# Patient Record
Sex: FEMALE | Race: WHITE | NOT HISPANIC OR LATINO | Employment: OTHER | URBAN - METROPOLITAN AREA
[De-identification: names, ages, dates, MRNs, and addresses within clinical notes are randomized per-mention and may not be internally consistent; named-entity substitution may affect disease eponyms.]

---

## 2017-04-25 DIAGNOSIS — E78.5 HYPERLIPIDEMIA LDL GOAL <130: ICD-10-CM

## 2017-04-25 RX ORDER — ATORVASTATIN CALCIUM 10 MG/1
TABLET, FILM COATED ORAL
Qty: 90 TABLET | Refills: 0 | Status: SHIPPED | OUTPATIENT
Start: 2017-04-25 | End: 2017-07-06

## 2017-04-25 NOTE — TELEPHONE ENCOUNTER
atorvastatin (LIPITOR) 10 MG tablet 90 tablet 3 2/22/2016              Last Written Prescription Date: 2/22/16  Last Fill Quantity: 90, # refills: 3    Last Office Visit with [facility], [facility] or [facility] prescribing provider:  10/10/16   Future Office Visit:  None    BP Readings from Last 3 Encounters:   10/10/16 115/56   03/25/16 149/78   02/22/16 138/63     Lab Results   Component Value Date    ALT 32 02/22/2016     Lab Results   Component Value Date    CHOL 174 09/21/2012     Lab Results   Component Value Date    HDL 68 02/22/2016     Lab Results   Component Value Date    LDL 87 02/22/2016    LDL 95 09/21/2012     Lab Results   Component Value Date    TRIG 95 09/21/2012     Lab Results   Component Value Date    CHOLHDLRATIO 2.9 09/21/2012

## 2017-04-25 NOTE — TELEPHONE ENCOUNTER
Prescription approved per [facility] Refill Protocol.  Lauren Rodriguez RN

## 2017-04-27 DIAGNOSIS — I10 ESSENTIAL HYPERTENSION, BENIGN: ICD-10-CM

## 2017-04-27 RX ORDER — CAPTOPRIL 50 MG/1
TABLET ORAL
Qty: 135 TABLET | Refills: 0 | Status: SHIPPED | OUTPATIENT
Start: 2017-04-27 | End: 2017-05-31

## 2017-04-27 NOTE — TELEPHONE ENCOUNTER
Pt is due for general Px. Please help her schedule then route back to refill pool. Thanks.     Amna Brown RN

## 2017-04-27 NOTE — TELEPHONE ENCOUNTER
Medication is being filled for 1 time refill only due to:  Due for labs  Filled 1 month supply  Mirian Eduardo RN

## 2017-04-27 NOTE — TELEPHONE ENCOUNTER
Pending Prescriptions:                       Disp   Refills    captopril (CAPOTEN) 50 MG tablet [Pharmacy*135 ta*0        Sig: TAKE ONE AND ONE-HALF TABLETS BY MOUTH THREE TIMES           DAILY          Last Written Prescription Date: 12/13/2016  Last Fill Quantity: 135, # refills: 0  Last Office Visit with [facility], [facility] or [facility] prescribing provider: 10/10/2016       Potassium   Date Value Ref Range Status   02/22/2016 4.2 3.4 - 5.3 mmol/L Final     Creatinine   Date Value Ref Range Status   02/22/2016 0.97 0.52 - 1.04 mg/dL Final     BP Readings from Last 3 Encounters:   10/10/16 115/56   03/25/16 149/78   02/22/16 138/63

## 2017-06-08 ENCOUNTER — OFFICE VISIT (OUTPATIENT)
Dept: URGENT CARE | Facility: URGENT CARE | Age: 82
End: 2017-06-08
Payer: COMMERCIAL

## 2017-06-08 ENCOUNTER — HOSPITAL ENCOUNTER (OUTPATIENT)
Dept: ULTRASOUND IMAGING | Facility: CLINIC | Age: 82
Discharge: HOME | End: 2017-06-08
Attending: PHYSICIAN ASSISTANT | Admitting: PHYSICIAN ASSISTANT
Payer: MEDICARE

## 2017-06-08 VITALS
BODY MASS INDEX: 41.81 KG/M2 | TEMPERATURE: 98.1 F | WEIGHT: 236 LBS | DIASTOLIC BLOOD PRESSURE: 70 MMHG | SYSTOLIC BLOOD PRESSURE: 152 MMHG | OXYGEN SATURATION: 97 % | HEART RATE: 63 BPM

## 2017-06-08 DIAGNOSIS — M79.661 RIGHT CALF PAIN: ICD-10-CM

## 2017-06-08 DIAGNOSIS — M79.10 MUSCLE PAIN: ICD-10-CM

## 2017-06-08 DIAGNOSIS — M79.604 PAIN OF RIGHT LOWER EXTREMITY: Primary | ICD-10-CM

## 2017-06-08 DIAGNOSIS — M79.604 PAIN OF RIGHT LOWER EXTREMITY: ICD-10-CM

## 2017-06-08 PROCEDURE — 99214 OFFICE O/P EST MOD 30 MIN: CPT | Performed by: PHYSICIAN ASSISTANT

## 2017-06-08 PROCEDURE — 93971 EXTREMITY STUDY: CPT | Mod: RT

## 2017-06-08 RX ORDER — ACETAMINOPHEN 500 MG
1000 TABLET ORAL EVERY 6 HOURS PRN
Qty: 30 TABLET | Refills: 0 | Status: SHIPPED | OUTPATIENT
Start: 2017-06-08 | End: 2018-12-27

## 2017-06-08 RX ORDER — TIZANIDINE 2 MG/1
2 TABLET ORAL 3 TIMES DAILY PRN
Qty: 21 TABLET | Refills: 0 | Status: SHIPPED | OUTPATIENT
Start: 2017-06-08 | End: 2017-07-06

## 2017-06-08 RX ORDER — MELOXICAM 7.5 MG/1
7.5 TABLET ORAL DAILY
Qty: 90 TABLET | Refills: 1 | Status: CANCELLED | OUTPATIENT
Start: 2017-06-08

## 2017-06-08 NOTE — NURSING NOTE
"Chief Complaint   Patient presents with     Leg Pain     Rt leg pain which is radiating to hip area x 2 days        Initial /70 (BP Location: Right arm, Patient Position: Chair, Cuff Size: Adult Regular)  Pulse 63  Temp 98.1  F (36.7  C) (Oral)  Wt 236 lb (107 kg)  SpO2 97%  BMI 41.81 kg/m2 Estimated body mass index is 41.81 kg/(m^2) as calculated from the following:    Height as of 10/10/16: 5' 3\" (1.6 m).    Weight as of this encounter: 236 lb (107 kg).  Medication Reconciliation: complete    "

## 2017-06-08 NOTE — PROGRESS NOTES
SUBJECTIVE:  Chief Complaint   Patient presents with     Leg Pain     Rt leg pain which is radiating to hip area x 2 days      Riddhi Aguilar is a 86 year old female presents with a chief complaint of right posterior hamstring, popliteal and calf tenderness .  How: no injury.  The patient complained of mild to moderate pain  and has had decreased ROM.  Pain exacerbated by walking and movement.  Relieved by rest.  She treated it initially with no therapy. This is the first time this type of injury has occurred to this patient.     Past Medical History:   Diagnosis Date     Bleeding ulcer      Essential hypertension, benign      Pure hypercholesterolemia      Spinal stenosis      Unspecified cataract      Unspecified glaucoma      Allergies   Allergen Reactions     No Known Drug Allergies      Social History   Substance Use Topics     Smoking status: Never Smoker     Smokeless tobacco: Never Used     Alcohol use No       ROS:  CONSTITUTIONAL:NEGATIVE for fever, chills, change in weight  INTEGUMENTARY/SKIN: NEGATIVE for worrisome rashes, moles or lesions  GI: NEGATIVE for nausea, abdominal pain, heartburn, or change in bowel habits  MUSCULOSKELETAL: POSITIVE  for right side posterior leg tenderness, pain, popliteal pain  NEURO: NEGATIVE for weakness, dizziness or paresthesias    EXAM:   /70 (BP Location: Right arm, Patient Position: Chair, Cuff Size: Adult Regular)  Pulse 63  Temp 98.1  F (36.7  C) (Oral)  Wt 236 lb (107 kg)  SpO2 97%  BMI 41.81 kg/m2  Gen: healthy,alert,no distress  Extremity: Positive for right posterior leg and calf tenderness, pain, tenderness.   There is not compromise to the distal circulation.  Pulses are +2 and CRT is brisk  GENERAL APPEARANCE: healthy, alert and no distress  EXTREMITIES: peripheral pulses normal  MS:  Positive for right popliteal and posterior leg tenderness  SKIN: no suspicious lesions or rashes  NEURO: Normal strength and tone, sensory exam grossly normal,  mentation intact and speech normal    X-RAY was not done.     Leg ultrasound negative for DVT    ASSESSMENT/PLAN:    ICD-10-CM    1. Pain of right lower extremity M79.604 US Lower Extremity Venous Duplex Right     acetaminophen (TYLENOL) 500 MG tablet   2. Right calf pain M79.661 US Lower Extremity Venous Duplex Right     acetaminophen (TYLENOL) 500 MG tablet   3. Muscle pain M79.1 tiZANidine (ZANAFLEX) 2 MG tablet     ROM exercises  Follow up with PCP as needed

## 2017-06-08 NOTE — MR AVS SNAPSHOT
"              After Visit Summary   6/8/2017    Riddhi Aguilar    MRN: [id number]           Patient Information     Date Of Birth          2/15/1931        Visit Information        Provider Department      6/8/2017 9:15 AM Roberto Carlos Oleary PA-C [facility]        Today's Diagnoses     Pain of right lower extremity    -  1    Right calf pain        Muscle pain           Follow-ups after your visit        Your next 10 appointments already scheduled     Jul 06, 2017 11:00 AM CDT   PHYSICAL with Shun Prado MD   [facility]    [address] [facility] [id number] [phone]              Future tests that were ordered for you today     Open Future Orders        Priority Expected Expires Ordered    US Lower Extremity Venous Duplex Right STAT  6/8/2018 6/8/2017            Who to contact     If you have questions or need follow up information about today's clinic visit or your schedule please contact [facility] directly at [phone].  Normal or non-critical lab and imaging results will be communicated to you by [employer] [facility], letter or phone within 4 business days after the clinic has received the results. If you do not hear from us within 7 days, please contact the clinic through [employer] [facility] or phone. If you have a critical or abnormal lab result, we will notify you by phone as soon as possible.  Submit refill requests through [employer] or call your pharmacy and they will forward the refill request to us. Please allow 3 business days for your refill to be completed.          Additional Information About Your Visit        [facility] [facility] Information     [employer] lets you send messages to your doctor, view your test results, renew your prescriptions, schedule appointments and more. To sign up, go to [email or web address] . Click on \"Log in\" on the left side of the screen, which will take you to the Welcome page. Then click on \"Sign up " "Now\" on the right side of the page.     You will be asked to enter the access code listed below, as well as some personal information. Please follow the directions to create your username and password.     Your access code is: [email or web address]  Expires: 2017  3:28 PM     Your access code will  in 90 days. If you need help or a new code, please call your [facility] clinic or [phone].        Care EveryWhere ID     This is your Care EveryWhere ID. This could be used by other organizations to access your [facility] medical records  [id number]        Your Vitals Were     Pulse Temperature Pulse Oximetry BMI (Body Mass Index)          63 98.1  F (36.7  C) (Oral) 97% 41.81 kg/m2         Blood Pressure from Last 3 Encounters:   17 152/70   10/10/16 115/56   16 149/78    Weight from Last 3 Encounters:   17 236 lb (107 kg)   10/10/16 243 lb 8 oz (110.5 kg)   16 230 lb (104.3 kg)                 Today's Medication Changes          These changes are accurate as of: 17  3:28 PM.  If you have any questions, ask your nurse or doctor.               Start taking these medicines.        Dose/Directions    acetaminophen 500 MG tablet   Commonly known as:  TYLENOL   Used for:  Pain of right lower extremity, Right calf pain   Started by:  Roberto Carlos Oleary PA-C        Dose:  1000 mg   Take 2 tablets (1,000 mg) by mouth every 6 hours as needed for mild pain   Quantity:  30 tablet   Refills:  0       tiZANidine 2 MG tablet   Commonly known as:  ZANAFLEX   Used for:  Muscle pain   Started by:  Roberto Carlos Oleary PA-C        Dose:  2 mg   Take 1 tablet (2 mg) by mouth 3 times daily as needed for muscle spasms   Quantity:  21 tablet   Refills:  0         Stop taking these medicines if you haven't already. Please contact your care team if you have questions.     methylPREDNISolone 4 MG tablet   Commonly known as:  MEDROL DOSEPAK   Stopped by:  Roberto Carlos Oleary PA-C                Where to get your medicines    "   These medications were sent to [facility] [address] - [place], [place] - [address]  [address], [facility] [address]     Phone:  [phone]     acetaminophen 500 MG tablet    tiZANidine 2 MG tablet                Primary Care Provider Office Phone # Fax #    Hari Khan Richard Brush [phone]       [facility] [address]  [place] [address]        Thank you!     Thank you for choosing [facility]  for your care. Our goal is always to provide you with excellent care. Hearing back from our patients is one way we can continue to improve our services. Please take a few minutes to complete the written survey that you may receive in the mail after your visit with us. Thank you!             Your Updated Medication List - Protect others around you: Learn how to safely use, store and throw away your medicines at www.disposemymeds.org.          This list is accurate as of: 6/8/17  3:28 PM.  Always use your most recent med list.                   Brand Name Dispense Instructions for use    acetaminophen 500 MG tablet    TYLENOL    30 tablet    Take 2 tablets (1,000 mg) by mouth every 6 hours as needed for mild pain       atorvastatin 10 MG tablet    LIPITOR    90 tablet    TAKE ONE TABLET BY MOUTH ONE TIME DAILY       buPROPion 300 MG 24 hr tablet    WELLBUTRIN XL    90 tablet    TAKE ONE TABLET BY MOUTH EVERY MORNING       captopril 50 MG tablet    CAPOTEN    135 tablet    TAKE ONE AND ONE-HALF TABLETS BY MOUTH THREE TIMES DAILY       hydrochlorothiazide 12.5 MG capsule    MICROZIDE    90 capsule    TAKE ONE CAPSULE BY MOUTH ONE TIME DAILY       oxyCODONE 5 MG IR tablet    ROXICODONE    30 tablet    Take 1 tablet (5 mg) by mouth every 6 hours as needed for pain (maximum 6 tablets per day)       spironolactone 25 MG tablet    ALDACTONE    90 tablet    TAKE ONE TABLET BY MOUTH ONE TIME DAILY       timolol 0.5 % ophthalmic gel-form     TIMOPTIC-XE     Place 1 drop into both eyes every morning.       tiZANidine 2 MG tablet    ZANAFLEX    21 tablet    Take 1 tablet (2 mg) by mouth 3 times daily as needed for muscle spasms       traMADol 50 MG tablet    ULTRAM    30 tablet    Take 1 tablet (50 mg) by mouth every 6 hours as needed for pain (maximum 8 tablets per day)       verapamil 240 MG CR tablet    CALAN-SR    180 tablet    TAKE ONE TABLET BY MOUTH EVERY 12 HOURS

## 2017-07-06 ENCOUNTER — OFFICE VISIT (OUTPATIENT)
Dept: FAMILY MEDICINE | Facility: CLINIC | Age: 82
End: 2017-07-06
Payer: COMMERCIAL

## 2017-07-06 VITALS
DIASTOLIC BLOOD PRESSURE: 66 MMHG | WEIGHT: 225 LBS | BODY MASS INDEX: 41.41 KG/M2 | HEART RATE: 73 BPM | OXYGEN SATURATION: 97 % | RESPIRATION RATE: 18 BRPM | TEMPERATURE: 96.8 F | SYSTOLIC BLOOD PRESSURE: 121 MMHG | HEIGHT: 62 IN

## 2017-07-06 DIAGNOSIS — E66.01 MORBID OBESITY DUE TO EXCESS CALORIES (H): ICD-10-CM

## 2017-07-06 DIAGNOSIS — C50.919 MALIGNANT NEOPLASM OF FEMALE BREAST, UNSPECIFIED ESTROGEN RECEPTOR STATUS, UNSPECIFIED LATERALITY, UNSPECIFIED SITE OF BREAST (H): ICD-10-CM

## 2017-07-06 DIAGNOSIS — M54.50 BILATERAL LOW BACK PAIN WITHOUT SCIATICA, UNSPECIFIED CHRONICITY: ICD-10-CM

## 2017-07-06 DIAGNOSIS — M70.51 PES ANSERINUS BURSITIS OF RIGHT KNEE: ICD-10-CM

## 2017-07-06 DIAGNOSIS — E78.5 HYPERLIPIDEMIA LDL GOAL <130: ICD-10-CM

## 2017-07-06 DIAGNOSIS — L97.301: Primary | ICD-10-CM

## 2017-07-06 DIAGNOSIS — I83.003: Primary | ICD-10-CM

## 2017-07-06 DIAGNOSIS — I10 ESSENTIAL HYPERTENSION, BENIGN: ICD-10-CM

## 2017-07-06 DIAGNOSIS — F33.0 MAJOR DEPRESSIVE DISORDER, RECURRENT EPISODE, MILD (H): ICD-10-CM

## 2017-07-06 LAB
ERYTHROCYTE [DISTWIDTH] IN BLOOD BY AUTOMATED COUNT: 16.9 % (ref 10–15)
HCT VFR BLD AUTO: 35.6 % (ref 35–47)
HGB BLD-MCNC: 11.7 G/DL (ref 11.7–15.7)
MCH RBC QN AUTO: 33.6 PG (ref 26.5–33)
MCHC RBC AUTO-ENTMCNC: 32.9 G/DL (ref 31.5–36.5)
MCV RBC AUTO: 102 FL (ref 78–100)
PLATELET # BLD AUTO: 332 10E9/L (ref 150–450)
RBC # BLD AUTO: 3.48 10E12/L (ref 3.8–5.2)
WBC # BLD AUTO: 6 10E9/L (ref 4–11)

## 2017-07-06 PROCEDURE — 36415 COLL VENOUS BLD VENIPUNCTURE: CPT | Performed by: INTERNAL MEDICINE

## 2017-07-06 PROCEDURE — 99213 OFFICE O/P EST LOW 20 MIN: CPT | Performed by: INTERNAL MEDICINE

## 2017-07-06 PROCEDURE — 80061 LIPID PANEL: CPT | Performed by: INTERNAL MEDICINE

## 2017-07-06 PROCEDURE — 85027 COMPLETE CBC AUTOMATED: CPT | Performed by: INTERNAL MEDICINE

## 2017-07-06 PROCEDURE — 80053 COMPREHEN METABOLIC PANEL: CPT | Performed by: INTERNAL MEDICINE

## 2017-07-06 RX ORDER — OXYCODONE HYDROCHLORIDE 5 MG/1
5 TABLET ORAL EVERY 6 HOURS PRN
Qty: 30 TABLET | Refills: 0 | Status: CANCELLED | OUTPATIENT
Start: 2017-07-06

## 2017-07-06 RX ORDER — ATORVASTATIN CALCIUM 10 MG/1
10 TABLET, FILM COATED ORAL DAILY
Qty: 90 TABLET | Refills: 3 | Status: SHIPPED | OUTPATIENT
Start: 2017-07-06 | End: 2018-11-29

## 2017-07-06 RX ORDER — HYDROCHLOROTHIAZIDE 12.5 MG/1
1 CAPSULE ORAL DAILY
Qty: 90 CAPSULE | Refills: 3 | Status: SHIPPED | OUTPATIENT
Start: 2017-07-06 | End: 2018-12-27

## 2017-07-06 NOTE — PATIENT INSTRUCTIONS
For your knee bursa pain you may safely use over the counter Tylenol (acetaminophen).  You make take 1000mg  three times per day.    You should ice your knee for 10 minutes twice daily         Preventive Health Recommendations    Female Ages 65 +    Yearly exam:     See your health care provider every year in order to  o Review health changes.   o Discuss preventive care.    o Review your medicines if your doctor has prescribed any.      You no longer need a yearly Pap test unless you've had an abnormal Pap test in the past 10 years. If you have vaginal symptoms, such as bleeding or discharge, be sure to talk with your provider about a Pap test.      Every 1 to 2 years, have a mammogram.  If you are over 69, talk with your health care provider about whether or not you want to continue having screening mammograms.      Every 10 years, have a colonoscopy. Or, have a yearly FIT test (stool test). These exams will check for colon cancer.       Have a cholesterol test every 5 years, or more often if your doctor advises it.       Have a diabetes test (fasting glucose) every three years. If you are at risk for diabetes, you should have this test more often.       At age 65, have a bone density scan (DEXA) to check for osteoporosis (brittle bone disease).    Shots:    Get a flu shot each year.    Get a tetanus shot every 10 years.    Talk to your doctor about your pneumonia vaccines. There are now two you should receive - Pneumovax (PPSV 23) and Prevnar (PCV 13).    Talk to your doctor about the shingles vaccine.    Talk to your doctor about the hepatitis B vaccine.    Nutrition:     Eat at least 5 servings of fruits and vegetables each day.      Eat whole-grain bread, whole-wheat pasta and brown rice instead of white grains and rice.      Talk to your provider about Calcium and Vitamin D.     Lifestyle    Exercise at least 150 minutes a week (30 minutes a day, 5 days a week). This will help you control your weight and  prevent disease.      Limit alcohol to one drink per day.      No smoking.       Wear sunscreen to prevent skin cancer.       See your dentist twice a year for an exam and cleaning.      See your eye doctor every 1 to 2 years to screen for conditions such as glaucoma, macular degeneration and cataracts.

## 2017-07-06 NOTE — NURSING NOTE
"Chief Complaint   Patient presents with     Wellness Visit     Fasting       Initial /66 (BP Location: Right arm, Patient Position: Chair, Cuff Size: Adult Large)  Pulse 73  Temp 96.8  F (36  C) (Oral)  Resp 18  Ht 5' 1.5\" (1.562 m)  Wt 225 lb (102.1 kg)  SpO2 97%  BMI 41.82 kg/m2 Estimated body mass index is 41.82 kg/(m^2) as calculated from the following:    Height as of this encounter: 5' 1.5\" (1.562 m).    Weight as of this encounter: 225 lb (102.1 kg).  Medication Reconciliation: complete   Kymberly Hardy CMA (AAMA)      "

## 2017-07-06 NOTE — LETTER
[facility]  [address] [place] [address]. [facility]  [address]  [place]  [address]  Tel: [phone]    July 10, 2017    Riddhi Aguilar  [address] [facility] [address]        Dear Ms. Aguilar,    The following letter pertains to your most recent diagnostic tests:    -Liver and gallbladder tests are normal for you. (ALT,AST, Alk phos, bilirubin), kidney function is normal for you (Creatinine, GFR), Sodium is normal, Potassium is normal for you, Calcium is normal for you, Glucose (blood sugar) is normal for you.      -Your cholesterol panel looks healthy.     -Your complete blood counts including your hemoglobin returned normal for you.         Bottom line:  Your lab results look healthy and at goal      Follow up:  Call me if your knee bursitis persists despite ICE and TYLENOL and we can arrange for you to come in for an injection    If you have any further questions or problems, please contact our office.      Sincerely,    Shun Prado MD/CIRAA    Enclosure: Lab Results  Results for orders placed or performed in visit on 07/06/17   Comprehensive metabolic panel   Result Value Ref Range    Sodium 139 133 - 144 mmol/L    Potassium 4.1 3.4 - 5.3 mmol/L    Chloride 106 94 - 109 mmol/L    Carbon Dioxide 25 20 - 32 mmol/L    Anion Gap 8 3 - 14 mmol/L    Glucose 88 70 - 99 mg/dL    Urea Nitrogen 27 7 - 30 mg/dL    Creatinine 1.01 0.52 - 1.04 mg/dL    GFR Estimate 52 (L) >60 mL/min/1.7m2    GFR Estimate If Black 63 >60 mL/min/1.7m2    Calcium 9.3 8.5 - 10.1 mg/dL    Bilirubin Total 1.2 0.2 - 1.3 mg/dL    Albumin 4.2 3.4 - 5.0 g/dL    Protein Total 7.3 6.8 - 8.8 g/dL    Alkaline Phosphatase 50 40 - 150 U/L    ALT 21 0 - 50 U/L    AST 22 0 - 45 U/L   Lipid Profile with reflex to direct LDL   Result Value Ref Range    Cholesterol 140 <200 mg/dL    Triglycerides 86 <150 mg/dL    HDL Cholesterol 72 >49 mg/dL    LDL Cholesterol Calculated 51 <100 mg/dL    Non HDL Cholesterol 68 <130 mg/dL   CBC with platelets   Result Value  Ref Range    WBC 6.0 4.0 - 11.0 10e9/L    RBC Count 3.48 (L) 3.8 - 5.2 10e12/L    Hemoglobin 11.7 11.7 - 15.7 g/dL    Hematocrit 35.6 35.0 - 47.0 %     (H) 78 - 100 fl    MCH 33.6 (H) 26.5 - 33.0 pg    MCHC 32.9 31.5 - 36.5 g/dL    RDW 16.9 (H) 10.0 - 15.0 %    Platelet Count 332 150 - 450 10e9/L

## 2017-07-06 NOTE — PROGRESS NOTES
SUBJECTIVE:   Riddhi Aguilar is a 86 year old female who presents for Preventive Visit.  Are you in the first 12 months of your Medicare Part B coverage?  No    Healthy Habits:    Do you get at least three servings of calcium containing foods daily (dairy, green leafy vegetables, etc.)? yes    Amount of exercise or daily activities, outside of work: light walking-when can    Problems taking medications regularly No    Medication side effects: No    Have you had an eye exam in the past two years? yes    Do you see a dentist twice per year? no    Do you have sleep apnea, excessive snoring or daytime drowsiness?yes    COGNITIVE SCREEN  1) Repeat 3 items (Banana, Sunrise, Chair)    2) Clock draw: NORMAL  3) 3 item recall: Recalls 3 objects  Results: 3 items recalled: COGNITIVE IMPAIRMENT LESS LIKELY    Mini-CogTM Copyright IZNA Thurston. Licensed by the author for use in [facility]; reprinted with permission (jose@[facility]). All rights reserved.        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  No fevers or chills or pain associated with ankle ulcer.      Reviewed and updated as needed this visit by clinical staff  Tobacco  Allergies  Meds  Med Hx  Surg Hx  Fam Hx  Soc Hx        Reviewed and updated as needed this visit by Provider        Social History   Substance Use Topics     Smoking status: Never Smoker     Smokeless tobacco: Never Used     Alcohol use No       The patient does not drink >3 drinks per day nor >7 drinks per week.    Today's PHQ-2 Score:   PHQ-2 ( 1999 [employer]) 7/6/2017 10/10/2016   Q1: Little interest or pleasure in doing things 0 0   Q2: Feeling down, depressed or hopeless 0 0   PHQ-2 Score 0 0       Do you feel safe in your environment - Yes    Do you have a Health Care Directive?: Yes:  Advance Directive has been received and scanned.    Current providers sharing in care for this patient include:   Patient Care Team:  Hari Brush MD as PCP - General (Internal Medicine)      Hearing impairment: Yes,     Ability to successfully perform activities of daily living: Yes, no assistance needed     Fall risk:  Fallen 2 or more times in the past year?: No  Any fall with injury in the past year?: No    Home safety:  none identified      The following health maintenance items are reviewed in Epic and correct as of today:  Health Maintenance   Topic Date Due     DEPRESSION ACTION PLAN Q1 YR  02/16/1931     FALL RISK ASSESSMENT  02/22/2017     PHQ-9 Q6 MONTHS  04/10/2017     INFLUENZA VACCINE (SYSTEM ASSIGNED)  09/01/2017     ADVANCE DIRECTIVE PLANNING Q5 YRS  09/21/2017     TETANUS IMMUNIZATION (SYSTEM ASSIGNED)  09/21/2022     PNEUMOCOCCAL  Completed     Patient Active Problem List   Diagnosis     Essential hypertension, benign     Obesity     Urgency of urination     Malignant neoplasm of female breast (H)     Asymptomatic varicose veins     Allergic state     Insomnia     Family Hist of Malignant Neoplasm-- breast cancer     Cervical radiculopathy     Herpes Zoster- rt abdomen     HYPERLIPIDEMIA LDL GOAL <130     Diplopia     Glaucoma     Cataract, left eye     Duodenal ulcer with hemorrhage     Advanced directives, counseling/discussion     Subdural hematoma caused by concussion (H)     Health Care Home     Major depressive disorder, recurrent episode, mild (H)     Major depressive disorder, single episode, mild (H)     Past Surgical History:   Procedure Laterality Date     BREAST SURGERY      lumpectomy     C NONSPECIFIC PROCEDURE      Repair of buckled retina of rt eye     C NONSPECIFIC PROCEDURE      Appy     DILATION AND CURETTAGE, HYSTEROSCOPY DIAGNOSTIC, COMBINED  2/6/2014    Procedure: COMBINED DILATION AND CURETTAGE, HYSTEROSCOPY DIAGNOSTIC;  DILATION AND CURETTAGE,  HYSTEROSCOPY, POLYPECTOMY, INCISION AND DRAINAGE OF SEBACEOUS CYST ;  Surgeon: Serena Zamora MD;  Location: [facility]     INCISION AND DRAINAGE PERINEAL, COMBINED  2/6/2014    Procedure: COMBINED INCISION AND DRAINAGE PERINEAL;;  Surgeon: Serena Zamora MD;  Location: [facility]       Social History   Substance Use Topics     Smoking status: Never Smoker     Smokeless tobacco: Never Used     Alcohol use No     Family History   Problem Relation Age of Onset     Breast Cancer Sister      Breast Cancer Sister      CANCER Brother      bone cancer in arm         Current Outpatient Prescriptions   Medication Sig Dispense Refill     acetaminophen (TYLENOL) 500 MG tablet Take 2 tablets (1,000 mg) by mouth every 6 hours as needed for mild pain 30 tablet 0     captopril (CAPOTEN) 50 MG tablet TAKE ONE AND ONE-HALF TABLETS BY MOUTH THREE TIMES DAILY 135 tablet 1     atorvastatin (LIPITOR) 10 MG tablet TAKE ONE TABLET BY MOUTH ONE TIME DAILY 90 tablet 0     verapamil (CALAN-SR) 240 MG CR tablet TAKE ONE TABLET BY MOUTH EVERY 12 HOURS 180 tablet 2     buPROPion (WELLBUTRIN XL) 300 MG 24 hr tablet TAKE ONE TABLET BY MOUTH EVERY MORNING 90 tablet 2     spironolactone (ALDACTONE) 25 MG tablet TAKE ONE TABLET BY MOUTH ONE TIME DAILY 90 tablet 2     timolol (TIMOPTIC-XR) 0.5 % ophthalmic gel-forming Place 1 drop into both eyes every morning.       tiZANidine (ZANAFLEX) 2 MG tablet Take 1 tablet (2 mg) by mouth 3 times daily as needed for muscle spasms (Patient not taking: Reported on 7/6/2017) 21 tablet 0     oxyCODONE (ROXICODONE) 5 MG immediate release tablet Take 1 tablet (5 mg) by mouth every 6 hours as needed for pain (maximum 6 tablets per day) (Patient not taking: Reported on 7/6/2017) 30 tablet 0     hydrochlorothiazide (MICROZIDE) 12.5 MG capsule TAKE ONE CAPSULE BY MOUTH ONE TIME DAILY (Patient not taking: Reported on 7/6/2017) 90 capsule 1     No Known Allergies        ROS:  Constitutional, HEENT, cardiovascular, pulmonary,  "GI, , musculoskeletal, neuro, skin, endocrine and psych systems are negative, except as otherwise noted.    Right medial knee pain without after \"over extending\" muscles one month ago.  No joint swelling or redness.  Pain improved with APAP.      OBJECTIVE:   /66 (BP Location: Right arm, Patient Position: Chair, Cuff Size: Adult Large)  Pulse 73  Temp 96.8  F (36  C) (Oral)  Resp 18  Ht 5' 1.5\" (1.562 m)  Wt 225 lb (102.1 kg)  SpO2 97%  BMI 41.82 kg/m2 Estimated body mass index is 41.82 kg/(m^2) as calculated from the following:    Height as of this encounter: 5' 1.5\" (1.562 m).    Weight as of this encounter: 225 lb (102.1 kg).  EXAM:   GENERAL APPEARANCE: healthy, alert and no distress  EYES: Eyes grossly normal to inspection, PERRL and conjunctivae and sclerae normal  HENT: ear canals and TM's normal, nose and mouth without ulcers or lesions, oropharynx clear and oral mucous membranes moist  NECK: no adenopathy, no asymmetry, masses, or scars and thyroid normal to palpation  RESP: lungs clear to auscultation - no rales, rhonchi or wheezes  BREAST: normal without masses, tenderness or nipple discharge and no palpable axillary masses or adenopathy  CV: regular rate and rhythm, normal S1 S2, no S3 or S4, no murmur, click or rub, no peripheral edema and peripheral pulses strong  ABDOMEN: Morbid obesity, soft, nontender, no hepatosplenomegaly, no masses and bowel sounds normal  MS: no musculoskeletal defects are noted and gait is age appropriate without ataxia  SKIN: small shallow ulcer on medial aspect of right ankle without surrounding skin erythema   NEURO: Normal strength and tone, sensory exam grossly normal, mentation intact and speech normal  PSYCH: mentation appears normal and affect normal/bright  Right knee without effusion, full ROM, tender anserine bursa     ASSESSMENT / PLAN:   1. Venous stasis ulcer of ankle limited to breakdown of skin (H)  Need wound care clinic to help heal, needs " "compression wraps likey; until then daily dressing changes with petrolatum   - WOUND CARE REFERRAL    2. Malignant neoplasm of female breast, unspecified estrogen receptor status, unspecified laterality, unspecified site of breast (H)  Breast exam today     3. Major depressive disorder, recurrent episode, mild (H)  Well controlled on wellbutrin    4. Morbid obesity due to excess calories (H)  Counseled on diet and exercise interventions to promote weight loss     5. Bilateral low back pain without sciatica, unspecified chronicity      6. Hyperlipidemia LDL goal <130    - atorvastatin (LIPITOR) 10 MG tablet; Take 1 tablet (10 mg) by mouth daily  Dispense: 90 tablet; Refill: 3  - Lipid Profile with reflex to direct LDL    7. Essential hypertension, benign  Well controlled   - hydrochlorothiazide (MICROZIDE) 12.5 MG capsule; Take 1 capsule (12.5 mg) by mouth daily  Dispense: 90 capsule; Refill: 3  - Comprehensive metabolic panel  - CBC with platelets    8. Pes anserinus bursitis of right knee  Try ice and APAP as per instructions; return for shot if needed       End of Life Planning:  Patient currently has an advanced directive: Yes.  Practitioner is supportive of decision.    COUNSELING:  Reviewed preventive health counseling, as reflected in patient instructions  Special attention given to:       Regular exercise       Healthy diet/nutrition        Estimated body mass index is 41.82 kg/(m^2) as calculated from the following:    Height as of this encounter: 5' 1.5\" (1.562 m).    Weight as of this encounter: 225 lb (102.1 kg).  Weight management plan: Discussed healthy diet and exercise guidelines and patient will follow up in 12 months in clinic to re-evaluate.   reports that she has never smoked. She has never used smokeless tobacco.      Appropriate preventive services were discussed with this patient, including applicable screening as appropriate for cardiovascular disease, diabetes, osteopenia/osteoporosis, and " glaucoma.  As appropriate for age/gender, discussed screening for colorectal cancer, prostate cancer, breast cancer, and cervical cancer. Checklist reviewing preventive services available has been given to the patient.    Reviewed patients plan of care and provided an AVS. The Basic Care Plan (routine screening as documented in Health Maintenance) for Riddhi meets the Care Plan requirement. This Care Plan has been established and reviewed with the Patient and other:daughter in law .    Counseling Resources:  ATP IV Guidelines  Pooled Cohorts Equation Calculator  Breast Cancer Risk Calculator  FRAX Risk Assessment  ICSI Preventive Guidelines  Dietary Guidelines for Americans, 2010  USDA's MyPlate  ASA Prophylaxis  Lung CA Screening    Shun Prado MD  [facility]

## 2017-07-06 NOTE — MR AVS SNAPSHOT
After Visit Summary   7/6/2017    Riddhi Aguilar    MRN: [id number]           Patient Information     Date Of Birth          2/15/1931        Visit Information        Provider Department      7/6/2017 11:00 AM Shun Prado MD [facility]        Today's Diagnoses     Venous stasis ulcer of ankle limited to breakdown of skin (H)    -  1    Malignant neoplasm of female breast, unspecified estrogen receptor status, unspecified laterality, unspecified site of breast (H)        Major depressive disorder, recurrent episode, mild (H)        Morbid obesity due to excess calories (H)        Bilateral low back pain without sciatica, unspecified chronicity        Hyperlipidemia LDL goal <130        Essential hypertension, benign        Pes anserinus bursitis of right knee          Care Instructions    For your knee bursa pain you may safely use over the counter Tylenol (acetaminophen).  You make take 1000mg  three times per day.    You should ice your knee for 10 minutes twice daily         Preventive Health Recommendations    Female Ages 65 +    Yearly exam:     See your health care provider every year in order to  o Review health changes.   o Discuss preventive care.    o Review your medicines if your doctor has prescribed any.      You no longer need a yearly Pap test unless you've had an abnormal Pap test in the past 10 years. If you have vaginal symptoms, such as bleeding or discharge, be sure to talk with your provider about a Pap test.      Every 1 to 2 years, have a mammogram.  If you are over 69, talk with your health care provider about whether or not you want to continue having screening mammograms.      Every 10 years, have a colonoscopy. Or, have a yearly FIT test (stool test). These exams will check for colon cancer.       Have a cholesterol test every 5 years, or more often if your doctor advises it.       Have a diabetes test (fasting glucose) every three years. If you are at risk  for diabetes, you should have this test more often.       At age 65, have a bone density scan (DEXA) to check for osteoporosis (brittle bone disease).    Shots:    Get a flu shot each year.    Get a tetanus shot every 10 years.    Talk to your doctor about your pneumonia vaccines. There are now two you should receive - Pneumovax (PPSV 23) and Prevnar (PCV 13).    Talk to your doctor about the shingles vaccine.    Talk to your doctor about the hepatitis B vaccine.    Nutrition:     Eat at least 5 servings of fruits and vegetables each day.      Eat whole-grain bread, whole-wheat pasta and brown rice instead of white grains and rice.      Talk to your provider about Calcium and Vitamin D.     Lifestyle    Exercise at least 150 minutes a week (30 minutes a day, 5 days a week). This will help you control your weight and prevent disease.      Limit alcohol to one drink per day.      No smoking.       Wear sunscreen to prevent skin cancer.       See your dentist twice a year for an exam and cleaning.      See your eye doctor every 1 to 2 years to screen for conditions such as glaucoma, macular degeneration and cataracts.          Follow-ups after your visit        Additional Services     WOUND CARE REFERRAL       Your provider has referred you to: [place]:  [facility] [place] [facility] [phone] FAX REFERRAL TO [phone] [email or web address]    Reason for referral: Wound care      1. [facility] Wound Consult appointment is related to what kind of wound: Venous leg/foot ulcer    2. Location of wound: Lower extremity    3. Reason for referral: Assess and treat as indicated    4. Desired treatment if any: Per WO nurse     Please be aware that coverage of these services is subject to the terms and limitations of your health insurance plan.  Call member services at your health plan with any benefit or coverage questions.      Please bring the following with you to your  "appointment:    (1) Any X-Rays, CTs or MRIs which have been performed.  Contact the facility where they were done to arrange for  prior to your scheduled appointment.    (2) List of current medications   (3) This referral request   (4) Any documents/labs given to you for this referral                  Follow-up notes from your care team     Return 1-2 weeks if bursitis pain persists.      Who to contact     If you have questions or need follow up information about today's clinic visit or your schedule please contact [facility] directly at [phone].  Normal or non-critical lab and imaging results will be communicated to you by Literablyhart, letter or phone within 4 business days after the clinic has received the results. If you do not hear from us within 7 days, please contact the clinic through Literablyhart or phone. If you have a critical or abnormal lab result, we will notify you by phone as soon as possible.  Submit refill requests through ChannelMeter or call your pharmacy and they will forward the refill request to us. Please allow 3 business days for your refill to be completed.          Additional Information About Your Visit        MyChart Information     ChannelMeter lets you send messages to your doctor, view your test results, renew your prescriptions, schedule appointments and more. To sign up, go to [email or web address] . Click on \"Log in\" on the left side of the screen, which will take you to the Welcome page. Then click on \"Sign up Now\" on the right side of the page.     You will be asked to enter the access code listed below, as well as some personal information. Please follow the directions to create your username and password.     Your access code is: [email or web address]  Expires: 2017  3:28 PM     Your access code will  in 90 days. If you need help or a new code, please call your [facility] or [phone].        Care EveryWhere ID     This is your Care EveryWhere ID. This could be " "used by other organizations to access your [place] medical records  [id number]        Your Vitals Were     Pulse Temperature Respirations Height Pulse Oximetry BMI (Body Mass Index)    73 96.8  F (36  C) (Oral) 18 5' 1.5\" (1.562 m) 97% 41.82 kg/m2       Blood Pressure from Last 3 Encounters:   07/06/17 121/66   06/08/17 152/70   10/10/16 115/56    Weight from Last 3 Encounters:   07/06/17 225 lb (102.1 kg)   06/08/17 236 lb (107 kg)   10/10/16 243 lb 8 oz (110.5 kg)              We Performed the Following     CBC with platelets     Comprehensive metabolic panel     DEPRESSION ACTION PLAN (DAP)     Lipid Profile with reflex to direct LDL     WOUND CARE REFERRAL          Today's Medication Changes          These changes are accurate as of: 7/6/17 11:46 AM.  If you have any questions, ask your nurse or doctor.               These medicines have changed or have updated prescriptions.        Dose/Directions    atorvastatin 10 MG tablet   Commonly known as:  LIPITOR   This may have changed:  See the new instructions.   Used for:  Hyperlipidemia LDL goal <130   Changed by:  Shun Prado MD        Dose:  10 mg   Take 1 tablet (10 mg) by mouth daily   Quantity:  90 tablet   Refills:  3       hydrochlorothiazide 12.5 MG capsule   Commonly known as:  MICROZIDE   This may have changed:  See the new instructions.   Used for:  Essential hypertension, benign   Changed by:  Shun Prado MD        Dose:  1 capsule   Take 1 capsule (12.5 mg) by mouth daily   Quantity:  90 capsule   Refills:  3         Stop taking these medicines if you haven't already. Please contact your care team if you have questions.     oxyCODONE 5 MG IR tablet   Commonly known as:  ROXICODONE   Stopped by:  Shun Prado MD           tiZANidine 2 MG tablet   Commonly known as:  ZANAFLEX   Stopped by:  Shun Prado MD           traMADol 50 MG tablet   Commonly known as:  ULTRAM   Stopped by:  Shun Prado MD                Where to get your " medicines      These medications were sent to [place] [id number] - [place], [place] - [address]  [address], [facility] [address]     Phone:  [phone]     atorvastatin 10 MG tablet    hydrochlorothiazide 12.5 MG capsule                Primary Care Provider Office Phone # Fax #    Hari Khan Richard Brush [phone]       [facility] [address]  [facility] [address]        Equal Access to Services     AMOS LUNA : Hadii aad ku hadasho Soomaali, waaxda luqadaha, qaybta kaalmada adeegyada, waxay idiin hayaan adeeg kharash la'matthieu . So [facility] [phone].    ATENCIÓN: Si habla español, tiene a horvath disposición servicios gratuitos de asistencia lingüística. [facility] [phone].    We comply with applicable federal civil rights laws and [place] laws. We do not discriminate on the basis of race, color, national origin, age, disability sex, sexual orientation or gender identity.            Thank you!     Thank you for choosing [facility]  for your care. Our goal is always to provide you with excellent care. Hearing back from our patients is one way we can continue to improve our services. Please take a few minutes to complete the written survey that you may receive in the mail after your visit with us. Thank you!             Your Updated Medication List - Protect others around you: Learn how to safely use, store and throw away your medicines at www.disposemymeds.org.          This list is accurate as of: 7/6/17 11:46 AM.  Always use your most recent med list.                   Brand Name Dispense Instructions for use Diagnosis    acetaminophen 500 MG tablet    TYLENOL    30 tablet    Take 2 tablets (1,000 mg) by mouth every 6 hours as needed for mild pain    Pain of right lower extremity, Right calf pain       atorvastatin 10 MG tablet    LIPITOR    90 tablet    Take 1 tablet (10 mg) by mouth daily    Hyperlipidemia LDL goal <130       buPROPion  300 MG 24 hr tablet    WELLBUTRIN XL    90 tablet    TAKE ONE TABLET BY MOUTH EVERY MORNING    Major depressive disorder, recurrent episode, mild (H), Essential hypertension, benign       captopril 50 MG tablet    CAPOTEN    135 tablet    TAKE ONE AND ONE-HALF TABLETS BY MOUTH THREE TIMES DAILY    Essential hypertension, benign       hydrochlorothiazide 12.5 MG capsule    MICROZIDE    90 capsule    Take 1 capsule (12.5 mg) by mouth daily    Essential hypertension, benign       spironolactone 25 MG tablet    ALDACTONE    90 tablet    TAKE ONE TABLET BY MOUTH ONE TIME DAILY    Major depressive disorder, recurrent episode, mild (H), Essential hypertension, benign       timolol 0.5 % ophthalmic gel-form    TIMOPTIC-XE     Place 1 drop into both eyes every morning.        verapamil 240 MG CR tablet    CALAN-SR    180 tablet    TAKE ONE TABLET BY MOUTH EVERY 12 HOURS    Major depressive disorder, recurrent episode, mild (H), Essential hypertension, benign

## 2017-07-07 LAB
ALBUMIN SERPL-MCNC: 4.2 G/DL (ref 3.4–5)
ALP SERPL-CCNC: 50 U/L (ref 40–150)
ALT SERPL W P-5'-P-CCNC: 21 U/L (ref 0–50)
ANION GAP SERPL CALCULATED.3IONS-SCNC: 8 MMOL/L (ref 3–14)
AST SERPL W P-5'-P-CCNC: 22 U/L (ref 0–45)
BILIRUB SERPL-MCNC: 1.2 MG/DL (ref 0.2–1.3)
BUN SERPL-MCNC: 27 MG/DL (ref 7–30)
CALCIUM SERPL-MCNC: 9.3 MG/DL (ref 8.5–10.1)
CHLORIDE SERPL-SCNC: 106 MMOL/L (ref 94–109)
CHOLEST SERPL-MCNC: 140 MG/DL
CO2 SERPL-SCNC: 25 MMOL/L (ref 20–32)
CREAT SERPL-MCNC: 1.01 MG/DL (ref 0.52–1.04)
GFR SERPL CREATININE-BSD FRML MDRD: 52 ML/MIN/1.7M2
GLUCOSE SERPL-MCNC: 88 MG/DL (ref 70–99)
HDLC SERPL-MCNC: 72 MG/DL
LDLC SERPL CALC-MCNC: 51 MG/DL
NONHDLC SERPL-MCNC: 68 MG/DL
POTASSIUM SERPL-SCNC: 4.1 MMOL/L (ref 3.4–5.3)
PROT SERPL-MCNC: 7.3 G/DL (ref 6.8–8.8)
SODIUM SERPL-SCNC: 139 MMOL/L (ref 133–144)
TRIGL SERPL-MCNC: 86 MG/DL

## 2017-07-07 ASSESSMENT — PATIENT HEALTH QUESTIONNAIRE - PHQ9: SUM OF ALL RESPONSES TO PHQ QUESTIONS 1-9: 6

## 2017-07-08 NOTE — PROGRESS NOTES
The following letter pertains to your most recent diagnostic tests:    -Liver and gallbladder tests are normal for you. (ALT,AST, Alk phos, bilirubin), kidney function is normal for you (Creatinine, GFR), Sodium is normal, Potassium is normal for you, Calcium is normal for you, Glucose (blood sugar) is normal for you.      -Your cholesterol panel looks healthy.     -Your complete blood counts including your hemoglobin returned normal for you.         Bottom line:  Your lab results look healthy and at goal      Follow up:  Call me if your knee bursitis persists despite ICE and TYLENOL and we can arrange for you to come in for an injection      Sincerely,    Dr. Prado

## 2017-08-14 ENCOUNTER — HOSPITAL ENCOUNTER (OUTPATIENT)
Dept: WOUND CARE | Facility: CLINIC | Age: 82
Discharge: HOME | End: 2017-08-14
Attending: SURGERY | Admitting: SURGERY
Payer: MEDICARE

## 2017-08-14 VITALS
RESPIRATION RATE: 16 BRPM | HEIGHT: 62 IN | BODY MASS INDEX: 41.96 KG/M2 | DIASTOLIC BLOOD PRESSURE: 74 MMHG | TEMPERATURE: 98.2 F | HEART RATE: 69 BPM | WEIGHT: 228 LBS | SYSTOLIC BLOOD PRESSURE: 139 MMHG

## 2017-08-14 PROCEDURE — 93923 UPR/LXTR ART STDY 3+ LVLS: CPT

## 2017-08-14 PROCEDURE — 11100 ZZHC BIOPSY SKIN/SUBQ/MUC MEM, SINGLE LESION: CPT | Performed by: SURGERY

## 2017-08-14 PROCEDURE — 88305 TISSUE EXAM BY PATHOLOGIST: CPT | Performed by: SURGERY

## 2017-08-14 PROCEDURE — 99214 OFFICE O/P EST MOD 30 MIN: CPT | Mod: 25

## 2017-08-14 PROCEDURE — 99202 OFFICE O/P NEW SF 15 MIN: CPT | Mod: 25 | Performed by: SURGERY

## 2017-08-14 PROCEDURE — 11100 ZZHC BIOPSY SKIN/SUBQ/MUC MEM, SINGLE LESION: CPT

## 2017-08-14 PROCEDURE — 88305 TISSUE EXAM BY PATHOLOGIST: CPT | Mod: 26 | Performed by: SURGERY

## 2017-08-14 PROCEDURE — A6235 HYDROCOLLD DRG >16<=48 W/O B: HCPCS

## 2017-08-14 NOTE — PROGRESS NOTES
[facility] [place] Progress Note    Subject: Riddhi Aguilar Was referred to see us today by Dr. Prado was her primary care physician.  This delightful independent 86-year-old patient with few medical problems except for well-controlled hypertension developed an ulceration right medial ankle over a year ago.  Etiology is unclear with no prior history of venous problems or DVT or significant swelling.  The wound is somewhat improved but is never healed over completely.  She has some mild discomfort but no major problems associated with this.  She does not have any significant swelling.  She's never had a similar ulceration.    PMH: No allergies              Current Outpatient Prescriptions:      atorvastatin (LIPITOR) 10 MG tablet, Take 1 tablet (10 mg) by mouth daily, Disp: 90 tablet, Rfl: 3     hydrochlorothiazide (MICROZIDE) 12.5 MG capsule, Take 1 capsule (12.5 mg) by mouth daily, Disp: 90 capsule, Rfl: 3     acetaminophen (TYLENOL) 500 MG tablet, Take 2 tablets (1,000 mg) by mouth every 6 hours as needed for mild pain, Disp: 30 tablet, Rfl: 0     captopril (CAPOTEN) 50 MG tablet, TAKE ONE AND ONE-HALF TABLETS BY MOUTH THREE TIMES DAILY, Disp: 135 tablet, Rfl: 1     verapamil (CALAN-SR) 240 MG CR tablet, TAKE ONE TABLET BY MOUTH EVERY 12 HOURS, Disp: 180 tablet, Rfl: 2     buPROPion (WELLBUTRIN XL) 300 MG 24 hr tablet, TAKE ONE TABLET BY MOUTH EVERY MORNING, Disp: 90 tablet, Rfl: 2     spironolactone (ALDACTONE) 25 MG tablet, TAKE ONE TABLET BY MOUTH ONE TIME DAILY, Disp: 90 tablet, Rfl: 2     timolol (TIMOPTIC-XR) 0.5 % ophthalmic gel-forming, Place 1 drop into both eyes every morning., Disp: , Rfl:              Medical: Hypertension, glaucoma                        No history significant cardiac problems.  No PAD.                        No known venous insufficiency.         Nonsmoker.  Lives independently.    12 point review of systems is otherwise unremarkable except for the ankle ulcer.  No  "history of skin cancers.        Exam:  /74  Pulse 69  Temp 98.2  F (36.8  C) (Temporal)  Resp 16  Ht 1.562 m (5' 1.5\")  Wt 103.4 kg (228 lb)  BMI 42.38 kg/m2   Alert and appropriate.  She is here with her daughter.  Normal affect.  No carotid bruits.  Chest=clear  Cardiovascular=RR  Abdomen= mildly obese, nontender  Extremities=  Very minimal edema.  Several spider Telangiectasia and reticular veins in both legs but no large varicose veins.  No stigmata of chronic venous insufficiency such as stasis changes or hemosiderin staining.            +3 palpable dorsalis pedis pulse bilaterally. Normal +3 triphasic Doppler signals in the dorsalis pedis and posterior tibial arteries bilaterally.              On the right medial ankle there is an ulcerated area  That measures 0.5 x 0.8 cm with a depth of 0.3 cm.  Immediate 0.5 cm surrounding skin is slightly abnormal and not being normal epithelium compared to the rest of the ankle and leg.  Small amount of slough is noted in the center of the ulcerated area.    With these findings I felt that an excisional biopsy was needed not only to get a clean wound bed but also to rule out a malignancy that can occur with an ulcer that is now well over a year old.    Procedure:  Time out was called, informed consent obtained, and topical anesthetic of 4% lidocaine was applied per standing protocol. To get a better pain control we prepped the area with Betadine and injected 10 mL of 0.5% plain Marcaine in a field block fashion with excellent results.  A full-thickness/subcutaneous excisional debridement was performed using a #15 blade down to and including subcutaneous tissue .  We excised down to healthy bleeding tissue.  Nevus silver nitrate to decrease this.  Tissue was sent for pathology in formaldehyde. Bleeding controlled with light pressure. Patient tolerated procedure well.    Impression: Right medial chronic ankle ulcer.  No evidence of arterial insufficiency or " significant swelling.   By location this should be related to venous insufficiency despite a negative history.   We have excised the ulcer down to healthier tissue and we'll also send this to pathology to rule out a malignancy or vasculitis.    Plan: We will dress the wounds with Aquacel Ag and a daily basis.  Spandigrip will be used to control the swelling and help hold the dressings in place.  She may shower and get the wound wet but avoid soaking.  .  Patient will return to the clinic in Two weeks time.    I will call the patient at the    Allergy is abnormal.  Otherwise we'll discuss this on her next clinic visit.  If this is deemed to be of venous etiology eventual more formal venous evaluation will be necessary.    Juan Luis Flores MD  08/14/17 12:33 PM

## 2017-08-15 ENCOUNTER — TELEPHONE (OUTPATIENT)
Dept: WOUND CARE | Facility: CLINIC | Age: 82
End: 2017-08-15

## 2017-08-15 LAB — COPATH REPORT: NORMAL

## 2017-08-15 NOTE — TELEPHONE ENCOUNTER
I called Riddhi Aguilar daughter Sophie about her right ankle biopsy from the wound clinic.    This is a basal cell cancer.  + margins since I did not do an excisional biopsy.  This explains the non-healing and somewhat unusual appearance.   She will need a wide excisional biopsy in [place] surgery with a MAC.  ?? whether able to close wound due to location.    All questions answered and they will call Dang GUDINO  to schedule.     Juan Luis Flores MD

## 2017-08-28 ENCOUNTER — TELEPHONE (OUTPATIENT)
Dept: WOUND CARE | Facility: CLINIC | Age: 82
End: 2017-08-28

## 2017-08-28 NOTE — H&P (VIEW-ONLY)
[facility] [place] Progress Note    Subject: Riddhi Aguilar Was referred to see us today by Dr. Prado was her primary care physician.  This delightful independent 86-year-old patient with few medical problems except for well-controlled hypertension developed an ulceration right medial ankle over a year ago.  Etiology is unclear with no prior history of venous problems or DVT or significant swelling.  The wound is somewhat improved but is never healed over completely.  She has some mild discomfort but no major problems associated with this.  She does not have any significant swelling.  She's never had a similar ulceration.    PMH: No allergies              Current Outpatient Prescriptions:      atorvastatin (LIPITOR) 10 MG tablet, Take 1 tablet (10 mg) by mouth daily, Disp: 90 tablet, Rfl: 3     hydrochlorothiazide (MICROZIDE) 12.5 MG capsule, Take 1 capsule (12.5 mg) by mouth daily, Disp: 90 capsule, Rfl: 3     acetaminophen (TYLENOL) 500 MG tablet, Take 2 tablets (1,000 mg) by mouth every 6 hours as needed for mild pain, Disp: 30 tablet, Rfl: 0     captopril (CAPOTEN) 50 MG tablet, TAKE ONE AND ONE-HALF TABLETS BY MOUTH THREE TIMES DAILY, Disp: 135 tablet, Rfl: 1     verapamil (CALAN-SR) 240 MG CR tablet, TAKE ONE TABLET BY MOUTH EVERY 12 HOURS, Disp: 180 tablet, Rfl: 2     buPROPion (WELLBUTRIN XL) 300 MG 24 hr tablet, TAKE ONE TABLET BY MOUTH EVERY MORNING, Disp: 90 tablet, Rfl: 2     spironolactone (ALDACTONE) 25 MG tablet, TAKE ONE TABLET BY MOUTH ONE TIME DAILY, Disp: 90 tablet, Rfl: 2     timolol (TIMOPTIC-XR) 0.5 % ophthalmic gel-forming, Place 1 drop into both eyes every morning., Disp: , Rfl:              Medical: Hypertension, glaucoma                        No history significant cardiac problems.  No PAD.                        No known venous insufficiency.         Nonsmoker.  Lives independently.    12 point review of systems is otherwise unremarkable except for the ankle ulcer.  No  "history of skin cancers.        Exam:  /74  Pulse 69  Temp 98.2  F (36.8  C) (Temporal)  Resp 16  Ht 1.562 m (5' 1.5\")  Wt 103.4 kg (228 lb)  BMI 42.38 kg/m2   Alert and appropriate.  She is here with her daughter.  Normal affect.  No carotid bruits.  Chest=clear  Cardiovascular=RR  Abdomen= mildly obese, nontender  Extremities=  Very minimal edema.  Several spider Telangiectasia and reticular veins in both legs but no large varicose veins.  No stigmata of chronic venous insufficiency such as stasis changes or hemosiderin staining.            +3 palpable dorsalis pedis pulse bilaterally. Normal +3 triphasic Doppler signals in the dorsalis pedis and posterior tibial arteries bilaterally.              On the right medial ankle there is an ulcerated area  That measures 0.5 x 0.8 cm with a depth of 0.3 cm.  Immediate 0.5 cm surrounding skin is slightly abnormal and not being normal epithelium compared to the rest of the ankle and leg.  Small amount of slough is noted in the center of the ulcerated area.    With these findings I felt that an excisional biopsy was needed not only to get a clean wound bed but also to rule out a malignancy that can occur with an ulcer that is now well over a year old.    Procedure:  Time out was called, informed consent obtained, and topical anesthetic of 4% lidocaine was applied per standing protocol. To get a better pain control we prepped the area with Betadine and injected 10 mL of 0.5% plain Marcaine in a field block fashion with excellent results.  A full-thickness/subcutaneous excisional debridement was performed using a #15 blade down to and including subcutaneous tissue .  We excised down to healthy bleeding tissue.  Nevus silver nitrate to decrease this.  Tissue was sent for pathology in formaldehyde. Bleeding controlled with light pressure. Patient tolerated procedure well.    Impression: Right medial chronic ankle ulcer.  No evidence of arterial insufficiency or " significant swelling.   By location this should be related to venous insufficiency despite a negative history.   We have excised the ulcer down to healthier tissue and we'll also send this to pathology to rule out a malignancy or vasculitis.    Plan: We will dress the wounds with Aquacel Ag and a daily basis.  Spandigrip will be used to control the swelling and help hold the dressings in place.  She may shower and get the wound wet but avoid soaking.  .  Patient will return to the clinic in Two weeks time.    I will call the patient at the    Allergy is abnormal.  Otherwise we'll discuss this on her next clinic visit.  If this is deemed to be of venous etiology eventual more formal venous evaluation will be necessary.    Juan Luis Flores MD  08/14/17 12:33 PM

## 2017-08-28 NOTE — TELEPHONE ENCOUNTER
I called Riddhi HIRAM Coramike Today.  She was seen or wound clinic with a nonhealing medial ankle ulcer.  The illnesses in the location for venous ulcer she had no stigmata associated with this and no arterial insufficiency or swelling.    We debrided the wound  In the clinic but sent it for pathology.  Pathology revealed a basal cell cancer.    This certainly would explain the failure to heal.  She'll need a excisional biopsy of the site and this is planned to be performed tomorrow as an outpatient under local anesthetic with sedation. We will see if the wound is able to be closed otherwise allow this to heal by secondary intent with the dressings we have recommended clinic  .    I spoke to the patient on the phone today to discuss this further.  She had no questions about the upcoming surgical treatment and plan.       Juan Luis Flores MD

## 2017-08-29 ENCOUNTER — OFFICE VISIT (OUTPATIENT)
Dept: SURGERY | Facility: PHYSICIAN GROUP | Age: 82
End: 2017-08-29
Payer: COMMERCIAL

## 2017-08-29 ENCOUNTER — ANESTHESIA EVENT (OUTPATIENT)
Dept: SURGERY | Facility: CLINIC | Age: 82
End: 2017-08-29
Payer: MEDICARE

## 2017-08-29 ENCOUNTER — ANESTHESIA (OUTPATIENT)
Dept: SURGERY | Facility: CLINIC | Age: 82
End: 2017-08-29
Payer: MEDICARE

## 2017-08-29 ENCOUNTER — HOSPITAL ENCOUNTER (OUTPATIENT)
Facility: CLINIC | Age: 82
Discharge: HOME | End: 2017-08-29
Attending: SURGERY | Admitting: SURGERY
Payer: MEDICARE

## 2017-08-29 ENCOUNTER — SURGERY (OUTPATIENT)
Age: 82
End: 2017-08-29

## 2017-08-29 VITALS
BODY MASS INDEX: 43.93 KG/M2 | HEIGHT: 61 IN | WEIGHT: 232.7 LBS | OXYGEN SATURATION: 94 % | TEMPERATURE: 97.9 F | DIASTOLIC BLOOD PRESSURE: 65 MMHG | SYSTOLIC BLOOD PRESSURE: 145 MMHG | RESPIRATION RATE: 16 BRPM

## 2017-08-29 DIAGNOSIS — C44.712: Primary | ICD-10-CM

## 2017-08-29 PROCEDURE — 40000170 ZZH STATISTIC PRE-PROCEDURE ASSESSMENT II: Performed by: SURGERY

## 2017-08-29 PROCEDURE — 27210794 ZZH OR GENERAL SUPPLY STERILE: Performed by: SURGERY

## 2017-08-29 PROCEDURE — 88305 TISSUE EXAM BY PATHOLOGIST: CPT | Performed by: SURGERY

## 2017-08-29 PROCEDURE — 25000125 ZZHC RX 250: Performed by: SURGERY

## 2017-08-29 PROCEDURE — 25000125 ZZHC RX 250: Performed by: NURSE ANESTHETIST, CERTIFIED REGISTERED

## 2017-08-29 PROCEDURE — 25000128 H RX IP 250 OP 636: Performed by: NURSE ANESTHETIST, CERTIFIED REGISTERED

## 2017-08-29 PROCEDURE — 37000009 ZZH ANESTHESIA TECHNICAL FEE, EACH ADDTL 15 MIN: Performed by: SURGERY

## 2017-08-29 PROCEDURE — 11602 EXC TR-EXT MAL+MARG 1.1-2 CM: CPT | Performed by: SURGERY

## 2017-08-29 PROCEDURE — 36000050 ZZH SURGERY LEVEL 2 1ST 30 MIN: Performed by: SURGERY

## 2017-08-29 PROCEDURE — 25000128 H RX IP 250 OP 636: Performed by: SURGERY

## 2017-08-29 PROCEDURE — 36000052 ZZH SURGERY LEVEL 2 EA 15 ADDTL MIN: Performed by: SURGERY

## 2017-08-29 PROCEDURE — 37000008 ZZH ANESTHESIA TECHNICAL FEE, 1ST 30 MIN: Performed by: SURGERY

## 2017-08-29 PROCEDURE — 88305 TISSUE EXAM BY PATHOLOGIST: CPT | Mod: 26 | Performed by: SURGERY

## 2017-08-29 PROCEDURE — 27210995 ZZH RX 272: Performed by: SURGERY

## 2017-08-29 PROCEDURE — 71000027 ZZH RECOVERY PHASE 2 EACH 15 MINS: Performed by: SURGERY

## 2017-08-29 PROCEDURE — 71000012 ZZH RECOVERY PHASE 1 LEVEL 1 FIRST HR: Performed by: SURGERY

## 2017-08-29 PROCEDURE — S0020 INJECTION, BUPIVICAINE HYDRO: HCPCS | Performed by: SURGERY

## 2017-08-29 RX ORDER — NALOXONE HYDROCHLORIDE 0.4 MG/ML
.1-.4 INJECTION, SOLUTION INTRAMUSCULAR; INTRAVENOUS; SUBCUTANEOUS
Status: DISCONTINUED | OUTPATIENT
Start: 2017-08-29 | End: 2017-08-29 | Stop reason: HOSPADM

## 2017-08-29 RX ORDER — CEFAZOLIN SODIUM 2 G/100ML
2 INJECTION, SOLUTION INTRAVENOUS
Status: COMPLETED | OUTPATIENT
Start: 2017-08-29 | End: 2017-08-29

## 2017-08-29 RX ORDER — BUPIVACAINE HYDROCHLORIDE 5 MG/ML
INJECTION, SOLUTION PERINEURAL PRN
Status: DISCONTINUED | OUTPATIENT
Start: 2017-08-29 | End: 2017-08-29 | Stop reason: HOSPADM

## 2017-08-29 RX ORDER — ONDANSETRON 4 MG/1
4 TABLET, ORALLY DISINTEGRATING ORAL EVERY 30 MIN PRN
Status: DISCONTINUED | OUTPATIENT
Start: 2017-08-29 | End: 2017-08-29 | Stop reason: HOSPADM

## 2017-08-29 RX ORDER — SODIUM CHLORIDE, SODIUM LACTATE, POTASSIUM CHLORIDE, CALCIUM CHLORIDE 600; 310; 30; 20 MG/100ML; MG/100ML; MG/100ML; MG/100ML
INJECTION, SOLUTION INTRAVENOUS CONTINUOUS
Status: DISCONTINUED | OUTPATIENT
Start: 2017-08-29 | End: 2017-08-29 | Stop reason: HOSPADM

## 2017-08-29 RX ORDER — FENTANYL CITRATE 50 UG/ML
INJECTION, SOLUTION INTRAMUSCULAR; INTRAVENOUS PRN
Status: DISCONTINUED | OUTPATIENT
Start: 2017-08-29 | End: 2017-08-29

## 2017-08-29 RX ORDER — ALBUTEROL SULFATE 0.83 MG/ML
2.5 SOLUTION RESPIRATORY (INHALATION) EVERY 4 HOURS PRN
Status: DISCONTINUED | OUTPATIENT
Start: 2017-08-29 | End: 2017-08-29 | Stop reason: HOSPADM

## 2017-08-29 RX ORDER — MAGNESIUM HYDROXIDE 1200 MG/15ML
LIQUID ORAL PRN
Status: DISCONTINUED | OUTPATIENT
Start: 2017-08-29 | End: 2017-08-29 | Stop reason: HOSPADM

## 2017-08-29 RX ORDER — FENTANYL CITRATE 0.05 MG/ML
25-50 INJECTION, SOLUTION INTRAMUSCULAR; INTRAVENOUS
Status: DISCONTINUED | OUTPATIENT
Start: 2017-08-29 | End: 2017-08-29 | Stop reason: HOSPADM

## 2017-08-29 RX ORDER — LABETALOL HYDROCHLORIDE 5 MG/ML
10 INJECTION, SOLUTION INTRAVENOUS
Status: DISCONTINUED | OUTPATIENT
Start: 2017-08-29 | End: 2017-08-29 | Stop reason: HOSPADM

## 2017-08-29 RX ORDER — HYDRALAZINE HYDROCHLORIDE 20 MG/ML
2.5-5 INJECTION INTRAMUSCULAR; INTRAVENOUS EVERY 10 MIN PRN
Status: DISCONTINUED | OUTPATIENT
Start: 2017-08-29 | End: 2017-08-29 | Stop reason: HOSPADM

## 2017-08-29 RX ORDER — PROPOFOL 10 MG/ML
INJECTION, EMULSION INTRAVENOUS PRN
Status: DISCONTINUED | OUTPATIENT
Start: 2017-08-29 | End: 2017-08-29

## 2017-08-29 RX ORDER — HYDROCODONE BITARTRATE AND ACETAMINOPHEN 5; 325 MG/1; MG/1
1-2 TABLET ORAL
Status: DISCONTINUED | OUTPATIENT
Start: 2017-08-29 | End: 2017-08-29 | Stop reason: HOSPADM

## 2017-08-29 RX ORDER — CEFAZOLIN SODIUM 1 G/3ML
1 INJECTION, POWDER, FOR SOLUTION INTRAMUSCULAR; INTRAVENOUS SEE ADMIN INSTRUCTIONS
Status: DISCONTINUED | OUTPATIENT
Start: 2017-08-29 | End: 2017-08-29 | Stop reason: HOSPADM

## 2017-08-29 RX ORDER — HYDROCODONE BITARTRATE AND ACETAMINOPHEN 5; 325 MG/1; MG/1
1 TABLET ORAL EVERY 4 HOURS PRN
Qty: 10 TABLET | Refills: 0 | Status: SHIPPED | OUTPATIENT
Start: 2017-08-29 | End: 2017-10-02

## 2017-08-29 RX ORDER — ONDANSETRON 2 MG/ML
INJECTION INTRAMUSCULAR; INTRAVENOUS PRN
Status: DISCONTINUED | OUTPATIENT
Start: 2017-08-29 | End: 2017-08-29

## 2017-08-29 RX ORDER — ONDANSETRON 2 MG/ML
4 INJECTION INTRAMUSCULAR; INTRAVENOUS EVERY 30 MIN PRN
Status: DISCONTINUED | OUTPATIENT
Start: 2017-08-29 | End: 2017-08-29 | Stop reason: HOSPADM

## 2017-08-29 RX ORDER — SODIUM CHLORIDE, SODIUM LACTATE, POTASSIUM CHLORIDE, CALCIUM CHLORIDE 600; 310; 30; 20 MG/100ML; MG/100ML; MG/100ML; MG/100ML
INJECTION, SOLUTION INTRAVENOUS CONTINUOUS PRN
Status: DISCONTINUED | OUTPATIENT
Start: 2017-08-29 | End: 2017-08-29

## 2017-08-29 RX ORDER — MEPERIDINE HYDROCHLORIDE 25 MG/ML
12.5 INJECTION INTRAMUSCULAR; INTRAVENOUS; SUBCUTANEOUS
Status: DISCONTINUED | OUTPATIENT
Start: 2017-08-29 | End: 2017-08-29 | Stop reason: HOSPADM

## 2017-08-29 RX ADMIN — SODIUM CHLORIDE, POTASSIUM CHLORIDE, SODIUM LACTATE AND CALCIUM CHLORIDE: 600; 310; 30; 20 INJECTION, SOLUTION INTRAVENOUS at 09:54

## 2017-08-29 RX ADMIN — BUPIVACAINE HYDROCHLORIDE 8 ML: 5 INJECTION, SOLUTION PERINEURAL at 10:33

## 2017-08-29 RX ADMIN — PROPOFOL 20 MG: 10 INJECTION, EMULSION INTRAVENOUS at 10:04

## 2017-08-29 RX ADMIN — DEXMEDETOMIDINE HYDROCHLORIDE 4 MCG: 100 INJECTION, SOLUTION INTRAVENOUS at 10:17

## 2017-08-29 RX ADMIN — PROPOFOL 10 MG: 10 INJECTION, EMULSION INTRAVENOUS at 10:02

## 2017-08-29 RX ADMIN — PROPOFOL 20 MG: 10 INJECTION, EMULSION INTRAVENOUS at 10:17

## 2017-08-29 RX ADMIN — SODIUM CHLORIDE 1000 ML: 0.9 IRRIGANT IRRIGATION at 10:31

## 2017-08-29 RX ADMIN — ONDANSETRON 4 MG: 2 INJECTION INTRAMUSCULAR; INTRAVENOUS at 10:28

## 2017-08-29 RX ADMIN — PROPOFOL 10 MG: 10 INJECTION, EMULSION INTRAVENOUS at 09:59

## 2017-08-29 RX ADMIN — FENTANYL CITRATE 25 MCG: 50 INJECTION, SOLUTION INTRAMUSCULAR; INTRAVENOUS at 10:04

## 2017-08-29 RX ADMIN — PROPOFOL 20 MG: 10 INJECTION, EMULSION INTRAVENOUS at 10:11

## 2017-08-29 RX ADMIN — PROPOFOL 10 MG: 10 INJECTION, EMULSION INTRAVENOUS at 10:10

## 2017-08-29 RX ADMIN — PROPOFOL 20 MG: 10 INJECTION, EMULSION INTRAVENOUS at 10:23

## 2017-08-29 RX ADMIN — DEXMEDETOMIDINE HYDROCHLORIDE 4 MCG: 100 INJECTION, SOLUTION INTRAVENOUS at 10:23

## 2017-08-29 RX ADMIN — DEXMEDETOMIDINE HYDROCHLORIDE 12 MCG: 100 INJECTION, SOLUTION INTRAVENOUS at 09:55

## 2017-08-29 RX ADMIN — PROPOFOL 20 MG: 10 INJECTION, EMULSION INTRAVENOUS at 10:21

## 2017-08-29 RX ADMIN — LIDOCAINE HYDROCHLORIDE 10 ML: 10 INJECTION, SOLUTION EPIDURAL; INFILTRATION; INTRACAUDAL; PERINEURAL at 10:31

## 2017-08-29 RX ADMIN — PROPOFOL 10 MG: 10 INJECTION, EMULSION INTRAVENOUS at 10:25

## 2017-08-29 RX ADMIN — CEFAZOLIN SODIUM 2 G: 2 INJECTION, SOLUTION INTRAVENOUS at 10:02

## 2017-08-29 RX ADMIN — PROPOFOL 10 MG: 10 INJECTION, EMULSION INTRAVENOUS at 10:07

## 2017-08-29 RX ADMIN — PROPOFOL 10 MG: 10 INJECTION, EMULSION INTRAVENOUS at 09:57

## 2017-08-29 RX ADMIN — FENTANYL CITRATE 25 MCG: 50 INJECTION, SOLUTION INTRAMUSCULAR; INTRAVENOUS at 10:21

## 2017-08-29 ASSESSMENT — ENCOUNTER SYMPTOMS
SEIZURES: 0
DYSRHYTHMIAS: 0

## 2017-08-29 ASSESSMENT — LIFESTYLE VARIABLES: TOBACCO_USE: 0

## 2017-08-29 ASSESSMENT — COPD QUESTIONNAIRES: COPD: 0

## 2017-08-29 NOTE — ANESTHESIA CARE TRANSFER NOTE
Patient: Riddhi Aguilar    Procedure(s):  WIDE EXCISIONAL BIOPSY OF RIGHT ANKLE BASAL CELL CARCINOMA - Wound Class: I-Clean    Diagnosis: RIGHT ANKLE BASAL CELL CARCINOMA  Diagnosis Additional Information: No value filed.    Anesthesia Type:   MAC     Note:  Airway :Nasal Cannula  Patient transferred to:PACU  Comments: Patient awake.   Vital signs stable. Patient transferred to recovery with oxygen. Report given to RN.  Vitals in PACU:  /62  Sats 95%  Rr 17  HR 86      Vitals: (Last set prior to Anesthesia Care Transfer)    CRNA VITALS  8/29/2017 1009 - 8/29/2017 1041      8/29/2017             SpO2: 96 %    Resp Rate (set): 10                Electronically Signed By: LUCY Castorena CRNA  August 29, 2017  10:41 AM

## 2017-08-29 NOTE — DISCHARGE INSTRUCTIONS
Same Day Surgery Discharge Instructions for  Sedation and General Anesthesia       It's not unusual to feel dizzy, light-headed or faint for up to 24 hours after surgery or while taking pain medication.  If you have these symptoms: sit for a few minutes before standing and have someone assist you when you get up to walk or use the bathroom.      You should rest and relax for the next 24 hours. We recommend you make arrangements to have an adult stay with you for at least 24 hours after your discharge.  Avoid hazardous and strenuous activity.      DO NOT DRIVE any vehicle or operate mechanical equipment for 24 hours following the end of your surgery.  Even though you may feel normal, your reactions may be affected by the medication you have received.      Do not drink alcoholic beverages for 24 hours following surgery.       Slowly progress to your regular diet as you feel able. It's not unusual to feel nauseated and/or vomit after receiving anesthesia.  If you develop these symptoms, drink clear liquids (apple juice, ginger ale, broth, 7-up, etc. ) until you feel better.  If your nausea and vomiting persists for 24 hours, please notify your surgeon.        All narcotic pain medications, along with inactivity and anesthesia, can cause constipation. Drinking plenty of liquids and increasing fiber intake will help.      For any questions of a medical nature, call your surgeon.      Do not make important decisions for 24 hours.      If you had general anesthesia, you may have a sore throat for a couple of days related to the breathing tube used during surgery.  You may use Cepacol lozenges to help with this discomfort.  If it worsens or if you develop a fever, contact your surgeon.       If you feel your pain is not well managed with the pain medications prescribed by your surgeon, please contact your surgeon's office to let them know so they can address your concerns.         Reasons to contact your  surgeon:    1. Signs of possible infection:once your dressing is removed, check your incision daily for redness, swelling, warmth, red streaks or foul drainage.   2. Elevated temperature.  3. Pain not controlled with pain medication and/or rest.   4. Uncontrolled nausea or vomiting.  5. Any questions or concerns.    **If you have questions or concerns about your procedure  call Dr Juan Luis Flores at [phone]**

## 2017-08-29 NOTE — IP AVS SNAPSHOT
MRN:[id number]                      After Visit Summary   8/29/2017    Riddhi Aguilar    MRN: [id number]           Thank you!     Thank you for choosing [facility] for your care. Our goal is always to provide you with excellent care. Hearing back from our patients is one way we can continue to improve our services. Please take a few minutes to complete the written survey that you may receive in the mail after you visit with us. Thank you!        Patient Information     Date Of Birth          2/15/1931        About your hospital stay     You were admitted on:  August 29, 2017 You last received care in the:  [facility] PACU    You were discharged on:  August 29, 2017       Who to Call     For medical emergencies, please call 911.  For non-urgent questions about your medical care, please call your primary care provider or clinic, [phone]  For questions related to your surgery, please call your surgery clinic        Attending Provider     Provider Specialty    Juan Luis Flores MD Surgery       Primary Care Provider Office Phone # Fax #    Shun SAMI Prado [phone]      After Care Instructions     Diet Instructions       Resume pre-procedure diet            Discharge Instructions       Patient to follow up with appointment in 2 weeks with Dr. Flores [facility]            Do not - immerse incision in water until sutures removed       Do not immerse incision in water until sutures removed            Dressing       Keep dressing clean and dry  Remove the dressing tomorrow  You have stitches that will need to be removed in clinic            Ice to affected area       Ice to operative site PRN            No driving or operating machinery        until the day after procedure            Shower       No shower for 48 hours post op            Weight bearing status - As tolerated                 Your next 10 appointments already scheduled     Sep 11, 2017 10:45 AM  CDT   Return Visit with Juan Luis Flores MD   [facility] [place] ([facility])    [address]  [address]  [place] [address] [phone]              Further instructions from your care team       Same Day Surgery Discharge Instructions for  Sedation and General Anesthesia       It's not unusual to feel dizzy, light-headed or faint for up to 24 hours after surgery or while taking pain medication.  If you have these symptoms: sit for a few minutes before standing and have someone assist you when you get up to walk or use the bathroom.      You should rest and relax for the next 24 hours. We recommend you make arrangements to have an adult stay with you for at least 24 hours after your discharge.  Avoid hazardous and strenuous activity.      DO NOT DRIVE any vehicle or operate mechanical equipment for 24 hours following the end of your surgery.  Even though you may feel normal, your reactions may be affected by the medication you have received.      Do not drink alcoholic beverages for 24 hours following surgery.       Slowly progress to your regular diet as you feel able. It's not unusual to feel nauseated and/or vomit after receiving anesthesia.  If you develop these symptoms, drink clear liquids (apple juice, ginger ale, broth, 7-up, etc. ) until you feel better.  If your nausea and vomiting persists for 24 hours, please notify your surgeon.        All narcotic pain medications, along with inactivity and anesthesia, can cause constipation. Drinking plenty of liquids and increasing fiber intake will help.      For any questions of a medical nature, call your surgeon.      Do not make important decisions for 24 hours.      If you had general anesthesia, you may have a sore throat for a couple of days related to the breathing tube used during surgery.  You may use Cepacol lozenges to help with this discomfort.  If it worsens or if you develop a fever, contact  "your surgeon.       If you feel your pain is not well managed with the pain medications prescribed by your surgeon, please contact your surgeon's office to let them know so they can address your concerns.         Reasons to contact your surgeon:    1. Signs of possible infection:once your dressing is removed, check your incision daily for redness, swelling, warmth, red streaks or foul drainage.   2. Elevated temperature.  3. Pain not controlled with pain medication and/or rest.   4. Uncontrolled nausea or vomiting.  5. Any questions or concerns.    **If you have questions or concerns about your procedure  call Dr Juan Luis Flores at [phone]**        Pending Results     No orders found from 8/27/2017 to 8/30/2017.            Admission Information     Date & Time Provider Department Dept. Phone    8/29/2017 Juan Luis Flores MD [facility] PACU [phone]      Your Vitals Were     Blood Pressure Temperature Respirations Height Weight Pulse Oximetry    139/61 97.9  F (36.6  C) 16 1.549 m (5' 1\") 105.6 kg (232 lb 11.2 oz) 94%    BMI (Body Mass Index)                   43.97 kg/m2           Care EveryWhere ID     This is your Care EveryWhere ID. This could be used by other organizations to access your [facility] medical records  [id number]        Equal Access to Services     AMOS LUNA AH: Hadii valery brighto Sosimonaali, waaxda luqadaha, qaybta kaalmada adeegyada, minh moore. So [facility] [phone].    ATENCIÓN: Si habla español, tiene a horvath disposición servicios gratuitos de asistencia lingüística. Llame al [phone].    We comply with applicable federal civil rights laws and [place] laws. We do not discriminate on the basis of race, color, national origin, age, disability sex, sexual orientation or gender identity.               Review of your medicines      START taking        Dose / Directions    HYDROcodone-acetaminophen 5-325 MG per tablet   Commonly known as:  NORCO "   Used for:  Basal cell carcinoma of skin of ankle, right        Dose:  1 tablet   Take 1 tablet by mouth every 4 hours as needed for other (Moderate to Severe Pain)   Quantity:  10 tablet   Refills:  0         CONTINUE these medicines which have NOT CHANGED        Dose / Directions    acetaminophen 500 MG tablet   Commonly known as:  TYLENOL   Used for:  Pain of right lower extremity, Right calf pain        Dose:  1000 mg   Take 2 tablets (1,000 mg) by mouth every 6 hours as needed for mild pain   Quantity:  30 tablet   Refills:  0       atorvastatin 10 MG tablet   Commonly known as:  LIPITOR   Used for:  Hyperlipidemia LDL goal <130        Dose:  10 mg   Take 1 tablet (10 mg) by mouth daily   Quantity:  90 tablet   Refills:  3       buPROPion 300 MG 24 hr tablet   Commonly known as:  WELLBUTRIN XL   Used for:  Major depressive disorder, recurrent episode, mild (H), Essential hypertension, benign        TAKE ONE TABLET BY MOUTH EVERY MORNING   Quantity:  90 tablet   Refills:  2       captopril 50 MG tablet   Commonly known as:  CAPOTEN   Used for:  Essential hypertension, benign        TAKE ONE AND ONE-HALF TABLETS BY MOUTH THREE TIMES DAILY   Quantity:  135 tablet   Refills:  1       hydrochlorothiazide 12.5 MG capsule   Commonly known as:  MICROZIDE   Used for:  Essential hypertension, benign        Dose:  1 capsule   Take 1 capsule (12.5 mg) by mouth daily   Quantity:  90 capsule   Refills:  3       spironolactone 25 MG tablet   Commonly known as:  ALDACTONE   Used for:  Major depressive disorder, recurrent episode, mild (H), Essential hypertension, benign        TAKE ONE TABLET BY MOUTH ONE TIME DAILY   Quantity:  90 tablet   Refills:  2       timolol 0.5 % ophthalmic gel-form   Commonly known as:  TIMOPTIC-XE        Dose:  1 drop   Place 1 drop into both eyes every morning.   Refills:  0       verapamil 240 MG CR tablet   Commonly known as:  CALAN-SR   Used for:  Major depressive disorder, recurrent episode,  mild (H), Essential hypertension, benign        TAKE ONE TABLET BY MOUTH EVERY 12 HOURS   Quantity:  180 tablet   Refills:  2            Where to get your medicines      Some of these will need a paper prescription and others can be bought over the counter. Ask your nurse if you have questions.     Bring a paper prescription for each of these medications     HYDROcodone-acetaminophen 5-325 MG per tablet                Protect others around you: Learn how to safely use, store and throw away your medicines at www.disposemymeds.org.             Medication List: This is a list of all your medications and when to take them. Check marks below indicate your daily home schedule. Keep this list as a reference.      Medications           Morning Afternoon Evening Bedtime As Needed    acetaminophen 500 MG tablet   Commonly known as:  TYLENOL   Take 2 tablets (1,000 mg) by mouth every 6 hours as needed for mild pain                                atorvastatin 10 MG tablet   Commonly known as:  LIPITOR   Take 1 tablet (10 mg) by mouth daily                                buPROPion 300 MG 24 hr tablet   Commonly known as:  WELLBUTRIN XL   TAKE ONE TABLET BY MOUTH EVERY MORNING                                captopril 50 MG tablet   Commonly known as:  CAPOTEN   TAKE ONE AND ONE-HALF TABLETS BY MOUTH THREE TIMES DAILY                                hydrochlorothiazide 12.5 MG capsule   Commonly known as:  MICROZIDE   Take 1 capsule (12.5 mg) by mouth daily                                HYDROcodone-acetaminophen 5-325 MG per tablet   Commonly known as:  NORCO   Take 1 tablet by mouth every 4 hours as needed for other (Moderate to Severe Pain)                                spironolactone 25 MG tablet   Commonly known as:  ALDACTONE   TAKE ONE TABLET BY MOUTH ONE TIME DAILY                                timolol 0.5 % ophthalmic gel-form   Commonly known as:  TIMOPTIC-XE   Place 1 drop into both eyes every morning.                                 verapamil 240 MG CR tablet   Commonly known as:  CALAN-SR   TAKE ONE TABLET BY MOUTH EVERY 12 HOURS

## 2017-08-29 NOTE — IP AVS SNAPSHOT
[place] [address]    [place] [address]    Phone:  [phone]                                       After Visit Summary   8/29/2017    Riddhi Aguilar    MRN: [id number]           After Visit Summary Signature Page     I have received my discharge instructions, and my questions have been answered. I have discussed any challenges I see with this plan with the nurse or doctor.    ..........................................................................................................................................  Patient/Patient Representative Signature      ..........................................................................................................................................  Patient Representative Print Name and Relationship to Patient    ..................................................               ................................................  Date                                            Time    ..........................................................................................................................................  Reviewed by Signature/Title    ...................................................              ..............................................  Date                                                            Time

## 2017-08-29 NOTE — BRIEF OP NOTE
[facility] Brief Operative Note    Pre-operative diagnosis: RIGHT ANKLE BASAL CELL CARCINOMA   Post-operative diagnosis * No post-op diagnosis entered *  same   Procedure: Procedure(s):  WIDE EXCISIONAL BIOPSY OF RIGHT ANKLE BASAL CELL CARCINOMA - Wound Class: I-Clean   Surgeon(s): Surgeon(s) and Role:     * Juan Luis Flores MD - Primary   Estimated blood loss: * No values recorded between 8/29/2017 10:10 AM and 8/29/2017 10:38 AM *    Specimens:   ID Type Source Tests Collected by Time Destination   A : RIGHT ANKLE  Tissue Ankle SURGICAL PATHOLOGY EXAM Juan Luis Flores MD 8/29/2017 10:17 AM       Findings: Wide excision of right ankle basal cell carcinoma with primary closure

## 2017-08-29 NOTE — OP NOTE
Surgery Operative Note    PREOPERATIVE DIAGNOSIS:  Nonhealing right medial ankle ulceration secondary to                                                                  basal cell cancer     POSTOPERATIVE DIAGNOSIS:  Same    PROCEDURE: Wide excisional of right medial ankle basal cell ulceration with primary                                    closure.    ANESTHESIA:  Local with intravenous supplementation    PREOPERATIVE MEDICATIONS:  Ancef 2 g IV    SURGEON:  Juan Luis Flores MD     : Berta Fernandez MD ( [facility] Surgery Resident)    INDICATIONS:  86-year-old presented or wound clinic with a   Nonhealing right medial ankle ulcer.  She had excellent arterial blood and no stigmata of venous insufficiency.  Wound was debrided and the tissue sent for pathology due to the unusual appearance.  This was a basal cell cancer which explained the nonhealing.  She needs a wide excision with hopeful primary wound closure.    PROCEDURE: Brought to same-day surgery.  Right calf and foot were prepped and draped.  Timeout was called.  One percent lidocaine was injected in a field block fashion.             An elliptical incision was made with a #15 blade scalpel.  We had a minimum of 3 mm margin anteriorly and posteriorly and much larger margin proximally and distally.  Excellent blood supply was noted.  A suture was placed at the 12:00  For pathology.  We excised an additional 1.5 mm margin anteriorly.             Excellent blood supply was noted.  Electrocautery was used for hemostasis. Anterior and posterior skin flaps were created with good blood supply. Wound was closed with interrupted 3-0 nylon sutures with minimal tension.              Original ulcer measured 1 x 1 cm.  Total excision measured 3 x 2 cm extended into the subcutaneous normal tissue.  Bacitracin was placed over this followed by gauze-Kerlix-Ace bandage.    ESTIMATED BLOOD LOSS:  5 ml      Patient procedure well.  Discharged to home with her  daughter with postoperative instructions and follow up in two weeks.    Juan Luis Flores MD

## 2017-08-29 NOTE — ANESTHESIA POSTPROCEDURE EVALUATION
Patient: Riddhi Aguilar    Procedure(s):  WIDE EXCISIONAL BIOPSY OF RIGHT ANKLE BASAL CELL CARCINOMA - Wound Class: I-Clean    Diagnosis:RIGHT ANKLE BASAL CELL CARCINOMA  Diagnosis Additional Information: No value filed.    Anesthesia Type:  MAC    Note:  Anesthesia Post Evaluation    Patient location during evaluation: PACU  Patient participation: Able to fully participate in evaluation  Level of consciousness: awake and alert  Pain management: adequate  Airway patency: patent  Cardiovascular status: acceptable, blood pressure returned to baseline and hemodynamically unstable  Respiratory status: acceptable  Hydration status: acceptable  PONV: none     Anesthetic complications: None          Last vitals:  Vitals:    08/29/17 1100 08/29/17 1115 08/29/17 1130   BP: 139/61 145/65    Resp: 16 16 16   Temp: 36.6  C (97.9  F) 36.6  C (97.9  F)    SpO2: 94% 96% 94%         Electronically Signed By: Coleen Hernandez MD  August 29, 2017  4:21 PM

## 2017-08-29 NOTE — ANESTHESIA PREPROCEDURE EVALUATION
Procedure: Procedure(s):  EXCISE LESION LOWER EXTREMITY  Preop diagnosis: RIGHT ANKLE BASAL CELL CARCINOMA    No Known Allergies  Past Medical History:   Diagnosis Date     Bleeding ulcer      Essential hypertension, benign      Glaucoma      Pure hypercholesterolemia      Spinal stenosis      Unspecified cataract      Past Surgical History:   Procedure Laterality Date     BREAST SURGERY      lumpectomy     C NONSPECIFIC PROCEDURE      Repair of buckled retina of rt eye     C NONSPECIFIC PROCEDURE      Appy     DILATION AND CURETTAGE, HYSTEROSCOPY DIAGNOSTIC, COMBINED  2/6/2014    Procedure: COMBINED DILATION AND CURETTAGE, HYSTEROSCOPY DIAGNOSTIC;  DILATION AND CURETTAGE, HYSTEROSCOPY, POLYPECTOMY, INCISION AND DRAINAGE OF SEBACEOUS CYST ;  Surgeon: Serena Zamora MD;  Location: [facility]     INCISION AND DRAINAGE PERINEAL, COMBINED  2/6/2014    Procedure: COMBINED INCISION AND DRAINAGE PERINEAL;;  Surgeon: Serena Zamora MD;  Location: [facility]     Prior to Admission medications    Medication Sig Start Date End Date Taking? Authorizing Provider   atorvastatin (LIPITOR) 10 MG tablet Take 1 tablet (10 mg) by mouth daily 7/6/17   Shun Prado MD   hydrochlorothiazide (MICROZIDE) 12.5 MG capsule Take 1 capsule (12.5 mg) by mouth daily 7/6/17   Shun Prado MD   acetaminophen (TYLENOL) 500 MG tablet Take 2 tablets (1,000 mg) by mouth every 6 hours as needed for mild pain 6/8/17   Roberto Carlos Oleary, PAJean-PierreC   captopril (CAPOTEN) 50 MG tablet TAKE ONE AND ONE-HALF TABLETS BY MOUTH THREE TIMES DAILY 6/1/17   Shun Prado MD   verapamil (CALAN-SR) 240 MG CR tablet TAKE ONE TABLET BY MOUTH EVERY 12 HOURS 2/14/17   Hari Brush MD   buPROPion (WELLBUTRIN XL) 300 MG 24 hr tablet TAKE ONE TABLET BY MOUTH EVERY MORNING 2/14/17   Hari Brush MD   spironolactone (ALDACTONE) 25 MG tablet TAKE ONE TABLET BY MOUTH ONE TIME DAILY 2/14/17   Hari Brush MD   timolol  (TIMOPTIC-XR) 0.5 % ophthalmic gel-forming Place 1 drop into both eyes every morning.    Unknown, Entered By History     No current Epic-ordered facility-administered medications on file.      Current Outpatient Prescriptions Ordered in Epic   Medication     atorvastatin (LIPITOR) 10 MG tablet     hydrochlorothiazide (MICROZIDE) 12.5 MG capsule     acetaminophen (TYLENOL) 500 MG tablet     captopril (CAPOTEN) 50 MG tablet     verapamil (CALAN-SR) 240 MG CR tablet     buPROPion (WELLBUTRIN XL) 300 MG 24 hr tablet     spironolactone (ALDACTONE) 25 MG tablet     timolol (TIMOPTIC-XR) 0.5 % ophthalmic gel-forming     Wt Readings from Last 1 Encounters:   08/14/17 103.4 kg (228 lb)     Temp Readings from Last 1 Encounters:   08/14/17 36.8  C (98.2  F) (Temporal)     BP Readings from Last 6 Encounters:   08/14/17 139/74   07/06/17 121/66   06/08/17 152/70   10/10/16 115/56   03/25/16 149/78   02/22/16 138/63     Pulse Readings from Last 4 Encounters:   08/14/17 69   07/06/17 73   06/08/17 63   10/10/16 75     Resp Readings from Last 1 Encounters:   08/14/17 16     SpO2 Readings from Last 1 Encounters:   07/06/17 97%     Recent Labs   Lab Test  07/06/17   1159  02/22/16   1512   NA  139  137   POTASSIUM  4.1  4.2   CHLORIDE  106  102   CO2  25  28   ANIONGAP  8  7   GLC  88  78   BUN  27  22   CR  1.01  0.97   TAMMY  9.3  9.2     Recent Labs   Lab Test  07/06/17   1159  02/22/16   1512   WBC  6.0  6.2   HGB  11.7  12.7   PLT  332  312     Recent Labs   Lab Test  12/16/12   2244   INR  1.05              Anesthesia Evaluation     .             ROS/MED HX    ENT/Pulmonary:     (+)ANUSHA risk factors hypertension, obese, , . .   (-) tobacco use, asthma, COPD and sleep apnea   Neurologic:     (+)other neuro spinal stenosis   (-) seizures, CVA and migraines   Cardiovascular:     (+) Dyslipidemia, hypertension----. : . . . :. .      (-) CAD, arrhythmias and valvular problems/murmurs   METS/Exercise Tolerance:  >4 METS   Hematologic:         (-) history of blood clots, anemia and other hematologic disorder   Musculoskeletal:         GI/Hepatic:     (+) Other GI/Hepatic hx peptic ulcer disease     (-) GERD and liver disease   Renal/Genitourinary:      (-) renal disease and nephrolithiasis   Endo:     (+) Obesity, .   (-) Type I DM, Type II DM and thyroid disease   Psychiatric:     (+) psychiatric history depression      Infectious Disease:        (-) Recent Fever   Malignancy:   (+) Malignancy History of Breast  Breast CA Remission status post Surgery.         Other:                     Physical Exam  Normal systems: cardiovascular and pulmonary    Airway   Mallampati: III  TM distance: >3 FB  Neck ROM: full    Dental   (+) lower dentures and upper dentures    Cardiovascular   Rhythm and rate: regular and normal      Pulmonary    breath sounds clear to auscultation                    Anesthesia Plan      History & Physical Review      ASA Status:  3 .    NPO Status:  > 8 hours    Plan for MAC Reason for MAC:  Deep or markedly invasive procedure (G8)  PONV prophylaxis:  Ondansetron (or other 5HT-3)  Propofol infusion      Postoperative Care  Postoperative pain management:  Multi-modal analgesia.      Consents  Anesthetic plan, risks, benefits and alternatives discussed with:  Patient..                          .

## 2017-08-30 LAB — COPATH REPORT: NORMAL

## 2017-09-01 ENCOUNTER — TELEPHONE (OUTPATIENT)
Dept: WOUND CARE | Facility: CLINIC | Age: 82
End: 2017-09-01

## 2017-09-01 NOTE — TELEPHONE ENCOUNTER
Wound Clinic    I called Riddhi Aguilar today.  She had a nonhealing right ankle ulcer that turned out to be a basal cell cancer.  She underwent wide excision with primary closure earlier this week.  She reports she had some  Discomfort later that day after the local anesthetic wore off but has been fine since.  She reports the wound looks fine also      Pathology revealed a basal cell cancer.  The immediate 9:00 margin did h evidence of tumor with the rest being negative.  I did discuss this with her.  We may consider further excision once the present wound has healed since if we excised more at this  We would be unable to close the wound primarily.    I will discuss this further with  Her when she returns to the Wound  Clinic in 10 days.       Juan Luis Flores MD       We did send an additional skin segment to Path labeled re-excision of anterior margin.  This would be from the 6 oclock to 12 oclock (thus including the 9 oclock original specimen margin that was positive).  This additional segment had no BCC.    Thus all margins were negative when second segment included.    Juan Luis Flores MD   09-06-17

## 2017-09-11 ENCOUNTER — HOSPITAL ENCOUNTER (OUTPATIENT)
Dept: WOUND CARE | Facility: CLINIC | Age: 82
Discharge: HOME | End: 2017-09-11
Attending: SURGERY | Admitting: SURGERY
Payer: MEDICARE

## 2017-09-11 VITALS
TEMPERATURE: 98.6 F | SYSTOLIC BLOOD PRESSURE: 138 MMHG | RESPIRATION RATE: 18 BRPM | DIASTOLIC BLOOD PRESSURE: 69 MMHG | HEART RATE: 68 BPM

## 2017-09-11 PROCEDURE — 99211 OFF/OP EST MAY X REQ PHY/QHP: CPT

## 2017-09-11 PROCEDURE — 99024 POSTOP FOLLOW-UP VISIT: CPT | Performed by: SURGERY

## 2017-09-11 NOTE — PROGRESS NOTES
[facility] Wound Healing [place] Progress Note    Subject: Riddhi Aguilar And her daughter return to see us in follow-up.  She had a nonhealing right medial ankle ulcer that was debrided and biopsied in the clinic.  This revealed a basal cell cancer.  She underwent excisional biopsy operating room On 8/29/2017.  We undermined the wound to allow closure but there was some tension.    Final pathology revealed a basal cell cancer.  The 9:00 margin was positive.  We did excise at the time of surgery in additional 2-3 mm skin segment along that entire 9:00 margin with the final on this margin being negative for basal cell.    Patient is having no discomfort or drainage from the site.  She is applying a small amount of bacitracin and a Band-Aid.  She is ambulatory.      Exam:  /69  Pulse 68  Temp 98.6  F (37  C) (Temporal)  Resp 18  Alert and appropriate.  Very comfortable.  Evaluation the site does reveal some eschar on the anterior margin measuring between 1-3 mm in width.  No evidence of infection.  All sutures removed today.    l.    Impression: Complete excision of basal cell cancer from medial ankle.  Due the closure under tension we may lose the anterior margin and this was discussed with the patient and her daughter.  We'll allow this to demarcate and if necessary the wound will need to heal by secondary intent.  She'll apply a small amount of bacitracin to the site along with a protective Band-Aid.  She may shower but not soak the wound.    Plan: We will dress the wounds with Bacitracin.  Patient will return to the clinic in Three weeks time. She will call if she has any concerns prior to this.    Juan Luis Flores MD  09/11/17 11:31 AM

## 2017-09-18 ENCOUNTER — HOSPITAL ENCOUNTER (OUTPATIENT)
Dept: WOUND CARE | Facility: CLINIC | Age: 82
Discharge: HOME | End: 2017-09-18
Attending: SURGERY | Admitting: SURGERY
Payer: MEDICARE

## 2017-09-18 VITALS
DIASTOLIC BLOOD PRESSURE: 65 MMHG | HEART RATE: 68 BPM | SYSTOLIC BLOOD PRESSURE: 140 MMHG | TEMPERATURE: 98.5 F | RESPIRATION RATE: 16 BRPM

## 2017-09-18 PROCEDURE — 11042 DBRDMT SUBQ TIS 1ST 20SQCM/<: CPT | Performed by: SURGERY

## 2017-09-18 PROCEDURE — A6235 HYDROCOLLD DRG >16<=48 W/O B: HCPCS

## 2017-09-18 PROCEDURE — 11042 DBRDMT SUBQ TIS 1ST 20SQCM/<: CPT

## 2017-09-18 NOTE — PROGRESS NOTES
[facility] Wound Healing [place] Progress Note    Subject: Riddhi Aguilar At her daughter return to see us. She had a basal cell carcinoma that underwent wide excision.  We tried to close the wound primarily but did have some tissue loss with eschar noted on her last visit.   Her daughters noted some worsening of this.  She had more swelling and erythema yesterday.  She did cleanse the wound and place bacitracin on it with some improvement of the erythema but thought that evaluation was indicated.      Exam:  /65  Pulse 68  Temp 98.5  F (36.9  C) (Temporal)  Resp 16  Third appropriate.  Very comfortable.  More swelling is appreciated that her last visit.  The area that we has closed his now nonviable.  This measures 2.4 x 2.5 x 0.3 cm.  Some black eschars noted.  Some erythema but no obvious cellulitis.    Procedure:  Time out was called, informed consent obtained, and topical anesthetic of 4% lidocaine was applied per standing protocol, debridement was performed using a #15 blade down to and including subcutaneous tissue .  I excised circumferentially until we had viable tissue with the measurements as described above.  Good bleeding healthy tissue was noted. Bleeding controlled with light pressure. Patient tolerated procedure well.    Impression: Loss of area that we tried to close.  We don't have an open ulcer.  We discussed this with the patient and her daughter at the time of surgery that this may occur.   This will not need to heal by secondary intent.  I am not going to put her on oral antibiotics since I do not see cellulitis though she certainly is at risk and they will contact us if there is any concerns.    Plan: We will dress the wounds with Aquacel Ag daily.  She may shower..  Patient will return to the clinic in Two weeks time    Juan Luis Flores MD  09/18/17 11:06 AM

## 2017-10-02 ENCOUNTER — HOSPITAL ENCOUNTER (OUTPATIENT)
Dept: WOUND CARE | Facility: CLINIC | Age: 82
Discharge: HOME | End: 2017-10-02
Attending: SURGERY | Admitting: SURGERY
Payer: MEDICARE

## 2017-10-02 VITALS — HEART RATE: 72 BPM | SYSTOLIC BLOOD PRESSURE: 163 MMHG | DIASTOLIC BLOOD PRESSURE: 81 MMHG | TEMPERATURE: 97.8 F

## 2017-10-02 DIAGNOSIS — T81.89XA NON-HEALING SURGICAL WOUND, INITIAL ENCOUNTER: Primary | ICD-10-CM

## 2017-10-02 PROCEDURE — A6235 HYDROCOLLD DRG >16<=48 W/O B: HCPCS

## 2017-10-02 PROCEDURE — 11042 DBRDMT SUBQ TIS 1ST 20SQCM/<: CPT | Performed by: SURGERY

## 2017-10-02 PROCEDURE — 11042 DBRDMT SUBQ TIS 1ST 20SQCM/<: CPT

## 2017-10-02 NOTE — PROGRESS NOTES
[facility] Wound Healing [place] Progress Note    Subject: Riddhi Aguilar And her daughter return to see us at the clinic today.  She'll long-standing nonhealing right medial ankle ulcer. We were very suspicious of this and she had excellent blood supply and no history of venous problems and performed a biopsy.  This revealed a basal cell carcinoma explaining the nonhealing.    We performed a wide excision in the operating room with negative final pathology margins.  We attempted to close the wound primarily but unfortunately this had to much tension and broke down.    She now has an open ulcer that she is treating daily with Aquacel AG.  She has some chronic mild aching to the site.  She is wearing a SpandaGrip which makes it feel better and hold the dressing in place.  Her daughters change this daily.      Her  is in hospice and is expected to pass away in the next week or two when they're dealing with this presently.      Exam:  /81  Pulse 72  Temp 97.8  F (36.6  C)  Alert and appropriate.  Very comfortable.  No significant leg or ankle swelling.  +3 palpable dorsalis pedis pulse.  Medial ankle ulcer measures 2.9 x 2.8 cm with a depth of 0.2 cm.  Very adherent layer of fibrin covers the entire wound.  No border erythema or undermining.    Procedure:  Time out was called, informed consent obtained, and topical anesthetic of 4% lidocaine was applied per standing protocol, debridement was performed using a #15 blade down to and including subcutaneous tissue .  We excised all fibrin and the skin edges slightly.  Excellent granulation tissue covers over 85% of the wound base. Bleeding controlled with light pressure. Patient tolerated procedure well.    Dressed with Aquacel Ag-gauze-SpandaGrip    Impression: Healthy granulation tissue underlying the fibrin that was excised.  We feel be appropriate to use Santyl daily with an overlying gauze to aid in debridement.  This should be covered by her  medication insurance since it is quite expensive but hopefully we will only need one tube of this and this will keep the wound  with a healthier base and thus increase the rate of healing by secondary intent.    Plan: We will dress the wounds with santyl daily when available but continue with Aquacel Ag till then. Discuss plan with the patient and her daughter today and all questions were answered.  Patient will return to the clinic in 1 weeks time    Juan Luis Flores MD  10/02/17 10:49 AM

## 2017-10-23 ENCOUNTER — HOSPITAL ENCOUNTER (OUTPATIENT)
Dept: WOUND CARE | Facility: CLINIC | Age: 82
Discharge: HOME | End: 2017-10-23
Attending: SURGERY | Admitting: SURGERY
Payer: MEDICARE

## 2017-10-23 VITALS
DIASTOLIC BLOOD PRESSURE: 60 MMHG | TEMPERATURE: 98.6 F | RESPIRATION RATE: 16 BRPM | SYSTOLIC BLOOD PRESSURE: 146 MMHG | HEART RATE: 76 BPM

## 2017-10-23 DIAGNOSIS — F33.0 MAJOR DEPRESSIVE DISORDER, RECURRENT EPISODE, MILD (H): ICD-10-CM

## 2017-10-23 DIAGNOSIS — I10 ESSENTIAL HYPERTENSION, BENIGN: ICD-10-CM

## 2017-10-23 PROCEDURE — 11042 DBRDMT SUBQ TIS 1ST 20SQCM/<: CPT

## 2017-10-23 PROCEDURE — 11042 DBRDMT SUBQ TIS 1ST 20SQCM/<: CPT | Performed by: SURGERY

## 2017-10-23 NOTE — PROGRESS NOTES
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  She underwent wide excisional Biopsy with attempted primary wound closure. The closure did break down and  We have been treating this with SANTYL daily.  She has missed the last two appointments due to  Illness over 89-year-old   Who  last week.  She and her family are doing well.  She has minimal discomfort  At the ulcer site.      Exam:  /60  Pulse 76  Temp 98.6  F (37  C) (Tympanic)  Resp 16  Alert and appropriate.  Very comfortable.  Good distal pulses.  Minimal edema.  Ulcer measures 2.1 x 2.1 cm with a depth of 0.1 cm and a moderate amount of slough.  No undermining.    Procedure:  Time out was called, informed consent obtained, and topical anesthetic of 4% lidocaine was applied per standing protocol, debridement was performed using a #15 blade down to and including subcutaneous tissue .  We removed all the slough down to very healthy firm granulation tissue.  Skin edges were excised 0.1 cm  To very healthy tissue. Bleeding controlled with light pressure. Patient tolerated procedure well.    Impression: Good granulation bed is now developed.  Continue with Santyl daily with an overlying Telfa followed by gauze.   We will see if the wound can heal by  Secondary intent and avoid skin grafting.    Plan: We will dress the wounds with Santyl.  Patient will return to the clinic in 1 weeks time    Juan Luis Flores MD  10/23/17 9:19 AM

## 2017-10-24 NOTE — TELEPHONE ENCOUNTER
Routing refill request to provider for review/approval because:  Last Rx from former PCP Dr Brush  Pt now established with you  Please advise on taking over refilling this med  Thanks,  Jordyn VILLARREAL RN

## 2017-10-25 RX ORDER — BUPROPION HYDROCHLORIDE 300 MG/1
300 TABLET ORAL EVERY MORNING
Qty: 90 TABLET | Refills: 2 | Status: SHIPPED | OUTPATIENT
Start: 2017-10-25 | End: 2018-07-12

## 2017-10-30 ENCOUNTER — HOSPITAL ENCOUNTER (OUTPATIENT)
Dept: WOUND CARE | Facility: CLINIC | Age: 82
Discharge: HOME | End: 2017-10-30
Attending: SURGERY | Admitting: SURGERY
Payer: MEDICARE

## 2017-10-30 VITALS — SYSTOLIC BLOOD PRESSURE: 161 MMHG | HEART RATE: 69 BPM | DIASTOLIC BLOOD PRESSURE: 72 MMHG | TEMPERATURE: 98.5 F

## 2017-10-30 PROCEDURE — 11042 DBRDMT SUBQ TIS 1ST 20SQCM/<: CPT | Performed by: SURGERY

## 2017-10-30 PROCEDURE — 11042 DBRDMT SUBQ TIS 1ST 20SQCM/<: CPT

## 2017-10-30 NOTE — PROGRESS NOTES
[facility] Wound Healing [place] Progress Note    Subject: Riddhi Aguilar Returns for follow-up of her right medial ankle ulcer.  She has not had a basal cell cancers the recent nonhealing and underwent wide excision with attempted primary closure.  This did not work and we are using dressings.  Patient is not interested in a skin graft and once the wound to heal by secondary intent.  She has no problems with the daily dressing changes and does not have any discomfort.  Her appetite and nutritional intake and good.  She is taking vitamin supplements to aid healing.    Her son is with her today.      Exam:  /72  Pulse 69  Temp 98.5  F (36.9  C) (Tympanic)  Alert and appropriate.  Decreased size of wound with healthier granulation tissue.  Mild amount of fibrin slough.  No undermining.  No cord or erythema.Ulcer now measures 2.1 x 2.2 cm with a maximum depth distal of 0.2 cm.      She does have a tender area to touch distal to the ulcer and slightly anterior of uncertain etiology.    Procedure:  Time out was called, informed consent obtained, and topical anesthetic of 4% lidocaine was applied per standing protocol, debridement was performed using a #15 blade down to and including subcutaneous tissue .  Removed all fibrinous slough down to very healthy tissue. Bleeding controlled with light pressure. Patient tolerated procedure well.    Impression:  Continued improvement.  She has enough Santyl last two more weeks and she'll use this daily.  We will see her back at that time.  She has not interested and skin grafting over the area    Plan: We will dress the wounds with Santyl.  Patient will return to the clinic in Two weeks time    Juan Luis Flores MD  10/30/17 9:44 AM

## 2017-11-13 ENCOUNTER — HOSPITAL ENCOUNTER (OUTPATIENT)
Dept: WOUND CARE | Facility: CLINIC | Age: 82
Discharge: HOME | End: 2017-11-13
Attending: SURGERY | Admitting: SURGERY
Payer: MEDICARE

## 2017-11-13 VITALS
DIASTOLIC BLOOD PRESSURE: 69 MMHG | HEART RATE: 92 BPM | SYSTOLIC BLOOD PRESSURE: 127 MMHG | RESPIRATION RATE: 16 BRPM | TEMPERATURE: 98 F

## 2017-11-13 PROCEDURE — 11042 DBRDMT SUBQ TIS 1ST 20SQCM/<: CPT | Performed by: SURGERY

## 2017-11-13 PROCEDURE — A6248 HYDROGEL DRSG GEL FILLER: HCPCS

## 2017-11-13 NOTE — PROGRESS NOTES
[facility] Wound Healing [place] Progress Note    Subject: Riddhi Aguilar And her daughter return to see us today for her right medial ankle ulcer.  This was related to a squamous cell cancer.  We did a wide excision with negative margins with attempted primary closure.  This was unsuccessful due to tension. We have decided to allow the wound to heal by secondary intent ( patient is not interested and split-thickness skin grafting).  She's been using Santyl on the wound daily and his distention the tube off.  She has no pain..  She uses a wheeled walker for ambulation  Is able to do her normal activities.      Exam:  /69  Pulse 92  Temp 98  F (36.7  C) (Tympanic)  Resp 16  Alert and appropriate.  Very comfortable.  Minimal swelling and no cellulitis.  +3 dorsalis pedis pulse.  The ulceration measures 1.9 x 2.2 x 0.2 cm.  Very thick Of fibrin is noted  Covering the entire ulcerated site.  No undermining.    Procedure:  Time out was called, informed consent obtained, and topical anesthetic of 4% lidocaine was applied per standing protocol, debridement was performed using a #15 blade down to and including subcutaneous tissue .  We removed all fibrin.  I did excise some of the more prominent granulation tissue so was level with the skin.  Skin edges were debrided 0.1 cm  Down to bleeding epithelium and dermis to encourage tissue ingrowth and reepithelialization.  She tolerated this with no discomfort  Using the topical lidocaine. Bleeding controlled with light pressure. Patient tolerated procedure well.    Impression: Improvement of the ulcer.  Large amount of fibrin despite the use the Santyl.  Because of this we will see her back in one week for sharp debridement.  We'll go to a [place] collagen gel to be applied daily.    Plan: We will dress the wounds with Collagen gel daily.  Patient will return to the clinic in One weeks time    Juan Luis Flores MD  11/13/17 9:42 AM

## 2017-11-14 DIAGNOSIS — F33.0 MAJOR DEPRESSIVE DISORDER, RECURRENT EPISODE, MILD (H): ICD-10-CM

## 2017-11-14 DIAGNOSIS — I10 ESSENTIAL HYPERTENSION, BENIGN: ICD-10-CM

## 2017-11-16 RX ORDER — VERAPAMIL HYDROCHLORIDE 240 MG/1
TABLET, FILM COATED, EXTENDED RELEASE ORAL
Qty: 180 TABLET | Refills: 1 | Status: SHIPPED | OUTPATIENT
Start: 2017-11-16 | End: 2018-02-15

## 2017-11-16 RX ORDER — SPIRONOLACTONE 25 MG/1
25 TABLET ORAL DAILY
Qty: 90 TABLET | Refills: 1 | Status: SHIPPED | OUTPATIENT
Start: 2017-11-16 | End: 2018-02-15

## 2017-11-16 NOTE — TELEPHONE ENCOUNTER
Prescription approved per [facility] Refill Protocol.  Mirian Eduardo RN

## 2017-11-20 ENCOUNTER — HOSPITAL ENCOUNTER (OUTPATIENT)
Dept: WOUND CARE | Facility: CLINIC | Age: 82
Discharge: HOME | End: 2017-11-20
Attending: SURGERY | Admitting: SURGERY
Payer: MEDICARE

## 2017-11-20 VITALS
DIASTOLIC BLOOD PRESSURE: 65 MMHG | SYSTOLIC BLOOD PRESSURE: 132 MMHG | RESPIRATION RATE: 16 BRPM | TEMPERATURE: 98.2 F | HEART RATE: 76 BPM

## 2017-11-20 PROCEDURE — 11042 DBRDMT SUBQ TIS 1ST 20SQCM/<: CPT | Performed by: SURGERY

## 2017-11-20 PROCEDURE — 11042 DBRDMT SUBQ TIS 1ST 20SQCM/<: CPT

## 2017-11-20 PROCEDURE — A6248 HYDROGEL DRSG GEL FILLER: HCPCS

## 2017-11-20 NOTE — PROGRESS NOTES
[facility] Wound Healing [place] Progress Note    Subject: Riddhi Aguilar And her daughter return for follow-up of her right medial ankle ulceration.  This was due to a squamous cell skin cancer.  We performed a wide excision along the wound to heal by secondary intent. The Shellman had not been overly effective as a debriding agent.  He changed to a wound collagen gel last week and felt that weekly debridement would be more effective.  She has very minimal discomfort associated with this.  She notices that using gauze  Causes the wound to dry out.      Exam:  /65  Pulse 76  Temp 98.2  F (36.8  C) (Tympanic)  Resp 16  Very alert and appropriate.  Quite comfortable.  Good distal dorsalis pedis pulse.  No evidence of cellulitis.  Ulcerated area measures 1.9 x 2 cm with a depth of 0.2 cm.  There is a layer of fibrin and slough over the wound but no undermining.  Granulation tissue at the base is very firm and healthy.    Procedure:  Time out was called, informed consent obtained, and topical anesthetic of 4% lidocaine was applied per standing protocol, debridement was performed using a #15 blade down to and including subcutaneous tissue .  We removed all slough and fibrin and debriding the skin edges 0.1 cm.  Very healthy from  Bleeding granulation tissue is noted following debridement. Bleeding controlled with light pressure. Patient tolerated procedure well.    Impression: Continued improvement.  Weekly debridements have been helpful and we'll see her again in one week.  She'll use the collagen gel but patent helpful over this to help keep the wound moist  Along with a Juan Diego wrap since the tape is very irritating to her skin.  Elevated as much as possible.    Plan: We will dress the wounds with Collagen gel and Telfa.  Patient will return to the clinic in One weeks time    Juan Luis Flores MD  11/20/17 8:57 AM

## 2017-11-27 ENCOUNTER — HOSPITAL ENCOUNTER (OUTPATIENT)
Dept: WOUND CARE | Facility: CLINIC | Age: 82
Discharge: HOME | End: 2017-11-27
Attending: SURGERY | Admitting: SURGERY
Payer: MEDICARE

## 2017-11-27 VITALS
HEART RATE: 81 BPM | SYSTOLIC BLOOD PRESSURE: 133 MMHG | RESPIRATION RATE: 16 BRPM | DIASTOLIC BLOOD PRESSURE: 63 MMHG | TEMPERATURE: 98.6 F

## 2017-11-27 PROCEDURE — 11042 DBRDMT SUBQ TIS 1ST 20SQCM/<: CPT | Performed by: SURGERY

## 2017-11-27 PROCEDURE — 11042 DBRDMT SUBQ TIS 1ST 20SQCM/<: CPT

## 2017-11-27 NOTE — PROGRESS NOTES
[facility] Wound Healing [place] Progress Note    Subject: Riddhi Aguilar And her son return to see us at the wound clinic today.  She required a wide excision of a nonhealing right medial ankle ulcer due to squamous cell cancer.  She is decided let this heal by secondary intent. She is using collagen gel in the area with an overlying Telfa but does notice its dry in the morning.  She has no pain at and remains fully independent and ambulatory. Been doing weekly surgical debridements here in the clinic to help stimulate a healing.  Wound has been decreasing in size and depth.      Exam:  /63  Pulse 81  Temp 98.6  F (37  C) (Tympanic)  Resp 16  Alert and appropriate.  Excellent distal pulses.  Ulceration has decreased now measuring 1.9 x 1.8 x 0.2 cm. While adherent slough is noted on the granulation bed which was otherwise healthy.  No undermining.  Surrounding skin is normal.    Procedure:  Time out was called, informed consent obtained, and topical anesthetic of 4% lidocaine was applied per standing protocol, debridement was performed using a #15 blade down to and including subcutaneous tissue Per to all slough and fibrin was removed.  Skin edges were excised 0.1 cm circumferentially to encourage healing. Bleeding controlled with light pressure. Patient tolerated procedure well.    Impression: Continued improvement.  We'll allow the heal by secondary intent.  Since the wound is drying out the collagen gel will be applied twice daily.    Plan: We will dress the wounds with Collagen gel b.i.d. With overlying Telfa.  Patient will return to the clinic in One weeks time    Juan Luis Flores MD  11/27/17 10:04 AM

## 2017-12-04 ENCOUNTER — HOSPITAL ENCOUNTER (OUTPATIENT)
Dept: WOUND CARE | Facility: CLINIC | Age: 82
Discharge: HOME | End: 2017-12-04
Attending: SURGERY | Admitting: SURGERY
Payer: MEDICARE

## 2017-12-04 VITALS — HEART RATE: 76 BPM | SYSTOLIC BLOOD PRESSURE: 133 MMHG | TEMPERATURE: 98.7 F | DIASTOLIC BLOOD PRESSURE: 50 MMHG

## 2017-12-04 PROCEDURE — 11042 DBRDMT SUBQ TIS 1ST 20SQCM/<: CPT | Performed by: SURGERY

## 2017-12-04 NOTE — PROGRESS NOTES
[facility] Wound Healing [place] Progress Note    Subject: Riddhi Aguilar And her son return to see us in the office today.  She had a nonhealing right medial ankle ulcer secondary squamous cell cancer with a wide excision. We are allowing this to heal by secondary intent.  She's been applying Santyl to the wound but due to drying reapplying this evening  She is changing the entire dressing daily.  Some mild irritation of the site but otherwise no problems.  She may developing some mild dermatitis cephalid to the wound.  She is avoiding tape to this area.      Exam:  /50  Pulse 76  Temp 98.7  F (37.1  C) (Tympanic)  Alert and appropriate.  Very comfortable.  +3 dorsalis pedis pulse with no distal edema. No cellulitis.  Mild eczema as noted proximal to the ulcer.  Ulcer measures 1.8 x 1.8 x 0.2 cm    Procedure:  Time out was called, informed consent obtained, and topical anesthetic of 4% lidocaine was applied per standing protocol, debridement was performed using a #15 blade down to and including subcutaneous tissue .  Skin edges were excised through 0.1 cm and the rest was debrided  Down to very healthy firm bleeding granulation tissue.  With the b.i.d. Reapplication of the sac until the wound iswell hydrated.   Bleeding controlled with light pressure. Patient tolerated procedure well.    Impression: Continuing improvement.  Continue with b.i.d. Santyl..  When this is finished and we will go to the collagen gel b.i.d. Also.    Plan: We will dress the wounds with Above.  Patient will return to the clinic in 2 weeks time    Juan Luis Flores MD  12/04/17 10:04 AM

## 2017-12-16 DIAGNOSIS — I10 ESSENTIAL HYPERTENSION, BENIGN: ICD-10-CM

## 2017-12-18 ENCOUNTER — HOSPITAL ENCOUNTER (OUTPATIENT)
Dept: WOUND CARE | Facility: CLINIC | Age: 82
Discharge: HOME | End: 2017-12-18
Attending: SURGERY | Admitting: SURGERY
Payer: MEDICARE

## 2017-12-18 VITALS — TEMPERATURE: 98.5 F | SYSTOLIC BLOOD PRESSURE: 152 MMHG | DIASTOLIC BLOOD PRESSURE: 74 MMHG | HEART RATE: 86 BPM

## 2017-12-18 DIAGNOSIS — T81.89XD NONHEALING SURGICAL WOUND, SUBSEQUENT ENCOUNTER: Primary | ICD-10-CM

## 2017-12-18 PROCEDURE — A6021 COLLAGEN DRESSING <=16 SQ IN: HCPCS

## 2017-12-18 PROCEDURE — A6212 FOAM DRG <=16 SQ IN W/BORDER: HCPCS

## 2017-12-18 PROCEDURE — 11042 DBRDMT SUBQ TIS 1ST 20SQCM/<: CPT | Performed by: SURGERY

## 2017-12-18 NOTE — PROGRESS NOTES
[facility] Wound Healing [place] Progress Note    Subject: Riddhi Aguilar her son return to see us today at the wound clinic.  She developed an ulceration of her right medial ankle which was a squamous cell cancer.  She underwent wide excision to negative margins and we have been allowing this to heal by secondary intent.  She has minimal discomfort.  She is ambulatory.  More recently she has been using [employer] collagen gel and a Telfa along with SpandaGrip for compression (she finished off her Santyl last week and went to the [facility] at that time).    This measured 1.8 x 1.8 x 0.2 cm 2 weeks ago      Exam:  /74  Pulse 86  Temp 98.5  F (36.9  C) (Tympanic)  Alert and appropriate.  Mild calf and ankle edema.  No cellulitis.  Good distal pulses.  Ulceration measures 1.6 x 2 cm with a depth of 0.1 cm  Mild amount of slough is noted.  No undermining.    Procedure:  Time out was called, informed consent obtained, and topical anesthetic of 4% lidocaine was applied per standing protocol, debridement was performed using a #15 blade down to and including subcutaneous tissue.  Excised skin edges 0.1 cm and removed all fibrin from the wound base.  Granulation tissue is firm and healthy.  Good bleeding noted.    Bleeding controlled with light pressure. Patient tolerated procedure well.    Impression: Clean ulcer with good granulation bed and circulation.  However, the wound is stagnant and has not changed significantly in diameter.  We thus will change this to Endoform collagen--we will see if this will speed up reepithelialization.    Plan: We will dress the wounds with Endoform collagen to be changed every other day.  She is noticing minimal drainage and thus saline will be used to activate the dressing at time of application.    Patient will return to the clinic in 1 weeks time    Juan Luis Flores MD  12/18/17 10:08 AM

## 2017-12-19 RX ORDER — CAPTOPRIL 50 MG/1
TABLET ORAL
Qty: 405 TABLET | Refills: 1 | Status: SHIPPED | OUTPATIENT
Start: 2017-12-19 | End: 2018-11-29

## 2017-12-19 NOTE — TELEPHONE ENCOUNTER
Routing refill request to provider for review/approval because:  A break in medication - Rx last sent for 30 day supply with 1 refill 6/2017   Ryann CARRILLO RN        Requested Prescriptions   Pending Prescriptions Disp Refills     captopril (CAPOTEN) 50 MG tablet [Pharmacy Med Name: CAPTOPRIL 50 MG TABLET] 135 tablet 1     Sig: TAKE ONE AND ONE-HALF TABLETS BY MOUTH THREE TIMES DAILY    ACE Inhibitors (Including Combos) Protocol Passed    12/16/2017 10:20 AM       Passed - Blood pressure under 140/90    BP Readings from Last 3 Encounters:   12/18/17 152/74   12/04/17 133/50   11/27/17 133/63                Passed - Recent or future visit with authorizing provider's specialty    Patient had office visit in the last year or has a visit in the next 30 days with authorizing provider.  See chart review.              Passed - Patient is age 18 or older       Passed - No active pregnancy on record       Passed - Normal serum creatinine on file in past 12 months    Recent Labs   Lab Test  07/06/17   1159   CR  1.01            Passed - Normal serum potassium on file in past 12 months    Recent Labs   Lab Test  07/06/17   1159   POTASSIUM  4.1            Passed - No positive pregnancy test in past 12 months

## 2018-01-02 ENCOUNTER — HOSPITAL ENCOUNTER (OUTPATIENT)
Dept: WOUND CARE | Facility: CLINIC | Age: 83
Discharge: HOME | End: 2018-01-02
Attending: PHYSICIAN ASSISTANT | Admitting: PHYSICIAN ASSISTANT
Payer: MEDICARE

## 2018-01-02 VITALS
SYSTOLIC BLOOD PRESSURE: 114 MMHG | HEART RATE: 80 BPM | RESPIRATION RATE: 18 BRPM | DIASTOLIC BLOOD PRESSURE: 73 MMHG | TEMPERATURE: 97.6 F

## 2018-01-02 PROCEDURE — 97597 DBRDMT OPN WND 1ST 20 CM/<: CPT | Performed by: PHYSICIAN ASSISTANT

## 2018-01-02 PROCEDURE — A6021 COLLAGEN DRESSING <=16 SQ IN: HCPCS

## 2018-01-11 NOTE — PROGRESS NOTES
[place] [facility]    HISTORY OF PRESENT ILLNESS: Ms. Riddhi Aguilar is an 86-year-old woman who returns to the clinic to follow-up on a right medial ankle ulcer secondary to a SCC excision performed by Dr. Flores. The margins were clear and she has been healing in secondarily steadily.    WOUND CARE: Riddhi has been dressing the wound with Endoform and utilizing SpandaGrip for compression.     DATE WOUND ACQUIRED: Excision was 8/29/17    PAST MEDICAL HISTORY:  has a past medical history of Arthritis; Bleeding ulcer; Essential hypertension, benign; Glaucoma; Pure hypercholesterolemia; Spinal stenosis; and Unspecified cataract.    MEDICATIONS: reviewed, negative for anticoagulants or immunosuppressants    VITALS: /73  Pulse 80  Temp 97.6  F (36.4  C) (Tympanic)  Resp 18    PHYSICAL EXAM:  GENERAL: Patient is alert and oriented and in no acute distress  INTEGUMENTARY:   WOUND ASSESSMENT #1:     Location: right medial ankle     Size: 1.5 cm x 1.5 cm with a depth of 0.1 cm    Drainage: moderate amount of serosanguinous drainage    Wound description: continues to improve, small amount of moist slough overlying granular bed    PROCEDURE: A selective debridement was performed using a sharp curette to remove all non-viable tissue of <20 cm. Minimal bleeding was controlled with pressure. The patient tolerated the procedure well.      ASSESSMENT: surgical ulcer of right lower leg with fat-layer exposed    PLAN:   1. Primary dressing: Endoform    FOLLOW-UP: as scheduled with Dr. Thalia LINK PA-C

## 2018-01-15 ENCOUNTER — HOSPITAL ENCOUNTER (OUTPATIENT)
Dept: WOUND CARE | Facility: CLINIC | Age: 83
Discharge: HOME | End: 2018-01-15
Attending: SURGERY | Admitting: SURGERY
Payer: MEDICARE

## 2018-01-15 VITALS — TEMPERATURE: 98 F | SYSTOLIC BLOOD PRESSURE: 136 MMHG | HEART RATE: 77 BPM | DIASTOLIC BLOOD PRESSURE: 56 MMHG

## 2018-01-15 PROCEDURE — A6021 COLLAGEN DRESSING <=16 SQ IN: HCPCS

## 2018-01-15 PROCEDURE — 11042 DBRDMT SUBQ TIS 1ST 20SQCM/<: CPT | Performed by: SURGERY

## 2018-01-15 PROCEDURE — 11042 DBRDMT SUBQ TIS 1ST 20SQCM/<: CPT

## 2018-01-15 RX ORDER — MULTIPLE VITAMINS W/ MINERALS TAB 9MG-400MCG
1 TAB ORAL DAILY
COMMUNITY

## 2018-01-15 NOTE — PROGRESS NOTES
[facility] Wound Healing [place] Progress Note    Subject: Riddhi Aguilar and her son return to see us in the office today.  She had a in cancer in her right medial ankle that required wide excision.  Attempted primary closure was unsuccessful.  We decided to allow this to heal by secondary intent.  She is able to change her own dressings daily or every other day with endo-form.  She has no pain or drainage from the site.  She remains completely independent.      Exam:  /56  Pulse 77  Temp 98  F (36.7  C) (Tympanic)  Alert and appropriate.  Very comfortable.  Ulceration measures 1.5 x 1.5  Centimeters.  This is actually somewhat elevated from the skin edges surrounding it.  No undermining.  No cellulitis or swelling.  Good pulses distally.    Procedure:  Time out was called, informed consent obtained, and topical anesthetic of 4% lidocaine was applied per standing protocol, debridement was performed using a #15 blade down to and including subcutaneous tissue.  We excised the slough and fibrin and shaved down the granulation tissue till was flushed with the skin.  Skin edges surrounding were slightly debrided very healthy.  She tolerated this with no discomfort.  Bleeding controlled with light pressure. Patient tolerated procedure well.    Impression: Slowly improving right medial ankle ulceration following wide excision of skin cancer.  She is very comfortable with the present cares and allowing this to heal by secondary intent.    Plan: We will dress the wounds with above.  Patient will return to the clinic in 3 weeks time    Juan Luis Flores MD  01/15/18 10:11 AM

## 2018-02-05 ENCOUNTER — HOSPITAL ENCOUNTER (OUTPATIENT)
Dept: WOUND CARE | Facility: CLINIC | Age: 83
Discharge: HOME | End: 2018-02-05
Attending: SURGERY | Admitting: SURGERY
Payer: MEDICARE

## 2018-02-05 VITALS
TEMPERATURE: 97.7 F | DIASTOLIC BLOOD PRESSURE: 77 MMHG | SYSTOLIC BLOOD PRESSURE: 118 MMHG | RESPIRATION RATE: 18 BRPM | HEART RATE: 79 BPM

## 2018-02-05 PROCEDURE — 11042 DBRDMT SUBQ TIS 1ST 20SQCM/<: CPT | Performed by: SURGERY

## 2018-02-05 PROCEDURE — 11042 DBRDMT SUBQ TIS 1ST 20SQCM/<: CPT

## 2018-02-05 NOTE — PROGRESS NOTES
[facility] Wound Healing [place] Progress Note    Subject: Riddhi Aguilar and her son return to see me in the office today.  She underwent a wide excision of her right medial ankle squamous cell cancer.  We have been allowing this to heal by secondary intent.  She has been using Fibracol to the area but due to the decreased size this is drying out despite being moistened and covered with Telfa.  Wound continues to decrease in size with no drainage.  She has no pain.  She is fully ambulatory.  Good nutritional intake.      Exam:  /77  Pulse 79  Temp 97.7  F (36.5  C)  Resp 18  Alert and appropriate.  No distal edema.  Good distal pulse.  Ulceration is significantly improved.  This now measures 0.9 x 0.6 x 0.1 cm.  Mild amount of slough is noted but no undermining and very healthy underlying granulation tissue.  Surrounding skin is completely normal.    Procedure:  Time out was called, informed consent obtained, and topical anesthetic of 4% lidocaine was applied per standing protocol, debridement was performed using a #15 blade down to and including subcutaneous tissue.  We excised the skin edges and base of the wound and a very brisk bleeding healthy firm granulation tissue throughout the wound bed.  Bleeding controlled with light pressure. Patient tolerated procedure well.    Impression: Continued improvement.  Decreasing size and depth we will go to the Woun'Dres collagen gel covered with a Telfa tape or wrap to be changed daily.    Plan: We will dress the wounds with Woun'Dres collagen gel.  Patient will return to the clinic in 3 weeks time    Juan Luis Flores MD  02/05/18 10:05 AM

## 2018-02-13 ENCOUNTER — TELEPHONE (OUTPATIENT)
Dept: FAMILY MEDICINE | Facility: CLINIC | Age: 83
End: 2018-02-13

## 2018-02-13 NOTE — TELEPHONE ENCOUNTER
"Reason for Call:  Medication or medication refill:    Do you use a [facility]?  Name of the pharmacy and phone number for the current request:  [facility] [address] [place] [facility], [place] - [address] [place] [address]      Name of the medication requested: n/a    Other request: please change phamarcy to \"fv mail order\"     Can we leave a detailed message on this number? yes    Phone number patient can be reached at: Home number on file  Best Time: any      Call taken on 2/13/2018 at 11:56 AM by Dre Blackman      "

## 2018-02-15 DIAGNOSIS — I10 ESSENTIAL HYPERTENSION, BENIGN: ICD-10-CM

## 2018-02-15 DIAGNOSIS — F33.0 MAJOR DEPRESSIVE DISORDER, RECURRENT EPISODE, MILD (H): ICD-10-CM

## 2018-02-15 RX ORDER — SPIRONOLACTONE 25 MG/1
25 TABLET ORAL DAILY
Qty: 90 TABLET | Refills: 1 | Status: SHIPPED | OUTPATIENT
Start: 2018-02-15 | End: 2018-11-29

## 2018-02-15 RX ORDER — VERAPAMIL HYDROCHLORIDE 240 MG/1
TABLET, FILM COATED, EXTENDED RELEASE ORAL
Qty: 180 TABLET | Refills: 1 | Status: SHIPPED | OUTPATIENT
Start: 2018-02-15 | End: 2018-03-13

## 2018-02-16 NOTE — TELEPHONE ENCOUNTER
Prescription approved per [facility] Refill Protocol.  Lauren Rodriguez RN

## 2018-02-16 NOTE — TELEPHONE ENCOUNTER
"Switching to mail order pharmacy    Requested Prescriptions   Pending Prescriptions Disp Refills     spironolactone (ALDACTONE) 25 MG tablet  Last Written Prescription Date:  11/16/17  Last Fill Quantity: 90 tablet,  # refills: 1   Last office visit: 7/6/2017 with prescribing provider:  Johan Villasenor Office Visit:   Next 5 appointments (look out 90 days)     Feb 26, 2018  9:30 AM CST   Return Visit with Juan Luis Flores MD   [facility]ound [facility] [place] ([facility])    [address] [facility] [address]  [facility] [address]   [phone]                90 tablet 1     Sig: Take 1 tablet (25 mg) by mouth daily    Diuretics (Including Combos) Protocol Passed    2/15/2018  6:30 PM       Passed - Blood pressure under 140/90 in past 12 months    BP Readings from Last 3 Encounters:   02/05/18 118/77   01/15/18 136/56   01/02/18 114/73          Passed - Recent or future visit with authorizing provider's specialty    Patient had office visit in the last year or has a visit in the next 30 days with authorizing provider.  See \"Patient Info\" tab in inbasket, or \"Choose Columns\" in Meds & Orders section of the refill encounter.        Passed - Patient is age 18 or older       Passed - No active pregancy on record       Passed - Normal serum creatinine on file in past 12 months    Recent Labs   Lab Test  07/06/17   1159   CR  1.01           Passed - Normal serum potassium on file in past 12 months    Recent Labs   Lab Test  07/06/17   1159   POTASSIUM  4.1           Passed - Normal serum sodium on file in past 12 months    Recent Labs   Lab Test  07/06/17   1159   NA  139           Passed - No positive pregnancy test in past 12 months                  verapamil (CALAN-SR) 240 MG CR tablet  Last Written Prescription Date:  11/16/17  Last Fill Quantity: 180 tablet,  # refills: 1   Last office visit: 7/6/2017 with prescribing provider:  Johan Villasenor Office Visit:   Next 5 appointments (look out " "90 days)     Feb 26, 2018  9:30 AM CST   Return Visit with Juan Luis Flores MD   [facility]ound Healing [place] ([facility])    [address]  [place] [address] [phone]                180 tablet 1     Sig: TAKE ONE TABLET BY MOUTH EVERY 12 HOURS    Calcium Channel Blockers Protocol  Passed    2/15/2018  6:30 PM       Passed - Blood pressure under 140/90 in past 12 months    BP Readings from Last 3 Encounters:   02/05/18 118/77   01/15/18 136/56   01/02/18 114/73          Passed - Normal ALT in past 12 months    Recent Labs   Lab Test  07/06/17   1159   ALT  21          Passed - Recent or future visit with authorizing provider    Patient had office visit in the last year or has a visit in the next 30 days with authorizing provider.  See \"Patient Info\" tab in inbasket, or \"Choose Columns\" in Meds & Orders section of the refill encounter.        Passed - Patient is age 18 or older       Passed - No active pregnancy on record       Passed - Normal serum creatinine on file in past 12 months    Recent Labs   Lab Test  07/06/17   1159   CR  1.01          Passed - No positive pregnancy test in past 12 months          "

## 2018-02-26 ENCOUNTER — HOSPITAL ENCOUNTER (OUTPATIENT)
Dept: WOUND CARE | Facility: CLINIC | Age: 83
Discharge: HOME | End: 2018-02-26
Attending: SURGERY | Admitting: SURGERY
Payer: MEDICARE

## 2018-02-26 VITALS — TEMPERATURE: 97.3 F | SYSTOLIC BLOOD PRESSURE: 142 MMHG | HEART RATE: 67 BPM | DIASTOLIC BLOOD PRESSURE: 69 MMHG

## 2018-02-26 PROCEDURE — 11042 DBRDMT SUBQ TIS 1ST 20SQCM/<: CPT | Performed by: SURGERY

## 2018-02-26 PROCEDURE — 97597 DBRDMT OPN WND 1ST 20 CM/<: CPT | Performed by: SURGERY

## 2018-02-26 NOTE — PROGRESS NOTES
[facility] Wound Healing [place] Progress Note    Subject: Riddhi Aguilar and her son return to see us today for right medial ankle ulcer related to a underlying squamous cell cancer that underwent wide excision.  We have been allowing this to heal by secondary intent.    She has no complaints at all.  She is very active.  She is using Woun'Dres collagen gel over the area followed by a Telfa and paper tape changing this daily.  She has noticed continued decrease in size of the ulceration          Exam:  /69  Pulse 67  Temp 97.3  F (36.3  C) (Tympanic)  Alert and appropriate.  No edema nor cellulitis.  Ulcer continues to decrease in size now measuring 0.3 x 0.4 x 0.1 cm with a mild amount of slough.  Surrounding skin is healed over very nicely.    Procedure:  Time out was called, informed consent obtained, and topical anesthetic of 4% lidocaine was applied per standing protocol, debridement was performed using a #15 blade to remove the overlying slough and cause bleeding in the wound and a selective debridement fashion. bleeding controlled with light pressure. Patient tolerated procedure well.    Impression: Continued decrease in size of ulceration related to wide excision of the skin cancer.  Continue with the Woun'Dres collagen gel daily dressing changes until this is healed over.    Plan: We will dress the wounds with above.  Patient will return to the clinic in 3 weeks time    Juan Luis Flores MD  02/26/18 9:51 AM

## 2018-02-26 NOTE — DISCHARGE INSTRUCTIONS
[facility]  [address] [facility] [address], [facility] [address]  Appointment Phone [phone] Nurse Advisors [phone]    Riddhi Aguilar      2/15/1931    Wound Dressing Change to right medial ankle  Cleanse wound and surrounding skin with:soap and water  Cover wound with Wound'dres gel   Cover wound with telfa and paper tape  Change dressing daily.    Dr. Juan Luis Flores    Call us at [phone] if you have any questions about your wounds, have redness or swelling around your wound, have a fever of 101 or greater or if you have any other problems or concerns. We answer the phone Monday through Friday 8 am to 4 pm, please leave a message as we check the voicemail frequently throughout the day.     Follow up with Provider - 2-3 weeks

## 2018-02-26 NOTE — MR AVS SNAPSHOT
"                  MRN:[id number]                      After Visit Summary   2018    Riddhi Aguilar    MRN: [id number]           Visit Information        Provider Department      2018  9:30 AM Juan Luis Flores MD [facility] [place]          Further instructions from your care team             [facility] [place]  [address] [facility] [address], [place] [address]  Appointment Phone [phone] Nurse Advisors [phone]    Riddhi Aguilar      2/15/1931    Wound Dressing Change to right medial ankle  Cleanse wound and surrounding skin with:soap and water  Cover wound with Wound'dres gel   Cover wound with telfa and paper tape  Change dressing daily.    Dr. Juan Luis Flores    Call us at [phone] if you have any questions about your wounds, have redness or swelling around your wound, have a fever of 101 or greater or if you have any other problems or concerns. We answer the phone Monday through Friday 8 am to 4 pm, please leave a message as we check the voicemail frequently throughout the day.     Follow up with Provider - 2-3 weeks           MyChart Information     [employer] lets you send messages to your doctor, view your test results, renew your prescriptions, schedule appointments and more. To sign up, go to [email or web address] . Click on \"Log in\" on the left side of the screen, which will take you to the Welcome page. Then click on \"Sign up Now\" on the right side of the page.     You will be asked to enter the access code listed below, as well as some personal information. Please follow the directions to create your username and password.     Your access code is: [email or web address]  Expires: 2018  9:44 AM     Your access code will  in 90 days. If you need help or a new code, please call your [place] clinic or [phone].        Care EveryWhere ID     This is your Care EveryWhere ID. This could be used by other organizations to access " your [place] medical records  [id number]        Equal Access to Services     AMOS LUNA : Art Rasmussen, baldemar guevara, shazia sorensen, minh moore. So [facility] [phone].    ATENCIÓN: Si habla español, tiene a horvath disposición servicios gratuitos de asistencia lingüística. Llame al [phone].    We comply with applicable federal civil rights laws and [place] laws. We do not discriminate on the basis of race, color, national origin, age, disability, sex, sexual orientation, or gender identity.

## 2018-03-01 ENCOUNTER — ALLIED HEALTH/NURSE VISIT (OUTPATIENT)
Dept: PHARMACY | Facility: CLINIC | Age: 83
End: 2018-03-01
Payer: COMMERCIAL

## 2018-03-01 DIAGNOSIS — I10 ESSENTIAL HYPERTENSION, BENIGN: ICD-10-CM

## 2018-03-01 DIAGNOSIS — M54.12 CERVICAL RADICULOPATHY: ICD-10-CM

## 2018-03-01 DIAGNOSIS — F33.0 MAJOR DEPRESSIVE DISORDER, RECURRENT EPISODE, MILD (H): Primary | ICD-10-CM

## 2018-03-01 DIAGNOSIS — Z85.828 HISTORY OF BASAL CELL CARCINOMA: ICD-10-CM

## 2018-03-01 DIAGNOSIS — E78.5 HYPERLIPIDEMIA LDL GOAL <130: ICD-10-CM

## 2018-03-01 PROCEDURE — 99605 MTMS BY PHARM NP 15 MIN: CPT | Mod: U4 | Performed by: PHARMACIST

## 2018-03-01 PROCEDURE — 99607 MTMS BY PHARM ADDL 15 MIN: CPT | Mod: U4 | Performed by: PHARMACIST

## 2018-03-01 NOTE — PROGRESS NOTES
SUBJECTIVE/OBJECTIVE:                           Riddhi Aguilar is a 87 year old female called for an initial visit for Medication Therapy Management.  She was referred to me from [facility].     Chief Complaint: Comprehensive medication review.    Allergies/ADRs: None  Tobacco: No tobacco use  Alcohol: none  Caffeine: 1 cups/day of coffee  Activity: walks with walker as much as possible  PMH: Reviewed in Epic    Medication Adherence/Access:  Patient uses pill box(es). Patient is a retired RN  Patient takes medications 3 time(s) per day.  Per patient, misses medication 0 times per week.   Medication barriers: none.   The patient fills medications at [facility]: YES.     Hypertension: Current medications include Captopril 75 mg three times daily, Hydrochlorothiazide 12.5 mg daily, Spironolactone 25 mg daily, and Verapamil 240 mg twice daily.  Patient has BP monitor at home but has not been using it. BP has been monitored at wound clinic appointments. Patient reports last BP taken at wound clinic was 117/62. Patient reports no current medication side effects. Patient requested medication refills for Verapamil and Spironolactone.    S/p Basal Cell Carcinoma: She thinks she only has 1 more visit left at the wound clinic. No skin graft. She is healing well. Down to a pencil eraser size. Gets her BP measured there.     Spinal Stenosis/Knee Pain: Current medications include APAP 500 mg 1-2 tablets 1-2 times daily as needed.  She finds that this works well for her. Utilizes a walker.    Depression:  Current medications include: Bupropion  mg once daily. Pt reports that depression symptoms are stable.  PHQ-9 SCORE 3/25/2016 10/10/2016 7/6/2017   Total Score - - -   Total Score 11 2 6     Hyperlipidemia: Current therapy includes atorvastatin 10 mg once daily.  Pt reports no significant myalgias or other side effects.   The ASCVD Risk score ([place] FLOR Jr, et al., 2013) failed to calculate for the following reasons:     The 2013 ASCVD risk score is only valid for ages 40 to 79    Current labs include:  Today's Vitals: There were no vitals taken for this visit. - telemed    BP Readings from Last 3 Encounters:   02/26/18 142/69   02/05/18 118/77   01/15/18 136/56     No results found for: A1C.  Lab Results   Component Value Date    CHOL 140 07/06/2017     Lab Results   Component Value Date    TRIG 86 07/06/2017     Lab Results   Component Value Date    HDL 72 07/06/2017     Lab Results   Component Value Date    LDL 51 07/06/2017       Liver Function Studies -   Recent Labs   Lab Test  07/06/17   1159   PROTTOTAL  7.3   ALBUMIN  4.2   BILITOTAL  1.2   ALKPHOS  50   AST  22   ALT  21     Last Basic Metabolic Panel:  Lab Results   Component Value Date     07/06/2017      Lab Results   Component Value Date    POTASSIUM 4.1 07/06/2017     Lab Results   Component Value Date    CHLORIDE 106 07/06/2017     Lab Results   Component Value Date    BUN 27 07/06/2017     Lab Results   Component Value Date    CR 1.01 07/06/2017     GFR Estimate   Date Value Ref Range Status   07/06/2017 52 (L) >60 mL/min/1.7m2 Final     Comment:     Non  GFR Calc   02/22/2016 55 (L) >60 mL/min/1.7m2 Final     Comment:     Non  GFR Calc   03/04/2015 62 >60 mL/min/1.7m2 Final     Comment:     Non  GFR Calc     TSH   Date Value Ref Range Status   03/04/2015 1.99 0.40 - 4.00 mU/L Final     Comment:     Effective 7/30/2014, the reference range for this assay has changed to reflect   new instrumentation/methodology.     ]    Most Recent Immunizations   Administered Date(s) Administered     Influenza (IIV3) PF 10/26/2015     Pneumo Conj 13-V (2010&after) 03/04/2015     Pneumococcal 23 valent 01/27/2010     TD (ADULT, 7+) 05/22/2008     TDAP Vaccine (Adacel) 09/21/2012     Zoster vaccine, live 01/31/2014       ASSESSMENT:                             Current medications were reviewed today. Medicare Part D topics  discussed:Medications reviewed-no issues identified or changes needed    Medication Adherence: excellent, no issues identified    Hypertension: Stable. New Rx for Verapamil and Spironolactone were sent to [facility] mail order on 2/15 by Dr. Prado.    S/p Basal Cell Carcinoma: No issues identified    Spinal Stenosis/Knee Pain: No issues identified. Current use of APAP Is safe.     Depression: Stable.     Hyperlipidemia: Stable. Pt is on moderate intensity statin which is indicated based on 2013 ACC/AHA guidelines for lipid management.  However, the benefits of a statin for primary prevention in a patient of this age are unclear.      PLAN:                            1. Continue current medication regimen    I spent 15 minutes with this patient today (an extra 15 minutes was spent creating the Medication Action Plan). A copy of the visit note was provided to the patient's primary care provider.    Will follow up in 1 year or sooner if needed.    The patient declined a summary of these recommendations as an after visit summary.     Se Magallanes, Pharm.D Student    Berta Padilla Pharm.D., BCACP  Medication Therapy Management Pharmacist  Page/VM:  [phone]

## 2018-03-01 NOTE — PATIENT INSTRUCTIONS
Recommendations from today's MTM visit:                                                    Today we reviewed what your medicines are for, how to know if they are working, that your medicines are safe and how to make your medicine regimen as easy as possible.     1. Everything looks great!    Next MTM visit: Please feel free to reach out if any questions or concerns should arise with your medications.    To schedule another MTM appointment, please call the clinic directly or you may call the MTM scheduling line at [phone] or toll-free at [phone].     My Clinical Pharmacist's contact information:                                                      It was a pleasure seeing you today!  Please feel free to contact me with any questions or concerns you have.     Se Magallanes, Pharmacy Student Intern     Berta Padilla, Pharm.D., [facility]  Medication Therapy Management Pharmacist  Page/VM:  [phone]    You may receive a survey about the MTM services you received.  I would appreciate your feedback to help me serve you better in the future. Please fill it out and return it when you can. Your comments will be anonymous.

## 2018-03-01 NOTE — LETTER
"     [facility] MEDICATION THERAPY MANAGEMENT     Date: 3/2/2018    Riddhi Aguilar  [address] [facility] [address]    Dear Ms. Jeff,    Thank you for talking with me on  about your health and medications. Medicare s MTM (Medication Therapy Management) program helps you understand your medications and use them safely.      This letter includes an action plan (Medication Action Plan) and medication list (Personal Medication List). The action plan has steps you should take to help you get the best results from your medications. The medication list will help you keep track of your medications and how to use them the right way.       Have your action plan and medication list with you when you talk with your doctors, pharmacists, and other healthcare providers in your care team.     Ask your doctors, pharmacists, and other healthcare providers to update the action plan and medication list at every visit.     Take your medication list with you if you go to the hospital or emergency room.     Give a copy of the action plan and medication list to your family or caregivers.     If you want to talk about this letter or any of the papers with it, please call   [phone].   We look forward to working with you, your doctors, and other healthcare providers to help you stay healthy.     Sincerely,    Berta Padilla      Enclosed: Medication Action Plan and Personal Medication List    MEDICATION ACTION PLAN FOR Riddhi Aguilar,  2/15/1931     This action plan will help you get the best results from your medications if you:   1. Read \"What we talked about.\"   2. Take the steps listed in the \"What I need to do\" boxes.   3. Fill in \"What I did and when I did it.\"   4. Fill in \"My follow-up plan\" and \"Questions I want to ask.\"     Have this action plan with you when you talk with your doctors, pharmacists, and other healthcare providers in your care team. Share this with your family or caregivers " too.  DATE PREPARED: 3/2/2018  What we talked about: Continue your current medications as directed                                                  What I need to do: Nothing at this time       What I did and when I did it:                                              My follow-up plan:                 Questions I want to ask:              If you have any questions about your action plan, call Berta Padilla, Phone: [phone] , Monday-Friday 8-4:30pm.           MEDICATION LIST FOR JULIEN Bentley 2/15/1931     This medication list was made for you after we talked. We also used information from your doctor's chart.      Use blank rows to add new medications. Then fill in the dates you started using them.    Cross out medications when you no longer use them. Then write the date and why you stopped using them.    Ask your doctors, pharmacists, and other healthcare providers to update this list at every visit. Keep this list up-to-date with:       Prescription medications    Over the counter drugs     Herbals    Vitamins    Minerals      If you go to the hospital or emergency room, take this list with you. Share this with your family or caregivers too.     DATE PREPARED: 3/2/2018  Allergies or side effects: Review of patient's allergies indicates no known allergies.     Medication:  ACETAMINOPHEN 500 MG PO TABS      How I use it:  Take 2 tablets (1,000 mg) by mouth every 6 hours as needed for mild pain      Why I use it: Pain     Prescriber:  Roberto Carlos Oleary PA-C      Date I started using it:       Date I stopped using it:         Why I stopped using it:            Medication:  ATORVASTATIN CALCIUM 10 MG PO TABS      How I use it:  Take 1 tablet (10 mg) by mouth daily      Why I use it: Cholesterol    Prescriber:  Shun Prado MD      Date I started using it:       Date I stopped using it:         Why I stopped using it:            Medication:  BUPROPION HCL ER (XL) 300 MG PO TB24      How I use it:  Take  1 tablet (300 mg) by mouth every morning      Why I use it: Depression    Prescriber:  Shun Prado MD      Date I started using it:       Date I stopped using it:         Why I stopped using it:            Medication:  CAPTOPRIL 50 MG PO TABS      How I use it:  TAKE ONE AND ONE-HALF TABLETS BY MOUTH THREE TIMES DAILY      Why I use it: High blood pressure    Prescriber:  Shun Prado MD      Date I started using it:       Date I stopped using it:         Why I stopped using it:            Medication:  HYDROCHLOROTHIAZIDE 12.5 MG PO CAPS      How I use it:  Take 1 capsule (12.5 mg) by mouth daily      Why I use it: High blood pressure    Prescriber:  Shun Prado MD      Date I started using it:       Date I stopped using it:         Why I stopped using it:            Medication:  SPIRONOLACTONE 25 MG PO TABS      How I use it:  Take 1 tablet (25 mg) by mouth daily      Why I use it: High blood pressure    Prescriber:  Shun Prado MD      Date I started using it:       Date I stopped using it:         Why I stopped using it:            Medication:  THERA M PLUS PO TABS      How I use it:  Take 1 tablet by mouth daily      Why I use it:  General health    Prescriber:  Patient Reported      Date I started using it:       Date I stopped using it:         Why I stopped using it:            Medication:  TIMOLOL MALEATE 0.5 % OP SOLG      How I use it:  Place 1 drop into both eyes every morning.      Why I use it:  Glaucoma    Prescriber:  Entered By History Unknown      Date I started using it:       Date I stopped using it:         Why I stopped using it:            Medication:  VERAPAMIL HCL  MG PO TBCR      How I use it:  TAKE ONE TABLET BY MOUTH EVERY 12 HOURS      Why I use it: High blood pressure    Prescriber:  Shun Prado MD      Date I started using it:       Date I stopped using it:         Why I stopped using it:               Other Information:     If you have any questions about your  action plan, call [phone].    According to the Paperwork Reduction Act of 1995, no persons are required to respond to a collection of information unless it displays a valid OMB control number. The valid OMB number for this information collection is 0651-1363. The time required to complete this information collection is estimated to average 40 minutes per response, including the time to review instructions, searching existing data resources, gather the data needed, and complete and review the information collection. If you have any comments concerning the accuracy of the time estimate(s) or suggestions for improving this form, please write to: CMS, Attn: PRA Reports Clearance Officer, [address] [place] [phone].

## 2018-03-01 NOTE — MR AVS SNAPSHOT
After Visit Summary   3/1/2018    Riddhi Aguilar    MRN: [id number]           Patient Information     Date Of Birth          2/15/1931        Visit Information        Provider Department      3/1/2018 11:00 AM Berta Padilla [facility] Medication Therapy Management        Today's Diagnoses     Major depressive disorder, recurrent episode, mild (H)    -  1    Hyperlipidemia LDL goal <130        Essential hypertension, benign        Cervical radiculopathy        History of basal cell carcinoma          Care Instructions    Recommendations from today's MTM visit:                                                    Today we reviewed what your medicines are for, how to know if they are working, that your medicines are safe and how to make your medicine regimen as easy as possible.     1. Everything looks great!    Next MTM visit: Please feel free to reach out if any questions or concerns should arise with your medications.    To schedule another MTM appointment, please call the clinic directly or you may call the MTM scheduling line at [phone] or toll-free at [phone].     My Clinical Pharmacist's contact information:                                                      It was a pleasure seeing you today!  Please feel free to contact me with any questions or concerns you have.     Se Magallanes, Pharmacy Student Intern     Berta Padilla, Pharm.D., [facility]  Medication Therapy Management Pharmacist  Page/VM:  [phone]    You may receive a survey about the MTM services you received.  I would appreciate your feedback to help me serve you better in the future. Please fill it out and return it when you can. Your comments will be anonymous.                  Follow-ups after your visit        Your next 10 appointments already scheduled     Mar 02, 2018  4:00 PM CST   [facility]    [address] [facility]  "[place] [address]   [phone]            Mar 19, 2018  9:30 AM CDT   Return Visit with Juan Luis Flores MD   [facility] ([facility])    [address]  [address]  [place] [address]   [phone]              Who to contact     If you have questions or need follow up information about today's clinic visit or your schedule please contact [facility] MEDICATION THERAPY MANAGEMENT directly at [phone].  Normal or non-critical lab and imaging results will be communicated to you by [employer], letter or phone within 4 business days after the clinic has received the results. If you do not hear from us within 7 days, please contact the clinic through [employer] or phone. If you have a critical or abnormal lab result, we will notify you by phone as soon as possible.  Submit refill requests through [employer] or call your pharmacy and they will forward the refill request to us. Please allow 3 business days for your refill to be completed.          Additional Information About Your Visit        Amvona Information     [employer]ona lets you send messages to your doctor, view your test results, renew your prescriptions, schedule appointments and more. To sign up, go to [email or web address] . Click on \"Log in\" on the left side of the screen, which will take you to the Welcome page. Then click on \"Sign up Now\" on the right side of the page.     You will be asked to enter the access code listed below, as well as some personal information. Please follow the directions to create your username and password.     Your access code is: [email or web address]  Expires: 2018  9:44 AM     Your access code will  in 90 days. If you need help or a new code, please call your [facility] clinic or [phone].        Care EveryWhere ID     This is your Care EveryWhere ID. This could be used by other organizations to access your [facility] medical records  [id number]         Blood Pressure from Last " 3 Encounters:   02/26/18 142/69   02/05/18 118/77   01/15/18 136/56    Weight from Last 3 Encounters:   08/29/17 232 lb 11.2 oz (105.6 kg)   08/14/17 228 lb (103.4 kg)   07/06/17 225 lb (102.1 kg)              Today, you had the following     No orders found for display       Primary Care Provider Office Phone # Fax #    Shun Prado [phone]       [facility] [address] [place] [address] [facility]  [facility] [address]        Equal Access to Services     [facility]: Hadii aad ku hadasho Soomaali, waaxda luqadaha, qaybta kaalmada adeegyada, minh glover . So [facility] [phone].    ATENCIÓN: Si habla español, tiene a horvath disposición servicios gratuitos de asistencia lingüística. [facility] [phone].    We comply with applicable federal civil rights laws and [place] laws. We do not discriminate on the basis of race, color, national origin, age, disability, sex, sexual orientation, or gender identity.            Thank you!     Thank you for choosing [facility] MEDICATION THERAPY MANAGEMENT  for your care. Our goal is always to provide you with excellent care. Hearing back from our patients is one way we can continue to improve our services. Please take a few minutes to complete the written survey that you may receive in the mail after your visit with us. Thank you!             Your Updated Medication List - Protect others around you: Learn how to safely use, store and throw away your medicines at www.disposemymeds.org.          This list is accurate as of 3/1/18 11:59 PM.  Always use your most recent med list.                   Brand Name Dispense Instructions for use Diagnosis    acetaminophen 500 MG tablet    TYLENOL    30 tablet    Take 2 tablets (1,000 mg) by mouth every 6 hours as needed for mild pain    Pain of right lower extremity, Right calf pain       atorvastatin 10 MG tablet    LIPITOR    90 tablet    Take 1 tablet (10 mg) by mouth daily    Hyperlipidemia LDL  goal <130       buPROPion 300 MG 24 hr tablet    WELLBUTRIN XL    90 tablet    Take 1 tablet (300 mg) by mouth every morning    Major depressive disorder, recurrent episode, mild (H), Essential hypertension, benign       captopril 50 MG tablet    CAPOTEN    405 tablet    TAKE ONE AND ONE-HALF TABLETS BY MOUTH THREE TIMES DAILY    Essential hypertension, benign       hydrochlorothiazide 12.5 MG capsule    MICROZIDE    90 capsule    Take 1 capsule (12.5 mg) by mouth daily    Essential hypertension, benign       Multi-vitamin Tabs tablet      Take 1 tablet by mouth daily        spironolactone 25 MG tablet    ALDACTONE    90 tablet    Take 1 tablet (25 mg) by mouth daily    Major depressive disorder, recurrent episode, mild (H), Essential hypertension, benign       timolol 0.5 % ophthalmic gel-form    TIMOPTIC-XE     Place 1 drop into both eyes every morning.        verapamil 240 MG CR tablet    CALAN-SR    180 tablet    TAKE ONE TABLET BY MOUTH EVERY 12 HOURS    Major depressive disorder, recurrent episode, mild (H), Essential hypertension, benign

## 2018-03-02 ENCOUNTER — OFFICE VISIT (OUTPATIENT)
Dept: FAMILY MEDICINE | Facility: CLINIC | Age: 83
End: 2018-03-02
Payer: COMMERCIAL

## 2018-03-02 VITALS
SYSTOLIC BLOOD PRESSURE: 134 MMHG | HEIGHT: 61 IN | TEMPERATURE: 97.7 F | HEART RATE: 78 BPM | DIASTOLIC BLOOD PRESSURE: 74 MMHG | WEIGHT: 228.3 LBS | BODY MASS INDEX: 43.11 KG/M2 | OXYGEN SATURATION: 99 %

## 2018-03-02 DIAGNOSIS — C50.919 MALIGNANT NEOPLASM OF FEMALE BREAST, UNSPECIFIED ESTROGEN RECEPTOR STATUS, UNSPECIFIED LATERALITY, UNSPECIFIED SITE OF BREAST (H): ICD-10-CM

## 2018-03-02 DIAGNOSIS — F32.0 MAJOR DEPRESSIVE DISORDER, SINGLE EPISODE, MILD (H): ICD-10-CM

## 2018-03-02 DIAGNOSIS — Z01.818 PREOP GENERAL PHYSICAL EXAM: Primary | ICD-10-CM

## 2018-03-02 DIAGNOSIS — E66.01 MORBID OBESITY DUE TO EXCESS CALORIES (H): ICD-10-CM

## 2018-03-02 DIAGNOSIS — H02.403 PTOSIS OF BOTH EYELIDS: ICD-10-CM

## 2018-03-02 DIAGNOSIS — I10 ESSENTIAL HYPERTENSION, BENIGN: ICD-10-CM

## 2018-03-02 PROCEDURE — 99214 OFFICE O/P EST MOD 30 MIN: CPT | Performed by: INTERNAL MEDICINE

## 2018-03-02 NOTE — MR AVS SNAPSHOT
After Visit Summary   3/2/2018    Riddhi Aguilar    MRN: [id number]           Patient Information     Date Of Birth          2/15/1931        Visit Information        Provider Department      3/2/2018 4:00 PM Shun Prado MD [facility]        Today's Diagnoses     Preop general physical exam    -  1    Ptosis of both eyelids        Morbid obesity due to excess calories (H)        Malignant neoplasm of female breast, unspecified estrogen receptor status, unspecified laterality, unspecified site of breast (H)        Major depressive disorder, single episode, mild (H)        Essential hypertension, benign          Care Instructions      Before Your Surgery      Call your surgeon if there is any change in your health. This includes signs of a cold or flu (such as a sore throat, runny nose, cough, rash or fever).    Do not smoke, drink alcohol or take over the counter medicine (unless your surgeon or primary care doctor tells you to) for the 24 hours before and after surgery.    If you take prescribed drugs: Follow your doctor s orders about which medicines to take and which to stop until after surgery.    Eating and drinking prior to surgery: follow the instructions from your surgeon    Take a shower or bath the night before surgery. Use the soap your surgeon gave you to gently clean your skin. If you do not have soap from your surgeon, use your regular soap. Do not shave or scrub the surgery site.  Wear clean pajamas and have clean sheets on your bed.           Follow-ups after your visit        Follow-up notes from your care team     Return in about 4 months (around 7/2/2018).      Your next 10 appointments already scheduled     Mar 19, 2018  9:30 AM CDT   Return Visit with Juan Luis Flores MD   [facility] [place] ([facility])    [address] [address]  [facility] [address]   [phone]              Who to contact     If you have  "questions or need follow up information about today's clinic visit or your schedule please contact [facility] directly at [phone].  Normal or non-critical lab and imaging results will be communicated to you by MyChart, letter or phone within 4 business days after the clinic has received the results. If you do not hear from us within 7 days, please contact the clinic through MyChart or phone. If you have a critical or abnormal lab result, we will notify you by phone as soon as possible.  Submit refill requests through [employer] or call your pharmacy and they will forward the refill request to us. Please allow 3 business days for your refill to be completed.          Additional Information About Your Visit        Care EveryWhere ID     This is your Care EveryWhere ID. This could be used by other organizations to access your [facility] medical records  [id number]        Your Vitals Were     Pulse Temperature Height Pulse Oximetry BMI (Body Mass Index)       78 97.7  F (36.5  C) (Oral) 5' 1\" (1.549 m) 99% 43.14 kg/m2        Blood Pressure from Last 3 Encounters:   03/02/18 134/74   02/26/18 142/69   02/05/18 118/77    Weight from Last 3 Encounters:   03/02/18 228 lb 4.8 oz (103.6 kg)   08/29/17 232 lb 11.2 oz (105.6 kg)   08/14/17 228 lb (103.4 kg)              Today, you had the following     No orders found for display       Primary Care Provider Office Phone # Fax #    Shun C MD Johan [phone]       [facility] [address] [address]        Equal Access to Services     SAMINA LUNA : Hadii valery Rasmussen, waaxda luqmiguelangel, qaybta kaalminh dean. So [facility] [phone].    ATENCIÓN: Si habla español, tiene a horvath disposición servicios gratuitos de asistencia lingüística. Llame al [phone].    We comply with applicable federal civil rights laws and [place] laws. We do not discriminate on the basis " of race, color, national origin, age, disability, sex, sexual orientation, or gender identity.            Thank you!     Thank you for choosing [facility]  for your care. Our goal is always to provide you with excellent care. Hearing back from our patients is one way we can continue to improve our services. Please take a few minutes to complete the written survey that you may receive in the mail after your visit with us. Thank you!             Your Updated Medication List - Protect others around you: Learn how to safely use, store and throw away your medicines at www.disposemymeds.org.          This list is accurate as of 3/2/18  4:41 PM.  Always use your most recent med list.                   Brand Name Dispense Instructions for use Diagnosis    acetaminophen 500 MG tablet    TYLENOL    30 tablet    Take 2 tablets (1,000 mg) by mouth every 6 hours as needed for mild pain    Pain of right lower extremity, Right calf pain       atorvastatin 10 MG tablet    LIPITOR    90 tablet    Take 1 tablet (10 mg) by mouth daily    Hyperlipidemia LDL goal <130       buPROPion 300 MG 24 hr tablet    WELLBUTRIN XL    90 tablet    Take 1 tablet (300 mg) by mouth every morning    Major depressive disorder, recurrent episode, mild (H), Essential hypertension, benign       captopril 50 MG tablet    CAPOTEN    405 tablet    TAKE ONE AND ONE-HALF TABLETS BY MOUTH THREE TIMES DAILY    Essential hypertension, benign       hydrochlorothiazide 12.5 MG capsule    MICROZIDE    90 capsule    Take 1 capsule (12.5 mg) by mouth daily    Essential hypertension, benign       Multi-vitamin Tabs tablet      Take 1 tablet by mouth daily        spironolactone 25 MG tablet    ALDACTONE    90 tablet    Take 1 tablet (25 mg) by mouth daily    Major depressive disorder, recurrent episode, mild (H), Essential hypertension, benign       timolol 0.5 % ophthalmic gel-form    TIMOPTIC-XE     Place 1 drop into both eyes every morning.         verapamil 240 MG CR tablet    CALAN-SR    180 tablet    TAKE ONE TABLET BY MOUTH EVERY 12 HOURS    Major depressive disorder, recurrent episode, mild (H), Essential hypertension, benign

## 2018-03-02 NOTE — PROGRESS NOTES
"[facility]  [address] [address]  [phone]  Dept: [phone]    PRE-OP EVALUATION:  Today's date: 3/2/2018    Riddhi Aguilar (: 2/15/1931) presents for pre-operative evaluation assessment as requested by Dr. Samir Warren.  She requires evaluation and anesthesia risk assessment prior to undergoing surgery/procedure for treatment of ptosis .    Proposed Surgery/ Procedure: Bilateral upper lid blepharoplasty, Ptosis repair  Date of Surgery/ Procedure: 3-  Time of Surgery/ Procedure: 12:15 pm  Hospital/Surgical Facility: [facility]  Fax number for surgical facility: [phone]  Primary Physician: Shun Prado  Type of Anesthesia Anticipated: to be determined    Patient has a Health Care Directive or Living Will:  NO    1. NO - Do you have a history of heart attack, stroke, stent, bypass or surgery on an artery in the head, neck, heart or legs?  2. NO - Do you ever have any pain or discomfort in your chest?  3. NO - Do you have a history of  Heart Failure?  4. NO - Are you troubled by shortness of breath when: walking on the level, up a slight hill or at night?  5. NO - Do you currently have a cold, bronchitis or other respiratory infection?  6. NO - Do you have a cough, shortness of breath or wheezing?  7. NO - Do you sometimes get pains in the calves of your legs when you walk?  8. NO - Do you or anyone in your family have previous history of blood clots?  9. NO - Do you or does anyone in your family have a serious bleeding problem such as prolonged bleeding following surgeries or cuts?  10. NO - Have you ever had problems with anemia or been told to take iron pills?  11. yes - Have you had any abnormal blood loss such as black, tarry or bloody stools, or abnormal vaginal bleeding?  She had \"bleeding ulcers\" 6 years ago.  Current stools are brown color.    12. yes - Have you ever had a blood transfusion?  13. NO - Have you or any of your relatives " ever had problems with anesthesia?  14. NO - Do you have sleep apnea, excessive snoring or daytime drowsiness?  15. NO - Do you have any prosthetic heart valves?  16. NO - Do you have prosthetic joints?  17. NO - Is there any chance that you may be pregnant?      HPI:     HPI related to upcoming procedure: She is having trouble reading without holding her eyelids up.  Symptoms have been present for years, but are now severe.  She can walk up a flight of stairs without chest pain or dyspnea.        MEDICAL HISTORY:     Patient Active Problem List    Diagnosis Date Noted     Major depressive disorder, single episode, mild (H) 03/10/2016     Priority: Medium     Major depressive disorder, recurrent episode, mild (H) 02/29/2016     Priority: Medium     Health Care Home 12/20/2012     Priority: Medium     EMERGENCY CARE PLAN  Presenting Problem Signs and Symptoms Treatment Plan    Questions or conerns during clinic hours    I will call the clinic directly     Questions or conerns outside clinic hours    I will call the 24 hour nurse line at [phone]    Patient needs to schedule an appointment    I will call the 24 hour scheduling team at [phone] or clinic directly    Same day treatment     I will call the clinic first, nurse line if after hours, urgent care and express care if needed                        Sara Hoff RN, MS, Clinic Care Coordinator  [phone]    Neurologist Teo Amin [address] [facility] [phone]  DX V65.8 REPLACED WITH [phone] HEALTH CARE HOME (04/08/2013)       Subdural hematoma caused by concussion (H) 12/16/2012     Priority: Medium     Advanced directives, counseling/discussion 09/21/2012     Priority: Medium     Discussed advance care planning with patient; information given to patient to review. 9/21/2012          Duodenal ulcer with hemorrhage 03/09/2012     Priority: Medium     Cataract, left eye 05/17/2011     Priority: Medium     Diplopia 01/06/2011     Priority: Medium      Borderline glaucoma with ocular hypertension, unspecified laterality 01/06/2011     Priority: Medium     HYPERLIPIDEMIA LDL GOAL <130 10/31/2010     Priority: Medium     Herpes Zoster- rt abdomen 07/27/2010     Priority: Medium     Cervical radiculopathy 01/27/2010     Priority: Medium     Family Hist of Malignant Neoplasm-- breast cancer 05/19/2009     Priority: Medium     Allergic state 05/22/2008     Priority: Medium     (Problem list name updated by automated process. Provider to review and confirm.)       Insomnia 05/22/2008     Priority: Medium     Urgency of urination 07/18/2006     Priority: Medium     Malignant neoplasm of female breast (H) 07/18/2006     Priority: Medium     Problem list name updated by automated process. Provider to review       Asymptomatic varicose veins 07/18/2006     Priority: Medium     Essential hypertension, benign 05/25/2004     Priority: Medium     Morbid obesity due to excess calories (H) 05/25/2004     Priority: Medium     Problem list name updated by automated process. Provider to review        Past Medical History:   Diagnosis Date     Arthritis      Bleeding ulcer      Essential hypertension, benign      Pure hypercholesterolemia      Spinal stenosis      Unspecified cataract      Unspecified glaucoma(365.9)      Past Surgical History:   Procedure Laterality Date     BREAST SURGERY      lumpectomy     C NONSPECIFIC PROCEDURE      Repair of buckled retina of rt eye     C NONSPECIFIC PROCEDURE      Appy     DILATION AND CURETTAGE, HYSTEROSCOPY DIAGNOSTIC, COMBINED  2/6/2014    Procedure: COMBINED DILATION AND CURETTAGE, HYSTEROSCOPY DIAGNOSTIC;  DILATION AND CURETTAGE, HYSTEROSCOPY, POLYPECTOMY, INCISION AND DRAINAGE OF SEBACEOUS CYST ;  Surgeon: Serena Zamora MD;  Location: [facility]     EXCISE LESION LOWER EXTREMITY Right 8/29/2017    Procedure: EXCISE LESION LOWER EXTREMITY;  WIDE EXCISIONAL BIOPSY OF RIGHT ANKLE BASAL CELL CARCINOMA;  Surgeon: Juan Luis Flores MD;   "Location:  OR     INCISION AND DRAINAGE PERINEAL, COMBINED  2/6/2014    Procedure: COMBINED INCISION AND DRAINAGE PERINEAL;;  Surgeon: Serena Zamora MD;  Location: [facility]     Current Outpatient Prescriptions   Medication Sig Dispense Refill     spironolactone (ALDACTONE) 25 MG tablet Take 1 tablet (25 mg) by mouth daily 90 tablet 1     verapamil (CALAN-SR) 240 MG CR tablet TAKE ONE TABLET BY MOUTH EVERY 12 HOURS 180 tablet 1     multivitamin, therapeutic with minerals (MULTI-VITAMIN) TABS tablet Take 1 tablet by mouth daily       captopril (CAPOTEN) 50 MG tablet TAKE ONE AND ONE-HALF TABLETS BY MOUTH THREE TIMES DAILY 405 tablet 1     buPROPion (WELLBUTRIN XL) 300 MG 24 hr tablet Take 1 tablet (300 mg) by mouth every morning 90 tablet 2     atorvastatin (LIPITOR) 10 MG tablet Take 1 tablet (10 mg) by mouth daily 90 tablet 3     hydrochlorothiazide (MICROZIDE) 12.5 MG capsule Take 1 capsule (12.5 mg) by mouth daily 90 capsule 3     acetaminophen (TYLENOL) 500 MG tablet Take 2 tablets (1,000 mg) by mouth every 6 hours as needed for mild pain 30 tablet 0     timolol (TIMOPTIC-XR) 0.5 % ophthalmic gel-forming Place 1 drop into both eyes every morning.       OTC products: None, except as noted above    No Known Allergies   Latex Allergy: NO    Social History   Substance Use Topics     Smoking status: Never Smoker     Smokeless tobacco: Never Used     Alcohol use No     History   Drug Use No       REVIEW OF SYSTEMS:   Constitutional, neuro, ENT, endocrine, pulmonary, cardiac, gastrointestinal, genitourinary, musculoskeletal, integument and psychiatric systems are negative, except as otherwise noted.    EXAM:   /74  Pulse 78  Temp 97.7  F (36.5  C) (Oral)  Ht 5' 1\" (1.549 m)  Wt 228 lb 4.8 oz (103.6 kg)  SpO2 99%  BMI 43.14 kg/m2    GENERAL APPEARANCE: healthy, alert and no distress     EYES: bilateral ptosis noted, EOMI, PERRL     HENT: ear canals and TM's normal and nose and mouth without ulcers or " lesions     NECK: no adenopathy, no asymmetry, masses, or scars and thyroid normal to palpation     RESP: lungs clear to auscultation - no rales, rhonchi or wheezes     CV: regular rates and rhythm, normal S1 S2, no S3 or S4 and no murmur, click or rub     ABDOMEN:  soft, nontender, no HSM or masses and bowel sounds normal     MS: extremities normal- no gross deformities noted, no evidence of inflammation in joints, FROM in all extremities.     SKIN: no suspicious lesions or rashes     NEURO: Normal strength and tone, sensory exam grossly normal, mentation intact and speech normal     PSYCH: mentation appears normal. and affect normal/bright     LYMPHATICS: No cervical adenopathy    DIAGNOSTICS:   No labs or EKG required for low risk surgery (cataract, skin procedure, breast biopsy, etc)    Recent Labs   Lab Test  07/06/17   1159  02/22/16   1512   12/16/12   2244   HGB  11.7  12.7   < >  12.4   PLT  332  312   < >   --    INR   --    --    --   1.05   NA  139  137   < >   --    POTASSIUM  4.1  4.2   < >   --    CR  1.01  0.97   < >   --     < > = values in this interval not displayed.        IMPRESSION:   Reason for surgery/procedure: bilateral ptosis   Diagnosis/reason for consult: Preoperative evaluation     The proposed surgical procedure is considered LOW risk.    REVISED CARDIAC RISK INDEX  The patient has the following serious cardiovascular risks for perioperative complications such as (MI, PE, VFib and 3  AV Block):  No serious cardiac risks  INTERPRETATION: 0 risks: Class I (very low risk - 0.4% complication rate)    The patient has the following additional risks for perioperative complications:  No identified additional risks      ICD-10-CM    1. Preop general physical exam Z01.818    2. Ptosis of both eyelids H02.403    3. Morbid obesity due to excess calories (H) E66.01    4. Malignant neoplasm of female breast, unspecified estrogen receptor status, unspecified laterality, unspecified site of breast (H)  C50.919    5. Major depressive disorder, single episode, mild (H) F32.0    6. Essential hypertension, benign I10        RECOMMENDATIONS:     --Consult hospital rounder / IM to assist post-op medical management    --Patient is to take all scheduled medications on the day of surgery     APPROVAL GIVEN to proceed with proposed procedure, without further diagnostic evaluation       Signed Electronically by: Shun Prado MD    Copy of this evaluation report is provided to requesting physician.    [place] Preop Guidelines

## 2018-03-02 NOTE — NURSING NOTE
"Chief Complaint   Patient presents with     Pre-Op Exam       Initial /71 (BP Location: Left arm, Cuff Size: Adult Large)  Pulse 78  Temp 97.7  F (36.5  C) (Oral)  Ht 5' 1\" (1.549 m)  Wt 228 lb 4.8 oz (103.6 kg)  SpO2 99%  BMI 43.14 kg/m2 Estimated body mass index is 43.14 kg/(m^2) as calculated from the following:    Height as of this encounter: 5' 1\" (1.549 m).    Weight as of this encounter: 228 lb 4.8 oz (103.6 kg).  Medication Reconciliation: complete   Edie Duran MA  "

## 2018-03-03 ASSESSMENT — PATIENT HEALTH QUESTIONNAIRE - PHQ9: SUM OF ALL RESPONSES TO PHQ QUESTIONS 1-9: 4

## 2018-03-13 ENCOUNTER — TELEPHONE (OUTPATIENT)
Dept: FAMILY MEDICINE | Facility: CLINIC | Age: 83
End: 2018-03-13

## 2018-03-13 DIAGNOSIS — I10 ESSENTIAL HYPERTENSION, BENIGN: ICD-10-CM

## 2018-03-13 DIAGNOSIS — F33.0 MAJOR DEPRESSIVE DISORDER, RECURRENT EPISODE, MILD (H): ICD-10-CM

## 2018-03-13 RX ORDER — VERAPAMIL HYDROCHLORIDE 120 MG/1
TABLET, FILM COATED, EXTENDED RELEASE ORAL
Qty: 180 TABLET | Refills: 3 | Status: SHIPPED | OUTPATIENT
Start: 2018-03-13 | End: 2019-06-04

## 2018-03-13 NOTE — TELEPHONE ENCOUNTER
If patient's pulse is that low, I recommend cutting verapamil dose in half   Prescription for lower dose of verapamil sent to [facility]  Patient should start taking lower dose as soon as she can  She should schedule office visit appointment with me when she can for blood pressure recheck and to recheck pulse and possibly EKG  Agree ER sooner if symptoms reoccur

## 2018-03-13 NOTE — TELEPHONE ENCOUNTER
Reason for call:  Patient reporting a symptom    Symptom or request: Dizzy and nauseas, pt vomited BP was 101/46 pulse was 45 Pt is NOT currently having these symptoms     Duration (how long have symptoms been present): 1 day 3/11-     Have you been treated for this before? No    Additional comments: Pt states she broke out into sweat and then got freezing cold. Pt states she took the BP later in the the day it was 122/50       Phone Number patient can be reached at:  Home number on file [phone] (home)    Best Time:  any    Can we leave a detailed message on this number:  YES    Call taken on 3/13/2018 at 3:40 PM by Blanche Junior

## 2018-03-13 NOTE — TELEPHONE ENCOUNTER
Spoke with  pharmacy- they provided the number to call the  of the medication     Spoke with [employer] who verified that the mechanisms (extended release) would NOT be altered if divided in half by the score line.     Updated patient that she CAN cut her 240mg tab in half, starting this evening and until she receives her new RX from her mail order pharmacy.       Also schedule Pt for f/u appointment on Friday, 3/16.     Again reiterated the importance of calling/seeking care if symptoms return,     Pt agrees with plan,     Deborah QUIÑONES RN    **Pt would like us to call and HOLD her new RX for 120mg tabs. She has just rec'd a #90 supply from the pharmacy for the 240mg which she will cut in half. (Does not want to over use her Medicare spending) pharmacy has profiled the med- pt can call when she needs refills.

## 2018-03-16 ENCOUNTER — OFFICE VISIT (OUTPATIENT)
Dept: FAMILY MEDICINE | Facility: CLINIC | Age: 83
End: 2018-03-16
Payer: COMMERCIAL

## 2018-03-16 VITALS
BODY MASS INDEX: 43.05 KG/M2 | WEIGHT: 228 LBS | HEART RATE: 74 BPM | SYSTOLIC BLOOD PRESSURE: 136 MMHG | TEMPERATURE: 96.8 F | DIASTOLIC BLOOD PRESSURE: 72 MMHG | OXYGEN SATURATION: 99 % | HEIGHT: 61 IN

## 2018-03-16 DIAGNOSIS — I10 ESSENTIAL HYPERTENSION, BENIGN: ICD-10-CM

## 2018-03-16 DIAGNOSIS — R00.1 BRADYCARDIA: ICD-10-CM

## 2018-03-16 DIAGNOSIS — R55 NEAR SYNCOPE: Primary | ICD-10-CM

## 2018-03-16 PROCEDURE — 93005 ELECTROCARDIOGRAM TRACING: CPT | Performed by: INTERNAL MEDICINE

## 2018-03-16 PROCEDURE — 99214 OFFICE O/P EST MOD 30 MIN: CPT | Performed by: INTERNAL MEDICINE

## 2018-03-16 NOTE — PROGRESS NOTES
SUBJECTIVE:   Riddhi Aguilar is a 87 year old female who presents to clinic today for the following health issues:    Recheck medication - hypotension, low BP, emesis episode.     Episode of near syncope with emesis and noted low blood pressure and pulse on 3/11  No subsequent episodes  Similar episode noted 2 months prior  No preceding chest pain, palpitations, bowel bladder incontinence or post ictal symptoms   No postural dizziness   Episode occurred with patient at rest reading news paper       Problem list and histories reviewed & adjusted, as indicated.  Additional history: as documented    Patient Active Problem List   Diagnosis     Essential hypertension, benign     Morbid obesity due to excess calories (H)     Urgency of urination     Malignant neoplasm of female breast (H)     Asymptomatic varicose veins     Allergic state     Insomnia     Family Hist of Malignant Neoplasm-- breast cancer     Cervical radiculopathy     Herpes Zoster- rt abdomen     HYPERLIPIDEMIA LDL GOAL <130     Diplopia     Borderline glaucoma with ocular hypertension, unspecified laterality     Cataract, left eye     Duodenal ulcer with hemorrhage     Advanced directives, counseling/discussion     Subdural hematoma caused by concussion (H)     Health Care Home     Major depressive disorder, recurrent episode, mild (H)     Major depressive disorder, single episode, mild (H)     Past Surgical History:   Procedure Laterality Date     BREAST SURGERY      lumpectomy     C NONSPECIFIC PROCEDURE      Repair of buckled retina of rt eye     C NONSPECIFIC PROCEDURE      Appy     DILATION AND CURETTAGE, HYSTEROSCOPY DIAGNOSTIC, COMBINED  2/6/2014    Procedure: COMBINED DILATION AND CURETTAGE, HYSTEROSCOPY DIAGNOSTIC;  DILATION AND CURETTAGE, HYSTEROSCOPY, POLYPECTOMY, INCISION AND DRAINAGE OF SEBACEOUS CYST ;  Surgeon: Serena Zamora MD;  Location:  [facility]     EXCISE LESION LOWER EXTREMITY Right 8/29/2017    Procedure: EXCISE LESION LOWER  "EXTREMITY;  WIDE EXCISIONAL BIOPSY OF RIGHT ANKLE BASAL CELL CARCINOMA;  Surgeon: Juan Luis Flores MD;  Location: [facility]     INCISION AND DRAINAGE PERINEAL, COMBINED  2/6/2014    Procedure: COMBINED INCISION AND DRAINAGE PERINEAL;;  Surgeon: Serena Zamora MD;  Location:  [facility]       Social History   Substance Use Topics     Smoking status: Never Smoker     Smokeless tobacco: Never Used     Alcohol use No     Family History   Problem Relation Age of Onset     Breast Cancer Sister      Breast Cancer Sister      CANCER Brother      bone cancer in arm         Current Outpatient Prescriptions   Medication Sig Dispense Refill     verapamil (CALAN-SR) 120 MG TBCR CR tablet TAKE ONE TABLET BY MOUTH EVERY 12 HOURS 180 tablet 3     spironolactone (ALDACTONE) 25 MG tablet Take 1 tablet (25 mg) by mouth daily 90 tablet 1     multivitamin, therapeutic with minerals (MULTI-VITAMIN) TABS tablet Take 1 tablet by mouth daily       captopril (CAPOTEN) 50 MG tablet TAKE ONE AND ONE-HALF TABLETS BY MOUTH THREE TIMES DAILY 405 tablet 1     buPROPion (WELLBUTRIN XL) 300 MG 24 hr tablet Take 1 tablet (300 mg) by mouth every morning 90 tablet 2     atorvastatin (LIPITOR) 10 MG tablet Take 1 tablet (10 mg) by mouth daily 90 tablet 3     hydrochlorothiazide (MICROZIDE) 12.5 MG capsule Take 1 capsule (12.5 mg) by mouth daily 90 capsule 3     acetaminophen (TYLENOL) 500 MG tablet Take 2 tablets (1,000 mg) by mouth every 6 hours as needed for mild pain 30 tablet 0     timolol (TIMOPTIC-XR) 0.5 % ophthalmic gel-forming Place 1 drop into both eyes every morning.       No Known Allergies    Reviewed and updated as needed this visit by clinical staff  Tobacco       Reviewed and updated as needed this visit by Provider         ROS:  Constitutional, HEENT, cardiovascular, pulmonary, gi and gu systems are negative, except as otherwise noted.    OBJECTIVE:     /72  Pulse 74  Temp 96.8  F (36  C) (Tympanic)  Ht 5' 1\" (1.549 m)  Wt 228 " lb (103.4 kg)  SpO2 99%  Breastfeeding? No  BMI 43.08 kg/m2  Body mass index is 43.08 kg/(m^2).   Blood pressure above standing   GENERAL: healthy, alert and no distress  RESP: lungs clear to auscultation - no rales, rhonchi or wheezes  CV: Heart with regular rate and rhythm.   MS: No edema  NEURO: Normal strength and tone, mentation intact and speech normal; uses walker   PSYCH: mentation appears normal, affect normal/bright    EKG:  Pending     ASSESSMENT/PLAN:       1. Near syncope  Consider bradyarrhythmia/heart block as potential etiology   Holter to further evaluate    - EKG 12-lead complete w/read - Clinics    2. Bradycardia  If significant bradycardia noted on holter, then stop verapamil or consider EP consult   - Zio Patch Holter; Future    3. Essential hypertension, benign  Blood pressure ok in clinic today on half-dose of verapamil       FUTURE APPOINTMENTS:       - Follow-up visit pending Holter findings/ symptoms     Shun Prado MD  [facility]

## 2018-03-16 NOTE — NURSING NOTE
"Chief Complaint   Patient presents with     RECHECK     Hypotension       Initial /66 (BP Location: Right arm, Cuff Size: Adult Large)  Pulse 74  Temp 96.8  F (36  C) (Tympanic)  Ht 5' 1\" (1.549 m)  Wt 228 lb (103.4 kg)  SpO2 99%  Breastfeeding? No  BMI 43.08 kg/m2 Estimated body mass index is 43.08 kg/(m^2) as calculated from the following:    Height as of this encounter: 5' 1\" (1.549 m).    Weight as of this encounter: 228 lb (103.4 kg).  Medication Reconciliation: complete   Ashley Mancini MA      "

## 2018-03-16 NOTE — MR AVS SNAPSHOT
"              After Visit Summary   3/16/2018    Riddhi Aguilar    MRN: [id number]           Patient Information     Date Of Birth          2/15/1931        Visit Information        Provider Department      3/16/2018 11:00 AM Shun Prado MD [facility]        Today's Diagnoses     Near syncope    -  1    Bradycardia        Essential hypertension, benign           Follow-ups after your visit        Future tests that were ordered for you today     Open Future Orders        Priority Expected Expires Ordered    Zio Patch Holter Routine  4/30/2018 3/16/2018            Who to contact     If you have questions or need follow up information about today's clinic visit or your schedule please contact [facility] directly at [phone].  Normal or non-critical lab and imaging results will be communicated to you by MyChart, letter or phone within 4 business days after the clinic has received the results. If you do not hear from us within 7 days, please contact the clinic through MyChart or phone. If you have a critical or abnormal lab result, we will notify you by phone as soon as possible.  Submit refill requests through [email or web address] or call your pharmacy and they will forward the refill request to us. Please allow 3 business days for your refill to be completed.          Additional Information About Your Visit        Care EveryWhere ID     This is your Care EveryWhere ID. This could be used by other organizations to access your [place] medical records  [id number]        Your Vitals Were     Pulse Temperature Height Pulse Oximetry Breastfeeding? BMI (Body Mass Index)    74 96.8  F (36  C) (Tympanic) 5' 1\" (1.549 m) 99% No 43.08 kg/m2       Blood Pressure from Last 3 Encounters:   03/16/18 136/72   03/02/18 134/74   02/26/18 142/69    Weight from Last 3 Encounters:   03/16/18 228 lb (103.4 kg)   03/02/18 228 lb 4.8 oz (103.6 kg)   08/29/17 232 lb 11.2 oz (105.6 kg)              We Performed the " Following     EKG 12-lead complete w/read - Clinics        Primary Care Provider Office Phone # Fax #    Shun Prado [phone]       [facility] [address] [facility] [address]  [facility] [address]        Equal Access to Services     AMOS LUNA : Hadii aad ku hadasho Soomaali, waaxda luqadaha, qaybta kaalmada adeegyada, waxay idiin hayaan adeeg harjeetsukh lamarin . So [facility] [phone].    ATENCIÓN: Si habla español, tiene a horvath disposición servicios gratuitos de asistencia lingüística. Llame al [phone].    We comply with applicable federal civil rights laws and [place] laws. We do not discriminate on the basis of race, color, national origin, age, disability, sex, sexual orientation, or gender identity.            Thank you!     Thank you for choosing [facility]  for your care. Our goal is always to provide you with excellent care. Hearing back from our patients is one way we can continue to improve our services. Please take a few minutes to complete the written survey that you may receive in the mail after your visit with us. Thank you!             Your Updated Medication List - Protect others around you: Learn how to safely use, store and throw away your medicines at www.disposemymeds.org.          This list is accurate as of 3/16/18 11:44 AM.  Always use your most recent med list.                   Brand Name Dispense Instructions for use Diagnosis    acetaminophen 500 MG tablet    TYLENOL    30 tablet    Take 2 tablets (1,000 mg) by mouth every 6 hours as needed for mild pain    Pain of right lower extremity, Right calf pain       atorvastatin 10 MG tablet    LIPITOR    90 tablet    Take 1 tablet (10 mg) by mouth daily    Hyperlipidemia LDL goal <130       buPROPion 300 MG 24 hr tablet    WELLBUTRIN XL    90 tablet    Take 1 tablet (300 mg) by mouth every morning    Major depressive disorder, recurrent episode, mild (H), Essential hypertension, benign       captopril 50  MG tablet    CAPOTEN    405 tablet    TAKE ONE AND ONE-HALF TABLETS BY MOUTH THREE TIMES DAILY    Essential hypertension, benign       hydrochlorothiazide 12.5 MG capsule    MICROZIDE    90 capsule    Take 1 capsule (12.5 mg) by mouth daily    Essential hypertension, benign       Multi-vitamin Tabs tablet      Take 1 tablet by mouth daily        spironolactone 25 MG tablet    ALDACTONE    90 tablet    Take 1 tablet (25 mg) by mouth daily    Major depressive disorder, recurrent episode, mild (H), Essential hypertension, benign       timolol 0.5 % ophthalmic gel-form    TIMOPTIC-XE     Place 1 drop into both eyes every morning.        verapamil 120 MG Tbcr CR tablet    CALAN-SR    180 tablet    TAKE ONE TABLET BY MOUTH EVERY 12 HOURS    Essential hypertension, benign

## 2018-03-23 ENCOUNTER — HOSPITAL ENCOUNTER (OUTPATIENT)
Dept: CARDIOLOGY | Facility: CLINIC | Age: 83
Discharge: HOME | End: 2018-03-23
Attending: INTERNAL MEDICINE | Admitting: INTERNAL MEDICINE
Payer: MEDICARE

## 2018-03-23 DIAGNOSIS — R00.1 BRADYCARDIA: ICD-10-CM

## 2018-03-23 PROCEDURE — 0296T ZIO PATCH HOLTER: CPT

## 2018-03-23 PROCEDURE — 0298T ZZC EXT ECG > 48HR TO 21 DAY REVIEW AND INTERPRETATN: CPT | Performed by: INTERNAL MEDICINE

## 2018-03-23 NOTE — LETTER
[address]. [facility]  [address]  [place]  [address]  Tel: [phone]    April 17, 2018    Riddhi Aguilar  [address] [place] [address]        Dear Ms. Aguilar,    The following letter pertains to your most recent diagnostic tests:    Good news! No dangerous heart arrhythmias were identified on your heart monitor test to explain your episode of faintness last month.  If you continue to have episodes of feeling faint, please inform me.      Sincerely,    Shun Prado MD/CIARA

## 2018-03-23 NOTE — LETTER
[facility]  [address] [place] [address]. [facility]  [address]  [place]  [address]  Tel: [phone]    April 17, 2018    Riddhi Aguilar  [address] [address]        Dear Ms. Aguilar,    The following letter pertains to your most recent diagnostic tests:    Good news! No dangerous heart arrhythmias were identified on your heart monitor test to explain your episode of faintness last month.  If you continue to have episodes of feeling faint, please inform me.    If you have any further questions or problems, please contact our office.      Sincerely,    Shun Prado MD/teresa          Enclosure: Lab Results

## 2018-04-17 NOTE — PROGRESS NOTES
The following letter pertains to your most recent diagnostic tests:    Good news! No dangerous heart arrhythmias were identified on your heart monitor test to explain your episode of faintness last month.  If you continue to have episodes of feeling faint, please inform me.      Sincerely,    Dr. Prado

## 2018-07-12 DIAGNOSIS — I10 ESSENTIAL HYPERTENSION, BENIGN: ICD-10-CM

## 2018-07-12 DIAGNOSIS — F33.0 MAJOR DEPRESSIVE DISORDER, RECURRENT EPISODE, MILD (H): ICD-10-CM

## 2018-07-12 NOTE — TELEPHONE ENCOUNTER
"Last Written Prescription Date:  10/25/17  Last Fill Quantity: 90 tablet,  # refills: 2   Last office visit: 3/16/2018 with prescribing provider:  Johan   Future Office Visit:      Requested Prescriptions   Pending Prescriptions Disp Refills     buPROPion (WELLBUTRIN XL) 300 MG 24 hr tablet 90 tablet 2     Sig: Take 1 tablet (300 mg) by mouth every morning    SSRIs Protocol Passed    7/12/2018  4:20 PM       Passed - PHQ-9 score less than 5 in past 6 months    Please review last PHQ-9 score.          Passed - Medication is Bupropion    If the medication is Bupropion (Wellbutrin), and the patient is taking for smoking cessation; OK to refill.         Passed - Patient is age 18 or older       Passed - No active pregnancy on record       Passed - No positive pregnancy test in last 12 months       Passed - Recent (6 mo) or future (30 days) visit within the authorizing provider's specialty    Patient had office visit in the last 6 months or has a visit in the next 30 days with authorizing provider or within the authorizing provider's specialty.  See \"Patient Info\" tab in inbasket, or \"Choose Columns\" in Meds & Orders section of the refill encounter.              PHQ-9 SCORE 10/10/2016 7/6/2017 3/2/2018   Total Score - - -   Total Score 2 6 4     No flowsheet data found.        "

## 2018-07-16 RX ORDER — BUPROPION HYDROCHLORIDE 300 MG/1
300 TABLET ORAL EVERY MORNING
Qty: 90 TABLET | Refills: 0 | Status: SHIPPED | OUTPATIENT
Start: 2018-07-16 | End: 2018-11-29

## 2018-07-16 NOTE — TELEPHONE ENCOUNTER
PHQ-9 SCORE 10/10/2016 7/6/2017 3/2/2018   Total Score - - -   Total Score 2 6 4     Pt due for a PHQ9 in September  Prescription approved per [facility] Refill Protocol.  Kassy Predomo RN

## 2018-11-29 DIAGNOSIS — I10 ESSENTIAL HYPERTENSION, BENIGN: ICD-10-CM

## 2018-11-29 DIAGNOSIS — F33.0 MAJOR DEPRESSIVE DISORDER, RECURRENT EPISODE, MILD (H): ICD-10-CM

## 2018-11-29 DIAGNOSIS — E78.5 HYPERLIPIDEMIA LDL GOAL <130: ICD-10-CM

## 2018-11-29 NOTE — TELEPHONE ENCOUNTER
captopril (CAPOTEN) 50 MG tablet 405 tablet 1 12/19/2017  No   Sig: TAKE ONE AND ONE-HALF TABLETS BY MOUTH THREE TIMES DAILY     Last Written Prescription Date:  12/19/2017  Last Fill Quantity: 405,  # refills: 1   Last office visit: 3/16/2018 with prescribing provider:     Future Office Visit:   Next 5 appointments (look out 90 days)     Dec 27, 2018 11:30 AM CST   [facility] Visit with Shun Prado MD   [facility]    [address] [facility] [address]   [phone]                       buPROPion (WELLBUTRIN XL) 300 MG 24 hr tablet 90 tablet 0 7/16/2018  No   Sig - Route: Take 1 tablet (300 mg) by mouth every morning - Oral   Class: E-Prescribe   Notes to Pharmacy: Pt due for a PHQ9 in September     PHQ-9 SCORE 10/10/2016 7/6/2017 3/2/2018   PHQ-9 Total Score - - -   PHQ-9 Total Score 2 6 4     Last Written Prescription Date:  07/16/2018  Last Fill Quantity: 90,  # refills: 0   Last office visit: 3/16/2018 with prescribing provider:     Future Office Visit:   Next 5 appointments (look out 90 days)     Dec 27, 2018 11:30 AM CST   Office Visit with Shun Prado MD   [facility]    [address] [facility] [address]   [phone]                         atorvastatin (LIPITOR) 10 MG tablet 90 tablet 3 7/6/2017  No   Sig - Route: Take 1 tablet (10 mg) by mouth daily      Last Written Prescription Date:  90  Last Fill Quantity: 3,  # refills: 07/06/2017   Last office visit: 3/16/2018 with prescribing provider:     Future Office Visit:   Next 5 appointments (look out 90 days)     Dec 27, 2018 11:30 AM CST   Office Visit with Shun Prado MD   [facility]    [address] [facility] [address]   [phone]                     spironolactone (ALDACTONE) 25 MG tablet 90 tablet 1 2/15/2018  No   Sig - Route: Take 1 tablet (25 mg) by mouth daily      Last Written Prescription Date:   "02/15/2018  Last Fill Quantity: 90,  # refills: 1   Last office visit: 3/16/2018 with prescribing provider:     Future Office Visit:   Next 5 appointments (look out 90 days)     Dec 27, 2018 11:30 AM CST   Office Visit with Shun Prado MD   [facility] ([facility]    [address] [facility] [address]   [phone]                     Requested Prescriptions   Pending Prescriptions Disp Refills     captopril (CAPOTEN) 50 MG tablet 405 tablet 1    ACE Inhibitors (Including Combos) Protocol Failed    11/29/2018  4:17 PM       Failed - Normal serum creatinine on file in past 12 months    Recent Labs   Lab Test  07/06/17   1159   12/01/10   1447   CR  1.01   < >   --    CREAT   --    --   0.5*    < > = values in this interval not displayed.            Failed - Normal serum potassium on file in past 12 months    Recent Labs   Lab Test  07/06/17   1159   POTASSIUM  4.1            Passed - Blood pressure under 140/90 in past 12 months    BP Readings from Last 3 Encounters:   03/16/18 136/72   03/02/18 134/74   02/26/18 142/69                Passed - Recent (12 mo) or future (30 days) visit within the authorizing provider's specialty    Patient had office visit in the last 12 months or has a visit in the next 30 days with authorizing provider or within the authorizing provider's specialty.  See \"Patient Info\" tab in inbasket, or \"Choose Columns\" in Meds & Orders section of the refill encounter.             Passed - Patient is age 18 or older       Passed - No active pregnancy on record       Passed - No positive pregnancy test in past 12 months        buPROPion (WELLBUTRIN XL) 300 MG 24 hr tablet 90 tablet 0     Sig: Take 1 tablet (300 mg) by mouth every morning    SSRIs Protocol Failed    11/29/2018  4:17 PM       Failed - PHQ-9 score less than 5 in past 6 months    Please review last PHQ-9 score.          Passed - Medication is Bupropion    If the medication is Bupropion (Wellbutrin), " "and the patient is taking for smoking cessation; OK to refill.         Passed - Patient is age 18 or older       Passed - No active pregnancy on record       Passed - No positive pregnancy test in last 12 months       Passed - Recent (6 mo) or future (30 days) visit within the authorizing provider's specialty    Patient had office visit in the last 6 months or has a visit in the next 30 days with authorizing provider or within the authorizing provider's specialty.  See \"Patient Info\" tab in inbasket, or \"Choose Columns\" in Meds & Orders section of the refill encounter.            atorvastatin (LIPITOR) 10 MG tablet 90 tablet 3     Sig: Take 1 tablet (10 mg) by mouth daily    Statins Protocol Failed    11/29/2018  4:17 PM       Failed - LDL on file in past 12 months    Recent Labs   Lab Test  07/06/17   1159   LDL  51            Passed - No abnormal creatine kinase in past 12 months    No lab results found.            Passed - Recent (12 mo) or future (30 days) visit within the authorizing provider's specialty    Patient had office visit in the last 12 months or has a visit in the next 30 days with authorizing provider or within the authorizing provider's specialty.  See \"Patient Info\" tab in inbasket, or \"Choose Columns\" in Meds & Orders section of the refill encounter.             Passed - Patient is age 18 or older       Passed - No active pregnancy on record       Passed - No positive pregnancy test in past 12 months        spironolactone (ALDACTONE) 25 MG tablet 90 tablet 1     Sig: Take 1 tablet (25 mg) by mouth daily    Diuretics (Including Combos) Protocol Failed    11/29/2018  4:17 PM       Failed - Normal serum creatinine on file in past 12 months    Recent Labs   Lab Test  07/06/17   1159   CR  1.01             Failed - Normal serum potassium on file in past 12 months    Recent Labs   Lab Test  07/06/17   1159   POTASSIUM  4.1                   Failed - Normal serum sodium on file in past 12 months    " "Recent Labs   Lab Test  07/06/17   1159   NA  139             Passed - Blood pressure under 140/90 in past 12 months    BP Readings from Last 3 Encounters:   03/16/18 136/72   03/02/18 134/74   02/26/18 142/69                Passed - Recent (12 mo) or future (30 days) visit within the authorizing provider's specialty    Patient had office visit in the last 12 months or has a visit in the next 30 days with authorizing provider or within the authorizing provider's specialty.  See \"Patient Info\" tab in inbasket, or \"Choose Columns\" in Meds & Orders section of the refill encounter.             Passed - Patient is age 18 or older       Passed - No active pregancy on record       Passed - No positive pregnancy test in past 12 months          "

## 2018-12-03 RX ORDER — BUPROPION HYDROCHLORIDE 300 MG/1
300 TABLET ORAL EVERY MORNING
Qty: 90 TABLET | Refills: 0 | Status: SHIPPED | OUTPATIENT
Start: 2018-12-03 | End: 2019-03-04

## 2018-12-03 RX ORDER — CAPTOPRIL 50 MG/1
TABLET ORAL
Qty: 405 TABLET | Refills: 0 | Status: SHIPPED | OUTPATIENT
Start: 2018-12-03 | End: 2019-03-04

## 2018-12-03 RX ORDER — ATORVASTATIN CALCIUM 10 MG/1
10 TABLET, FILM COATED ORAL DAILY
Qty: 90 TABLET | Refills: 0 | Status: SHIPPED | OUTPATIENT
Start: 2018-12-03 | End: 2019-03-04

## 2018-12-03 RX ORDER — SPIRONOLACTONE 25 MG/1
25 TABLET ORAL DAILY
Qty: 90 TABLET | Refills: 0 | Status: SHIPPED | OUTPATIENT
Start: 2018-12-03 | End: 2019-03-04

## 2018-12-03 NOTE — TELEPHONE ENCOUNTER
Medication filled 1 time as pt is due for Physical. It has been one year since last office visit. Please have patient call [phone] to schedule.

## 2018-12-27 ENCOUNTER — OFFICE VISIT (OUTPATIENT)
Dept: FAMILY MEDICINE | Facility: CLINIC | Age: 83
End: 2018-12-27
Payer: COMMERCIAL

## 2018-12-27 VITALS
OXYGEN SATURATION: 99 % | HEART RATE: 78 BPM | WEIGHT: 219 LBS | SYSTOLIC BLOOD PRESSURE: 129 MMHG | TEMPERATURE: 98.4 F | DIASTOLIC BLOOD PRESSURE: 55 MMHG | BODY MASS INDEX: 41.35 KG/M2 | HEIGHT: 61 IN

## 2018-12-27 DIAGNOSIS — F33.0 MAJOR DEPRESSIVE DISORDER, RECURRENT EPISODE, MILD (H): ICD-10-CM

## 2018-12-27 DIAGNOSIS — E78.5 HYPERLIPIDEMIA LDL GOAL <100: ICD-10-CM

## 2018-12-27 DIAGNOSIS — G89.29 CHRONIC KNEE PAIN, UNSPECIFIED LATERALITY: ICD-10-CM

## 2018-12-27 DIAGNOSIS — I10 ESSENTIAL HYPERTENSION, BENIGN: Primary | ICD-10-CM

## 2018-12-27 DIAGNOSIS — E66.01 MORBID OBESITY DUE TO EXCESS CALORIES (H): ICD-10-CM

## 2018-12-27 DIAGNOSIS — M25.569 CHRONIC KNEE PAIN, UNSPECIFIED LATERALITY: ICD-10-CM

## 2018-12-27 LAB
ERYTHROCYTE [DISTWIDTH] IN BLOOD BY AUTOMATED COUNT: 19.1 % (ref 10–15)
HCT VFR BLD AUTO: 34.7 % (ref 35–47)
HGB BLD-MCNC: 11.2 G/DL (ref 11.7–15.7)
MCH RBC QN AUTO: 33.4 PG (ref 26.5–33)
MCHC RBC AUTO-ENTMCNC: 32.3 G/DL (ref 31.5–36.5)
MCV RBC AUTO: 104 FL (ref 78–100)
PLATELET # BLD AUTO: 364 10E9/L (ref 150–450)
RBC # BLD AUTO: 3.35 10E12/L (ref 3.8–5.2)
WBC # BLD AUTO: 6.7 10E9/L (ref 4–11)

## 2018-12-27 PROCEDURE — 36415 COLL VENOUS BLD VENIPUNCTURE: CPT | Performed by: INTERNAL MEDICINE

## 2018-12-27 PROCEDURE — 99214 OFFICE O/P EST MOD 30 MIN: CPT | Performed by: INTERNAL MEDICINE

## 2018-12-27 PROCEDURE — 80053 COMPREHEN METABOLIC PANEL: CPT | Performed by: INTERNAL MEDICINE

## 2018-12-27 PROCEDURE — 80061 LIPID PANEL: CPT | Performed by: INTERNAL MEDICINE

## 2018-12-27 PROCEDURE — 85027 COMPLETE CBC AUTOMATED: CPT | Performed by: INTERNAL MEDICINE

## 2018-12-27 RX ORDER — ACETAMINOPHEN 500 MG
500 TABLET ORAL EVERY 6 HOURS PRN
Qty: 1 BOTTLE | Refills: 11 | Status: SHIPPED | OUTPATIENT
Start: 2018-12-27 | End: 2024-01-31

## 2018-12-27 RX ORDER — HYDROCHLOROTHIAZIDE 12.5 MG/1
1 CAPSULE ORAL DAILY
Qty: 90 CAPSULE | Refills: 3 | Status: SHIPPED | OUTPATIENT
Start: 2018-12-27 | End: 2020-01-20

## 2018-12-27 ASSESSMENT — PATIENT HEALTH QUESTIONNAIRE - PHQ9: SUM OF ALL RESPONSES TO PHQ QUESTIONS 1-9: 1

## 2018-12-27 ASSESSMENT — MIFFLIN-ST. JEOR: SCORE: 1365.76

## 2018-12-27 NOTE — LETTER
[facility]  [address] [place] [address]  [address]  [place]  [address]  Tel: [phone]    December 31, 2018    Riddhi HIRAM Jeff  [address] [address]        Dear Ms. Aguilar,    The following letter pertains to your most recent diagnostic tests:     -Liver and gallbladder tests are normal for you. (ALT,AST, Alk phos, bilirubin), kidney function is normal for you (Creatinine, GFR), Sodium is normal, Potassium is normal for you, Calcium is normal for you, Glucose (blood sugar) is normal for you.       -Your cholesterol panel looks healthy.     -Your complete blood counts including your hemoglobin returned stable for you.           Bottom line:  Your lab results look stable.         Follow up:  Schedule an appointment for a physical examination with fasting blood tests in one year's time, or return sooner if new questions, symptoms or problems arise. Have fun in [place]!       Sincerely,    Shun Prado MD/ jennifer          Enclosure: Lab Results            Results for orders placed or performed in visit on 12/27/18   Lipid panel reflex to direct LDL Fasting   Result Value Ref Range    Cholesterol 150 <200 mg/dL    Triglycerides 85 <150 mg/dL    HDL Cholesterol 65 >49 mg/dL    LDL Cholesterol Calculated 68 <100 mg/dL    Non HDL Cholesterol 85 <130 mg/dL   Comprehensive metabolic panel   Result Value Ref Range    Sodium 137 133 - 144 mmol/L    Potassium 4.8 3.4 - 5.3 mmol/L    Chloride 104 94 - 109 mmol/L    Carbon Dioxide 26 20 - 32 mmol/L    Anion Gap 7 3 - 14 mmol/L    Glucose 85 70 - 99 mg/dL    Urea Nitrogen 24 7 - 30 mg/dL    Creatinine 1.00 0.52 - 1.04 mg/dL    GFR Estimate 50 (L) >60 mL/min/[1.73_m2]    GFR Estimate If Black 58 (L) >60 mL/min/[1.73_m2]    Calcium 9.9 8.5 - 10.1 mg/dL    Bilirubin Total 1.2 0.2 - 1.3 mg/dL    Albumin 4.2 3.4 - 5.0 g/dL    Protein Total 7.2 6.8 - 8.8 g/dL    Alkaline Phosphatase 46 40 - 150 U/L    ALT 23 0 - 50 U/L    AST 27 0 - 45 U/L   CBC with platelets    Result Value Ref Range    WBC 6.7 4.0 - 11.0 10e9/L    RBC Count 3.35 (L) 3.8 - 5.2 10e12/L    Hemoglobin 11.2 (L) 11.7 - 15.7 g/dL    Hematocrit 34.7 (L) 35.0 - 47.0 %     (H) 78 - 100 fl    MCH 33.4 (H) 26.5 - 33.0 pg    MCHC 32.3 31.5 - 36.5 g/dL    RDW 19.1 (H) 10.0 - 15.0 %    Platelet Count 364 150 - 450 10e9/L

## 2018-12-27 NOTE — PROGRESS NOTES
SUBJECTIVE:   Riddhi Aguilar is a 87 year old female who presents to clinic today for the following health issues:      Medication Followup     Taking Medication as prescribed: yes    Side Effects:  None    Medication Helping Symptoms:  yes       Very pleasant 87-year-old female with history of morbid obesity, hypertension, hyperlipidemia, chronic knee pain, depression, breast cancer.  She lives independently and is a non-smoker.  She was treated for a skin cancer in the lower extremity last year.  She also was treated for near syncope symptoms last spring and her symptoms have resolved completely since decreasing the dose of her verapamil.  She had a negative Holter monitor.  She feels well today and is without specific complaints.  She has occasional mild pain in her knee that is associated with long walks, but this is controllable with ice and Tylenol.  She denies recent knee injury or trauma.    Problem list and histories reviewed & adjusted, as indicated.  Additional history: as documented    Patient Active Problem List   Diagnosis     Essential hypertension, benign     Morbid obesity due to excess calories (H)     Urgency of urination     Malignant neoplasm of female breast (H)     Asymptomatic varicose veins     Allergic state     Insomnia     Family Hist of Malignant Neoplasm-- breast cancer     Cervical radiculopathy     Herpes Zoster- rt abdomen     Hyperlipidemia LDL goal <100     Diplopia     Borderline glaucoma with ocular hypertension, unspecified laterality     Cataract, left eye     Duodenal ulcer with hemorrhage     Advanced directives, counseling/discussion     Subdural hematoma caused by concussion (H)     Health Care Home     Major depressive disorder, recurrent episode, mild (H)     Major depressive disorder, single episode, mild (H)     Past Surgical History:   Procedure Laterality Date     BREAST SURGERY      lumpectomy     C NONSPECIFIC PROCEDURE      Repair of buckled retina of rt eye      C NONSPECIFIC PROCEDURE      Appy     DILATION AND CURETTAGE, HYSTEROSCOPY DIAGNOSTIC, COMBINED  2/6/2014    Procedure: COMBINED DILATION AND CURETTAGE, HYSTEROSCOPY DIAGNOSTIC;  DILATION AND CURETTAGE, HYSTEROSCOPY, POLYPECTOMY, INCISION AND DRAINAGE OF SEBACEOUS CYST ;  Surgeon: Serena Zamora MD;  [facility]:  [facility]     EXCISE LESION LOWER EXTREMITY Right 8/29/2017    Procedure: EXCISE LESION LOWER EXTREMITY;  WIDE EXCISIONAL BIOPSY OF RIGHT ANKLE BASAL CELL CARCINOMA;  Surgeon: Juan Luis Flores MD;  Location:  OR     INCISION AND DRAINAGE PERINEAL, COMBINED  2/6/2014    Procedure: COMBINED INCISION AND DRAINAGE PERINEAL;;  Surgeon: Serena Zamora MD;  Location: [facility]       Social History     Tobacco Use     Smoking status: Never Smoker     Smokeless tobacco: Never Used   Substance Use Topics     Alcohol use: No     Alcohol/week: 0.0 oz     Family History   Problem Relation Age of Onset     Breast Cancer Sister      Breast Cancer Sister      Cancer Brother         bone cancer in arm         Current Outpatient Medications   Medication Sig Dispense Refill     acetaminophen (TYLENOL) 500 MG tablet Take 1 tablet (500 mg) by mouth every 6 hours as needed for mild pain 1 Bottle 11     atorvastatin (LIPITOR) 10 MG tablet Take 1 tablet (10 mg) by mouth daily 90 tablet 0     buPROPion (WELLBUTRIN XL) 300 MG 24 hr tablet Take 1 tablet (300 mg) by mouth every morning 90 tablet 0     captopril (CAPOTEN) 50 MG tablet TAKE ONE AND ONE-HALF TABLETS BY MOUTH THREE TIMES DAILY 405 tablet 0     hydrochlorothiazide (MICROZIDE) 12.5 MG capsule Take 1 capsule (12.5 mg) by mouth daily 90 capsule 3     multivitamin, therapeutic with minerals (MULTI-VITAMIN) TABS tablet Take 1 tablet by mouth daily       spironolactone (ALDACTONE) 25 MG tablet Take 1 tablet (25 mg) by mouth daily 90 tablet 0     timolol (TIMOPTIC-XR) 0.5 % ophthalmic gel-forming Place 1 drop into both eyes every morning.       verapamil (CALAN-SR) 120 MG TBCR  "CR tablet TAKE ONE TABLET BY MOUTH EVERY 12 HOURS 180 tablet 3     No Known Allergies    Reviewed and updated as needed this visit by clinical staff       Reviewed and updated as needed this visit by Provider         ROS:  Constitutional, HEENT, cardiovascular, pulmonary, gi and gu systems are negative, except as otherwise noted.    OBJECTIVE:     /55 (BP Location: Right arm, Patient Position: Chair, Cuff Size: Adult Large)   Pulse 78   Temp 98.4  F (36.9  C) (Tympanic)   Ht 1.549 m (5' 1\")   Wt 99.3 kg (219 lb)   SpO2 99%   BMI 41.38 kg/m    Body mass index is 41.38 kg/m .  GENERAL: healthy, alert and no distress  EYES: Eyes grossly normal to inspection, PERRL and conjunctivae and sclerae normal  HENT: ear canals and TM's normal, nose and mouth without ulcers or lesions  NECK: no adenopathy, no asymmetry, masses, or scars and thyroid normal to palpation  RESP: lungs clear to auscultation - no rales, rhonchi or wheezes  CV: The heart has a regular rate and rhythm, no carotid bruits  ABDOMEN: soft, nontender, no hepatosplenomegaly, no masses and bowel sounds normal  MS: Trace bilateral peripheral edema, improved from previous exams, venous stasis skin changes in both lower extremities, no ulcerations  NEURO: Normal strength and tone, mentation intact and speech normal; she walks with a walker  PSYCH: mentation appears normal, affect normal/bright    Diagnostic Test Results:  Labs pending    ASSESSMENT/PLAN:       1. Essential hypertension, benign  Under good control on current medications despite decreased dose of verapamil  - hydrochlorothiazide (MICROZIDE) 12.5 MG capsule; Take 1 capsule (12.5 mg) by mouth daily  Dispense: 90 capsule; Refill: 3    2. Hyperlipidemia LDL goal <100  On statin therapy, recheck lipids  - Lipid panel reflex to direct LDL Fasting  - Comprehensive metabolic panel  - CBC with platelets    3. Morbid obesity due to excess calories (H)  Discussed diet and exercise modification to " promote weight loss    4. Major depressive disorder, recurrent episode, mild (H)  Mood symptoms under good control on current dose of Wellbutrin    5. Chronic knee pain, unspecified laterality  Continue with ice and Tylenol as needed, return as symptoms dictate for cortisone injection, discussed with patient and daughter  - acetaminophen (TYLENOL) 500 MG tablet; Take 1 tablet (500 mg) by mouth every 6 hours as needed for mild pain  Dispense: 1 Bottle; Refill: 11        Shun Prado MD  [facility]

## 2018-12-28 LAB
ALBUMIN SERPL-MCNC: 4.2 G/DL (ref 3.4–5)
ALP SERPL-CCNC: 46 U/L (ref 40–150)
ALT SERPL W P-5'-P-CCNC: 23 U/L (ref 0–50)
ANION GAP SERPL CALCULATED.3IONS-SCNC: 7 MMOL/L (ref 3–14)
AST SERPL W P-5'-P-CCNC: 27 U/L (ref 0–45)
BILIRUB SERPL-MCNC: 1.2 MG/DL (ref 0.2–1.3)
BUN SERPL-MCNC: 24 MG/DL (ref 7–30)
CALCIUM SERPL-MCNC: 9.9 MG/DL (ref 8.5–10.1)
CHLORIDE SERPL-SCNC: 104 MMOL/L (ref 94–109)
CHOLEST SERPL-MCNC: 150 MG/DL
CO2 SERPL-SCNC: 26 MMOL/L (ref 20–32)
CREAT SERPL-MCNC: 1 MG/DL (ref 0.52–1.04)
GFR SERPL CREATININE-BSD FRML MDRD: 50 ML/MIN/{1.73_M2}
GLUCOSE SERPL-MCNC: 85 MG/DL (ref 70–99)
HDLC SERPL-MCNC: 65 MG/DL
LDLC SERPL CALC-MCNC: 68 MG/DL
NONHDLC SERPL-MCNC: 85 MG/DL
POTASSIUM SERPL-SCNC: 4.8 MMOL/L (ref 3.4–5.3)
PROT SERPL-MCNC: 7.2 G/DL (ref 6.8–8.8)
SODIUM SERPL-SCNC: 137 MMOL/L (ref 133–144)
TRIGL SERPL-MCNC: 85 MG/DL

## 2018-12-30 NOTE — RESULT ENCOUNTER NOTE
The following letter pertains to your most recent diagnostic tests:    -Liver and gallbladder tests are normal for you. (ALT,AST, Alk phos, bilirubin), kidney function is normal for you (Creatinine, GFR), Sodium is normal, Potassium is normal for you, Calcium is normal for you, Glucose (blood sugar) is normal for you.      -Your cholesterol panel looks healthy.     -Your complete blood counts including your hemoglobin returned stable for you.          Bottom line:  Your lab results look stable.        Follow up:  Schedule an appointment for a physical examination with fasting blood tests in one year's time, or return sooner if new questions, symptoms or problems arise      Sincerely,    Dr. Prado

## 2018-12-30 NOTE — RESULT ENCOUNTER NOTE
The following letter pertains to your most recent diagnostic tests:    -Liver and gallbladder tests are normal for you. (ALT,AST, Alk phos, bilirubin), kidney function is normal for you (Creatinine, GFR), Sodium is normal, Potassium is normal for you, Calcium is normal for you, Glucose (blood sugar) is normal for you.      -Your cholesterol panel looks healthy.     -Your complete blood counts including your hemoglobin returned stable for you.          Bottom line:  Your lab results look stable.        Follow up:  Schedule an appointment for a physical examination with fasting blood tests in one year's time, or return sooner if new questions, symptoms or problems arise. Have fun in [place]!      Sincerely,    Dr. Prado

## 2018-12-30 NOTE — RESULT ENCOUNTER NOTE
"Please send the letter that does not include \"have fun in [place]\" this patient is not going to [place]... thanks"

## 2019-03-01 DIAGNOSIS — I10 ESSENTIAL HYPERTENSION, BENIGN: ICD-10-CM

## 2019-03-01 DIAGNOSIS — F33.0 MAJOR DEPRESSIVE DISORDER, RECURRENT EPISODE, MILD (H): ICD-10-CM

## 2019-03-01 DIAGNOSIS — E78.5 HYPERLIPIDEMIA LDL GOAL <130: ICD-10-CM

## 2019-03-01 NOTE — TELEPHONE ENCOUNTER
"spironolactone (ALDACTONE) 25 MG tablet  Last Written Prescription Date:  12/3/18  Last Fill Quantity: 90,  # refills: 0   Last office visit: 12/27/2018 with prescribing provider:  Johan Villasenor Office Visit:          buPROPion (WELLBUTRIN XL) 300 MG 24 hr tablet  Last Written Prescription Date:  12/3/18  Last Fill Quantity: 90,  # refills: 0   Last office visit: 12/27/2018 with prescribing provider:  Johan Villasenor Office Visit:        atorvastatin (LIPITOR) 10 MG tablet  Last Written Prescription Date:  12/3/18  Last Fill Quantity: 90,  # refills: 0   Last office visit: 12/27/2018 with prescribing provider:  Johan Villasenor Office Visit:            captopril (CAPOTEN) 50 MG tablet  Last Written Prescription Date:  12/3/18  Last Fill Quantity: 405,  # refills: 0   Last office visit: 12/27/2018 with prescribing provider:  Johan Villasenor Office Visit:            Requested Prescriptions   Pending Prescriptions Disp Refills     buPROPion (WELLBUTRIN XL) 300 MG 24 hr tablet 90 tablet 0     Sig: Take 1 tablet (300 mg) by mouth every morning    SSRIs Protocol Passed - 3/1/2019 12:29 PM       Passed - PHQ-9 score less than 5 in past 6 months    Please review last PHQ-9 score.          Passed - Medication is Bupropion    If the medication is Bupropion (Wellbutrin), and the patient is taking for smoking cessation; OK to refill.         Passed - Medication is active on med list       Passed - Patient is age 18 or older       Passed - No active pregnancy on record       Passed - No positive pregnancy test in last 12 months       Passed - Recent (6 mo) or future (30 days) visit within the authorizing provider's specialty    Patient had office visit in the last 6 months or has a visit in the next 30 days with authorizing provider or within the authorizing provider's specialty.  See \"Patient Info\" tab in inbasket, or \"Choose Columns\" in Meds & Orders section of the refill encounter.            atorvastatin (LIPITOR) 10 MG " "tablet 90 tablet 0     Sig: Take 1 tablet (10 mg) by mouth daily    Statins Protocol Passed - 3/1/2019 12:29 PM       Passed - LDL on file in past 12 months    Recent Labs   Lab Test 12/27/18  1415   LDL 68            Passed - No abnormal creatine kinase in past 12 months    No lab results found.            Passed - Recent (12 mo) or future (30 days) visit within the authorizing provider's specialty    Patient had office visit in the last 12 months or has a visit in the next 30 days with authorizing provider or within the authorizing provider's specialty.  See \"Patient Info\" tab in inbasket, or \"Choose Columns\" in Meds & Orders section of the refill encounter.             Passed - Medication is active on med list       Passed - Patient is age 18 or older       Passed - No active pregnancy on record       Passed - No positive pregnancy test in past 12 months        captopril (CAPOTEN) 50 MG tablet 405 tablet 0     Sig: TAKE ONE AND ONE-HALF TABLETS BY MOUTH THREE TIMES DAILY    ACE Inhibitors (Including Combos) Protocol Passed - 3/1/2019 12:29 PM       Passed - Blood pressure under 140/90 in past 12 months    BP Readings from Last 3 Encounters:   12/27/18 129/55   03/16/18 136/72   03/02/18 134/74                Passed - Recent (12 mo) or future (30 days) visit within the authorizing provider's specialty    Patient had office visit in the last 12 months or has a visit in the next 30 days with authorizing provider or within the authorizing provider's specialty.  See \"Patient Info\" tab in inbasket, or \"Choose Columns\" in Meds & Orders section of the refill encounter.             Passed - Medication is active on med list       Passed - Patient is age 18 or older       Passed - No active pregnancy on record       Passed - Normal serum creatinine on file in past 12 months    Recent Labs   Lab Test 12/27/18  1415   CR 1.00            Passed - Normal serum potassium on file in past 12 months    Recent Labs   Lab Test " "12/27/18  1415   POTASSIUM 4.8            Passed - No positive pregnancy test within past 12 months        spironolactone (ALDACTONE) 25 MG tablet 90 tablet 0     Sig: Take 1 tablet (25 mg) by mouth daily    Diuretics (Including Combos) Protocol Passed - 3/1/2019 12:29 PM       Passed - Blood pressure under 140/90 in past 12 months    BP Readings from Last 3 Encounters:   12/27/18 129/55   03/16/18 136/72   03/02/18 134/74                Passed - Recent (12 mo) or future (30 days) visit within the authorizing provider's specialty    Patient had office visit in the last 12 months or has a visit in the next 30 days with authorizing provider or within the authorizing provider's specialty.  See \"Patient Info\" tab in inbasket, or \"Choose Columns\" in Meds & Orders section of the refill encounter.             Passed - Medication is active on med list       Passed - Patient is age 18 or older       Passed - No active pregancy on record       Passed - Normal serum creatinine on file in past 12 months    Recent Labs   Lab Test 12/27/18  1415   CR 1.00             Passed - Normal serum potassium on file in past 12 months    Recent Labs   Lab Test 12/27/18  1415   POTASSIUM 4.8                   Passed - Normal serum sodium on file in past 12 months    Recent Labs   Lab Test 12/27/18  1415                Passed - No positive pregnancy test in past 12 months          "

## 2019-03-04 RX ORDER — ATORVASTATIN CALCIUM 10 MG/1
10 TABLET, FILM COATED ORAL DAILY
Qty: 90 TABLET | Refills: 2 | Status: SHIPPED | OUTPATIENT
Start: 2019-03-04 | End: 2019-10-28

## 2019-03-04 RX ORDER — SPIRONOLACTONE 25 MG/1
25 TABLET ORAL DAILY
Qty: 90 TABLET | Refills: 2 | Status: SHIPPED | OUTPATIENT
Start: 2019-03-04 | End: 2019-10-28

## 2019-03-04 RX ORDER — BUPROPION HYDROCHLORIDE 300 MG/1
300 TABLET ORAL EVERY MORNING
Qty: 90 TABLET | Refills: 0 | Status: SHIPPED | OUTPATIENT
Start: 2019-03-04 | End: 2019-05-29

## 2019-03-04 RX ORDER — CAPTOPRIL 50 MG/1
TABLET ORAL
Qty: 405 TABLET | Refills: 2 | Status: SHIPPED | OUTPATIENT
Start: 2019-03-04 | End: 2020-01-17

## 2019-03-04 NOTE — TELEPHONE ENCOUNTER
To PCP    Routing refill request to provider for review/approval because:  Drug interaction warning: Verapamil/Atorvastatin     Please review and authorize if appropriate,     Thank you,   Deborah MARSHALL RN       All other RX's approved per [facility] Protocol

## 2019-05-09 ENCOUNTER — TELEPHONE (OUTPATIENT)
Dept: FAMILY MEDICINE | Facility: CLINIC | Age: 84
End: 2019-05-09

## 2019-05-09 NOTE — TELEPHONE ENCOUNTER
Reason for Call:  Other appointment    Detailed comments: patient is experiencing a great deal of pain in her knee. She probably needs to have a cortisone injection, but called to schedule and cannot get in with Dr. Prado until mid June.    Would it be possible to get in sooner than that?    It has been difficult to walk due to the pain.    Phone Number Patient daughter in law, Mary Ann, can be reached at: [phone]    Best Time: anytime    Can we leave a detailed message on this number? YES    Call taken on 5/9/2019 at 9:58 AM by Bella Ortega  .

## 2019-05-15 ENCOUNTER — OFFICE VISIT (OUTPATIENT)
Dept: FAMILY MEDICINE | Facility: CLINIC | Age: 84
End: 2019-05-15
Payer: MEDICARE

## 2019-05-15 VITALS
DIASTOLIC BLOOD PRESSURE: 70 MMHG | SYSTOLIC BLOOD PRESSURE: 155 MMHG | OXYGEN SATURATION: 98 % | TEMPERATURE: 97.3 F | HEART RATE: 88 BPM | WEIGHT: 206 LBS | BODY MASS INDEX: 38.92 KG/M2

## 2019-05-15 DIAGNOSIS — M17.12 PRIMARY OSTEOARTHRITIS OF LEFT KNEE: Primary | ICD-10-CM

## 2019-05-15 PROCEDURE — 20610 DRAIN/INJ JOINT/BURSA W/O US: CPT | Performed by: INTERNAL MEDICINE

## 2019-05-15 PROCEDURE — 99212 OFFICE O/P EST SF 10 MIN: CPT | Mod: 25 | Performed by: INTERNAL MEDICINE

## 2019-05-15 RX ORDER — TRIAMCINOLONE ACETONIDE 40 MG/ML
40 INJECTION, SUSPENSION INTRA-ARTICULAR; INTRAMUSCULAR ONCE
Status: DISCONTINUED | OUTPATIENT
Start: 2019-05-15 | End: 2020-12-17

## 2019-05-15 NOTE — PROGRESS NOTES
SUBJECTIVE:   Riddhi Aguilar is a 88 year old female who presents to clinic today for the following   health issues:      Left knee pain    Many year history of left knee pain as described at her clinic visit with me in December.  Pain is refractory to Tylenol and ice as recommended at her clinic visit in December.  She did see a orthopedic surgeon for this in the past and was administered cortisone injections with temporary symptom relief.  She requests a cortisone injection today.  She recalls having x-rays in the orthopedic clinic demonstrating arthritis.  She denies any knee swelling or redness or locking or catching.  The pain is severe.    Additional history: as documented    Reviewed  and updated as needed this visit by clinical staff  Tobacco  Allergies  Meds         Reviewed and updated as needed this visit by Provider             ROS:      OBJECTIVE:     /70 (BP Location: Right arm, Patient Position: Chair, Cuff Size: Adult Large)   Pulse 88   Temp 97.3  F (36.3  C) (Tympanic)   Wt 93.4 kg (206 lb)   SpO2 98%   BMI 38.92 kg/m    Body mass index is 38.92 kg/m .  There is lateral joint line tenderness of the left knee joint without any effusion or Baker's cyst, the knee has full range of motion and meniscus and ligamentous testing is intact, the patient walks with a cane.         ASSESSMENT/PLAN:         1. Primary osteoarthritis of left knee  Left knee cortisone injection  After discussion of risks and benefits of the procedure including bleeding and infection, verbal informed consent was obtained.  Area prepped and draped in sterile fashion.  Local anesthesia was obtained with 1% lidocaine.   The suprapatellar pouch was entered using a lateral approach.  1cc of K40 was injected into the joint space.  The procedure was tolerated well and after care was discussed.        - Drain/Inject Large Joint/Bursa  (Shoulder, Knee)  - triamcinolone (KENALOG-40) injection 40 mg    FUTURE APPOINTMENTS:        -As symptoms dictate or on December 2019 for a preventive exam    Shun Prado MD  [facility]

## 2019-05-28 DIAGNOSIS — I10 ESSENTIAL HYPERTENSION, BENIGN: ICD-10-CM

## 2019-05-28 DIAGNOSIS — F33.0 MAJOR DEPRESSIVE DISORDER, RECURRENT EPISODE, MILD (H): ICD-10-CM

## 2019-05-29 RX ORDER — BUPROPION HYDROCHLORIDE 300 MG/1
300 TABLET ORAL EVERY MORNING
Qty: 90 TABLET | Refills: 0 | Status: SHIPPED | OUTPATIENT
Start: 2019-05-29 | End: 2019-06-21

## 2019-05-29 NOTE — TELEPHONE ENCOUNTER
Prescription approved per [facility] Refill Protocol.  Jordyn VILLARREAL RN

## 2019-05-31 DIAGNOSIS — I10 ESSENTIAL HYPERTENSION, BENIGN: ICD-10-CM

## 2019-05-31 NOTE — TELEPHONE ENCOUNTER
Reason for Call:  Other prescription    Detailed comments: verapamil (CALAN-SR) 120 MG TBCR CR tablet        [facility] - [place], [place] - [address] [facility] [address]     Phone Number Patient can be reached at: Cell number on file:    Telephone Information:   Mobile [phone]       Best Time: any    Can we leave a detailed message on this number? YES    Call taken on 5/31/2019 at 12:04 PM by Daphnie Mays

## 2019-05-31 NOTE — TELEPHONE ENCOUNTER
"    verapamil (CALAN-SR) 120 MG TBCR CR tablet 180 tablet 3 3/13/2018  No   Sig: TAKE ONE TABLET BY MOUTH EVERY 12 HOURS     Last Written Prescription Date:  03/13/2018  Last Fill Quantity: 180,  # refills: 3   Last office visit: 5/15/2019 with prescribing provider:     Future Office Visit:      Requested Prescriptions   Pending Prescriptions Disp Refills     verapamil ER (CALAN-SR) 120 MG CR tablet 180 tablet 3     Sig: TAKE ONE TABLET BY MOUTH EVERY 12 HOURS       Calcium Channel Blockers Protocol  Failed - 5/31/2019 12:10 PM        Failed - Blood pressure under 140/90 in past 12 months     BP Readings from Last 3 Encounters:   05/15/19 155/70   12/27/18 129/55   03/16/18 136/72                 Passed - Normal ALT in past 12 months     Recent Labs   Lab Test 12/27/18  1415   ALT 23             Passed - Recent (12 mo) or future (30 days) visit within the authorizing provider's specialty     Patient had office visit in the last 12 months or has a visit in the next 30 days with authorizing provider or within the authorizing provider's specialty.  See \"Patient Info\" tab in inbasket, or \"Choose Columns\" in Meds & Orders section of the refill encounter.              Passed - Medication is active on med list        Passed - Patient is age 18 or older        Passed - No active pregnancy on record        Passed - Normal serum creatinine on file in past 12 months     Recent Labs   Lab Test 12/27/18  1415   CR 1.00             Passed - No positive pregnancy test in past 12 months          "

## 2019-06-04 RX ORDER — VERAPAMIL HYDROCHLORIDE 120 MG/1
TABLET, FILM COATED, EXTENDED RELEASE ORAL
Qty: 180 TABLET | Refills: 3 | Status: SHIPPED | OUTPATIENT
Start: 2019-06-04 | End: 2020-12-17

## 2019-06-04 NOTE — TELEPHONE ENCOUNTER
Routing refill request to provider for review/approval because:  BPs out of range.  Please authorize if appropriate.  Thanks,  Reentta Bejarano RN

## 2019-06-12 DIAGNOSIS — F33.0 MAJOR DEPRESSIVE DISORDER, RECURRENT EPISODE, MILD (H): ICD-10-CM

## 2019-06-12 DIAGNOSIS — I10 ESSENTIAL HYPERTENSION, BENIGN: ICD-10-CM

## 2019-06-12 NOTE — TELEPHONE ENCOUNTER
"Pending Prescriptions:                       Disp   Refills    buPROPion (WELLBUTRIN XL) 300 MG 24 hr ta*90 tab*0            Sig: TAKE ONE TABLET BY MOUTH EVERY MORNING    Last Written Prescription Date:  05/29/2019  Last Fill Quantity: 90,  # refills: 0   Last office visit: 5/15/2019 with prescribing provider:     Future Office Visit:    Requested Prescriptions   Pending Prescriptions Disp Refills     buPROPion (WELLBUTRIN XL) 300 MG 24 hr tablet [Pharmacy Med Name: BUPROPION HCL ER (XL) 300MG TB24] 90 tablet 0     Sig: TAKE ONE TABLET BY MOUTH EVERY MORNING       SSRIs Protocol Passed - 6/12/2019 11:45 AM        Passed - PHQ-9 score less than 5 in past 6 months     Please review last PHQ-9 score.           Passed - Medication is Bupropion     If the medication is Bupropion (Wellbutrin), and the patient is taking for smoking cessation; OK to refill.          Passed - Medication is active on med list        Passed - Patient is age 18 or older        Passed - No active pregnancy on record        Passed - No positive pregnancy test in last 12 months        Passed - Recent (6 mo) or future (30 days) visit within the authorizing provider's specialty     Patient had office visit in the last 6 months or has a visit in the next 30 days with authorizing provider or within the authorizing provider's specialty.  See \"Patient Info\" tab in inbasket, or \"Choose Columns\" in Meds & Orders section of the refill encounter.              "

## 2019-06-13 RX ORDER — BUPROPION HYDROCHLORIDE 300 MG/1
TABLET ORAL
Refills: 0
Start: 2019-06-13

## 2019-06-21 DIAGNOSIS — I10 ESSENTIAL HYPERTENSION, BENIGN: ICD-10-CM

## 2019-06-21 DIAGNOSIS — F33.0 MAJOR DEPRESSIVE DISORDER, RECURRENT EPISODE, MILD (H): ICD-10-CM

## 2019-06-21 RX ORDER — BUPROPION HYDROCHLORIDE 300 MG/1
TABLET ORAL
Qty: 90 TABLET | Refills: 0 | Status: SHIPPED | OUTPATIENT
Start: 2019-06-21 | End: 2019-10-28

## 2019-06-21 NOTE — TELEPHONE ENCOUNTER
PHQ-9 SCORE 3/2/2018 12/27/2018 12/27/2018   PHQ-9 Total Score - - -   PHQ-9 Total Score 4 1 1     Prescription approved per [facility] Refill Protocol.  Ryann CARRILLO RN

## 2019-06-21 NOTE — TELEPHONE ENCOUNTER
"Pending Prescriptions:                       Disp   Refills    buPROPion (WELLBUTRIN XL) 300 MG 24 hr ta*90 tab*0            Sig: TAKE ONE TABLET BY MOUTH EVERY MORNING    Last Written Prescription Date:  5/29/19  Last Fill Quantity: 90,  # refills: 0   Last office visit: 5/15/2019 with prescribing provider:     Future Office Visit:    Requested Prescriptions   Pending Prescriptions Disp Refills     buPROPion (WELLBUTRIN XL) 300 MG 24 hr tablet [Pharmacy Med Name: BUPROPION HCL ER (XL) 300MG TB24] 90 tablet 0     Sig: TAKE ONE TABLET BY MOUTH EVERY MORNING       SSRIs Protocol Passed - 6/21/2019 10:11 AM        Passed - PHQ-9 score less than 5 in past 6 months     Please review last PHQ-9 score.           Passed - Medication is Bupropion     If the medication is Bupropion (Wellbutrin), and the patient is taking for smoking cessation; OK to refill.          Passed - Medication is active on med list        Passed - Patient is age 18 or older        Passed - No active pregnancy on record        Passed - No positive pregnancy test in last 12 months        Passed - Recent (6 mo) or future (30 days) visit within the authorizing provider's specialty     Patient had office visit in the last 6 months or has a visit in the next 30 days with authorizing provider or within the authorizing provider's specialty.  See \"Patient Info\" tab in inbasket, or \"Choose Columns\" in Meds & Orders section of the refill encounter.              "

## 2019-10-28 DIAGNOSIS — I10 ESSENTIAL HYPERTENSION, BENIGN: ICD-10-CM

## 2019-10-28 DIAGNOSIS — E78.5 HYPERLIPIDEMIA LDL GOAL <130: ICD-10-CM

## 2019-10-28 DIAGNOSIS — F33.0 MAJOR DEPRESSIVE DISORDER, RECURRENT EPISODE, MILD (H): ICD-10-CM

## 2019-10-29 NOTE — TELEPHONE ENCOUNTER
"spironolactone (ALDACTONE) 25 MG tablet  Last Written Prescription Date:  3/04/19  Last Fill Quantity: 90 tablet,  # refills: 2   Last office visit: 5/15/2019 with prescribing provider:  Johan Villasenor Office Visit:      buPROPion (WELLBUTRIN XL) 300 MG 24 hr tablet  Last Written Prescription Date:  6/21/19  Last Fill Quantity: 90 tablet,  # refills: 0   Last office visit: 5/15/2019 with prescribing provider:  Johan Villasenor Office Visit:      [facility] [place] Follow-up to PHQ 3/2/2018 12/27/2018 12/27/2018   PHQ-9 9. Suicide Ideation past 2 weeks Not at all Not at all Not at all     No flowsheet data found.      atorvastatin (LIPITOR) 10 MG tablet  Last Written Prescription Date:  3/04/19  Last Fill Quantity: 90 tablet,  # refills: 2   Last office visit: 5/15/2019 with prescribing provider:  Johan Villasenor Office Visit:      Requested Prescriptions   Pending Prescriptions Disp Refills     spironolactone (ALDACTONE) 25 MG tablet [Pharmacy Med Name: SPIRONOLACTONE 25MG TABS] 90 tablet 1     Sig: TAKE ONE TABLET BY MOUTH EVERY DAY       Diuretics (Including Combos) Protocol Failed - 10/28/2019  2:21 PM        Failed - Blood pressure under 140/90 in past 12 months     BP Readings from Last 3 Encounters:   05/15/19 155/70   12/27/18 129/55   03/16/18 136/72                 Passed - Recent (12 mo) or future (30 days) visit within the authorizing provider's specialty     Patient has had an office visit with the authorizing provider or a provider within the authorizing providers department within the previous 12 mos or has a future within next 30 days. See \"Patient Info\" tab in inbasket, or \"Choose Columns\" in Meds & Orders section of the refill encounter.              Passed - Medication is active on med list        Passed - Patient is age 18 or older        Passed - No active pregancy on record        Passed - Normal serum creatinine on file in past 12 months     Recent Labs   Lab Test 12/27/18  1415   CR 1.00              Passed " "- Normal serum potassium on file in past 12 months     Recent Labs   Lab Test 12/27/18  1415   POTASSIUM 4.8                    Passed - Normal serum sodium on file in past 12 months     Recent Labs   Lab Test 12/27/18  1415                 Passed - No positive pregnancy test in past 12 months        buPROPion (WELLBUTRIN XL) 300 MG 24 hr tablet [Pharmacy Med Name: BUPROPION HCL ER (XL) 300MG TB24] 90 tablet 0     Sig: TAKE ONE TABLET BY MOUTH EVERY MORNING       SSRIs Protocol Failed - 10/28/2019  2:21 PM        Failed - PHQ-9 score less than 5 in past 6 months     Please review last PHQ-9 score.           Passed - Medication is Bupropion     If the medication is Bupropion (Wellbutrin), and the patient is taking for smoking cessation; OK to refill.          Passed - Medication is active on med list        Passed - Patient is age 18 or older        Passed - No active pregnancy on record        Passed - No positive pregnancy test in last 12 months        Passed - Recent (6 mo) or future (30 days) visit within the authorizing provider's specialty     Patient had office visit in the last 6 months or has a visit in the next 30 days with authorizing provider or within the authorizing provider's specialty.  See \"Patient Info\" tab in inbasket, or \"Choose Columns\" in Meds & Orders section of the refill encounter.            atorvastatin (LIPITOR) 10 MG tablet [Pharmacy Med Name: ATORVASTATIN CALCIUM 10MG TABS] 90 tablet 1     Sig: TAKE ONE TABLET BY MOUTH EVERY DAY       Statins Protocol Passed - 10/28/2019  2:21 PM        Passed - LDL on file in past 12 months     Recent Labs   Lab Test 12/27/18  1415   LDL 68             Passed - No abnormal creatine kinase in past 12 months     No lab results found.             Passed - Recent (12 mo) or future (30 days) visit within the authorizing provider's specialty     Patient has had an office visit with the authorizing provider or a provider within the authorizing providers " "department within the previous 12 mos or has a future within next 30 days. See \"Patient Info\" tab in inbasket, or \"Choose Columns\" in Meds & Orders section of the refill encounter.              Passed - Medication is active on med list        Passed - Patient is age 18 or older        Passed - No active pregnancy on record        Passed - No positive pregnancy test in past 12 months          "

## 2019-10-30 RX ORDER — ATORVASTATIN CALCIUM 10 MG/1
TABLET, FILM COATED ORAL
Qty: 90 TABLET | Refills: 0 | Status: SHIPPED | OUTPATIENT
Start: 2019-10-30 | End: 2020-01-17

## 2019-10-30 RX ORDER — BUPROPION HYDROCHLORIDE 300 MG/1
TABLET ORAL
Qty: 90 TABLET | Refills: 0 | Status: SHIPPED | OUTPATIENT
Start: 2019-10-30 | End: 2020-01-17

## 2019-10-30 RX ORDER — SPIRONOLACTONE 25 MG/1
TABLET ORAL
Qty: 90 TABLET | Refills: 0 | Status: SHIPPED | OUTPATIENT
Start: 2019-10-30 | End: 2020-01-17

## 2019-10-30 NOTE — TELEPHONE ENCOUNTER
Routing refill request to provider for review/approval because:  Drug interaction warning: Verapamil and atorvastatin     Please review and authorize if appropriate,     Thank you,   Deborah MARSHALL RN          Spironolactone/bupropion: Pended/sent Medication filled 1 time as pt is due for a follow-up in clinic (End of December 2018/January 2020). Pharmacy has been notified to inform patient to call clinic and schedule appointment.

## 2020-01-17 DIAGNOSIS — I10 ESSENTIAL HYPERTENSION, BENIGN: ICD-10-CM

## 2020-01-17 DIAGNOSIS — F33.0 MAJOR DEPRESSIVE DISORDER, RECURRENT EPISODE, MILD (H): ICD-10-CM

## 2020-01-17 DIAGNOSIS — E78.5 HYPERLIPIDEMIA LDL GOAL <130: ICD-10-CM

## 2020-01-17 RX ORDER — CAPTOPRIL 50 MG/1
TABLET ORAL
Qty: 405 TABLET | Refills: 0 | Status: SHIPPED | OUTPATIENT
Start: 2020-01-17 | End: 2020-04-29

## 2020-01-17 NOTE — TELEPHONE ENCOUNTER
"All RX's previously sent to mail order pharmacies, switching to [facility] in [place]      atorvastatin (LIPITOR) 10 MG tablet  Last Written Prescription Date:  10/30/19  Last Fill Quantity: 90 tablet,  # refills: 0   Last office visit: 5/15/2019 with prescribing provider:  Johan Villasenor Office Visit:      buPROPion (WELLBUTRIN XL) 300 MG 24 hr tablet  Last Written Prescription Date:  10/30/19  Last Fill Quantity: 90 tablet,  # refills: 0   Last office visit: 5/15/2019 with prescribing provider:  Johan Villasenor Office Visit:      [facility] Follow-up to PHQ 3/2/2018 12/27/2018 12/27/2018   PHQ-9 9. Suicide Ideation past 2 weeks Not at all Not at all Not at all     No flowsheet data found.      hydrochlorothiazide (MICROZIDE) 12.5 MG capsule  Last Written Prescription Date:  12/27/18  Last Fill Quantity: 90 capsule,  # refills: 3   Last office visit: 5/15/2019 with prescribing provider:  Johan Villasenor Office Visit:      spironolactone (ALDACTONE) 25 MG tablet  Last Written Prescription Date:  10/30/19  Last Fill Quantity: 90 tablet,  # refills: 0   Last office visit: 5/15/2019 with prescribing provider:  Johan Villasenor Office Visit:      Requested Prescriptions   Pending Prescriptions Disp Refills     atorvastatin (LIPITOR) 10 MG tablet 90 tablet 0     Sig: Take 1 tablet (10 mg) by mouth daily       Statins Protocol Failed - 1/17/2020  5:33 PM        Failed - LDL on file in past 12 months     Recent Labs   Lab Test 12/27/18  1415   LDL 68             Passed - No abnormal creatine kinase in past 12 months     No lab results found.             Passed - Recent (12 mo) or future (30 days) visit within the authorizing provider's specialty     Patient has had an office visit with the authorizing provider or a provider within the authorizing providers department within the previous 12 mos or has a future within next 30 days. See \"Patient Info\" tab in inbasket, or \"Choose Columns\" in Meds & Orders section of the refill " "encounter.              Passed - Medication is active on med list        Passed - Patient is age 18 or older        Passed - No active pregnancy on record        Passed - No positive pregnancy test in past 12 months        buPROPion (WELLBUTRIN XL) 300 MG 24 hr tablet 90 tablet 0     Sig: Take 1 tablet (300 mg) by mouth every morning       SSRIs Protocol Failed - 1/17/2020  5:33 PM        Failed - PHQ-9 score less than 5 in past 6 months     Please review last PHQ-9 score.           Failed - Recent (6 mo) or future (30 days) visit within the authorizing provider's specialty     Patient had office visit in the last 6 months or has a visit in the next 30 days with authorizing provider or within the authorizing provider's specialty.  See \"Patient Info\" tab in inbasket, or \"Choose Columns\" in Meds & Orders section of the refill encounter.            Passed - Medication is Bupropion     If the medication is Bupropion (Wellbutrin), and the patient is taking for smoking cessation; OK to refill.          Passed - Medication is active on med list        Passed - Patient is age 18 or older        Passed - No active pregnancy on record        Passed - No positive pregnancy test in last 12 months        hydrochlorothiazide (MICROZIDE) 12.5 MG capsule 90 capsule 3     Sig: Take 1 capsule (12.5 mg) by mouth daily       Diuretics (Including Combos) Protocol Failed - 1/17/2020  5:33 PM        Failed - Blood pressure under 140/90 in past 12 months     BP Readings from Last 3 Encounters:   05/15/19 155/70   12/27/18 129/55   03/16/18 136/72                 Failed - Normal serum creatinine on file in past 12 months     Recent Labs   Lab Test 12/27/18  1415   CR 1.00              Failed - Normal serum potassium on file in past 12 months     Recent Labs   Lab Test 12/27/18  1415   POTASSIUM 4.8                    Failed - Normal serum sodium on file in past 12 months     Recent Labs   Lab Test 12/27/18  1415                 Passed " "- Recent (12 mo) or future (30 days) visit within the authorizing provider's specialty     Patient has had an office visit with the authorizing provider or a provider within the authorizing providers department within the previous 12 mos or has a future within next 30 days. See \"Patient Info\" tab in inbasket, or \"Choose Columns\" in Meds & Orders section of the refill encounter.              Passed - Medication is active on med list        Passed - Patient is age 18 or older        Passed - No active pregancy on record        Passed - No positive pregnancy test in past 12 months        spironolactone (ALDACTONE) 25 MG tablet 90 tablet 0     Sig: Take 1 tablet (25 mg) by mouth daily       Diuretics (Including Combos) Protocol Failed - 1/17/2020  5:33 PM        Failed - Blood pressure under 140/90 in past 12 months     BP Readings from Last 3 Encounters:   05/15/19 155/70   12/27/18 129/55   03/16/18 136/72                 Failed - Normal serum creatinine on file in past 12 months     Recent Labs   Lab Test 12/27/18  1415   CR 1.00              Failed - Normal serum potassium on file in past 12 months     Recent Labs   Lab Test 12/27/18  1415   POTASSIUM 4.8                    Failed - Normal serum sodium on file in past 12 months     Recent Labs   Lab Test 12/27/18  1415                 Passed - Recent (12 mo) or future (30 days) visit within the authorizing provider's specialty     Patient has had an office visit with the authorizing provider or a provider within the authorizing providers department within the previous 12 mos or has a future within next 30 days. See \"Patient Info\" tab in inbasket, or \"Choose Columns\" in Meds & Orders section of the refill encounter.              Passed - Medication is active on med list        Passed - Patient is age 18 or older        Passed - No active pregancy on record        Passed - No positive pregnancy test in past 12 months          "

## 2020-01-20 RX ORDER — ATORVASTATIN CALCIUM 10 MG/1
10 TABLET, FILM COATED ORAL DAILY
Qty: 90 TABLET | Refills: 0 | Status: SHIPPED | OUTPATIENT
Start: 2020-01-20 | End: 2020-04-29

## 2020-01-20 RX ORDER — BUPROPION HYDROCHLORIDE 300 MG/1
300 TABLET ORAL EVERY MORNING
Qty: 90 TABLET | Refills: 0 | Status: SHIPPED | OUTPATIENT
Start: 2020-01-20 | End: 2020-04-29

## 2020-01-20 RX ORDER — SPIRONOLACTONE 25 MG/1
25 TABLET ORAL DAILY
Qty: 90 TABLET | Refills: 0 | Status: SHIPPED | OUTPATIENT
Start: 2020-01-20 | End: 2020-04-29

## 2020-01-20 RX ORDER — HYDROCHLOROTHIAZIDE 12.5 MG/1
1 CAPSULE ORAL DAILY
Qty: 90 CAPSULE | Refills: 0 | Status: SHIPPED | OUTPATIENT
Start: 2020-01-20 | End: 2021-04-12

## 2020-01-20 NOTE — TELEPHONE ENCOUNTER
Pt due for physical. Medication filled 1 time as pt is due for a follow-up in clinic. Pharmacy has been notified to inform patient to call clinic and schedule appointment.

## 2020-05-22 NOTE — TELEPHONE ENCOUNTER
"buPROPion (WELLBUTRIN XL) 300 MG 24 hr tablet 90 tablet 0 3/4/2019     Last Written Prescription Date:  3/4/19  Last Fill Quantity: 90,  # refills: 0   Last office visit: 5/15/2019 with prescribing provider:  Johan   Future Office Visit:  None    Requested Prescriptions   Pending Prescriptions Disp Refills     buPROPion (WELLBUTRIN XL) 300 MG 24 hr tablet 90 tablet 0     Sig: Take 1 tablet (300 mg) by mouth every morning       SSRIs Protocol Passed - 5/28/2019  8:45 PM        Passed - PHQ-9 score less than 5 in past 6 months     Please review last PHQ-9 score.           Passed - Medication is Bupropion     If the medication is Bupropion (Wellbutrin), and the patient is taking for smoking cessation; OK to refill.          Passed - Medication is active on med list        Passed - Patient is age 18 or older        Passed - No active pregnancy on record        Passed - No positive pregnancy test in last 12 months        Passed - Recent (6 mo) or future (30 days) visit within the authorizing provider's specialty     Patient had office visit in the last 6 months or has a visit in the next 30 days with authorizing provider or within the authorizing provider's specialty.  See \"Patient Info\" tab in inbasket, or \"Choose Columns\" in Meds & Orders section of the refill encounter.            [facility] Follow-up to PHQ 3/2/2018 12/27/2018 12/27/2018   PHQ-9 9. Suicide Ideation past 2 weeks Not at all Not at all Not at all         " INTERNAL MEDICINE PROGRESS NOTE    Patient: Blanca Humphreys Date: 2020   : 1966 Attending: Yaya Jimenes MD   53 year old female      Chief Complaint:  Shortness of breath    Subjective:  Patient seen and examined, c/o right leg cramping pain, denies shortness of breath, nausea/vomiting.  No fever.  On 3 L O2 n/c    Summary:   Blanca Humphreys is a 53 year old female with PMH OHT   (NICMP: with LVAD), KOFFI, HTN, DM II, CKD II, DVT & PE; s/p IVC filter. She  presented to the ED from OP setting with c/o with dyspnea, chest and abdonminal pain. She had MONICA with hypokalemia and was admitted to the floor and found to be COVID +. She had increased oxygen requirements and WOB; was transferred to the [facility] () and placed on optiflow. She has remained on optiflow. Received tocilizumab . Oxygenation improving; again developed MONICA with steep rise in Scr, oliguria. Received albumin and bumex, UO increased with downtrending Scr     Problem List:   Patient Active Problem List   Diagnosis   • DVT (deep venous thrombosis) (CMS/HCC)   • Pulmonary embolism (CMS/[facility])   • Nonischemic Dilated Cardiomyopathy    • Hypertension, on Lisinopril and Hydralazine   • Obstructive sleep apnea, uses CPAP   • DM (diabetes mellitus) (CMS/HCC)   • Long-term use of anticoagulants, on Coumadin for DVT   • Digoxin therapy   • Congestive Heart Failure, Chronic Systolic   • Adrenal adenoma   • Type II or unspecified type diabetes mellitus without mention of complication, uncontrolled   • Hx of Ovarian Cancer: Dysgerminoma   • Morbid obesity (CMS/HCC)   • LV dysfunction, EF% 17% echo 2013    • Hypomagnesemia   • Hypokalemia   • Chest pain   • Systolic heart failure (CMS/HCC)   • S/P orthotopic heart transplant (CMS/[facility])   • Family history of breast cancer   • Adjustment disorder with depressed mood   • Heart transplant recipient (CMS/HCC)   • Nausea and vomiting in adult   • Abdominal pain   • Type 2 diabetes mellitus without  complication, with long-term current use of insulin (CMS/HCC)   • History of DVT (deep vein thrombosis)   • Debility   • CKD (chronic kidney disease) stage 2, GFR 60-89 ml/min   • S/P IVC filter   • Hypokalemia   • Long term (current) use of anticoagulants   • IVC thrombosis (CMS/HCC)   • Iliac vein thrombosis, bilateral (CMS/HCC)        ALLERGIES:   Allergen Reactions   • Lisinopril SWELLING     Lip Swelling       Review of Systems: A 10 point review of systems is negative except as in the HPI    Vital Last Value 24 Hour Range   Temperature 97.7 °F (36.5 °C) (05/22/20 0700) Temp  Min: 97.6 °F (36.4 °C)  Max: 98.6 °F (37 °C)   Pulse 88 (05/22/20 1100) Pulse  Min: 85  Max: 114   Respiratory (!) 30 (05/22/20 0700) Resp  Min: 22  Max: 42   Non-Invasive  Blood Pressure 120/77 (05/22/20 0700) BP  Min: 117/88  Max: 150/82   Pulse Oximetry 93 % (05/22/20 0700) SpO2  Min: 93 %  Max: 100 %     Vital Today Admitted   Weight 127.6 kg (05/21/20 0500) Weight: 134.9 kg (05/08/20 2340)   Height N/A Height: 5' 7\" (170.2 cm) (05/08/20 2340)   BMI N/A BMI (Calculated): 46.58 (05/08/20 2340)     Intake and Output:    Intake/Output Summary (Last 24 hours) at 5/22/2020 1118  Last data filed at 5/22/2020 0845  Gross per 24 hour   Intake 1432 ml   Output 1000 ml   Net 432 ml         Physical Examination :  General:  Alert, awake, no acute distress  HENT:  Atraumatic, normocephalic  Neck:  Supple  Respiratory:  Breath sounds clear on both sides, no wheezing   Cardiovascular:  RRR, S1 and S2 normal, no murmurs  GI:  Soft, nontender, nondistended  Musculoskeletal:  Right lower extremity edema below the knee with tenderness  Skin:  No rash  Neurologic:  Alert, oriented x 3, no focal deficits noted       Medications :   Scheduled  • melatonin  6 mg Oral Nightly   • TACROlimus  1.5 mg Oral BIDTX   • insulin glargine  20 Units Subcutaneous Nightly   • pravastatin  20 mg Oral Nightly   • sodium chloride (PF)  2 mL Intracatheter 2 times per day    • insulin lispro   Subcutaneous TID WC       Continuous infusion  • heparin (porcine) 25,000 units/250 mL in dextrose 5 % infusion 13 Units/kg/hr (05/22/20 1005)   • sodium chloride 0.9% infusion 30 mL/hr at 05/22/20 0800   • dextrose 5 % infusion         Laboratory Results:   Recent Labs   Lab 05/22/20  0659 05/21/20  2331 05/21/20  1619 05/21/20  1618 05/21/20  0650  05/20/20  0355  05/19/20  0634  05/16/20  0351   WBC 5.8  --   --   --  5.2  --  6.2  --  10.2   < > 6.9   HCT 29.0*  --   --   --  27.8*  --  26.8*  --  30.3*   < > 32.7*   HGB 9.1*  --   --   --  8.6*  --  8.3*  --  9.2*   < > 10.1*     --   --   --  290  --  237  --  265   < > 275   INR 2.1  --   --   --  2.6  --  3.8   < >  --    < > 3.0   PTT 94* 82*  --  38*  --   --   --   --   --   --   --    SODIUM 135  --  135  --  133*   < > 132*   < > 131*   < > 134*   POTASSIUM 3.3*  --  3.7  --  3.8   < > 4.3   < > 4.4   < > 4.5   CHLORIDE 103  --  102  --  102   < > 101   < > 100   < > 105   CO2 23  --  25  --  24   < > 24   < > 24   < > 23   CALCIUM 9.3  --  9.5  --  9.0   < > 9.0   < > 9.0   < > 8.4   GLUCOSE 151*  --  134*  --  145*   < > 161*   < > 194*   < > 200*   BUN 53*  --  59*  --  60*   < > 56*   < > 42*   < > 23*   CREATININE 1.44*  --  1.88*  --  2.23*   < > 3.45*   < > 2.44*   < > 1.18*   AST 23  --   --   --  21  --  26  --  31   < >  --    GPT 19  --   --   --  19  --  18  --  16   < >  --    ALKPT 59  --   --   --  61  --  63  --  72   < >  --    BILIRUBIN 0.7  --   --   --  0.7  --  0.8  --  1.5*   < >  --    ALBUMIN 2.5*  --   --   --  2.5*  --  2.2*  --  2.1*   < >  --    PHOS 4.2  --   --   --   --   --   --   --  5.0*  --  3.1    < > = values in this interval not displayed.       Imaging Results Reviewed Include:  BILATERAL LOWER EXTREMITY DUPLEX VENOUS ULTRASOUND 5/21/2020  IMPRESSION:  1. Rather extensive right lower extremity DVT from common femoral through the popliteal vein. 2. Although the right external iliac vein  is not seen, the left lower extremity venous waveforms are normal without evidence of central caval occlusion. 3. There is no sonographic evidence of left lower extremity DVT. These findings were called to the nurses caring for the patient at 1526 PM 21 May.      XR CHEST AP OR PA 5/20/2020  IMPRESSION:  Interstitial and upper lobe predominant alveolar opacities, slightly  improved on the right compared to the prior study, unchanged on the left.      Assessment :  1. COVID-19 pneumonia, s/p convalescent plasma (5/14), Tocilzumab (5/19)  2. Acute hypoxic respiratory failure - on 3 L O2 n/c, continue wean if tolerates  3. Orthotopic heart transplant (10/2014), on chronic immunosuppression   4. Acute kidney injury on CKD II - Cr improving  5. Supratherapeutic INR, resolved  6. Right lower extremity DVT, s/p IVC filter (4/2019)  7. Anemia of chronic disease  8. DM type 2  9. Generalized weakness      Plan :  Recheck CBC, BMP in a.m.  Heparin gtt  Continue Lantus, sliding scale insulin  Statin  Vascular surgery consult  PT/OT    DVT/VTE Prophylaxis    Discussed with RN      Code status:  Full Resuscitation        Yaya Jimenes MD  [phone]    Contact the Hospitalist caring for the patient until 7pm.   From 7pm to 7am contact the Hospitalist on call

## 2020-07-23 DIAGNOSIS — I10 ESSENTIAL HYPERTENSION, BENIGN: ICD-10-CM

## 2020-07-24 RX ORDER — CAPTOPRIL 50 MG/1
TABLET ORAL
Qty: 405 TABLET | Refills: 0 | Status: SHIPPED | OUTPATIENT
Start: 2020-07-24 | End: 2020-12-17

## 2020-07-24 NOTE — TELEPHONE ENCOUNTER
BP Readings from Last 6 Encounters:   05/15/19 155/70   12/27/18 129/55   03/16/18 136/72   03/02/18 134/74   02/26/18 142/69   02/05/18 118/77     Routing refill request to provider for review/approval because:  Over 1 year since last visit and labs.  Pended 90 days with note to pharm for review.     Medication refilled one time.  Pharmacy instructed to notify patient to call and send a follow up, either in clinic or virtual as appropriate.

## 2020-07-29 DIAGNOSIS — E78.5 HYPERLIPIDEMIA LDL GOAL <130: ICD-10-CM

## 2020-07-29 DIAGNOSIS — F33.0 MAJOR DEPRESSIVE DISORDER, RECURRENT EPISODE, MILD (H): ICD-10-CM

## 2020-07-29 DIAGNOSIS — I10 ESSENTIAL HYPERTENSION, BENIGN: ICD-10-CM

## 2020-07-30 RX ORDER — BUPROPION HYDROCHLORIDE 300 MG/1
TABLET ORAL
Qty: 90 TABLET | Refills: 0 | Status: SHIPPED | OUTPATIENT
Start: 2020-07-30 | End: 2020-10-22

## 2020-07-30 RX ORDER — VERAPAMIL HYDROCHLORIDE 120 MG/1
CAPSULE, EXTENDED RELEASE ORAL
Qty: 180 CAPSULE | Refills: 0 | Status: SHIPPED | OUTPATIENT
Start: 2020-07-30 | End: 2020-10-22

## 2020-07-30 RX ORDER — ATORVASTATIN CALCIUM 10 MG/1
TABLET, FILM COATED ORAL
Qty: 90 TABLET | Refills: 0 | Status: SHIPPED | OUTPATIENT
Start: 2020-07-30 | End: 2020-10-22

## 2020-07-30 RX ORDER — SPIRONOLACTONE 25 MG/1
TABLET ORAL
Qty: 90 TABLET | Refills: 0 | Status: SHIPPED | OUTPATIENT
Start: 2020-07-30 | End: 2020-10-22

## 2020-10-22 DIAGNOSIS — F33.0 MAJOR DEPRESSIVE DISORDER, RECURRENT EPISODE, MILD (H): ICD-10-CM

## 2020-10-22 DIAGNOSIS — I10 ESSENTIAL HYPERTENSION, BENIGN: ICD-10-CM

## 2020-10-22 DIAGNOSIS — E78.5 HYPERLIPIDEMIA LDL GOAL <130: ICD-10-CM

## 2020-10-23 NOTE — TELEPHONE ENCOUNTER
"Last OV 5/15/19    Need to notify due for OV/verify if being seen elsewhere?     Called patient - phone has been disconnected     Called Mary Ann - on C2C. M asking her to call back.      Called Nguyen - on C2C - no answer     Ryann CARRILLO RN      Requested Prescriptions   Pending Prescriptions Disp Refills     atorvastatin (LIPITOR) 10 MG tablet 90 tablet 0     Sig: Take 1 tablet (10 mg) by mouth daily       Statins Protocol Failed - 10/22/2020  3:29 PM        Failed - LDL on file in past 12 months     Recent Labs   Lab Test 12/27/18  1415   LDL 68             Failed - Recent (12 mo) or future (30 days) visit within the authorizing provider's specialty     Patient has had an office visit with the authorizing provider or a provider within the authorizing providers department within the previous 12 mos or has a future within next 30 days. See \"Patient Info\" tab in inbasket, or \"Choose Columns\" in Meds & Orders section of the refill encounter.              Passed - No abnormal creatine kinase in past 12 months     No lab results found.             Passed - Medication is active on med list        Passed - Patient is age 18 or older        Passed - No active pregnancy on record        Passed - No positive pregnancy test in past 12 months           spironolactone (ALDACTONE) 25 MG tablet 90 tablet 0     Sig: Take 1 tablet (25 mg) by mouth daily       Diuretics (Including Combos) Protocol Failed - 10/22/2020  3:29 PM        Failed - Blood pressure under 140/90 in past 12 months     BP Readings from Last 3 Encounters:   05/15/19 155/70   12/27/18 129/55   03/16/18 136/72                 Failed - Recent (12 mo) or future (30 days) visit within the authorizing provider's specialty     Patient has had an office visit with the authorizing provider or a provider within the authorizing providers department within the previous 12 mos or has a future within next 30 days. See \"Patient Info\" tab in inbasket, or \"Choose Columns\" in Meds & " "Orders section of the refill encounter.              Failed - Normal serum creatinine on file in past 12 months     Recent Labs   Lab Test 12/27/18  1415   CR 1.00              Failed - Normal serum potassium on file in past 12 months     Recent Labs   Lab Test 12/27/18  1415   POTASSIUM 4.8                    Failed - Normal serum sodium on file in past 12 months     Recent Labs   Lab Test 12/27/18  1415                 Passed - Medication is active on med list        Passed - Patient is age 18 or older        Passed - No active pregancy on record        Passed - No positive pregnancy test in past 12 months           buPROPion (WELLBUTRIN XL) 300 MG 24 hr tablet 90 tablet 0     Sig: TAKE 1 TABLET(300 MG) BY MOUTH EVERY MORNING       SSRIs Protocol Failed - 10/22/2020  3:29 PM        Failed - PHQ-9 score less than 5 in past 6 months     Please review last PHQ-9 score.           Failed - Recent (6 mo) or future (30 days) visit within the authorizing provider's specialty     Patient had office visit in the last 6 months or has a visit in the next 30 days with authorizing provider or within the authorizing provider's specialty.  See \"Patient Info\" tab in inbasket, or \"Choose Columns\" in Meds & Orders section of the refill encounter.            Passed - Medication is Bupropion     If the medication is Bupropion (Wellbutrin), and the patient is taking for smoking cessation; OK to refill.          Passed - Medication is active on med list        Passed - Patient is age 18 or older        Passed - No active pregnancy on record        Passed - No positive pregnancy test in last 12 months           verapamil ER (VERELAN) 120 MG 24 hr capsule 180 capsule 0     Sig: TAKE 1 CAPSULE BY MOUTH EVERY 12 HOURS       Calcium Channel Blockers Protocol  Failed - 10/22/2020  3:29 PM        Failed - Blood pressure under 140/90 in past 12 months     BP Readings from Last 3 Encounters:   05/15/19 155/70   12/27/18 129/55   03/16/18 " "136/72                 Failed - Normal ALT in past 12 months     Recent Labs   Lab Test 12/27/18  1415   ALT 23             Failed - Recent (12 mo) or future (30 days) visit within the authorizing provider's specialty     Patient has had an office visit with the authorizing provider or a provider within the authorizing providers department within the previous 12 mos or has a future within next 30 days. See \"Patient Info\" tab in inbasket, or \"Choose Columns\" in Meds & Orders section of the refill encounter.              Failed - Normal serum creatinine on file in past 12 months     Recent Labs   Lab Test 12/27/18  1415   CR 1.00       Ok to refill medication if creatinine is low          Passed - Medication is active on med list        Passed - Patient is age 18 or older        Passed - No active pregnancy on record        Passed - No positive pregnancy test in past 12 months             "

## 2020-10-24 ENCOUNTER — NURSE TRIAGE (OUTPATIENT)
Dept: NURSING | Facility: CLINIC | Age: 85
End: 2020-10-24

## 2020-10-24 NOTE — TELEPHONE ENCOUNTER
Son of pt Vahe calls in   Saying he is returning a call from yesterday from     Looking in chart I see   Ryann Gilliam RN   Registered Nurse      Telephone Encounter   Signed   Encounter Date:  10/22/2020                    []Hide copied text    []Hover for details  Last OV 5/15/19     Need to notify due for OV/verify if being seen elsewhere?      Called patient - phone has been disconnected      Called Mary Ann - on C2C. [facility] asking her to call back.       Called Nguyen - on C2C - no answer      Ryann CARRILLO RN        Advised Vahe to call clinic Monday when open and update phone and address and discuss    Protocol and care advice reviewed  Caller states understanding of the recommended disposition  Advised to call back if further questions or concerns    Doe Pemberton RN / [facility]    Reason for Disposition    [1] Caller requesting NON-URGENT health information AND [2] PCP's office is the best resource    Protocols used: INFORMATION ONLY CALL-A-

## 2020-10-27 ENCOUNTER — TELEPHONE (OUTPATIENT)
Dept: FAMILY MEDICINE | Facility: CLINIC | Age: 85
End: 2020-10-27

## 2020-10-27 NOTE — TELEPHONE ENCOUNTER
Reason for Call:  Other call back    Detailed comments: patient wasn't sure why we were trying to Reachher but she updated her address and phone number with us    Phone Number Patient can be reached at: Home number on file [phone] (home)    Best Time: when available    Can we leave a detailed message on this number? YES    Call taken on 10/27/2020 at 4:57 PM by Fernanda Mcdonnell

## 2020-10-29 RX ORDER — ATORVASTATIN CALCIUM 10 MG/1
10 TABLET, FILM COATED ORAL DAILY
Qty: 90 TABLET | Refills: 0 | Status: SHIPPED | OUTPATIENT
Start: 2020-10-29 | End: 2020-12-17

## 2020-10-29 RX ORDER — VERAPAMIL HYDROCHLORIDE 120 MG/1
CAPSULE, EXTENDED RELEASE ORAL
Qty: 180 CAPSULE | Refills: 0 | Status: SHIPPED | OUTPATIENT
Start: 2020-10-29 | End: 2021-04-12

## 2020-10-29 RX ORDER — BUPROPION HYDROCHLORIDE 300 MG/1
TABLET ORAL
Qty: 90 TABLET | Refills: 0 | Status: SHIPPED | OUTPATIENT
Start: 2020-10-29 | End: 2020-12-17

## 2020-10-29 RX ORDER — SPIRONOLACTONE 25 MG/1
25 TABLET ORAL DAILY
Qty: 90 TABLET | Refills: 0 | Status: SHIPPED | OUTPATIENT
Start: 2020-10-29 | End: 2020-12-17

## 2020-10-29 NOTE — TELEPHONE ENCOUNTER
Discussed with patient   Still a pt of Dr Prado - scheduled future OV    Next 5 appointments (look out 90 days)    Dec 17, 2020 10:30 AM  Office Visit with Shun Prado MD  [facility] ([facility]) [address] [facility] [address]  [phone]        Ryann CARRILLO RN

## 2020-10-29 NOTE — TELEPHONE ENCOUNTER
"Called patient - scheduled future visit       Next 5 appointments (look out 90 days)    Dec 17, 2020 10:30 AM  Office Visit with Shun Prado MD  [facility] ([facility]) [address] [facility] [address] [phone]        Routing refill request to provider for review/approval because:  Last labs in 2018 - 90 day Rxs pended to last to upcoming appt     Ryann CARRILLO RN          Requested Prescriptions   Pending Prescriptions Disp Refills     atorvastatin (LIPITOR) 10 MG tablet 90 tablet 0     Sig: Take 1 tablet (10 mg) by mouth daily       Statins Protocol Failed - 10/23/2020  9:08 AM        Failed - LDL on file in past 12 months     Recent Labs   Lab Test 12/27/18  1415   LDL 68             Failed - Recent (12 mo) or future (30 days) visit within the authorizing provider's specialty     Patient has had an office visit with the authorizing provider or a provider within the authorizing providers department within the previous 12 mos or has a future within next 30 days. See \"Patient Info\" tab in inbasket, or \"Choose Columns\" in Meds & Orders section of the refill encounter.              Passed - No abnormal creatine kinase in past 12 months     No lab results found.             Passed - Medication is active on med list        Passed - Patient is age 18 or older        Passed - No active pregnancy on record        Passed - No positive pregnancy test in past 12 months           spironolactone (ALDACTONE) 25 MG tablet 90 tablet 0     Sig: Take 1 tablet (25 mg) by mouth daily       Diuretics (Including Combos) Protocol Failed - 10/23/2020  9:08 AM        Failed - Blood pressure under 140/90 in past 12 months     BP Readings from Last 3 Encounters:   05/15/19 155/70   12/27/18 129/55   03/16/18 136/72                 Failed - Recent (12 mo) or future (30 days) visit within the authorizing provider's specialty     Patient has had an office visit with the authorizing provider or a " "provider within the authorizing providers department within the previous 12 mos or has a future within next 30 days. See \"Patient Info\" tab in inbasket, or \"Choose Columns\" in Meds & Orders section of the refill encounter.              Failed - Normal serum creatinine on file in past 12 months     Recent Labs   Lab Test 12/27/18  1415   CR 1.00              Failed - Normal serum potassium on file in past 12 months     Recent Labs   Lab Test 12/27/18  1415   POTASSIUM 4.8                    Failed - Normal serum sodium on file in past 12 months     Recent Labs   Lab Test 12/27/18  1415                 Passed - Medication is active on med list        Passed - Patient is age 18 or older        Passed - No active pregancy on record        Passed - No positive pregnancy test in past 12 months           buPROPion (WELLBUTRIN XL) 300 MG 24 hr tablet 90 tablet 0     Sig: TAKE 1 TABLET(300 MG) BY MOUTH EVERY MORNING       SSRIs Protocol Failed - 10/23/2020  9:08 AM        Failed - PHQ-9 score less than 5 in past 6 months     Please review last PHQ-9 score.           Failed - Recent (6 mo) or future (30 days) visit within the authorizing provider's specialty     Patient had office visit in the last 6 months or has a visit in the next 30 days with authorizing provider or within the authorizing provider's specialty.  See \"Patient Info\" tab in inbasket, or \"Choose Columns\" in Meds & Orders section of the refill encounter.            Passed - Medication is Bupropion     If the medication is Bupropion (Wellbutrin), and the patient is taking for smoking cessation; OK to refill.          Passed - Medication is active on med list        Passed - Patient is age 18 or older        Passed - No active pregnancy on record        Passed - No positive pregnancy test in last 12 months           verapamil ER (VERELAN) 120 MG 24 hr capsule 180 capsule 0     Sig: TAKE 1 CAPSULE BY MOUTH EVERY 12 HOURS       Calcium Channel Blockers " "Protocol  Failed - 10/23/2020  9:08 AM        Failed - Blood pressure under 140/90 in past 12 months     BP Readings from Last 3 Encounters:   05/15/19 155/70   12/27/18 129/55   03/16/18 136/72                 Failed - Normal ALT in past 12 months     Recent Labs   Lab Test 12/27/18  1415   ALT 23             Failed - Recent (12 mo) or future (30 days) visit within the authorizing provider's specialty     Patient has had an office visit with the authorizing provider or a provider within the authorizing providers department within the previous 12 mos or has a future within next 30 days. See \"Patient Info\" tab in inbasket, or \"Choose Columns\" in Meds & Orders section of the refill encounter.              Failed - Normal serum creatinine on file in past 12 months     Recent Labs   Lab Test 12/27/18  1415   CR 1.00       Ok to refill medication if creatinine is low          Passed - Medication is active on med list        Passed - Patient is age 18 or older        Passed - No active pregnancy on record        Passed - No positive pregnancy test in past 12 months               "

## 2020-12-08 ENCOUNTER — DOCUMENTATION ONLY (OUTPATIENT)
Dept: FAMILY MEDICINE | Facility: CLINIC | Age: 85
End: 2020-12-08

## 2020-12-08 NOTE — TELEPHONE ENCOUNTER
I would recommend that we wait until after her telephone visit  Based on any issues discussed we can order labs if needed following the visit

## 2020-12-09 NOTE — PROGRESS NOTES
Called and spoke to patient regarding upcoming Lab appointment - will check with Daughter to reschedule after Televisit with PCP.    Mary Hoffmann MA on 12/9/2020 at 9:09 AM

## 2020-12-17 ENCOUNTER — VIRTUAL VISIT (OUTPATIENT)
Dept: FAMILY MEDICINE | Facility: CLINIC | Age: 85
End: 2020-12-17

## 2020-12-17 VITALS — WEIGHT: 179 LBS | BODY MASS INDEX: 33.82 KG/M2

## 2020-12-17 DIAGNOSIS — E78.5 HYPERLIPIDEMIA LDL GOAL <130: ICD-10-CM

## 2020-12-17 DIAGNOSIS — Z85.3 PERSONAL HISTORY OF MALIGNANT NEOPLASM OF BREAST: Primary | ICD-10-CM

## 2020-12-17 DIAGNOSIS — I10 ESSENTIAL HYPERTENSION, BENIGN: ICD-10-CM

## 2020-12-17 DIAGNOSIS — F33.0 MAJOR DEPRESSIVE DISORDER, RECURRENT EPISODE, MILD (H): ICD-10-CM

## 2020-12-17 DIAGNOSIS — N18.32 STAGE 3B CHRONIC KIDNEY DISEASE (H): ICD-10-CM

## 2020-12-17 PROBLEM — N18.30 CHRONIC KIDNEY DISEASE, STAGE 3 (H): Status: ACTIVE | Noted: 2020-12-17

## 2020-12-17 PROCEDURE — 99214 OFFICE O/P EST MOD 30 MIN: CPT | Mod: 95 | Performed by: INTERNAL MEDICINE

## 2020-12-17 RX ORDER — VERAPAMIL HYDROCHLORIDE 120 MG/1
TABLET, FILM COATED, EXTENDED RELEASE ORAL
Qty: 180 TABLET | Refills: 3 | Status: SHIPPED | OUTPATIENT
Start: 2020-12-17 | End: 2021-12-27

## 2020-12-17 RX ORDER — SPIRONOLACTONE 25 MG/1
25 TABLET ORAL DAILY
Qty: 90 TABLET | Refills: 3 | Status: SHIPPED | OUTPATIENT
Start: 2020-12-17 | End: 2021-04-12

## 2020-12-17 RX ORDER — BUPROPION HYDROCHLORIDE 300 MG/1
TABLET ORAL
Qty: 90 TABLET | Refills: 3 | Status: SHIPPED | OUTPATIENT
Start: 2020-12-17 | End: 2021-04-12

## 2020-12-17 RX ORDER — CAPTOPRIL 50 MG/1
TABLET ORAL
Qty: 405 TABLET | Refills: 3 | Status: SHIPPED | OUTPATIENT
Start: 2020-12-17 | End: 2021-04-12

## 2020-12-17 RX ORDER — ATORVASTATIN CALCIUM 10 MG/1
10 TABLET, FILM COATED ORAL DAILY
Qty: 90 TABLET | Refills: 3 | Status: SHIPPED | OUTPATIENT
Start: 2020-12-17 | End: 2021-04-12

## 2020-12-17 ASSESSMENT — PATIENT HEALTH QUESTIONNAIRE - PHQ9: SUM OF ALL RESPONSES TO PHQ QUESTIONS 1-9: 2

## 2020-12-17 NOTE — PROGRESS NOTES
"Riddhi Aguilar is a 89 year old female who is being evaluated via a billable telephone visit.      The patient has been notified of following:     \"This telephone visit will be conducted via a call between you and your physician/provider. We have found that certain health care needs can be provided without the need for a physical exam.  This service lets us provide the care you need with a short phone conversation.  If a prescription is necessary we can send it directly to your pharmacy.  If lab work is needed we can place an order for that and you can then stop by our lab to have the test done at a later time.    Telephone visits are billed at different rates depending on your insurance coverage. During this emergency period, for some insurers they may be billed the same as an in-person visit.  Please reach out to your insurance provider with any questions.    If during the course of the call the physician/provider feels a telephone visit is not appropriate, you will not be charged for this service.\"    Patient has given verbal consent for Telephone visit?  Yes    What phone number would you like to be contacted at? [phone] would also like to have a 3-way call with her daughter in law Mary Ann at [phone] if possible please.     How would you like to obtain your AVS? Mail a copy    Subjective     Riddhi Aguilar is a 89 year old female who presents via phone visit today for the following health issues:    HPI     Medication Check - She is not taking her hydrochlorothiazide daily as prescribed - she is only taking it 2-3 days per week, depending on her ankle swelling. She has not been able to take her BP at home due to inability to get the cuff on her own arm, and nobody available at her facility to help since she is in independent living.        Overall she is doing well  Daughter in law does not have any specific questions  No new symptoms  Will be getting vacccine for COVID in 2 weeks she tells me "       Review of Systems   Constitutional, HEENT, cardiovascular, pulmonary, gi and gu systems are negative, except as otherwise noted.       Objective          Vitals:  No vitals were obtained today due to virtual visit.    healthy, alert and no distress  PSYCH: Alert and oriented times 3; coherent speech, normal   rate and volume, able to articulate logical thoughts, able   to abstract reason, no tangential thoughts, no hallucinations   or delusions  Her affect is normal  RESP: No cough, no audible wheezing, able to talk in full sentences  Remainder of exam unable to be completed due to telephone visits            Assessment/Plan:    Assessment & Plan     Riddhi was seen today for recheck medication.    Diagnoses and all orders for this visit:    Personal history of malignant neoplasm of breast    Essential hypertension, benign  -     verapamil ER (CALAN-SR) 120 MG CR tablet; TAKE ONE TABLET BY MOUTH EVERY 12 HOURS  -     spironolactone (ALDACTONE) 25 MG tablet; Take 1 tablet (25 mg) by mouth daily  -     captopril (CAPOTEN) 50 MG tablet; TAKE ONE AND ONE-HALF TABLET BY MOUTH THREE TIMES DAILY  -     buPROPion (WELLBUTRIN XL) 300 MG 24 hr tablet; TAKE 1 TABLET(300 MG) BY MOUTH EVERY MORNING    Major depressive disorder, recurrent episode, mild (H)  -     spironolactone (ALDACTONE) 25 MG tablet; Take 1 tablet (25 mg) by mouth daily  -     buPROPion (WELLBUTRIN XL) 300 MG 24 hr tablet; TAKE 1 TABLET(300 MG) BY MOUTH EVERY MORNING    Hyperlipidemia LDL goal <130  -     atorvastatin (LIPITOR) 10 MG tablet; Take 1 tablet (10 mg) by mouth daily    Stage 3b chronic kidney disease          She seems to be doing well  We will plan to bring her in to the office in 2-3 months to check labs and blood pressure after she has had COVID vaccine series and it is safer        Return in about 2 months (around 2/17/2021) for Preventive Visit.  Patient instructed to return to clinic or contact us sooner if symptoms worsen or new  symptoms develop.     Shun Prado MD  [facility]    Phone call duration:  15 minutes

## 2021-04-12 ENCOUNTER — OFFICE VISIT (OUTPATIENT)
Dept: FAMILY MEDICINE | Facility: CLINIC | Age: 86
End: 2021-04-12
Payer: COMMERCIAL

## 2021-04-12 VITALS
OXYGEN SATURATION: 100 % | BODY MASS INDEX: 35.6 KG/M2 | HEART RATE: 62 BPM | DIASTOLIC BLOOD PRESSURE: 74 MMHG | TEMPERATURE: 98 F | SYSTOLIC BLOOD PRESSURE: 122 MMHG | WEIGHT: 188.4 LBS

## 2021-04-12 DIAGNOSIS — Z00.00 ROUTINE GENERAL MEDICAL EXAMINATION AT A HEALTH CARE FACILITY: Primary | ICD-10-CM

## 2021-04-12 DIAGNOSIS — E66.01 MORBID OBESITY (H): ICD-10-CM

## 2021-04-12 DIAGNOSIS — N18.32 STAGE 3B CHRONIC KIDNEY DISEASE (H): ICD-10-CM

## 2021-04-12 DIAGNOSIS — D64.9 ANEMIA, UNSPECIFIED TYPE: ICD-10-CM

## 2021-04-12 DIAGNOSIS — F33.0 MAJOR DEPRESSIVE DISORDER, RECURRENT EPISODE, MILD (H): ICD-10-CM

## 2021-04-12 DIAGNOSIS — E78.5 HYPERLIPIDEMIA LDL GOAL <130: ICD-10-CM

## 2021-04-12 DIAGNOSIS — I10 ESSENTIAL HYPERTENSION, BENIGN: ICD-10-CM

## 2021-04-12 LAB
ANION GAP SERPL CALCULATED.3IONS-SCNC: 5 MMOL/L (ref 3–14)
BUN SERPL-MCNC: 29 MG/DL (ref 7–30)
CALCIUM SERPL-MCNC: 9.3 MG/DL (ref 8.5–10.1)
CHLORIDE SERPL-SCNC: 106 MMOL/L (ref 94–109)
CHOLEST SERPL-MCNC: 126 MG/DL
CO2 SERPL-SCNC: 28 MMOL/L (ref 20–32)
CREAT SERPL-MCNC: 1.23 MG/DL (ref 0.52–1.04)
ERYTHROCYTE [DISTWIDTH] IN BLOOD BY AUTOMATED COUNT: 19.4 % (ref 10–15)
GFR SERPL CREATININE-BSD FRML MDRD: 39 ML/MIN/{1.73_M2}
GLUCOSE SERPL-MCNC: 82 MG/DL (ref 70–99)
HCT VFR BLD AUTO: 30.6 % (ref 35–47)
HDLC SERPL-MCNC: 61 MG/DL
HGB BLD-MCNC: 10.4 G/DL (ref 11.7–15.7)
LDLC SERPL CALC-MCNC: 50 MG/DL
MCH RBC QN AUTO: 35.5 PG (ref 26.5–33)
MCHC RBC AUTO-ENTMCNC: 34 G/DL (ref 31.5–36.5)
MCV RBC AUTO: 104 FL (ref 78–100)
NONHDLC SERPL-MCNC: 65 MG/DL
PLATELET # BLD AUTO: 337 10E9/L (ref 150–450)
POTASSIUM SERPL-SCNC: 4 MMOL/L (ref 3.4–5.3)
RBC # BLD AUTO: 2.93 10E12/L (ref 3.8–5.2)
SODIUM SERPL-SCNC: 139 MMOL/L (ref 133–144)
TRIGL SERPL-MCNC: 73 MG/DL
WBC # BLD AUTO: 5.7 10E9/L (ref 4–11)

## 2021-04-12 PROCEDURE — 80048 BASIC METABOLIC PNL TOTAL CA: CPT | Performed by: INTERNAL MEDICINE

## 2021-04-12 PROCEDURE — 36415 COLL VENOUS BLD VENIPUNCTURE: CPT | Performed by: INTERNAL MEDICINE

## 2021-04-12 PROCEDURE — 85027 COMPLETE CBC AUTOMATED: CPT | Performed by: INTERNAL MEDICINE

## 2021-04-12 PROCEDURE — 99397 PER PM REEVAL EST PAT 65+ YR: CPT | Performed by: INTERNAL MEDICINE

## 2021-04-12 PROCEDURE — 80061 LIPID PANEL: CPT | Performed by: INTERNAL MEDICINE

## 2021-04-12 RX ORDER — BUPROPION HYDROCHLORIDE 300 MG/1
TABLET ORAL
Qty: 90 TABLET | Refills: 3 | Status: SHIPPED | OUTPATIENT
Start: 2021-04-12 | End: 2022-03-24

## 2021-04-12 RX ORDER — HYDROCHLOROTHIAZIDE 12.5 MG/1
1 CAPSULE ORAL DAILY
Qty: 90 CAPSULE | Refills: 3 | Status: SHIPPED | OUTPATIENT
Start: 2021-04-12 | End: 2022-04-27

## 2021-04-12 RX ORDER — ATORVASTATIN CALCIUM 10 MG/1
10 TABLET, FILM COATED ORAL DAILY
Qty: 90 TABLET | Refills: 3 | Status: SHIPPED | OUTPATIENT
Start: 2021-04-12 | End: 2022-04-27

## 2021-04-12 RX ORDER — VERAPAMIL HYDROCHLORIDE 120 MG/1
CAPSULE, EXTENDED RELEASE ORAL
Qty: 180 CAPSULE | Refills: 0 | Status: SHIPPED | OUTPATIENT
Start: 2021-04-12 | End: 2021-04-12

## 2021-04-12 RX ORDER — VERAPAMIL HYDROCHLORIDE 120 MG/1
TABLET, FILM COATED, EXTENDED RELEASE ORAL
Qty: 180 TABLET | Refills: 3 | Status: CANCELLED | OUTPATIENT
Start: 2021-04-12

## 2021-04-12 RX ORDER — SPIRONOLACTONE 25 MG/1
25 TABLET ORAL DAILY
Qty: 90 TABLET | Refills: 3 | Status: SHIPPED | OUTPATIENT
Start: 2021-04-12 | End: 2022-04-27

## 2021-04-12 RX ORDER — CAPTOPRIL 50 MG/1
TABLET ORAL
Qty: 405 TABLET | Refills: 3 | Status: SHIPPED | OUTPATIENT
Start: 2021-04-12 | End: 2022-04-27

## 2021-04-12 RX ORDER — TIMOLOL MALEATE 5 MG/ML
1 SOLUTION OPHTHALMIC EVERY MORNING
Status: CANCELLED | OUTPATIENT
Start: 2021-04-12

## 2021-04-12 ASSESSMENT — ACTIVITIES OF DAILY LIVING (ADL): CURRENT_FUNCTION: NO ASSISTANCE NEEDED

## 2021-04-12 NOTE — PROGRESS NOTES
"SUBJECTIVE:   Riddhi Aguilar is a 89 year old female who presents for Preventive Visit.      Patient has been advised of split billing requirements and indicates understanding: Yes   Are you in the first 12 months of your Medicare coverage?  No    Healthy Habits:     In general, how would you rate your overall health?  Good    Frequency of exercise:  2-3 days/week    Duration of exercise:  Less than 15 minutes    Do you usually eat at least 4 servings of fruit and vegetables a day, include whole grains    & fiber and avoid regularly eating high fat or \"junk\" foods?  Yes    Taking medications regularly:  Yes    Barriers to taking medications:  None    Medication side effects:  None    Ability to successfully perform activities of daily living:  No assistance needed    Home Safety:  No safety concerns identified    Hearing Impairment:  No hearing concerns    In the past 6 months, have you been bothered by leaking of urine?  No    In general, how would you rate your overall mental or emotional health?  Good      PHQ-2 Total Score: 0    Additional concerns today:  No    Do you feel safe in your environment? Yes    Have you ever done Advance Care Planning? (For example, a Health Directive, POLST, or a discussion with a medical provider or your loved ones about your wishes): Yes, patient states has an Advance Care Planning document and will bring a copy to the clinic.       Fall risk  Fallen 2 or more times in the past year?: No  Any fall with injury in the past year?: No    Cognitive Screening   1) Repeat 3 items (Leader, Season, Table)    2) Clock draw: ABNORMAL - wrong time   3) 3 item recall: Recalls 3 objects  Results: ABNORMAL clock, 1-2 items recalled: PROBABLE COGNITIVE IMPAIRMENT, **INFORM PROVIDER**    Mini-CogTM Copyright ZINA Thurston. Licensed by the author for use in [facility]; reprinted with permission (jose@.[facility]). All rights reserved.      Do you have sleep apnea, excessive snoring or daytime " drowsiness?: yes    Reviewed and updated as needed this visit by clinical staff  Tobacco  Allergies  Meds              Reviewed and updated as needed this visit by Provider                Social History     Tobacco Use     Smoking status: Never Smoker     Smokeless tobacco: Never Used   Substance Use Topics     Alcohol use: No     Alcohol/week: 0.0 standard drinks     If you drink alcohol do you typically have >3 drinks per day or >7 drinks per week? No    Alcohol Use 7/6/2017   Prescreen: >3 drinks/day or >7 drinks/week? The patient does not drink >3 drinks per day nor >7 drinks per week.           Current providers sharing in care for this patient include:   Patient Care Team:  Shun Prado MD as PCP - General (Internal Medicine)  Shun Prado MD as Assigned PCP    The following health maintenance items are reviewed in Epic and correct as of today:  Health Maintenance Due   Topic Date Due     ADVANCE CARE PLANNING  09/21/2017     Patient Active Problem List   Diagnosis     Essential hypertension, benign     Urgency of urination     Asymptomatic varicose veins     Allergic state     Insomnia     Family Hist of Malignant Neoplasm-- breast cancer     Cervical radiculopathy     Herpes Zoster- rt abdomen     Hyperlipidemia LDL goal <130     Diplopia     Borderline glaucoma with ocular hypertension, unspecified laterality     Cataract, left eye     Duodenal ulcer with hemorrhage     Advanced directives, counseling/discussion     Health Care Home     Major depressive disorder, recurrent episode, mild (H)     Major depressive disorder, single episode, mild (H)     Chronic kidney disease, stage 3     Personal history of malignant neoplasm of breast     Past Surgical History:   Procedure Laterality Date     BREAST SURGERY      lumpectomy     DILATION AND CURETTAGE, HYSTEROSCOPY DIAGNOSTIC, COMBINED  2/6/2014    Procedure: COMBINED DILATION AND CURETTAGE, HYSTEROSCOPY DIAGNOSTIC;  DILATION AND CURETTAGE,  HYSTEROSCOPY, POLYPECTOMY, INCISION AND DRAINAGE OF SEBACEOUS CYST ;  Surgeon: Serena Zamora MD;  Location:  [facility]     EXCISE LESION LOWER EXTREMITY Right 8/29/2017    Procedure: EXCISE LESION LOWER EXTREMITY;  WIDE EXCISIONAL BIOPSY OF RIGHT ANKLE BASAL CELL CARCINOMA;  Surgeon: Juan Luis Flores MD;  Location: [facility] OR     INCISION AND DRAINAGE PERINEAL, COMBINED  2/6/2014    Procedure: COMBINED INCISION AND DRAINAGE PERINEAL;;  Surgeon: Serena Zamora MD;  Location: [facility]     [facility] NONSPECIFIC PROCEDURE      Repair of buckled retina of rt eye     [facility] NONSPECIFIC PROCEDURE      Appy       Social History     Tobacco Use     Smoking status: Never Smoker     Smokeless tobacco: Never Used   Substance Use Topics     Alcohol use: No     Alcohol/week: 0.0 standard drinks     Family History   Problem Relation Age of Onset     Breast Cancer Sister      Breast Cancer Sister      Cancer Brother         bone cancer in arm         Current Outpatient Medications   Medication Sig Dispense Refill     acetaminophen (TYLENOL) 500 MG tablet Take 1 tablet (500 mg) by mouth every 6 hours as needed for mild pain 1 Bottle 11     atorvastatin (LIPITOR) 10 MG tablet Take 1 tablet (10 mg) by mouth daily 90 tablet 3     buPROPion (WELLBUTRIN XL) 300 MG 24 hr tablet TAKE 1 TABLET(300 MG) BY MOUTH EVERY MORNING 90 tablet 3     captopril (CAPOTEN) 50 MG tablet TAKE ONE AND ONE-HALF TABLET BY MOUTH THREE TIMES DAILY 405 tablet 3     hydrochlorothiazide (MICROZIDE) 12.5 MG capsule Take 1 capsule (12.5 mg) by mouth daily Patient is taking this PRN for ankle swelling only - states she takes in 2-3 times per week. 90 capsule 3     multivitamin, therapeutic with minerals (MULTI-VITAMIN) TABS tablet Take 1 tablet by mouth daily       spironolactone (ALDACTONE) 25 MG tablet Take 1 tablet (25 mg) by mouth daily 90 tablet 3     timolol (TIMOPTIC-XR) 0.5 % ophthalmic gel-forming Place 1 drop into both eyes every morning.       verapamil ER  "(CALAN-SR) 120 MG CR tablet TAKE ONE TABLET BY MOUTH EVERY 12 HOURS 180 tablet 3     No Known Allergies      Review of Systems  A 10 organ systems ROS is negative other than any pertinent positives or negatives previously stated.     OBJECTIVE:   /74 (BP Location: Right arm, Patient Position: Sitting, Cuff Size: Adult Regular)   Pulse 62   Temp 98  F (36.7  C) (Temporal)   Wt 85.5 kg (188 lb 6.4 oz)   SpO2 100%   Breastfeeding No   BMI 35.60 kg/m   Estimated body mass index is 35.6 kg/m  as calculated from the following:    Height as of 12/27/18: 1.549 m (5' 1\").    Weight as of this encounter: 85.5 kg (188 lb 6.4 oz).  Physical Exam  GENERAL APPEARANCE: healthy, alert and no distress  EYES: Eyes grossly normal to inspection, PERRL and conjunctivae and sclerae normal  HENT: ear canals and TM's normal  NECK: no adenopathy, no asymmetry, masses, or scars and thyroid normal to palpation  RESP: lungs clear to auscultation - no rales, rhonchi or wheezes  CV: regular rate and rhythm, normal S1 S2, no S3 or S4, no murmur, click or rub, no peripheral edema and peripheral pulses strong  ABDOMEN: Obese, soft, nontender, no hepatosplenomegaly, no masses and bowel sounds normal  MS: no musculoskeletal defects are noted and gait is age appropriate she uses a walker  SKIN: no suspicious lesions or rashes  NEURO: Normal strength and tone, sensory exam grossly normal, mentation intact and speech normal, she did not pass the memory screen, but I see no deficits, she probably could not hear the questions, no concerns from her daughter in law,  PSYCH: mentation appears normal and affect normal/bright    Labs pending     ASSESSMENT / PLAN:       ICD-10-CM    1. Routine general medical examination at a health care facility  Z00.00    2. Morbid obesity (H)  E66.01    3. Major depressive disorder, recurrent episode, mild (H)  F33.0 buPROPion (WELLBUTRIN XL) 300 MG 24 hr tablet     spironolactone (ALDACTONE) 25 MG tablet   4. " "Hyperlipidemia LDL goal <130  E78.5 atorvastatin (LIPITOR) 10 MG tablet     Lipid panel reflex to direct LDL Fasting   5. Essential hypertension, benign  I10 buPROPion (WELLBUTRIN XL) 300 MG 24 hr tablet     captopril (CAPOTEN) 50 MG tablet     hydrochlorothiazide (MICROZIDE) 12.5 MG capsule     spironolactone (ALDACTONE) 25 MG tablet     Basic metabolic panel     CBC with platelets     DISCONTINUED: verapamil ER (VERELAN) 120 MG 24 hr capsule   6. Stage 3b chronic kidney disease  N18.32      Overall she is doing quite well  She is working on her diet to improve weight  She is on statin therapy  Blood pressure is well controlled   Mood is well controlled    Patient has been advised of split billing requirements and indicates understanding: Yes  COUNSELING:  Reviewed preventive health counseling, as reflected in patient instructions  Special attention given to:       Regular exercise       Healthy diet/nutrition       Immunizations    Vaccines are up to date           Estimated body mass index is 35.6 kg/m  as calculated from the following:    Height as of 12/27/18: 1.549 m (5' 1\").    Weight as of this encounter: 85.5 kg (188 lb 6.4 oz).    Weight management plan: Discussed healthy diet and exercise guidelines    She reports that she has never smoked. She has never used smokeless tobacco.      Appropriate preventive services were discussed with this patient, including applicable screening as appropriate for cardiovascular disease, diabetes, osteopenia/osteoporosis, and glaucoma.  As appropriate for age/gender, discussed screening for colorectal cancer, prostate cancer, breast cancer, and cervical cancer. Checklist reviewing preventive services available has been given to the patient.    Reviewed patients plan of care and provided an AVS. The Basic Care Plan (routine screening as documented in Health Maintenance) for Riddhi meets the Care Plan requirement. This Care Plan has been established and reviewed with the " Patient.    Counseling Resources:  ATP IV Guidelines  Pooled Cohorts Equation Calculator  Breast Cancer Risk Calculator  Breast Cancer: Medication to Reduce Risk  FRAX Risk Assessment  ICSI Preventive Guidelines  Dietary Guidelines for Americans, 2010  USDA's MyPlate  ASA Prophylaxis  Lung CA Screening    Shun Prado MD  [facility]    Identified Health Risks:

## 2021-04-12 NOTE — LETTER
"April 13, 2021      Riddhi HIRAM Jeff  [address]   [place] [address]        Dear ,    The following letter pertains to your most recent diagnostic tests:     The basic metabolic panel demonstrates a mild decrease in kidney function as evidenced by the elevated creatinine level.  This may have to do with fasting for lab test and mild dehydration.  It also could reflect age-related \"wear and tear\" effects on the kidneys.  Certainly, it is not a dangerous finding.  However, I think it should be rechecked in a nonfasting state.  Also, the hemoglobin has decreased slightly from your baseline.  This is referred to as mild anemia.  I would like to recheck the hemoglobin as well to establish a trend.  If the hemoglobin remains low on the second check, we can add additional laboratory investigations to find out if there is a potentially treatable or reversible cause for this.       The remainder of the blood test results look healthy.     Resulted Orders   Lipid panel reflex to direct LDL Fasting   Result Value Ref Range    Cholesterol 126 <200 mg/dL    Triglycerides 73 <150 mg/dL      Comment:      Fasting specimen    HDL Cholesterol 61 >49 mg/dL    LDL Cholesterol Calculated 50 <100 mg/dL      Comment:      Desirable:       <100 mg/dl    Non HDL Cholesterol 65 <130 mg/dL   Basic metabolic panel   Result Value Ref Range    Sodium 139 133 - 144 mmol/L    Potassium 4.0 3.4 - 5.3 mmol/L    Chloride 106 94 - 109 mmol/L    Carbon Dioxide 28 20 - 32 mmol/L    Anion Gap 5 3 - 14 mmol/L    Glucose 82 70 - 99 mg/dL      Comment:      Fasting specimen    Urea Nitrogen 29 7 - 30 mg/dL    Creatinine 1.23 (H) 0.52 - 1.04 mg/dL    GFR Estimate 39 (L) >60 mL/min/[1.73_m2]      Comment:      Non  GFR Calc  Starting 12/18/2018, serum creatinine based estimated GFR (eGFR) will be   calculated using the Chronic Kidney Disease Epidemiology Collaboration   (CKD-EPI) equation.      GFR Estimate If " Black 45 (L) >60 mL/min/[1.73_m2]      Comment:       GFR Calc  Starting 12/18/2018, serum creatinine based estimated GFR (eGFR) will be   calculated using the Chronic Kidney Disease Epidemiology Collaboration   (CKD-EPI) equation.      Calcium 9.3 8.5 - 10.1 mg/dL   CBC with platelets   Result Value Ref Range    WBC 5.7 4.0 - 11.0 10e9/L    RBC Count 2.93 (L) 3.8 - 5.2 10e12/L    Hemoglobin 10.4 (L) 11.7 - 15.7 g/dL    Hematocrit 30.6 (L) 35.0 - 47.0 %     (H) 78 - 100 fl    MCH 35.5 (H) 26.5 - 33.0 pg    MCHC 34.0 31.5 - 36.5 g/dL    RDW 19.4 (H) 10.0 - 15.0 %    Platelet Count 337 150 - 450 10e9/L       If you have any questions or concerns, please call the clinic at the number listed above.       Sincerely,      Shun Prado MD    roshni

## 2021-04-13 NOTE — RESULT ENCOUNTER NOTE
"The following letter pertains to your most recent diagnostic tests:    The basic metabolic panel demonstrates a mild decrease in kidney function as evidenced by the elevated creatinine level.  This may have to do with fasting for lab test and mild dehydration.  It also could reflect age-related \"wear and tear\" effects on the kidneys.  Certainly, it is not a dangerous finding.  However, I think it should be rechecked in a nonfasting state.  Also, the hemoglobin has decreased slightly from your baseline.  This is referred to as mild anemia.  I would like to recheck the hemoglobin as well to establish a trend.  If the hemoglobin remains low on the second check, we can add additional laboratory investigations to find out if there is a potentially treatable or reversible cause for this.      The remainder of the blood test results look healthy.      Sincerely,    Dr. Prado"

## 2021-04-13 NOTE — RESULT ENCOUNTER NOTE
Please call patient to arrange for a laboratory appointment to recheck the metabolic panel and CBC as stated in my lab letter.

## 2021-04-26 DIAGNOSIS — N18.32 STAGE 3B CHRONIC KIDNEY DISEASE (H): ICD-10-CM

## 2021-04-26 LAB
ERYTHROCYTE [DISTWIDTH] IN BLOOD BY AUTOMATED COUNT: 20 % (ref 10–15)
HCT VFR BLD AUTO: 30.1 % (ref 35–47)
HGB BLD-MCNC: 9.9 G/DL (ref 11.7–15.7)
MCH RBC QN AUTO: 35 PG (ref 26.5–33)
MCHC RBC AUTO-ENTMCNC: 32.9 G/DL (ref 31.5–36.5)
MCV RBC AUTO: 106 FL (ref 78–100)
PLATELET # BLD AUTO: 318 10E9/L (ref 150–450)
RBC # BLD AUTO: 2.83 10E12/L (ref 3.8–5.2)
WBC # BLD AUTO: 5.2 10E9/L (ref 4–11)

## 2021-04-26 PROCEDURE — 80048 BASIC METABOLIC PNL TOTAL CA: CPT | Performed by: INTERNAL MEDICINE

## 2021-04-26 PROCEDURE — 36415 COLL VENOUS BLD VENIPUNCTURE: CPT | Performed by: INTERNAL MEDICINE

## 2021-04-26 PROCEDURE — 85027 COMPLETE CBC AUTOMATED: CPT | Performed by: INTERNAL MEDICINE

## 2021-04-26 NOTE — LETTER
April 29, 2021      Riddhi Aguilar  [address]   [place] [address]        Dear ,    The following letter pertains to your most recent diagnostic tests:     The kidney function test creatinine has improved back to your baseline.  This is good news.  However, the hemoglobin is actually a little lower than it was when last checked.  You have moderate anemia.  The reason for your low blood count is unclear to me.  I tried to have the laboratory add additional tests onto your existing blood sample to investigate the cause of your low blood count, but unfortunately, they were unable to do so (my apologies).  As such, I recommend that you return to the laboratory to have additional blood drawn to further investigate the cause for your low hemoglobin.  You can schedule a lab appointment for that purpose.     Resulted Orders   **Basic metabolic panel FUTURE anytime   Result Value Ref Range    Sodium 139 133 - 144 mmol/L    Potassium 4.1 3.4 - 5.3 mmol/L    Chloride 107 94 - 109 mmol/L    Carbon Dioxide 29 20 - 32 mmol/L    Anion Gap 3 3 - 14 mmol/L    Glucose 79 70 - 99 mg/dL      Comment:      Non Fasting    Urea Nitrogen 25 7 - 30 mg/dL    Creatinine 1.03 0.52 - 1.04 mg/dL    GFR Estimate 48 (L) >60 mL/min/[1.73_m2]      Comment:      Non  GFR Calc  Starting 12/18/2018, serum creatinine based estimated GFR (eGFR) will be   calculated using the Chronic Kidney Disease Epidemiology Collaboration   (CKD-EPI) equation.      GFR Estimate If Black 55 (L) >60 mL/min/[1.73_m2]      Comment:       GFR Calc  Starting 12/18/2018, serum creatinine based estimated GFR (eGFR) will be   calculated using the Chronic Kidney Disease Epidemiology Collaboration   (CKD-EPI) equation.      Calcium 9.1 8.5 - 10.1 mg/dL   **CBC with platelets FUTURE anytime   Result Value Ref Range    WBC 5.2 4.0 - 11.0 10e9/L    RBC Count 2.83 (L) 3.8 - 5.2 10e12/L    Hemoglobin 9.9 (L) 11.7 - 15.7  g/dL    Hematocrit 30.1 (L) 35.0 - 47.0 %     (H) 78 - 100 fl    MCH 35.0 (H) 26.5 - 33.0 pg    MCHC 32.9 31.5 - 36.5 g/dL    RDW 20.0 (H) 10.0 - 15.0 %    Platelet Count 318 150 - 450 10e9/L       If you have any questions or concerns, please call the clinic at the number listed above.       Sincerely,      Shun Prado MD      roshni

## 2021-04-27 LAB
ANION GAP SERPL CALCULATED.3IONS-SCNC: 3 MMOL/L (ref 3–14)
BUN SERPL-MCNC: 25 MG/DL (ref 7–30)
CALCIUM SERPL-MCNC: 9.1 MG/DL (ref 8.5–10.1)
CHLORIDE SERPL-SCNC: 107 MMOL/L (ref 94–109)
CO2 SERPL-SCNC: 29 MMOL/L (ref 20–32)
CREAT SERPL-MCNC: 1.03 MG/DL (ref 0.52–1.04)
GFR SERPL CREATININE-BSD FRML MDRD: 48 ML/MIN/{1.73_M2}
GLUCOSE SERPL-MCNC: 79 MG/DL (ref 70–99)
POTASSIUM SERPL-SCNC: 4.1 MMOL/L (ref 3.4–5.3)
SODIUM SERPL-SCNC: 139 MMOL/L (ref 133–144)

## 2021-04-27 NOTE — RESULT ENCOUNTER NOTE
Can you please ask the lab staff to add labs I ordered yesterday to work up anemia  I am not sure they check the  lab messages anymore  Dr. Prado

## 2021-04-29 NOTE — RESULT ENCOUNTER NOTE
The following letter pertains to your most recent diagnostic tests:    The kidney function test creatinine has improved back to your baseline.  This is good news.  However, the hemoglobin is actually a little lower than it was when last checked.  You have moderate anemia.  The reason for your low blood count is unclear to me.  I tried to have the laboratory add additional tests onto your existing blood sample to investigate the cause of your low blood count, but unfortunately, they were unable to do so (my apologies).  As such, I recommend that you return to the laboratory to have additional blood drawn to further investigate the cause for your low hemoglobin.  You can schedule a lab appointment for that purpose.       Sincerely,    Dr. Prado

## 2021-05-10 DIAGNOSIS — D64.9 ANEMIA, UNSPECIFIED TYPE: ICD-10-CM

## 2021-05-10 LAB
FOLATE SERPL-MCNC: 56.2 NG/ML
TSH SERPL DL<=0.005 MIU/L-ACNC: 3.45 MU/L (ref 0.4–4)
VIT B12 SERPL-MCNC: 937 PG/ML (ref 193–986)

## 2021-05-10 PROCEDURE — 36415 COLL VENOUS BLD VENIPUNCTURE: CPT | Performed by: INTERNAL MEDICINE

## 2021-05-10 PROCEDURE — 82728 ASSAY OF FERRITIN: CPT | Performed by: INTERNAL MEDICINE

## 2021-05-10 PROCEDURE — 84443 ASSAY THYROID STIM HORMONE: CPT | Performed by: INTERNAL MEDICINE

## 2021-05-10 PROCEDURE — 82607 VITAMIN B-12: CPT | Performed by: INTERNAL MEDICINE

## 2021-05-10 PROCEDURE — 82746 ASSAY OF FOLIC ACID SERUM: CPT | Performed by: INTERNAL MEDICINE

## 2021-05-10 PROCEDURE — 83540 ASSAY OF IRON: CPT | Performed by: INTERNAL MEDICINE

## 2021-05-10 PROCEDURE — 83010 ASSAY OF HAPTOGLOBIN QUANT: CPT | Performed by: INTERNAL MEDICINE

## 2021-05-10 PROCEDURE — 83550 IRON BINDING TEST: CPT | Performed by: INTERNAL MEDICINE

## 2021-05-10 NOTE — LETTER
May 11, 2021      Riddhi Aguilar  [address]   [place] [address]        Dear ,    We are writing to inform you of your test results.  The following letter pertains to your most recent diagnostic tests:     The investigation to explore for causes of low blood count did not reveal any abnormal findings.  Specifically, your anemia is not the results of lack of iron, vitamin B12, Folic acid, or thyroid hormone.       This is both good news and not as good news.       The good news is there is no dangerous cause for your anemia.  The not as good news is that there is not an easily fixable or reversible cause for your anemia.       Since the anemia remains, mild I would not recommend any specific additional intervention or testing related to the problem for now other than rechecking at a 3 month interval.  If the hemoglobin is lower at that interval, we may have you see a blood specialist to see if they can come up with any reason for the low blood count that I could not identify.         Follow up:  Schedule a lab appointment in August to recheck the hemoglobin.   Return sooner if new symptom or problems develop.         Resulted Orders   Haptoglobin   Result Value Ref Range    Haptoglobin 119 32 - 197 mg/dL   Folate   Result Value Ref Range    Folate 56.2 >5.4 ng/mL   TSH with free T4 reflex   Result Value Ref Range    TSH 3.45 0.40 - 4.00 mU/L   Vitamin B12   Result Value Ref Range    Vitamin B12 937 193 - 986 pg/mL   Ferritin   Result Value Ref Range    Ferritin 348 (H) 8 - 252 ng/mL   Iron and iron binding capacity   Result Value Ref Range    Iron 118 35 - 180 ug/dL    Iron Binding Cap 295 240 - 430 ug/dL    Iron Saturation Index 40 15 - 46 %       If you have any questions or concerns, please call the clinic at the number listed above.       Sincerely,      Shun Prado MD

## 2021-05-11 LAB
FERRITIN SERPL-MCNC: 348 NG/ML (ref 8–252)
HAPTOGLOB SERPL-MCNC: 119 MG/DL (ref 32–197)
IRON SATN MFR SERPL: 40 % (ref 15–46)
IRON SERPL-MCNC: 118 UG/DL (ref 35–180)
TIBC SERPL-MCNC: 295 UG/DL (ref 240–430)

## 2021-05-11 NOTE — RESULT ENCOUNTER NOTE
The following letter pertains to your most recent diagnostic tests:    The investigation to explore for causes of low blood count did not reveal any abnormal findings.  Specifically, your anemia is not the results of lack of iron, vitamin B12, Folic acid, or thyroid hormone.      This is both good news and not as good news.       The good news is there is no dangerous cause for your anemia.  The not as good news is that there is not an easily fixable or reversible cause for your anemia.      Since the anemia remains, mild I would not recommend any specific additional intervention or testing related to the problem for now other than rechecking at a 3 month interval.  If the hemoglobin is lower at that interval, we may have you see a blood specialist to see if they can come up with any reason for the low blood count that I could not identify.         Follow up:  Schedule a lab appointment in August to recheck the hemoglobin.   Return sooner if new symptom or problems develop.         Sincerely,    Dr. Prado

## 2021-07-10 DIAGNOSIS — I10 ESSENTIAL HYPERTENSION, BENIGN: ICD-10-CM

## 2021-07-12 RX ORDER — VERAPAMIL HYDROCHLORIDE 120 MG/1
CAPSULE, EXTENDED RELEASE ORAL
Qty: 180 CAPSULE | Refills: 0 | OUTPATIENT
Start: 2021-07-12

## 2021-07-12 NOTE — TELEPHONE ENCOUNTER
1 year Rx was sent 12/17/2020     verapamil ER (CALAN-SR) 120 MG CR tablet 180 tablet 3 12/17/2020  No   Sig: TAKE ONE TABLET BY MOUTH EVERY 12 HOURS   Sent to pharmacy as: Verapamil HCl  MG Oral Tablet Extended Release (CALAN-SR)   Class: E-Prescribe   Order: [id number]   E-Prescribing Status: Receipt confirmed by pharmacy (12/17/2020 11:03 AM CST)   Printout Tracking    External Result Report   Medication Administration Instructions    TAKE ONE TABLET BY MOUTH EVERY 12 HOURS   Pharmacy    [id number] - [place] [address] [address] Replaced by [facility] [address]

## 2021-08-23 ENCOUNTER — LAB (OUTPATIENT)
Dept: LAB | Facility: CLINIC | Age: 86
End: 2021-08-23
Payer: COMMERCIAL

## 2021-08-23 DIAGNOSIS — D64.9 ANEMIA, UNSPECIFIED TYPE: ICD-10-CM

## 2021-08-23 DIAGNOSIS — D64.9 ANEMIA, UNSPECIFIED TYPE: Primary | ICD-10-CM

## 2021-08-23 LAB — HGB BLD-MCNC: 10 G/DL (ref 11.7–15.7)

## 2021-08-23 PROCEDURE — 36415 COLL VENOUS BLD VENIPUNCTURE: CPT

## 2021-08-23 PROCEDURE — 85018 HEMOGLOBIN: CPT

## 2021-08-23 NOTE — RESULT ENCOUNTER NOTE
The following letter pertains to your most recent diagnostic tests:    Good news! The hemoglobin has improved slightly since last check.  Provided that you remain feeling well.  I recommend another check in 3-6 months.           Follow up:  lab appointment in 3-6 month for follow up hemoglobin level.        Sincerely,    Dr. Prado

## 2021-08-30 ENCOUNTER — TRANSFERRED RECORDS (OUTPATIENT)
Dept: HEALTH INFORMATION MANAGEMENT | Facility: CLINIC | Age: 86
End: 2021-08-30

## 2021-10-24 ENCOUNTER — HEALTH MAINTENANCE LETTER (OUTPATIENT)
Age: 86
End: 2021-10-24

## 2021-12-27 DIAGNOSIS — I10 ESSENTIAL HYPERTENSION, BENIGN: ICD-10-CM

## 2021-12-27 NOTE — TELEPHONE ENCOUNTER
Refill request for verapamil to [facility] in [place] on [address].    Nuria Pope RN  [facility] Nurse Advisor

## 2021-12-30 RX ORDER — VERAPAMIL HYDROCHLORIDE 120 MG/1
TABLET, FILM COATED, EXTENDED RELEASE ORAL
Qty: 180 TABLET | Refills: 0 | Status: SHIPPED | OUTPATIENT
Start: 2021-12-30 | End: 2022-04-27

## 2021-12-30 NOTE — TELEPHONE ENCOUNTER
Routing refill request to provider for review/approval because:  Labs not current:  Lab Results   Component Value Date    ALT 23 12/27/2018     Last office vist: 4/12/2021    Pended 90 day supply & 0 refills. Please Review.    Next office visit: none- sent patient mychart reminder to notify that due for appt on 4/12/21      Asha Faust RN  [facility]

## 2022-03-19 DIAGNOSIS — F33.0 MAJOR DEPRESSIVE DISORDER, RECURRENT EPISODE, MILD (H): ICD-10-CM

## 2022-03-19 DIAGNOSIS — I10 ESSENTIAL HYPERTENSION, BENIGN: ICD-10-CM

## 2022-03-19 NOTE — TELEPHONE ENCOUNTER
Bupropion  MG tablets    Summary: TAKE 1 TABLET(300 MG) BY MOUTH EVERY MORNING, Disp-90 tablet, R-3, E-Prescribe   Start: 4/12/2021  Ord/Sold: 4/12/2021    LOV 4/12/21

## 2022-03-23 ENCOUNTER — TRANSFERRED RECORDS (OUTPATIENT)
Dept: HEALTH INFORMATION MANAGEMENT | Facility: CLINIC | Age: 87
End: 2022-03-23
Payer: COMMERCIAL

## 2022-03-24 ENCOUNTER — MYC MEDICAL ADVICE (OUTPATIENT)
Dept: FAMILY MEDICINE | Facility: CLINIC | Age: 87
End: 2022-03-24
Payer: COMMERCIAL

## 2022-03-24 RX ORDER — BUPROPION HYDROCHLORIDE 300 MG/1
TABLET ORAL
Qty: 90 TABLET | Refills: 0 | Status: SHIPPED | OUTPATIENT
Start: 2022-03-24 | End: 2022-04-27

## 2022-03-24 ASSESSMENT — PATIENT HEALTH QUESTIONNAIRE - PHQ9
SUM OF ALL RESPONSES TO PHQ QUESTIONS 1-9: 12
SUM OF ALL RESPONSES TO PHQ QUESTIONS 1-9: 12
10. IF YOU CHECKED OFF ANY PROBLEMS, HOW DIFFICULT HAVE THESE PROBLEMS MADE IT FOR YOU TO DO YOUR WORK, TAKE CARE OF THINGS AT HOME, OR GET ALONG WITH OTHER PEOPLE: SOMEWHAT DIFFICULT

## 2022-03-24 NOTE — TELEPHONE ENCOUNTER
PHQ9 survey sent via my chart.     .Medication is being filled for 1 time refill only due to:  Pt needs PHQ9 for more refills.

## 2022-03-25 ASSESSMENT — PATIENT HEALTH QUESTIONNAIRE - PHQ9: SUM OF ALL RESPONSES TO PHQ QUESTIONS 1-9: 12

## 2022-04-27 ENCOUNTER — OFFICE VISIT (OUTPATIENT)
Dept: FAMILY MEDICINE | Facility: CLINIC | Age: 87
End: 2022-04-27
Payer: COMMERCIAL

## 2022-04-27 VITALS
BODY MASS INDEX: 36.25 KG/M2 | HEIGHT: 61 IN | WEIGHT: 192 LBS | HEART RATE: 66 BPM | TEMPERATURE: 98 F | OXYGEN SATURATION: 100 % | RESPIRATION RATE: 17 BRPM | SYSTOLIC BLOOD PRESSURE: 143 MMHG | DIASTOLIC BLOOD PRESSURE: 75 MMHG

## 2022-04-27 DIAGNOSIS — N18.32 STAGE 3B CHRONIC KIDNEY DISEASE (H): ICD-10-CM

## 2022-04-27 DIAGNOSIS — F33.0 MAJOR DEPRESSIVE DISORDER, RECURRENT EPISODE, MILD (H): Primary | ICD-10-CM

## 2022-04-27 DIAGNOSIS — Z23 HIGH PRIORITY FOR 2019-NCOV VACCINE: ICD-10-CM

## 2022-04-27 DIAGNOSIS — G89.29 CHRONIC LEFT SHOULDER PAIN: ICD-10-CM

## 2022-04-27 DIAGNOSIS — E78.5 HYPERLIPIDEMIA LDL GOAL <130: ICD-10-CM

## 2022-04-27 DIAGNOSIS — M25.512 CHRONIC LEFT SHOULDER PAIN: ICD-10-CM

## 2022-04-27 DIAGNOSIS — I10 ESSENTIAL HYPERTENSION, BENIGN: ICD-10-CM

## 2022-04-27 DIAGNOSIS — E66.01 MORBID OBESITY (H): ICD-10-CM

## 2022-04-27 DIAGNOSIS — D64.9 ANEMIA, UNSPECIFIED TYPE: ICD-10-CM

## 2022-04-27 LAB
ERYTHROCYTE [DISTWIDTH] IN BLOOD BY AUTOMATED COUNT: 20.4 % (ref 10–15)
HCT VFR BLD AUTO: 32.8 % (ref 35–47)
HGB BLD-MCNC: 10.8 G/DL (ref 11.7–15.7)
MCH RBC QN AUTO: 34.8 PG (ref 26.5–33)
MCHC RBC AUTO-ENTMCNC: 32.9 G/DL (ref 31.5–36.5)
MCV RBC AUTO: 106 FL (ref 78–100)
PLATELET # BLD AUTO: 337 10E3/UL (ref 150–450)
RBC # BLD AUTO: 3.1 10E6/UL (ref 3.8–5.2)
WBC # BLD AUTO: 6.1 10E3/UL (ref 4–11)

## 2022-04-27 PROCEDURE — 91306 COVID-19,PF,MODERNA (18+ YRS BOOSTER .25ML): CPT | Performed by: INTERNAL MEDICINE

## 2022-04-27 PROCEDURE — 80061 LIPID PANEL: CPT | Performed by: INTERNAL MEDICINE

## 2022-04-27 PROCEDURE — 0064A COVID-19,PF,MODERNA (18+ YRS BOOSTER .25ML): CPT | Performed by: INTERNAL MEDICINE

## 2022-04-27 PROCEDURE — 36415 COLL VENOUS BLD VENIPUNCTURE: CPT | Performed by: INTERNAL MEDICINE

## 2022-04-27 PROCEDURE — 99397 PER PM REEVAL EST PAT 65+ YR: CPT | Mod: 25 | Performed by: INTERNAL MEDICINE

## 2022-04-27 PROCEDURE — 80048 BASIC METABOLIC PNL TOTAL CA: CPT | Performed by: INTERNAL MEDICINE

## 2022-04-27 PROCEDURE — 85027 COMPLETE CBC AUTOMATED: CPT | Performed by: INTERNAL MEDICINE

## 2022-04-27 PROCEDURE — 99214 OFFICE O/P EST MOD 30 MIN: CPT | Mod: 25 | Performed by: INTERNAL MEDICINE

## 2022-04-27 RX ORDER — BUPROPION HYDROCHLORIDE 300 MG/1
TABLET ORAL
Qty: 90 TABLET | Refills: 3 | Status: SHIPPED | OUTPATIENT
Start: 2022-04-27 | End: 2023-05-09

## 2022-04-27 RX ORDER — VERAPAMIL HYDROCHLORIDE 120 MG/1
TABLET, FILM COATED, EXTENDED RELEASE ORAL
Qty: 180 TABLET | Refills: 3 | Status: SHIPPED | OUTPATIENT
Start: 2022-04-27 | End: 2023-04-12

## 2022-04-27 RX ORDER — ATORVASTATIN CALCIUM 10 MG/1
10 TABLET, FILM COATED ORAL DAILY
Qty: 90 TABLET | Refills: 3 | Status: SHIPPED | OUTPATIENT
Start: 2022-04-27 | End: 2023-04-26

## 2022-04-27 RX ORDER — SPIRONOLACTONE 25 MG/1
25 TABLET ORAL DAILY
Qty: 90 TABLET | Refills: 3 | Status: SHIPPED | OUTPATIENT
Start: 2022-04-27 | End: 2023-04-12

## 2022-04-27 RX ORDER — CAPTOPRIL 50 MG/1
TABLET ORAL
Qty: 405 TABLET | Refills: 3 | Status: SHIPPED | OUTPATIENT
Start: 2022-04-27 | End: 2023-05-09

## 2022-04-27 RX ORDER — HYDROCHLOROTHIAZIDE 12.5 MG/1
1 CAPSULE ORAL DAILY
Qty: 90 CAPSULE | Refills: 3 | Status: ON HOLD | OUTPATIENT
Start: 2022-04-27 | End: 2024-01-24

## 2022-04-27 ASSESSMENT — ENCOUNTER SYMPTOMS
FREQUENCY: 1
CHILLS: 0
SHORTNESS OF BREATH: 0
ARTHRALGIAS: 1
HEMATOCHEZIA: 0
PARESTHESIAS: 0
ABDOMINAL PAIN: 0
PALPITATIONS: 0
DYSURIA: 0
MYALGIAS: 1
COUGH: 0
NAUSEA: 0
FEVER: 0
BREAST MASS: 0
DIARRHEA: 0
SORE THROAT: 0
HEARTBURN: 0
NERVOUS/ANXIOUS: 0
WEAKNESS: 0
DIZZINESS: 0
HEMATURIA: 0
CONSTIPATION: 1
HEADACHES: 0

## 2022-04-27 ASSESSMENT — ACTIVITIES OF DAILY LIVING (ADL)
CURRENT_FUNCTION: SHOPPING REQUIRES ASSISTANCE
CURRENT_FUNCTION: HOUSEWORK REQUIRES ASSISTANCE

## 2022-04-27 ASSESSMENT — PAIN SCALES - GENERAL: PAINLEVEL: EXTREME PAIN (8)

## 2022-04-27 NOTE — PROGRESS NOTES
"SUBJECTIVE:   Ridhdi Aguilar is a 89 year old female who presents for Preventive Visit.    Accompanied by daughter-in-law Mary Ann.    Patient has been advised of split billing requirements and indicates understanding: Yes  Are you in the first 12 months of your Medicare coverage?  No      Healthy Habits:     In general, how would you rate your overall health?  Good    Frequency of exercise:  1 day/week    Duration of exercise:  Less than 15 minutes    Do you usually eat at least 4 servings of fruit and vegetables a day, include whole grains    & fiber and avoid regularly eating high fat or \"junk\" foods?  Yes    Taking medications regularly:  Yes    Medication side effects:  None    Ability to successfully perform activities of daily living:  Shopping requires assistance and housework requires assistance    Home Safety:  No safety concerns identified    Hearing Impairment:  No hearing concerns    In the past 6 months, have you been bothered by leaking of urine? Yes    In general, how would you rate your overall mental or emotional health?  Good      PHQ-2 Total Score: 2    Additional concerns today:  Yes    Several weeks of shoulder pain  Fall 3 years ago  Difficulty lifting shoulder above head    Do you feel safe in your environment? Yes    Have you ever done Advance Care Planning? (For example, a Health Directive, POLST, or a discussion with a medical provider or your loved ones about your wishes): No, advance care planning information given to patient to review.  Patient plans to discuss their wishes with loved ones or provider.         Fall risk  Fallen 2 or more times in the past year?: No  Any fall with injury in the past year?: No    Cognitive Screening   1) Repeat 3 items (Leader, Season, Table)    2) Clock draw: NORMAL  3) 3 item recall: Recalls 3 objects  Results: 3 items recalled: COGNITIVE IMPAIRMENT LESS LIKELY    Mini-CogTM Copyright S Karena. Licensed by the author for use in [facility]; " reprinted with permission (soxu@.[facility]). All rights reserved.      Do you have sleep apnea, excessive snoring or daytime drowsiness?: no    Reviewed and updated as needed this visit by clinical staff   Tobacco  Allergies                 Reviewed and updated as needed this visit by Provider                   Social History     Tobacco Use     Smoking status: Never Smoker     Smokeless tobacco: Never Used   Substance Use Topics     Alcohol use: No     Alcohol/week: 0.0 standard drinks     If you drink alcohol do you typically have >3 drinks per day or >7 drinks per week? No    Alcohol Use 4/27/2022   Prescreen: >3 drinks/day or >7 drinks/week? No   Prescreen: >3 drinks/day or >7 drinks/week? -     Current providers sharing in care for this patient include:   Patient Care Team:  Shun Prado MD as PCP - General (Internal Medicine)  Shun Prado MD as Assigned PCP    The following health maintenance items are reviewed in Epic and correct as of today:  Health Maintenance Due   Topic Date Due     COVID-19 Vaccine (3 - Booster for Moderna series) 07/15/2021     LIPID  04/12/2022     FALL RISK ASSESSMENT  04/12/2022     BMP  04/26/2022     Patient Active Problem List   Diagnosis     Essential hypertension, benign     Urgency of urination     Asymptomatic varicose veins     Allergic state     Insomnia     Family Hist of Malignant Neoplasm-- breast cancer     Cervical radiculopathy     Herpes Zoster- rt abdomen     Hyperlipidemia LDL goal <130     Diplopia     Borderline glaucoma with ocular hypertension, unspecified laterality     Cataract, left eye     Duodenal ulcer with hemorrhage     Advanced directives, counseling/discussion     Health Care Home     Major depressive disorder, recurrent episode, mild (H)     Major depressive disorder, single episode, mild (H)     Chronic kidney disease, stage 3 (H)     Personal history of malignant neoplasm of breast     Morbid obesity (H)     Past Surgical History:   Procedure  Laterality Date     BREAST SURGERY      lumpectomy     DILATION AND CURETTAGE, HYSTEROSCOPY DIAGNOSTIC, COMBINED  2/6/2014    Procedure: COMBINED DILATION AND CURETTAGE, HYSTEROSCOPY DIAGNOSTIC;  DILATION AND CURETTAGE, HYSTEROSCOPY, POLYPECTOMY, INCISION AND DRAINAGE OF SEBACEOUS CYST ;  Surgeon: Serena Zamora MD;  Location:  [facility]     EXCISE LESION LOWER EXTREMITY Right 8/29/2017    Procedure: EXCISE LESION LOWER EXTREMITY;  WIDE EXCISIONAL BIOPSY OF RIGHT ANKLE BASAL CELL CARCINOMA;  Surgeon: Juan Luis Flores MD;  Location:  OR     INCISION AND DRAINAGE PERINEAL, COMBINED  2/6/2014    Procedure: COMBINED INCISION AND DRAINAGE PERINEAL;;  Surgeon: Serena Zamora MD;  Location: [facility]     ZZC NONSPECIFIC PROCEDURE      Repair of buckled retina of rt eye     Z NONSPECIFIC PROCEDURE      Appy       Social History     Tobacco Use     Smoking status: Never Smoker     Smokeless tobacco: Never Used   Substance Use Topics     Alcohol use: No     Alcohol/week: 0.0 standard drinks     Family History   Problem Relation Age of Onset     Breast Cancer Sister      Breast Cancer Sister      Cancer Brother         bone cancer in arm         Current Outpatient Medications   Medication Sig Dispense Refill     acetaminophen (TYLENOL) 500 MG tablet Take 1 tablet (500 mg) by mouth every 6 hours as needed for mild pain 1 Bottle 11     atorvastatin (LIPITOR) 10 MG tablet Take 1 tablet (10 mg) by mouth daily 90 tablet 3     buPROPion (WELLBUTRIN XL) 300 MG 24 hr tablet TAKE 1 TABLET(300 MG) BY MOUTH EVERY MORNING 90 tablet 3     captopril (CAPOTEN) 50 MG tablet TAKE ONE AND ONE-HALF TABLET BY MOUTH THREE TIMES DAILY 405 tablet 3     hydrochlorothiazide (MICROZIDE) 12.5 MG capsule Take 1 capsule (12.5 mg) by mouth daily Patient is taking this PRN for ankle swelling only - states she takes in 2-3 times per week. 90 capsule 3     multivitamin w/minerals (THERA-VIT-M) tablet Take 1 tablet by mouth daily       spironolactone  "(ALDACTONE) 25 MG tablet Take 1 tablet (25 mg) by mouth daily 90 tablet 3     timolol (TIMOPTIC-XR) 0.5 % ophthalmic gel-forming Place 1 drop into both eyes every morning.       verapamil ER (CALAN-SR) 120 MG CR tablet TAKE ONE TABLET BY MOUTH EVERY 12 HOURS 180 tablet 3     No Known Allergies      Review of Systems   Constitutional: Negative for chills and fever.   HENT: Negative for congestion, ear pain, hearing loss and sore throat.    Eyes: Negative for visual disturbance.   Respiratory: Negative for cough and shortness of breath.    Cardiovascular: Negative for chest pain, palpitations and peripheral edema.   Gastrointestinal: Positive for constipation. Negative for abdominal pain, diarrhea, heartburn, hematochezia and nausea.   Breasts:  Negative for tenderness, breast mass and discharge.   Genitourinary: Positive for frequency and urgency. Negative for dysuria, hematuria, pelvic pain, vaginal bleeding and vaginal discharge.   Musculoskeletal: Positive for arthralgias and myalgias.   Skin: Negative for rash.   Neurological: Negative for dizziness, weakness, headaches and paresthesias.   Psychiatric/Behavioral: Negative for mood changes. The patient is not nervous/anxious.          OBJECTIVE:   BP (!) 143/75 (BP Location: Right arm, Patient Position: Left side, Cuff Size: Adult Regular)   Pulse 66   Temp 98  F (36.7  C) (Temporal)   Resp 17   Ht 1.549 m (5' 1\")   Wt 87.1 kg (192 lb)   SpO2 100%   BMI 36.28 kg/m   Estimated body mass index is 36.28 kg/m  as calculated from the following:    Height as of this encounter: 1.549 m (5' 1\").    Weight as of this encounter: 87.1 kg (192 lb).  Physical Exam  GENERAL: healthy, alert and no distress  EYES: Eyes grossly normal to inspection, PERRL and conjunctivae and sclerae normal  HENT: ear canals and TM's normal, nose and mouth without ulcers or lesions  NECK: no adenopathy, no asymmetry, masses, or scars and thyroid normal to palpation  RESP: lungs clear to " auscultation - no rales, rhonchi or wheezes  CV: regular rate and rhythm, normal S1 S2, no S3 or S4, no murmur, click or rub, no peripheral edema and peripheral pulses strong  ABDOMEN: Obese, soft, nontender, no hepatosplenomegaly, no masses and bowel sounds normal  MS: Rotator cuff strength in left arm is intact, impingement sign noted, no bony tenderness  SKIN: no suspicious lesions or rashes  NEURO: Normal strength and tone, mentation intact and speech normal  PSYCH: mentation appears normal, affect normal/bright        ASSESSMENT / PLAN:   (F33.0) Major depressive disorder, recurrent episode, mild (H)  (primary encounter diagnosis)  Comment: stable   Plan: buPROPion (WELLBUTRIN XL) 300 MG 24 hr tablet,         spironolactone (ALDACTONE) 25 MG tablet,         OFFICE/OUTPT VISIT,EST,LEVL IV            (N18.32) Stage 3b chronic kidney disease (H)  Comment: recheck  Plan: OFFICE/OUTPT VISIT,EST,LEVL IV            (E66.01) Morbid obesity (H)  Comment:   Plan: OFFICE/OUTPT VISIT,EST,LEVL IV            (I10) Essential hypertension, benign  Comment: ok control for her age ; check labs   Plan: buPROPion (WELLBUTRIN XL) 300 MG 24 hr tablet,         verapamil ER (CALAN-SR) 120 MG CR tablet,         captopril (CAPOTEN) 50 MG tablet,         hydrochlorothiazide (MICROZIDE) 12.5 MG         capsule, spironolactone (ALDACTONE) 25 MG         tablet, CBC with platelets, Basic metabolic         panel, OFFICE/OUTPT VISIT,EST,LEVL IV            (E78.5) Hyperlipidemia LDL goal <130  Comment: recheck   Plan: atorvastatin (LIPITOR) 10 MG tablet,         OFFICE/OUTPT VISIT,EST,LEVL IV            (M25.512,  G89.29) Chronic left shoulder pain  Comment: check x-ray ; PT vs. Injection pending result; if going to PT could work on knee too  Plan: XR Shoulder Left 2 Views, OFFICE/OUTPT         VISIT,EST,LEVL IV, OFFICE/OUTPT VISIT,EST,LEVL         IV            (Z23) High priority for 2019-nCoV vaccine  Comment:   Plan: COVID-19,PF,MODERNA  "(18+ Yrs BOOSTER .25mL)            (D64.9) Anemia, unspecified type  Comment: recheck  Plan:     Patient has been advised of split billing requirements and indicates understanding: Yes    COUNSELING:  Reviewed preventive health counseling, as reflected in patient instructions  Special attention given to:       Regular exercise       Healthy diet/nutrition       Immunizations    4th COVID shot today           Estimated body mass index is 36.28 kg/m  as calculated from the following:    Height as of this encounter: 1.549 m (5' 1\").    Weight as of this encounter: 87.1 kg (192 lb).    Weight management plan: Discussed healthy diet and exercise guidelines    She reports that she has never smoked. She has never used smokeless tobacco.      Appropriate preventive services were discussed with this patient, including applicable screening as appropriate for cardiovascular disease, diabetes, osteopenia/osteoporosis, and glaucoma.  As appropriate for age/gender, discussed screening for colorectal cancer, prostate cancer, breast cancer, and cervical cancer. Checklist reviewing preventive services available has been given to the patient.    Reviewed patients plan of care and provided an AVS. The Basic Care Plan (routine screening as documented in Health Maintenance) for Riddhi meets the Care Plan requirement. This Care Plan has been established and reviewed with the Patient.    Counseling Resources:  ATP IV Guidelines  Pooled Cohorts Equation Calculator  Breast Cancer Risk Calculator  Breast Cancer: Medication to Reduce Risk  FRAX Risk Assessment  ICSI Preventive Guidelines  Dietary Guidelines for Americans, 2010  USDA's MyPlate  ASA Prophylaxis  Lung CA Screening    Shun Prado MD  [facility]    Identified Health Risks:  "

## 2022-04-27 NOTE — LETTER
April 29, 2022      Riddhi HIRAM Jeff  [address]   [place] [address]        Dear ,    We are writing to inform you of your test results.    The following letter pertains to your most recent diagnostic tests:     -Your cholesterol panel looks healthy.     -The metabolic panel is stable.       -Your complete blood counts including your hemoglobin returned stable for you.             Bottom line:  Labs look stable for you       Follow up:  Schedule an appointment for a physical examination with fasting blood tests in one year's time, or return sooner if new questions, symptoms or problems arise.       Resulted Orders   Lipid panel reflex to direct LDL Fasting   Result Value Ref Range    Cholesterol 132 <200 mg/dL    Triglycerides 76 <150 mg/dL    Direct Measure HDL 66 >=50 mg/dL    LDL Cholesterol Calculated 51 <=100 mg/dL    Non HDL Cholesterol 66 <130 mg/dL    Patient Fasting > 8hrs? Yes     Narrative    Cholesterol  Desirable:  <200 mg/dL    Triglycerides  Normal:  Less than 150 mg/dL  Borderline High:  150-199 mg/dL  High:  200-499 mg/dL  Very High:  Greater than or equal to 500 mg/dL    Direct Measure HDL  Female:  Greater than or equal to 50 mg/dL   Male:  Greater than or equal to 40 mg/dL    LDL Cholesterol  Desirable:  <100mg/dL  Above Desirable:  100-129 mg/dL   Borderline High:  130-159 mg/dL   High:  160-189 mg/dL   Very High:  >= 190 mg/dL    Non HDL Cholesterol  Desirable:  130 mg/dL  Above Desirable:  130-159 mg/dL  Borderline High:  160-189 mg/dL  High:  190-219 mg/dL  Very High:  Greater than or equal to 220 mg/dL   CBC with platelets   Result Value Ref Range    WBC Count 6.1 4.0 - 11.0 10e3/uL    RBC Count 3.10 (L) 3.80 - 5.20 10e6/uL    Hemoglobin 10.8 (L) 11.7 - 15.7 g/dL    Hematocrit 32.8 (L) 35.0 - 47.0 %     (H) 78 - 100 fL    MCH 34.8 (H) 26.5 - 33.0 pg    MCHC 32.9 31.5 - 36.5 g/dL    RDW 20.4 (H) 10.0 - 15.0 %    Platelet Count 337 150 - 450 10e3/uL   Basic  metabolic panel   Result Value Ref Range    Sodium 139 133 - 144 mmol/L    Potassium 4.6 3.4 - 5.3 mmol/L    Chloride 106 94 - 109 mmol/L    Carbon Dioxide (CO2) 26 20 - 32 mmol/L      Comment:      This result was previously suppressed from the chart.    Anion Gap 7 3 - 14 mmol/L      Comment:      This result was previously suppressed from the chart.    Urea Nitrogen 28 7 - 30 mg/dL      Comment:      This result was previously suppressed from the chart.    Creatinine 1.10 (H) 0.52 - 1.04 mg/dL      Comment:      This result was previously suppressed from the chart.    Calcium 9.5 8.5 - 10.1 mg/dL      Comment:      This result was previously suppressed from the chart.    Glucose 84 70 - 99 mg/dL      Comment:      This result was previously suppressed from the chart.    GFR Estimate 47 (L) >60 mL/min/1.73m2      Comment:      Effective December 21, 2021 eGFRcr in adults is calculated using the 2021 CKD-EPI creatinine equation which includes age and gender (Angela et al., NEJM, DOI: 10.1056/JPSHdx0493916)   This result was previously suppressed from the chart.       If you have any questions or concerns, please call the clinic at the number listed above.       Sincerely,      Shun Prado MD

## 2022-04-28 LAB
ANION GAP SERPL CALCULATED.3IONS-SCNC: 7 MMOL/L (ref 3–14)
BUN SERPL-MCNC: 28 MG/DL (ref 7–30)
CALCIUM SERPL-MCNC: 9.5 MG/DL (ref 8.5–10.1)
CHLORIDE BLD-SCNC: 106 MMOL/L (ref 94–109)
CHOLEST SERPL-MCNC: 132 MG/DL
CO2 SERPL-SCNC: 26 MMOL/L (ref 20–32)
CREAT SERPL-MCNC: 1.1 MG/DL (ref 0.52–1.04)
FASTING STATUS PATIENT QL REPORTED: YES
GFR SERPL CREATININE-BSD FRML MDRD: 47 ML/MIN/1.73M2
GLUCOSE BLD-MCNC: 84 MG/DL (ref 70–99)
HDLC SERPL-MCNC: 66 MG/DL
LDLC SERPL CALC-MCNC: 51 MG/DL
NONHDLC SERPL-MCNC: 66 MG/DL
POTASSIUM BLD-SCNC: 4.6 MMOL/L (ref 3.4–5.3)
SODIUM SERPL-SCNC: 139 MMOL/L (ref 133–144)
TRIGL SERPL-MCNC: 76 MG/DL

## 2022-04-29 NOTE — RESULT ENCOUNTER NOTE
The following letter pertains to your most recent diagnostic tests:    -Your cholesterol panel looks healthy.     -The metabolic panel is stable.      -Your complete blood counts including your hemoglobin returned stable for you.           Bottom line:  Labs look stable for you      Follow up:  Schedule an appointment for a physical examination with fasting blood tests in one year's time, or return sooner if new questions, symptoms or problems arise.        Sincerely,    Dr. Prado

## 2022-05-06 ENCOUNTER — THERAPY VISIT (OUTPATIENT)
Dept: PHYSICAL THERAPY | Facility: CLINIC | Age: 87
End: 2022-05-06
Attending: INTERNAL MEDICINE
Payer: COMMERCIAL

## 2022-05-06 DIAGNOSIS — G89.29 CHRONIC PAIN OF LEFT KNEE: ICD-10-CM

## 2022-05-06 DIAGNOSIS — G89.29 CHRONIC LEFT SHOULDER PAIN: ICD-10-CM

## 2022-05-06 DIAGNOSIS — M25.562 CHRONIC PAIN OF LEFT KNEE: ICD-10-CM

## 2022-05-06 DIAGNOSIS — M25.512 CHRONIC LEFT SHOULDER PAIN: ICD-10-CM

## 2022-05-06 PROCEDURE — 97110 THERAPEUTIC EXERCISES: CPT | Mod: GP | Performed by: PHYSICAL THERAPIST

## 2022-05-06 PROCEDURE — 97161 PT EVAL LOW COMPLEX 20 MIN: CPT | Mod: GP | Performed by: PHYSICAL THERAPIST

## 2022-05-06 NOTE — PROGRESS NOTES
Physical Therapy Initial Evaluation  Subjective:  History of left shoulder pain for 3 years following a fall. Pt has noted increased pain in the left shoulder since 12-21. Pt has had left knee pain for greater than 3 years secondary to a fall. Pt referred by MD for physical therapy on 4-27-22    The history is provided by the patient. No  was used.   Therapist Generated HPI Evaluation         Type of problem:  Left shoulder.    This is a chronic condition.  Condition occurred with:  A fall.  Where condition occurred: at home.  Patient reports pain:  Anterior and posterior.  Pain is described as aching and is intermittent.  Pain radiates to:  Upper arm. Pain is the same all the time.  Since onset symptoms are gradually improving.  Associated symptoms:  Loss of motion/stiffness and loss of strength. Symptoms are exacerbated by carrying, lifting, lying on extremity, using arm at shoulder level, using arm behind back and using arm overhead (dressing)  and relieved by heat, analgesics and rest.  Special tests included:  X-ray (see report).  Past treatment: none.   Work activity restrictions: retired       Therapist Generated HPI Evaluation         Type of problem:  Left knee.    This is a chronic condition.  Condition occurred with:  A fall/slip.  Where condition occurred: at home.  Patient reports pain:  Anterior.  Pain is described as aching and is intermittent.  Pain is the same all the time.    Associated symptoms:  Loss of motion/stiffness and loss of strength. Symptoms are exacerbated by walking, transfers, standing, descending stairs and ascending stairs  and relieved by rest.    Past treatment: none.   Work activity restrictions: retired   Barriers include:  None as reported by patient.                        Objective:    Gait:  Pt uses walker secondary to spinal stenosis  Assistive Devices:  Walker      Flexibility/Screens:       Lower Extremity:  Decreased left lower extremity  flexibility:Hamstrings                           Shoulder Evaluation:  ROM:  AROM:  : IR/EXT left to belt line.  Flexion:  Left:  75      Extension: Left: 40  Abduction:  Left: 70         External Rotation:  Left:  20                    PROM:    Flexion:  Left:  105        Extension:  Left:  50      Abduction:  Left:  80          External Rotation:  Left:  30                        Strength:    Flexion: Left:4-/5   Pain:      Extension:  Left: 4/5    Pain:      Abduction:  Left: 4-/5  Pain:      Adduction:  Left: 5/5    Pain:      Internal Rotation:  Left:4/5     Pain:      External Rotation:   Left:4-/5     Pain:               Special Tests:    Left shoulder positive for the following special tests:  Impingement    Palpation:    Left shoulder tenderness present at:  Acrimioclavicular and Supraspinatus                                 Knee Evaluation:  ROM:    AROM    Hyperextension: Left:  0    Right:  Extension: Left: 10    Right:   Flexion: Left: 100   Right:  PROM    Hyperextension: Left: 0   Right:   Extension: Left: 5   Right:   Flexion: Left: 105   Right:       Strength:     Extension:  Left: 4/5   Pain:        Flexion:  Left: 4+/5   Pain:                Palpation:    Left knee tenderness present at:  Medial Joint Line; Patellar Medial and Patellar Inferior              General     ROS    Assessment/Plan:    Patient is a 89 year old female with left side shoulder and left side knee complaints.    Patient has the following significant findings with corresponding treatment plan.                Diagnosis 1:  Left shoulder/left knee pain  Pain -  hot/cold therapy, manual therapy, self management, education and home program  Decreased ROM/flexibility - manual therapy, therapeutic exercise, therapeutic activity and home program  Decreased strength - therapeutic exercise, therapeutic activities and home program    Therapy Evaluation Codes:   1) History comprised of:   Personal factors that impact the plan of care:       Age.    Comorbidity factors that impact the plan of care are:      Cancer, Depression, High blood pressure, Migraines/headaches, Numbness/tingling and Overweight.     Medications impacting care: Anti-depressant and High blood pressure.  2) Examination of Body Systems comprised of:   Body structures and functions that impact the plan of care:      Shoulder.   Activity limitations that impact the plan of care are:      Dressing, Lifting, Squatting/kneeling, Stairs, Walking, Sleeping, Laying down and reaching.  3) Clinical presentation characteristics are:   Stable/Uncomplicated.  4) Decision-Making    Low complexity using standardized patient assessment instrument and/or measureable assessment of functional outcome.  Cumulative Therapy Evaluation is: Low complexity.    Previous and current functional limitations:  (See Goal Flow Sheet for this information)    Short term and Long term goals: (See Goal Flow Sheet for this information)     Communication ability:  Patient appears to be able to clearly communicate and understand verbal and written communication and follow directions correctly.  Treatment Explanation - The following has been discussed with the patient:   RX ordered/plan of care  Anticipated outcomes  Possible risks and side effects  This patient would benefit from PT intervention to resume normal activities.   Rehab potential is good.    Frequency:  1 X week, once daily  Duration:  for 6 weeks  Discharge Plan:  Achieve all LTG.  Independent in home treatment program.  Reach maximal therapeutic benefit.    Please refer to the daily flowsheet for treatment today, total treatment time and time spent performing 1:1 timed codes.

## 2022-05-06 NOTE — PROGRESS NOTES
[facility]    OUTPATIENT Physical Therapy ORTHOPEDIC EVALUATION  PLAN OF TREATMENT FOR OUTPATIENT REHABILITATION  (COMPLETE FOR INITIAL CLAIMS ONLY)  Patient's Last Name, First Name, M.I.  YOB: 1931  Riddhi Aguilar    Provider s Name:  [facility]   Medical Record No.  [id number]   Start of Care Date:  05/06/22   Onset Date:   04/27/22   Type:     _X__PT   ___OT Medical Diagnosis:    Encounter Diagnoses   Name Primary?    Chronic left shoulder pain     Chronic pain of left knee         Treatment Diagnosis:  left shoulder/left knee pain        Goals:     05/06/22 0500   Body Part   Goals listed below are for left shoulder/ left knee   Goal #1   Goal #1 reaching   Previous Functional Level No restrictions   Current Functional Level Cannot reach ;to shoulder level   STG Target Performance Reach ;to shoulder level   Rationale for independent personal hygiene;for dressing;for bathing;for meal preparation   Due date 06/06/22   LTG Target Performance Reach;overhead   Rationale for independent personal hygiene;for dressing;for bathing;for meal preparation   Due date 07/06/22   Goal #2   Goal #2 ambulation   Previous Functional Level No restrictions   Current Functional Level Miles patient can walk;with walker   Performance Level 5   STG Target Performance Minutes patient will be able to walk;with walker   Performance Level 10   Rationale for safe household ambulation;for safe outdoor household ambulation;for safe community ambulation   Due Date 06/07/22    LTG Target Performance Minutes patient will be able to  walk;with walker   Performance Level 15   Rationale for safe household ambulation;for safe outdoor household ambulation;for safe community ambulation   Due Date 07/07/22       Therapy Frequency:  1x/week  Predicted Duration of Therapy Intervention:  6 weeks    Stefan  Sena, PT                 I CERTIFY THE NEED FOR THESE SERVICES FURNISHED UNDER        THIS PLAN OF TREATMENT AND WHILE UNDER MY CARE     (Physician attestation of this document indicates review and certification of the therapy plan).                     Certification Date From:  05/06/22   Certification Date To:  08/03/22    Referring Provider:  Shun Prado    Initial Assessment        See Epic Evaluation SOC Date: 05/06/22

## 2022-05-20 ENCOUNTER — THERAPY VISIT (OUTPATIENT)
Dept: PHYSICAL THERAPY | Facility: CLINIC | Age: 87
End: 2022-05-20
Payer: COMMERCIAL

## 2022-05-20 DIAGNOSIS — M25.562 CHRONIC PAIN OF LEFT KNEE: ICD-10-CM

## 2022-05-20 DIAGNOSIS — G89.29 CHRONIC PAIN OF LEFT KNEE: ICD-10-CM

## 2022-05-20 DIAGNOSIS — M25.512 CHRONIC LEFT SHOULDER PAIN: Primary | ICD-10-CM

## 2022-05-20 DIAGNOSIS — G89.29 CHRONIC LEFT SHOULDER PAIN: Primary | ICD-10-CM

## 2022-05-20 PROCEDURE — 97112 NEUROMUSCULAR REEDUCATION: CPT | Mod: GP | Performed by: PHYSICAL THERAPIST

## 2022-05-20 PROCEDURE — 97110 THERAPEUTIC EXERCISES: CPT | Mod: GP | Performed by: PHYSICAL THERAPIST

## 2022-05-31 ENCOUNTER — THERAPY VISIT (OUTPATIENT)
Dept: PHYSICAL THERAPY | Facility: CLINIC | Age: 87
End: 2022-05-31
Payer: COMMERCIAL

## 2022-05-31 DIAGNOSIS — M25.512 CHRONIC LEFT SHOULDER PAIN: Primary | ICD-10-CM

## 2022-05-31 DIAGNOSIS — M25.562 CHRONIC PAIN OF LEFT KNEE: ICD-10-CM

## 2022-05-31 DIAGNOSIS — G89.29 CHRONIC PAIN OF LEFT KNEE: ICD-10-CM

## 2022-05-31 DIAGNOSIS — G89.29 CHRONIC LEFT SHOULDER PAIN: Primary | ICD-10-CM

## 2022-05-31 PROCEDURE — 97110 THERAPEUTIC EXERCISES: CPT | Mod: GP | Performed by: PHYSICAL THERAPIST

## 2022-05-31 PROCEDURE — 97112 NEUROMUSCULAR REEDUCATION: CPT | Mod: GP | Performed by: PHYSICAL THERAPIST

## 2022-06-07 ENCOUNTER — THERAPY VISIT (OUTPATIENT)
Dept: PHYSICAL THERAPY | Facility: CLINIC | Age: 87
End: 2022-06-07
Payer: COMMERCIAL

## 2022-06-07 DIAGNOSIS — G89.29 CHRONIC PAIN OF LEFT KNEE: ICD-10-CM

## 2022-06-07 DIAGNOSIS — M25.562 CHRONIC PAIN OF LEFT KNEE: ICD-10-CM

## 2022-06-07 DIAGNOSIS — G89.29 CHRONIC LEFT SHOULDER PAIN: Primary | ICD-10-CM

## 2022-06-07 DIAGNOSIS — M25.512 CHRONIC LEFT SHOULDER PAIN: Primary | ICD-10-CM

## 2022-06-07 PROCEDURE — 97110 THERAPEUTIC EXERCISES: CPT | Mod: GP | Performed by: PHYSICAL THERAPIST

## 2022-06-07 PROCEDURE — 97112 NEUROMUSCULAR REEDUCATION: CPT | Mod: GP | Performed by: PHYSICAL THERAPIST

## 2022-06-07 NOTE — PROGRESS NOTES
Subjective:  HPI  Physical Exam                    Objective:  System    Physical Exam    General     ROS    Assessment/Plan:    SUBJECTIVE  Subjective changes as noted by pt:  Pt notes increased strength in left arm     Current pain level: 4/10 Current Pain level: 4/10   Changes in function:  Pt notes increased ease with reaching to shoulder height     Adverse reaction to treatment or activity:  None    OBJECTIVE  Changes in objective findings:  Pt demonstrates improved strength left biceps and anterior deltoid.         ASSESSMENT  Riddhi continues to require intervention to meet STG and LTG's: PT  Patient's symptoms are resolving.  Response to therapy has shown an improvement in  strength and function  Progress made towards STG/LTG?  Yes,     PLAN  Current treatment program is being advanced to more complex exercises.    PTA/ATC plan:  N/A    Please refer to the daily flowsheet for treatment today, total treatment time and time spent performing 1:1 timed codes.

## 2022-06-14 ENCOUNTER — THERAPY VISIT (OUTPATIENT)
Dept: PHYSICAL THERAPY | Facility: CLINIC | Age: 87
End: 2022-06-14
Payer: COMMERCIAL

## 2022-06-14 DIAGNOSIS — M25.562 CHRONIC PAIN OF LEFT KNEE: ICD-10-CM

## 2022-06-14 DIAGNOSIS — M25.512 CHRONIC LEFT SHOULDER PAIN: Primary | ICD-10-CM

## 2022-06-14 DIAGNOSIS — G89.29 CHRONIC PAIN OF LEFT KNEE: ICD-10-CM

## 2022-06-14 DIAGNOSIS — G89.29 CHRONIC LEFT SHOULDER PAIN: Primary | ICD-10-CM

## 2022-06-14 PROCEDURE — 97110 THERAPEUTIC EXERCISES: CPT | Mod: GP | Performed by: PHYSICAL THERAPIST

## 2022-06-14 PROCEDURE — 97112 NEUROMUSCULAR REEDUCATION: CPT | Mod: GP | Performed by: PHYSICAL THERAPIST

## 2022-06-21 ENCOUNTER — THERAPY VISIT (OUTPATIENT)
Dept: PHYSICAL THERAPY | Facility: CLINIC | Age: 87
End: 2022-06-21
Payer: COMMERCIAL

## 2022-06-21 DIAGNOSIS — G89.29 CHRONIC PAIN OF LEFT KNEE: ICD-10-CM

## 2022-06-21 DIAGNOSIS — M25.512 CHRONIC LEFT SHOULDER PAIN: Primary | ICD-10-CM

## 2022-06-21 DIAGNOSIS — M25.562 CHRONIC PAIN OF LEFT KNEE: ICD-10-CM

## 2022-06-21 DIAGNOSIS — G89.29 CHRONIC LEFT SHOULDER PAIN: Primary | ICD-10-CM

## 2022-06-21 PROCEDURE — 97112 NEUROMUSCULAR REEDUCATION: CPT | Mod: GP | Performed by: PHYSICAL THERAPIST

## 2022-06-21 PROCEDURE — 97110 THERAPEUTIC EXERCISES: CPT | Mod: GP | Performed by: PHYSICAL THERAPIST

## 2022-06-21 NOTE — PROGRESS NOTES
Subjective:  HPI  Physical Exam                    Objective:  System    Physical Exam    General     ROS    Assessment/Plan:    SUBJECTIVE  Subjective changes as noted by pt:  Pt notes increased strength in left shoulder and knee. Pt reports minimal pain left knee and mild pain left shoulder      Current pain level: 1/10  Left knee 2/10 left shoulder   Changes in function:  Pt notes increased ease reaching overhead. pt still notes some difficulty with going up stairs.      Adverse reaction to treatment or activity:  None    OBJECTIVE  Changes in objective findings:  Improved strength left anterior deltoid and infraspinatus. Left quadriceps strength improving.         ASSESSMENT  Riddhi continues to require intervention to meet STG and LTG's: PT  Patient's symptoms are resolving.  Response to therapy has shown an improvement in  pain level and strength  Progress made towards STG/LTG?  Yes,     PLAN  Current treatment program is being advanced to more complex exercises.    PTA/ATC plan:  N/A    Please refer to the daily flowsheet for treatment today, total treatment time and time spent performing 1:1 timed codes.

## 2022-07-11 ENCOUNTER — THERAPY VISIT (OUTPATIENT)
Dept: PHYSICAL THERAPY | Facility: CLINIC | Age: 87
End: 2022-07-11
Payer: COMMERCIAL

## 2022-07-11 DIAGNOSIS — M25.512 CHRONIC LEFT SHOULDER PAIN: Primary | ICD-10-CM

## 2022-07-11 DIAGNOSIS — M25.562 CHRONIC PAIN OF LEFT KNEE: ICD-10-CM

## 2022-07-11 DIAGNOSIS — G89.29 CHRONIC LEFT SHOULDER PAIN: Primary | ICD-10-CM

## 2022-07-11 DIAGNOSIS — G89.29 CHRONIC PAIN OF LEFT KNEE: ICD-10-CM

## 2022-07-11 PROCEDURE — 97112 NEUROMUSCULAR REEDUCATION: CPT | Mod: GP | Performed by: PHYSICAL THERAPIST

## 2022-07-11 PROCEDURE — 97110 THERAPEUTIC EXERCISES: CPT | Mod: GP | Performed by: PHYSICAL THERAPIST

## 2022-07-11 ASSESSMENT — ACTIVITIES OF DAILY LIVING (ADL)
GIVING WAY, BUCKLING OR SHIFTING OF KNEE: I HAVE THE SYMPTOM BUT IT DOES NOT AFFECT MY ACTIVITY
GO DOWN STAIRS: ACTIVITY IS MINIMALLY DIFFICULT
KNEE_ACTIVITY_OF_DAILY_LIVING_SUM: 50
STAND: ACTIVITY IS MINIMALLY DIFFICULT
KNEEL ON THE FRONT OF YOUR KNEE: I AM UNABLE TO DO THE ACTIVITY
AS_A_RESULT_OF_YOUR_KNEE_INJURY,_HOW_WOULD_YOU_RATE_YOUR_CURRENT_LEVEL_OF_DAILY_ACTIVITY?: NEARLY NORMAL
PAIN: I HAVE THE SYMPTOM BUT IT DOES NOT AFFECT MY ACTIVITY
SQUAT: I AM UNABLE TO DO THE ACTIVITY
RAW_SCORE: 50
SIT WITH YOUR KNEE BENT: ACTIVITY IS NOT DIFFICULT
RISE FROM A CHAIR: ACTIVITY IS MINIMALLY DIFFICULT
HOW_WOULD_YOU_RATE_THE_CURRENT_FUNCTION_OF_YOUR_KNEE_DURING_YOUR_USUAL_DAILY_ACTIVITIES_ON_A_SCALE_FROM_0_TO_100_WITH_100_BEING_YOUR_LEVEL_OF_KNEE_FUNCTION_PRIOR_TO_YOUR_INJURY_AND_0_BEING_THE_INABILITY_TO_PERFORM_ANY_OF_YOUR_USUAL_DAILY_ACTIVITIES?: 40
STIFFNESS: I DO NOT HAVE THE SYMPTOM
LIMPING: I DO NOT HAVE THE SYMPTOM
GO UP STAIRS: ACTIVITY IS FAIRLY DIFFICULT
WALK: ACTIVITY IS MINIMALLY DIFFICULT
WEAKNESS: I HAVE THE SYMPTOM BUT IT DOES NOT AFFECT MY ACTIVITY
KNEE_ACTIVITY_OF_DAILY_LIVING_SCORE: 71.43
SWELLING: I DO NOT HAVE THE SYMPTOM
HOW_WOULD_YOU_RATE_THE_OVERALL_FUNCTION_OF_YOUR_KNEE_DURING_YOUR_USUAL_DAILY_ACTIVITIES?: NEARLY NORMAL

## 2022-07-11 NOTE — PROGRESS NOTES
Subjective:  HPI  Physical Exam                    Objective:  System    Physical Exam    General     ROS    Assessment/Plan:    PROGRESS  REPORT    Progress reporting period is from 5-6-22 to 7-11-22.       SUBJECTIVE  Subjective changes noted by patient:  Pt notes decreased pain and increased strength in left shoulder and left knee .       Current pain level is 2/10  .     Previous pain level was  5/10  .   Changes in function:  Pt still notes some difficulty with dressing and lifting objects overhead. Pt notes increased ease with ambulation and getting out of a chair.   Adverse reaction to treatment or activity: None    OBJECTIVE  Changes noted in objective findings:  Left shoulder AROM WFL left knee AROM WFL. Pt demonstrates improved strength in left shoulder and knee.         ASSESSMENT/PLAN  Updated problem list and treatment plan: Diagnosis 1:  Left shoulder/left knee pain  Pain -  hot/cold therapy, self management, education and home program  Decreased ROM/flexibility - therapeutic exercise, therapeutic activity and home program  Decreased strength - therapeutic exercise, therapeutic activities and home program  STG/LTGs have been met or progress has been made towards goals:  Yes,   Assessment of Progress: The patient's condition is improving.  Self Management Plans:  Patient has been instructed in a home treatment program.  I have re-evaluated this patient and find that the nature, scope, duration and intensity of the therapy is appropriate for the medical condition of the patient.  Riddhi continues to require the following intervention to meet STG and LTG's:  PT intervention is no longer required to meet STG/LTG.    Recommendations:  This patient is ready to be discharged from therapy and continue their home treatment program.    Please refer to the daily flowsheet for treatment today, total treatment time and time spent performing 1:1 timed codes.

## 2022-08-25 NOTE — PATIENT INSTRUCTIONS
"You should get the new shingles vaccine series \"SHINGRIX\" (not Zostavax) at a pharmacy.    " For your information only

## 2022-10-15 ENCOUNTER — HEALTH MAINTENANCE LETTER (OUTPATIENT)
Age: 87
End: 2022-10-15

## 2023-01-09 ENCOUNTER — TELEPHONE (OUTPATIENT)
Dept: FAMILY MEDICINE | Facility: CLINIC | Age: 88
End: 2023-01-09

## 2023-01-09 NOTE — TELEPHONE ENCOUNTER
Reason for Call:  Appointment Request    Patient requesting this type of appt:  Wellness    Requested provider: Shun Prado    Reason patient unable to be scheduled: Not within requested timeframe    When does patient want to be seen/preferred time: After 4/26    Comments: Daughter in law called to schedule annual wellness for pt. Pt is due for wellness exam after 4/26. Daughter in law hoping that pt can be seen closer to the time that the wellness visit is due. Please call daughter in law for scheduling. Please advise.    Could we send this information to you in [employer] or would you prefer to receive a phone call?:   Patient would prefer a phone call   Okay to leave a detailed message?: Yes at Other phone number:  [phone] (daughter in lawMary Ann)    Call taken on 1/9/2023 at 10:10 AM by Nuria Jimenez

## 2023-01-11 ENCOUNTER — TRANSFERRED RECORDS (OUTPATIENT)
Dept: FAMILY MEDICINE | Facility: CLINIC | Age: 88
End: 2023-01-11

## 2023-03-17 ENCOUNTER — MYC MEDICAL ADVICE (OUTPATIENT)
Dept: FAMILY MEDICINE | Facility: CLINIC | Age: 88
End: 2023-03-17

## 2023-03-17 ENCOUNTER — LAB (OUTPATIENT)
Dept: LAB | Facility: CLINIC | Age: 88
End: 2023-03-17
Attending: PREVENTIVE MEDICINE
Payer: COMMERCIAL

## 2023-03-17 ENCOUNTER — NURSE TRIAGE (OUTPATIENT)
Dept: NURSING | Facility: CLINIC | Age: 88
End: 2023-03-17

## 2023-03-17 ENCOUNTER — E-VISIT (OUTPATIENT)
Dept: URGENT CARE | Facility: CLINIC | Age: 88
End: 2023-03-17
Payer: COMMERCIAL

## 2023-03-17 DIAGNOSIS — U07.1 INFECTION DUE TO 2019 NOVEL CORONAVIRUS: Primary | ICD-10-CM

## 2023-03-17 DIAGNOSIS — Z20.822 SUSPECTED COVID-19 VIRUS INFECTION: ICD-10-CM

## 2023-03-17 DIAGNOSIS — Z20.822 SUSPECTED COVID-19 VIRUS INFECTION: Primary | ICD-10-CM

## 2023-03-17 LAB
DEPRECATED S PYO AG THROAT QL EIA: NEGATIVE
FLUAV RNA SPEC QL NAA+PROBE: NEGATIVE
FLUBV RNA RESP QL NAA+PROBE: NEGATIVE
GROUP A STREP BY PCR: NOT DETECTED
RSV RNA SPEC NAA+PROBE: NEGATIVE
SARS-COV-2 RNA RESP QL NAA+PROBE: POSITIVE

## 2023-03-17 PROCEDURE — 87651 STREP A DNA AMP PROBE: CPT

## 2023-03-17 PROCEDURE — 87637 SARSCOV2&INF A&B&RSV AMP PRB: CPT

## 2023-03-17 PROCEDURE — 99207 PR NO BILLABLE SERVICE THIS VISIT: CPT | Performed by: PREVENTIVE MEDICINE

## 2023-03-17 PROCEDURE — 99207 PR NO CHARGE LOS: CPT | Performed by: FAMILY MEDICINE

## 2023-03-17 NOTE — PATIENT INSTRUCTIONS
Riddhi,    Your symptoms show that you may have COVID-19. This illness can cause fever, cough and trouble breathing. Many people get a mild case and get better on their own. Some people can get very sick.    Because you reported additional symptoms, I would like to also test you for strep, covid, flu and rsv.    What should I do?  I have placed orders for these tests.   To schedule: go to your [employer] home page and scroll down to the section that says  You have an appointment that needs to be scheduled  and click the large green button that says  Schedule Now  and follow the steps to find the next available openings.     If you are unable to complete these [employer] scheduling steps, please call [phone] to schedule your testing.     How do I self-isolate?  You isolate when you have symptoms of COVID or a test shows you have COVID, even if you don t have symptoms.   If you DO have symptoms:  Stay home and away from others  For at least 5 days after your symptoms started, AND   You are fever free for 24 hours (with no medicine that reduces fever), AND  Your other symptoms are better.  Wear a mask for 10 full days any time you are around others.  If you DON T have symptoms:  Stay at home and away from others for at least 5 days after your positive test.  Wear a mask for 10 full days any time you are around others.    How can I take care of myself?  Over the counter medications may help with your symptoms such as runny or stuffy nose, cough, chills, or fever. Talk to your care team about your options.     Some people are at high risk of severe illness (for example, you have a weak immune system, you re 50 years or older, or you have certain medical problems). If your risk is high and your symptoms started in the last 5 days, we strongly recommend for you to get COVID treatment as soon as possible. There are safe and effective medicines available that can make you feel better faster, and prevent hospitalization and  "death.       To discuss COVID treatment you can:  Call your clinic OR [phone] ([phone]) and ask to speak to a nurse about a positive COVID test.   Send a [employer] message to your care team. In [employer], select \"Ask a Medical Question\" then \"Do I need an appointment\" and put \"COVID\" in the subject line. Please include a phone number to call you back in the message.        Get lots of rest. Drink extra fluids (unless a doctor has told you not to)  Take Tylenol (acetaminophen) or ibuprofen for fever or pain. If you have liver or kidney problems, ask your family doctor if it's okay to take Tylenol or ibuprofen  Take over the counter medications for your symptoms, as directed by your doctor. You may also talk to your pharmacist.    If you have other health problems (like cancer, heart failure, an organ transplant or severe kidney disease): Call your specialty clinic if you don't feel better in the next 2 days.  Know when to call 911. Emergency warning signs include:  Trouble breathing or shortness of breath  Pain or pressure in the chest that doesn't go away  Feeling confused like you haven't felt before, or not being able to wake up  Bluish-colored lips or face    Where can I get more information?  [facility] - About COVID-19: [email or web address]   CDC - What to Do If You're Sick: https://www.cdc.gov/coronavirus/2019-ncov/if-you-are-sick/index.html   CDC - Isolation https://www.cdc.gov/coronavirus/2019-ncov/your-health/isolation.html  "

## 2023-03-18 ENCOUNTER — VIRTUAL VISIT (OUTPATIENT)
Dept: URGENT CARE | Facility: CLINIC | Age: 88
End: 2023-03-18
Payer: COMMERCIAL

## 2023-03-18 ENCOUNTER — NURSE TRIAGE (OUTPATIENT)
Dept: NURSING | Facility: CLINIC | Age: 88
End: 2023-03-18

## 2023-03-18 DIAGNOSIS — U07.1 SARS-COV-2 POSITIVE: Primary | ICD-10-CM

## 2023-03-18 PROCEDURE — 99213 OFFICE O/P EST LOW 20 MIN: CPT | Mod: CS

## 2023-03-18 NOTE — PATIENT INSTRUCTIONS
Dear Riddhi Aguilar,    Covid treatment is not available through [facility] at this time.  You may call the clinic and ask to speak with a nurse as there is now a nurse protocol where patient may be prescribed Paxlovid ASAP.   Otherwise you may also schedule an ASAP telephone visit appointment today through our virtual urgent care. There are many open slots today.     Guadalupe Angel MD

## 2023-03-18 NOTE — PROGRESS NOTES
"Assessment:    SARS-CoV-2 positive    COVID-19 positive patient.  Encounter for consideration of medication intervention.    Patient does qualify for a prescription.   Full discussion with patient including medication options, risks and benefits.   Potential drug interactions reviewed with patient.      Plan:  Treatment planned   Paxlovid will be sent in to preferred pharmacy, renally dosed     Temporary change to home medications:   HOLD Lipitor (atorvastatin)  Monitor BP daily    Suresh Hannon  is a 89 year old  who has a confirmed new positive COVID-19 diagnosis.      She has been identified as high risk for complications of this infection, and is being evaluated via a billable     Phone visit.      Phone call duration: 9 minutes  Patient location: Home  Provider location: Home    Concern for COVID-19  When did symptoms begin? 3/17/23    Positive COVID test? Yes    Symptoms (Cough, trouble breathing, fever, chills, headache, sore throat, chest pain, diarrhea, body aches)?  Runny nose, rattling cough, tightness in upper chest         Constitutional, HEENT, cardiovascular, pulmonary, gi and gu systems are negative, except as otherwise noted.    Objective    Vitals:  No vitals were obtained today due to virtual visit.  Estimated body mass index is 36.28 kg/m  as calculated from the following:    Height as of 4/27/22: 1.549 m (5' 1\").    Weight as of 4/27/22: 87.1 kg (192 lb).   General: Alert, no apparent distress  PSYCH: Alert; coherent speech, normal rate and volume, able to articulate logical thoughts, appropriate insight, no tangential thoughts, no hallucination or delusions.  Affect is appropriate  RESP: No cough, no audible wheezing, able to talk in full sentences  Remainder of exam unable to be completed due to telephone visit  GFR Estimate   Date Value Ref Range Status   04/27/2022 47 (L) >60 mL/min/1.73m2 Final     Comment:     Effective December 21, 2021 eGFRcr in adults is calculated using the " "2021 CKD-EPI creatinine equation which includes age and gender (Angela peguero al., NEJM, DOI: 10.1056/SDROoh2935867)  This result was previously suppressed from the chart.   04/26/2021 48 (L) >60 mL/min/[1.73_m2] Final     Comment:     Non  GFR Calc  Starting 12/18/2018, serum creatinine based estimated GFR (eGFR) will be   calculated using the Chronic Kidney Disease Epidemiology Collaboration   (CKD-EPI) equation.                    The patient has been notified of following:     \"This telephone visit will be conducted via a call between you and your physician/provider. We have found that certain health care needs can be provided without the need for a physical exam.  This service lets us provide the care you need with a short phone conversation.  If a prescription is necessary we can send it directly to your pharmacy.  If lab work is needed we can place an order for that and you can then stop by our lab to have the test done at a later time.    Telephone visits are billed at different rates depending on your insurance coverage. During this emergency period, for some insurers they may be billed the same as an in-person visit.  Please reach out to your insurance provider with any questions.    If during the course of the call the physician/provider feels a telephone visit is not appropriate, you will not be charged for this service.\"    Patient has given verbal consent for Telephone visit?  Yes    "

## 2023-03-18 NOTE — TELEPHONE ENCOUNTER
Daughter in law calling and has consent to give information, not with Riddhi requesting Paxlovid. She is on Day 2 symptoms started 3/16. Informed her she may not get a call until Monday and she could try [facility] for more information.  Trinidad Huynh RN on 3/18/2023 at 12:25 PM      Reason for Disposition    Caller has medicine question, adult has minor symptoms, caller declines triage, AND triager answers question    Protocols used: MEDICATION QUESTION CALL-A-

## 2023-03-18 NOTE — TELEPHONE ENCOUNTER
covid positive    Chills 3/16/23 sore throat headache congestion fever. Currently afebrile.  Took tylenol about 3 hours ago    evisit    Consent to communicate on file    COVID Positive/Requesting COVID treatment    Patient is positive for COVID and requesting treatment options.    Date of positive COVID test (PCR or at home)? 3/17/23  Current COVID symptoms: fever or chills, shortness of breath or difficulty breathing, headache, sore throat and congestion or runny nose  Date COVID symptoms began: 3/16/23    Message should be routed to clinic RN pool. Best phone number to use for call back: Darren Cotter [phone] there is a consent to communicate on file and son coordinates all cares/rides    Nurse Triage SBAR    Is this a 2nd Level Triage? YES, LICENSED PRACTITIONER REVIEW IS REQUIRED    Situation: covid +    Background: Patient is covid +. Symptoms include chills, sore throat, headache, congestion, and  fever. Patient is currently afebrile, although treating with ibuprofen.  Patient had an Evisit today.    Assessment: Patient is high risk and would like to begin paxlovid    Protocol Recommended Disposition:   Call PCP Now    Recommendation: Patient will follow care advise with exception. Family will wait to hear about antiviral treatment. Patient verbalized understanding of care advise     Routed to covid 19 ambulatory and discharged patients pool    Cindi Beach RN on 3/17/2023 at 10:25 PM      Does the patient meet one of the following criteria for ADS visit consideration? 16+ years old, with an [facility] PCP     TIP  Providers, please consider if this condition is appropriate for management at one of our Acute and Diagnostic Services sites.     If patient is a good candidate, please use dotphrase <dot>triageresponse and select Refer to ADS to document.  Reason for Disposition    [1] HIGH RISK for severe COVID complications (e.g., weak immune system, age > 64 years, obesity with BMI > 25, pregnant, chronic lung  disease or other chronic medical condition) AND [2] COVID symptoms (e.g., cough, fever)  (Exceptions: Already seen by PCP and no new or worsening symptoms.)    Additional Information    Negative: SEVERE difficulty breathing (e.g., struggling for each breath, speaks in single words)    Negative: Difficult to awaken or acting confused (e.g., disoriented, slurred speech)    Negative: Bluish (or gray) lips or face now    Negative: Shock suspected (e.g., cold/pale/clammy skin, too weak to stand, low BP, rapid pulse)    Negative: Sounds like a life-threatening emergency to the triager    Negative: SEVERE or constant chest pain or pressure  (Exception: Mild central chest pain, present only when coughing.)    Negative: MODERATE difficulty breathing (e.g., speaks in phrases, SOB even at rest, pulse 100-120)    Negative: [1] Headache AND [2] stiff neck (can't touch chin to chest)    Negative: Oxygen level (e.g., pulse oximetry) 90 percent or lower    Negative: Chest pain or pressure    Negative: Patient sounds very sick or weak to the triager    Negative: MILD difficulty breathing (e.g., minimal/no SOB at rest, SOB with walking, pulse <100)    Negative: Fever > 103 F (39.4 C)    Negative: [1] Fever > 101 F (38.3 C) AND [2] age > 60 years    Negative: [1] Fever > 100.0 F (37.8 C) AND [2] bedridden (e.g., nursing home patient, CVA, chronic illness, recovering from surgery)    Protocols used: CORONAVIRUS (COVID-19) DIAGNOSED OR [id number]

## 2023-03-20 NOTE — TELEPHONE ENCOUNTER
Per chart review, patient had VV on 3/18/23 for covid virtual tx appointment and prescribed paxlovid by  provider.    Signing this encounter.    Sunny Parada RN  [facility]

## 2023-03-21 NOTE — TELEPHONE ENCOUNTER
Per chart review, patient had VV on 3/18/23 for covid virtual tx appointment, reports doing well on paxlovid.    Signing encounter.    Sunny Parada RN  [facility]

## 2023-03-28 ENCOUNTER — TELEPHONE (OUTPATIENT)
Dept: FAMILY MEDICINE | Facility: CLINIC | Age: 88
End: 2023-03-28
Payer: COMMERCIAL

## 2023-03-28 NOTE — TELEPHONE ENCOUNTER
Reason for Call:  Appointment Request    Patient requesting this type of appt:  Preventive     Requested provider: Sooner appt after 4-28 - last physical 4-27-22. Pt will run out of meds before June appt.    Reason patient unable to be scheduledabove    When does patient want to be seen/preferred time: anytime      Comments: none    Could we send this information to you in [employer] or would you prefer to receive a phone call?:   Call Mary Ann daughter [phone]    Call taken on 3/28/2023 at 10:46 AM by Ada Roland

## 2023-04-11 DIAGNOSIS — I10 ESSENTIAL HYPERTENSION, BENIGN: ICD-10-CM

## 2023-04-11 DIAGNOSIS — F33.0 MAJOR DEPRESSIVE DISORDER, RECURRENT EPISODE, MILD (H): ICD-10-CM

## 2023-04-12 RX ORDER — VERAPAMIL HYDROCHLORIDE 120 MG/1
TABLET, FILM COATED, EXTENDED RELEASE ORAL
Qty: 180 TABLET | Refills: 3 | Status: SHIPPED | OUTPATIENT
Start: 2023-04-12 | End: 2023-05-09

## 2023-04-12 RX ORDER — SPIRONOLACTONE 25 MG/1
TABLET ORAL
Qty: 90 TABLET | Refills: 3 | Status: SHIPPED | OUTPATIENT
Start: 2023-04-12 | End: 2023-05-09

## 2023-05-06 ASSESSMENT — ENCOUNTER SYMPTOMS
MYALGIAS: 0
CONSTIPATION: 1
HEMATOCHEZIA: 0
FEVER: 0
HEMATURIA: 0
SORE THROAT: 0
HEARTBURN: 0
ABDOMINAL PAIN: 0
NERVOUS/ANXIOUS: 0
SHORTNESS OF BREATH: 0
EYE PAIN: 0
DIARRHEA: 0
PARESTHESIAS: 0
WEAKNESS: 1
JOINT SWELLING: 0
BREAST MASS: 0
CHILLS: 0
PALPITATIONS: 0
DIZZINESS: 0
COUGH: 0
FREQUENCY: 1
DYSURIA: 0
HEADACHES: 1
ARTHRALGIAS: 1
NAUSEA: 0

## 2023-05-06 ASSESSMENT — ACTIVITIES OF DAILY LIVING (ADL)
CURRENT_FUNCTION: TRANSPORTATION REQUIRES ASSISTANCE
CURRENT_FUNCTION: HOUSEWORK REQUIRES ASSISTANCE
CURRENT_FUNCTION: SHOPPING REQUIRES ASSISTANCE

## 2023-05-09 ENCOUNTER — OFFICE VISIT (OUTPATIENT)
Dept: FAMILY MEDICINE | Facility: CLINIC | Age: 88
End: 2023-05-09
Payer: COMMERCIAL

## 2023-05-09 VITALS
TEMPERATURE: 97.3 F | BODY MASS INDEX: 35.01 KG/M2 | OXYGEN SATURATION: 99 % | HEART RATE: 83 BPM | RESPIRATION RATE: 20 BRPM | WEIGHT: 185.4 LBS | DIASTOLIC BLOOD PRESSURE: 71 MMHG | SYSTOLIC BLOOD PRESSURE: 116 MMHG | HEIGHT: 61 IN

## 2023-05-09 DIAGNOSIS — I10 ESSENTIAL HYPERTENSION, BENIGN: ICD-10-CM

## 2023-05-09 DIAGNOSIS — Z23 HIGH PRIORITY FOR 2019-NCOV VACCINE: ICD-10-CM

## 2023-05-09 DIAGNOSIS — N18.30 STAGE 3 CHRONIC KIDNEY DISEASE, UNSPECIFIED WHETHER STAGE 3A OR 3B CKD (H): ICD-10-CM

## 2023-05-09 DIAGNOSIS — Z00.00 ROUTINE GENERAL MEDICAL EXAMINATION AT A HEALTH CARE FACILITY: Primary | ICD-10-CM

## 2023-05-09 DIAGNOSIS — E78.5 HYPERLIPIDEMIA LDL GOAL <130: ICD-10-CM

## 2023-05-09 DIAGNOSIS — E66.01 MORBID OBESITY (H): ICD-10-CM

## 2023-05-09 DIAGNOSIS — F33.0 MAJOR DEPRESSIVE DISORDER, RECURRENT EPISODE, MILD (H): ICD-10-CM

## 2023-05-09 LAB — HGB BLD-MCNC: 10.3 G/DL (ref 11.7–15.7)

## 2023-05-09 PROCEDURE — 36415 COLL VENOUS BLD VENIPUNCTURE: CPT | Performed by: INTERNAL MEDICINE

## 2023-05-09 PROCEDURE — 80061 LIPID PANEL: CPT | Performed by: INTERNAL MEDICINE

## 2023-05-09 PROCEDURE — 99214 OFFICE O/P EST MOD 30 MIN: CPT | Mod: 25 | Performed by: INTERNAL MEDICINE

## 2023-05-09 PROCEDURE — G0439 PPPS, SUBSEQ VISIT: HCPCS | Performed by: INTERNAL MEDICINE

## 2023-05-09 PROCEDURE — 80048 BASIC METABOLIC PNL TOTAL CA: CPT | Performed by: INTERNAL MEDICINE

## 2023-05-09 PROCEDURE — 85018 HEMOGLOBIN: CPT | Performed by: INTERNAL MEDICINE

## 2023-05-09 PROCEDURE — 0134A COVID-19 BIVALENT 18+ (MODERNA): CPT | Performed by: INTERNAL MEDICINE

## 2023-05-09 PROCEDURE — 91313 COVID-19 BIVALENT 18+ (MODERNA): CPT | Performed by: INTERNAL MEDICINE

## 2023-05-09 RX ORDER — BUPROPION HYDROCHLORIDE 450 MG/1
450 TABLET, FILM COATED, EXTENDED RELEASE ORAL EVERY MORNING
Qty: 90 TABLET | Refills: 3 | Status: SHIPPED | OUTPATIENT
Start: 2023-05-09 | End: 2023-05-15

## 2023-05-09 RX ORDER — LISINOPRIL 40 MG/1
40 TABLET ORAL DAILY
Qty: 90 TABLET | Refills: 3 | Status: ON HOLD | OUTPATIENT
Start: 2023-05-09 | End: 2024-01-30

## 2023-05-09 RX ORDER — ATORVASTATIN CALCIUM 10 MG/1
10 TABLET, FILM COATED ORAL DAILY
Qty: 90 TABLET | Refills: 3 | Status: SHIPPED | OUTPATIENT
Start: 2023-05-09 | End: 2024-05-16

## 2023-05-09 RX ORDER — SPIRONOLACTONE 25 MG/1
25 TABLET ORAL DAILY
Qty: 90 TABLET | Refills: 3 | Status: ON HOLD | OUTPATIENT
Start: 2023-05-09 | End: 2024-01-30

## 2023-05-09 RX ORDER — CAPTOPRIL 50 MG/1
TABLET ORAL
Qty: 405 TABLET | Refills: 3 | Status: SHIPPED | OUTPATIENT
Start: 2023-05-09 | End: 2023-05-09

## 2023-05-09 RX ORDER — VERAPAMIL HYDROCHLORIDE 120 MG/1
TABLET, FILM COATED, EXTENDED RELEASE ORAL
Qty: 180 TABLET | Refills: 3 | Status: ON HOLD | OUTPATIENT
Start: 2023-05-09 | End: 2024-01-30

## 2023-05-09 ASSESSMENT — ENCOUNTER SYMPTOMS
HEMATOCHEZIA: 0
FREQUENCY: 1
PARESTHESIAS: 0
MYALGIAS: 0
CHILLS: 0
DYSURIA: 0
WEAKNESS: 1
HEADACHES: 1
NAUSEA: 0
EYE PAIN: 0
DIARRHEA: 0
HEMATURIA: 0
FEVER: 0
JOINT SWELLING: 0
ABDOMINAL PAIN: 0
DIZZINESS: 0
NERVOUS/ANXIOUS: 0
CONSTIPATION: 1
HEARTBURN: 0
BREAST MASS: 0
COUGH: 0
SHORTNESS OF BREATH: 0
PALPITATIONS: 0
ARTHRALGIAS: 1
SORE THROAT: 0

## 2023-05-09 ASSESSMENT — PATIENT HEALTH QUESTIONNAIRE - PHQ9
SUM OF ALL RESPONSES TO PHQ QUESTIONS 1-9: 5
10. IF YOU CHECKED OFF ANY PROBLEMS, HOW DIFFICULT HAVE THESE PROBLEMS MADE IT FOR YOU TO DO YOUR WORK, TAKE CARE OF THINGS AT HOME, OR GET ALONG WITH OTHER PEOPLE: SOMEWHAT DIFFICULT
SUM OF ALL RESPONSES TO PHQ QUESTIONS 1-9: 5

## 2023-05-09 ASSESSMENT — ACTIVITIES OF DAILY LIVING (ADL)
CURRENT_FUNCTION: HOUSEWORK REQUIRES ASSISTANCE
CURRENT_FUNCTION: TRANSPORTATION REQUIRES ASSISTANCE
CURRENT_FUNCTION: SHOPPING REQUIRES ASSISTANCE

## 2023-05-09 ASSESSMENT — PAIN SCALES - GENERAL: PAINLEVEL: WORST PAIN (10)

## 2023-05-09 NOTE — PROGRESS NOTES
"SUBJECTIVE:   Riddhi is a 89 year old who presents for Preventive Visit.       View : No data to display.            Patient has been advised of split billing requirements and indicates understanding: Yes  Are you in the first 12 months of your Medicare coverage?  No    Healthy Habits:     In general, how would you rate your overall health?  Good    Frequency of exercise:  2-3 days/week    Duration of exercise:  15-30 minutes    Do you usually eat at least 4 servings of fruit and vegetables a day, include whole grains    & fiber and avoid regularly eating high fat or \"junk\" foods?  Yes    Taking medications regularly:  Yes    Medication side effects:  None    Ability to successfully perform activities of daily living:  Transportation requires assistance, shopping requires assistance and housework requires assistance    Home Safety:  No safety concerns identified    Hearing Impairment:  No hearing concerns    In the past 6 months, have you been bothered by leaking of urine? Yes    In general, how would you rate your overall mental or emotional health?  Fair      PHQ-2 Total Score: 2    Additional concerns today:  No      Have you ever done Advance Care Planning? (For example, a Health Directive, POLST, or a discussion with a medical provider or your loved ones about your wishes): No, advance care planning information given to patient to review.  Patient declined advance care planning discussion at this time.       Fall risk  Fallen 2 or more times in the past year?: No  Any fall with injury in the past year?: No  click delete button to remove this line now  Cognitive Screening   1) Repeat 3 items (Leader, Season, Table)    2) Clock draw: NORMAL  3) 3 item recall: Recalls 3 objects  Results: 3 items recalled: COGNITIVE IMPAIRMENT LESS LIKELY    Mini-CogTM Copyright ZINA Thurston. Licensed by the author for use in [facility]; reprinted with permission (jose@.[facility]). All rights reserved.       Do you have sleep " apnea, excessive snoring or daytime drowsiness?: no    Reviewed and updated as needed this visit by clinical staff    Allergies  Meds              Reviewed and updated as needed this visit by Provider                 Social History     Tobacco Use     Smoking status: Never     Smokeless tobacco: Never   Vaping Use     Vaping status: Not on file   Substance Use Topics     Alcohol use: No     Alcohol/week: 0.0 standard drinks of alcohol             5/6/2023     2:25 PM   Alcohol Use   Prescreen: >3 drinks/day or >7 drinks/week? Not Applicable     Do you have a current opioid prescription? No  Do you use any other controlled substances or medications that are not prescribed by a provider? None      Current providers sharing in care for this patient include:   Patient Care Team:  Shun Prado MD as PCP - General (Internal Medicine)  Shun Prado MD as Assigned PCP    The following health maintenance items are reviewed in Epic and correct as of today:  Health Maintenance   Topic Date Due     DTAP/TDAP/TD IMMUNIZATION (2 - Td or Tdap) 09/21/2022     BMP  04/27/2023     LIPID  04/27/2023     MEDICARE ANNUAL WELLNESS VISIT  04/27/2023     PHQ-9  11/09/2023     ANNUAL REVIEW OF HM ORDERS  05/09/2024     FALL RISK ASSESSMENT  05/09/2024     HEMOGLOBIN  05/09/2024     ADVANCE CARE PLANNING  05/09/2028     DEPRESSION ACTION PLAN  Completed     INFLUENZA VACCINE  Completed     Pneumococcal Vaccine: 65+ Years  Completed     URINALYSIS  Completed     ZOSTER IMMUNIZATION  Completed     COVID-19 Vaccine  Completed     IPV IMMUNIZATION  Aged Out     MENINGITIS IMMUNIZATION  Aged Out     MICROALBUMIN  Discontinued     Patient Active Problem List   Diagnosis     Essential hypertension, benign     Urgency of urination     Asymptomatic varicose veins     Allergic state     Insomnia     Family Hist of Malignant Neoplasm-- breast cancer     Cervical radiculopathy     Herpes Zoster- rt abdomen     Hyperlipidemia LDL goal <130      Diplopia     Borderline glaucoma with ocular hypertension, unspecified laterality     Cataract, left eye     Duodenal ulcer with hemorrhage     Advanced directives, counseling/discussion     Health Care Home     Major depressive disorder, recurrent episode, mild (H)     Major depressive disorder, single episode, mild (H)     Chronic kidney disease, stage 3 (H)     Personal history of malignant neoplasm of breast     Morbid obesity (H)     Chronic left shoulder pain     Chronic pain of left knee     Past Surgical History:   Procedure Laterality Date     BREAST SURGERY      lumpectomy     DILATION AND CURETTAGE, HYSTEROSCOPY DIAGNOSTIC, COMBINED  2/6/2014    Procedure: COMBINED DILATION AND CURETTAGE, HYSTEROSCOPY DIAGNOSTIC;  DILATION AND CURETTAGE, HYSTEROSCOPY, POLYPECTOMY, INCISION AND DRAINAGE OF SEBACEOUS CYST ;  Surgeon: Serena Zamora MD;  [facility]     EXCISE LESION LOWER EXTREMITY Right 8/29/2017    Procedure: EXCISE LESION LOWER EXTREMITY;  WIDE EXCISIONAL BIOPSY OF RIGHT ANKLE BASAL CELL CARCINOMA;  Surgeon: Juan Luis Flores MD;  Location:  OR     INCISION AND DRAINAGE PERINEAL, COMBINED  2/6/2014    Procedure: COMBINED INCISION AND DRAINAGE PERINEAL;;  Surgeon: Serena Zamora MD;  [facility]     [facility] NONSPECIFIC PROCEDURE      Repair of buckled retina of rt eye     [facility] NONSPECIFIC PROCEDURE      Appy       Social History     Tobacco Use     Smoking status: Never     Smokeless tobacco: Never   Vaping Use     Vaping status: Not on file   Substance Use Topics     Alcohol use: No     Alcohol/week: 0.0 standard drinks of alcohol     Family History   Problem Relation Age of Onset     Breast Cancer Sister      Breast Cancer Sister      Cancer Brother         bone cancer in arm         Current Outpatient Medications   Medication Sig Dispense Refill     acetaminophen (TYLENOL) 500 MG tablet Take 1 tablet (500 mg) by mouth every 6 hours as needed for mild pain 1 Bottle 11     atorvastatin  (LIPITOR) 10 MG tablet Take 1 tablet (10 mg) by mouth daily 90 tablet 3     buPROPion 450 MG TB24 Take 450 mg by mouth every morning 90 tablet 3     hydrochlorothiazide (MICROZIDE) 12.5 MG capsule Take 1 capsule (12.5 mg) by mouth daily Patient is taking this PRN for ankle swelling only - states she takes in 2-3 times per week. 90 capsule 3     lisinopril (ZESTRIL) 40 MG tablet Take 1 tablet (40 mg) by mouth daily 90 tablet 3     multivitamin w/minerals (THERA-VIT-M) tablet Take 1 tablet by mouth daily       spironolactone (ALDACTONE) 25 MG tablet Take 1 tablet (25 mg) by mouth daily 90 tablet 3     timolol (TIMOPTIC-XR) 0.5 % ophthalmic gel-forming Place 1 drop into both eyes every morning.       verapamil ER (CALAN-SR) 120 MG CR tablet TAKE 1 TABLET BY MOUTH EVERY 12 HOURS 180 tablet 3     No Known Allergies        Pertinent mammograms are reviewed under the imaging tab.    Review of Systems   Constitutional: Negative for chills and fever.   HENT: Negative for congestion, ear pain, hearing loss and sore throat.    Eyes: Negative for pain and visual disturbance.   Respiratory: Negative for cough and shortness of breath.    Cardiovascular: Negative for chest pain, palpitations and peripheral edema.   Gastrointestinal: Positive for constipation. Negative for abdominal pain, diarrhea, heartburn, hematochezia and nausea.   Breasts:  Negative for tenderness, breast mass and discharge.   Genitourinary: Positive for frequency and urgency. Negative for dysuria, genital sores, hematuria, pelvic pain, vaginal bleeding and vaginal discharge.   Musculoskeletal: Positive for arthralgias. Negative for joint swelling and myalgias.   Skin: Negative for rash.   Neurological: Positive for weakness and headaches. Negative for dizziness and paresthesias.   Psychiatric/Behavioral: Positive for mood changes. The patient is not nervous/anxious.      Above symptoms are not new; headaches are not new symptom, they come and go, they usually  "go away with over the counter analgesics   Feels mild depression, she tried taking 1.5 X 300 mg wellbutrin tabs for several days and mood improved     OBJECTIVE:   /71 (BP Location: Right arm, Patient Position: Sitting, Cuff Size: Adult Large)   Pulse 83   Temp 97.3  F (36.3  C) (Temporal)   Resp 20   Ht 1.549 m (5' 1\")   Wt 84.1 kg (185 lb 6.4 oz)   SpO2 99%   BMI 35.03 kg/m   Estimated body mass index is 35.03 kg/m  as calculated from the following:    Height as of this encounter: 1.549 m (5' 1\").    Weight as of this encounter: 84.1 kg (185 lb 6.4 oz).  Physical Exam  GENERAL APPEARANCE: healthy, alert and no distress  EYES: Eyes grossly normal to inspection, PERRL and conjunctivae and sclerae normal  HENT: ear canals and TM's normal, nose and mouth without ulcers or lesions, oropharynx clear and oral mucous membranes moist  NECK: no adenopathy, no asymmetry, masses, or scars and thyroid normal to palpation  RESP: lungs clear to auscultation - no rales, rhonchi or wheezes  CV: regular rate and rhythm, normal S1 S2, no S3 or S4, no murmur, click or rub, no peripheral edema and peripheral pulses strong  ABDOMEN: soft, nontender, no hepatosplenomegaly, no masses and bowel sounds normal  MS: no musculoskeletal defects are noted and gait is age appropriate without ataxia  SKIN: no suspicious lesions or rashes  NEURO: Normal strength and tone, sensory exam grossly normal, mentation intact and speech normal  PSYCH: mentation appears normal and affect normal/bright    Labs pending     ASSESSMENT / PLAN:       ICD-10-CM    1. Routine general medical examination at a health care facility  Z00.00 COVID-19 BIVALENT 18+ (MODERNA)      2. Stage 3 chronic kidney disease, unspecified whether stage 3a or 3b CKD (H)  N18.30 BASIC METABOLIC PANEL     Hemoglobin     BASIC METABOLIC PANEL     Hemoglobin      3. Major depressive disorder, recurrent episode, mild (H)  F33.0 buPROPion 450 MG TB24     spironolactone " (ALDACTONE) 25 MG tablet      4. Morbid obesity (H)  E66.01       5. Hyperlipidemia LDL goal <130  E78.5 Lipid panel reflex to direct LDL Non-fasting     atorvastatin (LIPITOR) 10 MG tablet     Lipid panel reflex to direct LDL Non-fasting      6. Essential hypertension, benign  I10 buPROPion 450 MG TB24     spironolactone (ALDACTONE) 25 MG tablet     verapamil ER (CALAN-SR) 120 MG CR tablet     lisinopril (ZESTRIL) 40 MG tablet     DISCONTINUED: captopril (CAPOTEN) 50 MG tablet      7. High priority for 2019-nCoV vaccine  Z23 COVID-19 BIVALENT 18+ (MODERNA)        Recheck renal labs  Increase wellbutrin from 300 -> 450 to address mood complaints, side effects and risks dsicussed  Recheck lipids  Change TID captopril to equivalent dose lisinopril  Daughter in law who is RN will check blood pressure after 3 days of doing so and report back if blood pressure not well controlled   Patient has been advised of split billing requirements and indicates understanding: Yes      COUNSELING:  Reviewed preventive health counseling, as reflected in patient instructions  Special attention given to:       Immunizations    COVID booster today               She reports that she has never smoked. She has never used smokeless tobacco.      Appropriate preventive services were discussed with this patient, including applicable screening as appropriate for cardiovascular disease, diabetes, osteopenia/osteoporosis, and glaucoma.  As appropriate for age/gender, discussed screening for colorectal cancer, prostate cancer, breast cancer, and cervical cancer. Checklist reviewing preventive services available has been given to the patient.    Reviewed patients plan of care and provided an AVS. The Basic Care Plan (routine screening as documented in Health Maintenance) for Riddhi meets the Care Plan requirement. This Care Plan has been established and reviewed with the Patient and other:daughter in law.      Shun Prado MD  [facility]  [facility]    Identified Health Risks:    Answers for HPI/ROS submitted by the patient on 5/9/2023  If you checked off any problems, how difficult have these problems made it for you to do your work, take care of things at home, or get along with other people?: Somewhat difficult  PHQ9 TOTAL SCORE: 5

## 2023-05-10 LAB
ANION GAP SERPL CALCULATED.3IONS-SCNC: 14 MMOL/L (ref 7–15)
BUN SERPL-MCNC: 26.9 MG/DL (ref 8–23)
CALCIUM SERPL-MCNC: 9.8 MG/DL (ref 8.2–9.6)
CHLORIDE SERPL-SCNC: 102 MMOL/L (ref 98–107)
CHOLEST SERPL-MCNC: 127 MG/DL
CREAT SERPL-MCNC: 1.16 MG/DL (ref 0.51–0.95)
DEPRECATED HCO3 PLAS-SCNC: 23 MMOL/L (ref 22–29)
GFR SERPL CREATININE-BSD FRML MDRD: 44 ML/MIN/1.73M2
GLUCOSE SERPL-MCNC: 73 MG/DL (ref 70–99)
HDLC SERPL-MCNC: 56 MG/DL
LDLC SERPL CALC-MCNC: 57 MG/DL
NONHDLC SERPL-MCNC: 71 MG/DL
POTASSIUM SERPL-SCNC: 4.5 MMOL/L (ref 3.4–5.3)
SODIUM SERPL-SCNC: 139 MMOL/L (ref 136–145)
TRIGL SERPL-MCNC: 69 MG/DL

## 2023-05-10 NOTE — RESULT ENCOUNTER NOTE
The following letter pertains to your most recent diagnostic tests:    Overall, your blood work looks stable.  You have mild chronic age-related kidney dysfunction and mild associated anemia (lower hemoglobin).  This has been stable over the years.  The cholesterol looks at goal.      Provided, that you feel OK, we can recheck these parameters in one year's time.        Sincerely,    Dr. Prado

## 2023-05-15 ENCOUNTER — MYC MEDICAL ADVICE (OUTPATIENT)
Dept: FAMILY MEDICINE | Facility: CLINIC | Age: 88
End: 2023-05-15
Payer: COMMERCIAL

## 2023-05-15 DIAGNOSIS — F33.0 MAJOR DEPRESSIVE DISORDER, RECURRENT EPISODE, MILD (H): Primary | ICD-10-CM

## 2023-05-15 RX ORDER — BUPROPION HYDROCHLORIDE 150 MG/1
150 TABLET ORAL EVERY MORNING
Qty: 90 TABLET | Refills: 3 | Status: SHIPPED | OUTPATIENT
Start: 2023-05-15 | End: 2023-10-03

## 2023-05-15 RX ORDER — BUPROPION HYDROCHLORIDE 300 MG/1
300 TABLET ORAL EVERY MORNING
Qty: 90 TABLET | Refills: 3 | Status: SHIPPED | OUTPATIENT
Start: 2023-05-15 | End: 2023-05-30

## 2023-05-30 DIAGNOSIS — F33.0 MAJOR DEPRESSIVE DISORDER, RECURRENT EPISODE, MILD (H): ICD-10-CM

## 2023-05-30 RX ORDER — BUPROPION HYDROCHLORIDE 300 MG/1
TABLET ORAL
Qty: 90 TABLET | Refills: 3 | Status: SHIPPED | OUTPATIENT
Start: 2023-05-30 | End: 2024-05-07

## 2023-09-27 ENCOUNTER — OFFICE VISIT (OUTPATIENT)
Dept: URGENT CARE | Facility: URGENT CARE | Age: 88
End: 2023-09-27
Payer: COMMERCIAL

## 2023-09-27 ENCOUNTER — NURSE TRIAGE (OUTPATIENT)
Dept: NURSING | Facility: CLINIC | Age: 88
End: 2023-09-27
Payer: COMMERCIAL

## 2023-09-27 VITALS
HEART RATE: 80 BPM | SYSTOLIC BLOOD PRESSURE: 135 MMHG | DIASTOLIC BLOOD PRESSURE: 70 MMHG | TEMPERATURE: 98.8 F | OXYGEN SATURATION: 95 %

## 2023-09-27 DIAGNOSIS — M79.89 RIGHT LEG SWELLING: ICD-10-CM

## 2023-09-27 DIAGNOSIS — L03.115 CELLULITIS OF RIGHT LEG: Primary | ICD-10-CM

## 2023-09-27 PROCEDURE — 99214 OFFICE O/P EST MOD 30 MIN: CPT | Performed by: PHYSICIAN ASSISTANT

## 2023-09-27 RX ORDER — CEPHALEXIN 500 MG/1
500 CAPSULE ORAL 3 TIMES DAILY
Qty: 21 CAPSULE | Refills: 0 | Status: SHIPPED | OUTPATIENT
Start: 2023-09-27 | End: 2023-10-04

## 2023-09-27 NOTE — TELEPHONE ENCOUNTER
I don't see a picture  If there is an appointment today, then I think that would be OK, but if not, recommend UC today

## 2023-09-27 NOTE — PATIENT INSTRUCTIONS
Elevate leg and rest  Continue using an ace wrap on the area  Keflex for infection  Follow-up tomorrow for US at [facility], if positive, you will be contacted to go to ER for further treatment      Seek emergency care if you develop fever, chills, severe pain/swelling, inability to move extremity, numbness / tingling, weakness, skin paleness, or icy cold extremity.

## 2023-09-27 NOTE — TELEPHONE ENCOUNTER
"Writer called and spoke with patient's daughter in law Mary Ann (C2C) and reviewed Dr. Prado's response. Mary Ann expressed verbal understanding and is agreeable.     No appointments available today, Mary Ann will bring patient to [facility] this evening, notified of \"on my way\" feature.    No further questions or concerns at this time.    Signing encounter.    Sunny Parada RN  [facility]  "

## 2023-09-27 NOTE — PROGRESS NOTES
Assessment & Plan     1. Right leg swelling    - US Lower Extremity Venous Duplex Right; Future    2. Cellulitis of right leg    - cephALEXin (KEFLEX) 500 MG capsule; Take 1 capsule (500 mg) by mouth 3 times daily for 7 days  Dispense: 21 capsule; Refill: 0    Exam is consistent with cellulitis from an infected hematoma. Small area was weeping, I placed a drop of dermabond on the area to stop oozing. An ace bandage was applied for compression.   No systemic signs to suggest sepsis, joint infection etc. Low risk for fracture and patient has been able to bear weight without problem.   Will have her start Keflex antibiotics, which have been adjusted for creatinine clearance.   US scheduled for tomorrow AM to R/O DVT, if positive, will have her follow-up in ER  Encouraged her to rest and elevate her leg.   Discussed signs and symptoms requiring emergent follow-up     Diagnosis and treatment plan was reviewed with patient and/or family.   Patient verbalizes understanding. All questions were addressed and answered.     Addend: Spoke to patient about US results. Exam done does not appear to have abscess. We will continue with the medication as prescribed in clinic, if not improved advised follow-up in clinic.   LISA Collins PA-C  [facility] [place]    CHIEF COMPLAINT:   Chief Complaint   Patient presents with    Urgent Care     Left Leg infections concerns- hit it on the  1 week ago - warm to the touch     Subjective     Riddhi is a 89 year old female who presents to clinic today for evaluation of right lower leg swelling. Patient hit her left leg on a  last Thursday. Now, in the past several days, she has had increased redness and swelling. Area is tender to the touch. She has also noted some weeping from the area.  Patient denies having fever, chills, numbness, tingling, pale or cold extremity.       Past Medical History:   Diagnosis Date    Arthritis      Bleeding ulcer     Essential hypertension, benign     Pure hypercholesterolemia     Spinal stenosis     Unspecified cataract     Unspecified glaucoma(365.9)      Past Surgical History:   Procedure Laterality Date    BREAST SURGERY      lumpectomy    DILATION AND CURETTAGE, HYSTEROSCOPY DIAGNOSTIC, COMBINED  2/6/2014    Procedure: COMBINED DILATION AND CURETTAGE, HYSTEROSCOPY DIAGNOSTIC;  DILATION AND CURETTAGE, HYSTEROSCOPY, POLYPECTOMY, INCISION AND DRAINAGE OF SEBACEOUS CYST ;  Surgeon: Serena Zamora MD;  [facility]    EXCISE LESION LOWER EXTREMITY Right 8/29/2017    Procedure: EXCISE LESION LOWER EXTREMITY;  WIDE EXCISIONAL BIOPSY OF RIGHT ANKLE BASAL CELL CARCINOMA;  Surgeon: Juan Luis Flores MD;  Location:  OR    INCISION AND DRAINAGE PERINEAL, COMBINED  2/6/2014    Procedure: COMBINED INCISION AND DRAINAGE PERINEAL;;  Surgeon: Serena Zamora MD;  [facility]    [facility] NONSPECIFIC PROCEDURE      Repair of buckled retina of rt eye    [facility] NONSPECIFIC PROCEDURE      Appy     Social History     Tobacco Use    Smoking status: Never    Smokeless tobacco: Never   Substance Use Topics    Alcohol use: No     Alcohol/week: 0.0 standard drinks of alcohol     Current Outpatient Medications   Medication    acetaminophen (TYLENOL) 500 MG tablet    atorvastatin (LIPITOR) 10 MG tablet    buPROPion (WELLBUTRIN XL) 150 MG 24 hr tablet    buPROPion (WELLBUTRIN XL) 300 MG 24 hr tablet    hydrochlorothiazide (MICROZIDE) 12.5 MG capsule    lisinopril (ZESTRIL) 40 MG tablet    multivitamin w/minerals (THERA-VIT-M) tablet    spironolactone (ALDACTONE) 25 MG tablet    timolol (TIMOPTIC-XR) 0.5 % ophthalmic gel-forming    verapamil ER (CALAN-SR) 120 MG CR tablet     No current facility-administered medications for this visit.     No Known Allergies    10 point ROS of systems were all negative except for pertinent positives noted in my HPI.      Exam:   /70   Pulse 80   Temp 98.8  F (37.1  C) (Tympanic)    SpO2 95%   Gen: healthy,alert,no distress  Extremity: right lower leg has swelling, erythema and TTP. She has scant weeping.  There is not compromise to the distal circulation.  Pulses are +2 and CRT is brisk  EXTREMITIES: peripheral pulses normal  SKIN: no suspicious lesions or rashes  NEURO: Normal strength and tone, sensory exam grossly normal, mentation intact and speech normal

## 2023-09-27 NOTE — TELEPHONE ENCOUNTER
Nurse Triage SBAR    Is this a 2nd Level Triage? YES, LICENSED PRACTITIONER REVIEW IS REQUIRED    Situation:  Daughter-in-law, Mary Ann,  calling about patient having swelling and redness in right lower extremity following a minor skin injury. Able to get patient on the line to triage. Protocol advises patient be seen in ED/UCC (or to office with PCP approval). Daughter-in-law is asking if patient can be seen in clinic. Will upload a picture of leg in [employer].  Consent: on file in chart    Background: Last Thurs patient hit her right lower leg on corner of . Leg has become increasingly swollen, firm, warm  and red and is now leaking fluid.    Assessment:   Right lower leg wound- shin area   Area below knee and above ankle is red, firm, swollen, and warm to touch  Leaking fluid  Able to walk  Minimal pain  No fever    Protocol Recommended Disposition:   Go to ED/UCC Now (or to office with PCP approval)    Recommendation: Advised patient to go to urgent care, but daughter-in-law would like to speak with provider to see if patient can be seen in clinic. Will route a message to provider. Care advice given. Patient/caller verbalized understanding and agreed with plan.     Routed to provider to review and advise.    Does the patient meet one of the following criteria for ADS visit consideration? 16+ years old, with an [facility] PCP     TIP  Providers, please consider if this condition is appropriate for management at one of our Acute and Diagnostic Services sites.     If patient is a good candidate, please use dotphrase <dot>triageresponse and select Refer to ADS to document.     Heather Collier RN [facility] 9/27/2023 1:52 PM    Reason for Disposition   SEVERE swelling (e.g., swelling extends above knee, entire leg is swollen, weeping fluid)   Looks infected (spreading redness, red streak) and no fever   Wound looks infected    Additional Information   Negative: Sounds like a life-threatening emergency to  the triager   Negative: Difficulty breathing at rest   Negative: Entire foot is cool or blue in comparison to other side   Negative: Chest pain   Negative: Followed an insect bite and has localized swelling (e.g., small area of puffy or swollen skin)   Negative: Followed a knee injury   Negative: Ankle or foot injury   Negative: Pregnant with leg swelling or edema   Negative: Major bleeding (actively dripping or spurting) that can't be stopped   Negative: Bullet, stabbed by knife, or other serious penetrating wound   Negative: Looks like a dislocated joint (crooked or deformed)   Negative: Can't stand (bear weight) or walk   Negative: Serious injury with multiple fractures (broken bones)   Negative: Sounds like a life-threatening emergency to the triager   Negative: SEVERE pain in the wound   Negative: SEVERE pain with bending of finger (or toe) in wound on hand (or foot)   Negative: Bright red, wide-spread, sunburn-like rash   Negative: Fever > 103 F (39.4 C)   Negative: Black (necrotic), dark purple, or blisters develop in area of wound   Negative: Patient sounds very sick or weak to the triager   Negative: Wound infection diagnosed and taking an antibiotic   Negative: Cellulitis diagnosed and taking an antibiotic   Negative: Animal bite wound infection suspected   Negative: Boil suspected (painful red lump)   Negative: Surgical wound infection suspected (post-op)   Negative: Skin glue used to close wound and not infected   Negative: Stitches and not infected   Negative: Widespread rash and bright red, sunburn-like and too weak to stand   Negative: Sounds like a life-threatening emergency to the triager   Negative: Looks infected (spreading redness, red streak, pus) and fever   Negative: Looks infected (spreading redness, red streak, pus) and face wound   Negative: Red streak runs from the wound and longer than 1 inch (2.5 cm)   Negative: Finger wound and entire finger swollen    Protocols used: Leg Injury-A-OH,  Leg Swelling and Edema-A-OH, Wound Infection [id number]

## 2023-09-28 ENCOUNTER — HOSPITAL ENCOUNTER (OUTPATIENT)
Dept: ULTRASOUND IMAGING | Facility: CLINIC | Age: 88
Discharge: HOME | End: 2023-09-28
Attending: PHYSICIAN ASSISTANT | Admitting: PHYSICIAN ASSISTANT
Payer: COMMERCIAL

## 2023-09-28 DIAGNOSIS — M79.89 RIGHT LEG SWELLING: ICD-10-CM

## 2023-09-28 PROCEDURE — 93971 EXTREMITY STUDY: CPT | Mod: RT

## 2023-09-29 ASSESSMENT — ANXIETY QUESTIONNAIRES
6. BECOMING EASILY ANNOYED OR IRRITABLE: NOT AT ALL
GAD7 TOTAL SCORE: 1
GAD7 TOTAL SCORE: 1
2. NOT BEING ABLE TO STOP OR CONTROL WORRYING: NOT AT ALL
1. FEELING NERVOUS, ANXIOUS, OR ON EDGE: SEVERAL DAYS
7. FEELING AFRAID AS IF SOMETHING AWFUL MIGHT HAPPEN: NOT AT ALL
3. WORRYING TOO MUCH ABOUT DIFFERENT THINGS: NOT AT ALL
4. TROUBLE RELAXING: NOT AT ALL
IF YOU CHECKED OFF ANY PROBLEMS ON THIS QUESTIONNAIRE, HOW DIFFICULT HAVE THESE PROBLEMS MADE IT FOR YOU TO DO YOUR WORK, TAKE CARE OF THINGS AT HOME, OR GET ALONG WITH OTHER PEOPLE: SOMEWHAT DIFFICULT
5. BEING SO RESTLESS THAT IT IS HARD TO SIT STILL: NOT AT ALL

## 2023-10-03 ENCOUNTER — VIRTUAL VISIT (OUTPATIENT)
Dept: FAMILY MEDICINE | Facility: CLINIC | Age: 88
End: 2023-10-03
Payer: COMMERCIAL

## 2023-10-03 DIAGNOSIS — F33.0 MAJOR DEPRESSIVE DISORDER, RECURRENT EPISODE, MILD (H): Primary | ICD-10-CM

## 2023-10-03 PROCEDURE — 99214 OFFICE O/P EST MOD 30 MIN: CPT | Mod: VID | Performed by: INTERNAL MEDICINE

## 2023-10-03 RX ORDER — SERTRALINE HYDROCHLORIDE 25 MG/1
TABLET, FILM COATED ORAL
Qty: 90 TABLET | Refills: 3 | Status: ON HOLD | OUTPATIENT
Start: 2023-10-03 | End: 2024-01-24

## 2023-10-03 RX ORDER — DORZOLAMIDE HYDROCHLORIDE AND TIMOLOL MALEATE 20; 5 MG/ML; MG/ML
1 SOLUTION/ DROPS OPHTHALMIC 2 TIMES DAILY
COMMUNITY
Start: 2023-10-02

## 2023-10-03 NOTE — PROGRESS NOTES
Riddhi is a 89 year old who is being evaluated via a billable video visit.      How would you like to obtain your AVS? MyChart  If the video visit is dropped, the invitation should be resent by: Text to cell phone: [phone]  Will anyone else be joining your video visit? No          Assessment & Plan     Major depressive disorder, recurrent episode, mild (H24)  Discussed treatment options including non-drug interventions such talk therapy, physical activity, social activities  She would like to start a new medication  She adamantly declines talk therapy  Return to previous dose of wellbutrin 450-> 300 mg   Start zoloft 12.5-> after one week  Discussed risks and side effects of Zoloft  Virtual visit in 3 weeks to assess therapy      No LOS data to display   Time spent by me doing chart review, history and exam, documentation and further activities per the note        Shun Prado MD  [facility]    Subjective   Riddhi is a 89 year old, presenting for the following health issues:  Recheck Medication      History of Present Illness       Mental Health Follow-up:  Patient presents to follow-up on Depression & Anxiety.Patient's depression since last visit has been:  Worse  The patient is having other symptoms associated with depression.  Patient's anxiety since last visit has been:  Medium  The patient is not having other symptoms associated with anxiety.  Any significant life events: No  Patient is not feeling anxious or having panic attacks.  Patient has no concerns about alcohol or drug use.    She eats 2-3 servings of fruits and vegetables daily.She consumes 0 sweetened beverage(s) daily.She exercises with enough effort to increase her heart rate 9 or less minutes per day.  She exercises with enough effort to increase her heart rate 3 or less days per week.   She is taking medications regularly.     Mood no better on higher dose of wellbutrin  No side effects   Describes anhedonia, depressed  mood, lack of motivation, boredom  Denies dyspnea, chest pains  Was treated for cellulitis in UC, improving       Review of Systems         Objective           Vitals:  No vitals were obtained today due to virtual visit.    Physical Exam   GENERAL: Healthy, alert and no distress  EYES: Eyes grossly normal to inspection.  No discharge or erythema, or obvious scleral/conjunctival abnormalities.  RESP: No audible wheeze, cough, or visible cyanosis.  No visible retractions or increased work of breathing.    SKIN: Visible skin clear. No significant rash, abnormal pigmentation or lesions.  NEURO: Cranial nerves grossly intact.  Mentation and speech appropriate for age.  PSYCH: Mentation appears normal, affect normal/bright, judgement and insight intact, normal speech and appearance well-groomed.                Video-Visit Details    Type of service:  Video Visit   Video Start Time: 4:43 PM  Video End Time:5:07 PM    Originating Location (pt. Location): Home    Distant Location (provider location):  On-site  Platform used for Video Visit: [employer]

## 2023-10-30 NOTE — PROGRESS NOTES
Riddhi is a 89 year old who is being evaluated via a billable video visit.      How would you like to obtain your AVS? MyChart  If the video visit is dropped, the invitation should be resent by: Text to cell phone: [phone]  Will anyone else be joining your video visit? No          Assessment & Plan     Major depressive disorder, recurrent episode, mild (H24)  Continue current sertraline  Recommended RSV shot @ pharmacy       No LOS data to display   Time spent by me doing chart review, history and exam, documentation and further activities per the note           Shun Prado MD  [facility]    Subjective   Riddhi is a 89 year old, presenting for the following health issues:  Follow Up (Patient is having a virtual follow up.  Patient is following up for starting Zoloft and leg swelling and cellulitis. )      History of Present Illness       Mental Health Follow-up:  Patient presents to follow-up on Depression & Anxiety.Patient's depression since last visit has been:  Good  The patient is not having other symptoms associated with depression.  Patient's anxiety since last visit has been:  Good  The patient is not having other symptoms associated with anxiety.  Any significant life events: No  Patient is not feeling anxious or having panic attacks.  Patient has no concerns about alcohol or drug use.          No side effects from zoloft; working great  Leg swelling is improving with support stockings        3/24/2022    10:25 AM 5/9/2023    10:31 AM 10/31/2023     2:23 PM   PHQ   PHQ-9 Total Score 12 5 1   Q9: Thoughts of better off dead/self-harm past 2 weeks Not at all Not at all Not at all           Review of Systems         Objective           Vitals:  No vitals were obtained today due to virtual visit.    Physical Exam   GENERAL: Healthy, alert and no distress  EYES: Eyes grossly normal to inspection.  No discharge or erythema, or obvious scleral/conjunctival abnormalities.  RESP: No  audible wheeze, cough, or visible cyanosis.  No visible retractions or increased work of breathing.    SKIN: Visible skin clear. No significant rash, abnormal pigmentation or lesions.  NEURO: Cranial nerves grossly intact.  Mentation and speech appropriate for age.  PSYCH: Mentation appears normal, affect normal/bright, judgement and insight intact, normal speech and appearance well-groomed.                Video-Visit Details    Type of service:  Video Visit   Video Start Time: 4:10 PM  Video End Time: 1619    Originating Location (pt. Location): Home    Distant Location (provider location):  On-site  Platform used for Video Visit: [employer]

## 2023-10-31 ENCOUNTER — VIRTUAL VISIT (OUTPATIENT)
Dept: FAMILY MEDICINE | Facility: CLINIC | Age: 88
End: 2023-10-31
Attending: INTERNAL MEDICINE
Payer: COMMERCIAL

## 2023-10-31 DIAGNOSIS — F33.0 MAJOR DEPRESSIVE DISORDER, RECURRENT EPISODE, MILD (H): Primary | ICD-10-CM

## 2023-10-31 PROCEDURE — 99213 OFFICE O/P EST LOW 20 MIN: CPT | Mod: VID | Performed by: INTERNAL MEDICINE

## 2023-10-31 ASSESSMENT — ANXIETY QUESTIONNAIRES
5. BEING SO RESTLESS THAT IT IS HARD TO SIT STILL: NOT AT ALL
6. BECOMING EASILY ANNOYED OR IRRITABLE: NOT AT ALL
GAD7 TOTAL SCORE: 0
GAD7 TOTAL SCORE: 0
1. FEELING NERVOUS, ANXIOUS, OR ON EDGE: NOT AT ALL
3. WORRYING TOO MUCH ABOUT DIFFERENT THINGS: NOT AT ALL
2. NOT BEING ABLE TO STOP OR CONTROL WORRYING: NOT AT ALL
4. TROUBLE RELAXING: NOT AT ALL
7. FEELING AFRAID AS IF SOMETHING AWFUL MIGHT HAPPEN: NOT AT ALL
GAD7 TOTAL SCORE: 0
7. FEELING AFRAID AS IF SOMETHING AWFUL MIGHT HAPPEN: NOT AT ALL

## 2023-10-31 ASSESSMENT — PATIENT HEALTH QUESTIONNAIRE - PHQ9: SUM OF ALL RESPONSES TO PHQ QUESTIONS 1-9: 1

## 2024-01-23 ENCOUNTER — APPOINTMENT (OUTPATIENT)
Dept: CT IMAGING | Facility: CLINIC | Age: 89
DRG: 853 | End: 2024-01-23
Attending: EMERGENCY MEDICINE
Payer: COMMERCIAL

## 2024-01-23 ENCOUNTER — HOSPITAL ENCOUNTER (INPATIENT)
Facility: CLINIC | Age: 89
LOS: 7 days | Discharge: NURSING, REHAB OR LONG-TERM CARE | DRG: 853 | End: 2024-01-30
Attending: EMERGENCY MEDICINE | Admitting: INTERNAL MEDICINE
Payer: COMMERCIAL

## 2024-01-23 ENCOUNTER — APPOINTMENT (OUTPATIENT)
Dept: GENERAL RADIOLOGY | Facility: CLINIC | Age: 89
DRG: 853 | End: 2024-01-23
Attending: EMERGENCY MEDICINE
Payer: COMMERCIAL

## 2024-01-23 DIAGNOSIS — N20.1 LEFT URETERAL STONE: Primary | ICD-10-CM

## 2024-01-23 DIAGNOSIS — I10 ESSENTIAL HYPERTENSION, BENIGN: ICD-10-CM

## 2024-01-23 DIAGNOSIS — F33.0 MAJOR DEPRESSIVE DISORDER, RECURRENT EPISODE, MILD (H): ICD-10-CM

## 2024-01-23 DIAGNOSIS — A41.9 SEPSIS, DUE TO UNSPECIFIED ORGANISM, UNSPECIFIED WHETHER ACUTE ORGAN DYSFUNCTION PRESENT (H): ICD-10-CM

## 2024-01-23 DIAGNOSIS — R31.9 URINARY TRACT INFECTION WITH HEMATURIA, SITE UNSPECIFIED: ICD-10-CM

## 2024-01-23 DIAGNOSIS — N39.0 URINARY TRACT INFECTION WITH HEMATURIA, SITE UNSPECIFIED: ICD-10-CM

## 2024-01-23 DIAGNOSIS — L89.312 PRESSURE INJURY OF RIGHT BUTTOCK, STAGE 2 (H): ICD-10-CM

## 2024-01-23 DIAGNOSIS — N20.0 KIDNEY STONE: ICD-10-CM

## 2024-01-23 LAB
ALBUMIN SERPL BCG-MCNC: 4.8 G/DL (ref 3.5–5.2)
ALBUMIN UR-MCNC: 50 MG/DL
ALP SERPL-CCNC: 68 U/L (ref 40–150)
ALT SERPL W P-5'-P-CCNC: 16 U/L (ref 0–50)
AMORPH CRY #/AREA URNS HPF: ABNORMAL /HPF
ANION GAP SERPL CALCULATED.3IONS-SCNC: 14 MMOL/L (ref 7–15)
APPEARANCE UR: ABNORMAL
AST SERPL W P-5'-P-CCNC: 33 U/L (ref 0–45)
BACTERIA #/AREA URNS HPF: ABNORMAL /HPF
BASOPHILS # BLD AUTO: ABNORMAL 10*3/UL
BASOPHILS # BLD MANUAL: 0 10E3/UL (ref 0–0.2)
BASOPHILS NFR BLD AUTO: ABNORMAL %
BASOPHILS NFR BLD MANUAL: 0 %
BILIRUB DIRECT SERPL-MCNC: 0.55 MG/DL (ref 0–0.3)
BILIRUB SERPL-MCNC: 2.1 MG/DL
BILIRUB UR QL STRIP: NEGATIVE
BUN SERPL-MCNC: 27 MG/DL (ref 8–23)
CALCIUM SERPL-MCNC: 9.8 MG/DL (ref 8.2–9.6)
CHLORIDE SERPL-SCNC: 100 MMOL/L (ref 98–107)
COLOR UR AUTO: YELLOW
CREAT SERPL-MCNC: 1.16 MG/DL (ref 0.51–0.95)
DEPRECATED HCO3 PLAS-SCNC: 23 MMOL/L (ref 22–29)
EGFRCR SERPLBLD CKD-EPI 2021: 44 ML/MIN/1.73M2
ELLIPTOCYTES BLD QL SMEAR: SLIGHT
EOSINOPHIL # BLD AUTO: ABNORMAL 10*3/UL
EOSINOPHIL # BLD MANUAL: 0 10E3/UL (ref 0–0.7)
EOSINOPHIL NFR BLD AUTO: ABNORMAL %
EOSINOPHIL NFR BLD MANUAL: 0 %
ERYTHROCYTE [DISTWIDTH] IN BLOOD BY AUTOMATED COUNT: 22.7 % (ref 10–15)
FLUAV RNA SPEC QL NAA+PROBE: NEGATIVE
FLUBV RNA RESP QL NAA+PROBE: NEGATIVE
GLUCOSE SERPL-MCNC: 93 MG/DL (ref 70–99)
GLUCOSE UR STRIP-MCNC: NEGATIVE MG/DL
HCT VFR BLD AUTO: 32.7 % (ref 35–47)
HGB BLD-MCNC: 10.6 G/DL (ref 11.7–15.7)
HGB UR QL STRIP: ABNORMAL
IMM GRANULOCYTES # BLD: ABNORMAL 10*3/UL
IMM GRANULOCYTES NFR BLD: ABNORMAL %
KETONES UR STRIP-MCNC: NEGATIVE MG/DL
LACTATE SERPL-SCNC: 2.7 MMOL/L (ref 0.7–2)
LACTATE SERPL-SCNC: 3 MMOL/L (ref 0.7–2)
LEUKOCYTE ESTERASE UR QL STRIP: ABNORMAL
LIPASE SERPL-CCNC: 10 U/L (ref 13–60)
LYMPHOCYTES # BLD AUTO: ABNORMAL 10*3/UL
LYMPHOCYTES # BLD MANUAL: 0.1 10E3/UL (ref 0.8–5.3)
LYMPHOCYTES NFR BLD AUTO: ABNORMAL %
LYMPHOCYTES NFR BLD MANUAL: 1 %
MCH RBC QN AUTO: 34.2 PG (ref 26.5–33)
MCHC RBC AUTO-ENTMCNC: 32.4 G/DL (ref 31.5–36.5)
MCV RBC AUTO: 106 FL (ref 78–100)
MONOCYTES # BLD AUTO: ABNORMAL 10*3/UL
MONOCYTES # BLD MANUAL: 0 10E3/UL (ref 0–1.3)
MONOCYTES NFR BLD AUTO: ABNORMAL %
MONOCYTES NFR BLD MANUAL: 0 %
MUCOUS THREADS #/AREA URNS LPF: PRESENT /LPF
NEUTROPHILS # BLD AUTO: ABNORMAL 10*3/UL
NEUTROPHILS # BLD MANUAL: 12.4 10E3/UL (ref 1.6–8.3)
NEUTROPHILS NFR BLD AUTO: ABNORMAL %
NEUTROPHILS NFR BLD MANUAL: 99 %
NITRATE UR QL: NEGATIVE
NRBC # BLD AUTO: 0.5 10E3/UL
NRBC # BLD AUTO: 0.6 10E3/UL
NRBC BLD AUTO-RTO: 4 /100
NRBC BLD MANUAL-RTO: 5 %
PH UR STRIP: 7 [PH] (ref 5–7)
PLAT MORPH BLD: ABNORMAL
PLATELET # BLD AUTO: 275 10E3/UL (ref 150–450)
POTASSIUM SERPL-SCNC: 4.3 MMOL/L (ref 3.4–5.3)
PROT SERPL-MCNC: 7.2 G/DL (ref 6.4–8.3)
RBC # BLD AUTO: 3.1 10E6/UL (ref 3.8–5.2)
RBC MORPH BLD: ABNORMAL
RBC URINE: 118 /HPF
RSV RNA SPEC NAA+PROBE: NEGATIVE
SARS-COV-2 RNA RESP QL NAA+PROBE: NEGATIVE
SODIUM SERPL-SCNC: 137 MMOL/L (ref 135–145)
SP GR UR STRIP: 1.01 (ref 1–1.03)
TARGETS BLD QL SMEAR: SLIGHT
UROBILINOGEN UR STRIP-MCNC: NORMAL MG/DL
WBC # BLD AUTO: 12.5 10E3/UL (ref 4–11)
WBC URINE: 62 /HPF

## 2024-01-23 PROCEDURE — 82248 BILIRUBIN DIRECT: CPT | Performed by: EMERGENCY MEDICINE

## 2024-01-23 PROCEDURE — 250N000011 HC RX IP 250 OP 636: Performed by: EMERGENCY MEDICINE

## 2024-01-23 PROCEDURE — 83605 ASSAY OF LACTIC ACID: CPT | Performed by: EMERGENCY MEDICINE

## 2024-01-23 PROCEDURE — 93005 ELECTROCARDIOGRAM TRACING: CPT

## 2024-01-23 PROCEDURE — 80053 COMPREHEN METABOLIC PANEL: CPT | Performed by: EMERGENCY MEDICINE

## 2024-01-23 PROCEDURE — 36415 COLL VENOUS BLD VENIPUNCTURE: CPT | Performed by: EMERGENCY MEDICINE

## 2024-01-23 PROCEDURE — 87149 DNA/RNA DIRECT PROBE: CPT | Performed by: EMERGENCY MEDICINE

## 2024-01-23 PROCEDURE — 85007 BL SMEAR W/DIFF WBC COUNT: CPT | Performed by: EMERGENCY MEDICINE

## 2024-01-23 PROCEDURE — 83690 ASSAY OF LIPASE: CPT | Performed by: EMERGENCY MEDICINE

## 2024-01-23 PROCEDURE — 96361 HYDRATE IV INFUSION ADD-ON: CPT

## 2024-01-23 PROCEDURE — 80048 BASIC METABOLIC PNL TOTAL CA: CPT | Performed by: EMERGENCY MEDICINE

## 2024-01-23 PROCEDURE — 85027 COMPLETE CBC AUTOMATED: CPT | Performed by: EMERGENCY MEDICINE

## 2024-01-23 PROCEDURE — 99223 1ST HOSP IP/OBS HIGH 75: CPT | Performed by: INTERNAL MEDICINE

## 2024-01-23 PROCEDURE — 96365 THER/PROPH/DIAG IV INF INIT: CPT

## 2024-01-23 PROCEDURE — 71045 X-RAY EXAM CHEST 1 VIEW: CPT

## 2024-01-23 PROCEDURE — 74176 CT ABD & PELVIS W/O CONTRAST: CPT

## 2024-01-23 PROCEDURE — 87186 SC STD MICRODIL/AGAR DIL: CPT | Performed by: EMERGENCY MEDICINE

## 2024-01-23 PROCEDURE — 87086 URINE CULTURE/COLONY COUNT: CPT | Performed by: EMERGENCY MEDICINE

## 2024-01-23 PROCEDURE — 120N000001 HC R&B MED SURG/OB

## 2024-01-23 PROCEDURE — 258N000003 HC RX IP 258 OP 636: Performed by: EMERGENCY MEDICINE

## 2024-01-23 PROCEDURE — 81001 URINALYSIS AUTO W/SCOPE: CPT | Performed by: EMERGENCY MEDICINE

## 2024-01-23 PROCEDURE — 96375 TX/PRO/DX INJ NEW DRUG ADDON: CPT

## 2024-01-23 PROCEDURE — 99285 EMERGENCY DEPT VISIT HI MDM: CPT | Mod: 25

## 2024-01-23 PROCEDURE — 250N000013 HC RX MED GY IP 250 OP 250 PS 637: Performed by: EMERGENCY MEDICINE

## 2024-01-23 PROCEDURE — 87637 SARSCOV2&INF A&B&RSV AMP PRB: CPT | Performed by: EMERGENCY MEDICINE

## 2024-01-23 RX ORDER — OXYCODONE HYDROCHLORIDE 5 MG/1
5 TABLET ORAL ONCE
Status: COMPLETED | OUTPATIENT
Start: 2024-01-23 | End: 2024-01-23

## 2024-01-23 RX ORDER — CEFTRIAXONE 1 G/1
1 INJECTION, POWDER, FOR SOLUTION INTRAMUSCULAR; INTRAVENOUS ONCE
Status: COMPLETED | OUTPATIENT
Start: 2024-01-23 | End: 2024-01-23

## 2024-01-23 RX ORDER — ONDANSETRON 2 MG/ML
4 INJECTION INTRAMUSCULAR; INTRAVENOUS ONCE
Status: COMPLETED | OUTPATIENT
Start: 2024-01-23 | End: 2024-01-23

## 2024-01-23 RX ADMIN — SODIUM CHLORIDE 1000 ML: 9 INJECTION, SOLUTION INTRAVENOUS at 20:19

## 2024-01-23 RX ADMIN — ONDANSETRON 4 MG: 2 INJECTION INTRAMUSCULAR; INTRAVENOUS at 20:18

## 2024-01-23 RX ADMIN — CEFTRIAXONE 1 G: 1 INJECTION, POWDER, FOR SOLUTION INTRAMUSCULAR; INTRAVENOUS at 22:49

## 2024-01-23 RX ADMIN — OXYCODONE HYDROCHLORIDE 5 MG: 5 TABLET ORAL at 20:18

## 2024-01-23 ASSESSMENT — ACTIVITIES OF DAILY LIVING (ADL)
ADLS_ACUITY_SCORE: 35
ADLS_ACUITY_SCORE: 35

## 2024-01-24 ENCOUNTER — ANESTHESIA (OUTPATIENT)
Dept: SURGERY | Facility: CLINIC | Age: 89
DRG: 853 | End: 2024-01-24
Payer: COMMERCIAL

## 2024-01-24 ENCOUNTER — APPOINTMENT (OUTPATIENT)
Dept: GENERAL RADIOLOGY | Facility: CLINIC | Age: 89
DRG: 853 | End: 2024-01-24
Attending: EMERGENCY MEDICINE
Payer: COMMERCIAL

## 2024-01-24 ENCOUNTER — ANESTHESIA EVENT (OUTPATIENT)
Dept: SURGERY | Facility: CLINIC | Age: 89
DRG: 853 | End: 2024-01-24
Payer: COMMERCIAL

## 2024-01-24 LAB
ACINETOBACTER SPECIES: NOT DETECTED
ANION GAP SERPL CALCULATED.3IONS-SCNC: 12 MMOL/L (ref 7–15)
ATRIAL RATE - MUSE: 95 BPM
BUN SERPL-MCNC: 34.7 MG/DL (ref 8–23)
CALCIUM SERPL-MCNC: 8.7 MG/DL (ref 8.2–9.6)
CHLORIDE SERPL-SCNC: 101 MMOL/L (ref 98–107)
CITROBACTER SPECIES: NOT DETECTED
CREAT SERPL-MCNC: 1.43 MG/DL (ref 0.51–0.95)
CTX-M: NOT DETECTED
DEPRECATED HCO3 PLAS-SCNC: 21 MMOL/L (ref 22–29)
DIASTOLIC BLOOD PRESSURE - MUSE: NORMAL MMHG
EGFRCR SERPLBLD CKD-EPI 2021: 34 ML/MIN/1.73M2
ENTEROBACTER SPECIES: NOT DETECTED
ERYTHROCYTE [DISTWIDTH] IN BLOOD BY AUTOMATED COUNT: 23.3 % (ref 10–15)
ESCHERICHIA COLI: NOT DETECTED
GLUCOSE SERPL-MCNC: 87 MG/DL (ref 70–99)
HCT VFR BLD AUTO: 26.5 % (ref 35–47)
HGB BLD-MCNC: 8.8 G/DL (ref 11.7–15.7)
IMP: NOT DETECTED
INTERPRETATION ECG - MUSE: NORMAL
KLEBSIELLA OXYTOCA: NOT DETECTED
KLEBSIELLA PNEUMONIAE: NOT DETECTED
KPC: NOT DETECTED
LACTATE SERPL-SCNC: 2.5 MMOL/L (ref 0.7–2)
LACTATE SERPL-SCNC: 2.5 MMOL/L (ref 0.7–2)
MCH RBC QN AUTO: 35.2 PG (ref 26.5–33)
MCHC RBC AUTO-ENTMCNC: 33.2 G/DL (ref 31.5–36.5)
MCV RBC AUTO: 106 FL (ref 78–100)
NDM: NOT DETECTED
OXA (DETECTED/NOT DETECTED): NOT DETECTED
P AXIS - MUSE: 42 DEGREES
PLATELET # BLD AUTO: 228 10E3/UL (ref 150–450)
POTASSIUM SERPL-SCNC: 4 MMOL/L (ref 3.4–5.3)
PR INTERVAL - MUSE: 196 MS
PROTEUS SPECIES: DETECTED
PSEUDOMONAS AERUGINOSA: NOT DETECTED
QRS DURATION - MUSE: 92 MS
QT - MUSE: 378 MS
QTC - MUSE: 475 MS
R AXIS - MUSE: 6 DEGREES
RBC # BLD AUTO: 2.5 10E6/UL (ref 3.8–5.2)
SODIUM SERPL-SCNC: 134 MMOL/L (ref 135–145)
SYSTOLIC BLOOD PRESSURE - MUSE: NORMAL MMHG
T AXIS - MUSE: 83 DEGREES
VENTRICULAR RATE- MUSE: 95 BPM
VIM: NOT DETECTED
WBC # BLD AUTO: 41.3 10E3/UL (ref 4–11)

## 2024-01-24 PROCEDURE — 36415 COLL VENOUS BLD VENIPUNCTURE: CPT | Performed by: INTERNAL MEDICINE

## 2024-01-24 PROCEDURE — 250N000011 HC RX IP 250 OP 636

## 2024-01-24 PROCEDURE — 250N000025 HC SEVOFLURANE, PER MIN: Performed by: UROLOGY

## 2024-01-24 PROCEDURE — 0T9B70Z DRAINAGE OF BLADDER WITH DRAINAGE DEVICE, VIA NATURAL OR ARTIFICIAL OPENING: ICD-10-PCS | Performed by: UROLOGY

## 2024-01-24 PROCEDURE — 250N000011 HC RX IP 250 OP 636: Performed by: INTERNAL MEDICINE

## 2024-01-24 PROCEDURE — C2617 STENT, NON-COR, TEM W/O DEL: HCPCS | Performed by: UROLOGY

## 2024-01-24 PROCEDURE — C1758 CATHETER, URETERAL: HCPCS | Performed by: UROLOGY

## 2024-01-24 PROCEDURE — 258N000001 HC RX 258: Performed by: UROLOGY

## 2024-01-24 PROCEDURE — 250N000011 HC RX IP 250 OP 636: Performed by: UROLOGY

## 2024-01-24 PROCEDURE — 99233 SBSQ HOSP IP/OBS HIGH 50: CPT | Performed by: INTERNAL MEDICINE

## 2024-01-24 PROCEDURE — 250N000013 HC RX MED GY IP 250 OP 250 PS 637: Performed by: INTERNAL MEDICINE

## 2024-01-24 PROCEDURE — 52332 CYSTOSCOPY AND TREATMENT: CPT | Mod: LT | Performed by: UROLOGY

## 2024-01-24 PROCEDURE — 272N000001 HC OR GENERAL SUPPLY STERILE: Performed by: UROLOGY

## 2024-01-24 PROCEDURE — 258N000003 HC RX IP 258 OP 636

## 2024-01-24 PROCEDURE — 83605 ASSAY OF LACTIC ACID: CPT | Performed by: INTERNAL MEDICINE

## 2024-01-24 PROCEDURE — 80048 BASIC METABOLIC PNL TOTAL CA: CPT | Performed by: INTERNAL MEDICINE

## 2024-01-24 PROCEDURE — 85027 COMPLETE CBC AUTOMATED: CPT | Performed by: INTERNAL MEDICINE

## 2024-01-24 PROCEDURE — 999N000179 XR SURGERY CARM FLUORO LESS THAN 5 MIN W STILLS: Mod: TC

## 2024-01-24 PROCEDURE — 370N000017 HC ANESTHESIA TECHNICAL FEE, PER MIN: Performed by: UROLOGY

## 2024-01-24 PROCEDURE — 120N000001 HC R&B MED SURG/OB

## 2024-01-24 PROCEDURE — 250N000009 HC RX 250: Performed by: UROLOGY

## 2024-01-24 PROCEDURE — 0T778DZ DILATION OF LEFT URETER WITH INTRALUMINAL DEVICE, VIA NATURAL OR ARTIFICIAL OPENING ENDOSCOPIC: ICD-10-PCS | Performed by: UROLOGY

## 2024-01-24 PROCEDURE — 74420 UROGRAPHY RTRGR +-KUB: CPT | Mod: 26 | Performed by: UROLOGY

## 2024-01-24 PROCEDURE — 999N000141 HC STATISTIC PRE-PROCEDURE NURSING ASSESSMENT: Performed by: UROLOGY

## 2024-01-24 PROCEDURE — 360N000076 HC SURGERY LEVEL 3, PER MIN: Performed by: UROLOGY

## 2024-01-24 PROCEDURE — 250N000013 HC RX MED GY IP 250 OP 250 PS 637: Performed by: UROLOGY

## 2024-01-24 PROCEDURE — 250N000009 HC RX 250

## 2024-01-24 PROCEDURE — 710N000009 HC RECOVERY PHASE 1, LEVEL 1, PER MIN: Performed by: UROLOGY

## 2024-01-24 PROCEDURE — 99223 1ST HOSP IP/OBS HIGH 75: CPT | Mod: 25 | Performed by: UROLOGY

## 2024-01-24 DEVICE — URETERAL STENT
Type: IMPLANTABLE DEVICE | Site: URETHRA | Status: NON-FUNCTIONAL
Brand: POLARIS™ ULTRA
Removed: 2024-02-20

## 2024-01-24 RX ORDER — SODIUM CHLORIDE, SODIUM LACTATE, POTASSIUM CHLORIDE, CALCIUM CHLORIDE 600; 310; 30; 20 MG/100ML; MG/100ML; MG/100ML; MG/100ML
INJECTION, SOLUTION INTRAVENOUS CONTINUOUS PRN
Status: DISCONTINUED | OUTPATIENT
Start: 2024-01-24 | End: 2024-01-24

## 2024-01-24 RX ORDER — MAGNESIUM SULFATE HEPTAHYDRATE 40 MG/ML
2 INJECTION, SOLUTION INTRAVENOUS
Status: DISCONTINUED | OUTPATIENT
Start: 2024-01-24 | End: 2024-01-24 | Stop reason: HOSPADM

## 2024-01-24 RX ORDER — SODIUM CHLORIDE, SODIUM LACTATE, POTASSIUM CHLORIDE, CALCIUM CHLORIDE 600; 310; 30; 20 MG/100ML; MG/100ML; MG/100ML; MG/100ML
INJECTION, SOLUTION INTRAVENOUS CONTINUOUS
Status: DISCONTINUED | OUTPATIENT
Start: 2024-01-24 | End: 2024-01-24 | Stop reason: HOSPADM

## 2024-01-24 RX ORDER — ONDANSETRON 2 MG/ML
4 INJECTION INTRAMUSCULAR; INTRAVENOUS EVERY 6 HOURS PRN
Status: DISCONTINUED | OUTPATIENT
Start: 2024-01-24 | End: 2024-01-30 | Stop reason: HOSPADM

## 2024-01-24 RX ORDER — OXYCODONE HYDROCHLORIDE 5 MG/1
10 TABLET ORAL
Status: DISCONTINUED | OUTPATIENT
Start: 2024-01-24 | End: 2024-01-24 | Stop reason: HOSPADM

## 2024-01-24 RX ORDER — OXYCODONE HYDROCHLORIDE 5 MG/1
5 TABLET ORAL
Status: DISCONTINUED | OUTPATIENT
Start: 2024-01-24 | End: 2024-01-24 | Stop reason: HOSPADM

## 2024-01-24 RX ORDER — ACETAMINOPHEN 650 MG/1
650 SUPPOSITORY RECTAL EVERY 4 HOURS PRN
Status: DISCONTINUED | OUTPATIENT
Start: 2024-01-24 | End: 2024-01-30 | Stop reason: HOSPADM

## 2024-01-24 RX ORDER — FENTANYL CITRATE 50 UG/ML
50 INJECTION, SOLUTION INTRAMUSCULAR; INTRAVENOUS EVERY 5 MIN PRN
Status: DISCONTINUED | OUTPATIENT
Start: 2024-01-24 | End: 2024-01-24 | Stop reason: HOSPADM

## 2024-01-24 RX ORDER — DIPHENHYDRAMINE HYDROCHLORIDE 50 MG/ML
12.5 INJECTION INTRAMUSCULAR; INTRAVENOUS EVERY 6 HOURS PRN
Status: DISCONTINUED | OUTPATIENT
Start: 2024-01-24 | End: 2024-01-24 | Stop reason: HOSPADM

## 2024-01-24 RX ORDER — CALCIUM CARBONATE 500 MG/1
1000 TABLET, CHEWABLE ORAL 2 TIMES DAILY PRN
Status: DISCONTINUED | OUTPATIENT
Start: 2024-01-24 | End: 2024-01-30 | Stop reason: HOSPADM

## 2024-01-24 RX ORDER — DIPHENHYDRAMINE HCL 12.5MG/5ML
12.5 LIQUID (ML) ORAL EVERY 6 HOURS PRN
Status: DISCONTINUED | OUTPATIENT
Start: 2024-01-24 | End: 2024-01-24 | Stop reason: HOSPADM

## 2024-01-24 RX ORDER — SERTRALINE HYDROCHLORIDE 25 MG/1
25 TABLET, FILM COATED ORAL DAILY
COMMUNITY
End: 2024-05-14

## 2024-01-24 RX ORDER — AMOXICILLIN 250 MG
2 CAPSULE ORAL 2 TIMES DAILY PRN
Status: DISCONTINUED | OUTPATIENT
Start: 2024-01-24 | End: 2024-01-30 | Stop reason: HOSPADM

## 2024-01-24 RX ORDER — FENTANYL CITRATE 50 UG/ML
INJECTION, SOLUTION INTRAMUSCULAR; INTRAVENOUS PRN
Status: DISCONTINUED | OUTPATIENT
Start: 2024-01-24 | End: 2024-01-24

## 2024-01-24 RX ORDER — DEXAMETHASONE SODIUM PHOSPHATE 4 MG/ML
INJECTION, SOLUTION INTRA-ARTICULAR; INTRALESIONAL; INTRAMUSCULAR; INTRAVENOUS; SOFT TISSUE PRN
Status: DISCONTINUED | OUTPATIENT
Start: 2024-01-24 | End: 2024-01-24

## 2024-01-24 RX ORDER — ONDANSETRON 2 MG/ML
4 INJECTION INTRAMUSCULAR; INTRAVENOUS EVERY 30 MIN PRN
Status: DISCONTINUED | OUTPATIENT
Start: 2024-01-24 | End: 2024-01-24 | Stop reason: HOSPADM

## 2024-01-24 RX ORDER — ALBUTEROL SULFATE 0.83 MG/ML
2.5 SOLUTION RESPIRATORY (INHALATION) EVERY 4 HOURS PRN
Status: DISCONTINUED | OUTPATIENT
Start: 2024-01-24 | End: 2024-01-24 | Stop reason: HOSPADM

## 2024-01-24 RX ORDER — SIMETHICONE 80 MG
80 TABLET,CHEWABLE ORAL EVERY 6 HOURS PRN
Status: DISCONTINUED | OUTPATIENT
Start: 2024-01-24 | End: 2024-01-30 | Stop reason: HOSPADM

## 2024-01-24 RX ORDER — ONDANSETRON 4 MG/1
4 TABLET, ORALLY DISINTEGRATING ORAL EVERY 30 MIN PRN
Status: DISCONTINUED | OUTPATIENT
Start: 2024-01-24 | End: 2024-01-24 | Stop reason: HOSPADM

## 2024-01-24 RX ORDER — POLYETHYLENE GLYCOL 3350 17 G/17G
17 POWDER, FOR SOLUTION ORAL DAILY
Status: DISCONTINUED | OUTPATIENT
Start: 2024-01-24 | End: 2024-01-30 | Stop reason: HOSPADM

## 2024-01-24 RX ORDER — CEFTRIAXONE 1 G/1
1 INJECTION, POWDER, FOR SOLUTION INTRAMUSCULAR; INTRAVENOUS ONCE
Status: COMPLETED | OUTPATIENT
Start: 2024-01-24 | End: 2024-01-24

## 2024-01-24 RX ORDER — AMOXICILLIN 250 MG
1 CAPSULE ORAL 2 TIMES DAILY PRN
Status: DISCONTINUED | OUTPATIENT
Start: 2024-01-24 | End: 2024-01-30 | Stop reason: HOSPADM

## 2024-01-24 RX ORDER — GLYCOPYRROLATE 0.2 MG/ML
INJECTION, SOLUTION INTRAMUSCULAR; INTRAVENOUS PRN
Status: DISCONTINUED | OUTPATIENT
Start: 2024-01-24 | End: 2024-01-24

## 2024-01-24 RX ORDER — HYDRALAZINE HYDROCHLORIDE 20 MG/ML
5-10 INJECTION INTRAMUSCULAR; INTRAVENOUS EVERY 10 MIN PRN
Status: DISCONTINUED | OUTPATIENT
Start: 2024-01-24 | End: 2024-01-24 | Stop reason: HOSPADM

## 2024-01-24 RX ORDER — LABETALOL HYDROCHLORIDE 5 MG/ML
10 INJECTION, SOLUTION INTRAVENOUS
Status: DISCONTINUED | OUTPATIENT
Start: 2024-01-24 | End: 2024-01-24 | Stop reason: HOSPADM

## 2024-01-24 RX ORDER — LIDOCAINE HYDROCHLORIDE 20 MG/ML
INJECTION, SOLUTION INFILTRATION; PERINEURAL PRN
Status: DISCONTINUED | OUTPATIENT
Start: 2024-01-24 | End: 2024-01-24

## 2024-01-24 RX ORDER — PROPOFOL 10 MG/ML
INJECTION, EMULSION INTRAVENOUS PRN
Status: DISCONTINUED | OUTPATIENT
Start: 2024-01-24 | End: 2024-01-24

## 2024-01-24 RX ORDER — DORZOLAMIDE HYDROCHLORIDE AND TIMOLOL MALEATE 20; 5 MG/ML; MG/ML
1 SOLUTION/ DROPS OPHTHALMIC 2 TIMES DAILY
Status: DISCONTINUED | OUTPATIENT
Start: 2024-01-24 | End: 2024-01-30 | Stop reason: HOSPADM

## 2024-01-24 RX ORDER — ACETAMINOPHEN 325 MG/1
650 TABLET ORAL EVERY 4 HOURS PRN
Status: DISCONTINUED | OUTPATIENT
Start: 2024-01-24 | End: 2024-01-30 | Stop reason: HOSPADM

## 2024-01-24 RX ORDER — CEFTRIAXONE 2 G/1
2 INJECTION, POWDER, FOR SOLUTION INTRAMUSCULAR; INTRAVENOUS EVERY 24 HOURS
Status: DISCONTINUED | OUTPATIENT
Start: 2024-01-24 | End: 2024-01-29

## 2024-01-24 RX ORDER — ONDANSETRON 4 MG/1
4 TABLET, ORALLY DISINTEGRATING ORAL EVERY 6 HOURS PRN
Status: DISCONTINUED | OUTPATIENT
Start: 2024-01-24 | End: 2024-01-30 | Stop reason: HOSPADM

## 2024-01-24 RX ORDER — LIDOCAINE 40 MG/G
CREAM TOPICAL
Status: DISCONTINUED | OUTPATIENT
Start: 2024-01-24 | End: 2024-01-30 | Stop reason: HOSPADM

## 2024-01-24 RX ORDER — SERTRALINE HYDROCHLORIDE 25 MG/1
25 TABLET, FILM COATED ORAL DAILY
Status: DISCONTINUED | OUTPATIENT
Start: 2024-01-24 | End: 2024-01-30 | Stop reason: HOSPADM

## 2024-01-24 RX ORDER — KETOROLAC TROMETHAMINE 15 MG/ML
15 INJECTION, SOLUTION INTRAMUSCULAR; INTRAVENOUS
Status: DISCONTINUED | OUTPATIENT
Start: 2024-01-24 | End: 2024-01-24 | Stop reason: HOSPADM

## 2024-01-24 RX ORDER — HYDROMORPHONE HYDROCHLORIDE 1 MG/ML
0.5 INJECTION, SOLUTION INTRAMUSCULAR; INTRAVENOUS; SUBCUTANEOUS EVERY 5 MIN PRN
Status: DISCONTINUED | OUTPATIENT
Start: 2024-01-24 | End: 2024-01-24 | Stop reason: HOSPADM

## 2024-01-24 RX ORDER — BUPROPION HYDROCHLORIDE 150 MG/1
300 TABLET ORAL EVERY MORNING
Status: DISCONTINUED | OUTPATIENT
Start: 2024-01-25 | End: 2024-01-30 | Stop reason: HOSPADM

## 2024-01-24 RX ORDER — ONDANSETRON 2 MG/ML
INJECTION INTRAMUSCULAR; INTRAVENOUS PRN
Status: DISCONTINUED | OUTPATIENT
Start: 2024-01-24 | End: 2024-01-24

## 2024-01-24 RX ADMIN — GLYCOPYRROLATE 0.1 MG: 0.2 INJECTION, SOLUTION INTRAMUSCULAR; INTRAVENOUS at 06:45

## 2024-01-24 RX ADMIN — SERTRALINE HYDROCHLORIDE 25 MG: 25 TABLET ORAL at 14:15

## 2024-01-24 RX ADMIN — FENTANYL CITRATE 50 MCG: 50 INJECTION INTRAMUSCULAR; INTRAVENOUS at 06:39

## 2024-01-24 RX ADMIN — PHENYLEPHRINE HYDROCHLORIDE 150 MCG: 10 INJECTION INTRAVENOUS at 06:43

## 2024-01-24 RX ADMIN — DEXAMETHASONE SODIUM PHOSPHATE 4 MG: 4 INJECTION, SOLUTION INTRA-ARTICULAR; INTRALESIONAL; INTRAMUSCULAR; INTRAVENOUS; SOFT TISSUE at 06:39

## 2024-01-24 RX ADMIN — SODIUM CHLORIDE, POTASSIUM CHLORIDE, SODIUM LACTATE AND CALCIUM CHLORIDE: 600; 310; 30; 20 INJECTION, SOLUTION INTRAVENOUS at 06:36

## 2024-01-24 RX ADMIN — PROPOFOL 120 MG: 10 INJECTION, EMULSION INTRAVENOUS at 06:39

## 2024-01-24 RX ADMIN — ONDANSETRON 4 MG: 2 INJECTION INTRAMUSCULAR; INTRAVENOUS at 06:39

## 2024-01-24 RX ADMIN — LIDOCAINE HYDROCHLORIDE 50 MG: 20 INJECTION, SOLUTION INFILTRATION; PERINEURAL at 06:39

## 2024-01-24 RX ADMIN — ACETAMINOPHEN 650 MG: 325 TABLET, FILM COATED ORAL at 21:04

## 2024-01-24 RX ADMIN — DORZOLAMIDE HYDROCHLORIDE AND TIMOLOL MALEATE 1 DROP: 20; 5 SOLUTION/ DROPS OPHTHALMIC at 20:24

## 2024-01-24 RX ADMIN — PHENYLEPHRINE HYDROCHLORIDE 150 MCG: 10 INJECTION INTRAVENOUS at 06:50

## 2024-01-24 RX ADMIN — CEFTRIAXONE 2 G: 2 INJECTION, POWDER, FOR SOLUTION INTRAMUSCULAR; INTRAVENOUS at 21:05

## 2024-01-24 RX ADMIN — PHENYLEPHRINE HYDROCHLORIDE 150 MCG: 10 INJECTION INTRAVENOUS at 06:45

## 2024-01-24 RX ADMIN — CEFTRIAXONE 1 G: 1 INJECTION, POWDER, FOR SOLUTION INTRAMUSCULAR; INTRAVENOUS at 03:15

## 2024-01-24 ASSESSMENT — ACTIVITIES OF DAILY LIVING (ADL)
ADLS_ACUITY_SCORE: 39
ADLS_ACUITY_SCORE: 35
ADLS_ACUITY_SCORE: 37
ADLS_ACUITY_SCORE: 37
ADLS_ACUITY_SCORE: 39

## 2024-01-24 NOTE — H&P
History and Physical     Riddhi Aguilar MRN# [id number]   YOB: 1931 Age: 89 year old      Date of Admission:  1/23/2024    Primary care provider: Shun Prado          Assessment and Plan:     Summary of Stay: Riddhi Aguilar is a 89 year old female with a history of  htn/hlp, hx of GIB 2/2 duodenal ulcer, chronic macrocytic anemia in the 10-11 range, CKD 3 with baseline creat  1-1.2 range, admitted on 1/23/2024 with left sided pyelonephritis in association with a 2 mm proximal obstructing ureteral stone    Around 2 pm today she had abrupt onset of left sided flank pain.  At times the flank pain was so severe that it led to nausea with dry heaving.  She had some tactile fevers as well.  She's noticed that her urine has been cloudy and smelling off for the past 2 days.    Otherwise has been in her usual state of health, although does relate that 2 weeks ago had similar pain which resolved on its own.  This pain persisted and so she came into the ER to be evaluated     ER temp is climbing at 99.7 for Tmax, BPs have been ok, sats dropped and she's requiring some supplemental oxygen to keep her sats mid 90's.  CMP with baseline kidney function bun/creat 27/1.16, Tbili is up at 2.1 with direct fraction of 0.51 but LFTs wnl.    CBC with leukocytosis 12.5 with neutrophilia, hgb stable at 10.6    Lactic acid 2.7->3.0 despite 1 L NS    UA with large blood/118 rbcs, small LE/62 wbcs    Covid/flu/rsv negative     CT a/p 2 mm obstructing stone proximal left ureter resulting in moderate hydronephrosis and perinephric stranding.Several additional small stones both kidneys, and tiny layering stones in the bladder.Mild diffuse bladder wall thickening, nonspecific, but can be seen with cystitis. Clinical correlation recommended Severe atherosclerotic disease    Urology contacted from the ER, they plan on taking her for cysto in the am     When I see her in the ER her pain has completely resolved     Problem List:    Pyelonephritis  Obstructing L ureteral stone with mod hydro  Severe sepsis   Urology contacted from the ER given sepsis in one with obstructing ureteral stone and sepsis.  They plan to take her for cysto and stone removal first thing in the am. Somewhat concerning given rise in lactic acid and new hypoxic respiratory failure, CXR with vascular congestion (see below).  Her hypoxia will likely limit our ability to cont with aggressive IVF.  Interestingly her pain is completely gone now which could represent passage or at least movement of the stone such that it's not obstructing anymore   -marianela lactate  -ceftriaxone 2 gm every day for possible bacteremia  -urology consultation for cysto and definitive management of obstructing stone  -strain urine    Hypoxic respiratory failure   Murmur on exam seems most consistent with Aortic Stenosis although could be   CXR looks like pulmonary edema to me either from fluids or capillary leak in the setting of sepsis.   No diuretics at this stage given sepsis   Supplemental oxygen as needed  -ck echo given murmur and pulmonary edema to assess EF and help with future fluid needs     Elevated bilirubin  LFTs wnl.    Bili normal when last measured in 2018.  Abdominal exam is benign for me although does comment on a 2 weeks history of abdominal distension     Htn/hlp  Await med rec , will hold off on both at this time given severe sepsis    osteoporosis  Await med rec     DVT Prophylaxis: Pneumatic Compression Devices  Code Status: DNR / DNI discussed with patient and dtr at the bedside  Functional Status: lives in independent living at [facility], ambulates with a walker   [place]: not needed  Access:   PIV              Time spent 75 minutes reviewing epic including notes/labs/prior hx, current medications.  In addition to interviewing and examining the patient, updated patient and family regarding plan of care          Chief Complaint:     Abrupt onset of left flank pain         History of Present Illness:   Riddhi Aguilar is a 89 year old female with a history of  htn/hlp, hx of GIB 2/2 duodenal ulcer, chronic macrocytic anemia in the 10-11 range, CKD 3 with baseline creat  1-1.2 range, admitted on 1/23/2024 with left sided pyelonephritis in association with a 2 mm proximal obstructing ureteral stone    Around 2 pm today she had abrupt onset of left sided flank pain.  At times the flank pain was so severe that it led to nausea with dry heaving.  She had some tactile fevers as well.  She's noticed that her urine has been cloudy and smelling off for the past 2 days.    Otherwise has been in her usual state of health, although does relate that 2 weeks ago had similar pain which resolved on its own.  This pain persisted and so she came into the ER to be evaluated     ER temp is climbing at 99.7 for Tmax, BPs have been ok, sats dropped and she's requiring some supplemental oxygen to keep her sats mid 90's.  CMP with baseline kidney function bun/creat 27/1.16, Tbili is up at 2.1 with direct fraction of 0.51 but LFTs wnl.    CBC with leukocytosis 12.5 with neutrophilia, hgb stable at 10.6    Lactic acid 2.7->3.0 despite 1 L NS    UA with large blood/118 rbcs, small LE/62 wbcs    Covid/flu/rsv negative     CT a/p 2 mm obstructing stone proximal left ureter resulting in moderate hydronephrosis and perinephric stranding.Several additional small stones both kidneys, and tiny layering stones in the bladder.Mild diffuse bladder wall thickening, nonspecific, but can be seen with cystitis. Clinical correlation recommended Severe atherosclerotic disease    Urology contacted from the ER, they plan on taking her for cysto in the am     When I see her in the ER her pain has completely resolved       The history is obtained in discussion with the ER provider  Dr Lang, the patient with excellent reliability     Epic and Care everywhere were extensively reviewed        Past Medical History:     Past Medical  History:   Diagnosis Date    Anemia     baseline 10-11    Arthritis     Bleeding ulcer     duodenal ulcer    CKD (chronic kidney disease) stage 3, GFR 30-59 ml/min (H)     Essential hypertension, benign     Osteoporosis     Pure hypercholesterolemia     Spinal stenosis     Unspecified cataract     Unspecified glaucoma(365.9)              Past Surgical History:     Past Surgical History:   Procedure Laterality Date    APPENDECTOMY      BREAST SURGERY      lumpectomy    DILATION AND CURETTAGE, HYSTEROSCOPY DIAGNOSTIC, COMBINED  02/06/2014    Procedure: COMBINED DILATION AND CURETTAGE, HYSTEROSCOPY DIAGNOSTIC;  DILATION AND CURETTAGE, HYSTEROSCOPY, POLYPECTOMY, INCISION AND DRAINAGE OF SEBACEOUS CYST ;  Surgeon: Serena Zamora MD;  Location:  [facility]    EXCISE LESION LOWER EXTREMITY Right 08/29/2017    Procedure: EXCISE LESION LOWER EXTREMITY;  WIDE EXCISIONAL BIOPSY OF RIGHT ANKLE BASAL CELL CARCINOMA;  Surgeon: Juan Luis Flores MD;  Location:  OR    INCISION AND DRAINAGE PERINEAL, COMBINED  02/06/2014    Procedure: COMBINED INCISION AND DRAINAGE PERINEAL;;  Surgeon: Serena Zamora MD;  Location:  [facility]    ZZC NONSPECIFIC PROCEDURE      Repair of buckled retina of rt eye    [facility] NONSPECIFIC PROCEDURE      Appy             Social History:     Social History     Tobacco Use    Smoking status: Never    Smokeless tobacco: Never   Substance Use Topics    Alcohol use: No     Alcohol/week: 0.0 standard drinks of alcohol             Family History:     Family History   Problem Relation Age of Onset    Breast Cancer Sister     Breast Cancer Sister     Cancer Brother         bone cancer in arm            Allergies:   No Known Allergies          Medications:   Await formal med rec             Review of Systems:     A Comprehensive greater than 10 system review of systems was carried out.  Pertinent positives and negatives are noted above.  Otherwise negative for contributory information.           Physical Exam:   Blood  pressure 128/57, pulse 93, temperature 99.7  F (37.6  C), temperature source Oral, resp. rate 20, SpO2 92%, not currently breastfeeding.  Exam:    General:  Pleasant nad looks stated age  HEENT:  Head nc/at sclera clear PERRL OP with moist mm Neck is supple  Lungs: cta b nl effort   CV:  RRR no 3/6 SUNNY r/g trace-1+ chronic le edema  Abd:  S/nd, mild distension  no r/g  Neuro:  Cn 2-12 grossly intact and coffman  Alert and oriented affect appropriate   Skin:  W/d no c/c               Data:     Results for orders placed or performed during the hospital encounter of 01/23/24   Abd/pelvis CT no contrast - Stone Protocol     Status: None    Narrative    EXAM: CT ABDOMEN PELVIS W/O CONTRAST  LOCATION: [facility]  DATE: 1/23/2024    INDICATION: flank pain  COMPARISON: None.  TECHNIQUE: CT scan of the abdomen and pelvis was performed without IV contrast. Multiplanar reformats were obtained. Dose reduction techniques were used.  CONTRAST: None.    FINDINGS:   LOWER CHEST: Interstitial densities both lower lung fields could represent edema or fibrotic changes. Tiny calcified granuloma right middle lobe. Dense calcification of the mitral annulus. Small esophageal hiatal hernia.    HEPATOBILIARY: Normal.    PANCREAS: Normal.    SPLEEN: Normal.    ADRENAL GLANDS: Normal.    KIDNEYS/BLADDER: 2 mm obstructing stone proximal left ureter on image 90 of series 3 resulting in moderate hydronephrosis and left perinephric stranding. 3 additional tiny calyceal tip stones in the left kidney. 2 calyceal tip nonobstructing stones right   kidney, the largest measuring 3 mm. Several tiny stones layering within the dependent portion of the bladder. Mild diffuse bladder wall thickening.     BOWEL: No evidence for bowel obstruction. No bowel wall thickening or inflammatory changes.    LYMPH NODES: Normal.    VASCULATURE: Advanced atherosclerotic disease abdominal aorta and iliac arteries.    PELVIC ORGANS:  Normal.    MUSCULOSKELETAL: Prominent lower anterior abdominal wall pannus. Tiny fat-containing ventral wall hernia. Advanced degenerative disc and facet disease lower thoracic and lumbar spine. Presumed old L3 vertebral body compression fracture. Osteopenia.   Degenerative changes both hips.      Impression    IMPRESSION:   1.  2 mm obstructing stone proximal left ureter resulting in moderate hydronephrosis and perinephric stranding.  2.  Several additional small stones both kidneys, and tiny layering stones in the bladder.  3.  Mild diffuse bladder wall thickening, nonspecific, but can be seen with cystitis. Clinical correlation recommended.  4.  Severe atherosclerotic disease.     XR Chest Port 1 View     Status: None    Narrative    EXAM: XR CHEST PORT 1 VIEW  LOCATION: [facility]  DATE: 1/23/2024    INDICATION: Hypoxia  COMPARISON: 01/16/2010      Impression    IMPRESSION: Interval development of interstitial and alveolar infiltrates involving the mid to lower lungs, greater left lung base. Findings can be seen with acute pneumonitis. Acute pneumonitis associated with chronic underlying pulmonary fibrosis can   also have this appearance. Cardiac enlargement with mild pulmonary vascular congestion and interstitial opacities compatible with a component of CHF/volume overload. Atherosclerotic vascular calcification. Dense mitral valve calcification. Degenerative   changes in the spine and both shoulders.   Basic metabolic panel     Status: Abnormal   Result Value Ref Range    Sodium 137 135 - 145 mmol/L    Potassium 4.3 3.4 - 5.3 mmol/L    Chloride 100 98 - 107 mmol/L    Carbon Dioxide (CO2) 23 22 - 29 mmol/L    Anion Gap 14 7 - 15 mmol/L    Urea Nitrogen 27.0 (H) 8.0 - 23.0 mg/dL    Creatinine 1.16 (H) 0.51 - 0.95 mg/dL    GFR Estimate 44 (L) >60 mL/min/1.73m2    Calcium 9.8 (H) 8.2 - 9.6 mg/dL    Glucose 93 70 - 99 mg/dL   UA with Microscopic reflex to Culture     Status: Abnormal     Specimen: Urine, Catheter   Result Value Ref Range    Color Urine Yellow Colorless, Straw, Light Yellow, Yellow    Appearance Urine Cloudy (A) Clear    Glucose Urine Negative Negative mg/dL    Bilirubin Urine Negative Negative    Ketones Urine Negative Negative mg/dL    Specific Gravity Urine 1.015 1.003 - 1.035    Blood Urine Large (A) Negative    pH Urine 7.0 5.0 - 7.0    Protein Albumin Urine 50 (A) Negative mg/dL    Urobilinogen Urine Normal Normal, 2.0 mg/dL    Nitrite Urine Negative Negative    Leukocyte Esterase Urine Small (A) Negative    Bacteria Urine Few (A) None Seen /HPF    Mucus Urine Present (A) None Seen /LPF    Amorphous Crystals Urine Many (A) None Seen /HPF    RBC Urine 118 (H) <=2 /HPF    WBC Urine 62 (H) <=5 /HPF    Narrative    Urine Culture ordered based on laboratory criteria   Lactic acid whole blood     Status: Abnormal   Result Value Ref Range    Lactic Acid 2.7 (H) 0.7 - 2.0 mmol/L   Symptomatic Influenza A/B, RSV, & SARS-CoV2 PCR (COVID-19) Nasopharyngeal     Status: Normal    Specimen: Nasopharyngeal; Swab   Result Value Ref Range    Influenza A PCR Negative Negative    Influenza B PCR Negative Negative    RSV PCR Negative Negative    SARS CoV2 PCR Negative Negative    Narrative    Testing was performed using the Xpert Xpress CoV2/Flu/RSV Assay on the [employer] GeneXpert Instrument. This test should be ordered for the detection of SARS-CoV-2, influenza, and RSV viruses in individuals who meet clinical and/or epidemiological criteria. Test performance is unknown in asymptomatic patients. This test is for in vitro diagnostic use under the FDA EUA for laboratories certified under CLIA to perform high or moderate complexity testing. This test has not been FDA cleared or approved. A negative result does not rule out the presence of PCR inhibitors in the specimen or target RNA in concentration below the limit of detection for the assay. If only one viral target is positive but coinfection with  multiple targets is suspected, the sample should be re-tested with another FDA cleared, approved, or authorized test, if coinfection would change clinical management. This test was validated by the [facility]. These laboratories are certified under the Clinical Laboratory Improvement Amendments of 1988 (CLIA-88) as qualified to perform high complexity laboratory testing.   CBC with platelets and differential     Status: Abnormal   Result Value Ref Range    WBC Count 12.5 (H) 4.0 - 11.0 10e3/uL    RBC Count 3.10 (L) 3.80 - 5.20 10e6/uL    Hemoglobin 10.6 (L) 11.7 - 15.7 g/dL    Hematocrit 32.7 (L) 35.0 - 47.0 %     (H) 78 - 100 fL    MCH 34.2 (H) 26.5 - 33.0 pg    MCHC 32.4 31.5 - 36.5 g/dL    RDW 22.7 (H) 10.0 - 15.0 %    Platelet Count 275 150 - 450 10e3/uL    % Neutrophils      % Lymphocytes      % Monocytes      % Eosinophils      % Basophils      % Immature Granulocytes      NRBCs per 100 WBC 4 (H) <1 /100    Absolute Neutrophils      Absolute Lymphocytes      Absolute Monocytes      Absolute Eosinophils      Absolute Basophils      Absolute Immature Granulocytes      Absolute NRBCs 0.5 10e3/uL   Hepatic panel     Status: Abnormal   Result Value Ref Range    Protein Total 7.2 6.4 - 8.3 g/dL    Albumin 4.8 3.5 - 5.2 g/dL    Bilirubin Total 2.1 (H) <=1.2 mg/dL    Alkaline Phosphatase 68 40 - 150 U/L    AST 33 0 - 45 U/L    ALT 16 0 - 50 U/L    Bilirubin Direct 0.55 (H) 0.00 - 0.30 mg/dL   Lipase     Status: Abnormal   Result Value Ref Range    Lipase 10 (L) 13 - 60 U/L   Lactic acid whole blood     Status: Abnormal   Result Value Ref Range    Lactic Acid 3.0 (H) 0.7 - 2.0 mmol/L   Manual Differential     Status: Abnormal   Result Value Ref Range    % Neutrophils 99 %    % Lymphocytes 1 %    % Monocytes 0 %    % Eosinophils 0 %    % Basophils 0 %    NRBCs per 100 WBC 5 (H) <=0 %    Absolute Neutrophils 12.4 (H) 1.6 - 8.3 10e3/uL    Absolute Lymphocytes 0.1 (L) 0.8 - 5.3 10e3/uL     Absolute Monocytes 0.0 0.0 - 1.3 10e3/uL    Absolute Eosinophils 0.0 0.0 - 0.7 10e3/uL    Absolute Basophils 0.0 0.0 - 0.2 10e3/uL    Absolute NRBCs 0.6 (H) <=0.0 10e3/uL    RBC Morphology Confirmed RBC Indices     Platelet Assessment  Automated Count Confirmed. Platelet morphology is normal.     Automated Count Confirmed. Platelet morphology is normal.    Elliptocytes Slight (A) None Seen    Target Cells Slight (A) None Seen   CBC with platelets differential     Status: Abnormal    Narrative    The following orders were created for panel order CBC with platelets differential.  Procedure                               Abnormality         Status                     ---------                               -----------         ------                     CBC with platelets and d...[[id number]]  Abnormal            Final result               Manual Differential[[id number]]          Abnormal            Final result                 Please view results for these tests on the individual orders.

## 2024-01-24 NOTE — OP NOTE
OPERATIVE REPORT  DATE OF SURGERY: 01/24/24  LOCATION OF SURGERY: [facility]  PREOPERATIVE DIAGNOSIS:  (N20.1) Left ureteral stone  (primary encounter diagnosis)  (N20.0) Kidney stone  (N39.0,  R31.9) Urinary tract infection with hematuria, site unspecified  (A41.9) Sepsis, due to unspecified organism, unspecified whether acute organ dysfunction present (H)  POSTOPERATIVE DIAGNOSIS: (N20.1) Left ureteral stone  (primary encounter diagnosis)  (N20.0) Kidney stone  (N39.0,  R31.9) Urinary tract infection with hematuria, site unspecified  (A41.9) Sepsis, due to unspecified organism, unspecified whether acute organ dysfunction present (H)    START TIME: 6:47 AM  END TIME: 6:55 AM    PROCEDURE PERFORMED:   1. Cystoscopy  2. LEFT retrograde pyelogram  3. LEFT JJ stent placement  4. <1hr physician fluoroscopy time      STAFF SURGEON: Aureliano Brandt MD  ANESTHESIA: General.   ESTIMATED BLOOD LOSS: 0 mL.   DRAINS AND TUBES: LEFT 6fr x 24cm ureteral stent, 16fr rivera catheter  COMPLICATIONS: None.   DISPOSITION: PACU.   SPECIMENS OBTAINED: None  SIGNIFICANT FINDINGS: LEFT ureteral stent placed     HISTORY OF PRESENT ILLNESS: Riddhi Aguilar is a 89 year old female who is being seen for evaluation of 2 to 3 mm stone in the left proximal ureter with concern for UTI and concomitant sepsis from urinary source.  She developed acute onset of left-sided flank pain around 2 PM on 1/23/2024.  She had some associated nausea and dry heaving for this.  She is also noted cloudy and foul-smelling urine for the last 2 days.  She presented to the emergency room where she was found to have fairly stable vital signs with an elevated lactic acid which artem slightly from 2.7-3.0 despite 1 L of normal saline.  Urinalysis concerning for possible UTI.  Serum creatinine baseline.  CT scan obtained.     OPERATION PERFORMED:   Informed consent was obtained and the patient was brought to the operating room where general anesthesia was induced. The  patient was given appropriate preoperative antibiotics and positioned supine. The patient was then repositioned in dorsal lithotomy with all pressure points padded. We then performed a timeout, verifying the correct patient's site and procedure to be performed.    22 Emirati cystoscope inserted atraumatically into the bladder.  She was noted to have significant cloudy urine within the bladder.  The left ureteral orifice identified and cannulated with a 5 Emirati open-ended catheter which was advanced to the mid ureter and a gentle retrograde pyelogram was performed with evidence of mild hydronephrosis.  A 0.035 sensor wire was advanced up to the renal pelvis and the open-ended catheter was removed.  A 6 Emirati by 24 cm JJ ureteral stent was advanced over the wire with good curl noted in the renal pelvis fluoroscopically and confirmed in the bladder cystoscopically.  The cystoscope was removed and a 16 Emirati Martines catheter was placed with 10 cc balloon.  She was emerged from anesthesia and taken to the recovery room in stable condition.    Plan:  - Maintain Martines catheter to gravity drainage today  - Continue broad-spectrum antibiotics pending her urine culture  - Given the significant elevation of her leukocytosis dissipate she will require 1 to 2 days of hospitalization  -She will require a second procedure for stone removal in the near future and we will coordinate this on an outpatient basis.    Aureliano Brandt MD   Urology  [facility]  Clinic Phone [phone]

## 2024-01-24 NOTE — PROGRESS NOTES
[facility]  Hospitalist Progress Note  Amari Hooks MD 01/24/24    Reason for Stay (Diagnosis): Gram-negative sepsis, UTI with obstructing stone         Assessment and Plan:      Summary of Stay: Riddhi Aguilar is a 89 year old female with past medical history including hypertension, duodenal ulcer, chronic anemia with baseline of around 10, CKD 3 admitted on 1/23/2024 with left-sided pyelonephritis with a 2 mm proximal obstructing ureteral stone found to have septic shock.    Here in the ER she had low-grade fevers.  Kidney function was around baseline with creatinine of 1.16 and CBC showed leukocytosis of 12.5 K with neutrophilic predominance.  UA showed large blood and positive leukocyte esterase and 62 white cells.  Lactic acid was elevated at 3.0.  CT scan showed a 2 mm obstructing left ureteral stone with moderate hydronephrosis and perinephric stranding.  There were multiple additional small stones in both kidneys.    Urology was contacted and she underwent cystoscopy with left-sided ureteral stent placement early 1/24.  She remains admitted for IV antibiotics and supportive cares following this.    Blood culture has since returned positive for gram-negative rods.  White blood count artem up to 41 K and she was initially somewhat hypotensive but now hemodynamics are improving which is reassuring.    Problem List/Assessment and Plan:   Septic shock due to pyelonephritis/urinary tract infection with gram-negative bacteremia, left ureteral stone: As above  --Continue IV ceftriaxone  --White blood count up to 41 K perioperatively, recheck in the morning  --Monitor hemodynamics closely, hypotension improved throughout the day  --Await blood cultures, urine culture  --Urology following  --Martines catheter currently in place, defer to urology regarding timing of removal in this case    2.   Acute hypoxic respiratory failure: Potentially related to sepsis.  Chest x-ray did demonstrate some  evidence of pulmonary edema though it is unclear if this is cardiogenic or noncardiogenic.  --Await echocardiogram  --Balancing fluid resuscitation with risk for fluid overload.  Currently holding off on further IV maintenance fluids but also not giving diuretics.    3.   History of hypertension: Holding home verapamil, spironolactone, lisinopril given hypotension.  Resume once more stable.    4.   History of anxiety/depression: Resume home Wellbutrin and sertraline      DVT Prophylaxis: Pneumatic Compression Devices  Code Status: DNR / DNI  Discharge Dispo/Date: Likely 2 or 3 days more here.    Clinically Significant Risk Factors Present on Admission                  # Hypertension: Noted on problem list                       Interval History (Subjective):      Status post cystoscopy and stent placement this morning  Initially was somewhat hypotensive postoperatively but felt okay without lightheadedness or dizziness  Blood culture positive for gram-negative rods  Remains on IV ceftriaxone  Updated family at the bedside                  Physical Exam:      Last Vital Signs:  /49 (BP Location: Right arm, Patient Position: Semi-Jones's, Cuff Size: Adult Regular)   Pulse 85   Temp 97.8  F (36.6  C) (Oral)   Resp 20   Wt 78 kg (172 lb)   SpO2 97%   BMI 32.50 kg/m        Intake/Output Summary (Last 24 hours) at 1/24/2024 1349  Last data filed at 1/24/2024 1300  Gross per 24 hour   Intake 1400 ml   Output 225 ml   Net 1175 ml       General: Alert, awake, no acute distress.  HEENT: NC/AT, eyes anicteric, external occular movements intact, face symmetric.    Cardiac: 2 out of 6 systolic murmur RRR, S1, S2.  No murmurs appreciated.  Pulmonary: Normal chest rise, normal work of breathing.  Lungs CTA BL  Abdomen:  soft, non-tender, non-distended.  Bowel Sounds Present.  No guarding.  Extremities: no deformities.  Warm, well perfused.  Skin: no rashes or lesions noted.  Warm and Dry.  Neuro: No focal deficits  "noted.  Speech clear.  Coordination and strength grossly normal.  Psych: Appropriate affect.         Medications:      All current medications were reviewed with changes reflected in problem list.         Data:      All new lab and imaging data was reviewed.   Labs:  No results for input(s): \"CULT\" in the last 168 hours.  Recent Labs   Lab 01/24/24  0608   *   POTASSIUM 4.0   CHLORIDE 101   CO2 21*   ANIONGAP 12   GLC 87   BUN 34.7*   CR 1.43*   GFRESTIMATED 34*   TAMMY 8.7     Recent Labs   Lab 01/24/24  0608   WBC 41.3*   HGB 8.8*   HCT 26.5*   *         Imaging:   Recent Results (from the past 48 hour(s))   Abd/pelvis CT no contrast - Stone Protocol    Narrative    EXAM: CT ABDOMEN PELVIS W/O CONTRAST  LOCATION: [facility] [place]  DATE: 1/23/2024    INDICATION: flank pain  COMPARISON: None.  TECHNIQUE: CT scan of the abdomen and pelvis was performed without IV contrast. Multiplanar reformats were obtained. Dose reduction techniques were used.  CONTRAST: None.    FINDINGS:   LOWER CHEST: Interstitial densities both lower lung fields could represent edema or fibrotic changes. Tiny calcified granuloma right middle lobe. Dense calcification of the mitral annulus. Small esophageal hiatal hernia.    HEPATOBILIARY: Normal.    PANCREAS: Normal.    SPLEEN: Normal.    ADRENAL GLANDS: Normal.    KIDNEYS/BLADDER: 2 mm obstructing stone proximal left ureter on image 90 of series 3 resulting in moderate hydronephrosis and left perinephric stranding. 3 additional tiny calyceal tip stones in the left kidney. 2 calyceal tip nonobstructing stones right   kidney, the largest measuring 3 mm. Several tiny stones layering within the dependent portion of the bladder. Mild diffuse bladder wall thickening.     BOWEL: No evidence for bowel obstruction. No bowel wall thickening or inflammatory changes.    LYMPH NODES: Normal.    VASCULATURE: Advanced atherosclerotic disease abdominal aorta and iliac " arteries.    PELVIC ORGANS: Normal.    MUSCULOSKELETAL: Prominent lower anterior abdominal wall pannus. Tiny fat-containing ventral wall hernia. Advanced degenerative disc and facet disease lower thoracic and lumbar spine. Presumed old L3 vertebral body compression fracture. Osteopenia.   Degenerative changes both hips.      Impression    IMPRESSION:   1.  2 mm obstructing stone proximal left ureter resulting in moderate hydronephrosis and perinephric stranding.  2.  Several additional small stones both kidneys, and tiny layering stones in the bladder.  3.  Mild diffuse bladder wall thickening, nonspecific, but can be seen with cystitis. Clinical correlation recommended.  4.  Severe atherosclerotic disease.     XR Chest Port 1 View    Narrative    EXAM: XR CHEST PORT 1 VIEW  LOCATION: [facility]  DATE: 1/23/2024    INDICATION: Hypoxia  COMPARISON: 01/16/2010      Impression    IMPRESSION: Interval development of interstitial and alveolar infiltrates involving the mid to lower lungs, greater left lung base. Findings can be seen with acute pneumonitis. Acute pneumonitis associated with chronic underlying pulmonary fibrosis can   also have this appearance. Cardiac enlargement with mild pulmonary vascular congestion and interstitial opacities compatible with a component of CHF/volume overload. Atherosclerotic vascular calcification. Dense mitral valve calcification. Degenerative   changes in the spine and both shoulders.   XR Surgery HEYDI L/T 5 Min Fluoro w Stills    Narrative    This exam was marked as non-reportable because it will not be read by a   radiologist or a [place] non-radiologist provider.               Amari Hooks MD    I've spent 50 minutes in chart review, ordering medications and tests, obtaining additional history as needed, evaluating the patient and in documentation for this encounter.

## 2024-01-24 NOTE — PLAN OF CARE
Goal Outcome Evaluation:      Plan of Care Reviewed With: patient    Overall Patient Progress: improvingOverall Patient Progress: improving     Pt is A&Ox4, VSS. Weaned off oxygen to 1/2L which she tolerated around 94%. Incontinent CHG bath given. Antibiotic administered. NPO at midnight. Denies pain. Stomach distended and passing gas. Daughter in law in at bedside.

## 2024-01-24 NOTE — ED PROVIDER NOTES
History     Chief Complaint:  Abdominal Pain and Flank Pain       HPI   Riddhi Aguilar is a 89 year old female with history of hypertension who presents with left upper quadrant abdominal pain that began around 1400 today. The patient has not taken anything for the pain. She states it occasionally radiates to her left flank. The patient did have some chills in the lobby. The patient's daughter in law reports that she lives alone. She is experiencing a runny nose which she occasionally gets. She denies vomiting, chest pain, shortness of breath, fevers, cough, sore throat, dysuria, diarrhea or other symptoms. No recent antibiotics, hospitalizations or exposure to illness.    Independent Historian:    The patient's son and daughter in law aided in the history noted above.    Review of External Notes:  I reviewed the patient's virtual visit note from 10/31/23.      Medications:    Lipitor   Wellbutrin   Microzide   Zestril   Zoloft   Aldactone     Past Medical History:    Hypertension   Insomnia   Hyperlipidemia   MDD  Morbid obesity   Spinal stenosis   Arthritis     Past Surgical History:    Lumpectomy   Dilation and curettage, hysteroscopy, combined  Excise lesion lower extremity, right  Incision and drainage perineal, combined  Repair of buckled retina of right eye   Appendectomy    Carpal tunnel release   Capsulotomy     Physical Exam   Patient Vitals for the past 24 hrs:   BP Temp Temp src Pulse Resp SpO2   01/23/24 2215 (!) 151/60 -- -- 98 20 94 %   01/23/24 2028 (!) 163/61 -- -- -- -- --   01/23/24 1814 (!) 165/73 -- -- 77 16 100 %   01/23/24 1811 -- 97.7  F (36.5  C) Temporal -- -- --      Physical Exam  Nursing note and vitals reviewed.  Constitutional: Well nourished.   Eyes: Conjunctiva normal.  Pupils are equal, round, and reactive to light.   ENT: Nose normal. Mucous membranes pink and moist.    Neck: Normal range of motion.  CVS: Normal rate, regular rhythm.  Normal heart sounds.    Pulmonary: Lungs  clear to auscultation bilaterally. No wheezes/rales/rhonchi.  GI: Abdomen soft. Mild LUQ tenderness. No rigidity or guarding. Mild L. CVA tenderness    MSK: No calf tenderness or swelling.  Neuro: Alert. Follows simple commands.  Skin: Skin is warm and dry. No rash noted.   Psychiatric: Normal affect.     Emergency Department Course     Imaging:  XR Chest Port 1 View   Final Result   IMPRESSION: Interval development of interstitial and alveolar infiltrates involving the mid to lower lungs, greater left lung base. Findings can be seen with acute pneumonitis. Acute pneumonitis associated with chronic underlying pulmonary fibrosis can    also have this appearance. Cardiac enlargement with mild pulmonary vascular congestion and interstitial opacities compatible with a component of CHF/volume overload. Atherosclerotic vascular calcification. Dense mitral valve calcification. Degenerative    changes in the spine and both shoulders.      Abd/pelvis CT no contrast - Stone Protocol   Final Result   IMPRESSION:    1.  2 mm obstructing stone proximal left ureter resulting in moderate hydronephrosis and perinephric stranding.   2.  Several additional small stones both kidneys, and tiny layering stones in the bladder.   3.  Mild diffuse bladder wall thickening, nonspecific, but can be seen with cystitis. Clinical correlation recommended.   4.  Severe atherosclerotic disease.           Report per radiology    Laboratory:  Labs Ordered and Resulted from Time of ED Arrival to Time of ED Departure   BASIC METABOLIC PANEL - Abnormal       Result Value    Sodium 137      Potassium 4.3      Chloride 100      Carbon Dioxide (CO2) 23      Anion Gap 14      Urea Nitrogen 27.0 (*)     Creatinine 1.16 (*)     GFR Estimate 44 (*)     Calcium 9.8 (*)     Glucose 93     ROUTINE UA WITH MICROSCOPIC REFLEX TO CULTURE - Abnormal    Color Urine Yellow      Appearance Urine Cloudy (*)     Glucose Urine Negative      Bilirubin Urine Negative       Ketones Urine Negative      Specific Gravity Urine 1.015      Blood Urine Large (*)     pH Urine 7.0      Protein Albumin Urine 50 (*)     Urobilinogen Urine Normal      Nitrite Urine Negative      Leukocyte Esterase Urine Small (*)     Bacteria Urine Few (*)     Mucus Urine Present (*)     Amorphous Crystals Urine Many (*)     RBC Urine 118 (*)     WBC Urine 62 (*)    LACTIC ACID WHOLE BLOOD - Abnormal    Lactic Acid 2.7 (*)    CBC WITH PLATELETS AND DIFFERENTIAL - Abnormal    WBC Count 12.5 (*)     RBC Count 3.10 (*)     Hemoglobin 10.6 (*)     Hematocrit 32.7 (*)      (*)     MCH 34.2 (*)     MCHC 32.4      RDW 22.7 (*)     Platelet Count 275      % Neutrophils        % Lymphocytes        % Monocytes        % Eosinophils        % Basophils        % Immature Granulocytes        NRBCs per 100 WBC 4 (*)     Absolute Neutrophils        Absolute Lymphocytes        Absolute Monocytes        Absolute Eosinophils        Absolute Basophils        Absolute Immature Granulocytes        Absolute NRBCs 0.5     HEPATIC FUNCTION PANEL - Abnormal    Protein Total 7.2      Albumin 4.8      Bilirubin Total 2.1 (*)     Alkaline Phosphatase 68      AST 33      ALT 16      Bilirubin Direct 0.55 (*)    LIPASE - Abnormal    Lipase 10 (*)    LACTIC ACID WHOLE BLOOD - Abnormal    Lactic Acid 3.0 (*)    DIFFERENTIAL - Abnormal    % Neutrophils 99      % Lymphocytes 1      % Monocytes 0      % Eosinophils 0      % Basophils 0      NRBCs per 100 WBC 5 (*)     Absolute Neutrophils 12.4 (*)     Absolute Lymphocytes 0.1 (*)     Absolute Monocytes 0.0      Absolute Eosinophils 0.0      Absolute Basophils 0.0      Absolute NRBCs 0.6 (*)     RBC Morphology Confirmed RBC Indices      Platelet Assessment        Value: Automated Count Confirmed. Platelet morphology is normal.    Elliptocytes Slight (*)     Target Cells Slight (*)    INFLUENZA A/B, RSV, & SARS-COV2 PCR - Normal    Influenza A PCR Negative      Influenza B PCR Negative      RSV  PCR Negative      SARS CoV2 PCR Negative     LACTIC ACID WHOLE BLOOD   BLOOD CULTURE   BLOOD CULTURE   URINE CULTURE        Emergency Department Course & Assessments:       Interventions:  Medications   cefTRIAXone (ROCEPHIN) 1 g vial to attach to  mL bag for ADULTS or NS 50 mL bag for PEDS (1 g Intravenous $New Bag 1/23/24 1769)   sodium chloride 0.9% BOLUS 500 mL (has no administration in time range)   sodium chloride 0.9% BOLUS 1,000 mL (0 mLs Intravenous Stopped 1/23/24 2146)   ondansetron (ZOFRAN) injection 4 mg (4 mg Intravenous $Given 1/23/24 2018)   oxyCODONE (ROXICODONE) tablet 5 mg (5 mg Oral $Given 1/23/24 2018)      Assessments:  2010 I obtained history and examined the patient as noted above.       Consultations/Discussion of Management or Tests:  2243 I spoke with Dr. Andrade from Urology resident about the patient's presentation, findings, and plan of care.   11:05 PM I spoke to Dr. Castano, urology attending, who plans to take patient to OR in AM     11:12 PM I spoke with hospitalist Dr. Aguilar about the patient's presentation, findings, and plan of care.     Social Determinants of Health affecting care:  None      Disposition:  The patient was admitted to the hospital under the care of Dr. Aguilar.     Impression & Plan    CMS Diagnoses: Lactate elevated 2/2 to sepsis. No septic shock.     Medical Decision Making:  Patient is a 89-year-old female presenting with predominant complaints of upper abdominal pain/flank pain.  Her workup was initiated from triage given prolonged wait times.  UA does suggest concerns for infection.  Patient also found to have a 2 mm obstructing kidney stone in the left ureter resulting in moderate hydronephrosis.  Labs with noted lactic acidosis.  Despite IV fluids her lactic uptrended.  Her pressures however remain stable.  I have been judicious with IV fluids particularly given obstruction, she is unlikely to clear her lactate.  Moreover during patient's time in the ED she  did require minimal supplemental nasal cannula after IV fluids.  Blood cultures have been sent as well as urine culture.  She was given a dose of IV Rocephin.  I did speak to urology team requesting urgent urologic intervention given concern for septic kidney stone.  They have agreed to place patient on the OR schedule for the early morning.  Patient to be placed in IMC at this point in time for continuity of care.    Diagnosis:    ICD-10-CM    1. Left ureteral stone  N20.1 Case Request: CYSTOSCOPY, WITH RETROGRADE PYELOGRAM AND URETERAL STENT PLACEMENT- LEFT     Case Request: CYSTOSCOPY, WITH RETROGRADE PYELOGRAM AND URETERAL STENT PLACEMENT- LEFT      2. Kidney stone  N20.0       3. Urinary tract infection with hematuria, site unspecified  N39.0     R31.9       4. Sepsis, due to unspecified organism, unspecified whether acute organ dysfunction present (H)  A41.9            Scribe Disclosure:  Wood CONTRERAS, am serving as a scribe at 8:10 PM on 1/23/2024 to document services personally performed by Jalyn Lang DO based on my observations and the provider's statements to me.               Jalyn Lang DO  01/24/24 0207

## 2024-01-24 NOTE — ED TRIAGE NOTES
Pt. Presents to ED with complaints of L sided abdominal pain since around 1400. Reports it does radiate into her flank. Pt. Also reports abdominal distention. Reports she had a BM yesterday but none today. Reports nausea and dry heaving that has since improved. Reports she has had cloudy urine and thought she had a UTI but did not receive treatment. Pt. Is chronically incontinent and will need a cath UA. A & O x 4, independent. Denies blood in her urine or stool. Denies chest pain/SOB.

## 2024-01-24 NOTE — ED NOTES
[facility]  ED Nurse Handoff Report    ED Chief complaint: Abdominal Pain and Flank Pain  . ED Diagnosis:   Final diagnoses:   Kidney stone   Urinary tract infection with hematuria, site unspecified   Sepsis, due to unspecified organism, unspecified whether acute organ dysfunction present (H)       Allergies: No Known Allergies    Code Status: Full Code    Activity level - Baseline/Home:  independent.  Activity Level - Current:   assist of 1.   Lift room needed: No.   Bariatric: No   Needed: No   Isolation: No.   Infection: Not Applicable.     Respiratory status: Nasal cannula    Vital Signs (within 30 minutes):   Vitals:    01/23/24 2215 01/23/24 2245 01/23/24 2300 01/23/24 2311   BP: (!) 151/60 (!) 146/56 128/57    BP Location:       Pulse: 98 90 93    Resp: 20      Temp:    99.7  F (37.6  C)   TempSrc:    Oral   SpO2: 94% 92% 92%        Cardiac Rhythm:  ,      Pain level:    Patient confused: No.   Patient Falls Risk: nonskid shoes/slippers when out of bed and patient and family education.   Elimination Status: Has voided     Patient Report - Initial Complaint: abdominal pain/flank pain.   Focused Assessment: Riddhi Aguilar is a 89 year old female with history of hypertension who presents with left upper quadrant abdominal pain that began around 1400 today. The patient has not taken anything for the pain. She states it occasionally radiates to her left flank. The patient did have some chills in the lobby. The patient's daughter in law reports that she lives alone. She is experiencing a runny nose which she occasionally gets. She denies vomiting, chest pain, shortness of breath, fevers, cough, sore throat, dysuria, diarrhea or other symptoms. No recent antibiotics, hospitalizations or exposure to illness.        Abnormal Results:   Labs Ordered and Resulted from Time of ED Arrival to Time of ED Departure   BASIC METABOLIC PANEL - Abnormal       Result Value    Sodium 137       Potassium 4.3      Chloride 100      Carbon Dioxide (CO2) 23      Anion Gap 14      Urea Nitrogen 27.0 (*)     Creatinine 1.16 (*)     GFR Estimate 44 (*)     Calcium 9.8 (*)     Glucose 93     ROUTINE UA WITH MICROSCOPIC REFLEX TO CULTURE - Abnormal    Color Urine Yellow      Appearance Urine Cloudy (*)     Glucose Urine Negative      Bilirubin Urine Negative      Ketones Urine Negative      Specific Gravity Urine 1.015      Blood Urine Large (*)     pH Urine 7.0      Protein Albumin Urine 50 (*)     Urobilinogen Urine Normal      Nitrite Urine Negative      Leukocyte Esterase Urine Small (*)     Bacteria Urine Few (*)     Mucus Urine Present (*)     Amorphous Crystals Urine Many (*)     RBC Urine 118 (*)     WBC Urine 62 (*)    LACTIC ACID WHOLE BLOOD - Abnormal    Lactic Acid 2.7 (*)    CBC WITH PLATELETS AND DIFFERENTIAL - Abnormal    WBC Count 12.5 (*)     RBC Count 3.10 (*)     Hemoglobin 10.6 (*)     Hematocrit 32.7 (*)      (*)     MCH 34.2 (*)     MCHC 32.4      RDW 22.7 (*)     Platelet Count 275      % Neutrophils        % Lymphocytes        % Monocytes        % Eosinophils        % Basophils        % Immature Granulocytes        NRBCs per 100 WBC 4 (*)     Absolute Neutrophils        Absolute Lymphocytes        Absolute Monocytes        Absolute Eosinophils        Absolute Basophils        Absolute Immature Granulocytes        Absolute NRBCs 0.5     HEPATIC FUNCTION PANEL - Abnormal    Protein Total 7.2      Albumin 4.8      Bilirubin Total 2.1 (*)     Alkaline Phosphatase 68      AST 33      ALT 16      Bilirubin Direct 0.55 (*)    LIPASE - Abnormal    Lipase 10 (*)    LACTIC ACID WHOLE BLOOD - Abnormal    Lactic Acid 3.0 (*)    DIFFERENTIAL - Abnormal    % Neutrophils 99      % Lymphocytes 1      % Monocytes 0      % Eosinophils 0      % Basophils 0      NRBCs per 100 WBC 5 (*)     Absolute Neutrophils 12.4 (*)     Absolute Lymphocytes 0.1 (*)     Absolute Monocytes 0.0      Absolute Eosinophils  0.0      Absolute Basophils 0.0      Absolute NRBCs 0.6 (*)     RBC Morphology Confirmed RBC Indices      Platelet Assessment        Value: Automated Count Confirmed. Platelet morphology is normal.    Elliptocytes Slight (*)     Target Cells Slight (*)    INFLUENZA A/B, RSV, & SARS-COV2 PCR - Normal    Influenza A PCR Negative      Influenza B PCR Negative      RSV PCR Negative      SARS CoV2 PCR Negative     BLOOD CULTURE   BLOOD CULTURE   URINE CULTURE        Abd/pelvis CT no contrast - Stone Protocol   Final Result   IMPRESSION:    1.  2 mm obstructing stone proximal left ureter resulting in moderate hydronephrosis and perinephric stranding.   2.  Several additional small stones both kidneys, and tiny layering stones in the bladder.   3.  Mild diffuse bladder wall thickening, nonspecific, but can be seen with cystitis. Clinical correlation recommended.   4.  Severe atherosclerotic disease.         XR Chest Port 1 View    (Results Pending)       Treatments provided: IV, labs, images, abx  Family Comments: at bedside  OBS brochure/video discussed/provided to patient:  N/A  ED Medications:   Medications   sodium chloride 0.9% BOLUS 500 mL (0 mLs Intravenous Hold 1/23/24 2311)   sodium chloride 0.9% BOLUS 1,000 mL (0 mLs Intravenous Stopped 1/23/24 2146)   ondansetron (ZOFRAN) injection 4 mg (4 mg Intravenous $Given 1/23/24 2018)   oxyCODONE (ROXICODONE) tablet 5 mg (5 mg Oral $Given 1/23/24 2018)   cefTRIAXone (ROCEPHIN) 1 g vial to attach to  mL bag for ADULTS or NS 50 mL bag for PEDS (0 g Intravenous Stopped 1/23/24 2307)       Drips infusing:  No  For the majority of the shift this patient was Green.   Interventions performed were na.    Sepsis treatment initiated: Yes    Per the ED Provider, Time Zero for severe sepsis or septic shock is:  2115    3 Hour Severe Sepsis Bundle Completion:  1. Initial Lactic Acid Result:   Recent Labs   Lab Test 01/23/24 2151 01/23/24 2036   LACT 3.0* 2.7*     2. Blood  Cultures before Antibiotics: Yes  3. Broad Spectrum Antibiotics Administered:     Anti-infectives (From now, onward)      None          4. 1000 ml of IV fluids have been given so far      6 Hour Severe Sepsis Bundle Completion:    1. Repeat Lactic Acid Level: Last result   Lab Results   Component Value Date    LACT 3.0 (H) 01/23/2024     2. Patient currently on Vasopressors =  No    Cares/treatment/interventions/medications to be completed following ED care: per orders. cystoscopy    ED Nurse Name: Yung Osorio RN  11:13 PM

## 2024-01-24 NOTE — ANESTHESIA PREPROCEDURE EVALUATION
Anesthesia Pre-Procedure Evaluation    Patient: Riddhi Aguilar   MRN: [id number] : 2/15/1931        Procedure : Procedure(s):  CYSTOSCOPY, WITH RETROGRADE PYELOGRAM AND URETERAL STENT PLACEMENT- LEFT          Past Medical History:   Diagnosis Date    Anemia     baseline 10-11    Arthritis     Bleeding ulcer     duodenal ulcer    CKD (chronic kidney disease) stage 3, GFR 30-59 ml/min (H)     Essential hypertension, benign     Osteoporosis     Pure hypercholesterolemia     Spinal stenosis     Unspecified cataract     Unspecified glaucoma(365.9)       Past Surgical History:   Procedure Laterality Date    APPENDECTOMY      BREAST SURGERY      lumpectomy    DILATION AND CURETTAGE, HYSTEROSCOPY DIAGNOSTIC, COMBINED  2014    Procedure: COMBINED DILATION AND CURETTAGE, HYSTEROSCOPY DIAGNOSTIC;  DILATION AND CURETTAGE, HYSTEROSCOPY, POLYPECTOMY, INCISION AND DRAINAGE OF SEBACEOUS CYST ;  Surgeon: Serena Zamora MD;  [facility]    EXCISE LESION LOWER EXTREMITY Right 2017    Procedure: EXCISE LESION LOWER EXTREMITY;  WIDE EXCISIONAL BIOPSY OF RIGHT ANKLE BASAL CELL CARCINOMA;  Surgeon: Juan Luis Flores MD;  [facility]:  [facility]    INCISION AND DRAINAGE PERINEAL, COMBINED  2014    Procedure: COMBINED INCISION AND DRAINAGE PERINEAL;;  Surgeon: Serena Zamora MD;  [facility]    ZZC NONSPECIFIC PROCEDURE      Repair of buckled retina of rt eye    Z NONSPECIFIC PROCEDURE      Appy      No Known Allergies   Social History     Tobacco Use    Smoking status: Never    Smokeless tobacco: Never   Substance Use Topics    Alcohol use: No     Alcohol/week: 0.0 standard drinks of alcohol      Wt Readings from Last 1 Encounters:   23 84.1 kg (185 lb 6.4 oz)        Anesthesia Evaluation   Pt has had prior anesthetic.     No history of anesthetic complications       ROS/MED HX  ENT/Pulmonary:  - neg pulmonary ROS     Neurologic:  - neg neurologic ROS     Cardiovascular:     (+) Dyslipidemia  hypertension- -   -  - -                                      METS/Exercise Tolerance:     Hematologic:     (+)      anemia,          Musculoskeletal:   (+)  arthritis,             GI/Hepatic:       Renal/Genitourinary:     (+) renal disease, type: CRI, Pt does not require dialysis,    Nephrolithiasis ,       Endo:     (+)               Obesity,       Psychiatric/Substance Use:  - neg psychiatric ROS     Infectious Disease:       Malignancy:       Other:            Physical Exam    Airway        Mallampati: II   TM distance: > 3 FB   Neck ROM: full   Mouth opening: > 3 cm    Respiratory Devices and Support         Dental       (+) Modest Abnormalities - crowns, retainers, 1 or 2 missing teeth      Cardiovascular          Rhythm and rate: regular and normal     Pulmonary           breath sounds clear to auscultation           OUTSIDE LABS:  CBC:   Lab Results   Component Value Date    WBC 12.5 (H) 01/23/2024    WBC 6.1 04/27/2022    HGB 10.6 (L) 01/23/2024    HGB 10.3 (L) 05/09/2023    HCT 32.7 (L) 01/23/2024    HCT 32.8 (L) 04/27/2022     01/23/2024     04/27/2022     BMP:   Lab Results   Component Value Date     01/23/2024     05/09/2023    POTASSIUM 4.3 01/23/2024    POTASSIUM 4.5 05/09/2023    CHLORIDE 100 01/23/2024    CHLORIDE 102 05/09/2023    CO2 23 01/23/2024    CO2 23 05/09/2023    BUN 27.0 (H) 01/23/2024    BUN 26.9 (H) 05/09/2023    CR 1.16 (H) 01/23/2024    CR 1.16 (H) 05/09/2023    GLC 93 01/23/2024    GLC 73 05/09/2023     COAGS:   Lab Results   Component Value Date    INR 1.05 12/16/2012     POC:   Lab Results   Component Value Date     (H) 01/17/2010     HEPATIC:   Lab Results   Component Value Date    ALBUMIN 4.8 01/23/2024    PROTTOTAL 7.2 01/23/2024    ALT 16 01/23/2024    AST 33 01/23/2024    ALKPHOS 68 01/23/2024    BILITOTAL 2.1 (H) 01/23/2024     OTHER:   Lab Results   Component Value Date    LACT 3.0 (H) 01/23/2024    TAMMY 9.8 (H) 01/23/2024    LIPASE 10 (L)  01/23/2024    AMYLASE 60 07/27/2010    TSH 3.45 05/10/2021       Anesthesia Plan    ASA Status:  2    NPO Status:  NPO Appropriate    Anesthesia Type: General.     - Airway: LMA   Induction: Intravenous.   Maintenance: Balanced.        Consents    Anesthesia Plan(s) and associated risks, benefits, and realistic alternatives discussed. Questions answered and patient/representative(s) expressed understanding.     - Discussed:     - Discussed with:  Patient      - Extended Intubation/Ventilatory Support Discussed: No.      - Patient is DNR/DNI Status: No     Use of blood products discussed: No .     Postoperative Care    Pain management: IV analgesics, Oral pain medications, Multi-modal analgesia.   PONV prophylaxis: Ondansetron (or other 5HT-3), Dexamethasone or Solumedrol     Comments:               Sandro Brush MD

## 2024-01-24 NOTE — PHARMACY-ADMISSION MEDICATION HISTORY
Pharmacist Admission Medication History    Admission medication history is complete. The information provided in this note is only as accurate as the sources available at the time of the update.    Information Source(s): Patient and CareEverywhere/SureScripts via in-person    Pertinent Information:     Changes made to PTA medication list:  Added: vitamin d  Deleted: hydrochlorothiazide, timolol  Changed: mvi, zoloft, cosopt    :       Allergies reviewed with patient and updates made in EHR: yes    Medication History Completed By: Annmarie Rosa [facility] 1/24/2024 12:52 PM    Prior to Admission medications    Medication Sig Last Dose Taking? Auth Provider Long Term End Date   acetaminophen (TYLENOL) 500 MG tablet Take 1 tablet (500 mg) by mouth every 6 hours as needed for mild pain Past Month Yes Shun Prado MD     buPROPion (WELLBUTRIN XL) 300 MG 24 hr tablet TAKE 1 TABLET(300 MG) BY MOUTH EVERY MORNING 1/23/2024 at 0730 Yes Shun Prado MD Yes    dorzolamide-timolol (COSOPT) 22.3-6.8 MG/ML ophthalmic solution Place 1 drop into both eyes 2 times daily 1/23/2024 at am Yes Reported, Patient     lisinopril (ZESTRIL) 40 MG tablet Take 1 tablet (40 mg) by mouth daily 1/23/2024 at 0740 Yes Shun Prado MD Yes    multivitamin w/minerals (THERA-VIT-M) tablet Take 1 tablet by mouth daily 1/23/2024 at 0900 Yes Reported, Patient     sertraline (ZOLOFT) 25 MG tablet Take 25 mg by mouth daily 1/23/2024 at am Yes Unknown, Entered By History Yes    spironolactone (ALDACTONE) 25 MG tablet Take 1 tablet (25 mg) by mouth daily 1/23/2024 at 0730 Yes Shun Prado MD Yes    verapamil ER (CALAN-SR) 120 MG CR tablet TAKE 1 TABLET BY MOUTH EVERY 12 HOURS 1/23/2024 at 0730 Yes Shun Prado MD Yes    VITAMIN D PO Take 1 tablet by mouth daily Doesn't know strength 1/23/2024 Yes Unknown, Entered By History     atorvastatin (LIPITOR) 10 MG tablet Take 1 tablet (10 mg) by mouth daily 1/22/2024 at 1200  Shun Prado MD Yes

## 2024-01-24 NOTE — PROVIDER NOTIFICATION
DATE/TIME OF CALL RECEIVED FROM LAB:  01/24/24 at 2:11 PM   LAB TEST:  Lactate   LAB VALUE:  2.5  PROVIDER NOTIFIED?: Yes  PROVIDER NAME: Neav   DATE/TIME LAB VALUE REPORTED TO PROVIDER: Neva   MECHANISM OF PROVIDER NOTIFICATION: Page  PROVIDER RESPONSE: No IV fluids now. Encourage fluid intake.

## 2024-01-24 NOTE — CARE PLAN
Tried to wean off O2 and sats dropped down to 86%. Placed 2 liters O2 back on thru capno and stats maintain >90%.

## 2024-01-24 NOTE — ANESTHESIA PROCEDURE NOTES
Airway       Patient location during procedure: OR  Staff -        CRNA: Amna Whitaker APRN CRNA       Performed By: CRNA  Consent for Airway        Urgency: elective  Indications and Patient Condition       Indications for airway management: trixie-procedural       Induction type:intravenous       Mask difficulty assessment: 0 - not attempted    Final Airway Details       Final airway type: supraglottic airway    Supraglottic Airway Details        Type: LMA       Brand: I-Gel       LMA size: 4    Post intubation assessment        Placement verified by: capnometry, equal breath sounds and chest rise        Number of attempts at approach: 1       Number of other approaches attempted: 0       Secured with: commercial tube jane       Ease of procedure: easy       Dentition: Unchanged

## 2024-01-24 NOTE — CARE PLAN
PRIMARY DIAGNOSIS: CYSTOSCOPY, WITH RETROGRADE PYELOGRAM AND URETERAL STENT PLACEMENT- LEFT   OUTPATIENT/OBSERVATION GOALS TO BE MET BEFORE DISCHARGE:  1. Stable vital signs Yes  2. Tolerating diet:Yes  3. Pain controlled with oral pain medications:  Yes  4. Positive bowel sounds:  Yes  5. Voiding without difficulty:   Martines Catheter in place  6. Able to ambulate:  Yes  7. Provider specific discharge goals met:  No    Discharge Planner Nurse   Safe discharge environment identified: Yes  Barriers to discharge: Yes       Entered by: Yina Stephens RN 01/24/2024 12:33 PM     Please review provider order for any additional goals.   Nurse to notify provider when observation goals have been met and patient is ready for discharge.    Patient is alert and oriented x4. VS WNL and documented on the FS. Lungs sounds clear and patient is on 2 liters of O2 (will try to wean). Capno in place. Will encourage IS. Active bowel sounds with LBM 2 days ago. Martines in place and patient has a raspberry color urine (patent with adequate output). SL. Regular diet. Patient drinking plenty of water. Patient had positive BC on the 23rd and will continue on Rocephin. 1 assist with walker and gait belt when up. Daughter at bedside. Inpatient status.     /53 (BP Location: Right arm)   Pulse 81   Temp 98.2  F (36.8  C) (Oral)   Resp 18   Wt 78 kg (172 lb)   SpO2 96%   BMI 32.50 kg/m

## 2024-01-24 NOTE — CONSULTS
Urology Consult History and Physical    Name: Riddhi Aguilar    MRN: [id number]   YOB: 1931       We were asked to see Riddhi Aguilar at the request of Dr. Lang and Dr. Aguilar for evaluation and treatment of LEFT ureteral stone.          Chief Complaint:   LEFT ureteral stone, UTI with concern for sepsis from urinary source    History is obtained from the patient          History of Present Illness:   Riddhi Aguilar is a 89 year old female who is being seen for evaluation of 2 to 3 mm stone in the left proximal ureter with concern for UTI and concomitant sepsis from urinary source.  She developed acute onset of left-sided flank pain around 2 PM on 1/23/2024.  She had some associated nausea and dry heaving for this.  She is also noted cloudy and foul-smelling urine for the last 2 days.  She presented to the emergency room where she was found to have fairly stable vital signs with an elevated lactic acid which artem slightly from 2.7-3.0 despite 1 L of normal saline.  Urinalysis concerning for possible UTI.  Serum creatinine baseline.  CT scan obtained.           Past Medical History:     Past Medical History:   Diagnosis Date    Anemia     baseline 10-11    Arthritis     Bleeding ulcer     duodenal ulcer    CKD (chronic kidney disease) stage 3, GFR 30-59 ml/min (H)     Essential hypertension, benign     Osteoporosis     Pure hypercholesterolemia     Spinal stenosis     Unspecified cataract     Unspecified glaucoma(365.9)             Past Surgical History:     Past Surgical History:   Procedure Laterality Date    APPENDECTOMY      BREAST SURGERY      lumpectomy    DILATION AND CURETTAGE, HYSTEROSCOPY DIAGNOSTIC, COMBINED  02/06/2014    Procedure: COMBINED DILATION AND CURETTAGE, HYSTEROSCOPY DIAGNOSTIC;  DILATION AND CURETTAGE, HYSTEROSCOPY, POLYPECTOMY, INCISION AND DRAINAGE OF SEBACEOUS CYST ;  Surgeon: Serena Zamora MD;  Location: [facility]    EXCISE LESION LOWER EXTREMITY Right 08/29/2017     Procedure: EXCISE LESION LOWER EXTREMITY;  WIDE EXCISIONAL BIOPSY OF RIGHT ANKLE BASAL CELL CARCINOMA;  Surgeon: Juan Luis Flores MD;  Location: [facility]    INCISION AND DRAINAGE PERINEAL, COMBINED  02/06/2014    Procedure: COMBINED INCISION AND DRAINAGE PERINEAL;;  Surgeon: Serena Zamora MD;  Location:  [facility]    [facility] NONSPECIFIC PROCEDURE      Repair of buckled retina of rt eye    [facility] NONSPECIFIC PROCEDURE      Appy            Social History:     Social History     Tobacco Use    Smoking status: Never    Smokeless tobacco: Never   Substance Use Topics    Alcohol use: No     Alcohol/week: 0.0 standard drinks of alcohol            Family History:     Family History   Problem Relation Age of Onset    Breast Cancer Sister     Breast Cancer Sister     Cancer Brother         bone cancer in arm              Allergies:   No Known Allergies         Medications:     Current Facility-Administered Medications   Medication    [Auto Hold] acetaminophen (TYLENOL) tablet 650 mg    Or    [Auto Hold] acetaminophen (TYLENOL) Suppository 650 mg    [Auto Hold] calcium carbonate (TUMS) chewable tablet 1,000 mg    [Auto Hold] cefTRIAXone (ROCEPHIN) 2 g vial to attach to  ml bag for ADULTS or NS 50 ml bag for PEDS    [Auto Hold] lidocaine (LMX4) cream    [Auto Hold] lidocaine 1 % 0.1-1 mL    [Auto Hold] ondansetron (ZOFRAN ODT) ODT tab 4 mg    Or    [Auto Hold] ondansetron (ZOFRAN) injection 4 mg    [Auto Hold] polyethylene glycol (MIRALAX) Packet 17 g    [Auto Hold] senna-docusate (SENOKOT-S/PERICOLACE) 8.6-50 MG per tablet 1 tablet    Or    [Auto Hold] senna-docusate (SENOKOT-S/PERICOLACE) 8.6-50 MG per tablet 2 tablet    [Auto Hold] simethicone (MYLICON) chewable tablet 80 mg    [Auto Hold] sodium chloride (PF) 0.9% PF flush 3 mL    [Auto Hold] sodium chloride (PF) 0.9% PF flush 3 mL    [Auto Hold] sodium chloride 0.9% BOLUS 500 mL             Review of Systems:    ROS: 10 point ROS neg other than the symptoms noted above in  the HPI.          Physical Exam:   Patient Vitals for the past 24 hrs:   BP Temp Temp src Pulse Resp SpO2 Weight   01/24/24 0550 100/51 98.7  F (37.1  C) Temporal -- 16 96 % --   01/24/24 0454 -- -- -- -- -- -- 78 kg (172 lb)   01/24/24 0232 107/53 98.4  F (36.9  C) Oral 105 18 94 % --   01/23/24 2315 116/55 -- -- 90 -- 92 % --   01/23/24 2311 -- 99.7  F (37.6  C) Oral -- -- -- --   01/23/24 2300 128/57 -- -- 93 -- 92 % --   01/23/24 2245 (!) 146/56 -- -- 90 -- 92 % --   01/23/24 2215 (!) 151/60 -- -- 98 20 94 % --   01/23/24 2028 (!) 163/61 -- -- -- -- -- --   01/23/24 1814 (!) 165/73 -- -- 77 16 100 % --   01/23/24 1811 -- 97.7  F (36.5  C) Temporal -- -- -- --     General: age-appropriate appearing female in NAD  HEENT: Head AT/NC, EOMI, CN Grossly intact  Lungs: no respiratory distress, or pursed lip breathing  Heart: No obvious jugular venous distension present  Abdomen: soft, obesely-distended, non-tender. No organomegaly  Lymph: no palpable inguinal lymphadenopathy.  LE: no edema. pneumoboots in place.  Musculoskeltal: extremities normal, no peripheral edema  Neuro: Alert, oriented, speech and mentation normal;   moving all 4 extremities equally.  Psych: affect and mood normal          Data:   All laboratory data reviewed:    Recent Labs   Lab 01/24/24  0608 01/23/24 2036   WBC 41.3* 12.5*   HGB 8.8* 10.6*    275       Recent Labs   Lab 01/23/24 2036      POTASSIUM 4.3   CHLORIDE 100   CO2 23   BUN 27.0*   CR 1.16*   GLC 93   TAMMY 9.8*       Recent Labs   Lab 01/23/24 2159   COLOR Yellow   APPEARANCE Cloudy*   URINEGLC Negative   URINEBILI Negative   URINEKETONE Negative   SG 1.015   URINEPH 7.0   PROTEIN 50*   NITRITE Negative   LEUKEST Small*   RBCU 118*   WBCU 62*     IMAGING:  All pertinent imaging reviewed:    All imaging studies reviewed by me.  I personally reviewed these imaging films.  A formal report from radiology will follow.    CT ABD/PEL 1/23/2024:  FINDINGS:   LOWER CHEST:  Interstitial densities both lower lung fields could represent edema or fibrotic changes. Tiny calcified granuloma right middle lobe. Dense calcification of the mitral annulus. Small esophageal hiatal hernia.     HEPATOBILIARY: Normal.     PANCREAS: Normal.     SPLEEN: Normal.     ADRENAL GLANDS: Normal.     KIDNEYS/BLADDER: 2 mm obstructing stone proximal left ureter on image 90 of series 3 resulting in moderate hydronephrosis and left perinephric stranding. 3 additional tiny calyceal tip stones in the left kidney. 2 calyceal tip nonobstructing stones right   kidney, the largest measuring 3 mm. Several tiny stones layering within the dependent portion of the bladder. Mild diffuse bladder wall thickening.      BOWEL: No evidence for bowel obstruction. No bowel wall thickening or inflammatory changes.     LYMPH NODES: Normal.     VASCULATURE: Advanced atherosclerotic disease abdominal aorta and iliac arteries.     PELVIC ORGANS: Normal.     MUSCULOSKELETAL: Prominent lower anterior abdominal wall pannus. Tiny fat-containing ventral wall hernia. Advanced degenerative disc and facet disease lower thoracic and lumbar spine. Presumed old L3 vertebral body compression fracture. Osteopenia.   Degenerative changes both hips.                                                             IMPRESSION:   1.  2 mm obstructing stone proximal left ureter resulting in moderate hydronephrosis and perinephric stranding.  2.  Several additional small stones both kidneys, and tiny layering stones in the bladder.  3.  Mild diffuse bladder wall thickening, nonspecific, but can be seen with cystitis. Clinical correlation recommended.  4.  Severe atherosclerotic disease.         Impression and Plan:   Impression:   89-year-old female with complex medical history who presents with left-sided flank pain found to have a 2 to 3 mm left proximal ureteral stone with concern for a UTI and sepsis from a urinary source      Plan:   2 to 3 mm left  proximal ureteral stone with UTI and sepsis from urinary source  - I reviewed her labs which are notable for a stable serum creatinine  - I reviewed her urinalysis with concern for possible infection, though nitrite negative  - I reviewed her CBC with notable rise in her white blood cell count with leukocytosis on recheck this morning up to 41.3  - I reviewed her lactic acid which initially artem from 2.7-3.0 despite IV fluid, this has been down slightly to 2.5  -I reviewed his CT scan and reviewed these images personally.  Initial stone and a 2 to 3 mm in the left proximal ureter with associated perinephric stranding  - We discussed the need for urgent renal decompression with an operative cystoscopy, left retrograde pyelogram, and left ureteral stent placement.  Risk and benefits discussed in detail with the patient and her daughter-in-law who is an RN.  Informed consent completed  - Proceed urgently to the operating room     Aureliano Brandt MD   Urology  [facility]  Clinic Phone [phone]

## 2024-01-24 NOTE — ANESTHESIA POSTPROCEDURE EVALUATION
Patient: Riddhi Aguilar    Procedure: Procedure(s):  CYSTOSCOPY, WITH RETROGRADE PYELOGRAM AND URETERAL STENT PLACEMENT- LEFT       Anesthesia Type:  General    Note:  Disposition: Admission   Postop Pain Control: Uneventful            Sign Out: Well controlled pain   PONV: No   Neuro/Psych: Uneventful            Sign Out: Acceptable/Baseline neuro status   Airway/Respiratory: Uneventful            Sign Out: Acceptable/Baseline resp. status   CV/Hemodynamics: Uneventful            Sign Out: Acceptable CV status   Other NRE: NONE   DID A NON-ROUTINE EVENT OCCUR? No           Last vitals:  Vitals Value Taken Time   BP 91/43 01/24/24 0705   Temp 98.2  F (36.8  C) 01/24/24 0705   Pulse 79 01/24/24 0706   Resp 20 01/24/24 0706   SpO2 95 % 01/24/24 0706   Vitals shown include unfiled device data.    Electronically Signed By: Sandro Brush MD  January 24, 2024  7:08 AM

## 2024-01-25 ENCOUNTER — APPOINTMENT (OUTPATIENT)
Dept: CARDIOLOGY | Facility: CLINIC | Age: 89
DRG: 853 | End: 2024-01-25
Attending: PHYSICIAN ASSISTANT
Payer: COMMERCIAL

## 2024-01-25 LAB
ANION GAP SERPL CALCULATED.3IONS-SCNC: 9 MMOL/L (ref 7–15)
BUN SERPL-MCNC: 38.8 MG/DL (ref 8–23)
CALCIUM SERPL-MCNC: 8.8 MG/DL (ref 8.2–9.6)
CHLORIDE SERPL-SCNC: 100 MMOL/L (ref 98–107)
CREAT SERPL-MCNC: 1.2 MG/DL (ref 0.51–0.95)
DEPRECATED HCO3 PLAS-SCNC: 24 MMOL/L (ref 22–29)
EGFRCR SERPLBLD CKD-EPI 2021: 42 ML/MIN/1.73M2
ERYTHROCYTE [DISTWIDTH] IN BLOOD BY AUTOMATED COUNT: 23.6 % (ref 10–15)
GLUCOSE SERPL-MCNC: 106 MG/DL (ref 70–99)
HCT VFR BLD AUTO: 26.5 % (ref 35–47)
HGB BLD-MCNC: 8.8 G/DL (ref 11.7–15.7)
LVEF ECHO: NORMAL
MCH RBC QN AUTO: 35.2 PG (ref 26.5–33)
MCHC RBC AUTO-ENTMCNC: 33.2 G/DL (ref 31.5–36.5)
MCV RBC AUTO: 106 FL (ref 78–100)
PLATELET # BLD AUTO: 208 10E3/UL (ref 150–450)
POTASSIUM SERPL-SCNC: 4.6 MMOL/L (ref 3.4–5.3)
RBC # BLD AUTO: 2.5 10E6/UL (ref 3.8–5.2)
SODIUM SERPL-SCNC: 133 MMOL/L (ref 135–145)
WBC # BLD AUTO: 34.9 10E3/UL (ref 4–11)

## 2024-01-25 PROCEDURE — 85014 HEMATOCRIT: CPT | Performed by: INTERNAL MEDICINE

## 2024-01-25 PROCEDURE — 250N000013 HC RX MED GY IP 250 OP 250 PS 637: Performed by: INTERNAL MEDICINE

## 2024-01-25 PROCEDURE — 99232 SBSQ HOSP IP/OBS MODERATE 35: CPT | Performed by: PHYSICIAN ASSISTANT

## 2024-01-25 PROCEDURE — 255N000002 HC RX 255 OP 636: Performed by: EMERGENCY MEDICINE

## 2024-01-25 PROCEDURE — 120N000001 HC R&B MED SURG/OB

## 2024-01-25 PROCEDURE — 36415 COLL VENOUS BLD VENIPUNCTURE: CPT | Performed by: PHYSICIAN ASSISTANT

## 2024-01-25 PROCEDURE — 87040 BLOOD CULTURE FOR BACTERIA: CPT | Performed by: PHYSICIAN ASSISTANT

## 2024-01-25 PROCEDURE — 250N000011 HC RX IP 250 OP 636: Performed by: INTERNAL MEDICINE

## 2024-01-25 PROCEDURE — 93306 TTE W/DOPPLER COMPLETE: CPT | Mod: 26 | Performed by: INTERNAL MEDICINE

## 2024-01-25 PROCEDURE — C8929 TTE W OR WO FOL WCON,DOPPLER: HCPCS

## 2024-01-25 PROCEDURE — 250N000013 HC RX MED GY IP 250 OP 250 PS 637: Performed by: UROLOGY

## 2024-01-25 PROCEDURE — 80048 BASIC METABOLIC PNL TOTAL CA: CPT | Performed by: INTERNAL MEDICINE

## 2024-01-25 PROCEDURE — 36415 COLL VENOUS BLD VENIPUNCTURE: CPT | Performed by: INTERNAL MEDICINE

## 2024-01-25 RX ADMIN — ACETAMINOPHEN 650 MG: 325 TABLET, FILM COATED ORAL at 21:38

## 2024-01-25 RX ADMIN — HUMAN ALBUMIN MICROSPHERES AND PERFLUTREN 2 ML: 10; .22 INJECTION, SOLUTION INTRAVENOUS at 14:10

## 2024-01-25 RX ADMIN — SERTRALINE HYDROCHLORIDE 25 MG: 25 TABLET ORAL at 07:16

## 2024-01-25 RX ADMIN — POLYETHYLENE GLYCOL 3350 17 G: 17 POWDER, FOR SOLUTION ORAL at 07:16

## 2024-01-25 RX ADMIN — ACETAMINOPHEN 650 MG: 325 TABLET, FILM COATED ORAL at 14:24

## 2024-01-25 RX ADMIN — DORZOLAMIDE HYDROCHLORIDE AND TIMOLOL MALEATE 1 DROP: 20; 5 SOLUTION/ DROPS OPHTHALMIC at 07:21

## 2024-01-25 RX ADMIN — DORZOLAMIDE HYDROCHLORIDE AND TIMOLOL MALEATE 1 DROP: 20; 5 SOLUTION/ DROPS OPHTHALMIC at 19:44

## 2024-01-25 RX ADMIN — BUPROPION 300 MG: 150 TABLET, EXTENDED RELEASE ORAL at 07:16

## 2024-01-25 RX ADMIN — CEFTRIAXONE 2 G: 2 INJECTION, POWDER, FOR SOLUTION INTRAMUSCULAR; INTRAVENOUS at 21:31

## 2024-01-25 ASSESSMENT — ACTIVITIES OF DAILY LIVING (ADL)
ADLS_ACUITY_SCORE: 39

## 2024-01-25 NOTE — PLAN OF CARE
[id number]    Inpatient Progress Note:    /49 (BP Location: Right arm)   Pulse 70   Temp 97.9  F (36.6  C) (Oral)   Resp 16   Wt 78 kg (172 lb)   SpO2 95%   BMI 32.50 kg/m         Orientation: A&Ox4  Pain status: Denies  Activity: Ax1 Walker GB  Resp: denies SOB, refuses CPAP  Cardiac: WDL  GI: WDL  :  Martines cath in place, voiding adequately  Skin: intact  LDA: SL -IV rocephin   Pertinent Labs: Lactic acid- 2.5, hemoglobin 8.8   Diet: Regular  Consults: Urology  Discharge Plan:     Will continue to monitor and provide cares.     Carina Booker RN

## 2024-01-25 NOTE — CARE PLAN
Patient is alert and oriented x4. VS WNL and documented on the FS. Lung sounds clear and patient placed on RA this morning and stats >90%. Patient denies SOB. IS at beside and patient able to get it to 1000. Active bowel sounds and LBM was on Monday (stool softeners given). Martines catheter patent with adequate urine output (yellow). Denies pain. SL. Regular diet and tolerating. SBA/1 assist with walker (has her own in room) when up. Continues on Rocephin for a UTI with gram negative bacteremia.     Plan: Urology following.     /61 (BP Location: Right arm, Patient Position: Semi-Jones's, Cuff Size: Adult Regular)   Pulse 65   Temp 97.8  F (36.6  C) (Oral)   Resp 18   Wt 78 kg (172 lb)   SpO2 94%   BMI 32.50 kg/m

## 2024-01-25 NOTE — PROGRESS NOTES
Urology Progress Note    Name: Riddhi Aguilar    MRN: [id number]   YOB: 1931  Date of Admission: 1/23/2024              Impression and Plan:   Impression / Plan:   Riddhi Aguilar is a 89 year old female with 2 mm obstructing proximal L ureteral stone and gram-negative bacteremia. Now POD1 Cystoscopy, left retrograde pyelogram, left JJ stent placement.      - Maintain rivera catheter to gravity for max drainage. Rivera can be removed prior to discharge.   - Continue IV Abx, tailor Tx to C&S as available. Transition to orals as clinically appropriate.   - Monitor WBC.  - Will require URS for definitive stone management, patient prefers to have this done at [facility]. Will coordinate this on an outpatient basis.   - Anticipate being able to discharge from a urologic perspective tomorrow or the day after pending clinical improvement/C&S results.     Discussed with Dr. Jaquez    Thank you for the opportunity to participate in the care of Riddhi Aguilar.     LUCY Marcus, CNP  [facility] - Department of Urology  [phone]            Interval History:   Riddhi Aguilar is a 89 year old female with history including hypertension, duodenal ulcer, chronic anemia with baseline of around 10, CKD 3 who developed acute onset of left-sided flank pain around 2 PM on 1/23/2024, found to have gram-negative bacteremia (on Rocephin) & 2 mm obstructing stone of the proximal left ureter resulting in moderate hydronephrosis, now POD1 Cystoscopy, left retrograde pyelogram, left JJ stent placement. White blood count up to 41.3 perioperatively now improved to 34.9. She was initially somewhat hypotensive, currently afebrile and hemodynamically stable. She is doing well and enjoying her breakfast.     History is obtained from patient & EMR.          Past Medical History:     Past Medical History:   Diagnosis Date    Anemia     baseline 10-11    Arthritis     Bleeding ulcer     duodenal ulcer    CKD (chronic kidney  disease) stage 3, GFR 30-59 ml/min (H)     Essential hypertension, benign     Osteoporosis     Pure hypercholesterolemia     Spinal stenosis     Unspecified cataract     Unspecified glaucoma(365.9)             Past Surgical History:     Past Surgical History:   Procedure Laterality Date    APPENDECTOMY      BREAST SURGERY      lumpectomy    DILATION AND CURETTAGE, HYSTEROSCOPY DIAGNOSTIC, COMBINED  02/06/2014    Procedure: COMBINED DILATION AND CURETTAGE, HYSTEROSCOPY DIAGNOSTIC;  DILATION AND CURETTAGE, HYSTEROSCOPY, POLYPECTOMY, INCISION AND DRAINAGE OF SEBACEOUS CYST ;  Surgeon: Serena Zamora MD;  [facility]    EXCISE LESION LOWER EXTREMITY Right 08/29/2017    Procedure: EXCISE LESION LOWER EXTREMITY;  WIDE EXCISIONAL BIOPSY OF RIGHT ANKLE BASAL CELL CARCINOMA;  Surgeon: Juan Luis Flores MD;  Location:  OR    INCISION AND DRAINAGE PERINEAL, COMBINED  02/06/2014    Procedure: COMBINED INCISION AND DRAINAGE PERINEAL;;  Surgeon: Serena Zamora MD;  Location:  [facility]    [facility] NONSPECIFIC PROCEDURE      Repair of buckled retina of rt eye    [facility] NONSPECIFIC PROCEDURE      Appy            Social History:     Social History     Tobacco Use    Smoking status: Never    Smokeless tobacco: Never   Substance Use Topics    Alcohol use: No     Alcohol/week: 0.0 standard drinks of alcohol            Family History:     Family History   Problem Relation Age of Onset    Breast Cancer Sister     Breast Cancer Sister     Cancer Brother         bone cancer in arm            Allergies:   No Known Allergies         Medications:     Current Facility-Administered Medications   Medication    acetaminophen (TYLENOL) tablet 650 mg    Or    acetaminophen (TYLENOL) Suppository 650 mg    buPROPion (WELLBUTRIN XL) 24 hr tablet 300 mg    calcium carbonate (TUMS) chewable tablet 1,000 mg    cefTRIAXone (ROCEPHIN) 2 g vial to attach to  ml bag for ADULTS or NS 50 ml bag for PEDS    dorzolamide-timolol (COSOPT) ophthalmic  "solution 1 drop    lidocaine (LMX4) cream    lidocaine 1 % 0.1-1 mL    ondansetron (ZOFRAN ODT) ODT tab 4 mg    Or    ondansetron (ZOFRAN) injection 4 mg    polyethylene glycol (MIRALAX) Packet 17 g    senna-docusate (SENOKOT-S/PERICOLACE) 8.6-50 MG per tablet 1 tablet    Or    senna-docusate (SENOKOT-S/PERICOLACE) 8.6-50 MG per tablet 2 tablet    sertraline (ZOLOFT) tablet 25 mg    simethicone (MYLICON) chewable tablet 80 mg    sodium chloride (PF) 0.9% PF flush 3 mL    sodium chloride (PF) 0.9% PF flush 3 mL    sodium chloride 0.9% BOLUS 500 mL             Review of Systems:    ROS: See HPI for pertinent details.  Remainder of 10-point ROS negative.         Physical Exam:   VS:  T: 97.8    HR: 65    BP: 123/61    RR: 18     GEN:  Alert.  NAD. Pt is not diaphoretic.   HEENT:  Sclerae anicteric.    CV:  No obvious jugular venous distension  LUNGS: No respiratory distress, breathing comfortably wo accessory muscle use.  ABD:  Soft.  ND.   EXT:  Warm, well perfused.  SKIN:  Warm.  Dry.   NEURO:  CN grossly intact.     URINE: clear yellow urine collecting in rivera          Data:   All laboratory data reviewed:    No results found for: \"PSA\"  Recent Labs   Lab 01/25/24  0805 01/24/24  0608 01/23/24 2036   WBC 34.9* 41.3* 12.5*   HGB 8.8* 8.8* 10.6*    228 275       Recent Labs   Lab 01/25/24  0805 01/24/24  0608 01/23/24 2036   * 134* 137   POTASSIUM 4.6 4.0 4.3   CHLORIDE 100 101 100   CO2 24 21* 23   BUN 38.8* 34.7* 27.0*   CR 1.20* 1.43* 1.16*   * 87 93   TAMMY 8.8 8.7 9.8*       Recent Labs   Lab 01/23/24 2159   COLOR Yellow   APPEARANCE Cloudy*   URINEGLC Negative   URINEBILI Negative   URINEKETONE Negative   SG 1.015   URINEPH 7.0   PROTEIN 50*   NITRITE Negative   LEUKEST Small*   RBCU 118*   WBCU 62*     Results for orders placed or performed during the hospital encounter of 01/23/24   Urine Culture    Specimen: Urine, Catheter   Result Value Ref Range    Culture Culture in progress     " Culture >100,000 CFU/mL Gram negative bacilli (A)    Results for orders placed or performed in visit on 11/15/13   Urine culture    Specimen: Urine   Result Value Ref Range    Specimen Description Midstream Urine     Culture Micro       10,000 to 50,000 colonies/mL Mixed gram positive garry  Multiple species present, probable perineal contamination.  Susceptibility testing not routinely done    Micro Report Status FINAL 11/16/2013

## 2024-01-25 NOTE — PLAN OF CARE
INPATIENT NOTE: 1500 - 2300     PRIMARY PROBLEM: Abdominal Pain/UTI     Vital Signs: Stable      Orientation: A/O x 4    Neuro: Intact    Pain status: Denies, however asked from Tylenol to help her sleep through the night    Resp: Lung sounds CTA, denies any SOB, oxygen weaned    Cardiac: WDL    GI: Bowel sounds active. Last BM 1/22    : Martines catheter in place, sidra clear out put, voiding adequately    Skin: Intact    LDA: Peripheral IV to GREY SL    Pertinent Labs/Imaging: Hgb 8.8    Diet: Regular    Activity: Assist x 1 with W/G    Alarms/Safety: All alarms on and audible     Consults: Urology    Discharge Plan: Continue with Rocephin IV once daily for UTI.    Discharge Date: TBD     Will continue to monitor and provide cares.     Argenis Farias RN

## 2024-01-25 NOTE — PROGRESS NOTES
Patient triggered sepsis, PA on duty notified. No new orders at this time. Continue with the current treatment.

## 2024-01-25 NOTE — PROGRESS NOTES
[facility]    Medicine Progress Note - Hospitalist Service    Date of Admission: 1/23/2024    Assessment & Plan   Riddhi Aguilar is a 89 year old female with past medical history including hypertension, duodenal ulcer, chronic anemia with baseline of around 10, and CKD 3 admitted on 1/23/2024 with left-sided pyelonephritis with a 2 mm proximal obstructing ureteral stone found to have septic shock.     Here in the ER she had low-grade fevers.  Kidney function was around baseline with creatinine of 1.16 and CBC showed leukocytosis of 12.5 K with neutrophilic predominance.  UA showed large blood and positive leukocyte esterase and 62 white cells.  Lactic acid was elevated at 3.0.  CT scan showed a 2 mm obstructing left ureteral stone with moderate hydronephrosis and perinephric stranding.  There were multiple additional small stones in both kidneys.     Urology was contacted and she underwent cystoscopy with left-sided ureteral stent placement early 1/24.  She remains admitted for IV antibiotics and supportive cares following this.     Blood culture has since returned positive for gram-negative rods.  White blood count artem up to 41 K on 1/24 and downward trending on 1/25 and she was initially somewhat hypotensive but now hemodynamics are improving which is reassuring.     Problem List/Assessment and Plan:   #Septic shock due to pyelonephritis/urinary tract infection with Proteus mirabilis bacteremia  #Left 2 mm obstructing ureteral stone: As above  -Continue IV ceftriaxone (day 3)  -WBC downward trending to 34.9 on 1/25  -Monitor hemodynamics closely, BP stable over last day   -2 of 2 blood cultures from 1/23 growing proteus mirabilis, follow final results  -repeat blood cultures on 1/25, monitor clearance    -urine culture growing gram negative bacilli, follow final results  -Urology following, plan for outpatient follow up for definitive stone management   -Martines catheter currently in place,  per Urology voiding trial can be performed prior to discharge, plan for 1/26   -follow BMP and CBC      #Acute hypoxic respiratory failure, resolved: potentially related to sepsis.  Chest x-ray did demonstrate some evidence of pulmonary edema though it is unclear if this is cardiogenic or noncardiogenic.  -echocardiogram not previously ordered, obtained on 1/25 showing EF of 60 to 65%, left atrium is severely dilated, severe mitral annular calcification with mild mitral stenosis, mild valvular aortic stenosis with mild aortic regurgitation, no WMA, pulmonary hypertension  -weaned to RA on 1/25  -appears euvolemic on exam, hold off on diuresis at this time unless recurrent hypoxia   -encourage IS use      #MICHELE on CKD stage 3: baseline creatinine around 1.1-1.2, creatinine acutely increased to 1.43 on 1/24.   -creatinine improving to 1.2 on 1/25  -avoid nephrotoxins  -follow BMP    #Mild hyponatremia: suspect related to hypovolemia and holding off on additional IVFs with concern for hypervolemia on CXR. No history of hyponatremia.  -follow sodium level   -if continues to downward trend then add on urine osmolality and urine sodium in AM     #Chronic anemia: baseline Hgb of 9-10 with acute strop to 8.8 on 1/24, suspect drop may of been partially diluational.  -no current evidence of bleeding  -follow CBC     #History of hypertension: currently holding home verapamil, spironolactone, lisinopril given hypotension initially. Would hold off resuming on 1/25, reassess in AM if BP trends upward.      #History of anxiety/depression: resume home Wellbutrin and sertraline        Diet: Advance Diet as Tolerated: Regular Diet Adult    DVT Prophylaxis: Pneumatic Compression Devices  Martines Catheter: PRESENT, indication: /GI/GYN Pelvic Procedure  Lines: None     Cardiac Monitoring: None  Code Status: No CPR- Do NOT Intubate      Clinically Significant Risk Factors                  # Hypertension: Noted on problem list                    Disposition Plan      Expected Discharge Date: 01/26/2024                  The patient's care was discussed with the Bedside Nurse, Patient, and Patient's Family.    Fauzia Mcnamara PA-C  Hospitalist Service  [facility]  Securely message with [employer] (more info)  Text page via [facility] Paging/Directory   ______________________________________________________________________    Interval History   Patient reports still having pain on her left side and decreased appetite.  She notes a mild sore throat.  She reports drinking plenty of fluids.  Denies chest pain, shortness of breath, or cough.    Patient's son and daughter in law at the bedside and updated on current plan with all questions answered.     Physical Exam   Vital Signs: Temp: 98.2  F (36.8  C) Temp src: Oral BP: 116/56 Pulse: 87   Resp: 17 SpO2: 90 % O2 Device: None (Room air) Oxygen Delivery: 1 LPM  Weight: 172 lbs 0 oz    General Appearance: Sitting up in bed, A&Ox3, interactive, pleasant, NAD  Respiratory: CTAB without wheezing, mild crackles in right lung base  Cardiovascular: RRR without significant murmur heard   GI: soft, mild LLQ abdominal/flank pain with palpation, nondistended, normoactive bowel sounds   Skin: warm, dry, no lesions in visualized areas  Other: no LE edema      Medical Decision Making       45 MINUTES SPENT BY ME on the date of service doing chart review, history, exam, documentation & further activities per the note.      Data   ------------------------- PAST 24 HR DATA REVIEWED -----------------------------------------------

## 2024-01-25 NOTE — CARE PLAN
Patient is alert and oriented x4. VS WNL and documented on the FS. Lung sounds clear and patient placed on RA this morning and stats >90%. Patient denies SOB. IS at beside and patient able to get it to 1000. Active bowel sounds and LBM today (loose stool). Martines catheter patent with adequate urine output (yellow). Denies pain. SL. Regular diet and tolerating. SBA/1 assist with walker (has her own in room) when up. Continues on Rocephin for a UTI with gram negative bacteremia.      Plan: Urology following.     /57 (BP Location: Right arm)   Pulse 75   Temp 97.8  F (36.6  C) (Oral)   Resp 17   Wt 78 kg (172 lb)   SpO2 (!) 89%   BMI 32.50 kg/m

## 2024-01-26 ENCOUNTER — APPOINTMENT (OUTPATIENT)
Dept: PHYSICAL THERAPY | Facility: CLINIC | Age: 89
DRG: 853 | End: 2024-01-26
Attending: NURSE PRACTITIONER
Payer: COMMERCIAL

## 2024-01-26 LAB
ANION GAP SERPL CALCULATED.3IONS-SCNC: 9 MMOL/L (ref 7–15)
BACTERIA BLD CULT: ABNORMAL
BACTERIA UR CULT: ABNORMAL
BACTERIA UR CULT: ABNORMAL
BASOPHILS # BLD AUTO: 0.1 10E3/UL (ref 0–0.2)
BASOPHILS NFR BLD AUTO: 0 %
BUN SERPL-MCNC: 32.6 MG/DL (ref 8–23)
CALCIUM SERPL-MCNC: 8.9 MG/DL (ref 8.2–9.6)
CHLORIDE SERPL-SCNC: 101 MMOL/L (ref 98–107)
CREAT SERPL-MCNC: 0.99 MG/DL (ref 0.51–0.95)
DEPRECATED HCO3 PLAS-SCNC: 26 MMOL/L (ref 22–29)
EGFRCR SERPLBLD CKD-EPI 2021: 53 ML/MIN/1.73M2
EOSINOPHIL # BLD AUTO: 0.3 10E3/UL (ref 0–0.7)
EOSINOPHIL NFR BLD AUTO: 1 %
ERYTHROCYTE [DISTWIDTH] IN BLOOD BY AUTOMATED COUNT: 23.1 % (ref 10–15)
GLUCOSE SERPL-MCNC: 90 MG/DL (ref 70–99)
HCT VFR BLD AUTO: 29 % (ref 35–47)
HGB BLD-MCNC: 9.5 G/DL (ref 11.7–15.7)
IMM GRANULOCYTES # BLD: 0.3 10E3/UL
IMM GRANULOCYTES NFR BLD: 1 %
LACTATE SERPL-SCNC: 1.2 MMOL/L (ref 0.7–2)
LYMPHOCYTES # BLD AUTO: 0.6 10E3/UL (ref 0.8–5.3)
LYMPHOCYTES NFR BLD AUTO: 2 %
MCH RBC QN AUTO: 33.9 PG (ref 26.5–33)
MCHC RBC AUTO-ENTMCNC: 32.8 G/DL (ref 31.5–36.5)
MCV RBC AUTO: 104 FL (ref 78–100)
MONOCYTES # BLD AUTO: 1.3 10E3/UL (ref 0–1.3)
MONOCYTES NFR BLD AUTO: 5 %
NEUTROPHILS # BLD AUTO: 23.3 10E3/UL (ref 1.6–8.3)
NEUTROPHILS NFR BLD AUTO: 91 %
NRBC # BLD AUTO: 0.1 10E3/UL
NRBC BLD AUTO-RTO: 1 /100
PLATELET # BLD AUTO: 247 10E3/UL (ref 150–450)
POTASSIUM SERPL-SCNC: 4.2 MMOL/L (ref 3.4–5.3)
RBC # BLD AUTO: 2.8 10E6/UL (ref 3.8–5.2)
SODIUM SERPL-SCNC: 136 MMOL/L (ref 135–145)
WBC # BLD AUTO: 25.9 10E3/UL (ref 4–11)

## 2024-01-26 PROCEDURE — 250N000013 HC RX MED GY IP 250 OP 250 PS 637: Performed by: INTERNAL MEDICINE

## 2024-01-26 PROCEDURE — 83605 ASSAY OF LACTIC ACID: CPT | Performed by: PHYSICIAN ASSISTANT

## 2024-01-26 PROCEDURE — 36415 COLL VENOUS BLD VENIPUNCTURE: CPT | Performed by: PHYSICIAN ASSISTANT

## 2024-01-26 PROCEDURE — 85004 AUTOMATED DIFF WBC COUNT: CPT | Performed by: PHYSICIAN ASSISTANT

## 2024-01-26 PROCEDURE — 97161 PT EVAL LOW COMPLEX 20 MIN: CPT | Mod: GP | Performed by: PHYSICAL THERAPIST

## 2024-01-26 PROCEDURE — 80048 BASIC METABOLIC PNL TOTAL CA: CPT | Performed by: PHYSICIAN ASSISTANT

## 2024-01-26 PROCEDURE — 120N000001 HC R&B MED SURG/OB

## 2024-01-26 PROCEDURE — 250N000011 HC RX IP 250 OP 636: Performed by: INTERNAL MEDICINE

## 2024-01-26 PROCEDURE — 97530 THERAPEUTIC ACTIVITIES: CPT | Mod: GP | Performed by: PHYSICAL THERAPIST

## 2024-01-26 PROCEDURE — 250N000013 HC RX MED GY IP 250 OP 250 PS 637: Performed by: UROLOGY

## 2024-01-26 PROCEDURE — 99232 SBSQ HOSP IP/OBS MODERATE 35: CPT | Performed by: PHYSICIAN ASSISTANT

## 2024-01-26 PROCEDURE — G0463 HOSPITAL OUTPT CLINIC VISIT: HCPCS

## 2024-01-26 RX ORDER — LISINOPRIL 40 MG/1
40 TABLET ORAL DAILY
Status: DISCONTINUED | OUTPATIENT
Start: 2024-01-27 | End: 2024-01-30 | Stop reason: HOSPADM

## 2024-01-26 RX ADMIN — SERTRALINE HYDROCHLORIDE 25 MG: 25 TABLET ORAL at 07:29

## 2024-01-26 RX ADMIN — DORZOLAMIDE HYDROCHLORIDE AND TIMOLOL MALEATE 1 DROP: 20; 5 SOLUTION/ DROPS OPHTHALMIC at 21:19

## 2024-01-26 RX ADMIN — DORZOLAMIDE HYDROCHLORIDE AND TIMOLOL MALEATE 1 DROP: 20; 5 SOLUTION/ DROPS OPHTHALMIC at 07:29

## 2024-01-26 RX ADMIN — ACETAMINOPHEN 650 MG: 325 TABLET, FILM COATED ORAL at 21:19

## 2024-01-26 RX ADMIN — BUPROPION 300 MG: 150 TABLET, EXTENDED RELEASE ORAL at 07:29

## 2024-01-26 RX ADMIN — CEFTRIAXONE 2 G: 2 INJECTION, POWDER, FOR SOLUTION INTRAMUSCULAR; INTRAVENOUS at 21:19

## 2024-01-26 ASSESSMENT — ACTIVITIES OF DAILY LIVING (ADL)
DEPENDENT_IADLS:: INDEPENDENT
ADLS_ACUITY_SCORE: 39
ADLS_ACUITY_SCORE: 37
ADLS_ACUITY_SCORE: 39
ADLS_ACUITY_SCORE: 37
ADLS_ACUITY_SCORE: 39
ADLS_ACUITY_SCORE: 39

## 2024-01-26 NOTE — PLAN OF CARE
INPATIENT NOTE: 1500 - 2300     PRIMARY PROBLEM: Abdominal Pain/UTI     Vital Signs: Stable      Orientation: A/O x 4    Neuro: Intact    Pain status: Denies, however asked from Tylenol to help her sleep through the night    Resp: Lung sounds CTA, oxygen 2LPM    Cardiac: WDL    GI: Bowel sounds active. Last BM 1/22    : Martines catheter in place, sidra clear out put, voiding adequately    Skin: Intact    LDA: Peripheral IV to GREY SL    Pertinent Labs/Imaging: Hgb 8.8, pt triggering sepsis, PA notified, no new orders. Continue with the current Tx.    Diet: Regular    Activity: Assist x 1 with W/G    Alarms/Safety: All alarms on and audible     Consults: Urology    Discharge Plan: Continue with Rocephin IV once daily for UTI.    Discharge Date: TBD     Will continue to monitor and provide cares.     Argenis Farias RN

## 2024-01-26 NOTE — PROGRESS NOTES
Urology Progress Note    Name: Riddhi Aguilar    MRN: [id number]   YOB: 1931  Date of Admission: 1/23/2024              Impression and Plan:   Impression / Plan:   Riddhi Aguilar is a 89 year old female with 2 mm obstructing proximal L ureteral stone and pansusceptible proteus mirabilis bacteremia. Now POD2 Cystoscopy, left retrograde pyelogram, left JJ stent placement.      - Continue IV Abx, tailor Tx to C&S. Transition to 10-day PO course of Keflex TID on discharge.   - TOV tomorrow AM.  - Plan for interval definitive management of the stone @ [facility] Dr. Jaquez. Will coordinate this on an OP basis.   - Anticipate being able to discharge from a urologic perspective tomorrow or the day after pending clinical improvement     Discussed with Dr. Baron    Thank you for the opportunity to participate in the care of Riddhi Aguilar.     Bradley Patel, APRN, CNP  [facility] - Department of Urology  [phone]            Interval History:     Blood culture showing Proteus mirabilis - pansusceptible.  Feeling more fatigued today.  AVSS.  Tolerating PO.  Martines w clear yellow urine collecting. Draining freely.   WBC improving 41.3 --> 34.9 --> 25.9  Cr improved 1.42 --> 1.2 --> 0.99    History is obtained from patient & EMR.          Past Medical History:     Past Medical History:   Diagnosis Date    Anemia     baseline 10-11    Arthritis     Bleeding ulcer     duodenal ulcer    CKD (chronic kidney disease) stage 3, GFR 30-59 ml/min (H)     Essential hypertension, benign     Osteoporosis     Pure hypercholesterolemia     Spinal stenosis     Unspecified cataract     Unspecified glaucoma(365.9)             Past Surgical History:     Past Surgical History:   Procedure Laterality Date    APPENDECTOMY      BREAST SURGERY      lumpectomy    COMBINED CYSTOSCOPY, RETROGRADES, EXCHANGE STENT URETER(S) Left 1/24/2024    Procedure: Cystoscopy, left retrograde pyelogram, left JJ stent placement, <1hr physician  fluoroscopy time;  Surgeon: Aureliano Brandt MD;  Location:  OR    DILATION AND CURETTAGE, HYSTEROSCOPY DIAGNOSTIC, COMBINED  02/06/2014    Procedure: COMBINED DILATION AND CURETTAGE, HYSTEROSCOPY DIAGNOSTIC;  DILATION AND CURETTAGE, HYSTEROSCOPY, POLYPECTOMY, INCISION AND DRAINAGE OF SEBACEOUS CYST ;  Surgeon: Serena Zamora MD;  Location:  SD    EXCISE LESION LOWER EXTREMITY Right 08/29/2017    Procedure: EXCISE LESION LOWER EXTREMITY;  WIDE EXCISIONAL BIOPSY OF RIGHT ANKLE BASAL CELL CARCINOMA;  Surgeon: Juan Luis Flores MD;  Location:  OR    INCISION AND DRAINAGE PERINEAL, COMBINED  02/06/2014    Procedure: COMBINED INCISION AND DRAINAGE PERINEAL;;  Surgeon: Serena Zamora MD;  Location:  SD    ZZ NONSPECIFIC PROCEDURE      Repair of buckled retina of rt eye    [facility] NONSPECIFIC PROCEDURE      Appy            Social History:     Social History     Tobacco Use    Smoking status: Never    Smokeless tobacco: Never   Substance Use Topics    Alcohol use: No     Alcohol/week: 0.0 standard drinks of alcohol            Family History:     Family History   Problem Relation Age of Onset    Breast Cancer Sister     Breast Cancer Sister     Cancer Brother         bone cancer in arm            Allergies:   No Known Allergies         Medications:     Current Facility-Administered Medications   Medication    acetaminophen (TYLENOL) tablet 650 mg    Or    acetaminophen (TYLENOL) Suppository 650 mg    buPROPion (WELLBUTRIN XL) 24 hr tablet 300 mg    calcium carbonate (TUMS) chewable tablet 1,000 mg    cefTRIAXone (ROCEPHIN) 2 g vial to attach to  ml bag for ADULTS or NS 50 ml bag for PEDS    dorzolamide-timolol (COSOPT) ophthalmic solution 1 drop    lidocaine (LMX4) cream    lidocaine 1 % 0.1-1 mL    ondansetron (ZOFRAN ODT) ODT tab 4 mg    Or    ondansetron (ZOFRAN) injection 4 mg    polyethylene glycol (MIRALAX) Packet 17 g    senna-docusate (SENOKOT-S/PERICOLACE) 8.6-50 MG per tablet 1 tablet    Or     "senna-docusate (SENOKOT-S/PERICOLACE) 8.6-50 MG per tablet 2 tablet    sertraline (ZOLOFT) tablet 25 mg    simethicone (MYLICON) chewable tablet 80 mg    sodium chloride (PF) 0.9% PF flush 3 mL    sodium chloride (PF) 0.9% PF flush 3 mL    sodium chloride 0.9% BOLUS 500 mL             Review of Systems:    ROS: See HPI for pertinent details.  Remainder of 10-point ROS negative.         Physical Exam:   VS:  T: 98.9    HR: 87    BP: 121/79    RR: 16     GEN:  Alert.  NAD. Pt is not diaphoretic.   HEENT:  Sclerae anicteric.    CV:  No obvious jugular venous distension  LUNGS: No respiratory distress, breathing comfortably wo accessory muscle use.  ABD:  Soft.  NT.  ND.  No rebound or guarding.      EXT:  Warm, well perfused.  SKIN:  Warm.  Dry.   NEURO:  CN grossly intact.     URINE: rivera collecting clear yellow urine           Data:   All laboratory data reviewed:    No results found for: \"PSA\"  Recent Labs   Lab 01/26/24  0537 01/25/24  0805 01/24/24  0608 01/23/24 2036   WBC 25.9* 34.9* 41.3* 12.5*   HGB 9.5* 8.8* 8.8* 10.6*    208 228 275       Recent Labs   Lab 01/26/24  0537 01/25/24  0805 01/24/24  0608 01/23/24 2036    133* 134* 137   POTASSIUM 4.2 4.6 4.0 4.3   CHLORIDE 101 100 101 100   CO2 26 24 21* 23   BUN 32.6* 38.8* 34.7* 27.0*   CR 0.99* 1.20* 1.43* 1.16*   GLC 90 106* 87 93   TAMMY 8.9 8.8 8.7 9.8*       Recent Labs   Lab 01/23/24 2159   COLOR Yellow   APPEARANCE Cloudy*   URINEGLC Negative   URINEBILI Negative   URINEKETONE Negative   SG 1.015   URINEPH 7.0   PROTEIN 50*   NITRITE Negative   LEUKEST Small*   RBCU 118*   WBCU 62*     Results for orders placed or performed during the hospital encounter of 01/23/24   Urine Culture    Specimen: Urine, Catheter   Result Value Ref Range    Culture Culture in progress     Culture >100,000 CFU/mL Gram negative bacilli (A)    Results for orders placed or performed in visit on 11/15/13   Urine culture    Specimen: Urine   Result Value Ref " Range    Specimen Description Midstream Urine     Culture Micro       10,000 to 50,000 colonies/mL Mixed gram positive garry  Multiple species present, probable perineal contamination.  Susceptibility testing not routinely done    Micro Report Status FINAL 11/16/2013         '

## 2024-01-26 NOTE — CONSULTS
[facility] Nurse Inpatient Assessment     Consulted for: Buttocks    Patient History (according to provider note(s):      Riddhi Aguilar is a 89 year old female with past medical history including hypertension, duodenal ulcer, chronic anemia with baseline of around 10, and CKD 3 admitted on 1/23/2024 with left-sided pyelonephritis with a 2 mm proximal obstructing ureteral stone found to have septic shock.     Assessment:      Areas visualized during today's visit: Focused:    Pressure Injury Location: Right buttock  Last photo: 1/26/24    Wound type: Pressure Injury     Pressure Injury Stage: 2, present on admission   Wound history/plan of care:   Patient reports area started in March 2023.  Has been using Desitin at home with slow progress.  Currently having loose stools.   Wound base: 100 % dry drainage     Palpation of the wound bed: firm      Drainage: none     Description of drainage: none     Measurements (length x width x depth, in cm) 1  x 0.8  cm      Tunneling N/A     Undermining N/A  Periwound skin:  hyperpigmentation on left buttock from previous wound which is now healed, slight intertrigo in gluteal cleft      Color: pink      Temperature: normal   Odor: none  Pain: mild  Pain intervention prior to dressing change: slow and gentle cares   Treatment goal: Heal  and Protection  STATUS: initial assessment  Supplies ordered: at bedside    My PI Risk Assessment     Sensory Perception: 3 - Slightly Limited     Moisture: 3 - Occasionally moist      Activity: 3 - Walks occasionally      Mobility: 3 - Slightly limited      Nutrition: 3 - Adequate     Friction/Shear: 2 - Potential problem      TOTAL: 17       Treatment Plan:     Buttock wound(s): BID  Cleanse with Alberto Cleanse and Protect spray and soft dry wipe  Apply a thin layer of Critic Aid paste  On repeat cleanings only remove surface stool, then reapply Critic Aid over any remaining paste     Pressure Injury Prevention (PIP)  "Plan:  If patient is declining pressure injury prevention interventions: Explore reason why and address patient's concerns, Educate on pressure injury risk and prevention intervention(s), and If patient is still declining, document \"informed refusal\"   Mattress: Follow bed algorithm, reassess daily and order specialty mattress, if indicated.  HOB: Maintain at or below 30 degrees, unless contraindicated  Repositioning in bed: Every 1-2 hours   Heels: Pillows under calves  Protective Dressing: None  Positioning Equipment: None  Chair positioning: Chair cushion (#[id number])    If patient has a buttock pressure injury, or high risk for PI use chair cushion or SPS.  Moisture Management: Avoid brief in bed  Under Devices: Inspect skin under all medical devices during skin inspection , Ensure tubes are stabilized without tension, and Ensure patient is not lying on medical devices or equipment when repositioned  Ask provider to discontinue device when no longer needed.    Orders: Written    RECOMMEND PRIMARY TEAM ORDER: None, at this time  Education provided: importance of repositioning, plan of care, and Off-loading pressure  Discussed plan of care with: Patient, Family, and Nurse  WOC nurse follow-up plan: weekly  Notify WOC if wound(s) deteriorate.  Nursing to notify the Provider(s) and re-consult the WOC Nurse if new skin concern.    DATA:     Current support surface: Standard  Standard gel/foam mattress (IsoFlex, Atmos air, etc)  Containment of urine/stool: Indwelling catheter  BMI: Body mass index is 32.5 kg/m .   Active diet order: Orders Placed This Encounter      Advance Diet as Tolerated: Regular Diet Adult     Output: I/O last 3 completed shifts:  In: 960 [P.O.:960]  Out: 1875 [Urine:1875]     Labs:   Recent Labs   Lab 01/26/24  0537 01/24/24  0608 01/23/24 2036   ALBUMIN  --   --  4.8   HGB 9.5*   < > 10.6*   WBC 25.9*   < > 12.5*    < > = values in this interval not displayed.     Pressure injury risk assessment: "   Sensory Perception: 4-->no impairment  Moisture: 4-->rarely moist  Activity: 3-->walks occasionally  Mobility: 3-->slightly limited  Nutrition: 3-->adequate  Friction and Shear: 3-->no apparent problem  Syed Score: 20    DONTRELL Gross [facility] [employer]  Dept. Office Number: [phone]

## 2024-01-26 NOTE — CONSULTS
Care Management Initial Consult    General Information  Assessment completed with: Patient, Children,    Type of CM/SW Visit: Initial Assessment    Primary Care Provider verified and updated as needed: Yes   Readmission within the last 30 days: no previous admission in last 30 days      Reason for Consult: discharge planning  Advance Care Planning:            Communication Assessment  Patient's communication style: spoken language (English or Bilingual)             Cognitive  Cognitive/Neuro/Behavioral: WDL                      Living Environment:   People in home: alone     Current living Arrangements: independent living facility      Able to return to prior arrangements: other (see comments)  Living Arrangement Comments: Needs TCU for increased mobility and strength prior to return home    Family/Social Support:  Care provided by: self  Provides care for: no one  Marital Status:   Children          Description of Support System: Involved, Supportive    Support Assessment: Adequate family and caregiver support    Current Resources:   Patient receiving home care services: No     Community Resources: None  Equipment currently used at home: shower chair, walker, rolling  Supplies currently used at home: None    Employment/Financial:  Employment Status:          Financial Concerns:             Does the patient's insurance plan have a 3 day qualifying hospital stay waiver?  Yes     Which insurance plan 3 day waiver is available? Alternative insurance waiver    Will the waiver be used for post-acute placement? Yes    Lifestyle & Psychosocial Needs:  Social Determinants of Health     Food Insecurity: Low Risk  (9/29/2023)    Food Insecurity     Within the past 12 months, did you worry that your food would run out before you got money to buy more?: No     Within the past 12 months, did the food you bought just not last and you didn t have money to get more?: No   Depression: Not at risk (10/31/2023)    PHQ-2      PHQ-2 Score: 0   Housing Stability: Low Risk  (9/29/2023)    Housing Stability     Do you have housing? : Yes     Are you worried about losing your housing?: No   Tobacco Use: Low Risk  (1/24/2024)    Patient History     Smoking Tobacco Use: Never     Smokeless Tobacco Use: Never     Passive Exposure: Not on file   Financial Resource Strain: Low Risk  (9/29/2023)    Financial Resource Strain     Within the past 12 months, have you or your family members you live with been unable to get utilities (heat, electricity) when it was really needed?: No   Alcohol Use: Not on file   Transportation Needs: Low Risk  (9/29/2023)    Transportation Needs     Within the past 12 months, has lack of transportation kept you from medical appointments, getting your medicines, non-medical meetings or appointments, work, or from getting things that you need?: No   Physical Activity: Not on file   Interpersonal Safety: Low Risk  (10/31/2023)    Interpersonal Safety     Do you feel physically and emotionally safe where you currently live?: Yes     Within the past 12 months, have you been hit, slapped, kicked or otherwise physically hurt by someone?: No     Within the past 12 months, have you been humiliated or emotionally abused in other ways by your partner or ex-partner?: No   Stress: Not on file   Social Connections: Not on file       Functional Status:  Prior to admission patient needed assistance:   Dependent ADLs:: Ambulation-walker  Dependent IADLs:: Independent  Assesssment of Functional Status: Not at baseline with ADL Functioning    Mental Health Status:          Chemical Dependency Status:                Values/Beliefs:  Spiritual, Cultural Beliefs, Confucianist Practices, Values that affect care:                 Additional Information:    SW met with pt and daughter in law Nguyen (P: [phone]) at the bedside to discuss discharge planning. Pt reports that she lives in [facility] and is independent with a walker at baseline. Pt and  daughter in law are both agreeable to the recommendation for TCU and prefer [facility] as top choice. They are also accepting of [facility]. They prefer a private room and are agreeable to cost. They would be accepting of a shared room if that is all that's available. Family will transport at discharge. TCU referrals sent. SW following.     Elana Chakraborty/KRYSTINA Terrell, WEI  Inpatient Care Coordination  Emergency Room /Float  [phone]    Elana Chakraborty, SW

## 2024-01-26 NOTE — PROGRESS NOTES
[facility]    Medicine Progress Note - Hospitalist Service    Date of Admission:  1/23/2024    Assessment & Plan   Riddhi Aguilar is a 89 year old female with past medical history including hypertension, duodenal ulcer, chronic anemia with baseline of around 10, and CKD 3 admitted on 1/23/2024 with left-sided pyelonephritis with a 2 mm proximal obstructing ureteral stone found to have septic shock.     Here in the ER she had low-grade fevers.  Kidney function was around baseline with creatinine of 1.16 and CBC showed leukocytosis of 12.5 K with neutrophilic predominance.  UA showed large blood and positive leukocyte esterase and 62 white cells.  Lactic acid was elevated at 3.0.  CT scan showed a 2 mm obstructing left ureteral stone with moderate hydronephrosis and perinephric stranding.  There were multiple additional small stones in both kidneys.     Urology was contacted and she underwent cystoscopy with left-sided ureteral stent placement early 1/24.  She remains admitted for IV antibiotics and supportive cares following this.     Blood culture from 1/23 growing Proteus mirabilis species with urine culture growing >100 k Proteus mirabilis and Aerococcus sanguinicola with susceptibilities pending.  Blood cultures from 1/25 with NGTD.  Initial blood cultures with pan sensitivity including Rocephin which patient has been on since admission.  Leukocytosis has continued to trend down throughout stay and patient has remained afebrile.     Problem List/Assessment and Plan:   #Septic shock due to pyelonephritis/urinary tract infection with Proteus mirabilis bacteremia  #Left 2 mm obstructing ureteral stone: As above  -Continue IV ceftriaxone (day 4)  -WBC downward trending to 25.9 on 1/25  -Monitor hemodynamics closely, VSS  -2 of 2 blood cultures from 1/23 growing proteus mirabilis, pan sensitive including Ceftriaxone   -repeat blood cultures on 1/25, NGTD, continue to follow   -urine culture  growing >100 k proteus mirabilis and aerococcus sanguinicola with sensitivities pending   -Urology following, plan for outpatient follow up for definitive stone management   -Martines catheter currently in place, plan for voiding trial on 1/27 per discussion with Urology   -follow BMP and CBC   -PT consult due to increased weakness due to decreased mobility since admission  -SW consult to assist with discharge planning      #Acute hypoxic respiratory failure, resolved: potentially related to sepsis. Chest x-ray did demonstrate some evidence of pulmonary edema though it is unclear if this is cardiogenic or noncardiogenic, higher suspicion noncardiogenic.    -echocardiogram not previously ordered, obtained on 1/25 showing EF of 60 to 65%, left atrium is severely dilated, severe mitral annular calcification with mild mitral stenosis, mild valvular aortic stenosis with mild aortic regurgitation, no WMA, pulmonary hypertension  -weaned to RA on 1/25, intermittently requiring supplemental oxygen pending position in bed or sleeping   -appears euvolemic on exam, hold off on diuresis at this time unless recurrent/consistent hypoxia   -encourage IS use      #MICHELE on CKD stage 3: baseline creatinine around 1.1-1.2, creatinine acutely increased to 1.43 on 1/24.   -creatinine improved to 0.99  -avoid nephrotoxins  -follow BMP    #Mild hyponatremia: suspect related to hypovolemia and holding off on additional IVFs with concern for hypervolemia on CXR. No history of hyponatremia.  -resolved on recheck on 1/27    #Chronic anemia: baseline Hgb of 9-10 with acute strop to 8.8 on 1/24, suspect drop may of been partially diluational.  -no current evidence of bleeding  -Hgb upward trending on recheck  -follow CBC     #History of hypertension: initially held home verapamil, spironolactone, lisinopril given hypotension  -restart Lisinopril with parameters   -continue to hold PTA Verapamil and Spironolactone      #History of  anxiety/depression: resume home Wellbutrin and sertraline        Diet: Advance Diet as Tolerated: Regular Diet Adult    DVT Prophylaxis: Pneumatic Compression Devices  Martines Catheter: PRESENT, indication: /GI/GYN Pelvic Procedure  Lines: None     Cardiac Monitoring: None  Code Status: No CPR- Do NOT Intubate      Clinically Significant Risk Factors                  # Hypertension: Noted on problem list                   Disposition Plan     Expected Discharge Date: 01/26/2024                The patient's care was discussed with the Bedside Nurse, Patient, Patient's Family, and Urology Consultant(s).    Fauzia Mcnamara PA-C  Hospitalist Service  [facility]  Securely message with [employer] (more info)  Text page via [facility] Paging/Directory   ______________________________________________________________________    Interval History   Reports increased back pain due to her known spinal stenosis from laying in bed for prolonged periods of time since admission.  She notes increased weakness over the last day.  She has had some mild shortness of breath if hunched over.  Plan for physical therapy assessment.    Patient's daughter-in-law and daughter at bedside and updated on current plan with all questions answered.    Physical Exam   Vital Signs: Temp: 97.8  F (36.6  C) Temp src: Oral BP: (!) 146/71 Pulse: 86   Resp: 16 SpO2: 95 % O2 Device: Nasal cannula Oxygen Delivery: 1/2 LPM  Weight: 172 lbs 0 oz    General Appearance:  Sitting up in bed, A&Ox3, interactive, pleasant, NAD  Respiratory: CTAB without wheezing, no crackles  Cardiovascular: RRR without significant murmur heard   GI: soft, minimal LLQ abdominal/flank pain with palpation, nondistended, normoactive bowel sounds   Skin: warm, dry, no lesions in visualized areas  Other: no LE edema      Medical Decision Making       45 MINUTES SPENT BY ME on the date of service doing chart review, history, exam, documentation & further activities per  the note.      Data   ------------------------- PAST 24 HR DATA REVIEWED -----------------------------------------------

## 2024-01-26 NOTE — PROGRESS NOTES
01/26/24 1428   Appointment Info   Signing Clinician's Name / Credentials (PT) Denisha Meyer DPT   Living Environment   People in Home alone   Current Living Arrangements independent living facility   Home Accessibility no concerns   Transportation Anticipated family or friend will provide   Living Environment Comments Lives in ILF alone; walk in shower with bench (sits to shower); IND with all mobility with 4WW, ADLs and most IADLs (gets assist with vacuuming and transportation); baseline, not on O2   Self-Care   Usual Activity Tolerance moderate   Current Activity Tolerance fair   Equipment Currently Used at Home shower chair;walker, rolling   Fall history within last six months no   General Information   Onset of Illness/Injury or Date of Surgery 01/23/24   Referring Physician Vahe Cummings APRN CNP   Patient/Family Therapy Goals Statement (PT) to get stronger   Pertinent History of Current Problem (include personal factors and/or comorbidities that impact the POC) 89 year old female with past medical history including hypertension, duodenal ulcer, chronic anemia with baseline of around 10, and CKD 3 admitted on 1/23/2024 with left-sided pyelonephritis with a 2 mm proximal obstructing ureteral stone found to have septic shock   Existing Precautions/Restrictions no known precautions/restrictions   General Observations supine, NAD   Cognition   Affect/Mental Status (Cognition) WFL   Orientation Status (Cognition) oriented x 4   Follows Commands (Cognition) WFL   Pain Assessment   Patient Currently in Pain No   Integumentary/Edema   Integumentary/Edema Comments no significant skin changes noted   Posture    Posture Forward head position;Protracted shoulders   Range of Motion (ROM)   Range of Motion ROM is WFL   Strength (Manual Muscle Testing)   Strength (Manual Muscle Testing) Deficits observed during functional mobility   Bed Mobility   Bed Mobility supine-sit;sit-supine   Supine-Sit [place] (Bed  "Mobility) supervision   Sit-Supine [place] (Bed Mobility) supervision   Comment, (Bed Mobility) has \"really old hospital bed\" she sleeps in otherwise sleeps in lift chair   Transfers   Transfers sit-stand transfer   Sit-Stand Transfer   Sit-Stand [place] (Transfers) contact guard   Assistive Device (Sit-Stand Transfers) walker, 4-wheeled   Comment, (Sit-Stand Transfer) cues to place brakes   Gait/Stairs (Locomotion)   [place] Level (Gait) contact guard   Assistive Device (Gait) walker, 4-wheeled   Distance in Feet (Gait) 5' for eval; add'l for treat   Pattern (Gait) step-through   Balance   Balance Comments Fair, no overt LOB/lateral path deviation   Clinical Impression   Criteria for Skilled Therapeutic Intervention Yes, treatment indicated   PT Diagnosis (PT) decreased functional mobility   Influenced by the following impairments weakness/deconditioning   Functional limitations due to impairments ambulation distance   Clinical Presentation (PT Evaluation Complexity) stable   Clinical Presentation Rationale clinical judgement   Clinical Decision Making (Complexity) low complexity   Planned Therapy Interventions (PT) balance training;bed mobility training;gait training;home exercise program;neuromuscular re-education;ROM (range of motion);strengthening;stretching;transfer training;progressive activity/exercise;risk factor education;home program guidelines   Risk & Benefits of therapy have been explained care plan/treatment goals reviewed;evaluation/treatment results reviewed;risks/benefits reviewed;current/potential barriers reviewed;participants voiced agreement with care plan;participants included;patient   PT Total Evaluation Time   PT Eval, Low Complexity Minutes (19562) 8   Physical Therapy Goals   PT Frequency Daily   PT Predicted Duration/Target Date for Goal Attainment 01/31/24   PT Goals Transfers;Gait   PT: Transfers Modified independent;Sit to/from stand;Bed to/from chair;Assistive device "   PT: Gait Modified independent;Rolling walker;100 feet   PT Discharge Planning   PT Plan repeated STS for LE strengthening, inc amb distance   PT Discharge Recommendation (DC Rec) Transitional Care Facility;home with assist;home with home care physical therapy   PT Rationale for DC Rec Pt is below baseline for mobility with decreased activity tolerance.  Pt very IND at baseline with 4WW per family report.  Rec TCU for strengthening and mobility.   PT Brief overview of current status SBA with 4WW   PT Equipment Needed at Discharge walker, rolling   Total Session Time   Timed Code Treatment Minutes 24   Total Session Time (sum of timed and untimed services) 32

## 2024-01-26 NOTE — PLAN OF CARE
[id number]    Inpatient Progress Note:    /63 (BP Location: Right arm, Patient Position: Semi-Jones's)   Pulse 80   Temp 98.2  F (36.8  C) (Oral)   Resp 16   Wt 78 kg (172 lb)   SpO2 93%   BMI 32.50 kg/m         Orientation: A&O x4  Pain status: Denies  Activity: Ax1 walker GB  Resp: Oxygen 1L nasal cannula.  Cardiac: WDL, denies chest pain  GI: WDL  :  Martines cath in place, voiding adequately  Skin: Intact  Infusions: PIV SL- abx IV rocephin daily  Pertinent Labs: Hgb 8.8  Diet: Regular  Consults: Urology following  Discharge Plan: TBD    Pt triggered sepsis, PA notified, no new orders, continue w current treatment    Will continue to monitor and provide cares.     Carina Booker RN

## 2024-01-26 NOTE — CARE PLAN
Patient is alert and oriented x4. VS WNL and documented on the FS. Lung sounds clear and patient on 1/2 liter O2 NC this morning and stats >90%. Patient denies SOB at rest. IS at beside and patient able to get it to 1000. Active bowel sounds and LBM yesterday (held stool softeners). Martines catheter patent with adequate urine output (yellow). Patient complaining of back pain and declined any pain medication (patient states repositioning helps) SL. Regular diet and tolerating. SBA/1 assist with walker (has her own in room) when up. Continues on Rocephin for septic shock due to pyelonephritis/urinary tract infection with Proteus mirabilis bacteremia.    -2 of 2 blood cultures from 1/23 growing proteus mirabilis, follow final results   -repeat blood cultures on 1/25 NGTD  WBC trending down was 34.9 now 25.9  Creat was 1.20 now .99    Plan: Urology following.     /79 (BP Location: Right arm, Patient Position: Semi-Jones's, Cuff Size: Adult Large)   Pulse 87   Temp 98.9  F (37.2  C) (Oral)   Resp 16   Wt 78 kg (172 lb)   SpO2 92%   BMI 32.50 kg/m

## 2024-01-27 ENCOUNTER — PREP FOR PROCEDURE (OUTPATIENT)
Dept: UROLOGY | Facility: CLINIC | Age: 89
End: 2024-01-27
Payer: COMMERCIAL

## 2024-01-27 ENCOUNTER — APPOINTMENT (OUTPATIENT)
Dept: PHYSICAL THERAPY | Facility: CLINIC | Age: 89
DRG: 853 | End: 2024-01-27
Payer: COMMERCIAL

## 2024-01-27 DIAGNOSIS — N20.1 LEFT URETERAL STONE: ICD-10-CM

## 2024-01-27 DIAGNOSIS — N20.1 LEFT URETERAL STONE: Primary | ICD-10-CM

## 2024-01-27 LAB
ANION GAP SERPL CALCULATED.3IONS-SCNC: 10 MMOL/L (ref 7–15)
BASOPHILS # BLD AUTO: 0.1 10E3/UL (ref 0–0.2)
BASOPHILS NFR BLD AUTO: 1 %
BUN SERPL-MCNC: 25.9 MG/DL (ref 8–23)
CALCIUM SERPL-MCNC: 9 MG/DL (ref 8.2–9.6)
CHLORIDE SERPL-SCNC: 102 MMOL/L (ref 98–107)
CREAT SERPL-MCNC: 0.94 MG/DL (ref 0.51–0.95)
DEPRECATED HCO3 PLAS-SCNC: 25 MMOL/L (ref 22–29)
EGFRCR SERPLBLD CKD-EPI 2021: 57 ML/MIN/1.73M2
EOSINOPHIL # BLD AUTO: 0.3 10E3/UL (ref 0–0.7)
EOSINOPHIL NFR BLD AUTO: 3 %
ERYTHROCYTE [DISTWIDTH] IN BLOOD BY AUTOMATED COUNT: 23 % (ref 10–15)
GLUCOSE SERPL-MCNC: 84 MG/DL (ref 70–99)
HCT VFR BLD AUTO: 29 % (ref 35–47)
HGB BLD-MCNC: 9.7 G/DL (ref 11.7–15.7)
IMM GRANULOCYTES # BLD: 0.2 10E3/UL
IMM GRANULOCYTES NFR BLD: 2 %
LYMPHOCYTES # BLD AUTO: 1 10E3/UL (ref 0.8–5.3)
LYMPHOCYTES NFR BLD AUTO: 9 %
MCH RBC QN AUTO: 34.3 PG (ref 26.5–33)
MCHC RBC AUTO-ENTMCNC: 33.4 G/DL (ref 31.5–36.5)
MCV RBC AUTO: 103 FL (ref 78–100)
MONOCYTES # BLD AUTO: 1.3 10E3/UL (ref 0–1.3)
MONOCYTES NFR BLD AUTO: 12 %
NEUTROPHILS # BLD AUTO: 7.7 10E3/UL (ref 1.6–8.3)
NEUTROPHILS NFR BLD AUTO: 73 %
NRBC # BLD AUTO: 0.1 10E3/UL
NRBC BLD AUTO-RTO: 1 /100
PLATELET # BLD AUTO: 260 10E3/UL (ref 150–450)
POTASSIUM SERPL-SCNC: 4.1 MMOL/L (ref 3.4–5.3)
RBC # BLD AUTO: 2.83 10E6/UL (ref 3.8–5.2)
SODIUM SERPL-SCNC: 137 MMOL/L (ref 135–145)
WBC # BLD AUTO: 10.6 10E3/UL (ref 4–11)

## 2024-01-27 PROCEDURE — 36415 COLL VENOUS BLD VENIPUNCTURE: CPT | Performed by: PHYSICIAN ASSISTANT

## 2024-01-27 PROCEDURE — 250N000013 HC RX MED GY IP 250 OP 250 PS 637: Performed by: UROLOGY

## 2024-01-27 PROCEDURE — 250N000013 HC RX MED GY IP 250 OP 250 PS 637: Performed by: PHYSICIAN ASSISTANT

## 2024-01-27 PROCEDURE — 99232 SBSQ HOSP IP/OBS MODERATE 35: CPT | Performed by: PHYSICIAN ASSISTANT

## 2024-01-27 PROCEDURE — 250N000013 HC RX MED GY IP 250 OP 250 PS 637: Performed by: INTERNAL MEDICINE

## 2024-01-27 PROCEDURE — 120N000001 HC R&B MED SURG/OB

## 2024-01-27 PROCEDURE — 85025 COMPLETE CBC W/AUTO DIFF WBC: CPT | Performed by: PHYSICIAN ASSISTANT

## 2024-01-27 PROCEDURE — 250N000011 HC RX IP 250 OP 636: Performed by: INTERNAL MEDICINE

## 2024-01-27 PROCEDURE — 80048 BASIC METABOLIC PNL TOTAL CA: CPT | Performed by: PHYSICIAN ASSISTANT

## 2024-01-27 PROCEDURE — 97530 THERAPEUTIC ACTIVITIES: CPT | Mod: GP | Performed by: PHYSICAL THERAPIST

## 2024-01-27 RX ADMIN — DORZOLAMIDE HYDROCHLORIDE AND TIMOLOL MALEATE 1 DROP: 20; 5 SOLUTION/ DROPS OPHTHALMIC at 09:41

## 2024-01-27 RX ADMIN — BUPROPION 300 MG: 150 TABLET, EXTENDED RELEASE ORAL at 09:19

## 2024-01-27 RX ADMIN — CEFTRIAXONE 2 G: 2 INJECTION, POWDER, FOR SOLUTION INTRAMUSCULAR; INTRAVENOUS at 22:37

## 2024-01-27 RX ADMIN — POLYETHYLENE GLYCOL 3350 17 G: 17 POWDER, FOR SOLUTION ORAL at 09:18

## 2024-01-27 RX ADMIN — SERTRALINE HYDROCHLORIDE 25 MG: 25 TABLET ORAL at 09:19

## 2024-01-27 RX ADMIN — DORZOLAMIDE HYDROCHLORIDE AND TIMOLOL MALEATE 1 DROP: 20; 5 SOLUTION/ DROPS OPHTHALMIC at 19:48

## 2024-01-27 RX ADMIN — LISINOPRIL 40 MG: 40 TABLET ORAL at 09:19

## 2024-01-27 ASSESSMENT — ACTIVITIES OF DAILY LIVING (ADL)
ADLS_ACUITY_SCORE: 37
ADLS_ACUITY_SCORE: 39
ADLS_ACUITY_SCORE: 37
ADLS_ACUITY_SCORE: 39
ADLS_ACUITY_SCORE: 39
ADLS_ACUITY_SCORE: 37
ADLS_ACUITY_SCORE: 39
ADLS_ACUITY_SCORE: 39
ADLS_ACUITY_SCORE: 37
ADLS_ACUITY_SCORE: 39
ADLS_ACUITY_SCORE: 37
ADLS_ACUITY_SCORE: 37

## 2024-01-27 NOTE — PROGRESS NOTES
[facility]    Medicine Progress Note - Hospitalist Service    Date of Admission:  1/23/2024    Assessment & Plan   Riddhi Aguilar is a 89 year old female with past medical history including hypertension, duodenal ulcer, chronic anemia with baseline of around 10, and CKD 3 admitted on 1/23/2024 with left-sided pyelonephritis with a 2 mm proximal obstructing ureteral stone found to have septic shock.     Here in the ER she had low-grade fevers.  Kidney function was around baseline with creatinine of 1.16 and CBC showed leukocytosis of 12.5 K with neutrophilic predominance.  UA showed large blood and positive leukocyte esterase and 62 white cells.  Lactic acid was elevated at 3.0.  CT scan showed a 2 mm obstructing left ureteral stone with moderate hydronephrosis and perinephric stranding.  There were multiple additional small stones in both kidneys.     Urology was contacted and she underwent cystoscopy with left-sided ureteral stent placement early 1/24.  She remains admitted for IV antibiotics and supportive cares following this.     Blood culture from 1/23 growing Proteus mirabilis species with urine culture growing >100 k Proteus mirabilis and Aerococcus sanguinicola with susceptibilities showing pansensitivities with only resistance to Nitrofurantoin on urine culture for proteus species.  Blood cultures from 1/25 with NGTD.  Leukocytosis has continued to trend down throughout stay and patient has remained afebrile. Martines removed on 1/27. PT assessed patient due to increased weakness and has recommended TCU. SW following to assist with discharge planning.      Problem List/Assessment and Plan:   #Septic shock due to pyelonephritis/urinary tract infection with Proteus mirabilis bacteremia  #Left 2 mm obstructing ureteral stone: As above  -Continue IV ceftriaxone (day 5)  -WBC normalized on 1/27  -Monitor hemodynamics closely, VSS  -2 of 2 blood cultures from 1/23 growing proteus mirabilis,  pan sensitive including Ceftriaxone   -repeat blood cultures on 1/25, NGTD, continue to follow   -urine culture growing >100 k proteus mirabilis and aerococcus sanguinicola with sensitivities showing resistance to Nitrofurantoin for proteus species   -Urology following, plan for outpatient follow up for definitive stone management   -rivera removed on 1/27, due to void  -PT consult, initially recommended TCU with patient continuing to make improvement   -SW consult to assist with discharge planning, family and patient preferring rehab, pending timing due to being a weekend, may improve enough here to discharge back to ILF     #Acute hypoxic respiratory failure, resolved: potentially related to sepsis. Chest x-ray did demonstrate some evidence of pulmonary edema though it is unclear if this is cardiogenic or noncardiogenic, higher suspicion noncardiogenic.    -echocardiogram not previously ordered, obtained on 1/25 showing EF of 60 to 65%, left atrium is severely dilated, severe mitral annular calcification with mild mitral stenosis, mild valvular aortic stenosis with mild aortic regurgitation, no WMA, pulmonary hypertension  -weaned to RA on 1/25 and now stable over last day  -appears euvolemic on exam, hold off on diuresis at this time unless recurrent/consistent hypoxia   -encourage IS use      #MICHELE on CKD stage 3: baseline creatinine around 1.1-1.2, creatinine acutely increased to 1.43 on 1/24.   -creatinine stable  -avoid nephrotoxins  -follow BMP    #Mild hyponatremia: suspect related to hypovolemia and holding off on additional IVFs with concern for hypervolemia on CXR. No history of hyponatremia.  -resolved on recheck on 1/27    #Chronic anemia: baseline Hgb of 9-10 with acute strop to 8.8 on 1/24, suspect drop may of been partially diluational.  -no current evidence of bleeding  -Hgb upward trending on recheck  -follow CBC     #History of hypertension: initially held home verapamil, spironolactone, lisinopril  given hypotension  -restart Lisinopril with parameters   -continue to hold PTA Verapamil and Spironolactone      #History of anxiety/depression: resume home Wellbutrin and sertraline        Diet: Advance Diet as Tolerated: Regular Diet Adult    DVT Prophylaxis: Pneumatic Compression Devices and Ambulate every shift  Martines Catheter: Not present  Lines: None     Cardiac Monitoring: None  Code Status: No CPR- Do NOT Intubate      Clinically Significant Risk Factors                  # Hypertension: Noted on problem list                   Disposition Plan     Expected Discharge Date: 01/27/2024      Destination: nursing home            The patient's care was discussed with the Bedside Nurse, Care Coordinator/, Patient, and Patient's Family.    Fauzia Mcnamara PA-C  Hospitalist Service  [facility]  Securely message with [email or web address] (more info)  Text page via [facility] Paging/Directory   ______________________________________________________________________    Interval History   Patient is sitting up in the chair.  She has been stable off of oxygen over the last day.  Discussed ongoing improvement in her labs.  Patient and family are hopeful for rehab for short period of time in order to improve her strength prior to returning back to her independent living.  She notes improvement in her appetite.  Denies focal pain, nausea, vomiting, chest pain, or shortness of breath.    Patient's daughter and daughter in law at the bedside and updated on the current plan with all questions answered.     Physical Exam   Vital Signs: Temp: 98.1  F (36.7  C) Temp src: Oral BP: 117/51 Pulse: 73   Resp: 18 SpO2: 94 % O2 Device: None (Room air) Oxygen Delivery: 1 LPM  Weight: 172 lbs 0 oz  General Appearance:  Sitting up in recliner chair, A&Ox3, interactive, pleasant, NAD  Respiratory: CTAB without wheezing, no crackles  Cardiovascular: RRR without significant murmur heard   GI: soft, nontender, nondistended,  normoactive bowel sounds   Skin: warm, dry, no lesions in visualized areas  Other: no LE edema      Medical Decision Making       40 MINUTES SPENT BY ME on the date of service doing chart review, history, exam, documentation & further activities per the note.      Data   ------------------------- PAST 24 HR DATA REVIEWED -----------------------------------------------

## 2024-01-27 NOTE — PLAN OF CARE
[id number]    Inpatient Progress Note:    BP (!) 142/65 (BP Location: Right arm)   Pulse 84   Temp 98.4  F (36.9  C) (Oral)   Resp 18   Wt 78 kg (172 lb)   SpO2 96%   BMI 32.50 kg/m         Orientation: A&O x4  Pain status: Denies pain  Activity: Ax1 walker   Resp: WDL, denies SOB, 1 L of oxygen at night for pt comfort.  Cardiac: WDL, denies chest pain  GI: WDL  :  Martines cath, adequate output.   Skin: Buttocks-pressure injury (WOC saw her, wound care in MAR)  Infusions: SL-abx (rocephin)  Pertinent Labs: hemoglobin 9.5  Diet: regular  Consults: WOC/urology/SW/PT  Discharge Plan: TCU    Will continue to monitor and provide cares.     Carina Booker RN

## 2024-01-27 NOTE — PLAN OF CARE
1500 2300  Admitted :Septic Shock due to Pyelonephritis /UTI  Inpatient Progress Note:  Orientation: A&Ox4  Pain status: Denies  Activity: Ax1 Walker GB  Resp: denies SOB  Cardiac: WDL  GI: WDL  :  Martines cath in place, voiding adequately  Skin: intact  LDA: SL -IV rocephin   Pertinent Labs: Lactic acid- 1.2  Diet: Regular  Consults: Urology  Discharge Plan:     BP (!) 142/65 (BP Location: Right arm)   Pulse 84   Temp 98.4  F (36.9  C) (Oral)   Resp 18   Wt 78 kg (172 lb)   SpO2 96%   BMI 32.50 kg/m

## 2024-01-27 NOTE — PLAN OF CARE
Still due to void, ambulating to the bathroom with SB, ambulated in the [place] with PT. Family at the bedside comforting and assisting as needed. Encouraged to lay in bed when possible to help wound heal, pt and family says that it is much easier to breath when on the recliner. Will keep monitoring.

## 2024-01-28 ENCOUNTER — APPOINTMENT (OUTPATIENT)
Dept: PHYSICAL THERAPY | Facility: CLINIC | Age: 89
DRG: 853 | End: 2024-01-28
Payer: COMMERCIAL

## 2024-01-28 PROCEDURE — 120N000001 HC R&B MED SURG/OB

## 2024-01-28 PROCEDURE — 97530 THERAPEUTIC ACTIVITIES: CPT | Mod: GP

## 2024-01-28 PROCEDURE — 250N000013 HC RX MED GY IP 250 OP 250 PS 637: Performed by: UROLOGY

## 2024-01-28 PROCEDURE — 99232 SBSQ HOSP IP/OBS MODERATE 35: CPT | Performed by: PHYSICIAN ASSISTANT

## 2024-01-28 PROCEDURE — 250N000013 HC RX MED GY IP 250 OP 250 PS 637: Performed by: PHYSICIAN ASSISTANT

## 2024-01-28 PROCEDURE — 250N000011 HC RX IP 250 OP 636: Performed by: INTERNAL MEDICINE

## 2024-01-28 PROCEDURE — 250N000013 HC RX MED GY IP 250 OP 250 PS 637: Performed by: INTERNAL MEDICINE

## 2024-01-28 RX ORDER — SPIRONOLACTONE 25 MG/1
25 TABLET ORAL DAILY
Status: DISCONTINUED | OUTPATIENT
Start: 2024-01-28 | End: 2024-01-30 | Stop reason: HOSPADM

## 2024-01-28 RX ADMIN — SERTRALINE HYDROCHLORIDE 25 MG: 25 TABLET ORAL at 07:56

## 2024-01-28 RX ADMIN — DORZOLAMIDE HYDROCHLORIDE AND TIMOLOL MALEATE 1 DROP: 20; 5 SOLUTION/ DROPS OPHTHALMIC at 19:41

## 2024-01-28 RX ADMIN — LISINOPRIL 40 MG: 40 TABLET ORAL at 07:56

## 2024-01-28 RX ADMIN — CEFTRIAXONE 2 G: 2 INJECTION, POWDER, FOR SOLUTION INTRAMUSCULAR; INTRAVENOUS at 21:56

## 2024-01-28 RX ADMIN — ACETAMINOPHEN 650 MG: 325 TABLET, FILM COATED ORAL at 19:41

## 2024-01-28 RX ADMIN — DORZOLAMIDE HYDROCHLORIDE AND TIMOLOL MALEATE 1 DROP: 20; 5 SOLUTION/ DROPS OPHTHALMIC at 09:02

## 2024-01-28 RX ADMIN — BUPROPION 300 MG: 150 TABLET, EXTENDED RELEASE ORAL at 07:56

## 2024-01-28 RX ADMIN — SPIRONOLACTONE 25 MG: 25 TABLET ORAL at 09:03

## 2024-01-28 ASSESSMENT — ACTIVITIES OF DAILY LIVING (ADL)
ADLS_ACUITY_SCORE: 38
ADLS_ACUITY_SCORE: 39
ADLS_ACUITY_SCORE: 38
ADLS_ACUITY_SCORE: 38

## 2024-01-28 NOTE — PROGRESS NOTES
Care Management Follow Up    Length of Stay (days): 5    Expected Discharge Date: 01/28/2024     Concerns to be Addressed:     Discharge planning   Patient plan of care discussed at interdisciplinary rounds: Yes    Anticipated Discharge Disposition: Skilled Nursing Facility     Patient/family educated on Medicare website which has current facility and service quality ratings: yes  Education Provided on the Discharge Plan:  Pt   Patient/Family in Agreement with the Plan: yes    Additional Information:  CM following for discharge planning, reviewed PT notes from 1/27 with pt and family indicating home with HC PT vs TCU. Pt feels more fatigued today and would prefer TCU, family also feels pt is not yet strong enough to meet her needs at home. They would like to continue to pursue TCU placement and are aware we likely will not hear back on referrals until 1/29 due to it being the weekend. Referrals pending with [facility] and [facility] at this time. If they do not have availability they would like to send additional referrals to [facility], [facility], [facility] and [facility].     Sheila Long RN BSN   Inpatient Care Coordination  [facility]   Phone [phone]

## 2024-01-28 NOTE — PLAN OF CARE
Care from [id number]    Inpatient Progress Note:  For complete assessment see flow sheet documentation.    /62 (BP Location: Right arm)   Pulse 84   Temp 97.9  F (36.6  C) (Oral)   Resp 18   Wt 78 kg (172 lb)   SpO2 93%   BMI 32.50 kg/m         Orientation: A&O x4   Neuro: Intact   Pain status: Denies   Activity: SBA with walker. Pt sleeps in recliner during the night.   Resp: WDL  Cardiac: WDL  GI: WL  : Pt had rivera removed on previous shift. Has urinated four times now on this shift since removal.   Skin: Pt has wound on bottom, see orders for wound care needs.   LDA: PIV in L forearm.   Infusions: N/A  Pertinent Labs:   Diet: Regular   Consults: PT has been following.   Discharge Plan: PT recommends TCU, SW has sent out referrals.

## 2024-01-28 NOTE — PLAN OF CARE
[id number]    Inpatient Progress Note:    BP (!) 141/67 (BP Location: Right arm)   Pulse 70   Temp 98.1  F (36.7  C) (Oral)   Resp 16   Wt 78 kg (172 lb)   SpO2 94%   BMI 32.50 kg/m         Orientation: Alert and Oriented x4  Pain status:  denying pain  Activity:1 Assist with Gait Belt and Walker.Assisted toilet x3   Peripheral edema:   Resp: Denies SOB, room air  Cardiac: WDL  GI: WDL  : Voiding normal  Skin: slight buttock area, cleansed per wound care order and applied barium cream  LDA: PIV  Infusions: SL  Pertinent Labs: Hgb, CBC. recheck today  Diet: Reg  Consults: PT  Discharge Plan: TBD    Will continue to provide cares.     Tip Arellano RN     Goal Outcome Evaluation:

## 2024-01-28 NOTE — PROGRESS NOTES
[facility]    Medicine Progress Note - Hospitalist Service    Date of Admission:  1/23/2024    Assessment & Plan   Riddhi Aguilar is a 89 year old female with past medical history including hypertension, duodenal ulcer, chronic anemia with baseline of around 10, and CKD 3 admitted on 1/23/2024 with left-sided pyelonephritis with a 2 mm proximal obstructing ureteral stone found to have septic shock.     Here in the ER she had low-grade fevers.  Kidney function was around baseline with creatinine of 1.16 and CBC showed leukocytosis of 12.5 K with neutrophilic predominance.  UA showed large blood and positive leukocyte esterase and 62 white cells.  Lactic acid was elevated at 3.0.  CT scan showed a 2 mm obstructing left ureteral stone with moderate hydronephrosis and perinephric stranding.  There were multiple additional small stones in both kidneys.     Urology was contacted and she underwent cystoscopy with left-sided ureteral stent placement early 1/24.  She remains admitted for IV antibiotics and supportive cares following this.     Blood culture from 1/23 growing Proteus mirabilis species with urine culture growing >100 k Proteus mirabilis and Aerococcus sanguinicola with susceptibilities showing pansensitivities with only resistance to Nitrofurantoin on urine culture for proteus species.  Blood cultures from 1/25 with NGTD.  Leukocytosis has continued to trend down throughout stay and patient has remained afebrile. Martines removed on 1/27. PT assessed patient due to increased weakness and has recommended TCU. SW following to assist with discharge planning.      Problem List/Assessment and Plan:   #Septic shock due to pyelonephritis/urinary tract infection with Proteus mirabilis bacteremia  #Left 2 mm obstructing ureteral stone: As above  -Continue IV ceftriaxone (day 6), transition to PO upon discharge for total of 14 day course   -WBC normalized on 1/27  -Monitor hemodynamics closely,  VSS  -2 of 2 blood cultures from 1/23 growing proteus mirabilis, pan sensitive including Ceftriaxone   -repeat blood cultures on 1/25, NGTD, continue to follow   -urine culture growing >100 k proteus mirabilis and aerococcus sanguinicola with sensitivities showing resistance to Nitrofurantoin for proteus species   -Urology initially consulted and following, now has signed off, plan for outpatient follow up for definitive stone management   -rivera removed on 1/27, passed voiding trial   -PT consult, initially recommended TCU with patient continuing to make improvement  -OT consult on 1/28 for ADL assessment since this is an ongoing concern if patient were to discharge back to ILF   -SW consult to assist with discharge planning, family and patient preferring rehab, pending timing due to being a weekend, may improve enough here to discharge back to ILF, likely able to discharge to TCU or back to ILF with home care on 1/29 pending improvement and/or placement      #Acute hypoxic respiratory failure, resolved: potentially related to sepsis. Chest x-ray did demonstrate some evidence of pulmonary edema though it is unclear if this is cardiogenic or noncardiogenic, higher suspicion noncardiogenic.    -echocardiogram not previously ordered, obtained on 1/25 showing EF of 60 to 65%, left atrium is severely dilated, severe mitral annular calcification with mild mitral stenosis, mild valvular aortic stenosis with mild aortic regurgitation, no WMA, pulmonary hypertension  -weaned to RA on 1/25 and continues to be stable   -appears euvolemic on exam, hold off on diuresis at this time unless recurrent/consistent hypoxia   -encourage IS use      #MICHELE on CKD stage 3: baseline creatinine around 1.1-1.2, creatinine acutely increased to 1.43 on 1/24.   -creatinine stable  -avoid nephrotoxins  -follow BMP as outpatient     #Mild hyponatremia: suspect related to hypovolemia and holding off on additional IVFs with concern for hypervolemia  on CXR. No history of hyponatremia.  -resolved on recheck on 1/27    #Chronic anemia: baseline Hgb of 9-10 with acute strop to 8.8 on 1/24, suspect drop may of been partially diluational.  -no current evidence of bleeding  -Hgb upward trending on recheck  -no need to recheck while admitted unless new concern, monitor outpatient     #History of hypertension: initially held home verapamil, spironolactone, lisinopril given hypotension  -restarted Lisinopril, Spironolactone, and Verapamil with parameters as of 1/28      #History of anxiety/depression: resume home Wellbutrin and sertraline        Diet: Advance Diet as Tolerated: Regular Diet Adult    DVT Prophylaxis: Pneumatic Compression Devices and Ambulate every shift  Rivera Catheter: Not present  Lines: None     Cardiac Monitoring: None  Code Status: No CPR- Do NOT Intubate      Clinically Significant Risk Factors                  # Hypertension: Noted on problem list                   Disposition Plan     Expected Discharge Date: 01/28/2024, 12:00 PM    Destination: nursing home            The patient's care was discussed with the Bedside Nurse, Care Coordinator/, Patient, Patient's Family, and Physical therapy Team.    Fauzia Mcnamara PA-C  Hospitalist Service  [facility]  Securely message with [employer] (more info)  Text page via [employer] Paging/Directory   ______________________________________________________________________    Interval History   Patient is sitting up in the chair and reports increased fatigue today otherwise no other complaints. Tolerating adequate oral intake. Voiding since rivera removal and denies dysuria. She notes baseline urinary incontinence. She expresses concern about how much she urinates during the day and overnight and ability to continue regular hygiene if going back to her independent living.     Patient's daughter and daughter in law present at bedside with all questions answered.     Physical Exam    Vital Signs: Temp: 98.5  F (36.9  C) Temp src: Oral BP: 136/61 Pulse: 69   Resp: 18 SpO2: 95 % O2 Device: None (Room air)    Weight: 172 lbs 0 oz  General Appearance:  Sitting up in recliner chair, A&Ox3, interactive, NAD  Respiratory: CTAB without wheezing, no crackles  Cardiovascular: RRR without significant murmur heard   GI: soft, nontender, nondistended, normoactive bowel sounds   Skin: warm, dry, no lesions in visualized areas  Other: no LE edema      Medical Decision Making       40 MINUTES SPENT BY ME on the date of service doing chart review, history, exam, documentation & further activities per the note.      Data   ------------------------- PAST 24 HR DATA REVIEWED -----------------------------------------------

## 2024-01-29 ENCOUNTER — APPOINTMENT (OUTPATIENT)
Dept: PHYSICAL THERAPY | Facility: CLINIC | Age: 89
DRG: 853 | End: 2024-01-29
Payer: COMMERCIAL

## 2024-01-29 PROCEDURE — 250N000013 HC RX MED GY IP 250 OP 250 PS 637: Performed by: INTERNAL MEDICINE

## 2024-01-29 PROCEDURE — 250N000013 HC RX MED GY IP 250 OP 250 PS 637: Performed by: UROLOGY

## 2024-01-29 PROCEDURE — 97530 THERAPEUTIC ACTIVITIES: CPT | Mod: GP | Performed by: PHYSICAL THERAPIST

## 2024-01-29 PROCEDURE — 120N000001 HC R&B MED SURG/OB

## 2024-01-29 PROCEDURE — 250N000011 HC RX IP 250 OP 636: Performed by: PHYSICIAN ASSISTANT

## 2024-01-29 PROCEDURE — 250N000013 HC RX MED GY IP 250 OP 250 PS 637: Performed by: PHYSICIAN ASSISTANT

## 2024-01-29 PROCEDURE — 99233 SBSQ HOSP IP/OBS HIGH 50: CPT | Performed by: PHYSICIAN ASSISTANT

## 2024-01-29 RX ORDER — CEFTRIAXONE 2 G/1
2 INJECTION, POWDER, FOR SOLUTION INTRAMUSCULAR; INTRAVENOUS ONCE
Status: DISCONTINUED | OUTPATIENT
Start: 2024-01-29 | End: 2024-01-29

## 2024-01-29 RX ORDER — CEFDINIR 300 MG/1
300 CAPSULE ORAL EVERY 12 HOURS SCHEDULED
Status: DISCONTINUED | OUTPATIENT
Start: 2024-01-29 | End: 2024-01-29

## 2024-01-29 RX ORDER — CEFDINIR 300 MG/1
300 CAPSULE ORAL EVERY 12 HOURS SCHEDULED
Status: DISCONTINUED | OUTPATIENT
Start: 2024-01-30 | End: 2024-01-29

## 2024-01-29 RX ORDER — ENOXAPARIN SODIUM 100 MG/ML
40 INJECTION SUBCUTANEOUS EVERY 24 HOURS
Status: DISCONTINUED | OUTPATIENT
Start: 2024-01-29 | End: 2024-01-30 | Stop reason: HOSPADM

## 2024-01-29 RX ORDER — CEFDINIR 300 MG/1
300 CAPSULE ORAL EVERY 12 HOURS SCHEDULED
Status: DISCONTINUED | OUTPATIENT
Start: 2024-01-29 | End: 2024-01-30 | Stop reason: HOSPADM

## 2024-01-29 RX ORDER — VERAPAMIL HYDROCHLORIDE 120 MG/1
120 TABLET, FILM COATED, EXTENDED RELEASE ORAL 2 TIMES DAILY
Status: DISCONTINUED | OUTPATIENT
Start: 2024-01-30 | End: 2024-01-30 | Stop reason: HOSPADM

## 2024-01-29 RX ADMIN — DORZOLAMIDE HYDROCHLORIDE AND TIMOLOL MALEATE 1 DROP: 20; 5 SOLUTION/ DROPS OPHTHALMIC at 20:30

## 2024-01-29 RX ADMIN — POLYETHYLENE GLYCOL 3350 17 G: 17 POWDER, FOR SOLUTION ORAL at 09:35

## 2024-01-29 RX ADMIN — LISINOPRIL 40 MG: 40 TABLET ORAL at 09:36

## 2024-01-29 RX ADMIN — DORZOLAMIDE HYDROCHLORIDE AND TIMOLOL MALEATE 1 DROP: 20; 5 SOLUTION/ DROPS OPHTHALMIC at 09:43

## 2024-01-29 RX ADMIN — SERTRALINE HYDROCHLORIDE 25 MG: 25 TABLET ORAL at 09:36

## 2024-01-29 RX ADMIN — CEFDINIR 300 MG: 300 CAPSULE ORAL at 20:30

## 2024-01-29 RX ADMIN — BUPROPION 300 MG: 150 TABLET, EXTENDED RELEASE ORAL at 09:36

## 2024-01-29 RX ADMIN — ENOXAPARIN SODIUM 40 MG: 40 INJECTION SUBCUTANEOUS at 17:43

## 2024-01-29 RX ADMIN — SPIRONOLACTONE 25 MG: 25 TABLET ORAL at 09:36

## 2024-01-29 ASSESSMENT — ACTIVITIES OF DAILY LIVING (ADL)
ADLS_ACUITY_SCORE: 38
ADLS_ACUITY_SCORE: 40
ADLS_ACUITY_SCORE: 38
ADLS_ACUITY_SCORE: 40
ADLS_ACUITY_SCORE: 38
ADLS_ACUITY_SCORE: 38

## 2024-01-29 NOTE — PROGRESS NOTES
[facility]    Medicine Progress Note - Hospitalist Service    Date of Admission:  1/23/2024    Assessment & Plan Riddhi Aguilar is a 89 year old female with past medical history including hypertension, duodenal ulcer, chronic anemia with baseline of around 10, and CKD 3 admitted on 1/23/2024 with left-sided pyelonephritis with a 2 mm proximal obstructing ureteral stone found to have septic shock.     Here in the ER she had low-grade fevers.  Kidney function was around baseline with creatinine of 1.16 and CBC showed leukocytosis of 12.5 K with neutrophilic predominance.  UA showed large blood and positive leukocyte esterase and 62 white cells.  Lactic acid was elevated at 3.0.  CT scan showed a 2 mm obstructing left ureteral stone with moderate hydronephrosis and perinephric stranding.  There were multiple additional small stones in both kidneys.     Urology was contacted and she underwent cystoscopy with left-sided ureteral stent placement early 1/24.  She remains admitted for IV antibiotics and supportive cares following this.     Blood culture from 1/23 growing Proteus mirabilis species with urine culture growing >100 k Proteus mirabilis and Aerococcus sanguinicola with susceptibilities showing pansensitivities with only resistance to Nitrofurantoin on urine culture for proteus species.  Blood cultures from 1/25 with NGTD.  Leukocytosis has continued to trend down throughout stay and patient has remained afebrile. Martines removed on 1/27. PT assessed patient due to increased weakness and has recommended TCU. SW following to assist with discharge planning.      Problem List/Assessment and Plan:   #Septic shock due to pyelonephritis/urinary tract infection with Proteus mirabilis bacteremia  #Left 2 mm obstructing ureteral stone: As above  -Received  IV ceftriaxone (day 7 1/29), transition to PO abx 1/30 Omnicef for total of 14 day course   -WBC normalized on 1/27  -Monitor hemodynamics closely,  VSS  -2 of 2 blood cultures from 1/23 growing proteus mirabilis, pan sensitive including Ceftriaxone   -repeat blood cultures on 1/25, NGTD, continue to follow   -urine culture growing >100 k proteus mirabilis and aerococcus sanguinicola with sensitivities showing resistance to Nitrofurantoin for proteus species   -Urology initially consulted and following, now has signed off, plan for outpatient follow up for definitive stone management   -rivera removed on 1/27, passed voiding trial   -PT consult, initially recommended TCU with patient continuing to make improvement  -OT consult on 1/28 for ADL assessment since this is an ongoing concern if patient were to discharge back to ILF   -SW consult to assist with discharge planning, family and patient preferring rehab, pending timing due to being a weekend, may improve enough here to discharge back to ILF, likely able to discharge to TCU or back to ILF with home care on 1/30 pending improvement and/or placement      #Acute hypoxic respiratory failure, resolved: potentially related to sepsis. Chest x-ray did demonstrate some evidence of pulmonary edema though it is unclear if this is cardiogenic or noncardiogenic, higher suspicion noncardiogenic.    -echocardiogram not previously ordered, obtained on 1/25 showing EF of 60 to 65%, left atrium is severely dilated, severe mitral annular calcification with mild mitral stenosis, mild valvular aortic stenosis with mild aortic regurgitation, no WMA, pulmonary hypertension  -weaned to RA on 1/25 and continues to be stable   -appears euvolemic on exam, hold off on diuresis at this time unless recurrent/consistent hypoxia   -encourage IS use      #MICHELE on CKD stage 3: baseline creatinine around 1.1-1.2, creatinine acutely increased to 1.43 on 1/24.   -creatinine stable  -avoid nephrotoxins  -follow BMP as outpatient      #Mild hyponatremia: suspect related to hypovolemia and holding off on additional IVFs with concern for  hypervolemia on CXR. No history of hyponatremia.  -resolved on recheck on 1/27     #Chronic anemia: baseline Hgb of 9-10 with acute strop to 8.8 on 1/24, suspect drop may of been partially diluational.  -no current evidence of bleeding  -Hgb upward trending on recheck  -no need to recheck while admitted unless new concern, monitor outpatient      #History of hypertension: initially held home verapamil, spironolactone, lisinopril given hypotension  -restarted Lisinopril, Spironolactone, and Verapamil with parameters as of 1/29  -recheck BMP 1/30      #History of anxiety/depression: resume home Wellbutrin and sertraline       Diet: Advance Diet as Tolerated: Regular Diet Adult    DVT Prophylaxis: start Lovenox while admitted  Martines Catheter: Not present  Lines: None     Cardiac Monitoring: None  Code Status: No CPR- Do NOT Intubate      Clinically Significant Risk Factors                  # Hypertension: Noted on problem list                   Disposition Plan      Expected Discharge Date: 01/30/2024, 12:00 PM    Destination: nursing home            Carissa Whitaker PA-C  Hospitalist Service  [facility]  Securely message with [employer] (more info)  Text page via [employer] Paging/Directory   ______________________________________________________________________    Interval History   Care.  Afebrile.  Voiding.  Spoke with daughter and daughter-in-law over the phone and in person today.  Updated with plan of care.  No abdominal pain.  Only mild dysuria with voiding.  Patient noted abnormality on tongue while brushing teeth.  On exam no thrush or lesions.  No tongue swelling.  Physical Exam   Vital Signs: Temp: 98.2  F (36.8  C) Temp src: Oral BP: (!) 141/49 Pulse: 68   Resp: 16 SpO2: 96 % O2 Device: None (Room air)    Weight: 172 lbs 0 oz    Constitutional: Awake, alert, no apparent distress  Respiratory:  Normal work of breathing. Lungs clear to auscultation bilaterally, no crackles or  wheezing.  Cardiovascular: Regular rate and rhythm, normal S1 and S2, and no murmur appreciated.   GI: Bowel sounds present. soft, non-distended, non-tender.   Skin/Integument: Warm, dry. _1 bilateral edema.  Neuro: No focal deficits. Moving all extremities with normal strength. Coordination and sensation grossly intact. Speech clear. No focal deficits.   Psych: Appropriate affect.          Medical Decision Making       55 MINUTES SPENT BY ME on the date of service doing chart review, history, exam, documentation & further activities per the note.      Data   ------------------------- PAST 24 HR DATA REVIEWED -----------------------------------------------[id number]}

## 2024-01-29 NOTE — PROGRESS NOTES
Care Management Follow Up    Length of Stay (days): 6    Expected Discharge Date: 01/28/2024     Concerns to be Addressed:       Patient plan of care discussed at interdisciplinary rounds: Yes    Anticipated Discharge Disposition: Skilled Nursing Facility     Anticipated Discharge Services:    Anticipated Discharge DME:      Patient/family educated on Medicare website which has current facility and service quality ratings: yes  Education Provided on the Discharge Plan:    Patient/Family in Agreement with the Plan: yes    Referrals Placed by CM/SW:    Private pay costs discussed: private room/amenity fees    Additional Information:    Spoke with [facility] who can accept pt to TCU pending [employer] insurance authorization. [facility] reports that pt is moving well and they are not sure that pt will be approved. They will get back as soon as they hear from [facility], they anticipate they will not hear until tomorrow 1/30.     SW met with pt and daughter Nguyen at the bedside to inform. They are aware and agreeable. They are aware of the potential for pt auth to not be approved.    Plan for discharge to [facility] pending auth.     Elana Chakraborty/KRYSTINA Terrell, WEI  Inpatient Care Coordination  Emergency Room /Float  [phone]    Elana Chakraborty, WEI

## 2024-01-29 NOTE — PROGRESS NOTES
In pt note [id number]    Pt a/o x 4, pt doing better up to bathroom with her walker and stand by, pt voiding in bathroom. Pt siting up in chair for meals and tolerating meds and po without issues. SW working on tcu placement. Pt denies pain, nausea or shortness of breath. VSS. Cont with current plan of care.

## 2024-01-30 ENCOUNTER — APPOINTMENT (OUTPATIENT)
Dept: PHYSICAL THERAPY | Facility: CLINIC | Age: 89
DRG: 853 | End: 2024-01-30
Payer: COMMERCIAL

## 2024-01-30 ENCOUNTER — LAB REQUISITION (OUTPATIENT)
Dept: LAB | Facility: CLINIC | Age: 89
End: 2024-01-30
Payer: COMMERCIAL

## 2024-01-30 ENCOUNTER — APPOINTMENT (OUTPATIENT)
Dept: OCCUPATIONAL THERAPY | Facility: CLINIC | Age: 89
DRG: 853 | End: 2024-01-30
Attending: PHYSICIAN ASSISTANT
Payer: COMMERCIAL

## 2024-01-30 VITALS
OXYGEN SATURATION: 97 % | BODY MASS INDEX: 32.5 KG/M2 | DIASTOLIC BLOOD PRESSURE: 58 MMHG | WEIGHT: 172 LBS | HEART RATE: 78 BPM | TEMPERATURE: 98.2 F | SYSTOLIC BLOOD PRESSURE: 143 MMHG | RESPIRATION RATE: 18 BRPM

## 2024-01-30 DIAGNOSIS — Z11.1 ENCOUNTER FOR SCREENING FOR RESPIRATORY TUBERCULOSIS: ICD-10-CM

## 2024-01-30 LAB
ANION GAP SERPL CALCULATED.3IONS-SCNC: 11 MMOL/L (ref 7–15)
BACTERIA BLD CULT: NO GROWTH
BACTERIA BLD CULT: NO GROWTH
BUN SERPL-MCNC: 20.9 MG/DL (ref 8–23)
CALCIUM SERPL-MCNC: 8.6 MG/DL (ref 8.2–9.6)
CHLORIDE SERPL-SCNC: 102 MMOL/L (ref 98–107)
CREAT SERPL-MCNC: 0.89 MG/DL (ref 0.51–0.95)
DEPRECATED HCO3 PLAS-SCNC: 23 MMOL/L (ref 22–29)
EGFRCR SERPLBLD CKD-EPI 2021: 60 ML/MIN/1.73M2
GLUCOSE SERPL-MCNC: 91 MG/DL (ref 70–99)
POTASSIUM SERPL-SCNC: 3.9 MMOL/L (ref 3.4–5.3)
SODIUM SERPL-SCNC: 136 MMOL/L (ref 135–145)

## 2024-01-30 PROCEDURE — 36415 COLL VENOUS BLD VENIPUNCTURE: CPT | Performed by: PHYSICIAN ASSISTANT

## 2024-01-30 PROCEDURE — 97535 SELF CARE MNGMENT TRAINING: CPT | Mod: GO

## 2024-01-30 PROCEDURE — 250N000013 HC RX MED GY IP 250 OP 250 PS 637: Performed by: INTERNAL MEDICINE

## 2024-01-30 PROCEDURE — 80048 BASIC METABOLIC PNL TOTAL CA: CPT | Performed by: PHYSICIAN ASSISTANT

## 2024-01-30 PROCEDURE — 250N000013 HC RX MED GY IP 250 OP 250 PS 637: Performed by: PHYSICIAN ASSISTANT

## 2024-01-30 PROCEDURE — 97530 THERAPEUTIC ACTIVITIES: CPT | Mod: GP | Performed by: PHYSICAL THERAPIST

## 2024-01-30 PROCEDURE — 99239 HOSP IP/OBS DSCHRG MGMT >30: CPT | Performed by: PHYSICIAN ASSISTANT

## 2024-01-30 PROCEDURE — 97165 OT EVAL LOW COMPLEX 30 MIN: CPT | Mod: GO

## 2024-01-30 RX ORDER — CALCIUM CARBONATE 500 MG/1
1 TABLET, CHEWABLE ORAL 2 TIMES DAILY PRN
Status: ON HOLD | DISCHARGE
Start: 2024-01-30 | End: 2024-02-20

## 2024-01-30 RX ORDER — FLUORIDE TOOTHPASTE
15 TOOTHPASTE DENTAL 4 TIMES DAILY
DISCHARGE
Start: 2024-01-30 | End: 2024-02-03

## 2024-01-30 RX ORDER — POLYETHYLENE GLYCOL 3350 17 G/17G
17 POWDER, FOR SOLUTION ORAL DAILY
DISCHARGE
Start: 2024-01-30 | End: 2024-05-14

## 2024-01-30 RX ORDER — VERAPAMIL HYDROCHLORIDE 120 MG/1
TABLET, FILM COATED, EXTENDED RELEASE ORAL
Start: 2024-01-30 | End: 2024-05-07

## 2024-01-30 RX ORDER — DIPHENHYDRAMINE HYDROCHLORIDE AND LIDOCAINE HYDROCHLORIDE AND ALUMINUM HYDROXIDE AND MAGNESIUM HYDRO
10 KIT EVERY 6 HOURS PRN
Status: DISCONTINUED | OUTPATIENT
Start: 2024-01-30 | End: 2024-01-30

## 2024-01-30 RX ORDER — AMOXICILLIN 250 MG
1 CAPSULE ORAL
DISCHARGE
Start: 2024-01-30 | End: 2024-05-14

## 2024-01-30 RX ORDER — DIPHENHYDRAMINE HYDROCHLORIDE AND LIDOCAINE HYDROCHLORIDE AND ALUMINUM HYDROXIDE AND MAGNESIUM HYDRO
10 KIT 2 TIMES DAILY
Status: DISCONTINUED | OUTPATIENT
Start: 2024-01-30 | End: 2024-01-30

## 2024-01-30 RX ORDER — LISINOPRIL 40 MG/1
40 TABLET ORAL DAILY
Status: ON HOLD
Start: 2024-01-30 | End: 2024-05-22

## 2024-01-30 RX ORDER — CEFDINIR 300 MG/1
300 CAPSULE ORAL EVERY 12 HOURS
DISCHARGE
Start: 2024-01-30 | End: 2024-02-06

## 2024-01-30 RX ORDER — FLUORIDE TOOTHPASTE
15 TOOTHPASTE DENTAL 4 TIMES DAILY
Status: DISCONTINUED | OUTPATIENT
Start: 2024-01-30 | End: 2024-01-30 | Stop reason: HOSPADM

## 2024-01-30 RX ORDER — ACETAMINOPHEN 325 MG/1
650 TABLET ORAL EVERY 4 HOURS PRN
Status: ON HOLD | DISCHARGE
Start: 2024-01-30 | End: 2024-05-21

## 2024-01-30 RX ORDER — SPIRONOLACTONE 25 MG/1
25 TABLET ORAL DAILY
Start: 2024-01-30 | End: 2024-02-07

## 2024-01-30 RX ADMIN — Medication 15 ML: at 12:02

## 2024-01-30 RX ADMIN — DORZOLAMIDE HYDROCHLORIDE AND TIMOLOL MALEATE 1 DROP: 20; 5 SOLUTION/ DROPS OPHTHALMIC at 08:18

## 2024-01-30 RX ADMIN — LISINOPRIL 40 MG: 40 TABLET ORAL at 08:19

## 2024-01-30 RX ADMIN — Medication 15 ML: at 15:53

## 2024-01-30 RX ADMIN — VERAPAMIL HYDROCHLORIDE 120 MG: 120 TABLET, FILM COATED, EXTENDED RELEASE ORAL at 08:18

## 2024-01-30 RX ADMIN — SPIRONOLACTONE 25 MG: 25 TABLET ORAL at 08:19

## 2024-01-30 RX ADMIN — CEFDINIR 300 MG: 300 CAPSULE ORAL at 08:18

## 2024-01-30 RX ADMIN — BUPROPION 300 MG: 150 TABLET, EXTENDED RELEASE ORAL at 08:18

## 2024-01-30 RX ADMIN — SERTRALINE HYDROCHLORIDE 25 MG: 25 TABLET ORAL at 08:19

## 2024-01-30 ASSESSMENT — ACTIVITIES OF DAILY LIVING (ADL)
ADLS_ACUITY_SCORE: 40

## 2024-01-30 NOTE — PLAN OF CARE
Goal Outcome Evaluation: Pt alert and oriented. Up with own walker and 1 assist ambulating in halls with PT. Denies pain. Awaiting TCU authorization vs home. Right bottom wound intact, healing. Good appetite. Feels bumps on tongue and NP looked at it and swish and spit biotene ordered and given. Will monitor.

## 2024-01-30 NOTE — PLAN OF CARE
Up with standby assist, showered today. No c/o pain. Appetite good. Awaiting insurance approval for tcu.    Temp: 98.4  F (36.9  C) Temp src: Oral BP: (!) 160/61 Pulse: 84   Resp: 16 SpO2: 96 % O2 Device: None (Room air)

## 2024-01-30 NOTE — PLAN OF CARE
[id number]    Inpatient Progress Note:    BP (!) 147/61 (BP Location: Right arm)   Pulse 70   Temp 98.1  F (36.7  C) (Oral)   Resp 18   Wt 78 kg (172 lb)   SpO2 93%   BMI 32.50 kg/m         Orientation: A&O x4  Pain status: denies pain  Activity: Ax1 walker GB, pt sits up for meals either in bed or recliner.    Resp: WDL denies SOB  Cardiac: WDL denies chest pain  GI: WDL  :  WDL voids spontaneously   Skin: Wound noted on bottom (wound care in orders)  LDA: SL  Diet: regular  Consults: SW/PT/OT  Discharge Plan: TCU- awaiting approval from insurance     Will continue to monitor and provide cares.     Carina Booker RN

## 2024-01-30 NOTE — PROGRESS NOTES
Care Management Follow Up    Length of Stay (days): 7    Expected Discharge Date: 01/31/2024     Concerns to be Addressed:   yes    Patient plan of care discussed at interdisciplinary rounds: Yes    Anticipated Discharge Disposition: Skilled Nursing Facility     Anticipated Discharge Services:    Anticipated Discharge DME:      Patient/family educated on Medicare website which has current facility and service quality ratings: yes  Education Provided on the Discharge Plan:  Nguyen aguilera  Patient/Family in Agreement with the Plan: yes    Referrals Placed by CM/SW:  follow up on insurance auth  Private pay costs discussed: private room/amenity fees $40 per day til Feb 1st then cost changes by 11%. Family would like a private room and are agreeable to the cost.     Additional Information:  Received update from [facility] on insurance auth. [facility] has received insurance authorization. Facility can accept patient discharging today.     Family updated insurance authorization received.     Discharge orders faxed to [facility].     Nguyen Aguilera, and family will provide transportation today at 4:30 pm.     Jesus Hargrove RN Case Manager  Inpatient Care Coordination   [facility]   [phone]        Jesus Hargrove RN

## 2024-01-30 NOTE — PLAN OF CARE
Goal Outcome Evaluation: Met  Pt is stable and met goals for discharge.  Pt and 2 daughters are present in room. Pt left via wheelchair at 1620.  A packet was sent for TCU and given to Mary Ann, daughter.  Pt stated she had no pain and denied any questions regarding discharge plan. BP (!) 143/58 (BP Location: Right arm, Cuff Size: Adult Regular)   Pulse 78   Temp 98.2  F (36.8  C) (Oral)   Resp 18   Wt 78 kg (172 lb)   SpO2 97%   BMI 32.50 kg/m     Patient's After Visit Summary was reviewed with patient and/or family.   Patient verbalized understanding of After Visit Summary, recommended follow up and was given an opportunity to ask questions.   Discharge medications sent home with patient/family: No, Not applicable   Discharged with daughters providing transport.

## 2024-01-30 NOTE — PROGRESS NOTES
01/30/24 1213   Appointment Info   Signing Clinician's Name / Credentials (OT) Jorge Lugo, OTR/L   Quick Adds   Quick Adds Certification   Living Environment   People in Home alone   Current Living Arrangements independent living facility   Home Accessibility no concerns   Transportation Anticipated family or friend will provide   Living Environment Comments Lives in ILF alone; walk in shower with bench (sits to shower); IND with all mobility with 4WW, ADLs and most IADLs (gets assist with some heavier IADL such as cleaning, shopping, and transportation). Has supportive family.   Self-Care   Usual Activity Tolerance good   Current Activity Tolerance moderate   Fall history within last six months no   General Information   Onset of Illness/Injury or Date of Surgery 01/23/24   Referring Physician Fauzia Mcnamara PA-C   Patient/Family Therapy Goal Statement (OT) To return home   Additional Occupational Profile Info/Pertinent History of Current Problem Riddhi Aguilar is a 89 year old female with past medical history including hypertension, duodenal ulcer, chronic anemia with baseline of around 10, and CKD 3 admitted on 1/23/2024 with left-sided pyelonephritis with a 2 mm proximal obstructing ureteral stone found to have septic shock.   Existing Precautions/Restrictions no known precautions/restrictions   Cognitive Status Examination   Orientation Status orientation to person, place and time   Follows Commands follows one-step commands;over 90% accuracy   Cognitive Screens/Assessments   Cognitive Assessments Completed Blessed Orientation-Memory-Concentration   Blessed Orientation-Memory-Concentration Test:  Total Weighted Score out of 28 0   Blessed Orientation-Memory-Concentration Test Norms 0-8 equals normal to mild impairment   Blessed Orientation-Memory-Concentration Interpretation Patient with 0 errors on cognitive screen. Answers questions with ease.   Visual Perception   Visual Impairment/Limitations  WFL   Strength Comprehensive (MMT)   Comment, General Manual Muscle Testing (MMT) Assessment Deconditioned   Transfers   Transfers sit-stand transfer;toilet transfer   Sit-Stand Transfer   Sit-Stand [place] (Transfers) supervision   Toilet Transfer   [place] Level (Toilet Transfer) supervision   Balance   Balance Comments Benefits from 4WW   Activities of Daily Living   BADL Assessment/Intervention lower body dressing;toileting;grooming   Lower Body Dressing Assessment/Training   [place] Level (Lower Body Dressing) supervision   Grooming Assessment/Training   [place] Level (Grooming) supervision   Toileting   [place] Level (Toileting) supervision   Clinical Impression   Criteria for Skilled Therapeutic Interventions Met (OT) Yes, treatment indicated   OT Diagnosis Decline in function   OT Problem List-Impairments impacting ADL problems related to;activity tolerance impaired;balance;mobility;strength   Assessment of Occupational Performance 3-5 Performance Deficits   Identified Performance Deficits housekeeing, home mgmt, shopping, and other taxing IADL   Planned Therapy Interventions (OT) home program guidelines;transfer training;IADL retraining;risk factor education   Clinical Decision Making Complexity (OT) problem focused assessment/low complexity   Risk & Benefits of therapy have been explained evaluation/treatment results reviewed;care plan/treatment goals reviewed;risks/benefits reviewed;current/potential barriers reviewed;participants voiced agreement with care plan;participants included;patient   OT Total Evaluation Time   OT Eval, Low Complexity Minutes (40554) 12   Therapy Certification   Medical Diagnosis L ureteral stone and septic shock   Start of Care Date 01/30/24   Certification date from 01/30/24   Certification date to 01/30/24   OT Goals   Therapy Frequency (OT) One time eval and treatment   OT Predicted Duration/Target Date for Goal Attainment 01/30/24   OT Goals OT Goal  1;OT Goal 2;Cognition   OT: Cognitive Patient/caregiver will verbalize understanding of cognitive assessment results/recommendations as needed for safe discharge planning  (goal met)   OT: Goal 1 Patient will demonstrate basic ADL with supervision assist. Goal met   OT: Goal 2 Patient will participate in education of recommendations for level of assist to ensure safe discharge home. Goal met   Interventions   Interventions Quick Adds Self-Care/Home Management   Self-Care/Home Management   Self-Care/Home Mgmt/ADL, Compensatory, Meal Prep Minutes (71387) 8   Symptoms Noted During/After Treatment (Meal Preparation/Planning Training) none   Treatment Detail/Skilled Intervention Provided education on purpose of cognitive screen as it relates to IADL safety. Provided edu on performance w/ screen. Pt ambulating in room w/ 4WW and min cues for navigation of space. Demonstrates safe toileting and transfers w/ supervision assist. Recommeding patient have increased assist w/ heavier IADL at this time such as meal prep, cleaning, shopping, etc.   OT Discharge Planning   OT Discharge Recommendation (DC Rec) home with assist  (home with home care physical therapy)   OT Rationale for DC Rec Patient is presenting with decreased activity tolerance but demonstrates ability to complete ADL with safe technique and no assist. Anticipate safe discharge home with increased assist for taxing IADL such as home mgmt, cleaning, shopping, and meal prep. Recommend HHPT for progression of strength and endurance.   OT Brief overview of current status supervision A w/ ADL   Total Session Time   Timed Code Treatment Minutes 8   Total Session Time (sum of timed and untimed services) 20    [facility]  OUTPATIENT OCCUPATIONAL THERAPY  EVALUATION  PLAN OF TREATMENT FOR OUTPATIENT REHABILITATION  (COMPLETE FOR INITIAL CLAIMS ONLY)  Patient's Last Name, First Name, M.I.  YOB: 1931  Riddhi Aguilar                           Provider's Name  [facility] Medical Record No.  [id number]                             Onset Date:  01/23/24   Start of Care Date:  01/30/24   Type:     ___PT   _X_OT   ___SLP Medical Diagnosis:  L ureteral stone and septic shock                    OT Diagnosis:  Decline in function Visits from SOC:  1     See note for plan of treatment, functional goals and certification details    I CERTIFY THE NEED FOR THESE SERVICES FURNISHED UNDER        THIS PLAN OF TREATMENT AND WHILE UNDER MY CARE     (Physician co-signature of this document indicates review and certification of the therapy plan).

## 2024-01-31 ENCOUNTER — PATIENT OUTREACH (OUTPATIENT)
Dept: CARE COORDINATION | Facility: CLINIC | Age: 89
End: 2024-01-31

## 2024-01-31 ENCOUNTER — TRANSITIONAL CARE UNIT VISIT (OUTPATIENT)
Dept: GERIATRICS | Facility: CLINIC | Age: 89
End: 2024-01-31
Payer: COMMERCIAL

## 2024-01-31 ENCOUNTER — DOCUMENTATION ONLY (OUTPATIENT)
Dept: GERIATRICS | Facility: CLINIC | Age: 89
End: 2024-01-31

## 2024-01-31 VITALS
HEIGHT: 62 IN | WEIGHT: 172 LBS | SYSTOLIC BLOOD PRESSURE: 111 MMHG | TEMPERATURE: 97.8 F | RESPIRATION RATE: 18 BRPM | OXYGEN SATURATION: 95 % | HEART RATE: 63 BPM | BODY MASS INDEX: 31.65 KG/M2 | DIASTOLIC BLOOD PRESSURE: 56 MMHG

## 2024-01-31 DIAGNOSIS — I10 ESSENTIAL HYPERTENSION, BENIGN: ICD-10-CM

## 2024-01-31 DIAGNOSIS — R78.81 BACTEREMIA: ICD-10-CM

## 2024-01-31 DIAGNOSIS — N18.32 STAGE 3B CHRONIC KIDNEY DISEASE (H): ICD-10-CM

## 2024-01-31 DIAGNOSIS — N39.0 URINARY TRACT INFECTION DUE TO PROTEUS: ICD-10-CM

## 2024-01-31 DIAGNOSIS — B96.4 URINARY TRACT INFECTION DUE TO PROTEUS: ICD-10-CM

## 2024-01-31 DIAGNOSIS — N18.9 ACUTE KIDNEY INJURY SUPERIMPOSED ON CHRONIC KIDNEY DISEASE (H): ICD-10-CM

## 2024-01-31 DIAGNOSIS — M62.81 GENERALIZED MUSCLE WEAKNESS: ICD-10-CM

## 2024-01-31 DIAGNOSIS — N17.9 ACUTE KIDNEY INJURY SUPERIMPOSED ON CHRONIC KIDNEY DISEASE (H): ICD-10-CM

## 2024-01-31 DIAGNOSIS — N20.1 LEFT URETERAL STONE: Primary | ICD-10-CM

## 2024-01-31 DIAGNOSIS — F32.0 MAJOR DEPRESSIVE DISORDER, SINGLE EPISODE, MILD (H): ICD-10-CM

## 2024-01-31 PROCEDURE — P9604 ONE-WAY ALLOW PRORATED TRIP: HCPCS | Performed by: PHYSICIAN ASSISTANT

## 2024-01-31 PROCEDURE — 86481 TB AG RESPONSE T-CELL SUSP: CPT | Performed by: PHYSICIAN ASSISTANT

## 2024-01-31 PROCEDURE — 36415 COLL VENOUS BLD VENIPUNCTURE: CPT | Performed by: PHYSICIAN ASSISTANT

## 2024-01-31 PROCEDURE — 99310 SBSQ NF CARE HIGH MDM 45: CPT | Performed by: PHYSICIAN ASSISTANT

## 2024-01-31 NOTE — PLAN OF CARE
Physical Therapy Discharge Summary     Reason for therapy discharge:    Discharged to transitional care facility.     Progress towards therapy goal(s). See goals on Care Plan in [facility] electronic health record for goal details.  Goals not met.  Barriers to achieving goals:   discharge from facility.     Therapy recommendation(s):    Continued therapy is recommended.  Rationale/Recommendations:  Patient would benefit from PT eval at TCU in order to increase strength, activity tolerance, balance and independence with mobility.    **Pt not seen by discharging therapist on this date, note written based on previous treating therapist's notes and recommendations

## 2024-01-31 NOTE — LETTER
1/31/2024        RE: Riddhi Aguilar  [address] [address]  [facility] [address]        [facility]    PRIMARY CARE PROVIDER AND CLINIC:  Shun Prado MD, 1981 [address] / [place] [address]  Chief Complaint   Patient presents with     Hospital F/U      Nathalie Medical Record Number:  [id number]  Place of Service where encounter took place:  [facility] ([facility]) [[id number]]    Riddhi Aguilar  is a 89 year old  (2/15/1931), admitted to the above facility from  [facility]. Hospital stay 1/23/24 through 1/30/24..   HPI:    Riddhi Aguilar is an exceptionally pleasant 89-year-old female with a past medical history of hypertension, CKD and depression who was recently hospitalized at [facility].  Presented for flank pain found to be febrile with associated leukocytosis and elevated lactic acid.  Urine analysis concerning for infection and CT with 2 mm obstructing left ureteral stone.  Urology was consulted and she underwent cystoscopy with ureteral stent placement.  Treated with antibiotics and blood cultures ultimately positive proteus species, similar to urinary culture.  Transitioned to p.o. antibiotics at discharge.  Hospital course complicated by hypoxia which resolved without specific treatment.  Deconditioned compared to baseline so discharged to U for further therapies    Riddhi is evaluated in her room.  States every day she is feeling better.  Highly motivated to work with therapy and get back to her independent apartment.  Denies shortness of breath.  No chest pain.  Denies pain related to ureteral stent, dysuria or hematuria.  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  Additionally, understand she is on a prolonged course of antibiotics due to her bacteremia.  Denies any diarrhea associated with antibiotic.    Review of nursing home EMR: SBP  [phone]    CODE STATUS/ADVANCE DIRECTIVES DISCUSSION:  Prior  DNR / DNI  ALLERGIES: No Known Allergies   PAST MEDICAL HISTORY:   Past Medical History:   Diagnosis Date     Anemia     baseline 10-11     Arthritis      Bleeding ulcer     duodenal ulcer     CKD (chronic kidney disease) stage 3, GFR 30-59 ml/min (H)      Essential hypertension, benign      Osteoporosis      Pure hypercholesterolemia      Spinal stenosis      Unspecified cataract      Unspecified glaucoma(365.9)       PAST SURGICAL HISTORY:   has a past surgical history that includes NONSPECIFIC PROCEDURE; NONSPECIFIC PROCEDURE; Breast surgery; Dilation and curettage, hysteroscopy diagnostic, combined (02/06/2014); Incision and drainage perineal, combined (02/06/2014); Excise lesion lower extremity (Right, 08/29/2017); appendectomy; and Combined cystoscopy, retrogrades, exchange stent ureter(s) (Left, 1/24/2024).  FAMILY HISTORY: family history includes Breast Cancer in her sister and sister; Cancer in her brother.  SOCIAL HISTORY:   reports that she has never smoked. She has never used smokeless tobacco. She reports that she does not drink alcohol and does not use drugs.  Patient's living condition: lives alone    Post Discharge Medication Reconciliation Status:   MED REC REQUIRED  Post Medication Reconciliation Status: discharge medications reconciled and changed, per note/orders       Current Outpatient Medications   Medication Sig     artificial saliva (BIOTENE DRY MOUTHWASH) LIQD liquid Swish and spit 15 mLs in mouth 4 times daily for 4 days     atorvastatin (LIPITOR) 10 MG tablet Take 1 tablet (10 mg) by mouth daily     buPROPion (WELLBUTRIN XL) 300 MG 24 hr tablet TAKE 1 TABLET(300 MG) BY MOUTH EVERY MORNING     calcium carbonate (TUMS) 500 MG chewable tablet Take 1 tablet (500 mg) by mouth 2 times daily as needed for heartburn     cefdinir (OMNICEF) 300 MG capsule Take 1 capsule (300 mg) by mouth every 12 hours for 7 days     dorzolamide-timolol  "(COSOPT) 22.3-6.8 MG/ML ophthalmic solution Place 1 drop into both eyes 2 times daily     lisinopril (ZESTRIL) 40 MG tablet Take 1 tablet (40 mg) by mouth daily Hold for SBP <120     polyethylene glycol (MIRALAX) 17 GM/Dose powder Take 17 g by mouth daily Hold for more than 3 stools/day     senna-docusate (SENOKOT-S/PERICOLACE) 8.6-50 MG tablet Take 1 tablet by mouth nightly as needed for constipation     sertraline (ZOLOFT) 25 MG tablet Take 25 mg by mouth daily     spironolactone (ALDACTONE) 25 MG tablet Take 1 tablet (25 mg) by mouth daily Hold for SBP <125 or DBP <55     verapamil ER (CALAN-SR) 120 MG CR tablet TAKE 1 TABLET BY MOUTH EVERY 12 HOURS  Hold for SBP <115     VITAMIN D PO Take 1 tablet by mouth daily Doesn't know strength     acetaminophen (TYLENOL) 325 MG tablet Take 2 tablets (650 mg) by mouth every 4 hours as needed for mild pain or other (and adjunct with moderate or severe pain or per patient request)     multivitamin w/minerals (THERA-VIT-M) tablet Take 1 tablet by mouth daily     No current facility-administered medications for this visit.       ROS:  10 point ROS of systems including Constitutional, Eyes, Respiratory, Cardiovascular, Gastroenterology, Genitourinary, Integumentary, Musculoskeletal, Psychiatric were all negative except for pertinent positives noted in my HPI.    Vitals:  /56   Pulse 63   Temp 97.8  F (36.6  C)   Resp 18   Ht 1.575 m (5' 2\")   Wt 78 kg (172 lb)   SpO2 95%   BMI 31.46 kg/m    Exam:  Physical Exam  Vitals (Facility EMR) reviewed.   Constitutional:       General: She is not in acute distress.  HENT:      Head: Normocephalic and atraumatic.   Eyes:      General: No scleral icterus.  Cardiovascular:      Rate and Rhythm: Normal rate and regular rhythm.      Heart sounds: No murmur heard.  Pulmonary:      Effort: Pulmonary effort is normal.      Breath sounds: No wheezing or rales.   Abdominal:      General: There is no distension.   Musculoskeletal:      " Comments: 1-2+ LE edema   Skin:     General: Skin is warm and dry.      Findings: No rash.   Neurological:      Mental Status: She is alert. Mental status is at baseline.   Psychiatric:         Behavior: Behavior normal.           Lab/Diagnostic data:  Recent labs in [facility] reviewed by me today.  and Most Recent 3 CBC's:  Recent Labs   Lab Test 01/27/24  0525 01/26/24  0537 01/25/24  0805   WBC 10.6 25.9* 34.9*   HGB 9.7* 9.5* 8.8*   * 104* 106*    247 208     Most Recent 3 BMP's:  Recent Labs   Lab Test 01/30/24  0515 01/27/24  0525 01/26/24  0537    137 136   POTASSIUM 3.9 4.1 4.2   CHLORIDE 102 102 101   CO2 23 25 26   BUN 20.9 25.9* 32.6*   CR 0.89 0.94 0.99*   ANIONGAP 11 10 9   TAMMY 8.6 9.0 8.9   GLC 91 84 90     Most Recent 2 LFT's:  Recent Labs   Lab Test 01/23/24 2036 12/27/18  1415   AST 33 27   ALT 16 23   ALKPHOS 68 46   BILITOTAL 2.1* 1.2     Most Recent 3 Hemoglobins:  Recent Labs   Lab Test 01/27/24  0525 01/26/24  0537 01/25/24  0805   HGB 9.7* 9.5* 8.8*     7-Day Micro Results       Collected Updated Procedure Result Status      01/31/2024 0556 01/31/2024 0919 Quantiferon TB Gold Plus Grey Tube [[id number]]   Blood from Arm, Right    In process Component Value   No component results            01/31/2024 0556 01/31/2024 0919 Quantiferon TB Gold Plus Green Tube [[id number]]   Blood from Arm, Right    In process Component Value   No component results            01/31/2024 0556 01/31/2024 0919 Quantiferon TB Gold Plus Yellow Tube [[id number]]   Blood from Arm, Right    In process Component Value   No component results            01/31/2024 0556 01/31/2024 0919 Quantiferon TB Gold Plus Purple Tube [[id number]]   Blood from Arm, Right    In process Component Value   No component results            01/25/2024 1001 01/30/2024 1301 Blood Culture Hand, Right [[id number]]   Blood from Hand, Right    Final result Component Value   Culture No Growth               01/25/2024 0956  01/30/2024 1301 Blood Culture Arm, Right [[id number]]   Blood from Arm, Right    Final result Component Value   Culture No Growth                     Collected 1/23/2024  9:59 PM       Status: Final result       Visible to patient: Yes (seen)    Specimen Information: Urine, Catheter   0 Result Notes  Culture >100,000 CFU/mL Proteus mirabilis Abnormal       >100,000 CFU/mL Aerococcus sanguinicola Abnormal    Aerococcus species is usually susceptible to penicillin, cephalosporins, tetracycline and vancomycin.  Identification obtained by MALDI-TOF mass spectrometry research use only database. Test characteristics determined and verified by the [facility].        Resulting Agency: [facility]     Susceptibility     Proteus mirabilis     MAIRA     Ampicillin <=2 ug/mL Susceptible     Ampicillin/ Sulbactam <=2 ug/mL Susceptible     Cefazolin <=4 ug/mL Susceptible 1     Cefepime <=1 ug/mL Susceptible     Cefoxitin <=4 ug/mL Susceptible     Ceftazidime <=1 ug/mL Susceptible     Ceftriaxone <=1 ug/mL Susceptible     Ciprofloxacin <=0.25 ug/mL Susceptible     Gentamicin <=1 ug/mL Susceptible     Levofloxacin <=0.12 ug/mL Susceptible     Nitrofurantoin 64 ug/mL Resistant 2     Piperacillin/Tazobactam <=4 ug/mL Susceptible     Tobramycin <=1 ug/mL Susceptible     Trimethoprim/Sulfamethoxazole <=1/19 ug/mL Susceptible              1 Cefazolin MAIRA breakpoints are for the treatment of uncomplicated urinary tract infections. For the treatment of systemic infections, please contact the laboratory for additional testing.   2 Intrinsically Resistant              Most Recent TSH and T4:  Recent Labs   Lab Test 05/10/21  1203   TSH 3.45     Most Recent Hemoglobin A1c:No lab results found.  Most Recent Urinalysis:  Recent Labs   Lab Test 01/23/24  2159   COLOR Yellow   APPEARANCE Cloudy*   URINEGLC Negative   URINEBILI Negative   URINEKETONE Negative   SG 1.015   UBLD Large*   URINEPH 7.0   PROTEIN 50*   NITRITE  Negative   LEUKEST Small*   RBCU 118*   WBCU 62*     Most Recent ESR & CRP:No lab results found.  Most Recent Anemia Panel:  Recent Labs   Lab Test 01/27/24  0525 08/23/21  1615 05/10/21  1204 05/10/21  1203   WBC 10.6   < >  --   --    HGB 9.7*   < >  --   --    HCT 29.0*   < >  --   --    *   < >  --   --       < >  --   --    IRON  --   --   --  118   IRONSAT  --   --   --  40   FEB  --   --   --  295   PABLO  --   --   --  348*   B12  --   --   --  937   FOLIC  --   --  56.2  --     < > = values in this interval not displayed.       ASSESSMENT/PLAN:  Left obstructing ureteral stone  Pyelonephritis/Proteus Mirabilis UTI  Proteus Mirabella's bacteremia: CT scan with 2 mm proximal obstructing ureteral stone with associated leukocytosis and elevated uric acid.  Urology was consulted and underwent cystoscopy and left ureteral stent placement.  2/2 BCs with Proteus Mirabilis.  UCx greater than 100 K CFU Proteus Mirabilis.  Received IV antibiotics during hospital stay and transitioned to cefdinir at discharge  - Continue cefdinir through 2/8  - CBC 2/5  - Follow up with Urology, yet to be scheduled    MICHELE  CKD, III: Estimated Creatinine Clearance: 39 mL/min (based on SCr of 0.89 mg/dL).:   - BMP 2/5    Hypoxia, resolved: Developed acute hypoxia in the setting of acute illness.  Chest x-ray with some evidence of pulmonary edema.  TTE with EF 60-65%, mild MS, mild AS.  Ultimately resolved without treatment  - Does have mild edema on exam.  Lungs clear  - Is back on home spironolactone  - Monitor volume status      HTN: SBP is appropriate  - Continue home regimen of lisinopril, spironolactone and verapamil    Anxiety  Depression:  - Continue sertraline and Wellbutrin    Generalized Weakness: Secondary to acute illness.  Sounds like at baseline quite independent  - PT/OT/social work      Orders:  BMP/CBC 2/5    A total 50 min were spent on this hospital follow-up visit including review of hospitalization,  medication reconciliation, evaluating patient discussing plan of care, writing orders and documenting.  All services performed on day of visit    This note was completed in part using Dragon voice recognition software. Although reviewed after completion, some word and grammatical errors may occur.        Electronically signed by:  Edie Alexis PA-C                    Sincerely,        Edie Alexis PA-C

## 2024-01-31 NOTE — PROGRESS NOTES
[facility] GERIATRICS    PRIMARY CARE PROVIDER AND CLINIC:  Shun Prado MD, [address] / [place] [address]  Chief Complaint   Patient presents with    Hospital F/U      [place] Medical Record Number:  [id number]  Place of Service where encounter took place:  [facility] ([facility]) [[id number]]    Riddhi Aguilar  is a 89 year old  (2/15/1931), admitted to the above facility from  [facility]. Hospital stay 1/23/24 through 1/30/24..   HPI:    Riddhi Aguilar is an exceptionally pleasant 89-year-old female with a past medical history of hypertension, CKD and depression who was recently hospitalized at [facility].  Presented for flank pain found to be febrile with associated leukocytosis and elevated lactic acid.  Urine analysis concerning for infection and CT with 2 mm obstructing left ureteral stone.  Urology was consulted and she underwent cystoscopy with ureteral stent placement.  Treated with antibiotics and blood cultures ultimately positive proteus species, similar to urinary culture.  Transitioned to p.o. antibiotics at discharge.  Hospital course complicated by hypoxia which resolved without specific treatment.  Deconditioned compared to baseline so discharged to TCU for further therapies    Riddhi is evaluated in her room.  States every day she is feeling better.  Highly motivated to work with therapy and get back to her independent apartment.  Denies shortness of breath.  No chest pain.  Denies pain related to ureteral stent, dysuria or hematuria.  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  Additionally, understand she is on a prolonged course of antibiotics due to her bacteremia.  Denies any diarrhea associated with antibiotic.    Review of nursing home EMR: [id number]    CODE STATUS/ADVANCE DIRECTIVES DISCUSSION:  Prior  DNR / DNI  ALLERGIES: No Known Allergies   PAST  MEDICAL HISTORY:   Past Medical History:   Diagnosis Date    Anemia     baseline 10-11    Arthritis     Bleeding ulcer     duodenal ulcer    CKD (chronic kidney disease) stage 3, GFR 30-59 ml/min (H)     Essential hypertension, benign     Osteoporosis     Pure hypercholesterolemia     Spinal stenosis     Unspecified cataract     Unspecified glaucoma(365.9)       PAST SURGICAL HISTORY:   has a past surgical history that includes NONSPECIFIC PROCEDURE; NONSPECIFIC PROCEDURE; Breast surgery; Dilation and curettage, hysteroscopy diagnostic, combined (02/06/2014); Incision and drainage perineal, combined (02/06/2014); Excise lesion lower extremity (Right, 08/29/2017); appendectomy; and Combined cystoscopy, retrogrades, exchange stent ureter(s) (Left, 1/24/2024).  FAMILY HISTORY: family history includes Breast Cancer in her sister and sister; Cancer in her brother.  SOCIAL HISTORY:   reports that she has never smoked. She has never used smokeless tobacco. She reports that she does not drink alcohol and does not use drugs.  Patient's living condition: lives alone    Post Discharge Medication Reconciliation Status:   MED REC REQUIRED  Post Medication Reconciliation Status: discharge medications reconciled and changed, per note/orders       Current Outpatient Medications   Medication Sig    artificial saliva (BIOTENE DRY MOUTHWASH) LIQD liquid Swish and spit 15 mLs in mouth 4 times daily for 4 days    atorvastatin (LIPITOR) 10 MG tablet Take 1 tablet (10 mg) by mouth daily    buPROPion (WELLBUTRIN XL) 300 MG 24 hr tablet TAKE 1 TABLET(300 MG) BY MOUTH EVERY MORNING    calcium carbonate (TUMS) 500 MG chewable tablet Take 1 tablet (500 mg) by mouth 2 times daily as needed for heartburn    cefdinir (OMNICEF) 300 MG capsule Take 1 capsule (300 mg) by mouth every 12 hours for 7 days    dorzolamide-timolol (COSOPT) 22.3-6.8 MG/ML ophthalmic solution Place 1 drop into both eyes 2 times daily    lisinopril (ZESTRIL) 40 MG tablet  "Take 1 tablet (40 mg) by mouth daily Hold for SBP <120    polyethylene glycol (MIRALAX) 17 GM/Dose powder Take 17 g by mouth daily Hold for more than 3 stools/day    senna-docusate (SENOKOT-S/PERICOLACE) 8.6-50 MG tablet Take 1 tablet by mouth nightly as needed for constipation    sertraline (ZOLOFT) 25 MG tablet Take 25 mg by mouth daily    spironolactone (ALDACTONE) 25 MG tablet Take 1 tablet (25 mg) by mouth daily Hold for SBP <125 or DBP <55    verapamil ER (CALAN-SR) 120 MG CR tablet TAKE 1 TABLET BY MOUTH EVERY 12 HOURS  Hold for SBP <115    VITAMIN D PO Take 1 tablet by mouth daily Doesn't know strength    acetaminophen (TYLENOL) 325 MG tablet Take 2 tablets (650 mg) by mouth every 4 hours as needed for mild pain or other (and adjunct with moderate or severe pain or per patient request)    multivitamin w/minerals (THERA-VIT-M) tablet Take 1 tablet by mouth daily     No current facility-administered medications for this visit.       ROS:  10 point ROS of systems including Constitutional, Eyes, Respiratory, Cardiovascular, Gastroenterology, Genitourinary, Integumentary, Musculoskeletal, Psychiatric were all negative except for pertinent positives noted in my HPI.    Vitals:  /56   Pulse 63   Temp 97.8  F (36.6  C)   Resp 18   Ht 1.575 m (5' 2\")   Wt 78 kg (172 lb)   SpO2 95%   BMI 31.46 kg/m    Exam:  Physical Exam  Vitals (Facility EMR) reviewed.   Constitutional:       General: She is not in acute distress.  HENT:      Head: Normocephalic and atraumatic.   Eyes:      General: No scleral icterus.  Cardiovascular:      Rate and Rhythm: Normal rate and regular rhythm.      Heart sounds: No murmur heard.  Pulmonary:      Effort: Pulmonary effort is normal.      Breath sounds: No wheezing or rales.   Abdominal:      General: There is no distension.   Musculoskeletal:      Comments: 1-2+ LE edema   Skin:     General: Skin is warm and dry.      Findings: No rash.   Neurological:      Mental Status: She " is alert. Mental status is at baseline.   Psychiatric:         Behavior: Behavior normal.           Lab/Diagnostic data:  Recent labs in EPIC reviewed by me today.  and Most Recent 3 CBC's:  Recent Labs   Lab Test 01/27/24  0525 01/26/24  0537 01/25/24  0805   WBC 10.6 25.9* 34.9*   HGB 9.7* 9.5* 8.8*   * 104* 106*    247 208     Most Recent 3 BMP's:  Recent Labs   Lab Test 01/30/24  0515 01/27/24  0525 01/26/24  0537    137 136   POTASSIUM 3.9 4.1 4.2   CHLORIDE 102 102 101   CO2 23 25 26   BUN 20.9 25.9* 32.6*   CR 0.89 0.94 0.99*   ANIONGAP 11 10 9   TAMMY 8.6 9.0 8.9   GLC 91 84 90     Most Recent 2 LFT's:  Recent Labs   Lab Test 01/23/24 2036 12/27/18  1415   AST 33 27   ALT 16 23   ALKPHOS 68 46   BILITOTAL 2.1* 1.2     Most Recent 3 Hemoglobins:  Recent Labs   Lab Test 01/27/24  0525 01/26/24  0537 01/25/24  0805   HGB 9.7* 9.5* 8.8*     7-Day Micro Results       Collected Updated Procedure Result Status      01/31/2024 0556 01/31/2024 0919 Quantiferon TB Gold Plus Grey Tube [[id number]]   Blood from Arm, Right    In process Component Value   No component results            01/31/2024 0556 01/31/2024 0919 Quantiferon TB Gold Plus Green Tube [[id number]]   Blood from Arm, Right    In process Component Value   No component results            01/31/2024 0556 01/31/2024 0919 Quantiferon TB Gold Plus Yellow Tube [[id number]]   Blood from Arm, Right    In process Component Value   No component results            01/31/2024 0556 01/31/2024 0919 Quantiferon TB Gold Plus Purple Tube [[id number]]   Blood from Arm, Right    In process Component Value   No component results            01/25/2024 1001 01/30/2024 1301 Blood Culture Hand, Right [[id number]]   Blood from Hand, Right    Final result Component Value   Culture No Growth               01/25/2024 0956 01/30/2024 1301 Blood Culture Arm, Right [[id number]]   Blood from Arm, Right    Final result Component Value   Culture No Growth                      Collected 1/23/2024  9:59 PM       Status: Final result       Visible to patient: Yes (seen)    Specimen Information: Urine, Catheter   0 Result Notes  Culture >100,000 CFU/mL Proteus mirabilis Abnormal       >100,000 CFU/mL Aerococcus sanguinicola Abnormal    Aerococcus species is usually susceptible to penicillin, cephalosporins, tetracycline and vancomycin.  Identification obtained by MALDI-TOF mass spectrometry research use only database. Test characteristics determined and verified by the [facility].        Resulting Agency: [facility]     Susceptibility     Proteus mirabilis     MAIRA     Ampicillin <=2 ug/mL Susceptible     Ampicillin/ Sulbactam <=2 ug/mL Susceptible     Cefazolin <=4 ug/mL Susceptible 1     Cefepime <=1 ug/mL Susceptible     Cefoxitin <=4 ug/mL Susceptible     Ceftazidime <=1 ug/mL Susceptible     Ceftriaxone <=1 ug/mL Susceptible     Ciprofloxacin <=0.25 ug/mL Susceptible     Gentamicin <=1 ug/mL Susceptible     Levofloxacin <=0.12 ug/mL Susceptible     Nitrofurantoin 64 ug/mL Resistant 2     Piperacillin/Tazobactam <=4 ug/mL Susceptible     Tobramycin <=1 ug/mL Susceptible     Trimethoprim/Sulfamethoxazole <=1/19 ug/mL Susceptible              1 Cefazolin MAIRA breakpoints are for the treatment of uncomplicated urinary tract infections. For the treatment of systemic infections, please contact the laboratory for additional testing.   2 Intrinsically Resistant              Most Recent TSH and T4:  Recent Labs   Lab Test 05/10/21  1203   TSH 3.45     Most Recent Hemoglobin A1c:No lab results found.  Most Recent Urinalysis:  Recent Labs   Lab Test 01/23/24  2159   COLOR Yellow   APPEARANCE Cloudy*   URINEGLC Negative   URINEBILI Negative   URINEKETONE Negative   SG 1.015   UBLD Large*   URINEPH 7.0   PROTEIN 50*   NITRITE Negative   LEUKEST Small*   RBCU 118*   WBCU 62*     Most Recent ESR & CRP:No lab results found.  Most Recent Anemia Panel:  Recent Labs    Lab Test 01/27/24  0525 08/23/21  1615 05/10/21  1204 05/10/21  1203   WBC 10.6   < >  --   --    HGB 9.7*   < >  --   --    HCT 29.0*   < >  --   --    *   < >  --   --       < >  --   --    IRON  --   --   --  118   IRONSAT  --   --   --  40   FEB  --   --   --  295   PABLO  --   --   --  348*   B12  --   --   --  937   FOLIC  --   --  56.2  --     < > = values in this interval not displayed.       ASSESSMENT/PLAN:  Left obstructing ureteral stone  Pyelonephritis/Proteus Mirabilis UTI  Proteus Mirabella's bacteremia: CT scan with 2 mm proximal obstructing ureteral stone with associated leukocytosis and elevated uric acid.  Urology was consulted and underwent cystoscopy and left ureteral stent placement.  2/2 BCs with Proteus Mirabilis.  UCx greater than 100 K CFU Proteus Mirabilis.  Received IV antibiotics during hospital stay and transitioned to cefdinir at discharge  - Continue cefdinir through 2/8  - CBC 2/5  - Follow up with Urology, yet to be scheduled    MICHELE  CKD, III: Estimated Creatinine Clearance: 39 mL/min (based on SCr of 0.89 mg/dL).:   - BMP 2/5    Hypoxia, resolved: Developed acute hypoxia in the setting of acute illness.  Chest x-ray with some evidence of pulmonary edema.  TTE with EF 60-65%, mild MS, mild AS.  Ultimately resolved without treatment  - Does have mild edema on exam.  Lungs clear  - Is back on home spironolactone  - Monitor volume status      HTN: SBP is appropriate  - Continue home regimen of lisinopril, spironolactone and verapamil    Anxiety  Depression:  - Continue sertraline and Wellbutrin    Generalized Weakness: Secondary to acute illness.  Sounds like at baseline quite independent  - PT/OT/social work      Orders:  BMP/CBC 2/5    A total 50 min were spent on this hospital follow-up visit including review of hospitalization, medication reconciliation, evaluating patient discussing plan of care, writing orders and documenting.  All services performed on day of  visit    This note was completed in part using Dragon voice recognition software. Although reviewed after completion, some word and grammatical errors may occur.        Electronically signed by:  Edie Alexis PA-C

## 2024-01-31 NOTE — PROGRESS NOTES
[facility]: [facility]    Background: Transitional Care Management program identified per system criteria and reviewed by [facility] team for possible outreach.    Assessment: Upon chart review, [facility] Team member will not proceed with patient outreach related to this episode of Transitional Care Management program due to reason below:    [facility] TCU: Patient discharged to TCU/ARU/LTACH and is established within [facility]. Referral created for Primary Care-Care Coordination program.    Plan: Transitional Care Management episode addressed appropriately per reason noted above.      BRAYAN Andino  [facility] Resource [place], [facility]    *Connected Care Resource Team does NOT follow patient ongoing. Referrals are identified based on internal discharge reports and the outreach is to ensure patient has an understanding of their discharge instructions.

## 2024-02-01 ENCOUNTER — MYC MEDICAL ADVICE (OUTPATIENT)
Dept: GERIATRICS | Facility: CLINIC | Age: 89
End: 2024-02-01
Payer: COMMERCIAL

## 2024-02-01 DIAGNOSIS — I10 ESSENTIAL HYPERTENSION, BENIGN: ICD-10-CM

## 2024-02-01 DIAGNOSIS — F33.0 MAJOR DEPRESSIVE DISORDER, RECURRENT EPISODE, MILD (H): ICD-10-CM

## 2024-02-01 NOTE — DISCHARGE SUMMARY
[facility]  Hospitalist Discharge Summary      Date of Admission:  1/23/2024  Date of Discharge:  1/30/2024  4:19 PM  Discharging Provider: Carissa Whitaker PA-C  Discharge Service: Hospitalist Service    Discharge Diagnoses   Septic shock   Complicated Proteus UTI with Pyelonephritis and Bacteremia   Left obstructing ureteral stone  Episode of hypoxia  MICHELE on CKD stage 3  Hyponatremia   Chronic Anemia  Hypertension     Follow-ups Needed After Discharge   Follow-up Appointments     Follow Up and recommended labs and tests      Follow up with assisted physician.  The following labs/tests are   recommended: recheck blood pressure, BMP and CBC 2/5.  Reassess tongue   after mouthwash use.   Follow-up with urology for definitive management of the stone at [facility]   with Dr. Jaquez .            Unresulted Labs Ordered in the Past 30 Days of this Admission       No orders found from 12/24/2023 to 1/24/2024.        These results will be followed up by n/a    Discharge Disposition   Discharged to short-term care facility  Condition at discharge: Stable    Hospital Course Riddhi Aguilar is a 89 year old female with past medical history including hypertension, duodenal ulcer, chronic anemia with baseline of around 10, and CKD 3 admitted on 1/23/2024 with left-sided pyelonephritis with a 2 mm proximal obstructing ureteral stone found to have septic shock.     Here in the ER she had low-grade fevers.  Kidney function was around baseline with creatinine of 1.16 and CBC showed leukocytosis of 12.5 K with neutrophilic predominance.  UA showed large blood and positive leukocyte esterase and 62 white cells.  Lactic acid was elevated at 3.0.  CT scan showed a 2 mm obstructing left ureteral stone with moderate hydronephrosis and perinephric stranding.  There were multiple additional small stones in both kidneys.     Urology was contacted and she underwent cystoscopy with left-sided ureteral stent  placement early 1/24.  She remains admitted for IV antibiotics and supportive cares following this.     Blood culture from 1/23 growing Proteus mirabilis species with urine culture growing >100 k Proteus mirabilis and Aerococcus sanguinicola with susceptibilities showing pansensitivities with only resistance to Nitrofurantoin on urine culture for proteus species.  Blood cultures from 1/25 with NGTD.  Leukocytosis has continued to trend down throughout stay and patient has remained afebrile. Rivera removed on 1/27. PT assessed patient due to increased weakness and has recommended TCU. SW following to assist with discharge planning.      Problem List/Assessment and Plan:   #Septic shock due to pyelonephritis/urinary tract infection with Proteus mirabilis bacteremia  #Left 2 mm obstructing ureteral stone: As above  -Received  IV ceftriaxone (day 7 1/29), transition to PO abx 1/30 Omnicef for total of 14 day course   -WBC normalized on 1/27  -Monitor hemodynamics closely, VSS  -2 of 2 blood cultures from 1/23 growing proteus mirabilis, pan sensitive including Ceftriaxone   -repeat blood cultures on 1/25, NGTD, continue to follow   -urine culture growing >100 k proteus mirabilis and aerococcus sanguinicola with sensitivities showing resistance to Nitrofurantoin for proteus species   -Urology initially consulted and following, now has signed off, plan for outpatient follow up for definitive stone management   -rivera removed on 1/27, passed voiding trial   -TCU, anticipate short stay      #Acute hypoxic respiratory failure, resolved: potentially related to sepsis. Chest x-ray did demonstrate some evidence of pulmonary edema though it is unclear if this is cardiogenic or noncardiogenic, higher suspicion noncardiogenic.    -echocardiogram not previously ordered, obtained on 1/25 showing EF of 60 to 65%, left atrium is severely dilated, severe mitral annular calcification with mild mitral stenosis, mild valvular aortic  stenosis with mild aortic regurgitation, no WMA, pulmonary hypertension  -weaned to RA on 1/25 and continues to be stable   -encourage IS use      #MICHELE on CKD stage 3: baseline creatinine around 1.1-1.2, creatinine acutely increased to 1.43 on 1/24.   -creatinine stable  -avoid nephrotoxins  -follow BMP as outpatient      #Mild hyponatremia: suspect related to hypovolemia and holding off on additional IVFs with concern for hypervolemia on CXR. No history of hyponatremia.  -resolved on recheck on 1/27     #Chronic anemia: baseline Hgb of 9-10 with acute strop to 8.8 on 1/24, suspect drop may of been partially diluational.  -no current evidence of bleeding  -Hgb upward trending on recheck  -no need to recheck while admitted unless new concern, monitor outpatient      #History of hypertension: initially held home verapamil, spironolactone, lisinopril given hypotension  -restarted Lisinopril, Spironolactone, and Verapamil with hold parameters.   -Monitor BP Trend, may at some point be able to reduce BP medication if needing to hold meds. After release from TCU patient may consider cuff for home use.      #History of anxiety/depression: resume home Wellbutrin and sertraline          Consultations This Hospital Stay   WOUND OSTOMY CONTINENCE NURSE  IP CONSULT  SOCIAL WORK IP CONSULT  PHYSICAL THERAPY ADULT IP CONSULT  CARE MANAGEMENT / SOCIAL WORK IP CONSULT  OCCUPATIONAL THERAPY ADULT IP CONSULT  WOUND OSTOMY CONTINENCE NURSE  IP CONSULT  PHYSICAL THERAPY ADULT IP CONSULT  OCCUPATIONAL THERAPY ADULT IP CONSULT    Code Status   Prior    Time Spent on this Encounter   I, Carissa Whitaker PA-C, personally saw the patient today and spent greater than 30 minutes discharging this patient.       Carissa Whitaker PA-C  ______________________________________________________________________    Interval History  No acute events overnight. Tongue lesions noted yesterday when brushing her teeth. No swelling, no pain. No itching. Do  not appear consistent with thrush, may be from med coating. No white coating or signs of thrush.     Voiding. No flank pain. No chest pain, dyspnea. No increased leg swelling.     Spoke with daughter Mary Ann over the phone and updated. She is requesting assistance with her  FMLA. Unable to complete at this phase of care, patient is discharging to TCU.     Physical Exam   Vital Signs:                    Weight: 172 lbs 0 oz    Constitutional: Awake, alert, no apparent distress  Respiratory:  Normal work of breathing. Lungs clear to auscultation bilaterally, no crackles or wheezing.  HEENT: Tongue lesions of papilla noted yesterday when brushing her teeth. No swelling, no pain. No itching. Do not appear consistent with thrush, may be from med coating. No white coating or signs of thrush.   Cardiovascular: Regular rate and rhythm, normal S1 and S2, and no murmur appreciated.   GI: Bowel sounds present. soft, non-distended, non-tender.   Skin/Integument: Warm, dry. Scant bilateral LE edema.   Neuro: No focal deficits. Moving all extremities with normal strength. Coordination and sensation grossly intact. Speech clear. No focal deficits.   Psych: Appropriate affect.         Primary Care Physician   Shun Prado    Discharge Orders      General info for SNF    Length of Stay Estimate: Short Term Care: Estimated # of Days <30  Condition at Discharge: Stable  Level of care:skilled   Rehabilitation Potential: Good  Admission H&P remains valid and up-to-date: Yes  Recent Chemotherapy: N/A  Use Nursing Home Standing Orders: Yes     Mantoux instructions    Give two-step Mantoux (PPD) Per Facility Policy Yes     Reason for your hospital stay    left-sided pyelonephritis with a 2 mm proximal obstructing ureteral stone found to have septic shock, bacteremia.  Treated with IV fluids and antibiotics.  Had a cystoscopy with left-sided ureteral stent placement on January 24.     Intake and output    Every shift     Activity - Up with  assistive device     Encourage PO fluids     Follow Up and recommended labs and tests    Follow up with skilled nursing physician.  The following labs/tests are recommended: recheck blood pressure, BMP and CBC 2/5.  Reassess tongue after mouthwash use.   Follow-up with urology for definitive management of the stone at [facility] with Dr. Jaquez .     Physical Therapy Adult Consult    Evaluate and treat as clinically indicated.    Reason:  deconditioning, sepsis from UTI     Occupational Therapy Adult Consult    Evaluate and treat as clinically indicated.    Reason:  conditioning     Diet    Follow this diet upon discharge: Orders Placed This Encounter      Advance Diet as Tolerated: Regular Diet Adult       Significant Results and Procedures   Results for orders placed or performed during the hospital encounter of 01/23/24   Abd/pelvis CT no contrast - Stone Protocol    Narrative    EXAM: CT ABDOMEN PELVIS W/O CONTRAST  LOCATION: [facility]  DATE: 1/23/2024    INDICATION: flank pain  COMPARISON: None.  TECHNIQUE: CT scan of the abdomen and pelvis was performed without IV contrast. Multiplanar reformats were obtained. Dose reduction techniques were used.  CONTRAST: None.    FINDINGS:   LOWER CHEST: Interstitial densities both lower lung fields could represent edema or fibrotic changes. Tiny calcified granuloma right middle lobe. Dense calcification of the mitral annulus. Small esophageal hiatal hernia.    HEPATOBILIARY: Normal.    PANCREAS: Normal.    SPLEEN: Normal.    ADRENAL GLANDS: Normal.    KIDNEYS/BLADDER: 2 mm obstructing stone proximal left ureter on image 90 of series 3 resulting in moderate hydronephrosis and left perinephric stranding. 3 additional tiny calyceal tip stones in the left kidney. 2 calyceal tip nonobstructing stones right   kidney, the largest measuring 3 mm. Several tiny stones layering within the dependent portion of the bladder. Mild diffuse bladder wall  thickening.     BOWEL: No evidence for bowel obstruction. No bowel wall thickening or inflammatory changes.    LYMPH NODES: Normal.    VASCULATURE: Advanced atherosclerotic disease abdominal aorta and iliac arteries.    PELVIC ORGANS: Normal.    MUSCULOSKELETAL: Prominent lower anterior abdominal wall pannus. Tiny fat-containing ventral wall hernia. Advanced degenerative disc and facet disease lower thoracic and lumbar spine. Presumed old L3 vertebral body compression fracture. Osteopenia.   Degenerative changes both hips.      Impression    IMPRESSION:   1.  2 mm obstructing stone proximal left ureter resulting in moderate hydronephrosis and perinephric stranding.  2.  Several additional small stones both kidneys, and tiny layering stones in the bladder.  3.  Mild diffuse bladder wall thickening, nonspecific, but can be seen with cystitis. Clinical correlation recommended.  4.  Severe atherosclerotic disease.     XR Chest Port 1 View    Narrative    EXAM: XR CHEST PORT 1 VIEW  LOCATION: [facility]  DATE: 1/23/2024    INDICATION: Hypoxia  COMPARISON: 01/16/2010      Impression    IMPRESSION: Interval development of interstitial and alveolar infiltrates involving the mid to lower lungs, greater left lung base. Findings can be seen with acute pneumonitis. Acute pneumonitis associated with chronic underlying pulmonary fibrosis can   also have this appearance. Cardiac enlargement with mild pulmonary vascular congestion and interstitial opacities compatible with a component of CHF/volume overload. Atherosclerotic vascular calcification. Dense mitral valve calcification. Degenerative   changes in the spine and both shoulders.   XR Surgery HEYDI L/T 5 Min Fluoro w Stills    Narrative    This exam was marked as non-reportable because it will not be read by a   radiologist or a [facility] non-radiologist provider.         Echocardiogram Complete     Value    LVEF  60-65%    Narrative     [id number]  [id number]  [id number]  [id number]^HARISH^VIRGINIA^LEVY     [facility]  Echocardiography Laboratory  [address]  [place] [address]     Name: KLEVER BROWN  MRN: [id number]  : 02/15/1931  Study Date: 2024 01:27 PM  Age: 89 yrs  Gender: Female  Patient Location: [facility]  Reason For Study: Cardiac Murmur  Ordering Physician: VIRGINIA MOREIRA  Referring Physician: Shun Prado  Performed By: Juliana Palomares RDCS     BSA: 1.8 m2  Height: 61 in  Weight: 172 lb  HR: 81  BP: 117/57 mmHg  ______________________________________________________________________________  Procedure  Complete Portable Echo Adult. Optison (NDC #[id number]) given intravenously.  ______________________________________________________________________________  Interpretation Summary     Left ventricular systolic function is normal.The visual ejection fraction is  60-65%.  The right ventricular systolic function is normal.  The left atrium is severely dilated.  There is severe mitral annular calcification.There is mild mitral  stenosis.There is mild (1+) mitral regurgitation.  Mild valvular aortic stenosis.There is mild (1+) aortic regurgitation.  IVC diameter >2.1 cm collapsing <50% with sniff suggests a high RA pressure  estimated at 15 mmHg or greater.  ______________________________________________________________________________  Left Ventricle  The left ventricle is normal in size. There is mild concentric left  ventricular hypertrophy. Left ventricular systolic function is normal. The  visual ejection fraction is 60-65%. Diastolic function not assessed due to  significant mitral annular calcification. No regional wall motion  abnormalities noted.     Right Ventricle  The right ventricle is normal size. The right ventricular systolic function is  normal.     Atria  The left atrium is severely dilated. The right atrium is severely dilated.  There is no color Doppler evidence of an atrial shunt.     Mitral  Valve  There is severe mitral annular calcification. There is mild (1+) mitral  regurgitation. There is mild mitral stenosis. The mean mitral valve gradient  is 4.7 mmHg.     Tricuspid Valve  There is moderate (2+) tricuspid regurgitation. The right ventricular systolic  pressure is approximated at 56.7 mmHg plus the right atrial pressure.  Pulmonary hypertension.     Aortic Valve  The aortic valve is trileaflet. There is mild (1+) aortic regurgitation. Mild  valvular aortic stenosis. The mean AoV pressure gradient is 10.0 mmHg.     Pulmonic Valve  There is trace pulmonic valvular regurgitation. There is no pulmonic valvular  stenosis.     Vessels  Normal size ascending aorta. Dilation of the inferior vena cava is present  with abnormal respiratory variation in diameter. IVC diameter >2.1 cm  collapsing <50% with sniff suggests a high RA pressure estimated at 15 mmHg or  greater.     Pericardium  There is no pericardial effusion.     ______________________________________________________________________________  MMode/2D Measurements & Calculations  IVSd: 1.3 cm  LVIDd: 3.5 cm  LVIDs: 2.5 cm  LVPWd: 1.3 cm  IVC diam: 2.6 cm  FS: 27.3 %  LV mass(C)d: 152.2 grams  LV mass(C)dI: 85.9 grams/m2     asc Aorta Diam: 3.5 cm  LVOT diam: 1.7 cm  LVOT area: 2.3 cm2  Asc Ao diam index BSA (cm/m2): 2.0  Asc Ao diam index Ht(cm/m): 2.3  LA Volume (BP): 181.0 ml  LA Volume Index (BP): 102.3 ml/m2  RV Base: 4.0 cm  RWT: 0.76  TAPSE: 1.9 cm     Doppler Measurements & Calculations  MV E max aminata: 148.0 cm/sec  MV A max aminata: 104.0 cm/sec  MV E/A: 1.4  MV max P.1 mmHg  MV mean P.7 mmHg  MV V2 VTI: 40.7 cm  MVA(VTI): 1.4 cm2  MV P1/2t max aminata: 168.4 cm/sec  MV P1/2t: 61.9 msec  MVA(P1/2t): 3.6 cm2  MV dec slope: 797.3 cm/sec2  MV dec time: 0.25 sec  Ao V2 max: 217.0 cm/sec  Ao max P.0 mmHg  Ao V2 mean: 150.0 cm/sec  Ao mean PG: 10.0 mmHg  Ao V2 VTI: 41.5 cm  OLIVIER(I,D): 1.3 cm2  OLIVIER(V,D): 1.3 cm2  LV V1 max P.7 mmHg  LV  V1 max: 119.0 cm/sec  LV V1 VTI: 23.6 cm  SV(LVOT): 55.0 ml  SI(LVOT): 31.0 ml/m2  PA acc time: 0.05 sec  TR max freeman: 376.4 cm/sec  TR max P.7 mmHg  AV Freeman Ratio (DI): 0.55  OLIVIER Index (cm2/m2): 0.75  E/E' av.4  Lateral E/e': 26.9  Medial E/e': 33.8  RV S Freeman: 10.2 cm/sec     ______________________________________________________________________________  Report approved by: Gera Flor 2024 02:34 PM             Discharge Medications   Discharge Medication List as of 2024  4:11 PM        START taking these medications    Details   !! acetaminophen (TYLENOL) 325 MG tablet Take 2 tablets (650 mg) by mouth every 4 hours as needed for mild pain or other (and adjunct with moderate or severe pain or per patient request), Transitional      artificial saliva (BIOTENE DRY MOUTHWASH) LIQD liquid Swish and spit 15 mLs in mouth 4 times daily for 4 days, Transitional      calcium carbonate (TUMS) 500 MG chewable tablet Take 1 tablet (500 mg) by mouth 2 times daily as needed for heartburn, Transitional      cefdinir (OMNICEF) 300 MG capsule Take 1 capsule (300 mg) by mouth every 12 hours for 7 days, Transitional      polyethylene glycol (MIRALAX) 17 GM/Dose powder Take 17 g by mouth daily Hold for more than 3 stools/day, Transitional      senna-docusate (SENOKOT-S/PERICOLACE) 8.6-50 MG tablet Take 1 tablet by mouth nightly as needed for constipation, Transitional       !! - Potential duplicate medications found. Please discuss with provider.        CONTINUE these medications which have CHANGED    Details   lisinopril (ZESTRIL) 40 MG tablet Take 1 tablet (40 mg) by mouth daily Hold for SBP <120, No Print Out      spironolactone (ALDACTONE) 25 MG tablet Take 1 tablet (25 mg) by mouth daily Hold for SBP <125 or DBP <55, No Print Out      verapamil ER (CALAN-SR) 120 MG CR tablet TAKE 1 TABLET BY MOUTH EVERY 12 HOURS  Hold for SBP <115, No Print Out           CONTINUE these medications which have NOT CHANGED     Details   atorvastatin (LIPITOR) 10 MG tablet Take 1 tablet (10 mg) by mouth daily, Disp-90 tablet, R-3, E-PrescribeProfile Rx: patient will contact pharmacy when needed      buPROPion (WELLBUTRIN XL) 300 MG 24 hr tablet TAKE 1 TABLET(300 MG) BY MOUTH EVERY MORNING, Disp-90 tablet, R-3, E-Prescribe      dorzolamide-timolol (COSOPT) 22.3-6.8 MG/ML ophthalmic solution Place 1 drop into both eyes 2 times daily, Historical      multivitamin w/minerals (THERA-VIT-M) tablet Take 1 tablet by mouth daily, Historical      sertraline (ZOLOFT) 25 MG tablet Take 25 mg by mouth daily, Historical      VITAMIN D PO Take 1 tablet by mouth daily Doesn't know strength, Historical      !! acetaminophen (TYLENOL) 500 MG tablet Take 1 tablet (500 mg) by mouth every 6 hours as needed for mild pain, Disp-1 Bottle, R-11, E-Prescribe       !! - Potential duplicate medications found. Please discuss with provider.        Allergies   No Known Allergies

## 2024-02-02 ENCOUNTER — TRANSITIONAL CARE UNIT VISIT (OUTPATIENT)
Dept: GERIATRICS | Facility: CLINIC | Age: 89
End: 2024-02-02
Payer: COMMERCIAL

## 2024-02-02 ENCOUNTER — LAB REQUISITION (OUTPATIENT)
Dept: LAB | Facility: CLINIC | Age: 89
End: 2024-02-02
Payer: COMMERCIAL

## 2024-02-02 VITALS
HEIGHT: 61 IN | TEMPERATURE: 97.7 F | WEIGHT: 188.7 LBS | OXYGEN SATURATION: 98 % | BODY MASS INDEX: 35.63 KG/M2 | SYSTOLIC BLOOD PRESSURE: 114 MMHG | DIASTOLIC BLOOD PRESSURE: 60 MMHG | RESPIRATION RATE: 20 BRPM | HEART RATE: 74 BPM

## 2024-02-02 DIAGNOSIS — N20.1 LEFT URETERAL STONE: Primary | ICD-10-CM

## 2024-02-02 DIAGNOSIS — N10 ACUTE PYELONEPHRITIS: ICD-10-CM

## 2024-02-02 DIAGNOSIS — M62.81 GENERALIZED MUSCLE WEAKNESS: ICD-10-CM

## 2024-02-02 DIAGNOSIS — I10 ESSENTIAL HYPERTENSION, BENIGN: ICD-10-CM

## 2024-02-02 DIAGNOSIS — R78.81 BACTEREMIA: ICD-10-CM

## 2024-02-02 LAB
GAMMA INTERFERON BACKGROUND BLD IA-ACNC: 0.06 IU/ML
M TB IFN-G BLD-IMP: NEGATIVE
M TB IFN-G CD4+ BCKGRND COR BLD-ACNC: 1.07 IU/ML
MITOGEN IGNF BCKGRD COR BLD-ACNC: 0.14 IU/ML
MITOGEN IGNF BCKGRD COR BLD-ACNC: 0.19 IU/ML
QUANTIFERON MITOGEN: 1.13 IU/ML
QUANTIFERON NIL TUBE: 0.06 IU/ML
QUANTIFERON TB1 TUBE: 0.25 IU/ML
QUANTIFERON TB2 TUBE: 0.2

## 2024-02-02 PROCEDURE — 99309 SBSQ NF CARE MODERATE MDM 30: CPT | Performed by: PHYSICIAN ASSISTANT

## 2024-02-02 NOTE — LETTER
"    2/2/2024        RE: Riddhi Aguilar  [id number]  [place] [address]          Chief Complaint   Patient presents with     Nursing Home Acute       HPI:  Riddhi Aguilar is a 89 year old  (2/15/1931), who is being seen today for an episodic care visit at: [facility] [[id number]].     Brief summary: Riddhi Aguilar is an exceptionally pleasant 89-year-old female with a past medical history of hypertension, CKD and depression who was recently hospitalized at [facility].  Presented for flank pain found to be febrile with associated leukocytosis and elevated lactic acid.  Urine analysis concerning for infection and CT with 2 mm obstructing left ureteral stone.  Urology was consulted and she underwent cystoscopy with ureteral stent placement.  Treated with antibiotics and blood cultures ultimately positive proteus species, similar to urinary culture.  Transitioned to p.o. antibiotics at discharge.  Hospital course complicated by hypoxia which resolved without specific treatment.  Deconditioned compared to baseline so discharged to U for further therapies      Today's concern is: Spirits are bright. Progressing with therapy. No chest pain or SOB. No urinary sx. Has care conference on Monday.     Review of nursing home EMR: -142. BP meds have not been held.    Allergies, and PMH/PSH reviewed in "[employer] ([facility] today.    REVIEW OF SYSTEMS:  4 point ROS including Respiratory, CV, GI and , other than that noted in the HPI,  is negative    Objective:   /60   Pulse 74   Temp 97.7  F (36.5  C)   Resp 20   Ht 1.549 m (5' 1\")   Wt 85.6 kg (188 lb 11.2 oz)   SpO2 98%   BMI 35.65 kg/m      Physical Exam  Vitals (Facility EMR) reviewed.   Constitutional:       General: She is not in acute distress.  HENT:      Head: Normocephalic and atraumatic.   Eyes:      General: No scleral icterus.  Cardiovascular:      Rate and Rhythm: Normal rate and regular rhythm.      Heart sounds: " No murmur heard.  Pulmonary:      Effort: Pulmonary effort is normal.   Musculoskeletal:      Comments: Trace edema   Skin:     General: Skin is warm and dry.      Findings: No rash.   Neurological:      Mental Status: She is alert. Mental status is at baseline.   Psychiatric:         Behavior: Behavior normal.          MED REC REQUIRED  Post Medication Reconciliation Status: patient was not discharged from an inpatient facility or TCU      Recent labs in [facility] reviewed by me today.     Assessment/Plan:  Left obstructing ureteral stone  Pyelonephritis/Proteus Mirabilis UTI  Proteus Mirabella's bacteremia: CT scan with 2 mm proximal obstructing ureteral stone with associated leukocytosis and elevated uric acid.  Urology was consulted and underwent cystoscopy and left ureteral stent placement.  2/2 BCs with Proteus Mirabilis.  UCx greater than 100 K CFU Proteus Mirabilis.  Received IV antibiotics during hospital stay and transitioned to cefdinir at discharge  - Continue cefdinir through 2/8  - CBC 2/5  - Follow up for stent exchanged 2/20    MICHELE  CKD, III: Estimated Creatinine Clearance: 39 mL/min (based on SCr of 0.89 mg/dL).:   - BMP scheduled 2/5    Hypoxia, resolved: Developed acute hypoxia in the setting of acute illness.  Chest x-ray with some evidence of pulmonary edema.  TTE with EF 60-65%, mild MS, mild AS.  Ultimately resolved without treatment  - Does have mild edema on exam.  Lungs clear  - Is back on home spironolactone  - Monitor volume status      HTN: SBP is appropriate  - Continue home regimen of lisinopril, spironolactone and verapamil. Has parameters in place. Nothing held so far    Anxiety  Depression:  - Continue sertraline and Wellbutrin    Generalized Weakness: Secondary to acute illness.  Sounds like at baseline quite independent  - PT/OT/social work following    Orders:  NNO    Electronically signed by: Edie Alexis PA-C       Sincerely,        Edie Alexis PA-C

## 2024-02-02 NOTE — PROGRESS NOTES
"  Chief Complaint   Patient presents with    Nursing Home Acute       HPI:  Riddhi Aguilar is a 89 year old  (2/15/1931), who is being seen today for an episodic care visit at: [facility] ([facility]) [[id number]].     Brief summary: Riddhi Aguilar is an exceptionally pleasant 89-year-old female with a past medical history of hypertension, CKD and depression who was recently hospitalized at [facility].  Presented for flank pain found to be febrile with associated leukocytosis and elevated lactic acid.  Urine analysis concerning for infection and CT with 2 mm obstructing left ureteral stone.  Urology was consulted and she underwent cystoscopy with ureteral stent placement.  Treated with antibiotics and blood cultures ultimately positive proteus species, similar to urinary culture.  Transitioned to p.o. antibiotics at discharge.  Hospital course complicated by hypoxia which resolved without specific treatment.  Deconditioned compared to baseline so discharged to U for further therapies      Today's concern is: Spirits are bright. Progressing with therapy. No chest pain or SOB. No urinary sx. Has care conference on Monday.     Review of nursing home EMR: -142. BP meds have not been held.    Allergies, and PMH/PSH reviewed in IPXI today.    REVIEW OF SYSTEMS:  4 point ROS including Respiratory, CV, GI and , other than that noted in the HPI,  is negative    Objective:   /60   Pulse 74   Temp 97.7  F (36.5  C)   Resp 20   Ht 1.549 m (5' 1\")   Wt 85.6 kg (188 lb 11.2 oz)   SpO2 98%   BMI 35.65 kg/m      Physical Exam  Vitals (Facility EMR) reviewed.   Constitutional:       General: She is not in acute distress.  HENT:      Head: Normocephalic and atraumatic.   Eyes:      General: No scleral icterus.  Cardiovascular:      Rate and Rhythm: Normal rate and regular rhythm.      Heart sounds: No murmur heard.  Pulmonary:      Effort: Pulmonary effort is normal.   Musculoskeletal:      " Comments: Trace edema   Skin:     General: Skin is warm and dry.      Findings: No rash.   Neurological:      Mental Status: She is alert. Mental status is at baseline.   Psychiatric:         Behavior: Behavior normal.          MED REC REQUIRED  Post Medication Reconciliation Status: patient was not discharged from an inpatient facility or TCU      Recent labs in EPIC reviewed by me today.     Assessment/Plan:  Left obstructing ureteral stone  Pyelonephritis/Proteus Mirabilis UTI  Proteus Mirabella's bacteremia: CT scan with 2 mm proximal obstructing ureteral stone with associated leukocytosis and elevated uric acid.  Urology was consulted and underwent cystoscopy and left ureteral stent placement.  2/2 BCs with Proteus Mirabilis.  UCx greater than 100 K CFU Proteus Mirabilis.  Received IV antibiotics during hospital stay and transitioned to cefdinir at discharge  - Continue cefdinir through 2/8  - CBC 2/5  - Follow up for stent exchanged 2/20    MICHELE  CKD, III: Estimated Creatinine Clearance: 39 mL/min (based on SCr of 0.89 mg/dL).:   - BMP scheduled 2/5    Hypoxia, resolved: Developed acute hypoxia in the setting of acute illness.  Chest x-ray with some evidence of pulmonary edema.  TTE with EF 60-65%, mild MS, mild AS.  Ultimately resolved without treatment  - Does have mild edema on exam.  Lungs clear  - Is back on home spironolactone  - Monitor volume status      HTN: SBP is appropriate  - Continue home regimen of lisinopril, spironolactone and verapamil. Has parameters in place. Nothing held so far    Anxiety  Depression:  - Continue sertraline and Wellbutrin    Generalized Weakness: Secondary to acute illness.  Sounds like at baseline quite independent  - PT/OT/social work following    Orders:  NNO    Electronically signed by: Edie Alexis PA-C

## 2024-02-02 NOTE — CONFIDENTIAL NOTE
February 2, 2024    Riddhi Aguilar  2/15/1931      ORDERS:    Please reschedule Verapamil to [phone] and [phone]    Edie Alexis PA-C

## 2024-02-05 ENCOUNTER — TELEPHONE (OUTPATIENT)
Dept: UROLOGY | Facility: CLINIC | Age: 89
End: 2024-02-05

## 2024-02-05 LAB
ANION GAP SERPL CALCULATED.3IONS-SCNC: 10 MMOL/L (ref 7–15)
BUN SERPL-MCNC: 26.5 MG/DL (ref 8–23)
CALCIUM SERPL-MCNC: 9 MG/DL (ref 8.2–9.6)
CHLORIDE SERPL-SCNC: 106 MMOL/L (ref 98–107)
CREAT SERPL-MCNC: 1 MG/DL (ref 0.51–0.95)
DEPRECATED HCO3 PLAS-SCNC: 23 MMOL/L (ref 22–29)
EGFRCR SERPLBLD CKD-EPI 2021: 53 ML/MIN/1.73M2
ERYTHROCYTE [DISTWIDTH] IN BLOOD BY AUTOMATED COUNT: 23.5 % (ref 10–15)
GLUCOSE SERPL-MCNC: 71 MG/DL (ref 70–99)
HCT VFR BLD AUTO: 29.1 % (ref 35–47)
HGB BLD-MCNC: 9.4 G/DL (ref 11.7–15.7)
MCH RBC QN AUTO: 33.7 PG (ref 26.5–33)
MCHC RBC AUTO-ENTMCNC: 32.3 G/DL (ref 31.5–36.5)
MCV RBC AUTO: 104 FL (ref 78–100)
PLATELET # BLD AUTO: 331 10E3/UL (ref 150–450)
POTASSIUM SERPL-SCNC: 3.9 MMOL/L (ref 3.4–5.3)
RBC # BLD AUTO: 2.79 10E6/UL (ref 3.8–5.2)
SODIUM SERPL-SCNC: 139 MMOL/L (ref 135–145)
WBC # BLD AUTO: 4.9 10E3/UL (ref 4–11)

## 2024-02-05 PROCEDURE — 80048 BASIC METABOLIC PNL TOTAL CA: CPT | Mod: ORL | Performed by: PHYSICIAN ASSISTANT

## 2024-02-05 PROCEDURE — 36415 COLL VENOUS BLD VENIPUNCTURE: CPT | Mod: ORL | Performed by: PHYSICIAN ASSISTANT

## 2024-02-05 PROCEDURE — 85027 COMPLETE CBC AUTOMATED: CPT | Mod: ORL | Performed by: PHYSICIAN ASSISTANT

## 2024-02-05 NOTE — TELEPHONE ENCOUNTER
Scheduled pt for surgery with Dr. Jaquez 2/20. Went through instructions over the phone with Megan at her group home, I told her she doesn't need a pre-op due to recent ER visit. Surgery info faxed to [phone]

## 2024-02-06 ENCOUNTER — TRANSITIONAL CARE UNIT VISIT (OUTPATIENT)
Dept: GERIATRICS | Facility: CLINIC | Age: 89
End: 2024-02-06
Payer: COMMERCIAL

## 2024-02-06 VITALS
TEMPERATURE: 98.4 F | SYSTOLIC BLOOD PRESSURE: 132 MMHG | HEART RATE: 80 BPM | WEIGHT: 190 LBS | RESPIRATION RATE: 19 BRPM | DIASTOLIC BLOOD PRESSURE: 60 MMHG | HEIGHT: 61 IN | BODY MASS INDEX: 35.87 KG/M2 | OXYGEN SATURATION: 99 %

## 2024-02-06 DIAGNOSIS — M62.81 GENERALIZED MUSCLE WEAKNESS: ICD-10-CM

## 2024-02-06 DIAGNOSIS — I10 ESSENTIAL HYPERTENSION, BENIGN: ICD-10-CM

## 2024-02-06 DIAGNOSIS — N20.1 LEFT URETERAL STONE: Primary | ICD-10-CM

## 2024-02-06 DIAGNOSIS — R78.81 BACTEREMIA: ICD-10-CM

## 2024-02-06 PROCEDURE — 99316 NF DSCHRG MGMT 30 MIN+: CPT | Performed by: PHYSICIAN ASSISTANT

## 2024-02-06 NOTE — PATIENT INSTRUCTIONS
Riddhi Aguilar  YOB: 1931                                   Discharge Date: 2/6/24      PHYSICIAN's DISCHARGE SUMMARY / ORDER SHEET  1. Discharge to: home with home care  2. Medications:      Current Outpatient Medications   Medication Sig Dispense Refill    acetaminophen (TYLENOL) 325 MG tablet Take 2 tablets (650 mg) by mouth every 4 hours as needed for mild pain or other (and adjunct with moderate or severe pain or per patient request)      atorvastatin (LIPITOR) 10 MG tablet Take 1 tablet (10 mg) by mouth daily 90 tablet 3    buPROPion (WELLBUTRIN XL) 300 MG 24 hr tablet TAKE 1 TABLET(300 MG) BY MOUTH EVERY MORNING 90 tablet 3    calcium carbonate (TUMS) 500 MG chewable tablet Take 1 tablet (500 mg) by mouth 2 times daily as needed for heartburn      cefdinir (OMNICEF) 300 MG capsule Take 1 capsule (300 mg) by mouth every 12 hours for 7 days      dorzolamide-timolol (COSOPT) 22.3-6.8 MG/ML ophthalmic solution Place 1 drop into both eyes 2 times daily      lisinopril (ZESTRIL) 40 MG tablet Take 1 tablet (40 mg) by mouth daily Hold for SBP <120      multivitamin w/minerals (THERA-VIT-M) tablet Take 1 tablet by mouth daily      polyethylene glycol (MIRALAX) 17 GM/Dose powder Take 17 g by mouth daily Hold for more than 3 stools/day      senna-docusate (SENOKOT-S/PERICOLACE) 8.6-50 MG tablet Take 1 tablet by mouth nightly as needed for constipation      sertraline (ZOLOFT) 25 MG tablet Take 25 mg by mouth daily      spironolactone (ALDACTONE) 25 MG tablet Take 1 tablet (25 mg) by mouth daily Hold for SBP <125 or DBP <55      verapamil ER (CALAN-SR) 120 MG CR tablet TAKE 1 TABLET BY MOUTH EVERY 12 HOURS  Hold for SBP <115      VITAMIN D PO Take 1 tablet by mouth daily Doesn't know strength        DISCHARGE PLAN:  Follow up labs: Routine monitoring of BMP with PCP  Medical Follow Up:      Follow up with primary care provider in 1 weeks  [facility] scheduled appointments:  Appointments in Next Year         Feb 21, 2024  9:00 AM  (Arrive by 8:45 AM)  ED/Hospital Follow Up with Shun Prado MD  [facility] ([facility] ) [phone]     Feb 27, 2024 10:30 AM  (Arrive by 10:15 AM)  Cystoscopy with Richie Jaquez MD, UB CYF  [facility] [place] ([facility] ) [phone]     May 14, 2024  9:30 AM  (Arrive by 9:10 AM)  Preventative Adult Visit with Shun Prado MD  [facility] ([facility] ) [phone]           Future Appointments   Date Time Provider Department Center   2/21/2024  9:00 AM Shun Prado MD [facility]    2/27/2024 10:30 AM Richie Jaquez MD [facility]   5/14/2024  9:30 AM Shun Prado MD [facility]       Discharge Services: Home Care:  Physical Therapy and Registered Nurse  Discharge Instructions Verbalized to Patient at Discharge:   Complete antibiotic course and follow up with Urology as scheduled    Discharge patient to home with current medications and treatments  Discharge Home with Home care as above  - Nursing call in 30 days supply of needed meds to pharmacy of choice upon discharge. Future refills by PCP Dr. Shun Prado with phone number [phone].  - Please send home with original of these discharge instructions and copy for chart.       ______________________________  Edie Alexis PA-C   [facility]                   2/6/2024

## 2024-02-06 NOTE — LETTER
2/6/2024        RE: Riddhi Aguilar  [id number]  [place] [address]        [facility] GERIATRICS DISCHARGE SUMMARY  PATIENT'S NAME: Riddhi Aguilar  YOB: 1931  MEDICAL RECORD NUMBER:  [id number]  Place of Service where encounter took place:  [facility] ([facility]) [[id number]]    PRIMARY CARE PROVIDER AND CLINIC RESPONSIBLE AFTER TRANSFER:   Shun Prado MD, [address] / [place] [address]    [facility] Provider     Transferring providers: Edie Alexis PA-C, Dr. Adrianna Miller MD  Recent Hospitalization/ED:  [facility] Hospital stay 1/23/24 to 1/30/24.  Date of SNF Admission: January 30, 2024  Date of SNF (anticipated) Discharge: February 06, 2024  Discharged to: previous independent home  Cognitive Scores: SLUMS: 24/30  Physical Function: Ambulating 400 ft with FWW and esperanza  DME: No new DME needed    CODE STATUS/ADVANCE DIRECTIVES DISCUSSION:  DNR/DNI  ALLERGIES: Patient has no known allergies.    NURSING FACILITY COURSE   Medication Changes/Rationale:   None, antibiotics to be complete 2/8    Summary of nursing facility stay:   Riddhi Aguilar is an exceptionally pleasant 89-year-old female with a past medical history of hypertension, CKD and depression who was recently hospitalized at [facility].  Presented for flank pain found to be febrile with associated leukocytosis and elevated lactic acid.  Urine analysis concerning for infection and CT with 2 mm obstructing left ureteral stone.  Urology was consulted and she underwent cystoscopy with ureteral stent placement.  Treated with antibiotics and blood cultures ultimately positive proteus species, similar to urinary culture.  Transitioned to p.o. antibiotics at discharge.  Hospital course complicated by hypoxia which resolved without specific treatment.  Deconditioned compared to baseline so discharged to TCU for further therapies    Left obstructing ureteral  stone  Pyelonephritis/Proteus Mirabilis UTI  Proteus Mirabella's bacteremia: CT scan with 2 mm proximal obstructing ureteral stone with associated leukocytosis and elevated uric acid.  Urology was consulted and underwent cystoscopy and left ureteral stent placement.  2/2 BCs with Proteus Mirabilis.  UCx greater than 100 K CFU Proteus Mirabilis.  Received IV antibiotics during hospital stay and transitioned to cefdinir at discharge  - Continue cefdinir through 2/8  - Follow up for stent exchanged 2/20    MICHELE  CKD, III: Estimated Creatinine Clearance: 35.8 mL/min (A) (based on SCr of 1 mg/dL (H)).  - BMP 2/5 overall stable at 1.0    Hypoxia, resolved: Developed acute hypoxia in the setting of acute illness.  Chest x-ray with some evidence of pulmonary edema.  TTE with EF 60-65%, mild MS, mild AS.  Ultimately resolved without treatment  - Does have mild edema on exam.  Lungs clear  - Is back on home spironolactone  - Monitor volume status      HTN: SBP is appropriate  - Continue home regimen of lisinopril, spironolactone and verapamil. Has holding  parameters in place. Nothing held so far    Anxiety  Depression:  - Continue sertraline and Wellbutrin    Generalized Weakness: Secondary to acute illness.  Sounds like at baseline quite independent  - PT/OT/social work following    Discharge Medications:  MED REC REQUIRED  Post Medication Reconciliation Status: discharge medications reconciled and changed, per note/orders       Current Outpatient Medications   Medication Sig Dispense Refill     acetaminophen (TYLENOL) 325 MG tablet Take 2 tablets (650 mg) by mouth every 4 hours as needed for mild pain or other (and adjunct with moderate or severe pain or per patient request)       atorvastatin (LIPITOR) 10 MG tablet Take 1 tablet (10 mg) by mouth daily 90 tablet 3     buPROPion (WELLBUTRIN XL) 300 MG 24 hr tablet TAKE 1 TABLET(300 MG) BY MOUTH EVERY MORNING 90 tablet 3     calcium carbonate (TUMS) 500 MG chewable tablet  "Take 1 tablet (500 mg) by mouth 2 times daily as needed for heartburn       cefdinir (OMNICEF) 300 MG capsule Take 1 capsule (300 mg) by mouth every 12 hours for 7 days       dorzolamide-timolol (COSOPT) 22.3-6.8 MG/ML ophthalmic solution Place 1 drop into both eyes 2 times daily       lisinopril (ZESTRIL) 40 MG tablet Take 1 tablet (40 mg) by mouth daily Hold for SBP <120       multivitamin w/minerals (THERA-VIT-M) tablet Take 1 tablet by mouth daily       polyethylene glycol (MIRALAX) 17 GM/Dose powder Take 17 g by mouth daily Hold for more than 3 stools/day       senna-docusate (SENOKOT-S/PERICOLACE) 8.6-50 MG tablet Take 1 tablet by mouth nightly as needed for constipation       sertraline (ZOLOFT) 25 MG tablet Take 25 mg by mouth daily       spironolactone (ALDACTONE) 25 MG tablet Take 1 tablet (25 mg) by mouth daily Hold for SBP <125 or DBP <55       verapamil ER (CALAN-SR) 120 MG CR tablet TAKE 1 TABLET BY MOUTH EVERY 12 HOURS  Hold for SBP <115       VITAMIN D PO Take 1 tablet by mouth daily Doesn't know strength          Controlled medications:   not applicable/none     Past Medical History:   Past Medical History:   Diagnosis Date     Anemia     baseline 10-11     Arthritis      Bleeding ulcer     duodenal ulcer     CKD (chronic kidney disease) stage 3, GFR 30-59 ml/min (H)      Essential hypertension, benign      Osteoporosis      Pure hypercholesterolemia      Spinal stenosis      Unspecified cataract      Unspecified glaucoma(365.9)      Physical Exam:   Vitals: /60   Pulse 80   Temp 98.4  F (36.9  C)   Resp 19   Ht 1.549 m (5' 1\")   Wt 86.2 kg (190 lb)   SpO2 99%   BMI 35.90 kg/m    BMI: Body mass index is 35.9 kg/m .  Physical Exam  Vitals (Facility EMR) reviewed.   Constitutional:       General: She is not in acute distress.  HENT:      Head: Normocephalic and atraumatic.   Eyes:      General: No scleral icterus.  Cardiovascular:      Rate and Rhythm: Normal rate and regular rhythm.      " Heart sounds: No murmur heard.  Pulmonary:      Effort: Pulmonary effort is normal.   Musculoskeletal:      Comments: 1+ edema in LE   Skin:     General: Skin is warm and dry.      Findings: No rash.   Neurological:      Mental Status: She is alert. Mental status is at baseline.   Psychiatric:         Behavior: Behavior normal.           SNF labs: Recent labs in [facility] reviewed by me today.  and Most Recent 3 CBC's:  Recent Labs   Lab Test 02/05/24  0529 01/27/24  0525 01/26/24  0537   WBC 4.9 10.6 25.9*   HGB 9.4* 9.7* 9.5*   * 103* 104*    260 247     Most Recent 3 BMP's:  Recent Labs   Lab Test 02/05/24  0529 01/30/24  0515 01/27/24  0525    136 137   POTASSIUM 3.9 3.9 4.1   CHLORIDE 106 102 102   CO2 23 23 25   BUN 26.5* 20.9 25.9*   CR 1.00* 0.89 0.94   ANIONGAP 10 11 10   TAMMY 9.0 8.6 9.0   GLC 71 91 84     Most Recent 2 LFT's:  Recent Labs   Lab Test 01/23/24 2036 12/27/18  1415   AST 33 27   ALT 16 23   ALKPHOS 68 46   BILITOTAL 2.1* 1.2       DISCHARGE PLAN:  Follow up labs: Routine monitoring of BMP with PCP  Medical Follow Up:      Follow up with primary care provider in 1 weeks  [facility] scheduled appointments:  Appointments in Next Year        Feb 21, 2024  9:00 AM  (Arrive by 8:45 AM)  ED/Hospital Follow Up with Shun Prado MD  [facility] ([facility] ) [phone]     Feb 27, 2024 10:30 AM  (Arrive by 10:15 AM)  Cystoscopy with Richie Jaquez MD, UB CYF  [facility] [place] ([facility] [place] ) [phone]     May 14, 2024  9:30 AM  (Arrive by 9:10 AM)  Preventative Adult Visit with Shun Prado MD  [facility] ([facility] ) [phone]           Future Appointments   Date Time Provider Department Center   2/21/2024  9:00 AM Shun Prado MD [facility]    2/27/2024 10:30 AM Richie Jaquez MD [facility]   5/14/2024   9:30 AM Shun Prado MD [facility] CS      Discharge Services: Home Care:  Physical Therapy and Registered Nurse  Discharge Instructions Verbalized to Patient at Discharge:   Complete antibiotic course and follow up with Urology as scheduled    TOTAL DISCHARGE TIME:   Greater than 30 minutes  Electronically signed by:  Edie Alexis PA-C     Home care Face to Face documentation done in [facility] attached to Home care orders for [facility].                Sincerely,        Edie Alexis PA-C

## 2024-02-06 NOTE — PROGRESS NOTES
[facility] GERIATRICS DISCHARGE SUMMARY  PATIENT'S NAME: Riddhi Aguilar  YOB: 1931  MEDICAL RECORD NUMBER:  [id number]  Place of Service where encounter took place:  [facility] ([facility]) [[id number]]    PRIMARY CARE PROVIDER AND CLINIC RESPONSIBLE AFTER TRANSFER:   Shun Prado MD, [address] / [place] [address]    G Provider     Transferring providers: Edie Alexis PA-C, Dr. Adrianna Miller MD  Recent Hospitalization/ED:  [facility] Hospital stay 1/23/24 to 1/30/24.  Date of SNF Admission: January 30, 2024  Date of SNF (anticipated) Discharge: February 06, 2024  Discharged to: previous independent home  Cognitive Scores: SLUMS: 24/30  Physical Function: Ambulating 400 ft with FWW and esperanza  DME: No new DME needed    CODE STATUS/ADVANCE DIRECTIVES DISCUSSION:  DNR/DNI  ALLERGIES: Patient has no known allergies.    NURSING FACILITY COURSE   Medication Changes/Rationale:   None, antibiotics to be complete 2/8    Summary of nursing facility stay:   Riddhi Aguilar is an exceptionally pleasant 89-year-old female with a past medical history of hypertension, CKD and depression who was recently hospitalized at [facility].  Presented for flank pain found to be febrile with associated leukocytosis and elevated lactic acid.  Urine analysis concerning for infection and CT with 2 mm obstructing left ureteral stone.  Urology was consulted and she underwent cystoscopy with ureteral stent placement.  Treated with antibiotics and blood cultures ultimately positive proteus species, similar to urinary culture.  Transitioned to p.o. antibiotics at discharge.  Hospital course complicated by hypoxia which resolved without specific treatment.  Deconditioned compared to baseline so discharged to TCU for further therapies    Left obstructing ureteral stone  Pyelonephritis/Proteus Mirabilis UTI  Proteus Mirabella's bacteremia: CT scan with 2 mm proximal  obstructing ureteral stone with associated leukocytosis and elevated uric acid.  Urology was consulted and underwent cystoscopy and left ureteral stent placement.  2/2 BCs with Proteus Mirabilis.  UCx greater than 100 K CFU Proteus Mirabilis.  Received IV antibiotics during hospital stay and transitioned to cefdinir at discharge  - Continue cefdinir through 2/8  - Follow up for stent exchanged 2/20    MICHELE  CKD, III: Estimated Creatinine Clearance: 35.8 mL/min (A) (based on SCr of 1 mg/dL (H)).  - BMP 2/5 overall stable at 1.0    Hypoxia, resolved: Developed acute hypoxia in the setting of acute illness.  Chest x-ray with some evidence of pulmonary edema.  TTE with EF 60-65%, mild MS, mild AS.  Ultimately resolved without treatment  - Does have mild edema on exam.  Lungs clear  - Is back on home spironolactone  - Monitor volume status      HTN: SBP is appropriate  - Continue home regimen of lisinopril, spironolactone and verapamil. Has holding  parameters in place. Nothing held so far    Anxiety  Depression:  - Continue sertraline and Wellbutrin    Generalized Weakness: Secondary to acute illness.  Sounds like at baseline quite independent  - PT/OT/social work following    Discharge Medications:  MED REC REQUIRED  Post Medication Reconciliation Status: discharge medications reconciled and changed, per note/orders       Current Outpatient Medications   Medication Sig Dispense Refill    acetaminophen (TYLENOL) 325 MG tablet Take 2 tablets (650 mg) by mouth every 4 hours as needed for mild pain or other (and adjunct with moderate or severe pain or per patient request)      atorvastatin (LIPITOR) 10 MG tablet Take 1 tablet (10 mg) by mouth daily 90 tablet 3    buPROPion (WELLBUTRIN XL) 300 MG 24 hr tablet TAKE 1 TABLET(300 MG) BY MOUTH EVERY MORNING 90 tablet 3    calcium carbonate (TUMS) 500 MG chewable tablet Take 1 tablet (500 mg) by mouth 2 times daily as needed for heartburn      cefdinir (OMNICEF) 300 MG capsule  "Take 1 capsule (300 mg) by mouth every 12 hours for 7 days      dorzolamide-timolol (COSOPT) 22.3-6.8 MG/ML ophthalmic solution Place 1 drop into both eyes 2 times daily      lisinopril (ZESTRIL) 40 MG tablet Take 1 tablet (40 mg) by mouth daily Hold for SBP <120      multivitamin w/minerals (THERA-VIT-M) tablet Take 1 tablet by mouth daily      polyethylene glycol (MIRALAX) 17 GM/Dose powder Take 17 g by mouth daily Hold for more than 3 stools/day      senna-docusate (SENOKOT-S/PERICOLACE) 8.6-50 MG tablet Take 1 tablet by mouth nightly as needed for constipation      sertraline (ZOLOFT) 25 MG tablet Take 25 mg by mouth daily      spironolactone (ALDACTONE) 25 MG tablet Take 1 tablet (25 mg) by mouth daily Hold for SBP <125 or DBP <55      verapamil ER (CALAN-SR) 120 MG CR tablet TAKE 1 TABLET BY MOUTH EVERY 12 HOURS  Hold for SBP <115      VITAMIN D PO Take 1 tablet by mouth daily Doesn't know strength          Controlled medications:   not applicable/none     Past Medical History:   Past Medical History:   Diagnosis Date    Anemia     baseline 10-11    Arthritis     Bleeding ulcer     duodenal ulcer    CKD (chronic kidney disease) stage 3, GFR 30-59 ml/min (H)     Essential hypertension, benign     Osteoporosis     Pure hypercholesterolemia     Spinal stenosis     Unspecified cataract     Unspecified glaucoma(365.9)      Physical Exam:   Vitals: /60   Pulse 80   Temp 98.4  F (36.9  C)   Resp 19   Ht 1.549 m (5' 1\")   Wt 86.2 kg (190 lb)   SpO2 99%   BMI 35.90 kg/m    BMI: Body mass index is 35.9 kg/m .  Physical Exam  Vitals (Facility EMR) reviewed.   Constitutional:       General: She is not in acute distress.  HENT:      Head: Normocephalic and atraumatic.   Eyes:      General: No scleral icterus.  Cardiovascular:      Rate and Rhythm: Normal rate and regular rhythm.      Heart sounds: No murmur heard.  Pulmonary:      Effort: Pulmonary effort is normal.   Musculoskeletal:      Comments: 1+ edema " in LE   Skin:     General: Skin is warm and dry.      Findings: No rash.   Neurological:      Mental Status: She is alert. Mental status is at baseline.   Psychiatric:         Behavior: Behavior normal.           SNF labs: Recent labs in [facility] reviewed by me today.  and Most Recent 3 CBC's:  Recent Labs   Lab Test 02/05/24  0529 01/27/24  0525 01/26/24  0537   WBC 4.9 10.6 25.9*   HGB 9.4* 9.7* 9.5*   * 103* 104*    260 247     Most Recent 3 BMP's:  Recent Labs   Lab Test 02/05/24  0529 01/30/24  0515 01/27/24  0525    136 137   POTASSIUM 3.9 3.9 4.1   CHLORIDE 106 102 102   CO2 23 23 25   BUN 26.5* 20.9 25.9*   CR 1.00* 0.89 0.94   ANIONGAP 10 11 10   TAMMY 9.0 8.6 9.0   GLC 71 91 84     Most Recent 2 LFT's:  Recent Labs   Lab Test 01/23/24 2036 12/27/18  1415   AST 33 27   ALT 16 23   ALKPHOS 68 46   BILITOTAL 2.1* 1.2       DISCHARGE PLAN:  Follow up labs: Routine monitoring of BMP with PCP  Medical Follow Up:      Follow up with primary care provider in 1 weeks  [facility] scheduled appointments:  Appointments in Next Year        Feb 21, 2024  9:00 AM  (Arrive by 8:45 AM)  ED/Hospital Follow Up with Shun Prado MD  [facility] ([facility] ) [phone]     Feb 27, 2024 10:30 AM  (Arrive by 10:15 AM)  Cystoscopy with Richie Jaqeuz MD, UB CYF  [facility] [place] ([facility] [place] ) [phone]     May 14, 2024  9:30 AM  (Arrive by 9:10 AM)  Preventative Adult Visit with Shun Prado MD  [facility] ([facility] ) [phone]           Future Appointments   Date Time Provider Department Center   2/21/2024  9:00 AM Shun Prado MD [facility]   2/27/2024 10:30 AM Richie Jaquez MD [facility]   5/14/2024  9:30 AM Shun Prado MD [facility]      Discharge Services: Home Care:  Physical Therapy and Registered Nurse  Discharge  Instructions Verbalized to Patient at Discharge:   Complete antibiotic course and follow up with Urology as scheduled    TOTAL DISCHARGE TIME:   Greater than 30 minutes  Electronically signed by:  Edie Alexis PA-C     Home care Face to Face documentation done in [facility] attached to Home care orders for [facility].

## 2024-02-08 RX ORDER — SPIRONOLACTONE 25 MG/1
25 TABLET ORAL DAILY
Qty: 90 TABLET | Refills: 3 | Status: ON HOLD | OUTPATIENT
Start: 2024-02-08 | End: 2024-05-22

## 2024-02-12 ENCOUNTER — TELEPHONE (OUTPATIENT)
Dept: FAMILY MEDICINE | Facility: CLINIC | Age: 89
End: 2024-02-12
Payer: COMMERCIAL

## 2024-02-12 NOTE — TELEPHONE ENCOUNTER
Mary Ann on C2C called, pt recently discharged from TCU    Was to follow up with PCP within a week     Future appt scheduled     Appointments in Next Year      Feb 14, 2024 10:30 AM  (Arrive by 10:10 AM)  Provider Visit with Shun Prado MD  [facility] ([facility] ) [phone]     Feb 27, 2024 10:30 AM  (Arrive by 10:15 AM)  Cystoscopy with Richie Jaquez MD, UB CYF  [facility] [place] ([facility] [place] ) [phone]     May 14, 2024  9:30 AM  (Arrive by 9:10 AM)  Preventative Adult Visit with Shun Prado MD  [facility] ([facility] ) [phone]          Elena Shaffer RN  [facility]

## 2024-02-13 ENCOUNTER — TELEPHONE (OUTPATIENT)
Dept: FAMILY MEDICINE | Facility: CLINIC | Age: 89
End: 2024-02-13
Payer: COMMERCIAL

## 2024-02-13 NOTE — TELEPHONE ENCOUNTER
Home Care is calling regarding an established patient with [facility].       Requesting orders from: Shun Prado  Provider is following patient: Yes  Is this a 60-day recertification request?  No    Orders Requested    Physical Therapy  Request for initial certification (first set of orders)   Frequency:  5 additional visits over the next 7 weeks    Information was gathered and will be sent to provider for review.  RN will contact Home Care with information after provider review.  Confirmed ok to leave a detailed message with call back.  Contact information confirmed and updated as needed.    Katerin Sales RN

## 2024-02-13 NOTE — TELEPHONE ENCOUNTER
Called and spoke to Jean. Relayed message from the provider. Jean expressed understanding and had no additional questions at this time.     Katerin Sales RN

## 2024-02-14 ENCOUNTER — OFFICE VISIT (OUTPATIENT)
Dept: FAMILY MEDICINE | Facility: CLINIC | Age: 89
End: 2024-02-14
Payer: COMMERCIAL

## 2024-02-14 VITALS
TEMPERATURE: 97.6 F | OXYGEN SATURATION: 100 % | HEIGHT: 61 IN | WEIGHT: 184.6 LBS | BODY MASS INDEX: 34.85 KG/M2 | RESPIRATION RATE: 16 BRPM | HEART RATE: 66 BPM | SYSTOLIC BLOOD PRESSURE: 134 MMHG | DIASTOLIC BLOOD PRESSURE: 66 MMHG

## 2024-02-14 DIAGNOSIS — H91.90 HEARING LOSS, UNSPECIFIED HEARING LOSS TYPE, UNSPECIFIED LATERALITY: ICD-10-CM

## 2024-02-14 DIAGNOSIS — N20.0 NEPHROLITHIASIS: ICD-10-CM

## 2024-02-14 DIAGNOSIS — N39.0 URINARY TRACT INFECTION WITHOUT HEMATURIA, SITE UNSPECIFIED: Primary | ICD-10-CM

## 2024-02-14 DIAGNOSIS — I10 ESSENTIAL HYPERTENSION, BENIGN: ICD-10-CM

## 2024-02-14 PROBLEM — R31.9 URINARY TRACT INFECTION WITH HEMATURIA, SITE UNSPECIFIED: Status: RESOLVED | Noted: 2024-01-23 | Resolved: 2024-02-14

## 2024-02-14 PROBLEM — E66.01 MORBID OBESITY (H): Status: RESOLVED | Noted: 2021-04-12 | Resolved: 2024-02-14

## 2024-02-14 PROBLEM — M25.562 CHRONIC PAIN OF LEFT KNEE: Status: RESOLVED | Noted: 2022-05-06 | Resolved: 2024-02-14

## 2024-02-14 PROBLEM — A41.9 SEPSIS, DUE TO UNSPECIFIED ORGANISM, UNSPECIFIED WHETHER ACUTE ORGAN DYSFUNCTION PRESENT (H): Status: RESOLVED | Noted: 2024-01-23 | Resolved: 2024-02-14

## 2024-02-14 PROBLEM — G89.29 CHRONIC PAIN OF LEFT KNEE: Status: RESOLVED | Noted: 2022-05-06 | Resolved: 2024-02-14

## 2024-02-14 PROBLEM — N18.30 CHRONIC KIDNEY DISEASE, STAGE 3 (H): Status: RESOLVED | Noted: 2020-12-17 | Resolved: 2024-02-14

## 2024-02-14 PROCEDURE — 99213 OFFICE O/P EST LOW 20 MIN: CPT | Performed by: INTERNAL MEDICINE

## 2024-02-14 ASSESSMENT — PAIN SCALES - GENERAL: PAINLEVEL: NO PAIN (0)

## 2024-02-14 NOTE — PROGRESS NOTES
"  Assessment & Plan     Urinary tract infection without hematuria, site unspecified  Improved     Nephrolithiasis  Follow up with urology as directed     Essential hypertension, benign  OK control back on routine medications     Hearing loss, unspecified hearing loss type, unspecified laterality  Needs referral for hearing aid adjustment   - Adult Audiology  Referral; Future      No LOS data to display   Time spent by me doing chart review, history and exam, documentation and further activities per the note    MED REC REQUIRED  Post Medication Reconciliation Status: discharge medications reconciled, continue medications without change  BMI  Estimated body mass index is 34.88 kg/m  as calculated from the following:    Height as of this encounter: 1.549 m (5' 1\").    Weight as of this encounter: 83.7 kg (184 lb 9.6 oz).         FUTURE APPOINTMENTS:       - Follow-up for annual visit or as needed    Suresh Hannon is a 89 year old, presenting for the following health issues:  Hospital F/U    History of Present Illness       Reason for visit:  Follow up after hospitalization for kidney stone/sepsis (1/23 - 1/30) and a stay at [facility] (1/30 - 2/6)    She eats 2-3 servings of fruits and vegetables daily.She consumes 0 sweetened beverage(s) daily.She exercises with enough effort to increase her heart rate 10 to 19 minutes per day.  She exercises with enough effort to increase her heart rate 3 or less days per week.   She is taking medications regularly.           Hospital Follow-up Visit:    Hospital/Nursing Home/ Rehab Facility: [facility] ([facility])  Date of Admission: 01/30/24  Date of Discharge: 02/06/24  Reason(s) for Admission: Left obstructing ureteral stone  Pyelonephritis/Proteus Mirabilis UTI  Proteus Mirabella's bacteremia    Was your hospitalization related to COVID-19? No   Problems taking medications regularly:  None  Medication changes since discharge: None  Problems " "adhering to non-medication therapy:  None    Summary of hospitalization:  [facility] discharge summary reviewed  Diagnostic Tests/Treatments reviewed.  Follow up needed: urology as directed   Other Healthcare Providers Involved in Patient s Care:         None  Update since discharge: improved.         Plan of care communicated with patient and family             Doing well, no new complaints  No dysuria, fevers, chills, flank pain, nausea or vomiting  Follow up labs were improving 2/5  Sees urology to discuss definitive stone management           Objective    /66 (BP Location: Left arm, Patient Position: Sitting, Cuff Size: Adult Regular)   Pulse 66   Temp 97.6  F (36.4  C)   Resp 16   Ht 1.549 m (5' 1\")   Wt 83.7 kg (184 lb 9.6 oz)   SpO2 100%   BMI 34.88 kg/m    Body mass index is 34.88 kg/m .  Physical Exam   GENERAL: alert and no distress, not toxic   RESP: lungs clear to auscultation - no rales, rhonchi or wheezes  CV: Heart with regular rate and rhythm.   ABDOMEN: soft, nontender, no hepatosplenomegaly, no masses and bowel sounds normal  :  No CVA tenderness   MS: no gross musculoskeletal defects noted, no edema  NEURO: Normal strength and tone, mentation intact and speech normal  PSYCH: mentation appears normal, affect normal/bright            Signed Electronically by: Shun Prado MD    "

## 2024-02-20 ENCOUNTER — ANESTHESIA EVENT (OUTPATIENT)
Dept: SURGERY | Facility: CLINIC | Age: 89
End: 2024-02-20
Payer: COMMERCIAL

## 2024-02-20 ENCOUNTER — APPOINTMENT (OUTPATIENT)
Dept: GENERAL RADIOLOGY | Facility: CLINIC | Age: 89
End: 2024-02-20
Attending: STUDENT IN AN ORGANIZED HEALTH CARE EDUCATION/TRAINING PROGRAM
Payer: COMMERCIAL

## 2024-02-20 ENCOUNTER — ANESTHESIA (OUTPATIENT)
Dept: SURGERY | Facility: CLINIC | Age: 89
End: 2024-02-20
Payer: COMMERCIAL

## 2024-02-20 ENCOUNTER — HOSPITAL ENCOUNTER (OUTPATIENT)
Facility: CLINIC | Age: 89
Discharge: HOME | End: 2024-02-20
Attending: STUDENT IN AN ORGANIZED HEALTH CARE EDUCATION/TRAINING PROGRAM | Admitting: STUDENT IN AN ORGANIZED HEALTH CARE EDUCATION/TRAINING PROGRAM
Payer: COMMERCIAL

## 2024-02-20 VITALS
RESPIRATION RATE: 18 BRPM | SYSTOLIC BLOOD PRESSURE: 104 MMHG | OXYGEN SATURATION: 97 % | TEMPERATURE: 97.5 F | DIASTOLIC BLOOD PRESSURE: 70 MMHG | WEIGHT: 187.6 LBS | HEART RATE: 78 BPM | BODY MASS INDEX: 35.45 KG/M2

## 2024-02-20 DIAGNOSIS — N20.1 LEFT URETERAL STONE: Primary | ICD-10-CM

## 2024-02-20 PROCEDURE — 250N000009 HC RX 250: Performed by: NURSE ANESTHETIST, CERTIFIED REGISTERED

## 2024-02-20 PROCEDURE — 258N000001 HC RX 258: Performed by: STUDENT IN AN ORGANIZED HEALTH CARE EDUCATION/TRAINING PROGRAM

## 2024-02-20 PROCEDURE — 710N000009 HC RECOVERY PHASE 1, LEVEL 1, PER MIN: Performed by: STUDENT IN AN ORGANIZED HEALTH CARE EDUCATION/TRAINING PROGRAM

## 2024-02-20 PROCEDURE — 272N000001 HC OR GENERAL SUPPLY STERILE: Performed by: STUDENT IN AN ORGANIZED HEALTH CARE EDUCATION/TRAINING PROGRAM

## 2024-02-20 PROCEDURE — 250N000009 HC RX 250: Performed by: STUDENT IN AN ORGANIZED HEALTH CARE EDUCATION/TRAINING PROGRAM

## 2024-02-20 PROCEDURE — 360N000084 HC SURGERY LEVEL 4 W/ FLUORO, PER MIN: Performed by: STUDENT IN AN ORGANIZED HEALTH CARE EDUCATION/TRAINING PROGRAM

## 2024-02-20 PROCEDURE — 250N000025 HC SEVOFLURANE, PER MIN: Performed by: STUDENT IN AN ORGANIZED HEALTH CARE EDUCATION/TRAINING PROGRAM

## 2024-02-20 PROCEDURE — 258N000003 HC RX IP 258 OP 636: Performed by: NURSE ANESTHETIST, CERTIFIED REGISTERED

## 2024-02-20 PROCEDURE — C1758 CATHETER, URETERAL: HCPCS | Performed by: STUDENT IN AN ORGANIZED HEALTH CARE EDUCATION/TRAINING PROGRAM

## 2024-02-20 PROCEDURE — 999N000179 XR SURGERY CARM FLUORO LESS THAN 5 MIN W STILLS: Mod: TC

## 2024-02-20 PROCEDURE — 370N000017 HC ANESTHESIA TECHNICAL FEE, PER MIN: Performed by: STUDENT IN AN ORGANIZED HEALTH CARE EDUCATION/TRAINING PROGRAM

## 2024-02-20 PROCEDURE — C1769 GUIDE WIRE: HCPCS | Performed by: STUDENT IN AN ORGANIZED HEALTH CARE EDUCATION/TRAINING PROGRAM

## 2024-02-20 PROCEDURE — 710N000012 HC RECOVERY PHASE 2, PER MINUTE: Performed by: STUDENT IN AN ORGANIZED HEALTH CARE EDUCATION/TRAINING PROGRAM

## 2024-02-20 PROCEDURE — 74420 UROGRAPHY RTRGR +-KUB: CPT | Mod: 26 | Performed by: STUDENT IN AN ORGANIZED HEALTH CARE EDUCATION/TRAINING PROGRAM

## 2024-02-20 PROCEDURE — 999N000141 HC STATISTIC PRE-PROCEDURE NURSING ASSESSMENT: Performed by: STUDENT IN AN ORGANIZED HEALTH CARE EDUCATION/TRAINING PROGRAM

## 2024-02-20 PROCEDURE — 250N000011 HC RX IP 250 OP 636: Performed by: STUDENT IN AN ORGANIZED HEALTH CARE EDUCATION/TRAINING PROGRAM

## 2024-02-20 PROCEDURE — 250N000011 HC RX IP 250 OP 636: Performed by: NURSE ANESTHETIST, CERTIFIED REGISTERED

## 2024-02-20 PROCEDURE — 258N000003 HC RX IP 258 OP 636: Performed by: ANESTHESIOLOGY

## 2024-02-20 PROCEDURE — 52351 CYSTOURETERO & OR PYELOSCOPE: CPT | Mod: LT | Performed by: STUDENT IN AN ORGANIZED HEALTH CARE EDUCATION/TRAINING PROGRAM

## 2024-02-20 PROCEDURE — 255N000002 HC RX 255 OP 636: Performed by: STUDENT IN AN ORGANIZED HEALTH CARE EDUCATION/TRAINING PROGRAM

## 2024-02-20 RX ORDER — SODIUM CHLORIDE, SODIUM LACTATE, POTASSIUM CHLORIDE, CALCIUM CHLORIDE 600; 310; 30; 20 MG/100ML; MG/100ML; MG/100ML; MG/100ML
INJECTION, SOLUTION INTRAVENOUS CONTINUOUS
Status: DISCONTINUED | OUTPATIENT
Start: 2024-02-20 | End: 2024-02-20 | Stop reason: HOSPADM

## 2024-02-20 RX ORDER — FENTANYL CITRATE 50 UG/ML
50 INJECTION, SOLUTION INTRAMUSCULAR; INTRAVENOUS EVERY 5 MIN PRN
Status: DISCONTINUED | OUTPATIENT
Start: 2024-02-20 | End: 2024-02-20 | Stop reason: HOSPADM

## 2024-02-20 RX ORDER — CEFAZOLIN SODIUM/WATER 2 G/20 ML
2 SYRINGE (ML) INTRAVENOUS SEE ADMIN INSTRUCTIONS
Status: DISCONTINUED | OUTPATIENT
Start: 2024-02-20 | End: 2024-02-20 | Stop reason: HOSPADM

## 2024-02-20 RX ORDER — HYDROMORPHONE HYDROCHLORIDE 1 MG/ML
0.5 INJECTION, SOLUTION INTRAMUSCULAR; INTRAVENOUS; SUBCUTANEOUS EVERY 5 MIN PRN
Status: DISCONTINUED | OUTPATIENT
Start: 2024-02-20 | End: 2024-02-20 | Stop reason: HOSPADM

## 2024-02-20 RX ORDER — ONDANSETRON 4 MG/1
4 TABLET, ORALLY DISINTEGRATING ORAL EVERY 30 MIN PRN
Status: DISCONTINUED | OUTPATIENT
Start: 2024-02-20 | End: 2024-02-20 | Stop reason: HOSPADM

## 2024-02-20 RX ORDER — LABETALOL HYDROCHLORIDE 5 MG/ML
10 INJECTION, SOLUTION INTRAVENOUS
Status: DISCONTINUED | OUTPATIENT
Start: 2024-02-20 | End: 2024-02-20 | Stop reason: HOSPADM

## 2024-02-20 RX ORDER — LIDOCAINE HYDROCHLORIDE 20 MG/ML
INJECTION, SOLUTION INFILTRATION; PERINEURAL PRN
Status: DISCONTINUED | OUTPATIENT
Start: 2024-02-20 | End: 2024-02-20

## 2024-02-20 RX ORDER — PROPOFOL 10 MG/ML
INJECTION, EMULSION INTRAVENOUS PRN
Status: DISCONTINUED | OUTPATIENT
Start: 2024-02-20 | End: 2024-02-20

## 2024-02-20 RX ORDER — OXYCODONE HYDROCHLORIDE 5 MG/1
10 TABLET ORAL
Status: DISCONTINUED | OUTPATIENT
Start: 2024-02-20 | End: 2024-02-20 | Stop reason: HOSPADM

## 2024-02-20 RX ORDER — MAGNESIUM SULFATE HEPTAHYDRATE 40 MG/ML
2 INJECTION, SOLUTION INTRAVENOUS
Status: DISCONTINUED | OUTPATIENT
Start: 2024-02-20 | End: 2024-02-20 | Stop reason: HOSPADM

## 2024-02-20 RX ORDER — ONDANSETRON 2 MG/ML
INJECTION INTRAMUSCULAR; INTRAVENOUS PRN
Status: DISCONTINUED | OUTPATIENT
Start: 2024-02-20 | End: 2024-02-20

## 2024-02-20 RX ORDER — HYDRALAZINE HYDROCHLORIDE 20 MG/ML
5-10 INJECTION INTRAMUSCULAR; INTRAVENOUS EVERY 10 MIN PRN
Status: DISCONTINUED | OUTPATIENT
Start: 2024-02-20 | End: 2024-02-20 | Stop reason: HOSPADM

## 2024-02-20 RX ORDER — ALBUTEROL SULFATE 0.83 MG/ML
2.5 SOLUTION RESPIRATORY (INHALATION) EVERY 4 HOURS PRN
Status: DISCONTINUED | OUTPATIENT
Start: 2024-02-20 | End: 2024-02-20 | Stop reason: HOSPADM

## 2024-02-20 RX ORDER — ONDANSETRON 2 MG/ML
4 INJECTION INTRAMUSCULAR; INTRAVENOUS EVERY 30 MIN PRN
Status: DISCONTINUED | OUTPATIENT
Start: 2024-02-20 | End: 2024-02-20 | Stop reason: HOSPADM

## 2024-02-20 RX ORDER — CEFAZOLIN SODIUM/WATER 2 G/20 ML
2 SYRINGE (ML) INTRAVENOUS
Status: COMPLETED | OUTPATIENT
Start: 2024-02-20 | End: 2024-02-20

## 2024-02-20 RX ORDER — OXYCODONE HYDROCHLORIDE 5 MG/1
5 TABLET ORAL
Status: DISCONTINUED | OUTPATIENT
Start: 2024-02-20 | End: 2024-02-20 | Stop reason: HOSPADM

## 2024-02-20 RX ORDER — DEXAMETHASONE SODIUM PHOSPHATE 4 MG/ML
INJECTION, SOLUTION INTRA-ARTICULAR; INTRALESIONAL; INTRAMUSCULAR; INTRAVENOUS; SOFT TISSUE PRN
Status: DISCONTINUED | OUTPATIENT
Start: 2024-02-20 | End: 2024-02-20

## 2024-02-20 RX ORDER — DIPHENHYDRAMINE HYDROCHLORIDE 50 MG/ML
12.5 INJECTION INTRAMUSCULAR; INTRAVENOUS EVERY 6 HOURS PRN
Status: DISCONTINUED | OUTPATIENT
Start: 2024-02-20 | End: 2024-02-20 | Stop reason: HOSPADM

## 2024-02-20 RX ORDER — DIPHENHYDRAMINE HCL 12.5MG/5ML
12.5 LIQUID (ML) ORAL EVERY 6 HOURS PRN
Status: DISCONTINUED | OUTPATIENT
Start: 2024-02-20 | End: 2024-02-20 | Stop reason: HOSPADM

## 2024-02-20 RX ORDER — FENTANYL CITRATE 50 UG/ML
INJECTION, SOLUTION INTRAMUSCULAR; INTRAVENOUS PRN
Status: DISCONTINUED | OUTPATIENT
Start: 2024-02-20 | End: 2024-02-20

## 2024-02-20 RX ORDER — KETOROLAC TROMETHAMINE 15 MG/ML
15 INJECTION, SOLUTION INTRAMUSCULAR; INTRAVENOUS
Status: DISCONTINUED | OUTPATIENT
Start: 2024-02-20 | End: 2024-02-20 | Stop reason: HOSPADM

## 2024-02-20 RX ORDER — LIDOCAINE 40 MG/G
CREAM TOPICAL
Status: DISCONTINUED | OUTPATIENT
Start: 2024-02-20 | End: 2024-02-20 | Stop reason: HOSPADM

## 2024-02-20 RX ADMIN — Medication 2 G: at 14:44

## 2024-02-20 RX ADMIN — FENTANYL CITRATE 12.5 MCG: 50 INJECTION INTRAMUSCULAR; INTRAVENOUS at 15:05

## 2024-02-20 RX ADMIN — ONDANSETRON 4 MG: 2 INJECTION INTRAMUSCULAR; INTRAVENOUS at 14:50

## 2024-02-20 RX ADMIN — FENTANYL CITRATE 12.5 MCG: 50 INJECTION INTRAMUSCULAR; INTRAVENOUS at 15:10

## 2024-02-20 RX ADMIN — LIDOCAINE HYDROCHLORIDE 50 MG: 20 INJECTION, SOLUTION INFILTRATION; PERINEURAL at 14:50

## 2024-02-20 RX ADMIN — FENTANYL CITRATE 50 MCG: 50 INJECTION INTRAMUSCULAR; INTRAVENOUS at 14:50

## 2024-02-20 RX ADMIN — DEXAMETHASONE SODIUM PHOSPHATE 8 MG: 4 INJECTION, SOLUTION INTRA-ARTICULAR; INTRALESIONAL; INTRAMUSCULAR; INTRAVENOUS; SOFT TISSUE at 14:50

## 2024-02-20 RX ADMIN — PHENYLEPHRINE HYDROCHLORIDE 100 MCG: 10 INJECTION INTRAVENOUS at 14:50

## 2024-02-20 RX ADMIN — SODIUM CHLORIDE, POTASSIUM CHLORIDE, SODIUM LACTATE AND CALCIUM CHLORIDE: 600; 310; 30; 20 INJECTION, SOLUTION INTRAVENOUS at 13:24

## 2024-02-20 RX ADMIN — PROPOFOL 100 MG: 10 INJECTION, EMULSION INTRAVENOUS at 14:50

## 2024-02-20 ASSESSMENT — ACTIVITIES OF DAILY LIVING (ADL)
ADLS_ACUITY_SCORE: 26
ADLS_ACUITY_SCORE: 27

## 2024-02-20 NOTE — ANESTHESIA PROCEDURE NOTES
Airway       Patient location during procedure: OR  Staff -        Anesthesiologist:  Sandro Brush MD       CRNA: Severson, Dean Dennis, APRN CRNA       Performed By: CRNA  Consent for Airway        Urgency: elective  Indications and Patient Condition       Indications for airway management: trixie-procedural and airway protection       Induction type:intravenous       Mask difficulty assessment: 1 - vent by mask    Final Airway Details       Final airway type: supraglottic airway    Supraglottic Airway Details        Type: LMA       Brand: I-Gel       LMA size: 4    Post intubation assessment        Placement verified by: capnometry, equal breath sounds and chest rise        Number of attempts at approach: 1       Number of other approaches attempted: 0       Secured with: commercial tube jane       Ease of procedure: easy       Dentition: Intact and Unchanged

## 2024-02-20 NOTE — OP NOTE
[facility]  Operative Note    Pre-operative diagnosis: Left ureteral stone [N20.1]   Post-operative diagnosis Left ureteral stone [N20.1]   Procedure: Procedure(s):  Cystoscopy, left diagnostic ureteroscopy, left retrograde pyelogram, left ureteral stent removal  Fluoroscopic interpretation <1 hour physician time   Surgeon: Richie Jaquez MD   Assistants(s): none   Anesthesia: General    Estimated blood loss: None    Total IV fluids: (See anesthesia record)   Blood transfusion: No transfusion was given during surgery   Total urine output: Not measured   Drains: none   Specimens: * No specimens in log *     Implants: Implant Name Type Inv. Item Serial No.  Lot No. LRB No. Used Action   STENT URETERAL POLARIS ULTRA [id number] - [id number] Stent STENT URETERAL POLARIS ULTRA [id number]  [employer] [id number] Left 1 Explanted          Findings:   Left ureteral stent removed. Complete pyeloscopy reviewed no stones within the left ureter or left renal collecting system, presumably spontaneously passed. Moderate left hydronephrosis. No stent replacement.   Complications: None.   Condition: Stable               Description of procedure:    89-year-old female with left hydronephrosis and infected obstructive small left proximal ureteral stone status post cystoscopy with left ureteral stent placement 1/24/2024 who returns today for ureteroscopic management.  Risks including ureteral injury, infection, incomplete stone clearance, hematuria, stent pain irritation were discussed.  Informed consent was obtained.    The patient was brought to operating room #14.  Ancef antibiotics were given prior to the procedure.  General anesthesia was induced, she was placed in dorsolithotomy position, prepped and draped in standard sterile fashion.  A timeout was performed.    A 22 Swedish Stortz cystoscope was assembled and passed through the urethra into the bladder.  The  existing stent was noted emanating from the left ureteral orifice with no encrustation.  The stent was grasped with a stent grasper and brought out the urethral meatus.  The stent was cannulated with a 0.035 inch stiff shaft Glidewire which was passed up to the renal pelvis under fluoroscopic guidance and the existing stent was removed intact and discarded.  The wire was clipped to the drapes as a safety wire.  A [employer] short semirigid ureteroscope was assembled and passed through the urethra, into the bladder and up the left ureter.  The ureteroscope was able to pass all the way up through the ureteropelvic junction into the renal pelvis and the entire ureter was widely patent without any evidence of stone.  A second wire was then passed through the scope up into the renal pelvis and the scope was backloaded off the wire.  Over the working wire an Olympus digital flexible ureteroscope was passed up to the renal pelvis under fluoroscopic guidance and the wire was removed.  Complete pyeloscopy revealed no evidence of any stones.  There was moderate hydronephrosis.  I looked systematically and every calyx at least 3 times.  Repeat retrograde pyelogram was performed and again I used fluoroscopic guidance to review every calyx and I did not find any evidence of stone.  There were several Chepe's plaques and stones which are forming in the papillary tips but nothing worth lasering.  The ureteroscope was removed.  Given the wide patency of the ureter and the ureteropelvic junction I opted not to replace the stent.  The safety wire was removed.  The bladder was drained.  Patient was cleaned up, woken from anesthesia and brought to PACU in stable condition.  She will discharge home.      Richie Jaquez MD   [facility]  [phone] clinic phone

## 2024-02-20 NOTE — DISCHARGE INSTRUCTIONS
POSTOPERATIVE INSTRUCTIONS    Diagnosis-------------------------------   Left ureteral stone    Procedure-------------------------------  Procedure(s) (LRB):  Cystoscopy, left diagnostic ureteroscopy, left retrograde pyelogram, left ureteral stent removal (Left)      Findings--------------------------------  Left ureteral stent removed. Complete pyeloscopy reviewed no stones within the left ureter or left renal collecting system, presumably spontaneously passed. Moderate left hydronephrosis. No stent replacement.     Home-going instructions-----------------         Activity Limitation:     - No driving or operating heavy machinery while on narcotic pain medication.     FOLLOW THESE INSTRUCTIONS AS INDICATED BELOW:  - Observe operative area for signs of excessive bleeding.  - You may shower.  - Increase fluid intake to promote clear urine.  - Resume usual diet as tolerated    What to expect while recovering-----------  - You may experience some intermittent bleeding that makes your urine pink or cherry colored. This is normal.  - However, if you are unable to urinate, passing large amount of clots, have michael blood in your urine, or have a temperature >101 degrees, call the urology nurse on call, or present to your nearest emergency department.  - You are encouraged to walk daily, and have no activity restrictions.     Discharge Medications/instructions:   - Take Tylenol 1000mg every 6 hours for pain      Questions/concerns------------------------  [facility]: [phone]    Future appointments  We will cancel the scheduled follow up next week since you don't have a stent.     Follow up in 2-3 months with renal ultrasound, litholink x2 prior      Richie Jaquez MD         You have 2 pea-sized red spots on your bottom. We put a special dressing on them, you can take it off tomorrow. Try to avoid sitting on those spots as much as possible. Have someone look at them on a regular basis to  make sure they are healing/not getting worse. Consult your primary care provider if they aren't getting better.

## 2024-02-20 NOTE — ANESTHESIA PREPROCEDURE EVALUATION
Anesthesia Pre-Procedure Evaluation    Patient: Riddhi Aguilar   MRN: [id number] : 2/15/1931        Procedure : Procedure(s):  Cystoscopy, left ureteroscopy with laser lithotripsy, left ureteroscopy with stone basketing, left retrograde pyelogram, left ureteral stent exchange (Left: Flank)           Past Medical History:   Diagnosis Date    Anemia     baseline 10-11    Arthritis     Bleeding ulcer     duodenal ulcer    CKD (chronic kidney disease) stage 3, GFR 30-59 ml/min (H)     Essential hypertension, benign     Osteoporosis     Pure hypercholesterolemia     Spinal stenosis     Unspecified cataract     Unspecified glaucoma(365.9)       Past Surgical History:   Procedure Laterality Date    APPENDECTOMY      BREAST SURGERY      lumpectomy    COMBINED CYSTOSCOPY, RETROGRADES, EXCHANGE STENT URETER(S) Left 2024    Procedure: Cystoscopy, left retrograde pyelogram, left JJ stent placement, <1hr physician fluoroscopy time;  Surgeon: Aureliano Brandt MD;  Location:  OR    DILATION AND CURETTAGE, HYSTEROSCOPY DIAGNOSTIC, COMBINED  2014    Procedure: COMBINED DILATION AND CURETTAGE, HYSTEROSCOPY DIAGNOSTIC;  DILATION AND CURETTAGE, HYSTEROSCOPY, POLYPECTOMY, INCISION AND DRAINAGE OF SEBACEOUS CYST ;  Surgeon: Serena Zamora MD;  Location: [facility]    EXCISE LESION LOWER EXTREMITY Right 2017    Procedure: EXCISE LESION LOWER EXTREMITY;  WIDE EXCISIONAL BIOPSY OF RIGHT ANKLE BASAL CELL CARCINOMA;  Surgeon: Juan Luis Flores MD;  Location:  OR    INCISION AND DRAINAGE PERINEAL, COMBINED  2014    Procedure: COMBINED INCISION AND DRAINAGE PERINEAL;;  Surgeon: Serena Zamora MD;  Location: [facility]    Z NONSPECIFIC PROCEDURE      Repair of buckled retina of rt eye    [facility] NONSPECIFIC PROCEDURE      Appy      No Known Allergies   Social History     Tobacco Use    Smoking status: Never    Smokeless tobacco: Never   Substance Use Topics    Alcohol use: No     Alcohol/week: 0.0 standard drinks of  alcohol      Wt Readings from Last 1 Encounters:   02/20/24 85.1 kg (187 lb 9.6 oz)        Anesthesia Evaluation   Pt has had prior anesthetic.     No history of anesthetic complications       ROS/MED HX  ENT/Pulmonary:  - neg pulmonary ROS     Neurologic:  - neg neurologic ROS     Cardiovascular:     (+) Dyslipidemia hypertension- -   -  - -                                      METS/Exercise Tolerance:     Hematologic:     (+)      anemia,          Musculoskeletal:   (+)  arthritis,             GI/Hepatic:       Renal/Genitourinary:     (+) renal disease, type: CRI, Pt does not require dialysis,    Nephrolithiasis ,       Endo:     (+)               Obesity,       Psychiatric/Substance Use:  - neg psychiatric ROS     Infectious Disease:       Malignancy:       Other:            Physical Exam    Airway        Mallampati: II   TM distance: > 3 FB   Neck ROM: full   Mouth opening: > 3 cm    Respiratory Devices and Support         Dental       (+) Modest Abnormalities - crowns, retainers, 1 or 2 missing teeth      Cardiovascular          Rhythm and rate: regular and normal     Pulmonary           breath sounds clear to auscultation           OUTSIDE LABS:  CBC:   Lab Results   Component Value Date    WBC 4.9 02/05/2024    WBC 10.6 01/27/2024    HGB 9.4 (L) 02/05/2024    HGB 9.7 (L) 01/27/2024    HCT 29.1 (L) 02/05/2024    HCT 29.0 (L) 01/27/2024     02/05/2024     01/27/2024     BMP:   Lab Results   Component Value Date     02/05/2024     01/30/2024    POTASSIUM 3.9 02/05/2024    POTASSIUM 3.9 01/30/2024    CHLORIDE 106 02/05/2024    CHLORIDE 102 01/30/2024    CO2 23 02/05/2024    CO2 23 01/30/2024    BUN 26.5 (H) 02/05/2024    BUN 20.9 01/30/2024    CR 1.00 (H) 02/05/2024    CR 0.89 01/30/2024    GLC 71 02/05/2024    GLC 91 01/30/2024     COAGS:   Lab Results   Component Value Date    INR 1.05 12/16/2012     POC:   Lab Results   Component Value Date     (H) 01/17/2010     HEPATIC:    Lab Results   Component Value Date    ALBUMIN 4.8 01/23/2024    PROTTOTAL 7.2 01/23/2024    ALT 16 01/23/2024    AST 33 01/23/2024    ALKPHOS 68 01/23/2024    BILITOTAL 2.1 (H) 01/23/2024     OTHER:   Lab Results   Component Value Date    LACT 1.2 01/26/2024    TAMMY 9.0 02/05/2024    LIPASE 10 (L) 01/23/2024    AMYLASE 60 07/27/2010    TSH 3.45 05/10/2021       Anesthesia Plan    ASA Status:  2    NPO Status:  NPO Appropriate    Anesthesia Type: General.     - Airway: LMA   Induction: Intravenous.   Maintenance: Balanced.        Consents    Anesthesia Plan(s) and associated risks, benefits, and realistic alternatives discussed. Questions answered and patient/representative(s) expressed understanding.     - Discussed:     - Discussed with:  Patient      - Extended Intubation/Ventilatory Support Discussed: No.      - Patient is DNR/DNI Status: Yes             Suspend during perioperative period? Yes.             Agree to: chemical resuscitation.   Use of blood products discussed: No .     Postoperative Care    Pain management: IV analgesics, Oral pain medications, Multi-modal analgesia.   PONV prophylaxis: Ondansetron (or other 5HT-3), Dexamethasone or Solumedrol     Comments:    Other Comments:   No CPR or defib during case.           Sandro Brush MD

## 2024-02-20 NOTE — ANESTHESIA POSTPROCEDURE EVALUATION
Patient: Riddhi Aguilar    Procedure: Procedure(s):  Cystoscopy, left diagnostic ureteroscopy, left retrograde pyelogram, left ureteral stent removal       Anesthesia Type:  General    Note:  Disposition: Outpatient   Postop Pain Control: Uneventful            Sign Out: Well controlled pain   PONV: No   Neuro/Psych: Uneventful            Sign Out: Acceptable/Baseline neuro status   Airway/Respiratory: Uneventful            Sign Out: Acceptable/Baseline resp. status   CV/Hemodynamics: Uneventful            Sign Out: Acceptable CV status   Other NRE: NONE   DID A NON-ROUTINE EVENT OCCUR? No           Last vitals:  Vitals Value Taken Time   BP 95/66 02/20/24 1545   Temp     Pulse 79 02/20/24 1600   Resp 19 02/20/24 1557   SpO2 97 % 02/20/24 1601   Vitals shown include unfiled device data.    Electronically Signed By: Sandro Brush MD  February 20, 2024  4:15 PM

## 2024-02-20 NOTE — ANESTHESIA CARE TRANSFER NOTE
Patient: Riddhi Aguilar    Procedure: Procedure(s):  Cystoscopy, left diagnostic ureteroscopy, left retrograde pyelogram, left ureteral stent removal       Diagnosis: Left ureteral stone [N20.1]  Diagnosis Additional Information: No value filed.    Anesthesia Type:   General     Note:    Oropharynx: oropharynx clear of all foreign objects  Level of Consciousness: drowsy  Oxygen Supplementation: face mask  Level of Supplemental Oxygen (L/min / FiO2): 4  Independent Airway: airway patency satisfactory and stable  Dentition: dentition unchanged  Vital Signs Stable: post-procedure vital signs reviewed and stable  Report to RN Given: handoff report given  Patient transferred to: PACU    Handoff Report: Identifed the Patient, Identified the Reponsible Provider, Reviewed the pertinent medical history, Discussed the surgical course, Reviewed Intra-OP anesthesia mangement and issues during anesthesia, Set expectations for post-procedure period and Allowed opportunity for questions and acknowledgement of understanding    Vitals:  Vitals Value Taken Time   BP     Temp     Pulse 74 02/20/24 1525   Resp 12 02/20/24 1525   SpO2 98 % 02/20/24 1525   Vitals shown include unfiled device data.    Electronically Signed By: Dean Dennis Severson, APRN CRNA  February 20, 2024  3:26 PM

## 2024-03-05 ENCOUNTER — MYC MEDICAL ADVICE (OUTPATIENT)
Dept: FAMILY MEDICINE | Facility: CLINIC | Age: 89
End: 2024-03-05
Payer: COMMERCIAL

## 2024-04-02 ENCOUNTER — TELEPHONE (OUTPATIENT)
Dept: FAMILY MEDICINE | Facility: CLINIC | Age: 89
End: 2024-04-02
Payer: COMMERCIAL

## 2024-04-02 NOTE — TELEPHONE ENCOUNTER
FYI - Status Update    Who is Calling: nurse, [facility]    Update: Pt discharged from home care agency today.     Does caller want a call/response back: Izabella Potts,  [facility]

## 2024-05-06 ENCOUNTER — HOSPITAL ENCOUNTER (OUTPATIENT)
Dept: ULTRASOUND IMAGING | Facility: CLINIC | Age: 89
Discharge: HOME | End: 2024-05-06
Attending: STUDENT IN AN ORGANIZED HEALTH CARE EDUCATION/TRAINING PROGRAM | Admitting: STUDENT IN AN ORGANIZED HEALTH CARE EDUCATION/TRAINING PROGRAM
Payer: COMMERCIAL

## 2024-05-06 DIAGNOSIS — N20.1 LEFT URETERAL STONE: ICD-10-CM

## 2024-05-06 PROCEDURE — 76770 US EXAM ABDO BACK WALL COMP: CPT

## 2024-05-07 DIAGNOSIS — I10 ESSENTIAL HYPERTENSION, BENIGN: ICD-10-CM

## 2024-05-07 DIAGNOSIS — F33.0 MAJOR DEPRESSIVE DISORDER, RECURRENT EPISODE, MILD (H): ICD-10-CM

## 2024-05-07 RX ORDER — BUPROPION HYDROCHLORIDE 300 MG/1
TABLET ORAL
Qty: 90 TABLET | Refills: 3 | Status: SHIPPED | OUTPATIENT
Start: 2024-05-07

## 2024-05-07 RX ORDER — VERAPAMIL HYDROCHLORIDE 120 MG/1
TABLET, FILM COATED, EXTENDED RELEASE ORAL
Qty: 180 TABLET | Refills: 11 | Status: ON HOLD | OUTPATIENT
Start: 2024-05-07 | End: 2024-05-22

## 2024-05-09 ENCOUNTER — OFFICE VISIT (OUTPATIENT)
Dept: UROLOGY | Facility: CLINIC | Age: 89
End: 2024-05-09
Payer: COMMERCIAL

## 2024-05-09 VITALS
BODY MASS INDEX: 32.47 KG/M2 | HEIGHT: 61 IN | HEART RATE: 82 BPM | SYSTOLIC BLOOD PRESSURE: 125 MMHG | WEIGHT: 172 LBS | DIASTOLIC BLOOD PRESSURE: 69 MMHG

## 2024-05-09 DIAGNOSIS — N20.1 LEFT URETERAL STONE: Primary | ICD-10-CM

## 2024-05-09 PROCEDURE — 99212 OFFICE O/P EST SF 10 MIN: CPT | Performed by: STUDENT IN AN ORGANIZED HEALTH CARE EDUCATION/TRAINING PROGRAM

## 2024-05-09 ASSESSMENT — PAIN SCALES - GENERAL: PAINLEVEL: NO PAIN (0)

## 2024-05-09 NOTE — LETTER
"5/9/2024       RE: Riddhi Aguilar  [address]  [place] [address]     Dear Colleague,    Thank you for referring your patient, Riddhi Aguilar, to the [facility] [place] [facility]. Please see a copy of my visit note below.    CHIEF COMPLAINT   Riddhi Aguilar who is a 89 year old female returns today for follow-up of left hydronephrosis and left nephrolithiasis s/p stent 1/24/2024 followed by ureteroscopy 2/20/2024 without any obvious stones    HPI   Riddhi Aguilar is a 89 year old female returns today for follow-up of left hydronephrosis and left nephrolithiasis s/p stent 1/24/2024 followed by ureteroscopy 2/20/2024 without any obvious stones    Patient declined to do litholink since she has significant urinary incontinence into pads and was not able to get a good collection    Denies any recent flank pain    Going on a cruise in 2 weeks but then not planning on any big trips after that    PHYSICAL EXAM  Patient is a 89 year old  female   Vitals: Blood pressure 125/69, pulse 82, height 1.549 m (5' 1\"), weight 78 kg (172 lb), not currently breastfeeding.  Body mass index is 32.5 kg/m .  General Appearance Adult:   Alert, no acute distress, oriented  HENT: throat/mouth:normal, good dentition  Lungs: no respiratory distress, or pursed lip breathing  Heart: No obvious jugular venous distension present  Abdomen: nondistended  Musculoskeltal: extremities normal, no peripheral edema  Skin: no suspicious lesions or rashes  Neuro: Alert, oriented, speech and mentation normal  Psych: affect and mood normal    All pertinent imaging reviewed:    Renal ultrasound 5/6/2024       IMPRESSION:  1.  No hydronephrosis demonstrated.        ASSESSMENT and PLAN  89 year old female returns today for follow-up of left hydronephrosis and left nephrolithiasis s/p stent 1/24/2024 followed by ureteroscopy 2/20/2024 without any obvious " stones    Renal ultrasound with no hydro. Known tiny forming stones in kidney but no significant stone burden on ureteroscopy. Discussed annual surveillance imaging vs watchful waiting. She wishes to continue with watchful waiting. Discussed stone prevention diet in general (no stone analysis to go off of, nor litholink). Cautioned that she may continue to pass small stones but if she develops uncontrolled pain, uncontrolled nausea or vomiting or especially fever or chills needs to be evaluated urgently      Richie Jaquez MD   [facility]  [facility] Phone: [phone]

## 2024-05-09 NOTE — PROGRESS NOTES
"CHIEF COMPLAINT   Riddhi Aguilar who is a 89 year old female returns today for follow-up of left hydronephrosis and left nephrolithiasis s/p stent 1/24/2024 followed by ureteroscopy 2/20/2024 without any obvious stones    HPI   Riddhi Aguilar is a 89 year old female returns today for follow-up of left hydronephrosis and left nephrolithiasis s/p stent 1/24/2024 followed by ureteroscopy 2/20/2024 without any obvious stones    Patient declined to do litholink since she has significant urinary incontinence into pads and was not able to get a good collection    Denies any recent flank pain    Going on a cruise in 2 weeks but then not planning on any big trips after that    PHYSICAL EXAM  Patient is a 89 year old  female   Vitals: Blood pressure 125/69, pulse 82, height 1.549 m (5' 1\"), weight 78 kg (172 lb), not currently breastfeeding.  Body mass index is 32.5 kg/m .  General Appearance Adult:   Alert, no acute distress, oriented  HENT: throat/mouth:normal, good dentition  Lungs: no respiratory distress, or pursed lip breathing  Heart: No obvious jugular venous distension present  Abdomen: nondistended  Musculoskeltal: extremities normal, no peripheral edema  Skin: no suspicious lesions or rashes  Neuro: Alert, oriented, speech and mentation normal  Psych: affect and mood normal    All pertinent imaging reviewed:    Renal ultrasound 5/6/2024       IMPRESSION:  1.  No hydronephrosis demonstrated.        ASSESSMENT and PLAN  89 year old female returns today for follow-up of left hydronephrosis and left nephrolithiasis s/p stent 1/24/2024 followed by ureteroscopy 2/20/2024 without any obvious stones    Renal ultrasound with no hydro. Known tiny forming stones in kidney but no significant stone burden on ureteroscopy. Discussed annual surveillance imaging vs watchful waiting. She wishes to continue with watchful waiting. Discussed stone prevention diet in general (no stone analysis to go off of, nor litholink). Cautioned " that she may continue to pass small stones but if she develops uncontrolled pain, uncontrolled nausea or vomiting or especially fever or chills needs to be evaluated urgently      Richie Jaquez MD   [facility]  [facility] Phone: [phone]

## 2024-05-09 NOTE — NURSING NOTE
Chief Complaint   Patient presents with    Kidney Stone(s) Followup     Review renal US      Pt states she is doing well, no symptoms today      Gill Easley, CMA

## 2024-05-14 ENCOUNTER — OFFICE VISIT (OUTPATIENT)
Dept: FAMILY MEDICINE | Facility: CLINIC | Age: 89
End: 2024-05-14
Attending: INTERNAL MEDICINE
Payer: COMMERCIAL

## 2024-05-14 VITALS
WEIGHT: 177.9 LBS | DIASTOLIC BLOOD PRESSURE: 77 MMHG | HEIGHT: 60 IN | RESPIRATION RATE: 16 BRPM | SYSTOLIC BLOOD PRESSURE: 134 MMHG | BODY MASS INDEX: 34.92 KG/M2 | TEMPERATURE: 97.8 F | HEART RATE: 78 BPM | OXYGEN SATURATION: 98 %

## 2024-05-14 DIAGNOSIS — I87.2 VENOUS (PERIPHERAL) INSUFFICIENCY: ICD-10-CM

## 2024-05-14 DIAGNOSIS — R53.83 OTHER FATIGUE: ICD-10-CM

## 2024-05-14 DIAGNOSIS — Z00.00 ENCOUNTER FOR MEDICARE ANNUAL WELLNESS EXAM: Primary | ICD-10-CM

## 2024-05-14 DIAGNOSIS — F33.0 MAJOR DEPRESSIVE DISORDER, RECURRENT EPISODE, MILD (H): ICD-10-CM

## 2024-05-14 DIAGNOSIS — E78.5 HYPERLIPIDEMIA LDL GOAL <130: ICD-10-CM

## 2024-05-14 DIAGNOSIS — D64.9 ANEMIA, UNSPECIFIED TYPE: ICD-10-CM

## 2024-05-14 DIAGNOSIS — I10 ESSENTIAL HYPERTENSION, BENIGN: ICD-10-CM

## 2024-05-14 PROBLEM — N20.1 LEFT URETERAL STONE: Status: RESOLVED | Noted: 2024-01-23 | Resolved: 2024-05-14

## 2024-05-14 LAB
CREAT SERPL-MCNC: 0.96 MG/DL (ref 0.51–0.95)
EGFRCR SERPLBLD CKD-EPI 2021: 55 ML/MIN/1.73M2
HBA1C MFR BLD: 5.3 % (ref 0–5.6)
HGB BLD-MCNC: 10 G/DL (ref 11.7–15.7)
HOLD SPECIMEN: NORMAL
LDLC SERPL DIRECT ASSAY-MCNC: 67 MG/DL
POTASSIUM SERPL-SCNC: 4.1 MMOL/L (ref 3.4–5.3)
SODIUM SERPL-SCNC: 137 MMOL/L (ref 135–145)
T4 FREE SERPL-MCNC: 1.09 NG/DL (ref 0.9–1.7)
TSH SERPL DL<=0.005 MIU/L-ACNC: 5.28 UIU/ML (ref 0.3–4.2)
VIT B12 SERPL-MCNC: 1185 PG/ML (ref 232–1245)

## 2024-05-14 PROCEDURE — 83721 ASSAY OF BLOOD LIPOPROTEIN: CPT | Performed by: INTERNAL MEDICINE

## 2024-05-14 PROCEDURE — 84295 ASSAY OF SERUM SODIUM: CPT | Performed by: INTERNAL MEDICINE

## 2024-05-14 PROCEDURE — G0439 PPPS, SUBSEQ VISIT: HCPCS | Performed by: INTERNAL MEDICINE

## 2024-05-14 PROCEDURE — 84443 ASSAY THYROID STIM HORMONE: CPT | Performed by: INTERNAL MEDICINE

## 2024-05-14 PROCEDURE — 91320 SARSCV2 VAC 30MCG TRS-SUC IM: CPT | Performed by: INTERNAL MEDICINE

## 2024-05-14 PROCEDURE — 90480 ADMN SARSCOV2 VAC 1/ONLY CMP: CPT | Performed by: INTERNAL MEDICINE

## 2024-05-14 PROCEDURE — 84439 ASSAY OF FREE THYROXINE: CPT | Performed by: INTERNAL MEDICINE

## 2024-05-14 PROCEDURE — 84132 ASSAY OF SERUM POTASSIUM: CPT | Performed by: INTERNAL MEDICINE

## 2024-05-14 PROCEDURE — 83036 HEMOGLOBIN GLYCOSYLATED A1C: CPT | Performed by: INTERNAL MEDICINE

## 2024-05-14 PROCEDURE — 82607 VITAMIN B-12: CPT | Performed by: INTERNAL MEDICINE

## 2024-05-14 PROCEDURE — 82565 ASSAY OF CREATININE: CPT | Performed by: INTERNAL MEDICINE

## 2024-05-14 PROCEDURE — 85018 HEMOGLOBIN: CPT | Performed by: INTERNAL MEDICINE

## 2024-05-14 PROCEDURE — 36415 COLL VENOUS BLD VENIPUNCTURE: CPT | Performed by: INTERNAL MEDICINE

## 2024-05-14 SDOH — HEALTH STABILITY: PHYSICAL HEALTH: ON AVERAGE, HOW MANY DAYS PER WEEK DO YOU ENGAGE IN MODERATE TO STRENUOUS EXERCISE (LIKE A BRISK WALK)?: 4 DAYS

## 2024-05-14 ASSESSMENT — PAIN SCALES - GENERAL: PAINLEVEL: NO PAIN (0)

## 2024-05-14 ASSESSMENT — PATIENT HEALTH QUESTIONNAIRE - PHQ9
10. IF YOU CHECKED OFF ANY PROBLEMS, HOW DIFFICULT HAVE THESE PROBLEMS MADE IT FOR YOU TO DO YOUR WORK, TAKE CARE OF THINGS AT HOME, OR GET ALONG WITH OTHER PEOPLE: SOMEWHAT DIFFICULT
SUM OF ALL RESPONSES TO PHQ QUESTIONS 1-9: 2
SUM OF ALL RESPONSES TO PHQ QUESTIONS 1-9: 2

## 2024-05-14 ASSESSMENT — SOCIAL DETERMINANTS OF HEALTH (SDOH): HOW OFTEN DO YOU GET TOGETHER WITH FRIENDS OR RELATIVES?: ONCE A WEEK

## 2024-05-14 NOTE — RESULT ENCOUNTER NOTE
The following letter pertains to your most recent diagnostic tests:    The hemoglobin is improved from what was measured previously and is about at your baseline level.  The thyroid blood tests are normal for someone in your age group.  The kidney test is normal.  The electrolytes sodium and potassium are normal.  Vitamin B12 level is adequate.  The cholesterol is well-controlled.  The blood sugar test (hemoglobin A1c) does not suggest diabetes or prediabetes.    Overall, the blood test results look stable and OK for you.  There are no obvious culprits to explain your fatigue.    Sincerely,    Dr. Prado

## 2024-05-14 NOTE — PROGRESS NOTES
Preventive Care Visit  [facility]  Shun Prado MD, Internal Medicine  May 14, 2024      Assessment & Plan     Encounter for Medicare annual wellness exam    - Creatinine; Future  - Hemoglobin A1c; Future  - Potassium; Future  - Sodium; Future  - Creatinine  - Hemoglobin A1c  - Potassium  - Sodium    Essential hypertension, benign  OK    Major depressive disorder, recurrent episode, mild (H24)  Off sertraline  Continue buproprion     Anemia, unspecified type  Recheck given fatigue  Work up again if worsening anemia       Other fatigue  Check for reversible causes   - TSH with free T4 reflex; Future  - Vitamin B12; Future  - TSH with free T4 reflex  - Vitamin B12    Hyperlipidemia LDL goal <130  On statin therapy   - LDL cholesterol direct; Future  - LDL cholesterol direct    Venous (peripheral) insufficiency  Try compression hosiery and leg elevation   If this does not address symptoms adequately, consider diuretic   - Compression Sleeve/Stocking Order for DME - ONLY FOR DME        No LOS data to display   Time spent by me doing chart review, history and exam, documentation and further activities per the note      BMI  Estimated body mass index is 34.74 kg/m  as calculated from the following:    Height as of this encounter: 1.524 m (5').    Weight as of this encounter: 80.7 kg (177 lb 14.4 oz).   Weight management plan: Discussed healthy diet and exercise guidelines    Counseling  Appropriate preventive services were discussed with this patient, including applicable screening as appropriate for fall prevention, nutrition, physical activity, Tobacco-use cessation, weight loss and cognition.  Checklist reviewing preventive services available has been given to the patient.  Reviewed patient's diet, addressing concerns and/or questions.   Discussed possible causes of fatigue. Updated plan of care.  Patient reported difficulty with activities of daily living were addressed today.Information on  urinary incontinence and treatment options given to patient.       FUTURE APPOINTMENTS:       - Follow-up for annual visit or as needed    Subjective   Riddhi is a 89 year old, presenting for the following:  Physical          Health Care Directive  Patient does not have a Health Care Directive or Living Will: Patient states has Advance Directive and will bring in a copy to clinic.    HPI      Follow up blood pressure  Depression   Chronic anemia  Hyperlipidemia     Leg swelling since kidney stone several months ago  Swelling improves overnight, but improves as day progresses  No orthopnea, PND, REDDY     Complains of generalized fatigue  Did not feel that sertraline helped, so she tapered off that drug  Remains on wellbutrin        5/14/2024   General Health   How would you rate your overall physical health? Good   Feel stress (tense, anxious, or unable to sleep) Not at all         5/14/2024   Nutrition   Diet: Regular (no restrictions)         5/14/2024   Exercise   Days per week of moderate/strenous exercise 4 days         5/14/2024   Social Factors   Frequency of gathering with friends or relatives Once a week   Worry food won't last until get money to buy more No   Food not last or not have enough money for food? No   Do you have housing?  Yes   Are you worried about losing your housing? No   Lack of transportation? No   Unable to get utilities (heat,electricity)? No         5/14/2024   Fall Risk   Fallen 2 or more times in the past year? No   Trouble with walking or balance? Yes           5/14/2024   Activities of Daily Living- Home Safety   Needs help with the following daily activites Shopping    Housework   Safety concerns in the home None of the above         5/14/2024   Dental   Dentist two times every year? Yes         5/14/2024   Hearing Screening   Hearing concerns? None of the above         5/14/2024   Driving Risk Screening   Patient/family members have concerns about driving No         5/14/2024    General Alertness/Fatigue Screening   Have you been more tired than usual lately? (!) YES         5/14/2024   Urinary Incontinence Screening   Bothered by leaking urine in past 6 months Yes          Today's PHQ-9 Score:       5/14/2024     9:19 AM   PHQ-9 SCORE   PHQ-9 Total Score MyChart 2 (Minimal depression)   PHQ-9 Total Score 2         5/14/2024   Substance Use   Alcohol more than 3/day or more than 7/wk No   Do you have a current opioid prescription? No   How severe/bad is pain from 1 to 10? 5/10   Do you use any other substances recreationally? No     Social History     Tobacco Use    Smoking status: Never    Smokeless tobacco: Never   Substance Use Topics    Alcohol use: No     Alcohol/week: 0.0 standard drinks of alcohol    Drug use: No                    Reviewed and updated as needed this visit by Provider                    Past Medical History:   Diagnosis Date    Anemia     baseline 10-11    Arthritis     Bleeding ulcer     duodenal ulcer    CKD (chronic kidney disease) stage 3, GFR 30-59 ml/min (H)     Essential hypertension, benign     Osteoporosis     Pure hypercholesterolemia     Spinal stenosis     Unspecified cataract     Unspecified glaucoma(365.9)      Past Surgical History:   Procedure Laterality Date    APPENDECTOMY      BREAST SURGERY      lumpectomy    COMBINED CYSTOSCOPY, RETROGRADES, EXCHANGE STENT URETER(S) Left 01/24/2024    Procedure: Cystoscopy, left retrograde pyelogram, left JJ stent placement, <1hr physician fluoroscopy time;  Surgeon: Aureliano Brandt MD;  Location: [facility]    COMBINED CYSTOSCOPY, RETROGRADES, URETEROSCOPY, LASER HOLMIUM LITHOTRIPSY URETER(S), INSERT STENT Left 02/20/2024    Procedure: Cystoscopy, left diagnostic ureteroscopy, left retrograde pyelogram, left ureteral stent removal, fluoroscopic interpretation <1 hour physician time;  Surgeon: Richie Jaquez MD;  Location: [facility] OR    CYSTOSCOPY      CYSTOSCOPY, REMOVE STENT(S), COMBINED Left 02/20/2024     Procedure: Cystoscopy, remove stent(s), combined;  Surgeon: Richie Jaquez MD;  Location:  OR    DILATION AND CURETTAGE, HYSTEROSCOPY DIAGNOSTIC, COMBINED  02/06/2014    Procedure: COMBINED DILATION AND CURETTAGE, HYSTEROSCOPY DIAGNOSTIC;  DILATION AND CURETTAGE, HYSTEROSCOPY, POLYPECTOMY, INCISION AND DRAINAGE OF SEBACEOUS CYST ;  Surgeon: Serena Zamora MD;  Location: [facility]    EXCISE LESION LOWER EXTREMITY Right 08/29/2017    Procedure: EXCISE LESION LOWER EXTREMITY;  WIDE EXCISIONAL BIOPSY OF RIGHT ANKLE BASAL CELL CARCINOMA;  Surgeon: Juan Luis Flores MD;  Location:  OR    INCISION AND DRAINAGE PERINEAL, COMBINED  02/06/2014    Procedure: COMBINED INCISION AND DRAINAGE PERINEAL;;  Surgeon: Serena Zamora MD;  Location: [facility]    ZZC NONSPECIFIC PROCEDURE      Repair of buckled retina of rt eye    ZZC NONSPECIFIC PROCEDURE      Appy     BP Readings from Last 3 Encounters:   05/14/24 134/77   05/09/24 125/69   02/20/24 104/70    Wt Readings from Last 3 Encounters:   05/14/24 80.7 kg (177 lb 14.4 oz)   05/09/24 78 kg (172 lb)   02/20/24 85.1 kg (187 lb 9.6 oz)                  Patient Active Problem List   Diagnosis    Essential hypertension, benign    Family Hist of Malignant Neoplasm-- breast cancer    Borderline glaucoma with ocular hypertension, unspecified laterality    Duodenal ulcer with hemorrhage    Advanced directives, counseling/discussion    Major depressive disorder, recurrent episode, mild (H24)    Personal history of malignant neoplasm of breast    Chronic left shoulder pain     Past Surgical History:   Procedure Laterality Date    APPENDECTOMY      BREAST SURGERY      lumpectomy    COMBINED CYSTOSCOPY, RETROGRADES, EXCHANGE STENT URETER(S) Left 01/24/2024    Procedure: Cystoscopy, left retrograde pyelogram, left JJ stent placement, <1hr physician fluoroscopy time;  Surgeon: Aureliano Brandt MD;  Location:  OR    COMBINED CYSTOSCOPY, RETROGRADES, URETEROSCOPY, LASER HOLMIUM  LITHOTRIPSY URETER(S), INSERT STENT Left 02/20/2024    Procedure: Cystoscopy, left diagnostic ureteroscopy, left retrograde pyelogram, left ureteral stent removal, fluoroscopic interpretation <1 hour physician time;  Surgeon: Richie Jaquez MD;  Location: [facility]    CYSTOSCOPY      CYSTOSCOPY, REMOVE STENT(S), COMBINED Left 02/20/2024    Procedure: Cystoscopy, remove stent(s), combined;  Surgeon: Richie Jaquez MD;  Location: [facility]    DILATION AND CURETTAGE, HYSTEROSCOPY DIAGNOSTIC, COMBINED  02/06/2014    Procedure: COMBINED DILATION AND CURETTAGE, HYSTEROSCOPY DIAGNOSTIC;  DILATION AND CURETTAGE, HYSTEROSCOPY, POLYPECTOMY, INCISION AND DRAINAGE OF SEBACEOUS CYST ;  Surgeon: Serena Zamora MD;  Location:  [facility]    EXCISE LESION LOWER EXTREMITY Right 08/29/2017    Procedure: EXCISE LESION LOWER EXTREMITY;  WIDE EXCISIONAL BIOPSY OF RIGHT ANKLE BASAL CELL CARCINOMA;  Surgeon: Juan Luis Flores MD;  Location:  OR    INCISION AND DRAINAGE PERINEAL, COMBINED  02/06/2014    Procedure: COMBINED INCISION AND DRAINAGE PERINEAL;;  Surgeon: Serena Zamora MD;  [facility]    ZZC NONSPECIFIC PROCEDURE      Repair of buckled retina of rt eye    Z NONSPECIFIC PROCEDURE      Appy       Social History     Tobacco Use    Smoking status: Never    Smokeless tobacco: Never   Substance Use Topics    Alcohol use: No     Alcohol/week: 0.0 standard drinks of alcohol     Family History   Problem Relation Age of Onset    Breast Cancer Sister     Breast Cancer Sister     Cancer Brother         bone cancer in arm         Current Outpatient Medications   Medication Sig Dispense Refill    acetaminophen (TYLENOL) 325 MG tablet Take 2 tablets (650 mg) by mouth every 4 hours as needed for mild pain or other (and adjunct with moderate or severe pain or per patient request)      atorvastatin (LIPITOR) 10 MG tablet Take 1 tablet (10 mg) by mouth daily 90 tablet 3    buPROPion (WELLBUTRIN XL) 300 MG 24 hr tablet TAKE 1 TABLET(300  MG) BY MOUTH EVERY MORNING 90 tablet 3    dorzolamide-timolol (COSOPT) 22.3-6.8 MG/ML ophthalmic solution Place 1 drop into both eyes 2 times daily      lisinopril (ZESTRIL) 40 MG tablet Take 1 tablet (40 mg) by mouth daily Hold for SBP <120      multivitamin w/minerals (THERA-VIT-M) tablet Take 1 tablet by mouth daily      sertraline (ZOLOFT) 25 MG tablet Take 25 mg by mouth daily      spironolactone (ALDACTONE) 25 MG tablet Take 1 tablet (25 mg) by mouth daily Hold for SBP <125 or DBP <55 90 tablet 3    verapamil ER (CALAN-SR) 120 MG CR tablet TAKE 1 TABLET BY MOUTH EVERY 12 HOURS 180 tablet 11    VITAMIN D PO Take 1 tablet by mouth daily Doesn't know strength       No Known Allergies  Current providers sharing in care for this patient include:  Patient Care Team:  Shun Prado MD as PCP - General (Internal Medicine)  Shun Prado MD as Assigned PCP    The following health maintenance items are reviewed in Epic and correct as of today:  Health Maintenance   Topic Date Due    COVID-19 Vaccine (8 - 2023-24 season) 02/27/2024    LIPID  05/09/2024    ANNUAL REVIEW OF HM ORDERS  05/09/2024    MEDICARE ANNUAL WELLNESS VISIT  05/09/2024    PHQ-9  11/14/2024    FALL RISK ASSESSMENT  05/14/2025    ADVANCE CARE PLANNING  05/09/2028    DTAP/TDAP/TD IMMUNIZATION (3 - Td or Tdap) 03/06/2034    DEPRESSION ACTION PLAN  Completed    INFLUENZA VACCINE  Completed    Pneumococcal Vaccine: 65+ Years  Completed    ZOSTER IMMUNIZATION  Completed    RSV VACCINE (Pregnancy & 60+)  Completed    IPV IMMUNIZATION  Aged Out    HPV IMMUNIZATION  Aged Out    MENINGITIS IMMUNIZATION  Aged Out    RSV MONOCLONAL ANTIBODY  Aged Out    MAMMO SCREENING  Discontinued            Objective    Exam  /77 (BP Location: Right arm, Patient Position: Sitting, Cuff Size: Adult Large)   Pulse 78   Temp 97.8  F (36.6  C) (Tympanic)   Resp 16   Ht 1.524 m (5')   Wt 80.7 kg (177 lb 14.4 oz)   SpO2 98%   BMI 34.74 kg/m     Estimated body mass  index is 34.74 kg/m  as calculated from the following:    Height as of this encounter: 1.524 m (5').    Weight as of this encounter: 80.7 kg (177 lb 14.4 oz).    Physical Exam  GENERAL: alert and no distress  EYES: Eyes grossly normal to inspection, PERRL and conjunctivae and sclerae normal  HENT: ear canals and TM's normal, nose and mouth without ulcers or lesions  NECK: no adenopathy, no asymmetry, masses, or scars  RESP: lungs clear to auscultation - no rales, rhonchi or wheezes  CV: Heart with regular rate and rhythm.   ABDOMEN: soft, nontender, no hepatosplenomegaly, no masses and bowel sounds normal  MS: trace bilateral lower extremity edema with chronic venous stasis skin changes   SKIN: no suspicious lesions or rashes  NEURO: Normal strength and tone, mentation intact and speech normal  PSYCH: mentation appears normal, affect normal/bright         No data to display                       Signed Electronically by: Shun Prado MD

## 2024-05-14 NOTE — PATIENT INSTRUCTIONS
"Preventive Care Advice   This is general advice we often give to help people stay healthy. Your care team may have specific advice just for you. Please talk to your care team about your own preventive care needs.  Lifestyle  Exercise at least 150 minutes each week (30 minutes a day, 5 days a week).  Do muscle strengthening activities 2 days a week. These help control your weight and prevent disease.  No smoking.  Wear sunscreen to prevent skin cancer.  Have your home tested for radon every 2 to 5 years. Radon is a colorless, odorless gas that can harm your lungs. To learn more, go to www.health.[facility].mn. and search for \"Radon in Homes.\"  Keep guns unloaded and locked up in a safe place like a safe or gun vault, or, use a gun lock and hide the keys. Always lock away bullets separately. To learn more, visit [employer].mn.gov and search for \"safe gun storage.\"  Nutrition  Eat 5 or more servings of fruits and vegetables each day.  Try wheat bread, brown rice and whole grain pasta (instead of white bread, rice, and pasta).  Get enough calcium and vitamin D. Check the label on foods and aim for 100% of the RDA (recommended daily allowance).  Regular exams  Have a dental exam and cleaning every 6 months.  See your health care team every year to talk about:  Any changes in your health.  Any medicines your care team has prescribed.  Preventive care, family planning, and ways to prevent chronic diseases.  Shots (vaccines)   HPV shots (up to age 26), if you've never had them before.  Hepatitis B shots (up to age 59), if you've never had them before.  COVID-19 shot: Get this shot when it's due.  Flu shot: Get a flu shot every year.  Tetanus shot: Get a tetanus shot every 10 years.  Pneumococcal, hepatitis A, and RSV shots: Ask your care team if you need these based on your risk.  Shingles shot (for age 50 and up).  General health tests  Diabetes screening:  Starting at age 35, Get screened for diabetes at least every 3 years.  If " you are younger than age 35, ask your care team if you should be screened for diabetes.  Cholesterol test: At age 39, start having a cholesterol test every 5 years, or more often if advised.  Bone density scan (DEXA): At age 50, ask your care team if you should have this scan for osteoporosis (brittle bones).  Hepatitis C: Get tested at least once in your life.  Abdominal aortic aneurysm screening: Talk to your doctor about having this screening if you:  Have ever smoked; and  Are biologically male; and  Are between the ages of 65 and 75.  STIs (sexually transmitted infections)  Before age 24: Ask your care team if you should be screened for STIs.  After age 24: Get screened for STIs if you're at risk. You are at risk for STIs (including HIV) if:  You are sexually active with more than one person.  You don't use condoms every time.  You or a partner was diagnosed with a sexually transmitted infection.  If you are at risk for HIV, ask about PrEP medicine to prevent HIV.  Get tested for HIV at least once in your life, whether you are at risk for HIV or not.  Cancer screening tests  Cervical cancer screening: If you have a cervix, begin getting regular cervical cancer screening tests at age 21. Most people who have regular screenings with normal results can stop after age 65. Talk about this with your provider.  Breast cancer scan (mammogram): If you've ever had breasts, begin having regular mammograms starting at age 40. This is a scan to check for breast cancer.  Colon cancer screening: It is important to start screening for colon cancer at age 45.  Have a colonoscopy test every 10 years (or more often if you're at risk) Or, ask your provider about stool tests like a FIT test every year or Cologuard test every 3 years.  To learn more about your testing options, visit: www.Sqrl/452208.pdf.  For help making a decision, visit: roscoe/id56948.  Prostate cancer screening test: If you have a prostate and are age 55  to 69, ask your provider if you would benefit from a yearly prostate cancer screening test.  Lung cancer screening: If you are a current or former smoker age 50 to 80, ask your care team if ongoing lung cancer screenings are right for you.  For informational purposes only. Not to replace the advice of your health care provider. Copyright   2023 [facility]. All rights reserved. Clinically reviewed by the [facility] Transitions Program. Xceive [id number] - REV 04/24.    Learning About Activities of Daily Living  What are activities of daily living?     Activities of daily living (ADLs) are the basic self-care tasks you do every day. These include eating, bathing, dressing, and moving around.  As you age, and if you have health problems, you may find that it's harder to do some of these tasks. If so, your doctor can suggest ideas that may help.  To measure what kind of help you may need, your doctor will ask how well you are able to do ADLs. Let your doctor know if there are any tasks that you are having trouble doing. This is an important first step to getting help. And when you have the help you need, you can stay as independent as possible.  How will a doctor assess your ADLs?  Asking about ADLs is part of a routine health checkup your doctor will likely do as you age. Your health check might be done in a doctor's office, in your home, or at a hospital. The goal is to find out if you are having any problems that could make it hard to care for yourself or that make it unsafe for you to be on your own.  To measure your ADLs, your doctor will ask how hard it is for you to do routine tasks. Your doctor may also want to know if you have changed the way you do a task because of a health problem. Your doctor may watch how you:  Walk back and forth.  Keep your balance while you stand or walk.  Move from sitting to standing or from a bed to a chair.  Button or unbutton a shirt or sweater.  Remove and put  on your shoes.  It's common to feel a little worried or anxious if you find you can't do all the things you used to be able to do. Talking with your doctor about ADLs is a way to make sure you're as safe as possible and able to care for yourself as well as you can. You may want to bring a caregiver, friend, or family member to your checkup. They can help you talk to your doctor.  Follow-up care is a key part of your treatment and safety. Be sure to make and go to all appointments, and call your doctor if you are having problems. It's also a good idea to know your test results and keep a list of the medicines you take.  Current as of: October 24, 2023               Content Version: 14.0    7919-4307 [employer].   Care instructions adapted under license by your healthcare professional. If you have questions about a medical condition or this instruction, always ask your healthcare professional. [employer] disclaims any warranty or liability for your use of this information.      Preventing Falls: Care Instructions  Injuries and health problems such as trouble walking or poor eyesight can increase your risk of falling. So can some medicines. But there are things you can do to help prevent falls. You can exercise to get stronger. You can also arrange your home to make it safer.    Talk to your doctor about the medicines you take. Ask if any of them increase the risk of falls and whether they can be changed or stopped.   Try to exercise regularly. It can help improve your strength and balance. This can help lower your risk of falling.     Practice fall safety and prevention.    Wear low-heeled shoes that fit well and give your feet good support. Talk to your doctor if you have foot problems that make this hard.  Carry a cellphone or wear a medical alert device that you can use to call for help.  Use stepladders instead of chairs to reach high objects. Don't climb if you're at risk for falls.  "Ask for help, if needed.  Wear the correct eyeglasses, if you need them.    Make your home safer.    Remove rugs, cords, clutter, and furniture from walkways.  Keep your house well lit. Use night-lights in hallways and bathrooms.  Install and use sturdy handrails on stairways.  Wear nonskid footwear, even inside. Don't walk barefoot or in socks without shoes.    Be safe outside.    Use handrails, curb cuts, and ramps whenever possible.  Keep your hands free by using a shoulder bag or backpack.  Try to walk in well-lit areas. Watch out for uneven ground, changes in pavement, and debris.  Be careful in the winter. Walk on the grass or gravel when sidewalks are slippery. Use de-icer on steps and walkways. Add non-slip devices to shoes.    Put grab bars and nonskid mats in your shower or tub and near the toilet. Try to use a shower chair or bath bench when bathing.   Get into a tub or shower by putting in your weaker leg first. Get out with your strong side first. Have a phone or medical alert device in the bathroom with you.   Where can you learn more?  Go to https://www.[employer]net/patiented  Enter G117 in the search box to learn more about \"Preventing Falls: Care Instructions.\"  Current as of: July 17, 2023               Content Version: 14.0    0901-2698 [employer].   Care instructions adapted under license by your healthcare professional. If you have questions about a medical condition or this instruction, always ask your healthcare professional. [employer] disclaims any warranty or liability for your use of this information.      Learning About Sleeping Well  What does sleeping well mean?     Sleeping well means getting enough sleep to feel good and stay healthy. How much sleep is enough varies among people.  The number of hours you sleep and how you feel when you wake up are both important. If you do not feel refreshed, you probably need more sleep. Another sign of not getting " "enough sleep is feeling tired during the day.  Experts recommend that adults get at least 7 or more hours of sleep per day. Children and older adults need more sleep.  Why is getting enough sleep important?  Getting enough quality sleep is a basic part of good health. When your sleep suffers, your physical health, mood, and your thoughts can suffer too. You may find yourself feeling more grumpy or stressed. Not getting enough sleep also can lead to serious problems, including injury, accidents, anxiety, and depression.  What might cause poor sleeping?  Many things can cause sleep problems, including:  Changes to your sleep schedule.  Stress. Stress can be caused by fear about a single event, such as giving a speech. Or you may have ongoing stress, such as worry about work or school.  Depression, anxiety, and other mental or emotional conditions.  Changes in your sleep habits or surroundings. This includes changes that happen where you sleep, such as noise, light, or sleeping in a different bed. It also includes changes in your sleep pattern, such as having jet lag or working a late shift.  Health problems, such as pain, breathing problems, and restless legs syndrome.  Lack of regular exercise.  Using alcohol, nicotine, or caffeine before bed.  How can you help yourself?  Here are some tips that may help you sleep more soundly and wake up feeling more refreshed.  Your sleeping area   Use your bedroom only for sleeping and sex. A bit of light reading may help you fall asleep. But if it doesn't, do your reading elsewhere in the house. Try not to use your TV, computer, smartphone, or tablet while you are in bed.  Be sure your bed is big enough to stretch out comfortably, especially if you have a sleep partner.  Keep your bedroom quiet, dark, and cool. Use curtains, blinds, or a sleep mask to block out light. To block out noise, use earplugs, soothing music, or a \"white noise\" machine.  Your evening and bedtime routine " "  Create a relaxing bedtime routine. You might want to take a warm shower or bath, or listen to soothing music.  Go to bed at the same time every night. And get up at the same time every morning, even if you feel tired.  What to avoid   Limit caffeine (coffee, tea, caffeinated sodas) during the day, and don't have any for at least 6 hours before bedtime.  Avoid drinking alcohol before bedtime. Alcohol can cause you to wake up more often during the night.  Try not to smoke or use tobacco, especially in the evening. Nicotine can keep you awake.  Limit naps during the day, especially close to bedtime.  Avoid lying in bed awake for too long. If you can't fall asleep or if you wake up in the middle of the night and can't get back to sleep within about 20 minutes, get out of bed and go to another room until you feel sleepy.  Avoid taking medicine right before bed that may keep you awake or make you feel hyper or energized. Your doctor can tell you if your medicine may do this and if you can take it earlier in the day.  If you can't sleep   Imagine yourself in a peaceful, pleasant scene. Focus on the details and feelings of being in a place that is relaxing.  Get up and do a quiet or boring activity until you feel sleepy.  Avoid drinking any liquids before going to bed to help prevent waking up often to use the bathroom.  Where can you learn more?  Go to https://www.[employer].net/patiented  Enter J942 in the search box to learn more about \"Learning About Sleeping Well.\"  Current as of: July 10, 2023               Content Version: 14.0    3170-6223 [employer].   Care instructions adapted under license by your healthcare professional. If you have questions about a medical condition or this instruction, always ask your healthcare professional. [employer] disclaims any warranty or liability for your use of this information.      Bladder Training: Care Instructions  Your Care Instructions   "   Bladder training is used to treat urge incontinence and stress incontinence. Urge incontinence means that the need to urinate comes on so fast that you can't get to a toilet in time. Stress incontinence means that you leak urine because of pressure on your bladder. For example, it may happen when you laugh, cough, or lift something heavy.  Bladder training can increase how long you can wait before you have to urinate. It can also help your bladder hold more urine. And it can give you better control over the urge to urinate.  It is important to remember that bladder training takes a few weeks to a few months to make a difference. You may not see results right away, but don't give up.  Follow-up care is a key part of your treatment and safety. Be sure to make and go to all appointments, and call your doctor if you are having problems. It's also a good idea to know your test results and keep a list of the medicines you take.  How can you care for yourself at home?  Work with your doctor to come up with a bladder training program that is right for you. You may use one or more of the following methods.  Delayed urination  In the beginning, try to keep from urinating for 5 minutes after you first feel the need to go.  While you wait, take deep, slow breaths to relax. Kegel exercises can also help you delay the need to go to the bathroom.  After some practice, when you can easily wait 5 minutes to urinate, try to wait 10 minutes before you urinate.  Slowly increase the waiting period until you are able to control when you have to urinate.  Scheduled urination  Empty your bladder when you first wake up in the morning.  Schedule times throughout the day when you will urinate.  Start by going to the bathroom every hour, even if you don't need to go.  Slowly increase the time between trips to the bathroom.  When you have found a schedule that works well for you, keep doing it.  If you wake up during the night and have to  "urinate, do it. Apply your schedule to waking hours only.  Kegel exercises  These tighten and strengthen pelvic muscles, which can help you control the flow of urine. (If doing these exercises causes pain, stop doing them and talk with your doctor.) To do Kegel exercises:  Squeeze your muscles as if you were trying not to pass gas. Or squeeze your muscles as if you were stopping the flow of urine. Your belly, legs, and buttocks shouldn't move.  Hold the squeeze for 3 seconds, then relax for 5 to 10 seconds.  Start with 3 seconds, then add 1 second each week until you are able to squeeze for 10 seconds.  Repeat the exercise 10 times a session. Do 3 to 8 sessions a day.  When should you call for help?  Watch closely for changes in your health, and be sure to contact your doctor if:    Your incontinence is getting worse.     You do not get better as expected.   Where can you learn more?  Go to https://www.[employer].net/patiented  Enter V684 in the search box to learn more about \"Bladder Training: Care Instructions.\"  Current as of: November 15, 2023               Content Version: 14.0    4469-0196 [employer].   Care instructions adapted under license by your healthcare professional. If you have questions about a medical condition or this instruction, always ask your healthcare professional. [employer] disclaims any warranty or liability for your use of this information.      "

## 2024-05-16 DIAGNOSIS — E78.5 HYPERLIPIDEMIA LDL GOAL <130: ICD-10-CM

## 2024-05-16 DIAGNOSIS — F33.0 MAJOR DEPRESSIVE DISORDER, RECURRENT EPISODE, MILD (H): ICD-10-CM

## 2024-05-16 RX ORDER — ATORVASTATIN CALCIUM 10 MG/1
10 TABLET, FILM COATED ORAL DAILY
Qty: 90 TABLET | Refills: 2 | Status: SHIPPED | OUTPATIENT
Start: 2024-05-16

## 2024-05-16 RX ORDER — BUPROPION HYDROCHLORIDE 150 MG/1
150 TABLET ORAL EVERY MORNING
Qty: 90 TABLET | Refills: 3 | Status: ON HOLD | OUTPATIENT
Start: 2024-05-16 | End: 2024-05-20

## 2024-05-19 ENCOUNTER — HOSPITAL ENCOUNTER (INPATIENT)
Facility: CLINIC | Age: 89
LOS: 2 days | Discharge: NURSING, REHAB OR LONG-TERM CARE | DRG: 482 | End: 2024-05-22
Attending: EMERGENCY MEDICINE | Admitting: INTERNAL MEDICINE
Payer: COMMERCIAL

## 2024-05-19 DIAGNOSIS — F33.0 MAJOR DEPRESSIVE DISORDER, RECURRENT EPISODE, MILD (H): ICD-10-CM

## 2024-05-19 DIAGNOSIS — I10 ESSENTIAL HYPERTENSION, BENIGN: ICD-10-CM

## 2024-05-19 DIAGNOSIS — S72.002A LEFT DISPLACED FEMORAL NECK FRACTURE (H): ICD-10-CM

## 2024-05-19 DIAGNOSIS — S52.125A CLOSED NONDISPLACED FRACTURE OF HEAD OF LEFT RADIUS, INITIAL ENCOUNTER: ICD-10-CM
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
## 2024-05-19 PROCEDURE — 99285 EMERGENCY DEPT VISIT HI MDM: CPT | Mod: 25

## 2024-05-19 ASSESSMENT — COLUMBIA-SUICIDE SEVERITY RATING SCALE - C-SSRS
6. HAVE YOU EVER DONE ANYTHING, STARTED TO DO ANYTHING, OR PREPARED TO DO ANYTHING TO END YOUR LIFE?: NO
1. IN THE PAST MONTH, HAVE YOU WISHED YOU WERE DEAD OR WISHED YOU COULD GO TO SLEEP AND NOT WAKE UP?: NO
2. HAVE YOU ACTUALLY HAD ANY THOUGHTS OF KILLING YOURSELF IN THE PAST MONTH?: NO

## 2024-05-20 ENCOUNTER — ANESTHESIA (OUTPATIENT)
Dept: SURGERY | Facility: CLINIC | Age: 89
DRG: 482 | End: 2024-05-20
Payer: COMMERCIAL

## 2024-05-20 ENCOUNTER — ANESTHESIA EVENT (OUTPATIENT)
Dept: SURGERY | Facility: CLINIC | Age: 89
DRG: 482 | End: 2024-05-20
Payer: COMMERCIAL

## 2024-05-20 ENCOUNTER — APPOINTMENT (OUTPATIENT)
Dept: GENERAL RADIOLOGY | Facility: CLINIC | Age: 89
DRG: 482 | End: 2024-05-20
Attending: EMERGENCY MEDICINE
Payer: COMMERCIAL

## 2024-05-20 ENCOUNTER — APPOINTMENT (OUTPATIENT)
Dept: MRI IMAGING | Facility: CLINIC | Age: 89
DRG: 482 | End: 2024-05-20
Attending: EMERGENCY MEDICINE
Payer: COMMERCIAL

## 2024-05-20 ENCOUNTER — APPOINTMENT (OUTPATIENT)
Dept: GENERAL RADIOLOGY | Facility: CLINIC | Age: 89
DRG: 482 | End: 2024-05-20
Attending: ORTHOPAEDIC SURGERY
Payer: COMMERCIAL
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
## 2024-05-20 PROBLEM — S72.002A LEFT DISPLACED FEMORAL NECK FRACTURE (H): Status: ACTIVE | Noted: 2024-05-20

## 2024-05-20 PROBLEM — S52.125A CLOSED NONDISPLACED FRACTURE OF HEAD OF LEFT RADIUS, INITIAL ENCOUNTER: Status: ACTIVE | Noted: 2024-05-20

## 2024-05-20 LAB
ALBUMIN UR-MCNC: NEGATIVE MG/DL
ANION GAP SERPL CALCULATED.3IONS-SCNC: 9 MMOL/L (ref 7–15)
APPEARANCE UR: CLEAR
BASOPHILS # BLD AUTO: 0.1 10E3/UL (ref 0–0.2)
BASOPHILS NFR BLD AUTO: 1 %
BILIRUB UR QL STRIP: NEGATIVE
BUN SERPL-MCNC: 22.6 MG/DL (ref 8–23)
CALCIUM SERPL-MCNC: 9.3 MG/DL (ref 8.2–9.6)
CHLORIDE SERPL-SCNC: 101 MMOL/L (ref 98–107)
COLOR UR AUTO: ABNORMAL
CREAT SERPL-MCNC: 0.9 MG/DL (ref 0.51–0.95)
DEPRECATED HCO3 PLAS-SCNC: 25 MMOL/L (ref 22–29)
EGFRCR SERPLBLD CKD-EPI 2021: 59 ML/MIN/1.73M2
EOSINOPHIL # BLD AUTO: 0.4 10E3/UL (ref 0–0.7)
EOSINOPHIL NFR BLD AUTO: 5 %
ERYTHROCYTE [DISTWIDTH] IN BLOOD BY AUTOMATED COUNT: 23.1 % (ref 10–15)
GLUCOSE SERPL-MCNC: 96 MG/DL (ref 70–99)
GLUCOSE UR STRIP-MCNC: NEGATIVE MG/DL
HCT VFR BLD AUTO: 27.6 % (ref 35–47)
HGB BLD-MCNC: 8.7 G/DL (ref 11.7–15.7)
HGB UR QL STRIP: ABNORMAL
IMM GRANULOCYTES # BLD: 0.1 10E3/UL
IMM GRANULOCYTES NFR BLD: 1 %
KETONES UR STRIP-MCNC: NEGATIVE MG/DL
LEUKOCYTE ESTERASE UR QL STRIP: ABNORMAL
LYMPHOCYTES # BLD AUTO: 0.8 10E3/UL (ref 0.8–5.3)
LYMPHOCYTES NFR BLD AUTO: 11 %
MCH RBC QN AUTO: 31.6 PG (ref 26.5–33)
MCHC RBC AUTO-ENTMCNC: 31.5 G/DL (ref 31.5–36.5)
MCV RBC AUTO: 100 FL (ref 78–100)
MONOCYTES # BLD AUTO: 0.9 10E3/UL (ref 0–1.3)
MONOCYTES NFR BLD AUTO: 12 %
NEUTROPHILS # BLD AUTO: 5.4 10E3/UL (ref 1.6–8.3)
NEUTROPHILS NFR BLD AUTO: 70 %
NITRATE UR QL: NEGATIVE
NRBC # BLD AUTO: 0 10E3/UL
NRBC BLD AUTO-RTO: 0 /100
PH UR STRIP: 6 [PH] (ref 5–7)
PLATELET # BLD AUTO: 304 10E3/UL (ref 150–450)
POTASSIUM SERPL-SCNC: 3.9 MMOL/L (ref 3.4–5.3)
RBC # BLD AUTO: 2.75 10E6/UL (ref 3.8–5.2)
RBC URINE: 2 /HPF
SODIUM SERPL-SCNC: 135 MMOL/L (ref 135–145)
SP GR UR STRIP: 1.01 (ref 1–1.03)
SQUAMOUS EPITHELIAL: 1 /HPF
UROBILINOGEN UR STRIP-MCNC: NORMAL MG/DL
WBC # BLD AUTO: 7.6 10E3/UL (ref 4–11)
WBC URINE: 17 /HPF

## 2024-05-20 PROCEDURE — 999N000141 HC STATISTIC PRE-PROCEDURE NURSING ASSESSMENT: Performed by: ORTHOPAEDIC SURGERY

## 2024-05-20 PROCEDURE — 999N000179 XR SURGERY CARM FLUORO LESS THAN 5 MIN W STILLS: Mod: TC

## 2024-05-20 PROCEDURE — 73502 X-RAY EXAM HIP UNI 2-3 VIEWS: CPT

## 2024-05-20 PROCEDURE — 250N000011 HC RX IP 250 OP 636

## 2024-05-20 PROCEDURE — 0SSB34Z REPOSITION LEFT HIP JOINT WITH INTERNAL FIXATION DEVICE, PERCUTANEOUS APPROACH: ICD-10-PCS | Performed by: ORTHOPAEDIC SURGERY

## 2024-05-20 PROCEDURE — 250N000013 HC RX MED GY IP 250 OP 250 PS 637

## 2024-05-20 PROCEDURE — 87086 URINE CULTURE/COLONY COUNT: CPT | Performed by: INTERNAL MEDICINE

## 2024-05-20 PROCEDURE — C1713 ANCHOR/SCREW BN/BN,TIS/BN: HCPCS | Performed by: ORTHOPAEDIC SURGERY

## 2024-05-20 PROCEDURE — 85004 AUTOMATED DIFF WBC COUNT: CPT | Performed by: EMERGENCY MEDICINE

## 2024-05-20 PROCEDURE — 272N000001 HC OR GENERAL SUPPLY STERILE: Performed by: ORTHOPAEDIC SURGERY

## 2024-05-20 PROCEDURE — 360N000083 HC SURGERY LEVEL 3 W/ FLUORO, PER MIN: Performed by: ORTHOPAEDIC SURGERY

## 2024-05-20 PROCEDURE — 80048 BASIC METABOLIC PNL TOTAL CA: CPT | Performed by: EMERGENCY MEDICINE

## 2024-05-20 PROCEDURE — 250N000011 HC RX IP 250 OP 636: Performed by: ORTHOPAEDIC SURGERY

## 2024-05-20 PROCEDURE — C1769 GUIDE WIRE: HCPCS | Performed by: ORTHOPAEDIC SURGERY

## 2024-05-20 PROCEDURE — 710N000009 HC RECOVERY PHASE 1, LEVEL 1, PER MIN: Performed by: ORTHOPAEDIC SURGERY

## 2024-05-20 PROCEDURE — 73721 MRI JNT OF LWR EXTRE W/O DYE: CPT | Mod: LT

## 2024-05-20 PROCEDURE — 258N000003 HC RX IP 258 OP 636: Performed by: INTERNAL MEDICINE

## 2024-05-20 PROCEDURE — 250N000013 HC RX MED GY IP 250 OP 250 PS 637: Performed by: INTERNAL MEDICINE

## 2024-05-20 PROCEDURE — 73070 X-RAY EXAM OF ELBOW: CPT | Mod: LT

## 2024-05-20 PROCEDURE — 120N000001 HC R&B MED SURG/OB

## 2024-05-20 PROCEDURE — 81001 URINALYSIS AUTO W/SCOPE: CPT | Performed by: INTERNAL MEDICINE

## 2024-05-20 PROCEDURE — 258N000003 HC RX IP 258 OP 636

## 2024-05-20 PROCEDURE — 250N000025 HC SEVOFLURANE, PER MIN: Performed by: ORTHOPAEDIC SURGERY

## 2024-05-20 PROCEDURE — 99222 1ST HOSP IP/OBS MODERATE 55: CPT | Performed by: INTERNAL MEDICINE

## 2024-05-20 PROCEDURE — 250N000009 HC RX 250: Performed by: ORTHOPAEDIC SURGERY

## 2024-05-20 PROCEDURE — 250N000013 HC RX MED GY IP 250 OP 250 PS 637: Performed by: EMERGENCY MEDICINE

## 2024-05-20 PROCEDURE — 250N000009 HC RX 250

## 2024-05-20 PROCEDURE — 36415 COLL VENOUS BLD VENIPUNCTURE: CPT | Performed by: EMERGENCY MEDICINE

## 2024-05-20 PROCEDURE — 370N000017 HC ANESTHESIA TECHNICAL FEE, PER MIN: Performed by: ORTHOPAEDIC SURGERY

## 2024-05-20 DEVICE — IMP SCR SYN CAN 7.3X16 THRDX85MM SS 208.885: Type: IMPLANTABLE DEVICE | Site: HIP | Status: FUNCTIONAL

## 2024-05-20 DEVICE — IMP SCR SYN CAN 7.3X16 THRDX90MM SS 208.890: Type: IMPLANTABLE DEVICE | Site: HIP | Status: FUNCTIONAL

## 2024-05-20 RX ORDER — POLYETHYLENE GLYCOL 3350 17 G/17G
17 POWDER, FOR SOLUTION ORAL 2 TIMES DAILY PRN
Status: DISCONTINUED | OUTPATIENT
Start: 2024-05-20 | End: 2024-05-22 | Stop reason: HOSPADM

## 2024-05-20 RX ORDER — ASPIRIN 325 MG
325 TABLET, DELAYED RELEASE (ENTERIC COATED) ORAL DAILY
Status: DISCONTINUED | OUTPATIENT
Start: 2024-05-21 | End: 2024-05-22 | Stop reason: HOSPADM

## 2024-05-20 RX ORDER — NALOXONE HYDROCHLORIDE 0.4 MG/ML
0.4 INJECTION, SOLUTION INTRAMUSCULAR; INTRAVENOUS; SUBCUTANEOUS
Status: DISCONTINUED | OUTPATIENT
Start: 2024-05-20 | End: 2024-05-22 | Stop reason: HOSPADM

## 2024-05-20 RX ORDER — HYDROMORPHONE HCL IN WATER/PF 6 MG/30 ML
0.1 PATIENT CONTROLLED ANALGESIA SYRINGE INTRAVENOUS
Status: DISCONTINUED | OUTPATIENT
Start: 2024-05-20 | End: 2024-05-22 | Stop reason: HOSPADM

## 2024-05-20 RX ORDER — FENTANYL CITRATE 50 UG/ML
50 INJECTION, SOLUTION INTRAMUSCULAR; INTRAVENOUS EVERY 5 MIN PRN
Status: DISCONTINUED | OUTPATIENT
Start: 2024-05-20 | End: 2024-05-20 | Stop reason: HOSPADM

## 2024-05-20 RX ORDER — LISINOPRIL 40 MG/1
40 TABLET ORAL DAILY
Status: DISCONTINUED | OUTPATIENT
Start: 2024-05-20 | End: 2024-05-22 | Stop reason: HOSPADM

## 2024-05-20 RX ORDER — FAMOTIDINE 20 MG/1
20 TABLET, FILM COATED ORAL
Status: DISCONTINUED | OUTPATIENT
Start: 2024-05-20 | End: 2024-05-22 | Stop reason: HOSPADM

## 2024-05-20 RX ORDER — HYDROMORPHONE HCL IN WATER/PF 6 MG/30 ML
0.2 PATIENT CONTROLLED ANALGESIA SYRINGE INTRAVENOUS
Status: DISCONTINUED | OUTPATIENT
Start: 2024-05-20 | End: 2024-05-22 | Stop reason: HOSPADM

## 2024-05-20 RX ORDER — ONDANSETRON 2 MG/ML
4 INJECTION INTRAMUSCULAR; INTRAVENOUS EVERY 30 MIN PRN
Status: DISCONTINUED | OUTPATIENT
Start: 2024-05-20 | End: 2024-05-20 | Stop reason: HOSPADM

## 2024-05-20 RX ORDER — SERTRALINE HYDROCHLORIDE 25 MG/1
25 TABLET, FILM COATED ORAL DAILY
COMMUNITY
End: 2024-06-06

## 2024-05-20 RX ORDER — ONDANSETRON 4 MG/1
4 TABLET, ORALLY DISINTEGRATING ORAL EVERY 30 MIN PRN
Status: DISCONTINUED | OUTPATIENT
Start: 2024-05-20 | End: 2024-05-20 | Stop reason: HOSPADM

## 2024-05-20 RX ORDER — DEXAMETHASONE SODIUM PHOSPHATE 4 MG/ML
4 INJECTION, SOLUTION INTRA-ARTICULAR; INTRALESIONAL; INTRAMUSCULAR; INTRAVENOUS; SOFT TISSUE
Status: DISCONTINUED | OUTPATIENT
Start: 2024-05-20 | End: 2024-05-20 | Stop reason: HOSPADM

## 2024-05-20 RX ORDER — LABETALOL HYDROCHLORIDE 5 MG/ML
10 INJECTION, SOLUTION INTRAVENOUS EVERY 5 MIN PRN
Status: DISCONTINUED | OUTPATIENT
Start: 2024-05-20 | End: 2024-05-20 | Stop reason: HOSPADM

## 2024-05-20 RX ORDER — HYDROMORPHONE HCL IN WATER/PF 6 MG/30 ML
0.2 PATIENT CONTROLLED ANALGESIA SYRINGE INTRAVENOUS EVERY 5 MIN PRN
Status: DISCONTINUED | OUTPATIENT
Start: 2024-05-20 | End: 2024-05-20 | Stop reason: HOSPADM

## 2024-05-20 RX ORDER — ONDANSETRON 2 MG/ML
4 INJECTION INTRAMUSCULAR; INTRAVENOUS EVERY 6 HOURS PRN
Status: DISCONTINUED | OUTPATIENT
Start: 2024-05-20 | End: 2024-05-20

## 2024-05-20 RX ORDER — MAGNESIUM HYDROXIDE 1200 MG/15ML
LIQUID ORAL PRN
Status: DISCONTINUED | OUTPATIENT
Start: 2024-05-20 | End: 2024-05-20 | Stop reason: HOSPADM

## 2024-05-20 RX ORDER — PROPOFOL 10 MG/ML
INJECTION, EMULSION INTRAVENOUS PRN
Status: DISCONTINUED | OUTPATIENT
Start: 2024-05-20 | End: 2024-05-20

## 2024-05-20 RX ORDER — LIDOCAINE 40 MG/G
CREAM TOPICAL
Status: DISCONTINUED | OUTPATIENT
Start: 2024-05-20 | End: 2024-05-22 | Stop reason: HOSPADM

## 2024-05-20 RX ORDER — HYDROMORPHONE HCL IN WATER/PF 6 MG/30 ML
0.4 PATIENT CONTROLLED ANALGESIA SYRINGE INTRAVENOUS EVERY 5 MIN PRN
Status: DISCONTINUED | OUTPATIENT
Start: 2024-05-20 | End: 2024-05-20 | Stop reason: HOSPADM

## 2024-05-20 RX ORDER — ACETAMINOPHEN 325 MG/1
975 TABLET ORAL ONCE
Status: DISCONTINUED | OUTPATIENT
Start: 2024-05-20 | End: 2024-05-20 | Stop reason: HOSPADM

## 2024-05-20 RX ORDER — PROCHLORPERAZINE MALEATE 5 MG
5 TABLET ORAL EVERY 6 HOURS PRN
Status: DISCONTINUED | OUTPATIENT
Start: 2024-05-20 | End: 2024-05-22 | Stop reason: HOSPADM

## 2024-05-20 RX ORDER — CEFAZOLIN SODIUM/WATER 2 G/20 ML
2 SYRINGE (ML) INTRAVENOUS SEE ADMIN INSTRUCTIONS
Status: DISCONTINUED | OUTPATIENT
Start: 2024-05-20 | End: 2024-05-20 | Stop reason: HOSPADM

## 2024-05-20 RX ORDER — LIDOCAINE HYDROCHLORIDE 20 MG/ML
INJECTION, SOLUTION INFILTRATION; PERINEURAL PRN
Status: DISCONTINUED | OUTPATIENT
Start: 2024-05-20 | End: 2024-05-20

## 2024-05-20 RX ORDER — ATORVASTATIN CALCIUM 10 MG/1
10 TABLET, FILM COATED ORAL DAILY
Status: DISCONTINUED | OUTPATIENT
Start: 2024-05-20 | End: 2024-05-22 | Stop reason: HOSPADM

## 2024-05-20 RX ORDER — VERAPAMIL HYDROCHLORIDE 120 MG/1
120 TABLET, FILM COATED, EXTENDED RELEASE ORAL 2 TIMES DAILY
Status: DISCONTINUED | OUTPATIENT
Start: 2024-05-20 | End: 2024-05-22 | Stop reason: HOSPADM

## 2024-05-20 RX ORDER — ACETAMINOPHEN 325 MG/1
975 TABLET ORAL 3 TIMES DAILY
Status: DISCONTINUED | OUTPATIENT
Start: 2024-05-20 | End: 2024-05-20

## 2024-05-20 RX ORDER — AMOXICILLIN 250 MG
2 CAPSULE ORAL 2 TIMES DAILY PRN
Status: DISCONTINUED | OUTPATIENT
Start: 2024-05-20 | End: 2024-05-22 | Stop reason: HOSPADM

## 2024-05-20 RX ORDER — POLYETHYLENE GLYCOL 3350 17 G/17G
17 POWDER, FOR SOLUTION ORAL DAILY
Status: DISCONTINUED | OUTPATIENT
Start: 2024-05-21 | End: 2024-05-22 | Stop reason: HOSPADM

## 2024-05-20 RX ORDER — SODIUM CHLORIDE, SODIUM LACTATE, POTASSIUM CHLORIDE, CALCIUM CHLORIDE 600; 310; 30; 20 MG/100ML; MG/100ML; MG/100ML; MG/100ML
INJECTION, SOLUTION INTRAVENOUS CONTINUOUS
Status: DISCONTINUED | OUTPATIENT
Start: 2024-05-20 | End: 2024-05-20 | Stop reason: HOSPADM

## 2024-05-20 RX ORDER — NALOXONE HYDROCHLORIDE 0.4 MG/ML
0.1 INJECTION, SOLUTION INTRAMUSCULAR; INTRAVENOUS; SUBCUTANEOUS
Status: DISCONTINUED | OUTPATIENT
Start: 2024-05-20 | End: 2024-05-20 | Stop reason: HOSPADM

## 2024-05-20 RX ORDER — IBUPROFEN 200 MG
400 TABLET ORAL ONCE
Status: DISCONTINUED | OUTPATIENT
Start: 2024-05-20 | End: 2024-05-20

## 2024-05-20 RX ORDER — ACETAMINOPHEN 325 MG/1
650 TABLET ORAL EVERY 4 HOURS PRN
Status: DISCONTINUED | OUTPATIENT
Start: 2024-05-23 | End: 2024-05-22 | Stop reason: HOSPADM

## 2024-05-20 RX ORDER — BISACODYL 10 MG
10 SUPPOSITORY, RECTAL RECTAL DAILY PRN
Status: DISCONTINUED | OUTPATIENT
Start: 2024-05-20 | End: 2024-05-22 | Stop reason: HOSPADM

## 2024-05-20 RX ORDER — OXYCODONE HYDROCHLORIDE 5 MG/1
5 TABLET ORAL
Status: DISCONTINUED | OUTPATIENT
Start: 2024-05-20 | End: 2024-05-20 | Stop reason: HOSPADM

## 2024-05-20 RX ORDER — CEFAZOLIN SODIUM 1 G/3ML
1 INJECTION, POWDER, FOR SOLUTION INTRAMUSCULAR; INTRAVENOUS EVERY 8 HOURS
Qty: 10 ML | Refills: 0 | Status: COMPLETED | OUTPATIENT
Start: 2024-05-20 | End: 2024-05-21

## 2024-05-20 RX ORDER — DORZOLAMIDE HYDROCHLORIDE AND TIMOLOL MALEATE 20; 5 MG/ML; MG/ML
1 SOLUTION/ DROPS OPHTHALMIC 2 TIMES DAILY
Status: DISCONTINUED | OUTPATIENT
Start: 2024-05-20 | End: 2024-05-22 | Stop reason: HOSPADM

## 2024-05-20 RX ORDER — LIDOCAINE 40 MG/G
CREAM TOPICAL
Status: DISCONTINUED | OUTPATIENT
Start: 2024-05-20 | End: 2024-05-20 | Stop reason: HOSPADM

## 2024-05-20 RX ORDER — CEFAZOLIN SODIUM/WATER 2 G/20 ML
2 SYRINGE (ML) INTRAVENOUS
Status: COMPLETED | OUTPATIENT
Start: 2024-05-20 | End: 2024-05-20

## 2024-05-20 RX ORDER — SODIUM CHLORIDE, SODIUM LACTATE, POTASSIUM CHLORIDE, CALCIUM CHLORIDE 600; 310; 30; 20 MG/100ML; MG/100ML; MG/100ML; MG/100ML
INJECTION, SOLUTION INTRAVENOUS CONTINUOUS
Status: DISCONTINUED | OUTPATIENT
Start: 2024-05-20 | End: 2024-05-20

## 2024-05-20 RX ORDER — GLYCOPYRROLATE 0.2 MG/ML
INJECTION, SOLUTION INTRAMUSCULAR; INTRAVENOUS PRN
Status: DISCONTINUED | OUTPATIENT
Start: 2024-05-20 | End: 2024-05-20

## 2024-05-20 RX ORDER — SODIUM CHLORIDE, SODIUM LACTATE, POTASSIUM CHLORIDE, CALCIUM CHLORIDE 600; 310; 30; 20 MG/100ML; MG/100ML; MG/100ML; MG/100ML
INJECTION, SOLUTION INTRAVENOUS CONTINUOUS
Status: DISCONTINUED | OUTPATIENT
Start: 2024-05-20 | End: 2024-05-22 | Stop reason: HOSPADM

## 2024-05-20 RX ORDER — ONDANSETRON 4 MG/1
4 TABLET, ORALLY DISINTEGRATING ORAL EVERY 6 HOURS PRN
Status: DISCONTINUED | OUTPATIENT
Start: 2024-05-20 | End: 2024-05-20

## 2024-05-20 RX ORDER — AMOXICILLIN 250 MG
1 CAPSULE ORAL 2 TIMES DAILY
Status: DISCONTINUED | OUTPATIENT
Start: 2024-05-20 | End: 2024-05-22 | Stop reason: HOSPADM

## 2024-05-20 RX ORDER — NALOXONE HYDROCHLORIDE 0.4 MG/ML
0.2 INJECTION, SOLUTION INTRAMUSCULAR; INTRAVENOUS; SUBCUTANEOUS
Status: DISCONTINUED | OUTPATIENT
Start: 2024-05-20 | End: 2024-05-22 | Stop reason: HOSPADM

## 2024-05-20 RX ORDER — FENTANYL CITRATE 50 UG/ML
INJECTION, SOLUTION INTRAMUSCULAR; INTRAVENOUS PRN
Status: DISCONTINUED | OUTPATIENT
Start: 2024-05-20 | End: 2024-05-20

## 2024-05-20 RX ORDER — DEXAMETHASONE SODIUM PHOSPHATE 4 MG/ML
INJECTION, SOLUTION INTRA-ARTICULAR; INTRALESIONAL; INTRAMUSCULAR; INTRAVENOUS; SOFT TISSUE PRN
Status: DISCONTINUED | OUTPATIENT
Start: 2024-05-20 | End: 2024-05-20

## 2024-05-20 RX ORDER — SPIRONOLACTONE 25 MG/1
25 TABLET ORAL DAILY
Status: DISCONTINUED | OUTPATIENT
Start: 2024-05-20 | End: 2024-05-22 | Stop reason: HOSPADM

## 2024-05-20 RX ORDER — ONDANSETRON 2 MG/ML
4 INJECTION INTRAMUSCULAR; INTRAVENOUS EVERY 6 HOURS PRN
Status: DISCONTINUED | OUTPATIENT
Start: 2024-05-20 | End: 2024-05-22 | Stop reason: HOSPADM

## 2024-05-20 RX ORDER — BUPIVACAINE HYDROCHLORIDE 2.5 MG/ML
INJECTION, SOLUTION INFILTRATION; PERINEURAL PRN
Status: DISCONTINUED | OUTPATIENT
Start: 2024-05-20 | End: 2024-05-20 | Stop reason: HOSPADM

## 2024-05-20 RX ORDER — FENTANYL CITRATE 50 UG/ML
25 INJECTION, SOLUTION INTRAMUSCULAR; INTRAVENOUS EVERY 5 MIN PRN
Status: DISCONTINUED | OUTPATIENT
Start: 2024-05-20 | End: 2024-05-20 | Stop reason: HOSPADM

## 2024-05-20 RX ORDER — CALCIUM CARBONATE 500 MG/1
1000 TABLET, CHEWABLE ORAL 4 TIMES DAILY PRN
Status: DISCONTINUED | OUTPATIENT
Start: 2024-05-20 | End: 2024-05-22 | Stop reason: HOSPADM

## 2024-05-20 RX ORDER — OXYCODONE HYDROCHLORIDE 5 MG/1
5 TABLET ORAL EVERY 4 HOURS PRN
Status: DISCONTINUED | OUTPATIENT
Start: 2024-05-20 | End: 2024-05-22 | Stop reason: HOSPADM

## 2024-05-20 RX ORDER — OXYCODONE HYDROCHLORIDE 5 MG/1
5 TABLET ORAL EVERY 4 HOURS PRN
Status: DISCONTINUED | OUTPATIENT
Start: 2024-05-20 | End: 2024-05-20

## 2024-05-20 RX ORDER — ONDANSETRON 4 MG/1
4 TABLET, ORALLY DISINTEGRATING ORAL EVERY 6 HOURS PRN
Status: DISCONTINUED | OUTPATIENT
Start: 2024-05-20 | End: 2024-05-22 | Stop reason: HOSPADM

## 2024-05-20 RX ORDER — HYDROMORPHONE HCL IN WATER/PF 6 MG/30 ML
0.2 PATIENT CONTROLLED ANALGESIA SYRINGE INTRAVENOUS
Status: DISCONTINUED | OUTPATIENT
Start: 2024-05-20 | End: 2024-05-20

## 2024-05-20 RX ORDER — OXYCODONE HYDROCHLORIDE 5 MG/1
10 TABLET ORAL
Status: DISCONTINUED | OUTPATIENT
Start: 2024-05-20 | End: 2024-05-20 | Stop reason: HOSPADM

## 2024-05-20 RX ORDER — TRANEXAMIC ACID 650 MG/1
1950 TABLET ORAL ONCE
Status: COMPLETED | OUTPATIENT
Start: 2024-05-20 | End: 2024-05-20

## 2024-05-20 RX ORDER — ACETAMINOPHEN 325 MG/1
975 TABLET ORAL EVERY 8 HOURS
Qty: 27 TABLET | Refills: 0 | Status: DISCONTINUED | OUTPATIENT
Start: 2024-05-20 | End: 2024-05-22 | Stop reason: HOSPADM

## 2024-05-20 RX ORDER — SERTRALINE HYDROCHLORIDE 25 MG/1
25 TABLET, FILM COATED ORAL DAILY
Status: DISCONTINUED | OUTPATIENT
Start: 2024-05-20 | End: 2024-05-22 | Stop reason: HOSPADM

## 2024-05-20 RX ORDER — ONDANSETRON 2 MG/ML
INJECTION INTRAMUSCULAR; INTRAVENOUS PRN
Status: DISCONTINUED | OUTPATIENT
Start: 2024-05-20 | End: 2024-05-20

## 2024-05-20 RX ORDER — AMOXICILLIN 250 MG
1 CAPSULE ORAL 2 TIMES DAILY PRN
Status: DISCONTINUED | OUTPATIENT
Start: 2024-05-20 | End: 2024-05-22 | Stop reason: HOSPADM

## 2024-05-20 RX ORDER — BUPROPION HYDROCHLORIDE 150 MG/1
300 TABLET ORAL EVERY MORNING
Status: DISCONTINUED | OUTPATIENT
Start: 2024-05-20 | End: 2024-05-22 | Stop reason: HOSPADM

## 2024-05-20 RX ADMIN — VERAPAMIL HYDROCHLORIDE 120 MG: 120 TABLET, FILM COATED, EXTENDED RELEASE ORAL at 23:02

## 2024-05-20 RX ADMIN — FENTANYL CITRATE 50 MCG: 50 INJECTION INTRAMUSCULAR; INTRAVENOUS at 09:10

## 2024-05-20 RX ADMIN — SPIRONOLACTONE 25 MG: 25 TABLET ORAL at 14:39

## 2024-05-20 RX ADMIN — SODIUM CHLORIDE, POTASSIUM CHLORIDE, SODIUM LACTATE AND CALCIUM CHLORIDE: 600; 310; 30; 20 INJECTION, SOLUTION INTRAVENOUS at 06:01

## 2024-05-20 RX ADMIN — DORZOLAMIDE HYDROCHLORIDE AND TIMOLOL MALEATE 1 DROP: 22.3; 6.8 SOLUTION/ DROPS OPHTHALMIC at 21:32

## 2024-05-20 RX ADMIN — GLYCOPYRROLATE 0.2 MG: 0.2 INJECTION, SOLUTION INTRAMUSCULAR; INTRAVENOUS at 08:48

## 2024-05-20 RX ADMIN — ATORVASTATIN CALCIUM 10 MG: 10 TABLET, FILM COATED ORAL at 14:38

## 2024-05-20 RX ADMIN — TRANEXAMIC ACID 1 G: 1 INJECTION, SOLUTION INTRAVENOUS at 08:52

## 2024-05-20 RX ADMIN — PHENYLEPHRINE HYDROCHLORIDE 50 MCG: 10 INJECTION INTRAVENOUS at 09:14

## 2024-05-20 RX ADMIN — OXYCODONE HYDROCHLORIDE 2.5 MG: 5 TABLET ORAL at 01:23

## 2024-05-20 RX ADMIN — ACETAMINOPHEN 975 MG: 325 TABLET, FILM COATED ORAL at 14:39

## 2024-05-20 RX ADMIN — SODIUM CHLORIDE, POTASSIUM CHLORIDE, SODIUM LACTATE AND CALCIUM CHLORIDE: 600; 310; 30; 20 INJECTION, SOLUTION INTRAVENOUS at 14:53

## 2024-05-20 RX ADMIN — BUPROPION HYDROCHLORIDE 300 MG: 150 TABLET, EXTENDED RELEASE ORAL at 14:39

## 2024-05-20 RX ADMIN — SENNOSIDES AND DOCUSATE SODIUM 1 TABLET: 50; 8.6 TABLET ORAL at 21:30

## 2024-05-20 RX ADMIN — PHENYLEPHRINE HYDROCHLORIDE 100 MCG: 10 INJECTION INTRAVENOUS at 09:16

## 2024-05-20 RX ADMIN — FENTANYL CITRATE 50 MCG: 50 INJECTION INTRAMUSCULAR; INTRAVENOUS at 08:44

## 2024-05-20 RX ADMIN — PHENYLEPHRINE HYDROCHLORIDE 100 MCG: 10 INJECTION INTRAVENOUS at 08:43

## 2024-05-20 RX ADMIN — SODIUM CHLORIDE, POTASSIUM CHLORIDE, SODIUM LACTATE AND CALCIUM CHLORIDE: 600; 310; 30; 20 INJECTION, SOLUTION INTRAVENOUS at 12:53

## 2024-05-20 RX ADMIN — DEXAMETHASONE SODIUM PHOSPHATE 4 MG: 4 INJECTION, SOLUTION INTRA-ARTICULAR; INTRALESIONAL; INTRAMUSCULAR; INTRAVENOUS; SOFT TISSUE at 08:45

## 2024-05-20 RX ADMIN — SERTRALINE HYDROCHLORIDE 25 MG: 25 TABLET ORAL at 16:43

## 2024-05-20 RX ADMIN — LIDOCAINE HYDROCHLORIDE 30 MG: 20 INJECTION, SOLUTION INFILTRATION; PERINEURAL at 08:44

## 2024-05-20 RX ADMIN — FAMOTIDINE 20 MG: 20 TABLET ORAL at 21:30

## 2024-05-20 RX ADMIN — IBUPROFEN 400 MG: 200 TABLET, FILM COATED ORAL at 01:23

## 2024-05-20 RX ADMIN — ACETAMINOPHEN 975 MG: 325 TABLET, FILM COATED ORAL at 21:30

## 2024-05-20 RX ADMIN — PROPOFOL 90 MG: 10 INJECTION, EMULSION INTRAVENOUS at 08:44

## 2024-05-20 RX ADMIN — Medication 2 G: at 08:37

## 2024-05-20 RX ADMIN — CEFAZOLIN 1 G: 1 INJECTION, POWDER, FOR SOLUTION INTRAMUSCULAR; INTRAVENOUS at 16:52

## 2024-05-20 RX ADMIN — ONDANSETRON 4 MG: 2 INJECTION INTRAMUSCULAR; INTRAVENOUS at 09:10

## 2024-05-20 ASSESSMENT — ACTIVITIES OF DAILY LIVING (ADL)
CHANGE_IN_FUNCTIONAL_STATUS_SINCE_ONSET_OF_CURRENT_ILLNESS/INJURY: YES
NUMBER_OF_TIMES_PATIENT_HAS_FALLEN_WITHIN_LAST_SIX_MONTHS: 2
CONCENTRATING,_REMEMBERING_OR_MAKING_DECISIONS_DIFFICULTY: NO
WEAR_GLASSES_OR_BLIND: YES
ADLS_ACUITY_SCORE: 35
ADLS_ACUITY_SCORE: 37
ADLS_ACUITY_SCORE: 34
ADLS_ACUITY_SCORE: 37
HEARING_DIFFICULTY_OR_DEAF: YES
ADLS_ACUITY_SCORE: 37
DIFFICULTY_COMMUNICATING: NO
ADLS_ACUITY_SCORE: 37
ADLS_ACUITY_SCORE: 34
DIFFICULTY_EATING/SWALLOWING: NO
ADLS_ACUITY_SCORE: 34
ADLS_ACUITY_SCORE: 35
WALKING_OR_CLIMBING_STAIRS_DIFFICULTY: YES
TOILETING: 1-->ASSISTANCE (EQUIPMENT/PERSON) NEEDED
ADLS_ACUITY_SCORE: 37
FALL_HISTORY_WITHIN_LAST_SIX_MONTHS: YES
ADLS_ACUITY_SCORE: 37
ADLS_ACUITY_SCORE: 37
ADLS_ACUITY_SCORE: 35
ADLS_ACUITY_SCORE: 34
VISION_MANAGEMENT: GLASSES
WERE_AUXILIARY_AIDS_OFFERED?: NO
ADLS_ACUITY_SCORE: 37
ADLS_ACUITY_SCORE: 34
DESCRIBE_HEARING_LOSS: BILATERAL HEARING LOSS
PATIENT'S_PREFERRED_MEANS_OF_COMMUNICATION: ENGLISH SPEAKER WITH HEARING LOSS, NO SPEECH PROBLEMS.
HEARING_MANAGEMENT: HEARING AIDS
ADLS_ACUITY_SCORE: 34
WALKING_OR_CLIMBING_STAIRS: AMBULATION DIFFICULTY, REQUIRES EQUIPMENT;STAIR CLIMBING DIFFICULTY, REQUIRES EQUIPMENT
ADLS_ACUITY_SCORE: 37
USE_OF_HEARING_ASSISTIVE_DEVICES: BILATERAL HEARING AIDS
DRESSING/BATHING_DIFFICULTY: NO
TOILETING: 1-->ASSISTANCE (EQUIPMENT/PERSON) NEEDED (NOT DEVELOPMENTALLY APPROPRIATE)
DOING_ERRANDS_INDEPENDENTLY_DIFFICULTY: YES
EQUIPMENT_CURRENTLY_USED_AT_HOME: WALKER, ROLLING
ADLS_ACUITY_SCORE: 34
TOILETING_ISSUES: YES
TOILETING_ASSISTANCE: TOILETING DIFFICULTY, REQUIRES EQUIPMENT
ADLS_ACUITY_SCORE: 34
ADLS_ACUITY_SCORE: 39

## 2024-05-20 NOTE — PLAN OF CARE
Roman Meza, RN : Progress Note  Shift : 5769-6646  Category of Care: Inpatient  VS: BP (!) 142/57 (BP Location: Left arm)   Pulse 86   Temp 97.9  F (36.6  C) (Temporal)   Resp 16   Ht 1.524 m (5')   Wt 79 kg (174 lb 2.6 oz)   SpO2 92%   BMI 34.01 kg/m      Shift Summary; Occurences, Discussions & Interventions of Note --  AOx4  Pt to surgery at 0800 this morning. Returned to unit at 1100  Denying pain but reported some ache when sitting in bathroom  Up assist of 1, voided, intermit incontinence urine. No bm today.  CMS in tact on feet 2+ pulses  Pt on regular diet, had broth for lunch.  Lungs clear, HR regular, - bowel active this am    Plan --  PT OT tomorrow. Encourage ambulation. CMS checked. Capno to discontinue tomorrow.    ??? Care Plan notation attached at the bottom of this note. Space added to increase note readability for relevant care team members.                                                                                                                            ---      Goal Outcome Evaluation:      Plan of Care Reviewed With: patient    Overall Patient Progress: improvingOverall Patient Progress: improving    Outcome Evaluation: Denies pain post procedure, AOx4 on RA, CMS lower extremeties in tact, up assist of 1. Voided. Pt needs urine for urine culture.      Problem: Orthopaedic Fracture  Goal: Bowel Elimination  Outcome: Not Progressing     Problem: Adult Inpatient Plan of Care  Goal: Plan of Care Review  Description: The Plan of Care Review/Shift note should be completed every shift.  The Outcome Evaluation is a brief statement about your assessment that the patient is improving, declining, or no change.  This information will be displayed automatically on your shift  note.  Outcome: Progressing  Flowsheets (Taken 5/20/2024 1607)  Outcome Evaluation: Denies pain post procedure, AOx4 on RA, CMS lower extremeties in tact, up assist of 1. Voided. Pt needs urine for urine culture.  Plan  "of Care Reviewed With: patient  Overall Patient Progress: improving  Goal: Patient-Specific Goal (Individualized)  Description: You can add care plan individualizations to a care plan. Examples of Individualization might be:  \"Parent requests to be called daily at 9am for status\", \"I have a hard time hearing out of my right ear\", or \"Do not touch me to wake me up as it startles  me\".  Outcome: Progressing  Goal: Absence of Hospital-Acquired Illness or Injury  Outcome: Progressing  Intervention: Prevent Skin Injury  Recent Flowsheet Documentation  Taken 5/20/2024 0738 by Roman Meza RN  Body Position: position changed independently  Intervention: Prevent Infection  Recent Flowsheet Documentation  Taken 5/20/2024 0738 by Roman Meza RN  Infection Prevention:   hand hygiene promoted   rest/sleep promoted   single patient room provided  Goal: Optimal Comfort and Wellbeing  Outcome: Progressing  Goal: Readiness for Transition of Care  Outcome: Progressing     Problem: Skin Injury Risk Increased  Goal: Skin Health and Integrity  Outcome: Progressing  Intervention: Plan: Nurse Driven Intervention: Moisture Management  Recent Flowsheet Documentation  Taken 5/20/2024 0738 by Roman Meza RN  Moisture Interventions:   No brief in bed   Incontinence pad  Intervention: Plan: Nurse Driven Intervention: Friction and Shear  Recent Flowsheet Documentation  Taken 5/20/2024 0738 by oRman Meza RN  Friction/Shear Interventions: HOB 30 degrees or less  Intervention: Optimize Skin Protection  Recent Flowsheet Documentation  Taken 5/20/2024 0738 by Roman Meza RN  Head of Bed (HOB) Positioning: HOB at 20 degrees     Problem: Fall Injury Risk  Goal: Absence of Fall and Fall-Related Injury  Outcome: Progressing     Problem: Orthopaedic Fracture  Goal: Absence of Bleeding  Outcome: Progressing  Goal: Absence of Embolism Signs and Symptoms  Outcome: Progressing  Goal: Fracture Stability  Outcome: Progressing  Goal: Optimal " Functional Ability  Outcome: Progressing  Intervention: Optimize Functional Ability  Recent Flowsheet Documentation  Taken 5/20/2024 0738 by Roman Meza, RN  Positioning/Transfer Devices:   pillows   in use  Goal: Absence of Infection Signs and Symptoms  Outcome: Progressing  Goal: Effective Tissue Perfusion  Outcome: Progressing  Goal: Optimal Pain Control and Function  Outcome: Progressing  Goal: Effective Oxygenation and Ventilation  Outcome: Progressing  Intervention: Promote Airway Secretion Clearance  Recent Flowsheet Documentation  Taken 5/20/2024 0738 by Roman Meza, RN  Cough And Deep Breathing: done with encouragement  Intervention: Optimize Oxygenation and Ventilation  Recent Flowsheet Documentation  Taken 5/20/2024 0738 by Rmoan Meza, RN  Head of Bed (HOB) Positioning: HOB at 20 degrees

## 2024-05-20 NOTE — OP NOTE
Preoperative diagnosis:  Left femoral neck fracture    Postoperative diagnosis:  As above    Procedure:  Closed reduction percutaneous pinning left hip    Surgeon:  Jose Luis Merino MD     Assistant:  Christofer Goins PA-C  A physicians assistant was available for the surgery and participated to decrease the patient's morbidity by assisting with positioning, manipulation of the limb during the procedure, surgical retraction as necessary, closure of the surgical wound and transferring the patient back to a hospital bed    Anesthesia:  General    Estimated blood loss:  20 cc    Complications:  None readily apparent    Indications for procedure:  Riddhi Aguilar is a 89 year old female who sustained a fall from standing resulting in immediate hip pain. The patient was evaluated and noted to have a fracture in the emergency room. She was admitted for treatment. There, we evaluated the x-rays and noted to have a displaced intertrochanteric hip fracture. The patient was counseled regarding treatment options and recommendation was made for cephalomedullary nail fixation in order to allow early weight bearing. She was amenable to this plan as was the family. They understood the risks of the procedure and wished to proceed.    Description of Procedure:  Riddhi was identified in the preoperative area. Informed consent was obtained as outlined above.  The patient was in agreement.  Subsequently, the hip was marked. The patient was then brought back to the operating room where they were placed under an appropriate anesthetic. The patient was then carefully positioned on the fracture table ensuring all bony prominences were well padded, and a closed reduction was performed. The contralateral leg was then placed into the well leg jane, and a C-arm was used to ensure adequate reduction of the injury. This being achieved, the patient was then prepped and draped in a standard sterile fashion. A time-out was called by the surgical team. All in  attendance were in agreement that the left hip was the correct operative site.  The correct laterality, procedure and hospital identification number were confirmed.     Fluoroscopic image intensification was utilized to localize incision over the lateral aspect the greater trochanter.  A pin was introduced to the lateral aspect of the femur just proximal to the midpoint of the lesser trochanter.  This was introduced into the inferior aspect of the femoral head.  Fluoroscopic image intensification was utilized in orthogonal planes to confirm optimal placement.  In similar fashion posterior superior and anterior superior guidewires were placed for an inverted triangle fixation construct.  The lateral cortex was drilled and 3 screws were placed with appropriate thread length to allow compression at the fracture site.  Excellent purchase was obtained using the distal interlock screw. Next, all instruments were removed. We again took final fluoroscopic images and being satisfied with the construct, irrigated the wounds. Next, the wounds were closed in a layered fashion, and a sterile dressing was applied. The patient was safely transferred to hospital bed and awakened from the anesthetic and taken to the PACU for recovery. All instrument, needle and lap counts were correct in the case in accordance with hospital protocol    Postoperative Plan:  Riddhi Aguilar will be return to the hospital were postoperatively. They will be weight-bearing as tolerated and will receive a physical therapy regimen. The pain will be controlled with a combination of IV and oral pain medicine. DVT prophylaxis will be Aspirin

## 2024-05-20 NOTE — PHARMACY-ADMISSION MEDICATION HISTORY
Pharmacist Admission Medication History    Admission medication history is complete. The information provided in this note is only as accurate as the sources available at the time of the update.    Information Source(s): Patient and CareEverywhere/SureScripts via phone    Pertinent Information:      Changes made to PTA medication list:  Added: Zoloft  Deleted: Wellbutrin   Changed: None    Allergies reviewed with patient and updates made in EHR: yes    Medication History Completed By: Racquel Allen PharmD 5/20/2024 12:33 PM    PTA Med List   Medication Sig Last Dose    acetaminophen (TYLENOL) 325 MG tablet Take 2 tablets (650 mg) by mouth every 4 hours as needed for mild pain or other (and adjunct with moderate or severe pain or per patient request) prn    atorvastatin (LIPITOR) 10 MG tablet TAKE 1 TABLET(10 MG) BY MOUTH DAILY 5/19/2024 at am    buPROPion (WELLBUTRIN XL) 300 MG 24 hr tablet TAKE 1 TABLET(300 MG) BY MOUTH EVERY MORNING 5/19/2024 at am    dorzolamide-timolol (COSOPT) 22.3-6.8 MG/ML ophthalmic solution Place 1 drop into both eyes 2 times daily 5/19/2024 at am    lisinopril (ZESTRIL) 40 MG tablet Take 1 tablet (40 mg) by mouth daily Hold for SBP <120 5/19/2024 at am    multivitamin w/minerals (THERA-VIT-M) tablet Take 1 tablet by mouth daily 5/19/2024 at am    sertraline (ZOLOFT) 25 MG tablet Take 25 mg by mouth daily 5/14/2024    spironolactone (ALDACTONE) 25 MG tablet Take 1 tablet (25 mg) by mouth daily Hold for SBP <125 or DBP <55 5/19/2024 at am    verapamil ER (CALAN-SR) 120 MG CR tablet TAKE 1 TABLET BY MOUTH EVERY 12 HOURS 5/19/2024 at am    VITAMIN D PO Take 1 tablet by mouth daily Doesn't know strength 5/19/2024 at am

## 2024-05-20 NOTE — ANESTHESIA CARE TRANSFER NOTE
Patient: Riddhi Aguilar    Procedure: Procedure(s):  CLOSED REDUCTION, HIP, WITH PERCUTANEOUS PINNING       Diagnosis: Left displaced femoral neck fracture (H) [S72.002A]  Diagnosis Additional Information: No value filed.    Anesthesia Type:   General     Note:    Oropharynx: oropharynx clear of all foreign objects  Level of Consciousness: drowsy  Oxygen Supplementation: face mask  Level of Supplemental Oxygen (L/min / FiO2): 6  Independent Airway: airway patency satisfactory and stable  Dentition: dentition unchanged  Vital Signs Stable: post-procedure vital signs reviewed and stable  Report to RN Given: handoff report given  Patient transferred to: PACU    Handoff Report: Identifed the Patient, Identified the Reponsible Provider, Reviewed the pertinent medical history, Discussed the surgical course, Reviewed Intra-OP anesthesia mangement and issues during anesthesia, Set expectations for post-procedure period and Allowed opportunity for questions and acknowledgement of understanding      Vitals:  Vitals Value Taken Time   BP     Temp     Pulse 80 05/20/24 0934   Resp 15 05/20/24 0934   SpO2 100 % 05/20/24 0934   Vitals shown include unfiled device data.    Electronically Signed By: LUCY Fair CRNA  May 20, 2024  9:35 AM

## 2024-05-20 NOTE — ED NOTES
[facility]  ED Nurse Handoff Report    ED Chief complaint: Fall and Hip Pain (Left hip)  . ED Diagnosis:   Final diagnoses:   Fall, initial encounter   Closed nondisplaced fracture of head of left radius, initial encounter   Left displaced femoral neck fracture (H)       Allergies: No Known Allergies    Code Status: Full Code    Activity level - Baseline/Home:  walker.  Activity Level - Current:   assist of 1 and walker.   Lift room needed: No.   Bariatric: No   Needed: No   Isolation: No.   Infection: Not Applicable.     Respiratory status: Room air    Vital Signs (within 30 minutes):   Vitals:    05/20/24 0420 05/20/24 0422 05/20/24 0423 05/20/24 0424   BP: 126/60      Pulse: 78      Resp:       Temp:       TempSrc:       SpO2:  93% 91% 93%   Weight:       Height:           Cardiac Rhythm:  ,      Pain level:    Patient confused: No.   Patient Falls Risk: arm band in place and patient and family education.   Elimination Status: Bladder was scanned at 4:30 AM; bladder scan volume was: 19 mL     Patient Report - Initial Complaint: Fall, Hip Pain.   Focused Assessment: Fall and Hip Pain (Left hip)     NATACHA Aguilar is a 89 year old female who presents with family members for an evaluation after a fall. The patient states she fell this morning around 10:00am when pushing a handicap button and the reach being too far. She states she usually uses a walker despite coming in on a wheel chair. She also is having pain in her left hip and her left upper arm which is consistent with her bruise. Patient developed mild left calf pain while in the ED. She took Tylenol earlier today for the pain. She denies history of hip replacement. She also denies head, or neck pain or sustaining other injuries.         Abnormal Results:   Labs Ordered and Resulted from Time of ED Arrival to Time of ED Departure   BASIC METABOLIC PANEL - Abnormal       Result Value    Sodium 135      Potassium 3.9       Chloride 101      Carbon Dioxide (CO2) 25      Anion Gap 9      Urea Nitrogen 22.6      Creatinine 0.90      GFR Estimate 59 (*)     Calcium 9.3      Glucose 96     ROUTINE UA WITH MICROSCOPIC REFLEX TO CULTURE   CBC WITH PLATELETS AND DIFFERENTIAL        MR Hip Left w/o Contrast   Final Result   IMPRESSION:   1.  Bone marrow edema of the femoral neck extending to the subcapital junction of the femoral head and neck where there is incomplete marrow fracturing extending from the superolateral aspect of the femoral neck centrally, the fracturing does not extend    across the entirety of the femoral neck. (Image 17, series 5; image 28, series 2).    2.  Small volume of free fluid within the greater trochanter, favored reflect traumatic trochanteric bursitis given history of fall   3.  Soft tissue contusions overlying the greater trochanter and proximal femur, consistent with soft tissue contusions from fall      XR Pelvis w Hip Left 1 View   Final Result   IMPRESSION: Normal joint spaces and alignment. No acute fracture. 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 Significant atherosclerotic calcification of bilateral femoral arteries.      XR Elbow Left 2 Views   Final Result   IMPRESSION: A transverse ill-defined linear lucency is seen in the radial head without overlying cortical disruption, with a small elbow joint effusion, suspicious for a nondisplaced fracture. A small degenerative olecranon spur is present.          Treatments provided: See MAR  Family Comments: Daughter  OBS brochure/video discussed/provided to patient:  No  ED Medications:   Medications   oxyCODONE IR (ROXICODONE) half-tab 2.5 mg (2.5 mg Oral $Given 5/20/24 0123)   ibuprofen (ADVIL/MOTRIN) tablet 400 mg (400 mg Oral $Given 5/20/24 0123)       Drips infusing:  No  For the majority of the shift this patient was Green.   Interventions performed were None.    Sepsis treatment initiated:  No    Cares/treatment/interventions/medications to be completed following ED care: UA    ED Nurse Name: Ariana Shetty RN  4:25 AM   RECEIVING UNIT ED HANDOFF REVIEW    Above ED Nurse Handoff Report was reviewed: Yes  Reviewed by: Elly Cosme RN on May 20, 2024 at 5:03 AM   I Penny called the ED to inform them the note was read: Yes

## 2024-05-20 NOTE — ED TRIAGE NOTES
Patient arrives after a fall at 10 am this morning. Fell on concrete. Complains of left hip pain and left forearm pain. Has been able to walk. Arrives in a wheelchair.      Triage Assessment (Adult)       Row Name 05/19/24 0407          Triage Assessment    Airway WDL WDL        Respiratory WDL    Respiratory WDL WDL        Skin Circulation/Temperature WDL    Skin Circulation/Temperature WDL WDL        Cardiac WDL    Cardiac WDL WDL        Peripheral/Neurovascular WDL    Peripheral Neurovascular WDL WDL        Cognitive/Neuro/Behavioral WDL    Cognitive/Neuro/Behavioral WDL WDL

## 2024-05-20 NOTE — PROGRESS NOTES
Hospitalist Follow up:    Please see admission H&P by my colleague this morning.    In follow-up I saw Riddhi postoperatively, reports that she is doing quite well and does not have any pain.  Son present as well.  No cardiopulmonary complaints, no nausea.  Anticipate remaining here for postoperative recovery, PT etc for at least a couple more nights.  She reports that if she needs to go to a TCU that she would be happy to go to [facility].    Amari Hooks MD

## 2024-05-20 NOTE — H&P
[facility]  Hospitalist Admission Note  Name: Riddhi Aguilar    MRN: [id number]  YOB: 1931    Age: 89 year old  Date of admission: 5/19/2024  Primary care provider: Shun Prado    Chief Complaint:  fall, left hip pain    Assessment and Plan:   Mechanical fall  Acute traumatic incomplete left femoral neck fracture  Acute traumatic left radial head fracture: Ambulating with her walker when she reached to push a handicap and open the door she fell landing on her left side.  She has some bruising and swelling to the left elbow but it does not really hurt and she has full range of motion.  Throughout the day she has had some worsening pain at the left hip, but she has been ambulating up until tonight when the pain was much worse.  She has pain with internal/external rotation of the left hip.  Mildly tender over the left elbow, but full range of motion.  X-ray pelvis and left hip was negative for fracture, but MRI of the left hip shows bone marrow edema of the femoral neck extending into the subcapital junction, the fracture does not extend across the entirety of the femoral neck, I am uncertain if this requires surgical repair or not and will defer to orthopedics.  X-ray of the left elbow shows a linear lucency through the radial head without overlying cortical disruption consistent with a nondisplaced fracture and small elbow joint effusion, anticipate this is nonoperative.  -Consult orthopedics.  Unclear if the left femoral neck fracture requires operative repair or not.  Keep n.p.o. and nonweightbearing on the left side until seen.  She is a very robust 89-year-old in fact they have a planned [employer] cruise 1 week from now.  -Acetaminophen 975 mg 3 times daily.  Oxycodone 2.5 mg for moderate and 5 mg severe pain every 4 as needed.  Place lidocaine patch.  Avoid NSAIDs given history of GI bleed secondary to ulcer.  -Consult PT/OT/SW    HTN: PTA on spironolactone 25 mg daily, verapamil   mg twice daily, lisinopril 40 daily.  Although she is not entirely certain if she is still taking lisinopril.  Blood pressure 170/79 in the ER.  -Resume verapamil and spironolactone  -Hold lisinopril in case surgery today    CKD stage IIIa: Creatinine at baseline of 0.9-1.    Lymphedema: Has chronic mild lower extremity edema for which she uses compression stockings.  1+ edema on exam which is her baseline.    Chronic anemia: Hemoglobin baseline is 9-10.  -CBC is in process    MDD: Resume PTA Wellbutrin XL 3 mg daily.    HLD: Resume PTA atorvastatin 10 mg daily.    Glaucoma  Cataracts: Resume Cosopt drops.    History of breast cancer    DVT Prophylaxis: Pneumatic Compression Devices  Code Status: DNR / DNI -confirmed with patient on admission  FEN: N.p.o. until seen by orthopedic surgery, LR at 75 mL/h while n.p.o.  Discharge Dispo: Lives in senior living.  Consult PT/OT/SW  Estimated Disch Date / # of Days until Disch: Admit inpatient for hip and elbow fractures.  May or may not need surgical invention, but will need pain control, mobility, and possible placement.  If no surgery anticipate 2 night hospitalization.  If surgery likely 3-4 nights.      History of Present Illness:  Riddhi Aguilar is a 89 year old female with PMH including HTN, HLD, anemia, CKD stage IIIa, bleeding ulcer, MDD, glaucoma, spinal stenosis, nephrolithiasis, and breast cancer who presents for left hip pain after a fall.  She normally is very active and she was ambulating with her walker when she reached out to push a handicap button to open a door and she fell on her left side.  She had some left hip pain initially that has been progressively worse throughout the day although she has been ambulating.  She noted some left elbow swelling and bruising, but does not have any significant pain there.  Due to worsening pain in the left hip she is coming for evaluation.  She denies any recent fevers, chills, cough, shortness of breath,  nausea, vomiting, diarrhea.  She did have a kidney stone with infection earlier this year and says she has not fully recovered from this as far as her strength goes.  Normally lives in a senior living facility on 1 floor.  She is very active and they are actually have a [employer] cruise that they were supposed to leave for 1 week from now.    History obtained from patient, patient's daughter, medical record, and from Dr. Haile in the emergency department.  Blood pressure 170/79, heart 83, temperature 90.9  F, oxygen 100% on room air.  BMP at baseline with creatinine 0.9.  CBC in process.  X-ray left elbow shows a left radial head fracture with small effusion.  X-ray the pelvis and left hip did not show any fracture.  MRI of the left hip shows incomplete left femoral neck fracture with bone marrow edema.  Pain improved with 2.5 mg oxycodone and also received ibuprofen 400 mg.  Admit inpatient and consult orthopedics.       Clinically Significant Risk Factors Present on Admission                  # Hypertension: Noted on problem list      # Obesity: Estimated body mass index is 34.75 kg/m  as calculated from the following:    Height as of this encounter: 1.524 m (5').    Weight as of this encounter: 80.7 kg (177 lb 14.6 oz).                        Past Medical History reviewed:  Past Medical History:   Diagnosis Date    Anemia     baseline 10-11    Arthritis     Bleeding ulcer     duodenal ulcer    CKD (chronic kidney disease) stage 3, GFR 30-59 ml/min (H)     Essential hypertension, benign     Osteoporosis     Pure hypercholesterolemia     Spinal stenosis     Unspecified cataract     Unspecified glaucoma(365.9)      Past Surgical History reviewed:  Past Surgical History:   Procedure Laterality Date    APPENDECTOMY      BREAST SURGERY      lumpectomy    COMBINED CYSTOSCOPY, RETROGRADES, EXCHANGE STENT URETER(S) Left 01/24/2024    Procedure: Cystoscopy, left retrograde pyelogram, left JJ stent placement, <1hr physician  fluoroscopy time;  Surgeon: Aureliano Brandt MD;  Location: [facility] OR    COMBINED CYSTOSCOPY, RETROGRADES, URETEROSCOPY, LASER HOLMIUM LITHOTRIPSY URETER(S), INSERT STENT Left 02/20/2024    Procedure: Cystoscopy, left diagnostic ureteroscopy, left retrograde pyelogram, left ureteral stent removal, fluoroscopic interpretation <1 hour physician time;  Surgeon: Richie Jaquez MD;  Location: [facility] OR    CYSTOSCOPY      CYSTOSCOPY, REMOVE STENT(S), COMBINED Left 02/20/2024    Procedure: Cystoscopy, remove stent(s), combined;  Surgeon: Richie Jaquez MD;  Location:  OR    DILATION AND CURETTAGE, HYSTEROSCOPY DIAGNOSTIC, COMBINED  02/06/2014    Procedure: COMBINED DILATION AND CURETTAGE, HYSTEROSCOPY DIAGNOSTIC;  DILATION AND CURETTAGE, HYSTEROSCOPY, POLYPECTOMY, INCISION AND DRAINAGE OF SEBACEOUS CYST ;  Surgeon: Serena Zamora MD;  Location: [facility]    EXCISE LESION LOWER EXTREMITY Right 08/29/2017    Procedure: EXCISE LESION LOWER EXTREMITY;  WIDE EXCISIONAL BIOPSY OF RIGHT ANKLE BASAL CELL CARCINOMA;  Surgeon: Juan Luis Flores MD;  Location:  OR    INCISION AND DRAINAGE PERINEAL, COMBINED  02/06/2014    Procedure: COMBINED INCISION AND DRAINAGE PERINEAL;;  Surgeon: Serena Zamora MD;  Location: [facility]    ZZC NONSPECIFIC PROCEDURE      Repair of buckled retina of rt eye    [facility] NONSPECIFIC PROCEDURE      Appy     Social History reviewed:  Social History     Tobacco Use    Smoking status: Never    Smokeless tobacco: Never   Substance Use Topics    Alcohol use: No     Alcohol/week: 0.0 standard drinks of alcohol     Social History     Social History Narrative    Not on file     Family History reviewed:  Family History   Problem Relation Age of Onset    Breast Cancer Sister     Breast Cancer Sister     Cancer Brother         bone cancer in arm     Allergies:  No Known Allergies  Medications:  Prior to Admission medications    Medication Sig Last Dose Taking? Auth Provider Long Term End Date   acetaminophen  (TYLENOL) 325 MG tablet Take 2 tablets (650 mg) by mouth every 4 hours as needed for mild pain or other (and adjunct with moderate or severe pain or per patient request)   Carissa Whitaker PA-C     atorvastatin (LIPITOR) 10 MG tablet TAKE 1 TABLET(10 MG) BY MOUTH DAILY   Shun Prado MD Yes    buPROPion (WELLBUTRIN XL) 150 MG 24 hr tablet TAKE 1 TABLET(150 MG) BY MOUTH EVERY MORNING   Shun Prado MD Yes    buPROPion (WELLBUTRIN XL) 300 MG 24 hr tablet TAKE 1 TABLET(300 MG) BY MOUTH EVERY MORNING   Shun Prado MD Yes    dorzolamide-timolol (COSOPT) 22.3-6.8 MG/ML ophthalmic solution Place 1 drop into both eyes 2 times daily   Reported, Patient     lisinopril (ZESTRIL) 40 MG tablet Take 1 tablet (40 mg) by mouth daily Hold for SBP <120   Carissa Whitaker PA-C Yes    multivitamin w/minerals (THERA-VIT-M) tablet Take 1 tablet by mouth daily   Reported, Patient     spironolactone (ALDACTONE) 25 MG tablet Take 1 tablet (25 mg) by mouth daily Hold for SBP <125 or DBP <55   Shun Prado MD Yes    verapamil ER (CALAN-SR) 120 MG CR tablet TAKE 1 TABLET BY MOUTH EVERY 12 HOURS   Shun Prado MD Yes    VITAMIN D PO Take 1 tablet by mouth daily Doesn't know strength   Unknown, Entered By History       Review of Systems:  A Comprehensive greater than 10 system review of systems was carried out.  Pertinent positives and negatives are noted above.  Otherwise negative.     Physical Exam:  Blood pressure (!) 170/79, pulse 83, temperature 98.9  F (37.2  C), temperature source Temporal, resp. rate 20, height 1.524 m (5'), weight 80.7 kg (177 lb 14.6 oz), SpO2 100%, not currently breastfeeding.  Wt Readings from Last 1 Encounters:   05/19/24 80.7 kg (177 lb 14.6 oz)     Exam:  Constitutional: Awake, NAD  Eyes: sclera white   HEENT: atraumatic, MMM, has hearing aids in  Respiratory: no respiratory distress, anterior lungs cta bilaterally, no crackles or wheeze  Cardiovascular: RRR.  2/6 systolic  murmur  GI: non-tender, not distended, bowel sounds present  Skin: Ecchymoses left elbow  Musculoskeletal/extremities: Swelling to the left elbow with minimal tenderness to palpation and full range of motion.  Tender over the left hip and pain with passive internal/external rotation.  1+ bilateral lower extremity edema  Neurologic: A&O, speech clear, wiggles toes bilaterally, light touch sensation intact peripherally  Psychiatric: calm, cooperative, normal affect    Lab and imaging data personally reviewed:  Labs:  Recent Labs   Lab 05/20/24  0309 05/14/24  1036    137   POTASSIUM 3.9 4.1   CHLORIDE 101  --    CO2 25  --    ANIONGAP 9  --    GLC 96  --    BUN 22.6  --    CR 0.90 0.96*   GFRESTIMATED 59* 55*   TAMMY 9.3  --          Imaging:  Recent Results (from the past 24 hour(s))   XR Pelvis w Hip Left 1 View    Narrative    EXAM: XR PELVIS AND HIP LEFT 1 VIEW  LOCATION: [facility]  DATE: 5/20/2024    INDICATION: fall, pain  COMPARISON: None.      Impression    IMPRESSION: Normal joint spaces and alignment. No acute fracture. 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 Significant atherosclerotic calcification of bilateral femoral arteries.   XR Elbow Left 2 Views    Narrative    EXAM: XR ELBOW LEFT 2 VIEWS  LOCATION: [facility]  DATE: 5/20/2024    INDICATION: fall, pain  COMPARISON: None.      Impression    IMPRESSION: A transverse ill-defined linear lucency is seen in the radial head without overlying cortical disruption, with a small elbow joint effusion, suspicious for a nondisplaced fracture. A small degenerative olecranon spur is present.   MR Hip Left w/o Contrast    Narrative    EXAM: MR HIP LEFT W/O CONTRAST  LOCATION: [facility]  DATE: 5/20/2024    INDICATION: fall , pain, negative XRay  COMPARISON: None.  TECHNIQUE: Unenhanced.    FINDINGS:    LEFT HIP:   -Labrum: No evidence of a detached labral  tear. No paralabral cyst.   -Cartilage: Normal.  -Joint space: small hip joint effusion   -Joint capsule/ligaments: Intact joint capsule. Ligamentum teres is intact.  -Morphology: Alpha Angle is normal. No bony morphologic changes associated with femoroacetabular impingement.    RIGHT HIP: No fracture, osteonecrosis, or joint effusion.    MUSCLES AND TENDONS:   -Gluteal: No tendon tear or tendinopathy. No trochanteric bursitis.  -Proximal hamstring: No tendon tear or tendinopathy.   -Iliopsoas: No tendon tear or tendinopathy. No bursitis.  -Rectus femoris origin: No tear or tendinopathy.    BONES:   -Bone marrow edema is seen within the femoral neck extending to the subcapital portions of the femoral head, with areas of linear marrow fracturing at the femoral neck subcapital junction (image 17, series 5; image 28, series 2), the marrow fracturing   does not extend completely across the femoral neck. The cortex is not disrupted.   -SI joints are normal. Visualized lumbar spine is normal.    SOFT TISSUES:   -Soft tissue stranding edema is seen overlying the greater trochanter and lateral aspect of the proximal femur consistent with soft tissue contusions. There is a trace amount of fluid within the greater trochanter bursa (image 13, series 4)    INTRA-PELVIC CONTENTS:  -Visualized portions are normal.      Impression    IMPRESSION:  1.  Bone marrow edema of the femoral neck extending to the subcapital junction of the femoral head and neck where there is incomplete marrow fracturing extending from the superolateral aspect of the femoral neck centrally, the fracturing does not extend   across the entirety of the femoral neck. (Image 17, series 5; image 28, series 2).   2.  Small volume of free fluid within the greater trochanter, favored reflect traumatic trochanteric bursitis given history of fall  3.  Soft tissue contusions overlying the greater trochanter and proximal femur, consistent with soft tissue contusions  from fall       Faustino Cotton MD  Hospitalist  [facility]

## 2024-05-20 NOTE — PLAN OF CARE
"Pt admitted from ED; NPO for possible surgery; AOx4; daughter in-law at bedside; LR 75ml/hr, NWB on left side; Q8VS; baseline numbness on R. Hand; RA.        Goal Outcome Evaluation:      Plan of Care Reviewed With: patient, family    Overall Patient Progress: no changeOverall Patient Progress: no change    Outcome Evaluation: awaiting ortho consult for today; pain managed with oxy      Problem: Adult Inpatient Plan of Care  Goal: Plan of Care Review  Description: The Plan of Care Review/Shift note should be completed every shift.  The Outcome Evaluation is a brief statement about your assessment that the patient is improving, declining, or no change.  This information will be displayed automatically on your shift  note.  5/20/2024 0628 by Elly Cosme RN  Outcome: Progressing  Flowsheets (Taken 5/20/2024 0628)  Outcome Evaluation:   awaiting ortho consult for today   pain managed with oxy  Plan of Care Reviewed With:   patient   family  Overall Patient Progress: no change  5/20/2024 0627 by Elly Cosme RN  Outcome: Progressing  Flowsheets (Taken 5/20/2024 0627)  Plan of Care Reviewed With: patient  5/20/2024 0520 by Elly Cosme RN  Outcome: Progressing  Flowsheets (Taken 5/20/2024 0520)  Plan of Care Reviewed With:   patient   child  Overall Patient Progress: improving  Goal: Patient-Specific Goal (Individualized)  Description: You can add care plan individualizations to a care plan. Examples of Individualization might be:  \"Parent requests to be called daily at 9am for status\", \"I have a hard time hearing out of my right ear\", or \"Do not touch me to wake me up as it startles  me\".  5/20/2024 0628 by Elly Cosme RN  Outcome: Progressing  5/20/2024 0627 by Elly Cosme RN  Outcome: Progressing  5/20/2024 0520 by Elly Cosme RN  Outcome: Progressing  Goal: Absence of Hospital-Acquired Illness or Injury  5/20/2024 0628 by Carmelina" DONTRELL Sanabria  Outcome: Progressing  5/20/2024 0627 by Elly Cosme RN  Outcome: Progressing  5/20/2024 0520 by Elly Cosme RN  Outcome: Progressing  Intervention: Identify and Manage Fall Risk  Recent Flowsheet Documentation  Taken 5/20/2024 0516 by Elly Cosme RN  Safety Promotion/Fall Prevention:   supervised activity   safety round/check completed   room near nurse's station   clutter free environment maintained  Intervention: Prevent Skin Injury  Recent Flowsheet Documentation  Taken 5/20/2024 0516 by Elly Cosme RN  Body Position: position changed independently  Skin Protection: adhesive use limited  Device Skin Pressure Protection:   absorbent pad utilized/changed   adhesive use limited  Intervention: Prevent and Manage VTE (Venous Thromboembolism) Risk  Recent Flowsheet Documentation  Taken 5/20/2024 0516 by Elly Cosme RN  VTE Prevention/Management: SCDs (sequential compression devices) off  Intervention: Prevent Infection  Recent Flowsheet Documentation  Taken 5/20/2024 0516 by Elly Cosme RN  Infection Prevention:   hand hygiene promoted   rest/sleep promoted   single patient room provided  Goal: Optimal Comfort and Wellbeing  5/20/2024 0628 by Elly Cosme RN  Outcome: Progressing  5/20/2024 0627 by Elly Cosme RN  Outcome: Progressing  5/20/2024 0520 by Elly Cosme RN  Outcome: Progressing  Goal: Readiness for Transition of Care  5/20/2024 0628 by Elly Cosme RN  Outcome: Progressing  5/20/2024 0627 by Elly Cosme RN  Outcome: Progressing  5/20/2024 0520 by Elly Cosme RN  Outcome: Progressing  Intervention: Mutually Develop Transition Plan  Recent Flowsheet Documentation  Taken 5/20/2024 0500 by Elly Cosme RN  Equipment Currently Used at Home: walker, rolling     Problem: Skin Injury Risk Increased  Goal: Skin Health and  Integrity  5/20/2024 0628 by Elly Cosme RN  Outcome: Progressing  5/20/2024 0627 by Elly Cosme RN  Outcome: Progressing  Intervention: Plan: Nurse Driven Intervention: Moisture Management  Recent Flowsheet Documentation  Taken 5/20/2024 0516 by Elly Cosme RN  Moisture Interventions:   No brief in bed   Incontinence pad  Intervention: Plan: Nurse Driven Intervention: Friction and Shear  Recent Flowsheet Documentation  Taken 5/20/2024 0516 by Elly Cosme RN  Friction/Shear Interventions: HOB 30 degrees or less  Intervention: Optimize Skin Protection  Recent Flowsheet Documentation  Taken 5/20/2024 0516 by Elly Cosme RN  Skin Protection: adhesive use limited  Head of Bed (HOB) Positioning: HOB at 20 degrees     Problem: Fall Injury Risk  Goal: Absence of Fall and Fall-Related Injury  Outcome: Progressing     Problem: Orthopaedic Fracture  Goal: Absence of Bleeding  Outcome: Progressing  Goal: Bowel Elimination  Outcome: Progressing  Goal: Absence of Embolism Signs and Symptoms  Outcome: Progressing  Goal: Fracture Stability  Outcome: Progressing  Goal: Optimal Functional Ability  Outcome: Progressing  Goal: Absence of Infection Signs and Symptoms  Outcome: Progressing  Goal: Effective Tissue Perfusion  Outcome: Progressing  Goal: Optimal Pain Control and Function  Outcome: Progressing  Goal: Effective Oxygenation and Ventilation  Outcome: Progressing

## 2024-05-20 NOTE — ANESTHESIA POSTPROCEDURE EVALUATION
Patient: Riddhi Aguilar    Procedure: Procedure(s):  CLOSED REDUCTION, HIP, WITH PERCUTANEOUS PINNING       Anesthesia Type:  General    Note:  Disposition: Inpatient   Postop Pain Control: Uneventful            Sign Out: Well controlled pain   PONV: No   Neuro/Psych: Uneventful            Sign Out: Acceptable/Baseline neuro status   Airway/Respiratory: Uneventful            Sign Out: Acceptable/Baseline resp. status   CV/Hemodynamics: Uneventful            Sign Out: Acceptable CV status; No obvious hypovolemia; No obvious fluid overload   Other NRE: NONE   DID A NON-ROUTINE EVENT OCCUR? No           Last vitals:  Vitals Value Taken Time   /60 05/20/24 1000   Temp     Pulse 85 05/20/24 1013   Resp 22 05/20/24 1013   SpO2 86 % 05/20/24 1001   Vitals shown include unfiled device data.    Electronically Signed By: Margoth Potter MD  May 20, 2024  10:15 AM

## 2024-05-20 NOTE — ED PROVIDER NOTES
Emergency Department Note      History of Present Illness     Chief Complaint  Fall and Hip Pain (Left hip)    HPI  Riddhi Aguilar is a 89 year old female who presents with family members for an evaluation after a fall. The patient states she fell this morning around 10:00am when pushing a handicap button and the reach being too far. She states she usually uses a walker despite coming in on a wheel chair. She also is having pain in her left hip and her left upper arm which is consistent with her bruise. Patient developed mild left calf pain while in the ED. She took Tylenol earlier today for the pain. She denies history of hip replacement. She also denies head, or neck pain or sustaining other injuries.     Independent Historian  None    Review of External Notes  None  Past Medical History   Medical History and Problem List  Anemia  Arthritis  Bleeding ulcer  CKD  Hypertension  Osteoporosis  Hypercholesterolemia  Spinal stenosis   Cataract  Glaucoma  Depression  Malignant neoplasm of breast    Medications  Atorvastatin  Bupropion  Lisinopril  Spironolactone  Verapamil     Surgical History   Appendectomy  Breast surgery  Cystoscopy  Excise lesion lower extremity  Incision and drainage perineal   Physical Exam   Patient Vitals for the past 24 hrs:   BP Temp Temp src Pulse Resp SpO2 Height Weight   05/19/24 2319 (!) 170/79 98.9  F (37.2  C) Temporal 83 20 100 % 1.524 m (5') --   05/19/24 2318 -- -- -- -- -- -- 1.524 m (5') 80.7 kg (177 lb 14.6 oz)     Physical Exam    HENT:  mmm, no rhinorrhea, atraumatic  Eyes: periorbital tissues and sclera normal, pupils equal  Neck: supple, no abnormal swelling, no midline tenderness, painless range of motion  Lungs:  CTAB,  no resp distress  CV: rrr, no m/r/g, ppi  Abd: soft, nontender, nondistended, no rebound/masses/guarding/hsm  Ext: Skeletal survey unremarkable exception of the left upper extremity where there is ecchymoses over the posterior portion of the elbow.  Mild  tenderness palpation at the radial head.  No significant joint effusion range of motion full intact and no significant pain.  Distal CMS intact left lower extremity there is pain with internal/external rotation of the left hip.  No tenderness of the mid femur distally through the foot.  Skin: warm, dry, well perfused, no rashes/bruising/lesions on exposed skin  Neuro: alert, MAEE, no gross motor or sensory deficits,   Psych: Normal mood, normal affect    Assist of 2 getting [facility] wheelchair [facility] the [facility]    Diagnostics   Lab Results   Labs Ordered and Resulted from Time of ED Arrival to Time of ED Departure   BASIC METABOLIC PANEL - Abnormal       Result Value    Sodium 135      Potassium 3.9      Chloride 101      Carbon Dioxide (CO2) 25      Anion Gap 9      Urea Nitrogen 22.6      Creatinine 0.90      GFR Estimate 59 (*)     Calcium 9.3      Glucose 96     ROUTINE UA WITH MICROSCOPIC REFLEX TO CULTURE   CBC WITH PLATELETS AND DIFFERENTIAL       Imaging  MR Hip Left w/o Contrast   Final Result   IMPRESSION:   1.  Bone marrow edema of the femoral neck extending to the subcapital junction of the femoral head and neck where there is incomplete marrow fracturing extending from the superolateral aspect of the femoral neck centrally, the fracturing does not extend    across the entirety of the femoral neck. (Image 17, series 5; image 28, series 2).    2.  Small volume of free fluid within the greater trochanter, favored reflect traumatic trochanteric bursitis given history of fall   3.  Soft tissue contusions overlying the greater trochanter and proximal femur, consistent with soft tissue contusions from fall      XR Pelvis w Hip Left 1 View   Final Result   IMPRESSION: Normal joint spaces and alignment. No acute fracture. 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 Significant atherosclerotic calcification of bilateral femoral arteries.      XR Elbow Left 2 Views   Final Result    IMPRESSION: A transverse ill-defined linear lucency is seen in the radial head without overlying cortical disruption, with a small elbow joint effusion, suspicious for a nondisplaced fracture. A small degenerative olecranon spur is present.        EKG   None     Independent Interpretation  Left elbow joint effusion, no obviously displaced radial head fracture however joint effusion suspicious for occult injury    Pelvis and hip radiograph without evidence of obvious fracture.  ED Course    Medications Administered  Medications   oxyCODONE IR (ROXICODONE) half-tab 2.5 mg (2.5 mg Oral $Given 5/20/24 0123)   ibuprofen (ADVIL/MOTRIN) tablet 400 mg (400 mg Oral $Given 5/20/24 0123)       Procedures  Procedures     Discussion of Management  Admitting Hospitalist,      Social Determinants of Health adding to complexity of care  None    ED Course  ED Course as of 05/20/24 0411   Sun May 19, 2024   2325 I obtained history and examined the patient as noted above.    Mon May 20, 2024   0056 I rechecked and updated the patient.      Medical Decision Making / Diagnosis   CMS Diagnoses: None    MIPS     None    [facility]  Riddhiantonia Aguilar is a 89 year old female here after an accidental ground-level fall injuring her left elbow and left hip.  She is very clear to there is no preceding lightheadedness dizziness chest pain shortness of breath and is also clear that she did not hit her head or lose consciousness.  No visible trauma to the head no neurologic deficits cervical spine clinically cleared not on anticoagulants I do not think she needs advanced imaging of the head.  Radiograph of the left elbow shows a nondisplaced radial head fracture she has minimal to no pain and full range of motion this would be a sling for comfort and return to activities as tolerated.  Left hip radiograph is negative still having pain and so we will follow-up with an MRI to evaluate for occult injury.  Likely will need observation admission for pain  control and monitoring even if MRI is negative given her current functional ability relative to her baseline and home status.        Update: MRI shows left femoral neck fracture.  Will admit to the hospital service for further evaluation and management and orthopedic consultation.  Patient and family aware of the radial head fracture as well as the left femoral neck fracture.  Disposition  The patient was admitted to the hospital.     ICD-10 Codes:    ICD-10-CM    1. Fall, initial encounter  W19.XXXA       2. Closed nondisplaced fracture of head of left radius, initial encounter  S52.125A       3. Left displaced femoral neck fracture (H)  S72.002A            Discharge Medications  New Prescriptions    No medications on file     Scribe Disclosure:  I, Romina Worthington, am serving as a scribe at 11:41 PM on 5/19/2024 to document services personally performed by John Haile MD based on my observations and the provider's statements to me.      John Haile MD  05/20/24 0414

## 2024-05-20 NOTE — ANESTHESIA PREPROCEDURE EVALUATION
Anesthesia Pre-Procedure Evaluation    Patient: Riddhi Aguilar   MRN: [id number] : 2/15/1931        Procedure : Procedure(s):  CLOSED REDUCTION, HIP, WITH PERCUTANEOUS PINNING          Past Medical History:   Diagnosis Date    Anemia     baseline 10-11    Arthritis     Bleeding ulcer     duodenal ulcer    CKD (chronic kidney disease) stage 3, GFR 30-59 ml/min (H)     Essential hypertension, benign     Osteoporosis     Pure hypercholesterolemia     Spinal stenosis     Unspecified cataract     Unspecified glaucoma(365.9)       Past Surgical History:   Procedure Laterality Date    APPENDECTOMY      BREAST SURGERY      lumpectomy    COMBINED CYSTOSCOPY, RETROGRADES, EXCHANGE STENT URETER(S) Left 2024    Procedure: Cystoscopy, left retrograde pyelogram, left JJ stent placement, <1hr physician fluoroscopy time;  Surgeon: Aureliano Brandt MD;  Location: [facility] OR    COMBINED CYSTOSCOPY, RETROGRADES, URETEROSCOPY, LASER HOLMIUM LITHOTRIPSY URETER(S), INSERT STENT Left 2024    Procedure: Cystoscopy, left diagnostic ureteroscopy, left retrograde pyelogram, left ureteral stent removal, fluoroscopic interpretation <1 hour physician time;  Surgeon: Richie Jaquez MD;  Location: [facility] OR    CYSTOSCOPY      CYSTOSCOPY, REMOVE STENT(S), COMBINED Left 2024    Procedure: Cystoscopy, remove stent(s), combined;  Surgeon: Richie Jaquez MD;  Location:  OR    DILATION AND CURETTAGE, HYSTEROSCOPY DIAGNOSTIC, COMBINED  2014    Procedure: COMBINED DILATION AND CURETTAGE, HYSTEROSCOPY DIAGNOSTIC;  DILATION AND CURETTAGE, HYSTEROSCOPY, POLYPECTOMY, INCISION AND DRAINAGE OF SEBACEOUS CYST ;  Surgeon: Serena Zamora MD;  Location:  [facility]    EXCISE LESION LOWER EXTREMITY Right 2017    Procedure: EXCISE LESION LOWER EXTREMITY;  WIDE EXCISIONAL BIOPSY OF RIGHT ANKLE BASAL CELL CARCINOMA;  Surgeon: Juan Luis Flores MD;  Location:  OR    INCISION AND DRAINAGE PERINEAL, COMBINED  2014    Procedure:  COMBINED INCISION AND DRAINAGE PERINEAL;;  Surgeon: Serena Zamora MD;  Location:  [facility]    [facility] NONSPECIFIC PROCEDURE      Repair of buckled retina of rt eye    [facility] NONSPECIFIC PROCEDURE      Appy      No Known Allergies   Social History     Tobacco Use    Smoking status: Never    Smokeless tobacco: Never   Substance Use Topics    Alcohol use: No     Alcohol/week: 0.0 standard drinks of alcohol      Wt Readings from Last 1 Encounters:   05/20/24 79 kg (174 lb 2.6 oz)        Anesthesia Evaluation   Pt has had prior anesthetic. Type: General.    No history of anesthetic complications       ROS/MED HX  ENT/Pulmonary:  - neg pulmonary ROS     Neurologic:  - neg neurologic ROS     Cardiovascular:     (+)  hypertension- -   -  - -                           valvular problems/murmurs type: AS and MR AS, MR, MS (all mild).         METS/Exercise Tolerance: >4 METS    Hematologic:  - neg hematologic  ROS     Musculoskeletal:   (+)     fracture,          GI/Hepatic:  - neg GI/hepatic ROS     Renal/Genitourinary:     (+) renal disease, type: CRI, Pt does not require dialysis,           Endo:     (+)               Obesity,       Psychiatric/Substance Use:  - neg psychiatric ROS     Infectious Disease:  - neg infectious disease ROS     Malignancy:  - neg malignancy ROS     Other:  - neg other ROS          Physical Exam    Airway        Mallampati: II   TM distance: > 3 FB   Neck ROM: full   Mouth opening: > 3 cm    Respiratory Devices and Support         Dental       (+) Edentulous      Cardiovascular   cardiovascular exam normal       Rhythm and rate: regular and normal     Pulmonary   pulmonary exam normal        breath sounds clear to auscultation           OUTSIDE LABS:  CBC:   Lab Results   Component Value Date    WBC 7.6 05/20/2024    WBC 4.9 02/05/2024    HGB 8.7 (L) 05/20/2024    HGB 10.0 (L) 05/14/2024    HCT 27.6 (L) 05/20/2024    HCT 29.1 (L) 02/05/2024     05/20/2024     02/05/2024     BMP:   Lab Results    Component Value Date     05/20/2024     05/14/2024    POTASSIUM 3.9 05/20/2024    POTASSIUM 4.1 05/14/2024    CHLORIDE 101 05/20/2024    CHLORIDE 106 02/05/2024    CO2 25 05/20/2024    CO2 23 02/05/2024    BUN 22.6 05/20/2024    BUN 26.5 (H) 02/05/2024    CR 0.90 05/20/2024    CR 0.96 (H) 05/14/2024    GLC 96 05/20/2024    GLC 71 02/05/2024     COAGS:   Lab Results   Component Value Date    INR 1.05 12/16/2012     POC:   Lab Results   Component Value Date     (H) 01/17/2010     HEPATIC:   Lab Results   Component Value Date    ALBUMIN 4.8 01/23/2024    PROTTOTAL 7.2 01/23/2024    ALT 16 01/23/2024    AST 33 01/23/2024    ALKPHOS 68 01/23/2024    BILITOTAL 2.1 (H) 01/23/2024     OTHER:   Lab Results   Component Value Date    LACT 1.2 01/26/2024    A1C 5.3 05/14/2024    TAMMY 9.3 05/20/2024    LIPASE 10 (L) 01/23/2024    AMYLASE 60 07/27/2010    TSH 5.28 (H) 05/14/2024    T4 1.09 05/14/2024       Anesthesia Plan    ASA Status:  3, emergent    NPO Status:  NPO Appropriate    Anesthesia Type: General.     - Airway: LMA   Induction: Intravenous.   Maintenance: Inhalation.        Consents    Anesthesia Plan(s) and associated risks, benefits, and realistic alternatives discussed. Questions answered and patient/representative(s) expressed understanding.     - Discussed: Risks, Benefits and Alternatives for the PROCEDURE were discussed     - Discussed with:  Patient       Use of blood products discussed: Yes.     - Discussed with: Patient.     - Consented: consented to blood products            Reason for refusal: other.     Postoperative Care    Pain management: IV analgesics, Oral pain medications.   PONV prophylaxis: Ondansetron (or other 5HT-3), Dexamethasone or Solumedrol     Comments:    Other Comments: Mechanical fall. Spoke with the patient and her daughter in law; DNR/DNI reversed for procedure, will re-instate after recovery period. Mild aortic stenosis, mild MR, mild mitral stenosis.    Plan: GA  LMA, fentanyl PRN. MAP > 70 mmHg.           Margoth Potter MD    I have reviewed the pertinent notes and labs in the chart from the past 30 days and (re)examined the patient.  Any updates or changes from those notes are reflected in this note.     # Hyponatremia: Lowest Na = 135 mmol/L in last 30 days, will monitor as appropriate          # Obesity: Estimated body mass index is 34.01 kg/m  as calculated from the following:    Height as of this encounter: 1.524 m (5').    Weight as of this encounter: 79 kg (174 lb 2.6 oz).

## 2024-05-20 NOTE — PHARMACY-ADMISSION MEDICATION HISTORY
Med rec was done by pre-admitting nurse,  Nurse Amna Blanco RN Mon May 20, 2024  7:56 AM       After confirming with patient, Wellbutrin XL 150mg daily was removed form PTA med list & Zoloft 25mg daily was added.      Medication History Reviewed & Completed By: Racquel Allen, PharmD 5/20/2024 12:25 PM    PTA Med List   Medication Sig Last Dose    acetaminophen (TYLENOL) 325 MG tablet Take 2 tablets (650 mg) by mouth every 4 hours as needed for mild pain or other (and adjunct with moderate or severe pain or per patient request) 5/19/2024    atorvastatin (LIPITOR) 10 MG tablet TAKE 1 TABLET(10 MG) BY MOUTH DAILY 5/19/2024    buPROPion (WELLBUTRIN XL) 300 MG 24 hr tablet TAKE 1 TABLET(300 MG) BY MOUTH EVERY MORNING 5/19/2024    dorzolamide-timolol (COSOPT) 22.3-6.8 MG/ML ophthalmic solution Place 1 drop into both eyes 2 times daily 5/19/2024    lisinopril (ZESTRIL) 40 MG tablet Take 1 tablet (40 mg) by mouth daily Hold for SBP <120 5/19/2024    multivitamin w/minerals (THERA-VIT-M) tablet Take 1 tablet by mouth daily 5/19/2024    sertraline (ZOLOFT) 25 MG tablet Take 25 mg by mouth daily Unknown    spironolactone (ALDACTONE) 25 MG tablet Take 1 tablet (25 mg) by mouth daily Hold for SBP <125 or DBP <55 5/19/2024    verapamil ER (CALAN-SR) 120 MG CR tablet TAKE 1 TABLET BY MOUTH EVERY 12 HOURS 5/19/2024    VITAMIN D PO Take 1 tablet by mouth daily Doesn't know strength 5/19/2024

## 2024-05-21 ENCOUNTER — APPOINTMENT (OUTPATIENT)
Dept: PHYSICAL THERAPY | Facility: CLINIC | Age: 89
DRG: 482 | End: 2024-05-21
Attending: INTERNAL MEDICINE
Payer: COMMERCIAL

## 2024-05-21 LAB
GLUCOSE SERPL-MCNC: 109 MG/DL (ref 70–99)
HGB BLD-MCNC: 9.2 G/DL (ref 11.7–15.7)

## 2024-05-21 PROCEDURE — 97161 PT EVAL LOW COMPLEX 20 MIN: CPT | Mod: GP | Performed by: PHYSICAL THERAPIST

## 2024-05-21 PROCEDURE — 250N000013 HC RX MED GY IP 250 OP 250 PS 637: Performed by: INTERNAL MEDICINE

## 2024-05-21 PROCEDURE — 99232 SBSQ HOSP IP/OBS MODERATE 35: CPT | Performed by: INTERNAL MEDICINE

## 2024-05-21 PROCEDURE — 97116 GAIT TRAINING THERAPY: CPT | Mod: GP | Performed by: PHYSICAL THERAPIST

## 2024-05-21 PROCEDURE — 97530 THERAPEUTIC ACTIVITIES: CPT | Mod: GP | Performed by: PHYSICAL THERAPIST

## 2024-05-21 PROCEDURE — 82947 ASSAY GLUCOSE BLOOD QUANT: CPT | Performed by: INTERNAL MEDICINE

## 2024-05-21 PROCEDURE — 258N000003 HC RX IP 258 OP 636: Performed by: INTERNAL MEDICINE

## 2024-05-21 PROCEDURE — 85018 HEMOGLOBIN: CPT

## 2024-05-21 PROCEDURE — 250N000011 HC RX IP 250 OP 636

## 2024-05-21 PROCEDURE — 36415 COLL VENOUS BLD VENIPUNCTURE: CPT

## 2024-05-21 PROCEDURE — 120N000001 HC R&B MED SURG/OB

## 2024-05-21 PROCEDURE — 250N000013 HC RX MED GY IP 250 OP 250 PS 637

## 2024-05-21 RX ORDER — ASPIRIN 325 MG
325 TABLET, DELAYED RELEASE (ENTERIC COATED) ORAL DAILY
DISCHARGE
Start: 2024-05-22

## 2024-05-21 RX ORDER — AMOXICILLIN 250 MG
1 CAPSULE ORAL 2 TIMES DAILY PRN
DISCHARGE
Start: 2024-05-21

## 2024-05-21 RX ORDER — OXYCODONE HYDROCHLORIDE 5 MG/1
2.5 TABLET ORAL EVERY 4 HOURS PRN
Qty: 12 TABLET | Refills: 0 | Status: SHIPPED | OUTPATIENT
Start: 2024-05-21 | End: 2024-05-24

## 2024-05-21 RX ORDER — ACETAMINOPHEN 325 MG/1
650 TABLET ORAL EVERY 4 HOURS PRN
DISCHARGE
Start: 2024-05-23 | End: 2024-05-23

## 2024-05-21 RX ADMIN — VERAPAMIL HYDROCHLORIDE 120 MG: 120 TABLET, FILM COATED, EXTENDED RELEASE ORAL at 08:10

## 2024-05-21 RX ADMIN — SERTRALINE HYDROCHLORIDE 25 MG: 25 TABLET ORAL at 08:10

## 2024-05-21 RX ADMIN — ACETAMINOPHEN 975 MG: 325 TABLET, FILM COATED ORAL at 14:01

## 2024-05-21 RX ADMIN — CEFAZOLIN 1 G: 1 INJECTION, POWDER, FOR SOLUTION INTRAMUSCULAR; INTRAVENOUS at 01:20

## 2024-05-21 RX ADMIN — ACETAMINOPHEN 975 MG: 325 TABLET, FILM COATED ORAL at 06:52

## 2024-05-21 RX ADMIN — ATORVASTATIN CALCIUM 10 MG: 10 TABLET, FILM COATED ORAL at 08:10

## 2024-05-21 RX ADMIN — POLYETHYLENE GLYCOL 3350 17 G: 17 POWDER, FOR SOLUTION ORAL at 12:39

## 2024-05-21 RX ADMIN — ASPIRIN 325 MG: 325 TABLET ORAL at 08:10

## 2024-05-21 RX ADMIN — BUPROPION HYDROCHLORIDE 300 MG: 150 TABLET, EXTENDED RELEASE ORAL at 08:10

## 2024-05-21 RX ADMIN — SENNOSIDES AND DOCUSATE SODIUM 1 TABLET: 50; 8.6 TABLET ORAL at 12:39

## 2024-05-21 RX ADMIN — DORZOLAMIDE HYDROCHLORIDE AND TIMOLOL MALEATE 1 DROP: 22.3; 6.8 SOLUTION/ DROPS OPHTHALMIC at 08:11

## 2024-05-21 RX ADMIN — DORZOLAMIDE HYDROCHLORIDE AND TIMOLOL MALEATE 1 DROP: 22.3; 6.8 SOLUTION/ DROPS OPHTHALMIC at 19:27

## 2024-05-21 RX ADMIN — VERAPAMIL HYDROCHLORIDE 120 MG: 120 TABLET, FILM COATED, EXTENDED RELEASE ORAL at 19:19

## 2024-05-21 RX ADMIN — ACETAMINOPHEN 975 MG: 325 TABLET, FILM COATED ORAL at 21:45

## 2024-05-21 RX ADMIN — SODIUM CHLORIDE 500 ML: 9 INJECTION, SOLUTION INTRAVENOUS at 10:30

## 2024-05-21 RX ADMIN — SPIRONOLACTONE 25 MG: 25 TABLET ORAL at 08:10

## 2024-05-21 RX ADMIN — OXYCODONE HYDROCHLORIDE 2.5 MG: 5 TABLET ORAL at 21:49

## 2024-05-21 RX ADMIN — SENNOSIDES AND DOCUSATE SODIUM 1 TABLET: 50; 8.6 TABLET ORAL at 19:20

## 2024-05-21 ASSESSMENT — ACTIVITIES OF DAILY LIVING (ADL)
ADLS_ACUITY_SCORE: 37
DEPENDENT_IADLS:: INDEPENDENT;CLEANING;TRANSPORTATION
ADLS_ACUITY_SCORE: 37

## 2024-05-21 NOTE — PROGRESS NOTES
[facility]    Medicine Progress Note - Hospitalist Service    Date of Admission:  5/19/2024    Assessment & Plan     Riddhi Aguilar is a 89 year old female with PMH including HTN, HLD, anemia, CKD stage IIIa, bleeding ulcer, MDD, glaucoma, spinal stenosis, nephrolithiasis, and breast cancer who presents for left hip pain after a fall.  She normally is very active and she was ambulating with her walker when she reached out to push a handicap button to open a door and she fell on her left side.     Here in the ER, Blood pressure was 170/79, heart 83, temperature 90.9  F, oxygen 100% on room air. BMP at baseline with creatinine 0.9. CBC in process. X-ray left elbow shows a left radial head fracture with small effusion. X-ray the pelvis and left hip did not show any fracture. MRI of the left hip shows incomplete left femoral neck fracture with bone marrow edema.     Riddhi was admitted for pain control and orthopedic surgery consultation.  She underwent closed reduction with percutaneous pinning of the left hip on 5/20.  Doing well postoperatively, likely will need TCU.    BP running somewhat low so holding some of her home bp regimen.    Acute traumatic incomplete left femoral neck fracture  Acute traumatic left radial head fracture:   -Status post closed reduction and percutaneous pinning of the left hip on 5/20.  - She is a very robust 89-year-old in fact they have a planned [place] cruise 1 week from now.  -Acetaminophen 975 mg 3 times daily.  Oxycodone 2.5 mg for moderate and 5 mg severe pain every 4 as needed.  Place lidocaine patch.  Avoid NSAIDs given history of GI bleed secondary to ulcer.  -Consult PT/OT/SW  -Anticipate likely requiring TCU upon discharge     HTN: PTA on spironolactone 25 mg daily, verapamil  mg twice daily, lisinopril 40 daily.  Blood pressure 170/79 in the ER but has trended to borderline hypotension in spite of holding lisinopril.  -Continue verapamil   -Hold  spironolactone given orthostatic symptoms.  -Hold lisinopril, might not need to restart upon discharge.     CKD stage IIIa: Creatinine at baseline of 0.9-1.     Lymphedema: Has chronic mild lower extremity edema for which she uses compression stockings.  1+ edema on exam which is her baseline.     Chronic anemia: Hemoglobin baseline is 9-10.  -CBC is in process     MDD: Resume PTA Wellbutrin XL 3 mg daily.     HLD: Resume PTA atorvastatin 10 mg daily.     Glaucoma  Cataracts: Resume Cosopt drops.     History of breast cancer          Diet: Advance Diet as Tolerated: Regular Diet Adult    DVT Prophylaxis:  mg daily as per orthopedic surgery.  Martines Catheter: Not present  Lines: None     Cardiac Monitoring: None  Code Status: No CPR- Do NOT Intubate      Clinically Significant Risk Factors                  # Hypertension: Noted on problem list        # Obesity: Estimated body mass index is 34.01 kg/m  as calculated from the following:    Height as of this encounter: 1.524 m (5').    Weight as of this encounter: 79 kg (174 lb 2.6 oz)., PRESENT ON ADMISSION            Disposition Plan     Medically Ready for Discharge: Anticipated Tomorrow             Amari Hooks MD  Hospitalist Service  [facility]  Securely message with [email or web address] (more info)  Text page via [email or web address] Paging/Directory   ______________________________________________________________________    Interval History   Bp running a bit low, orthostatic symptoms  Holding spironolactone  Already holding lisinopril  Updated DiL at the bedside and son via phone  Feels well otherwise, no cardiopulmonary complaints.    Physical Exam   Vital Signs: Temp: 98.9  F (37.2  C) Temp src: Temporal BP: (!) 105/29 Pulse: 54   Resp: 24 SpO2: 96 % (pt said she was just taken off of 02. Will notify nurse) O2 Device: None (Room air) Oxygen Delivery: 1 LPM  Weight: 174 lbs 2.61 oz    General: Alert, awake, no acute distress.  HEENT: NC/AT, eyes  anicteric, external occular movements intact, face symmetric.    Cardiac: RRR, S1, S2.  No murmurs appreciated.  Pulmonary: Normal chest rise, normal work of breathing.  Lungs CTA BL  Abdomen: soft, non-tender, non-distended.  Bowel Sounds Present.  No guarding.  Extremities: trace LE edema. no deformities.  Warm, well perfused.  Skin: no rashes or lesions noted.  Warm and Dry.  Neuro: No focal deficits noted.  Speech clear.  Coordination and strength grossly normal.  Psych: Appropriate affect.      Medical Decision Making       45 MINUTES SPENT BY ME on the date of service doing chart review, history, exam, documentation & further activities per the note.      Data     I have personally reviewed the following data over the past 24 hrs:    N/A  \   9.2 (L)   / N/A     N/A N/A N/A /  109 (H)   N/A N/A N/A \

## 2024-05-21 NOTE — PLAN OF CARE
"Goal Outcome Evaluation:      Plan of Care Reviewed With: patient    Overall Patient Progress: improving    Outcome Evaluation: Pt denies pain, discomfort-symone Tyl given; A/Ox4; Ax1 GB/walker; aquacel CDI; pending UC; PT/OT/SW consulted for d/c planning      Problem: Adult Inpatient Plan of Care  Goal: Plan of Care Review  Description: The Plan of Care Review/Shift note should be completed every shift.  The Outcome Evaluation is a brief statement about your assessment that the patient is improving, declining, or no change.  This information will be displayed automatically on your shift  note.  Outcome: Progressing  Flowsheets (Taken 5/21/2024 0512)  Outcome Evaluation:   Pt denies pain   A/Ox4   Ax1 GB/walker   aquacel CDI   pending UC   PT/OT/SW consulted for d/c planning  Plan of Care Reviewed With: patient  Overall Patient Progress: improving  Goal: Patient-Specific Goal (Individualized)  Description: You can add care plan individualizations to a care plan. Examples of Individualization might be:  \"Parent requests to be called daily at 9am for status\", \"I have a hard time hearing out of my right ear\", or \"Do not touch me to wake me up as it startles  me\".  Outcome: Progressing  Goal: Absence of Hospital-Acquired Illness or Injury  Outcome: Progressing  Intervention: Identify and Manage Fall Risk  Recent Flowsheet Documentation  Taken 5/20/2024 2130 by Benji Phillip RN  Safety Promotion/Fall Prevention:   supervised activity   safety round/check completed   nonskid shoes/slippers when out of bed   lighting adjusted   activity supervised  Intervention: Prevent Skin Injury  Recent Flowsheet Documentation  Taken 5/20/2024 2130 by Benji Phillip RN  Body Position: position changed independently  Intervention: Prevent and Manage VTE (Venous Thromboembolism) Risk  Recent Flowsheet Documentation  Taken 5/20/2024 2130 by Benji Phillip RN  VTE Prevention/Management: SCDs (sequential compression devices) " on  Intervention: Prevent Infection  Recent Flowsheet Documentation  Taken 5/20/2024 2130 by Benji Phillip RN  Infection Prevention:   rest/sleep promoted   single patient room provided  Goal: Optimal Comfort and Wellbeing  Outcome: Progressing  Intervention: Monitor Pain and Promote Comfort  Recent Flowsheet Documentation  Taken 5/20/2024 2130 by Benji Phillip RN  Pain Management Interventions:   medication (see MAR)   rest   quiet environment facilitated  Goal: Readiness for Transition of Care  Outcome: Progressing     Problem: Skin Injury Risk Increased  Goal: Skin Health and Integrity  Outcome: Progressing  Intervention: Optimize Skin Protection  Recent Flowsheet Documentation  Taken 5/20/2024 2130 by Benji Phillip RN  Activity Management:   activity adjusted per tolerance   back to bed  Head of Bed (HOB) Positioning: HOB at 30-45 degrees     Problem: Fall Injury Risk  Goal: Absence of Fall and Fall-Related Injury  Outcome: Progressing  Intervention: Identify and Manage Contributors  Recent Flowsheet Documentation  Taken 5/20/2024 2130 by Benji Phillip RN  Medication Review/Management: medications reviewed  Intervention: Promote Injury-Free Environment  Recent Flowsheet Documentation  Taken 5/20/2024 2130 by Benji Phillip RN  Safety Promotion/Fall Prevention:   supervised activity   safety round/check completed   nonskid shoes/slippers when out of bed   lighting adjusted   activity supervised     Problem: Orthopaedic Fracture  Goal: Absence of Bleeding  Outcome: Progressing  Goal: Bowel Elimination  Outcome: Progressing  Goal: Absence of Embolism Signs and Symptoms  Outcome: Progressing  Intervention: Prevent or Manage Embolism Risk  Recent Flowsheet Documentation  Taken 5/20/2024 2130 by Benji Phillip RN  VTE Prevention/Management: SCDs (sequential compression devices) on  Goal: Fracture Stability  Outcome: Progressing  Goal: Optimal Functional Ability  Outcome: Progressing  Intervention: Optimize  Functional Ability  Recent Flowsheet Documentation  Taken 5/20/2024 2130 by Benji Phillip RN  Activity Management:   activity adjusted per tolerance   back to bed  Positioning/Transfer Devices:   pillows   in use  Goal: Absence of Infection Signs and Symptoms  Outcome: Progressing  Goal: Effective Tissue Perfusion  Outcome: Progressing  Goal: Optimal Pain Control and Function  Outcome: Progressing  Intervention: Manage Acute Orthopaedic-Related Pain  Recent Flowsheet Documentation  Taken 5/20/2024 2130 by Benji Phillip RN  Pain Management Interventions:   medication (see MAR)   rest   quiet environment facilitated  Goal: Effective Oxygenation and Ventilation  Outcome: Progressing  Intervention: Promote Airway Secretion Clearance  Recent Flowsheet Documentation  Taken 5/20/2024 2130 by Benji Phillip RN  Cough And Deep Breathing: done independently per patient  Activity Management:   activity adjusted per tolerance   back to bed  Intervention: Optimize Oxygenation and Ventilation  Recent Flowsheet Documentation  Taken 5/20/2024 2130 by Benji Phillip RN  Head of Bed (HOB) Positioning: HOB at 30-45 degrees

## 2024-05-21 NOTE — CONSULTS
ORTHOPAEDIC SURGERY CONSULTATION NOTE  Subjective      CONSULTING PROVIDER:  Jose Luis Merino MD    HISTORY OF PRESENT ILLNESS  Riddhi Aguilar is a 89 year old female who presents for evaluation of hip and left elbow pain.  Patient fell from standing yesterday.  Had difficulty ambulating afterwards and was therefore brought to the emergency room.  X-rays as well as an MRI demonstrated a minimally displaced valgus impacted left femoral neck fracture.  In addition, has a nondisplaced left radial head fracture.  Denies numbness and tingling distally.  No other associated injuries.  Previously ambulating with a walker    MEDICAL HISTORY  Past Medical History:   Diagnosis Date    Anemia     baseline 10-11    Arthritis     Bleeding ulcer     duodenal ulcer    CKD (chronic kidney disease) stage 3, GFR 30-59 ml/min (H)     Essential hypertension, benign     Osteoporosis     Pure hypercholesterolemia     Spinal stenosis     Unspecified cataract     Unspecified glaucoma(365.9)        SURGICAL HISTORY  Past Surgical History:   Procedure Laterality Date    APPENDECTOMY      BREAST SURGERY      lumpectomy    COMBINED CYSTOSCOPY, RETROGRADES, EXCHANGE STENT URETER(S) Left 01/24/2024    Procedure: Cystoscopy, left retrograde pyelogram, left JJ stent placement, <1hr physician fluoroscopy time;  Surgeon: Aureliano Brandt MD;  Location: [facility] OR    COMBINED CYSTOSCOPY, RETROGRADES, URETEROSCOPY, LASER HOLMIUM LITHOTRIPSY URETER(S), INSERT STENT Left 02/20/2024    Procedure: Cystoscopy, left diagnostic ureteroscopy, left retrograde pyelogram, left ureteral stent removal, fluoroscopic interpretation <1 hour physician time;  Surgeon: Richie Jaquez MD;  Location: [facility] OR    CYSTOSCOPY      CYSTOSCOPY, REMOVE STENT(S), COMBINED Left 02/20/2024    Procedure: Cystoscopy, remove stent(s), combined;  Surgeon: Richie Jaquez MD;  Location:  OR    DILATION AND CURETTAGE, HYSTEROSCOPY DIAGNOSTIC, COMBINED  02/06/2014    Procedure: COMBINED DILATION  AND CURETTAGE, HYSTEROSCOPY DIAGNOSTIC;  DILATION AND CURETTAGE, HYSTEROSCOPY, POLYPECTOMY, INCISION AND DRAINAGE OF SEBACEOUS CYST ;  Surgeon: Serena Zamora MD;  Location:  [facility]    EXCISE LESION LOWER EXTREMITY Right 08/29/2017    Procedure: EXCISE LESION LOWER EXTREMITY;  WIDE EXCISIONAL BIOPSY OF RIGHT ANKLE BASAL CELL CARCINOMA;  Surgeon: Juan Luis Flores MD;  Location:  OR    INCISION AND DRAINAGE PERINEAL, COMBINED  02/06/2014    Procedure: COMBINED INCISION AND DRAINAGE PERINEAL;;  Surgeon: Serena Zamora MD;  Location:  [facility]    [facility] NONSPECIFIC PROCEDURE      Repair of buckled retina of rt eye    [facility] NONSPECIFIC PROCEDURE      Appy       CURRENT MEDICATIONS    Current Facility-Administered Medications:     [START ON 5/23/2024] acetaminophen (TYLENOL) tablet 650 mg, 650 mg, Oral, Q4H PRN, Cherelle Goins PA-C    acetaminophen (TYLENOL) tablet 975 mg, 975 mg, Oral, Q8H, Cherelle Goins PA-C, 975 mg at 05/21/24 0652    aspirin (ASA) EC tablet 325 mg, 325 mg, Oral, Daily, Cherelle Goins PA-C    atorvastatin (LIPITOR) tablet 10 mg, 10 mg, Oral, Daily, Faustino Cototn MD, 10 mg at 05/20/24 1438    benzocaine-menthol (CHLORASEPTIC) 6-10 MG lozenge 1 lozenge, 1 lozenge, Buccal, Q1H PRN, Cherelle Goins PA-C    bisacodyl (DULCOLAX) suppository 10 mg, 10 mg, Rectal, Daily PRN, Cherelle Goins PA-C    buPROPion (WELLBUTRIN XL) 24 hr tablet 300 mg, 300 mg, Oral, QAM, Faustino Cotton MD, 300 mg at 05/20/24 1439    calcium carbonate (TUMS) chewable tablet 1,000 mg, 1,000 mg, Oral, 4x Daily PRN, Faustino Cotton MD    dorzolamide-timolol (COSOPT) ophthalmic solution 1 drop, 1 drop, Both Eyes, BID, Faustino Cotton MD, 1 drop at 05/20/24 2132    famotidine (PEPCID) tablet 20 mg, 20 mg, Oral, Daily at 8 pm, 20 mg at 05/20/24 2130 **OR** famotidine (PEPCID) injection 20 mg, 20 mg, Intravenous, Daily at 8 pm, Cherelle Goins PA-C    HYDROmorphone (DILAUDID) injection 0.1 mg, 0.1 mg,  Intravenous, Q2H PRN **OR** HYDROmorphone (DILAUDID) injection 0.2 mg, 0.2 mg, Intravenous, Q2H PRN, Cherelle Goins PA-C    lactated ringers infusion, , Intravenous, Continuous, Cherelle Goins PA-C, Last Rate: 50 mL/hr at 05/20/24 1820, Rate Verify at 05/20/24 1820    lidocaine (LMX4) cream, , Topical, Q1H PRN, Faustino Cotton MD    lidocaine (LMX4) cream, , Topical, Q1H PRN, Cherelle Goins PA-C    lidocaine 1 % 0.1-1 mL, 0.1-1 mL, Other, Q1H PRN, Faustino Cotton MD    lidocaine 1 % 0.1-1 mL, 0.1-1 mL, Other, Q1H PRN, Cherelle Goins PA-C    [Held by provider] lisinopril (ZESTRIL) tablet 40 mg, 40 mg, Oral, Daily, Faustino Cotton MD    magnesium hydroxide (MILK OF MAGNESIA) suspension 30 mL, 30 mL, Oral, Daily PRN, Cherelle Goins PA-C    melatonin tablet 1 mg, 1 mg, Oral, At Bedtime PRN, Faustino Cotton MD    naloxone (NARCAN) injection 0.2 mg, 0.2 mg, Intravenous, Q2 Min PRN **OR** naloxone (NARCAN) injection 0.4 mg, 0.4 mg, Intravenous, Q2 Min PRN **OR** naloxone (NARCAN) injection 0.2 mg, 0.2 mg, Intramuscular, Q2 Min PRN **OR** naloxone (NARCAN) injection 0.4 mg, 0.4 mg, Intramuscular, Q2 Min PRN, Faustino Cotton MD    ondansetron (ZOFRAN ODT) ODT tab 4 mg, 4 mg, Oral, Q6H PRN **OR** ondansetron (ZOFRAN) injection 4 mg, 4 mg, Intravenous, Q6H PRN, Faustino Cotton MD    oxyCODONE IR (ROXICODONE) half-tab 2.5 mg, 2.5 mg, Oral, Q4H PRN **OR** oxyCODONE (ROXICODONE) tablet 5 mg, 5 mg, Oral, Q4H PRN, Cherelle Goins PA-C    polyethylene glycol (MIRALAX) Packet 17 g, 17 g, Oral, BID PRN, Faustino Cotton MD    polyethylene glycol (MIRALAX) Packet 17 g, 17 g, Oral, Daily, Cherelle Goins PA-C    prochlorperazine (COMPAZINE) injection 5 mg, 5 mg, Intravenous, Q6H PRN **OR** prochlorperazine (COMPAZINE) tablet 5 mg, 5 mg, Oral, Q6H PRN, Cherelle Goins PA-C    senna-docusate (SENOKOT-S/PERICOLACE) 8.6-50 MG per tablet 1 tablet, 1 tablet, Oral, BID PRN **OR**  senna-docusate (SENOKOT-S/PERICOLACE) 8.6-50 MG per tablet 2 tablet, 2 tablet, Oral, BID PRN, Faustino Cotton MD    senna-docusate (SENOKOT-S/PERICOLACE) 8.6-50 MG per tablet 1 tablet, 1 tablet, Oral, BID, Cherelle Goins PA-C, 1 tablet at 05/20/24 2130    sertraline (ZOLOFT) tablet 25 mg, 25 mg, Oral, Daily, Amari Hooks MD, 25 mg at 05/20/24 1643    sodium chloride (PF) 0.9% PF flush 3 mL, 3 mL, Intracatheter, Q8H, Faustino Cotton MD, 3 mL at 05/21/24 0652    sodium chloride (PF) 0.9% PF flush 3 mL, 3 mL, Intracatheter, q1 min prn, Faustino Cotton MD    sodium chloride (PF) 0.9% PF flush 3 mL, 3 mL, Intracatheter, Q8H, Cherelle Goins PA-C    sodium chloride (PF) 0.9% PF flush 3 mL, 3 mL, Intracatheter, q1 min prn, Cherelle Goins PA-C    spironolactone (ALDACTONE) tablet 25 mg, 25 mg, Oral, Daily, Faustino Cotton MD, 25 mg at 05/20/24 1439    verapamil ER (CALAN-SR) CR tablet 120 mg, 120 mg, Oral, BID, Faustino Cotton MD, 120 mg at 05/20/24 2302    SOCIAL HISTORY    Tobacco history is   History   Smoking Status    Never   Smokeless Tobacco    Never   .    REVIEW OF SYSTEMS  Constitutional: Negative for chills, fever, nausea, vomiting.  Objective   VITAL SIGNS  BP   Temp    Pulse   Resp    SpO2    Body mass index is 34.01 kg/m .    PHYSICAL EXAMINATION  Orthopedic Physical Examination_  General Appearance: Patient appears active, comfortable, no acute distress.  NEURO: The patient is alert and oriented to person, place, and time and anle to follow commands.  HEENT: Head normocephalic, atraumatic.  CVS: No cyanosis or obvious jugular venous distension .  Resp: No acute respiratory distress or increased inspiratory effort. Musculoskeletal:  The left lower extremity:  Neurologic: Patient demonstrates active extensor hallucis longus, flexor hallucis longus, tibialis anterior, gastrocsoleus complex.  Intact sensation to light touch through the deep peroneal,  superficial peroneal, sural, saphenous, and tibial nerve distributions of the affected leg.    Left upper extremity:  Skin clean, dry and intact.  infection. Neurovascular exam:  Intact extensor pollicis longus, flexor pollicis longus, Extensor digitorum comminus, Flexor digitorum profundus, Flexor digitorum superficialis, intrinsic muscles of the hand.  Sensation intact to light touch median, radial and ulnar nerve distributions. Palpable radial pulse.    DIAGNOSTICS  IMAGING:  Left hip series and MRI of the left hip demonstrate minimally displaced left femoral neck fracture in good alignment    Left elbow series demonstrates nondisplaced left radial head fracture    Assessment & Plan   89-year-old female with valgus impacted left hip, nondisplaced left radial head fracture    PLAN:  I discussed with Riddhi and her family members treatment options for her hip and elbow pain.  X-rays were reviewed.  Would recommend closed reduction percutaneous pinning of the valgus impacted femoral neck fracture.  Risk benefits alternatives were discussed with patient.  Expressed understanding of the risks and stated preference to proceed with surgery.  Will plan for none operative treatment of the nondisplaced left radial head fracture with early range of motion in a sling for symptomatic pain control. All patient questions were answered to the best of my ability at this visit. Thank you for consultation of this very pleasant patient    ADMINISTRATIVE/BILLIN minutes were spent in care of this patient greater than 30 of which were spent in counseling, imaging review and coordination of care

## 2024-05-21 NOTE — PROGRESS NOTES
"   05/21/24 0941   Appointment Info   Signing Clinician's Name / Credentials (PT) Melissa Botello, DARCIE   Living Environment   People in Home alone   Current Living Arrangements independent living facility   Home Accessibility no concerns;wheelchair accessible   Self-Care   Usual Activity Tolerance good   Current Activity Tolerance fair   Equipment Currently Used at Home walker, rolling  (4WW)   Fall history within last six months yes   Number of times patient has fallen within last six months 2   Activity/Exercise/Self-Care Comment normally independent with mobility and cares; daughter assists with groceries; has cleaning help   General Information   Onset of Illness/Injury or Date of Surgery 05/19/24   Referring Physician Cherelle Goins PA-C   Patient/Family Therapy Goals Statement (PT) go to rehab; prefers [facility]   Pertinent History of Current Problem (include personal factors and/or comorbidities that impact the POC) Patient seen POD #1 s/p \"Closed reduction percutaneous pinning, left hip\";\"Acute traumatic left radial head fracture; non operative   Existing Precautions/Restrictions fall;weight bearing   Weight-Bearing Status - LLE weight-bearing as tolerated   General Observations patient up in chair upon therapist arrival   Cognition   Cognitive Status Comments appears intact during session   Pain Assessment   Patient Currently in Pain No  (reports ache in L hip but no pain)   Integumentary/Edema   Integumentary/Edema Comments significant bruising noted on L UE; see nursing notes for further information   Posture    Posture Comments forward flexed at head and trunk   Range of Motion (ROM)   ROM Comment L LE limited by recent surgery and discomfort; others appear WFL   Strength (Manual Muscle Testing)   Strength Comments L LE limited by recent surgery and discomfort; others appear WFL   Bed Mobility   Comment, (Bed Mobility) NT; patient up in chair and wanting to remain there   Transfers   Comment, (Transfers) " sit<>stand with use of walker and mod A of 1   Gait/Stairs (Locomotion)   Comment, (Gait/Stairs) min A and use of walker   Balance   Balance Comments some baseline impairment; use of 4WW; current need for assist   Sensory Examination   Sensory Perception Comments intact   Clinical Impression   Criteria for Skilled Therapeutic Intervention Yes, treatment indicated   PT Diagnosis (PT) impaired functional mobility   Influenced by the following impairments L hip discomfort; hypotension; decreased L hip ROM/strength; impaired balance; decreased activity tolerance; functional weakness   Functional limitations due to impairments impaired independence with mobility and cares secondary to above deficits   Clinical Presentation (PT Evaluation Complexity) evolving   Clinical Presentation Rationale clinical judgement; level of assist   Clinical Decision Making (Complexity) low complexity   Planned Therapy Interventions (PT) balance training;bed mobility training;gait training;ROM (range of motion);strengthening;transfer training;progressive activity/exercise   Risk & Benefits of therapy have been explained evaluation/treatment results reviewed;care plan/treatment goals reviewed;risks/benefits reviewed;current/potential barriers reviewed;participants voiced agreement with care plan;participants included;patient   Clinical Impression Comments significantly below baseline   PT Total Evaluation Time   PT Eval, Low Complexity Minutes (27707) 10   Physical Therapy Goals   PT Frequency 5x/week   PT Predicted Duration/Target Date for Goal Attainment 05/23/24   PT Goals Bed Mobility;Transfers;Gait   PT: Bed Mobility Supervision/stand-by assist;Supine to/from sit;Rolling   PT: Transfers Sit to/from stand;Bed to/from chair;Assistive device;Supervision/stand-by assist   PT: Gait Supervision/stand-by assist;Rolling walker;100 feet   PT Discharge Planning   PT Discharge Recommendation (DC Rec) Transitional Care Facility   PT Rationale for DC  Rec Patient significantly below reported baseline level of function and resides alone in an ILF; Currently requiring assist for all mobility and cares and is limited by hypotension during mobility; would benefit from TCU to maximize return to PLOF   PT Brief overview of current status A x 1 with FWW; symptomatic dizziness and hypotension during gait   PT Equipment Needed at Discharge walker, rolling  (has own 4WW)

## 2024-05-21 NOTE — CONSULTS
Care Management Initial Consult    General Information  Assessment completed with: Patient, Children, Patient and dtr in law Reese  Type of CM/SW Visit: Initial Assessment    Primary Care Provider verified and updated as needed: Yes   Readmission within the last 30 days: no previous admission in last 30 days      Reason for Consult: discharge planning  Advance Care Planning: Advance Care Planning Reviewed: no concerns identified          Communication Assessment  Patient's communication style: spoken language (English or Bilingual)    Hearing Difficulty or Deaf: yes   Wear Glasses or Blind: yes    Cognitive  Cognitive/Neuro/Behavioral: WDL  Level of Consciousness: alert  Arousal Level: opens eyes spontaneously                Living Environment:   People in home: alone     Current living Arrangements: independent living facility      Able to return to prior arrangements: yes       Family/Social Support:  Care provided by: self  Provides care for: no one  Marital Status:   Children          Description of Support System: Supportive, Involved    Support Assessment: Adequate family and caregiver support, Adequate social supports    Current Resources:   Patient receiving home care services: No     Community Resources: None  Equipment currently used at home: walker, rolling  Supplies currently used at home: None       Does the patient's insurance plan have a 3 day qualifying hospital stay waiver?  Yes     Which insurance plan 3 day waiver is available? Alternative insurance waiver    Will the waiver be used for post-acute placement? Yes    Lifestyle & Psychosocial Needs:  Social Determinants of Health     Food Insecurity: Low Risk  (5/14/2024)    Food Insecurity     Within the past 12 months, did you worry that your food would run out before you got money to buy more?: No     Within the past 12 months, did the food you bought just not last and you didn t have money to get more?: No   Depression: Not at risk  (5/14/2024)    PHQ-2     PHQ-2 Score: 1   Housing Stability: Low Risk  (5/14/2024)    Housing Stability     Do you have housing? : Yes     Are you worried about losing your housing?: No   Tobacco Use: Low Risk  (5/20/2024)    Patient History     Smoking Tobacco Use: Never     Smokeless Tobacco Use: Never     Passive Exposure: Not on file   Financial Resource Strain: Low Risk  (5/14/2024)    Financial Resource Strain     Within the past 12 months, have you or your family members you live with been unable to get utilities (heat, electricity) when it was really needed?: No   Alcohol Use: Not on file   Transportation Needs: Low Risk  (5/14/2024)    Transportation Needs     Within the past 12 months, has lack of transportation kept you from medical appointments, getting your medicines, non-medical meetings or appointments, work, or from getting things that you need?: No   Physical Activity: Unknown (5/14/2024)    Exercise Vital Sign     Days of Exercise per Week: 4 days     Minutes of Exercise per Session: Not on file   Interpersonal Safety: Low Risk  (10/31/2023)    Interpersonal Safety     Do you feel physically and emotionally safe where you currently live?: Yes     Within the past 12 months, have you been hit, slapped, kicked or otherwise physically hurt by someone?: No     Within the past 12 months, have you been humiliated or emotionally abused in other ways by your partner or ex-partner?: No   Stress: No Stress Concern Present (5/14/2024)    Citizen of [place] of Occupational Health - Occupational Stress Questionnaire     Feeling of Stress : Not at all   Social Connections: Unknown (5/14/2024)    Social Connection and Isolation Panel [NHANES]     Frequency of Communication with Friends and Family: Not on file     Frequency of Social Gatherings with Friends and Family: Once a week     Attends Cheondoism Services: Not on file     Active Member of Clubs or Organizations: Not on file     Attends Club or Organization  Meetings: Not on file     Marital Status: Not on file   Health Literacy: Not on file       Functional Status:  Prior to admission patient needed assistance:   Dependent ADLs:: Ambulation-walker, Independent  Dependent IADLs:: Independent, Cleaning, Transportation              Additional Information:  Patient was admitted on 5/19/24 after a fall at her independent living facility resulting in acute traumatic incomplete left femoral neck fracture and acute traumatic left radial head fracture. S/P closed reduction and percutaneous pinning of the left hip on 5/20/24. 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 Therapies are currently recommending patient discharge to TCU when medically ready for continued therapies and strengthening. Patient is in agreement with this and would love to [facility] if possible. She has been here before and had a great experience. SNF packet provide and educated patient on how to navigate medicare.gov to research facilities and ratings. Patient is also in agreement with referral to [facility] as back up in case [facility] is full. Pt would prefer a private room and is in agreement with $72/day out of pocket private room fees.     Referrals sent to selected TCUs, awaiting responses from facilities. Patient plans on family transporting her to TCU at time of discharge. Will update family when we have accepting facility.       ADDENDUM 1400: Patient was accepted by [facility] for [facility] tomorrow 5/22/24 as long as they received authorization from pt's [facility] MEDICARE ADVANTAGE plan. They will have a shared room for patient tomorrow but, patient is on the list to move into a private room once one becomes available. Met with patient and dtr in law Reese again at bedside to update on info above. Patient will accept  this bed and is ok with the room situation. Patient should be medically ready tomorrow 5/22. Family will transport once we have update from TCU regarding auth. Will continue to follow for discharge planning.       You have successfully submitted the preadmission screening (PAS) to the Senior LinkAge Line on:  Created On  5/21/2024 1:55 PM    Your confirmation number is:  [id number]      Michaelle Sanchez RN  Care Coordinator  [facility]  [phone]

## 2024-05-21 NOTE — PROGRESS NOTES
Orthopedic Surgery  Riddhi Aguilar  05/21/2024     Admit Date:  5/19/2024  POD: 1 Day Post-Op   Procedure(s):  Closed reduction percutaneous pinning, left hip     Patient resting comfortably in chair.    Pain controlled.  Tolerating oral intake.    Denies nausea or vomiting  Denies chest pain or shortness of breath    Temp:  [97.4  F (36.3  C)-98.9  F (37.2  C)] 98.9  F (37.2  C)  Pulse:  [] 54  Resp:  [14-26] 24  BP: (105-149)/(29-86) 105/29  SpO2:  [90 %-100 %] 96 %    Alert and oriented  Dressing is clean, dry, and intact.   Minimal erythema of the surrounding skin.   Bilateral calves are soft, non-tender.  Left lower extremity is NVI.  Sensation intact bilateral lower extremities  Patient able to resist dorsi and plantar flexion bilaterally  +Dp pulse    Labs:  Recent Labs   Lab Test 05/21/24  0704 05/20/24  0309 05/14/24  1036 02/05/24  0529 01/27/24  0525   WBC  --  7.6  --  4.9 10.6   HGB 9.2* 8.7* 10.0* 9.4* 9.7*   PLT  --  304  --  331 260       1. PLAN:   Continue ASA 325mg for DVT prophylaxis.     Mobilize with PT/OT    WBAT Left LE with walker.     Continue current pain regiment.   Dressings: Keep intact.  Change if >60% saturated or peeling off.    Follow-up: 2 weeks post-op with Dr Merino team    2. Disposition   Anticipate d/c to TCU when medically cleared and progressing in PT.    Trinidad Newell PA-C

## 2024-05-21 NOTE — PLAN OF CARE
"  Problem: Adult Inpatient Plan of Care  Goal: Plan of Care Review  Description: The Plan of Care Review/Shift note should be completed every shift.  The Outcome Evaluation is a brief statement about your assessment that the patient is improving, declining, or no change.  This information will be displayed automatically on your shift  note.  Outcome: Progressing  Flowsheets (Taken 5/21/2024 1419)  Outcome Evaluation: Pt denies pain. Did feel dizzy and \"foggy\" when up to bathroom this am. BP soft. Given 500cc bolus. BP still 99 systolic but pt reports feeling much better. Up with 1 assist gait belt and walker and voiding well in bathroom. Passing gas. Given miralax and senna. Daughter in law at bedside and supportive. Pt alert and oriented x 4. Has been up in recliner most of day except to get up to bathroom. Tylenol scheduled for pain. Plans to discharge to TCU. BP meds either held or parameters in for when to hold them. Ice to incision and aquacel intact. Will monitor.  Plan of Care Reviewed With:   patient   family  Overall Patient Progress: improving  Goal: Patient-Specific Goal (Individualized)  Description: You can add care plan individualizations to a care plan. Examples of Individualization might be:  \"Parent requests to be called daily at 9am for status\", \"I have a hard time hearing out of my right ear\", or \"Do not touch me to wake me up as it startles  me\".  Outcome: Progressing  Goal: Absence of Hospital-Acquired Illness or Injury  Outcome: Progressing  Intervention: Identify and Manage Fall Risk  Recent Flowsheet Documentation  Taken 5/21/2024 0934 by Kaylen Olea RN  Safety Promotion/Fall Prevention:   supervised activity   safety round/check completed   nonskid shoes/slippers when out of bed   lighting adjusted   activity supervised  Intervention: Prevent Skin Injury  Recent Flowsheet Documentation  Taken 5/21/2024 0934 by Kaylen Olea RN  Body Position: position changed " independently  Intervention: Prevent and Manage VTE (Venous Thromboembolism) Risk  Recent Flowsheet Documentation  Taken 5/21/2024 0934 by Kaylen Olea RN  VTE Prevention/Management: SCDs (sequential compression devices) on  Intervention: Prevent Infection  Recent Flowsheet Documentation  Taken 5/21/2024 0934 by Kaylen Olea RN  Infection Prevention:   rest/sleep promoted   single patient room provided  Goal: Optimal Comfort and Wellbeing  Outcome: Progressing  Goal: Readiness for Transition of Care  Outcome: Progressing     Problem: Skin Injury Risk Increased  Goal: Skin Health and Integrity  Outcome: Progressing  Intervention: Optimize Skin Protection  Recent Flowsheet Documentation  Taken 5/21/2024 1238 by Kaylen Olea RN  Activity Management: activity adjusted per tolerance  Taken 5/21/2024 0934 by Kaylen Olea RN  Activity Management:   activity adjusted per tolerance   back to bed  Head of Bed (HOB) Positioning: HOB at 30-45 degrees  Taken 5/21/2024 0824 by Kaylen Olea RN  Activity Management: activity adjusted per tolerance     Problem: Fall Injury Risk  Goal: Absence of Fall and Fall-Related Injury  Outcome: Progressing  Intervention: Identify and Manage Contributors  Recent Flowsheet Documentation  Taken 5/21/2024 0934 by Kaylen Olea RN  Medication Review/Management: medications reviewed  Intervention: Promote Injury-Free Environment  Recent Flowsheet Documentation  Taken 5/21/2024 0934 by Kaylen Olea RN  Safety Promotion/Fall Prevention:   supervised activity   safety round/check completed   nonskid shoes/slippers when out of bed   lighting adjusted   activity supervised     Problem: Orthopaedic Fracture  Goal: Absence of Bleeding  Outcome: Progressing  Goal: Bowel Elimination  Outcome: Progressing  Goal: Absence of Embolism Signs and Symptoms  Outcome: Progressing  Intervention: Prevent or Manage Embolism Risk  Recent Flowsheet  Documentation  Taken 5/21/2024 0934 by Kaylen Olea RN  VTE Prevention/Management: SCDs (sequential compression devices) on  Goal: Fracture Stability  Outcome: Progressing  Goal: Optimal Functional Ability  Outcome: Progressing  Intervention: Optimize Functional Ability  Recent Flowsheet Documentation  Taken 5/21/2024 1238 by Kaylen Olea RN  Activity Management: activity adjusted per tolerance  Taken 5/21/2024 0934 by Kaylen Olea RN  Activity Management:   activity adjusted per tolerance   back to bed  Positioning/Transfer Devices:   pillows   in use  Taken 5/21/2024 0824 by Kaylen Olea RN  Activity Management: activity adjusted per tolerance  Goal: Absence of Infection Signs and Symptoms  Outcome: Progressing  Goal: Effective Tissue Perfusion  Outcome: Progressing  Goal: Optimal Pain Control and Function  Outcome: Progressing  Goal: Effective Oxygenation and Ventilation  Outcome: Progressing  Intervention: Promote Airway Secretion Clearance  Recent Flowsheet Documentation  Taken 5/21/2024 1238 by Kaylen Olea RN  Activity Management: activity adjusted per tolerance  Taken 5/21/2024 0934 by Kaylen Olea RN  Cough And Deep Breathing: done independently per patient  Activity Management:   activity adjusted per tolerance   back to bed  Taken 5/21/2024 0824 by Kaylen Olea RN  Activity Management: activity adjusted per tolerance  Intervention: Optimize Oxygenation and Ventilation  Recent Flowsheet Documentation  Taken 5/21/2024 0934 by Kaylen Olea RN  Head of Bed (HOB) Positioning: HOB at 30-45 degrees   Goal Outcome Evaluation:

## 2024-05-22 ENCOUNTER — APPOINTMENT (OUTPATIENT)
Dept: PHYSICAL THERAPY | Facility: CLINIC | Age: 89
DRG: 482 | End: 2024-05-22
Payer: COMMERCIAL

## 2024-05-22 ENCOUNTER — LAB REQUISITION (OUTPATIENT)
Dept: LAB | Facility: CLINIC | Age: 89
End: 2024-05-22
Payer: COMMERCIAL

## 2024-05-22 VITALS
TEMPERATURE: 97.1 F | WEIGHT: 174.16 LBS | RESPIRATION RATE: 12 BRPM | SYSTOLIC BLOOD PRESSURE: 103 MMHG | BODY MASS INDEX: 34.19 KG/M2 | HEIGHT: 60 IN | HEART RATE: 69 BPM | OXYGEN SATURATION: 97 % | DIASTOLIC BLOOD PRESSURE: 48 MMHG

## 2024-05-22 DIAGNOSIS — Z11.1 ENCOUNTER FOR SCREENING FOR RESPIRATORY TUBERCULOSIS: ICD-10-CM

## 2024-05-22 LAB
BACTERIA UR CULT: NORMAL
GLUCOSE SERPL-MCNC: 86 MG/DL (ref 70–99)
HGB BLD-MCNC: 8.8 G/DL (ref 11.7–15.7)

## 2024-05-22 PROCEDURE — 82947 ASSAY GLUCOSE BLOOD QUANT: CPT | Performed by: INTERNAL MEDICINE

## 2024-05-22 PROCEDURE — 250N000013 HC RX MED GY IP 250 OP 250 PS 637: Performed by: INTERNAL MEDICINE

## 2024-05-22 PROCEDURE — 85018 HEMOGLOBIN: CPT

## 2024-05-22 PROCEDURE — 97530 THERAPEUTIC ACTIVITIES: CPT | Mod: GP | Performed by: PHYSICAL THERAPIST

## 2024-05-22 PROCEDURE — 36415 COLL VENOUS BLD VENIPUNCTURE: CPT | Performed by: INTERNAL MEDICINE

## 2024-05-22 PROCEDURE — 97116 GAIT TRAINING THERAPY: CPT | Mod: GP | Performed by: PHYSICAL THERAPIST

## 2024-05-22 PROCEDURE — 99239 HOSP IP/OBS DSCHRG MGMT >30: CPT | Performed by: INTERNAL MEDICINE

## 2024-05-22 PROCEDURE — 250N000013 HC RX MED GY IP 250 OP 250 PS 637

## 2024-05-22 RX ORDER — LISINOPRIL 40 MG/1
40 TABLET ORAL DAILY
Status: SHIPPED
Start: 2024-05-22 | End: 2024-06-06

## 2024-05-22 RX ORDER — SPIRONOLACTONE 25 MG/1
25 TABLET ORAL DAILY
Qty: 90 TABLET | Refills: 3 | Status: SHIPPED | OUTPATIENT
Start: 2024-05-22 | End: 2024-06-06

## 2024-05-22 RX ORDER — VERAPAMIL HYDROCHLORIDE 120 MG/1
TABLET, FILM COATED, EXTENDED RELEASE ORAL
Qty: 180 TABLET | Refills: 11 | Status: SHIPPED | OUTPATIENT
Start: 2024-05-22 | End: 2024-06-06

## 2024-05-22 RX ADMIN — ACETAMINOPHEN 975 MG: 325 TABLET, FILM COATED ORAL at 14:05

## 2024-05-22 RX ADMIN — POLYETHYLENE GLYCOL 3350 17 G: 17 POWDER, FOR SOLUTION ORAL at 08:09

## 2024-05-22 RX ADMIN — DORZOLAMIDE HYDROCHLORIDE AND TIMOLOL MALEATE 1 DROP: 22.3; 6.8 SOLUTION/ DROPS OPHTHALMIC at 08:11

## 2024-05-22 RX ADMIN — BUPROPION HYDROCHLORIDE 300 MG: 150 TABLET, EXTENDED RELEASE ORAL at 08:08

## 2024-05-22 RX ADMIN — SENNOSIDES AND DOCUSATE SODIUM 1 TABLET: 50; 8.6 TABLET ORAL at 08:08

## 2024-05-22 RX ADMIN — VERAPAMIL HYDROCHLORIDE 120 MG: 120 TABLET, FILM COATED, EXTENDED RELEASE ORAL at 08:09

## 2024-05-22 RX ADMIN — SERTRALINE HYDROCHLORIDE 25 MG: 25 TABLET ORAL at 08:09

## 2024-05-22 RX ADMIN — ATORVASTATIN CALCIUM 10 MG: 10 TABLET, FILM COATED ORAL at 08:09

## 2024-05-22 RX ADMIN — ASPIRIN 325 MG: 325 TABLET ORAL at 08:08

## 2024-05-22 RX ADMIN — ACETAMINOPHEN 975 MG: 325 TABLET, FILM COATED ORAL at 06:29

## 2024-05-22 ASSESSMENT — ACTIVITIES OF DAILY LIVING (ADL)
ADLS_ACUITY_SCORE: 38

## 2024-05-22 NOTE — PLAN OF CARE
"Goal Outcome Evaluation:    Patient vital signs are at baseline: Yes  Patient able to ambulate as they were prior to admission or with assist devices provided by therapies during their stay:  No,  Reason:  requires Ax1 GB & walker   Patient MUST void prior to discharge:  Yes  Patient able to tolerate oral intake:  Yes  Pain has adequate pain control using Oral analgesics:  Yes  Does patient have an identified :  Yes  Has goal D/C date and time been discussed with patient:  Yes    A&O x4  Regular diet   Loss of IV access dt leaking  CMS intact, dressing CDI   Pain managed with tylenol, oxycodone     Problem: Adult Inpatient Plan of Care  Goal: Plan of Care Review  Description: The Plan of Care Review/Shift note should be completed every shift.  The Outcome Evaluation is a brief statement about your assessment that the patient is improving, declining, or no change.  This information will be displayed automatically on your shift  note.  Outcome: Progressing  Flowsheets (Taken 5/21/2024 1051)  Plan of Care Reviewed With: patient  Overall Patient Progress: improving  Goal: Patient-Specific Goal (Individualized)  Description: You can add care plan individualizations to a care plan. Examples of Individualization might be:  \"Parent requests to be called daily at 9am for status\", \"I have a hard time hearing out of my right ear\", or \"Do not touch me to wake me up as it startles  me\".  Outcome: Progressing  Goal: Absence of Hospital-Acquired Illness or Injury  Outcome: Progressing  Goal: Optimal Comfort and Wellbeing  Outcome: Progressing  Intervention: Monitor Pain and Promote Comfort  Recent Flowsheet Documentation  Taken 5/21/2024 6869 by Floresita Foster RN  Pain Management Interventions: medication (see MAR)  Goal: Readiness for Transition of Care  Outcome: Progressing           Plan of Care Reviewed With: patient    Overall Patient Progress: improvingOverall Patient Progress: improving           "

## 2024-05-22 NOTE — PROGRESS NOTES
[facility]    Medicine Progress Note - Hospitalist Service    Date of Admission:  5/19/2024    Assessment & Plan     Riddhi Aguilar is a 89 year old female with PMH including HTN, HLD, anemia, CKD stage IIIa, bleeding ulcer, MDD, glaucoma, spinal stenosis, nephrolithiasis, and breast cancer who presents for left hip pain after a fall.  She normally is very active and she was ambulating with her walker when she reached out to push a handicap button to open a door and she fell on her left side.     Here in the ER, Blood pressure was 170/79, heart 83, temperature 90.9  F, oxygen 100% on room air. BMP at baseline with creatinine 0.9. CBC in process. X-ray left elbow shows a left radial head fracture with small effusion. X-ray the pelvis and left hip did not show any fracture. MRI of the left hip shows incomplete left femoral neck fracture with bone marrow edema.     Riddhi was admitted for pain control and orthopedic surgery consultation.  She underwent closed reduction with percutaneous pinning of the left hip on 5/20.  Doing well postoperatively, likely will need TCU.    BP running somewhat low so holding some of her home bp regimen.    Awaiting bed availability/prior authorization at TCU.  Medically ready for discharge.    Acute traumatic incomplete left femoral neck fracture  Acute traumatic left radial head fracture:   -Status post closed reduction and percutaneous pinning of the left hip on 5/20.  - She is a very robust 89-year-old in fact they have a planned [employer] cruise 1 week from now.  -Acetaminophen 975 mg 3 times daily.  Oxycodone 2.5 mg for moderate and 5 mg severe pain every 4 as needed.  Place lidocaine patch.  Avoid NSAIDs given history of GI bleed secondary to ulcer.  -Consult PT/OT/SW  -TCU upon discharge     HTN: PTA on spironolactone 25 mg daily, verapamil  mg twice daily, lisinopril 40 daily.  Blood pressure 170/79 in the ER but has trended to borderline hypotension in  spite of holding lisinopril.  -Initially continued verapamil but now plan to hold as well  -Hold spironolactone given orthostatic symptoms.  -Hold lisinopril  -Will need to discharge with fairly strict hold parameters versus instructions to TCU to stop blood pressure meds until reevaluation.     CKD stage IIIa: Creatinine at baseline of 0.9-1.     Lymphedema: Has chronic mild lower extremity edema for which she uses compression stockings.  1+ edema on exam which is her baseline.     Chronic anemia: Hemoglobin baseline is 9-10.  -CBC is in process     MDD: Resume PTA Wellbutrin XL 3 mg daily.     HLD: Resume PTA atorvastatin 10 mg daily.     Glaucoma  Cataracts: Resume Cosopt drops.     History of breast cancer          Diet: Advance Diet as Tolerated: Regular Diet Adult    DVT Prophylaxis:  mg daily as per orthopedic surgery.  Martines Catheter: Not present  Lines: None     Cardiac Monitoring: None  Code Status: No CPR- Do NOT Intubate      Clinically Significant Risk Factors                  # Hypertension: Noted on problem list        # Obesity: Estimated body mass index is 34.01 kg/m  as calculated from the following:    Height as of this encounter: 1.524 m (5').    Weight as of this encounter: 79 kg (174 lb 2.6 oz)., PRESENT ON ADMISSION            Disposition Plan     Medically Ready for Discharge: Ready Now, awaiting prior authorization/bed availability             Amari Hooks MD  Hospitalist Service  [facility]  Securely message with [employer] (more info)  Text page via [employer] Paging/Directory   ______________________________________________________________________    Interval History   Bp running a bit low, orthostatic symptoms better after holding spironolactone  Plan to hold verapamil as well, she did get her morning dose  Updated her daughter in law at the bedside who is actually a postpartum nurse here at [facility]  Awaiting prior authorization for TCU  placement  Completed some paperwork for family    Physical Exam   Vital Signs: Temp: 97.1  F (36.2  C) Temp src: Temporal BP: 103/48 Pulse: 69   Resp: 12 SpO2: 97 % O2 Device: Nasal cannula Oxygen Delivery: 1 LPM  Weight: 174 lbs 2.61 oz    General: Alert, awake, no acute distress.  HEENT: NC/AT, eyes anicteric, external occular movements intact, face symmetric.    Cardiac: RRR, S1, S2.  No murmurs appreciated.  Pulmonary: Normal chest rise, normal work of breathing.  Lungs CTA BL  Abdomen: soft, non-tender, non-distended.  Bowel Sounds Present.  No guarding.  Extremities: trace LE edema. no deformities.  Warm, well perfused.  Skin: no rashes or lesions noted.  Warm and Dry.  Neuro: No focal deficits noted.  Speech clear.  Coordination and strength grossly normal.  Psych: Appropriate affect.      Medical Decision Making       45 MINUTES SPENT BY ME on the date of service doing chart review, history, exam, documentation & further activities per the note.      Data     I have personally reviewed the following data over the past 24 hrs:    N/A  \   8.8 (L)   / N/A     N/A N/A N/A /  86   N/A N/A N/A \

## 2024-05-22 NOTE — DISCHARGE SUMMARY
[facility]  Discharge Summary  Name: Riddhi Aguilar    MRN: [id number]  YOB: 1931    Age: 89 year old  Date of Discharge:  5/22/2024  Date of Admission: 5/19/2024  Primary Care Provider: Shun Prado  Discharge Physician:  Roland Hooks MD  Discharging Service:  Hospitalist      Hospital Course/Discharge Diagnoses:  Riddhi Aguilar is a 89 year old female with PMH including HTN, HLD, anemia, CKD stage IIIa, bleeding ulcer, MDD, glaucoma, spinal stenosis, nephrolithiasis, and breast cancer who presents for left hip pain after a fall.  She normally is very active and she was ambulating with her walker when she reached out to push a handicap button to open a door and she fell on her left side.      Here in the ER, Blood pressure was 170/79, heart 83, temperature 90.9  F, oxygen 100% on room air. BMP at baseline with creatinine 0.9. CBC in process. X-ray left elbow shows a left radial head fracture with small effusion. X-ray the pelvis and left hip did not show any fracture. MRI of the left hip shows incomplete left femoral neck fracture with bone marrow edema.      Riddhi was admitted for pain control and orthopedic surgery consultation.  She underwent closed reduction with percutaneous pinning of the left hip on 5/20.  Doing well postoperatively, likely will need TCU.     BP running somewhat low so holding some of her home bp regimen.     Medically ready to discharge to TCU     Acute traumatic incomplete left femoral neck fracture  Acute traumatic left radial head fracture:   -Status post closed reduction and percutaneous pinning of the left hip on 5/20.  - She is a very robust 89-year-old in fact they have a planned [employer] cruise 1 week from now.  -Pain control as per orthopedic surgery  -Consult PT/OT/SW  -TCU upon discharge     HTN: PTA on spironolactone 25 mg daily, verapamil  mg twice daily, lisinopril 40 daily.  Blood pressure 170/79 in the ER but has trended to  borderline hypotension in spite of holding lisinopril.  -Had to hold home blood pressure regimen due to some orthostatic symptoms and lower blood pressures  -discharge with fairly strict hold parameters versus instructions to TCU to stop blood pressure meds until reevaluation.     CKD stage IIIa: Creatinine at baseline of 0.9-1.     Lymphedema: Has chronic mild lower extremity edema for which she uses compression stockings.  1+ edema on exam which is her baseline.     Chronic anemia: Hemoglobin baseline is 9-10.  -CBC is in process     MDD: Resume PTA Wellbutrin XL 3 mg daily.     HLD: Resume PTA atorvastatin 10 mg daily.     Glaucoma  Cataracts: Resume Cosopt drops.     History of breast cancer        Discharge Disposition:  Discharged to short-term care facility     Allergies:  No Known Allergies     Discharge Medications:        Review of your medicines        START taking        Dose / Directions   aspirin 325 MG EC tablet  Commonly known as: ASA  Indication: VTE Prophylaxis      Dose: 325 mg  Take 1 tablet (325 mg) by mouth daily  Refills: 0     oxyCODONE 5 MG tablet  Commonly known as: ROXICODONE      Dose: 2.5 mg  Take 0.5 tablets (2.5 mg) by mouth every 4 hours as needed for moderate to severe pain  Quantity: 12 tablet  Refills: 0     senna-docusate 8.6-50 MG tablet  Commonly known as: SENOKOT-S/PERICOLACE      Dose: 1 tablet  Take 1 tablet by mouth 2 times daily as needed for constipation  Refills: 0            CONTINUE these medicines which may have CHANGED, or have new prescriptions. If we are uncertain of the size of tablets/capsules you have at home, strength may be listed as something that might have changed.        Dose / Directions   acetaminophen 325 MG tablet  Commonly known as: TYLENOL  This may have changed:   reasons to take this  These instructions start on May 23, 2024. If you are unsure what to do until then, ask your doctor or other care provider.      Dose: 650 mg  Start taking on: May 23,  2024  Take 2 tablets (650 mg) by mouth every 4 hours as needed for other (For optimal non-opioid multimodal pain management to improve pain control.)  Refills: 0     lisinopril 40 MG tablet  Commonly known as: ZESTRIL  This may have changed: additional instructions  Used for: Essential hypertension, benign      Dose: 40 mg  Take 1 tablet (40 mg) by mouth daily Hold for SBP <140 initially, revisit w/ TCU provider.  Refills: 0     spironolactone 25 MG tablet  Commonly known as: ALDACTONE  This may have changed: additional instructions  Used for: Essential hypertension, benign, Major depressive disorder, recurrent episode, mild (H24)      Dose: 25 mg  Take 1 tablet (25 mg) by mouth daily Hold for SBP <130 or DBP <55 or dizziness initially, revisit w/ tcu provider.  Quantity: 90 tablet  Refills: 3     verapamil  MG CR tablet  Commonly known as: CALAN-SR  This may have changed: additional instructions  Used for: Essential hypertension, benign      TAKE 1 TABLET BY MOUTH EVERY 12 HOURS.  Hold for SBP < 135 initially, revisit w/ TCU provider.  Quantity: 180 tablet  Refills: 11            CONTINUE these medicines which have NOT CHANGED        Dose / Directions   atorvastatin 10 MG tablet  Commonly known as: LIPITOR  Used for: Hyperlipidemia LDL goal <130      Dose: 10 mg  TAKE 1 TABLET(10 MG) BY MOUTH DAILY  Quantity: 90 tablet  Refills: 2     buPROPion 300 MG 24 hr tablet  Commonly known as: WELLBUTRIN XL  Used for: Major depressive disorder, recurrent episode, mild (H24)      TAKE 1 TABLET(300 MG) BY MOUTH EVERY MORNING  Quantity: 90 tablet  Refills: 3     dorzolamide-timolol 2-0.5 % ophthalmic solution  Commonly known as: COSOPT      Dose: 1 drop  Place 1 drop into both eyes 2 times daily  Refills: 0     multivitamin w/minerals tablet      Dose: 1 tablet  Take 1 tablet by mouth daily  Refills: 0     sertraline 25 MG tablet  Commonly known as: ZOLOFT      Dose: 25 mg  Take 25 mg by mouth daily  Refills: 0      VITAMIN D PO      Dose: 1 tablet  Take 1 tablet by mouth daily Doesn't know strength  Refills: 0               Where to get your medicines        These medications were sent to [facility] [place], [place] - [address] [address] [facility]  [address] [address] [facility] [address]      Phone: [phone]   spironolactone 25 MG tablet  verapamil  MG CR tablet       Some of these will need a paper prescription and others can be bought over the counter. Ask your nurse if you have questions.    Bring a paper prescription for each of these medications  oxyCODONE 5 MG tablet         Condition on Discharge:  Discharge condition: Stable       Code status on discharge: DNR / DNI     History of Illness:  See detailed admission note for full details.    Physical Exam:  Vital signs:  Temp: 97.1  F (36.2  C) Temp src: Temporal BP: 103/48 Pulse: 69   Resp: 12 SpO2: 97 % O2 Device: Nasal cannula Oxygen Delivery: 1 LPM Height: 152.4 cm (5') Weight: 79 kg (174 lb 2.6 oz)  Estimated body mass index is 34.01 kg/m  as calculated from the following:    Height as of this encounter: 1.524 m (5').    Weight as of this encounter: 79 kg (174 lb 2.6 oz).    Wt Readings from Last 1 Encounters:   05/20/24 79 kg (174 lb 2.6 oz)     General: Alert, awake, no acute distress.  HEENT: NC/AT, eyes anicteric, external occular movements intact, face symmetric.  Dentition WNL, MM moist.  Cardiac: RRR, S1, S2.  No murmurs appreciated.  Pulmonary: Normal chest rise, normal work of breathing.  Lungs CTA BL  Abdomen: soft, non-tender, non-distended.  Bowel Sounds Present.  No guarding.  Extremities: no deformities.  Warm, well perfused.  Skin: no rashes or lesions noted.  Warm and Dry.  Neuro: No focal deficits noted.  Speech clear.  Coordination and strength grossly normal.  Psych: Appropriate affect.    Procedures other than Imaging:  Closed reduction and percutaneous pinning of hip fracture     Imaging:  Results for orders placed or  performed during the hospital encounter of 05/19/24   XR Pelvis w Hip Left 1 View    Narrative    EXAM: XR PELVIS AND HIP LEFT 1 VIEW  LOCATION: [facility]  DATE: 5/20/2024    INDICATION: fall, pain  COMPARISON: None.      Impression    IMPRESSION: Normal joint spaces and alignment. No acute fracture. 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 Significant atherosclerotic calcification of bilateral femoral arteries.   XR Elbow Left 2 Views    Narrative    EXAM: XR ELBOW LEFT 2 VIEWS  LOCATION: [facility]  DATE: 5/20/2024    INDICATION: fall, pain  COMPARISON: None.      Impression    IMPRESSION: A transverse ill-defined linear lucency is seen in the radial head without overlying cortical disruption, with a small elbow joint effusion, suspicious for a nondisplaced fracture. A small degenerative olecranon spur is present.   MR Hip Left w/o Contrast    Narrative    EXAM: MR HIP LEFT W/O CONTRAST  LOCATION: [facility]  DATE: 5/20/2024    INDICATION: fall , pain, negative XRay  COMPARISON: None.  TECHNIQUE: Unenhanced.    FINDINGS:    LEFT HIP:   -Labrum: No evidence of a detached labral tear. No paralabral cyst.   -Cartilage: Normal.  -Joint space: small hip joint effusion   -Joint capsule/ligaments: Intact joint capsule. Ligamentum teres is intact.  -Morphology: Alpha Angle is normal. No bony morphologic changes associated with femoroacetabular impingement.    RIGHT HIP: No fracture, osteonecrosis, or joint effusion.    MUSCLES AND TENDONS:   -Gluteal: No tendon tear or tendinopathy. No trochanteric bursitis.  -Proximal hamstring: No tendon tear or tendinopathy.   -Iliopsoas: No tendon tear or tendinopathy. No bursitis.  -Rectus femoris origin: No tear or tendinopathy.    BONES:   -Bone marrow edema is seen within the femoral neck extending to the subcapital portions of the femoral head, with areas of linear marrow  fracturing at the femoral neck subcapital junction (image 17, series 5; image 28, series 2), the marrow fracturing   does not extend completely across the femoral neck. The cortex is not disrupted.   -SI joints are normal. Visualized lumbar spine is normal.    SOFT TISSUES:   -Soft tissue stranding edema is seen overlying the greater trochanter and lateral aspect of the proximal femur consistent with soft tissue contusions. There is a trace amount of fluid within the greater trochanter bursa (image 13, series 4)    INTRA-PELVIC CONTENTS:  -Visualized portions are normal.      Impression    IMPRESSION:  1.  Bone marrow edema of the femoral neck extending to the subcapital junction of the femoral head and neck where there is incomplete marrow fracturing extending from the superolateral aspect of the femoral neck centrally, the fracturing does not extend   across the entirety of the femoral neck. (Image 17, series 5; image 28, series 2).   2.  Small volume of free fluid within the greater trochanter, favored reflect traumatic trochanteric bursitis given history of fall  3.  Soft tissue contusions overlying the greater trochanter and proximal femur, consistent with soft tissue contusions from fall   XR Surgery HEYDI L/T 5 Min Fluoro w Stills    Narrative    This exam was marked as non-reportable because it will not be read by a   radiologist or a [place] non-radiologist provider.              Consultations:  Consultation during this admission received from orthopedics.       Recent Lab Results:  Recent Labs   Lab 05/22/24  0757 05/21/24  0704 05/20/24  0309   WBC  --   --  7.6   HGB 8.8* 9.2* 8.7*   HCT  --   --  27.6*   MCV  --   --  100   PLT  --   --  304          Lab Results   Component Value Date     05/20/2024     05/14/2024     02/05/2024     04/26/2021     04/12/2021     12/27/2018    Lab Results   Component Value Date    CHLORIDE 101 05/20/2024    CHLORIDE 106 02/05/2024     CHLORIDE 102 01/30/2024    CHLORIDE 106 04/27/2022    CHLORIDE 107 04/26/2021    CHLORIDE 106 04/12/2021    CHLORIDE 104 12/27/2018    Lab Results   Component Value Date    BUN 22.6 05/20/2024    BUN 26.5 02/05/2024    BUN 20.9 01/30/2024    BUN 28 04/27/2022    BUN 25 04/26/2021    BUN 29 04/12/2021    BUN 24 12/27/2018      Lab Results   Component Value Date    POTASSIUM 3.9 05/20/2024    POTASSIUM 4.1 05/14/2024    POTASSIUM 3.9 02/05/2024    POTASSIUM 4.6 04/27/2022    POTASSIUM 4.1 04/26/2021    POTASSIUM 4.0 04/12/2021    POTASSIUM 4.8 12/27/2018    Lab Results   Component Value Date    CO2 25 05/20/2024    CO2 23 02/05/2024    CO2 23 01/30/2024    CO2 26 04/27/2022    CO2 29 04/26/2021    CO2 28 04/12/2021    CO2 26 12/27/2018    Lab Results   Component Value Date    CR 0.90 05/20/2024    CR 0.96 05/14/2024    CR 1.00 02/05/2024    CR 1.03 04/26/2021    CR 1.23 04/12/2021    CR 1.00 12/27/2018             Pending Results:    Unresulted Labs Ordered in the Past 30 Days of this Admission       No orders found from 4/19/2024 to 5/20/2024.             Discharge Instructions and Follow-Up:   Discharge Procedure Orders   General info for SNF   Order Comments: Length of Stay Estimate: Short Term Care: Estimated # of Days <30  Condition at Discharge: Improving  Level of care:skilled   Rehabilitation Potential: Good  Admission H&P remains valid and up-to-date: Yes  Recent Chemotherapy: N/A  Use Nursing Home Standing Orders: Yes     Mantoux instructions   Order Comments: Give two-step Mantoux (PPD) Per Facility Policy Yes     Reason for your hospital stay   Order Comments: Left hip fracture: fixation with cannulated screws     Wound care   Order Comments: Site:   Left hip  Instructions:  Keep dressings clean, dry and intact.  Okay to shower over aquacel and tegaderm dressings.  Do not immerse.     Follow Up and recommended labs and tests   Order Comments: Follow-up with Dr. Merino team at [facility]  approximately 2 weeks following your surgery.  Call the care coordinator at [phone] to arrange appointment or if any questions.    [facility] clinic numbers:  [place] [phone], [place] [phone]  Walk in clinic hours: 8 am - 8 pm     Activity - Up with assistive device     Order Specific Question Answer Comments   Is discharge order? Yes      Weight bearing status   Order Comments: WBAT Left LE with walker     Follow Up and recommended labs and tests   Order Comments: Follow up with Nursing home physician.  Please revisit blood pressure management.     Physical Therapy Adult Consult   Order Comments: Evaluate and treat as clinically indicated.    Reason:  Left hip fracture: fixation with cannulated screws     Occupational Therapy Adult Consult   Order Comments: Evaluate and treat as clinically indicated.    Reason:  Left hip fracture: fixation with cannulated screws     Fall precautions     Cane DME   Order Comments: DME Documentation: Describe the reason for need to support medical necessity: Impaired gait status post hip surgery. I, the undersigned, certify that the above prescribed supplies are medically necessary for this patient and is both reasonable and necessary in reference to accepted standards of medical practice in the treatment of this patient's condition and is not prescribed as a convenience.     Order Specific Question Answer Comments   Medical Equipment (DME) Supplier: [employer]    PATIENT INSTRUCTIONS: If you did not receive this ordered item today, please contact [employer] for availability ([place] Locations: [phone], [place]: [phone]).    Cane Type: Single Tip    Reminder: Patient can typically get 1 every 5 years      Walker DME   Order Comments: DME Documentation: Describe the reason for need to support medical necessity: Impaired gait status post hip surgery. I, the undersigned, certify that the above prescribed supplies are medically  necessary for this patient and is both reasonable and necessary in reference to accepted standards of medical practice in the treatment of this patient's condition and is not prescribed as a convenience.     Order Specific Question Answer Comments   Medical Equipment (DME) Supplier: [employer]    PATIENT INSTRUCTIONS: If you did not receive this ordered item today, please contact [employer] for availability ([facility] Locations: [phone], [place]: [phone]).    Walker Type: Standard (2 Wheel)    Accessories: N/A      Diet   Order Comments: Follow this diet upon discharge: Orders Placed This Encounter      Advance Diet as Tolerated: Regular Diet Adult     Order Specific Question Answer Comments   Is discharge order? Yes        Total time spent in face to face contact with the patient and coordinating discharge was:  60 Minutes.

## 2024-05-22 NOTE — PLAN OF CARE
"Goal Outcome Evaluation:    RN [phone]  Pt A/Ox4; denies pain this shift; Ax1 GB/walker; did require 1L NC at bedtime for slight desat; aquacel CDI; plans to discharge to [facility] 5/22 via family transport      Plan of Care Reviewed With: patient    Overall Patient Progress: improving    Outcome Evaluation: denies pain; pt did desat to mid 80s when on RA at bedtime so placed on 1L NC while sleeping; dressing CDI      Problem: Adult Inpatient Plan of Care  Goal: Plan of Care Review  Description: The Plan of Care Review/Shift note should be completed every shift.  The Outcome Evaluation is a brief statement about your assessment that the patient is improving, declining, or no change.  This information will be displayed automatically on your shift  note.  Outcome: Progressing  Flowsheets (Taken 5/22/2024 5637)  Outcome Evaluation:   denies pain   pt did desat to mid 80s when on RA at bedtime so placed on 1L NC while sleeping   dressing CDI  Plan of Care Reviewed With: patient  Overall Patient Progress: improving  Goal: Patient-Specific Goal (Individualized)  Description: You can add care plan individualizations to a care plan. Examples of Individualization might be:  \"Parent requests to be called daily at 9am for status\", \"I have a hard time hearing out of my right ear\", or \"Do not touch me to wake me up as it startles  me\".  Outcome: Progressing  Goal: Absence of Hospital-Acquired Illness or Injury  Outcome: Progressing  Intervention: Identify and Manage Fall Risk  Recent Flowsheet Documentation  Taken 5/22/2024 0236 by Benji Phillip, RN  Safety Promotion/Fall Prevention:   supervised activity   safety round/check completed   nonskid shoes/slippers when out of bed   lighting adjusted   activity supervised  Intervention: Prevent Skin Injury  Recent Flowsheet Documentation  Taken 5/22/2024 0236 by Benji Phillip RN  Body Position: position changed independently  Intervention: Prevent and Manage VTE (Venous " Thromboembolism) Risk  Recent Flowsheet Documentation  Taken 5/22/2024 0236 by Benji Phillip RN  VTE Prevention/Management: SCDs (sequential compression devices) on  Intervention: Prevent Infection  Recent Flowsheet Documentation  Taken 5/22/2024 0236 by Benji Phillip RN  Infection Prevention:   rest/sleep promoted   single patient room provided  Goal: Optimal Comfort and Wellbeing  Outcome: Progressing  Intervention: Monitor Pain and Promote Comfort  Recent Flowsheet Documentation  Taken 5/22/2024 0236 by Benji Phillip RN  Pain Management Interventions:   rest   quiet environment facilitated  Goal: Readiness for Transition of Care  Outcome: Progressing     Problem: Skin Injury Risk Increased  Goal: Skin Health and Integrity  Outcome: Progressing  Intervention: Optimize Skin Protection  Recent Flowsheet Documentation  Taken 5/22/2024 0236 by Benji Phillip RN  Activity Management:   activity adjusted per tolerance   back to bed  Head of Bed (HOB) Positioning: HOB at 30-45 degrees     Problem: Fall Injury Risk  Goal: Absence of Fall and Fall-Related Injury  Outcome: Progressing  Intervention: Identify and Manage Contributors  Recent Flowsheet Documentation  Taken 5/22/2024 0236 by Benji Phillip RN  Medication Review/Management: medications reviewed  Intervention: Promote Injury-Free Environment  Recent Flowsheet Documentation  Taken 5/22/2024 0236 by Benji Phillip RN  Safety Promotion/Fall Prevention:   supervised activity   safety round/check completed   nonskid shoes/slippers when out of bed   lighting adjusted   activity supervised     Problem: Orthopaedic Fracture  Goal: Absence of Bleeding  Outcome: Progressing  Goal: Bowel Elimination  Outcome: Progressing  Goal: Absence of Embolism Signs and Symptoms  Outcome: Progressing  Intervention: Prevent or Manage Embolism Risk  Recent Flowsheet Documentation  Taken 5/22/2024 0236 by Benji Phillip RN  VTE Prevention/Management: SCDs (sequential compression  devices) on  Goal: Fracture Stability  Outcome: Progressing  Goal: Optimal Functional Ability  Outcome: Progressing  Intervention: Optimize Functional Ability  Recent Flowsheet Documentation  Taken 5/22/2024 0236 by Benji Phillip RN  Activity Management:   activity adjusted per tolerance   back to bed  Positioning/Transfer Devices:   pillows   in use  Goal: Absence of Infection Signs and Symptoms  Outcome: Progressing  Goal: Effective Tissue Perfusion  Outcome: Progressing  Goal: Optimal Pain Control and Function  Outcome: Progressing  Intervention: Manage Acute Orthopaedic-Related Pain  Recent Flowsheet Documentation  Taken 5/22/2024 0236 by Benji Phillip RN  Pain Management Interventions:   rest   quiet environment facilitated  Goal: Effective Oxygenation and Ventilation  Outcome: Progressing  Intervention: Promote Airway Secretion Clearance  Recent Flowsheet Documentation  Taken 5/22/2024 0236 by Benji Phillip RN  Cough And Deep Breathing: done independently per patient  Activity Management:   activity adjusted per tolerance   back to bed  Intervention: Optimize Oxygenation and Ventilation  Recent Flowsheet Documentation  Taken 5/22/2024 0236 by Benji Phillip RN  Head of Bed (HOB) Positioning: HOB at 30-45 degrees

## 2024-05-22 NOTE — PROGRESS NOTES
Care Management Discharge Note    Discharge Date: 05/22/2024       Discharge Disposition: Skilled Nursing Facility, Transitional Care    Discharge Services: None    Discharge DME: None    Discharge Transportation: family or friend will provide    Private pay costs discussed: private room/amenity fees    Does the patient's insurance plan have a 3 day qualifying hospital stay waiver?  Yes     Which insurance plan 3 day waiver is available? Alternative insurance waiver    Will the waiver be used for post-acute placement? Yes    PAS Confirmation Code: [id number]  Patient/family educated on Medicare website which has current facility and service quality ratings: yes    Education Provided on the Discharge Plan: Yes  Persons Notified of Discharge Plans: patient, family  Patient/Family in Agreement with the Plan: yes    Handoff Referral Completed: Yes    Additional Information:  [facility] called that they obtained the auth for patient to admit and can take today. Updated MD and patient. Her daughter in law present and able to transport. They would like patient there around 4-4:30pm.   MD put in orders. Updated [place] that patient will be there at requested time and faxed over discharge orders.     Courtney Smith RN  Care Coordinator  [facility]

## 2024-05-22 NOTE — PLAN OF CARE
"Goal Outcome Evaluation:      Plan of Care Reviewed With: patient, family    Overall Patient Progress: improvingOverall Patient Progress: improving    Outcome Evaluation: Pt A/O x4. VSS. PIV removed. Pain managed with schedule tylenol. Voiding well. Tolerating a regular diet well. CMS intact. Dressing CDI. Plan is to discharge today to TCU with family as transport.    Reviewed discharge instructions with patient and spouse. Questions answered. Patient discharged to TCU with Family as transport, and belongings. Pt agree to discharge.     Problem: Adult Inpatient Plan of Care  Goal: Plan of Care Review  Description: The Plan of Care Review/Shift note should be completed every shift.  The Outcome Evaluation is a brief statement about your assessment that the patient is improving, declining, or no change.  This information will be displayed automatically on your shift  note.  Outcome: Adequate for Care Transition  Flowsheets (Taken 5/22/2024 1602)  Outcome Evaluation: Pt A/O x4. VSS. PIV removed. Pain managed with schedule tylenol. Voiding well. Tolerating a regular diet well. CMS intact. Dressing CDI. Plan is to discharge today to TCU with family as transport.  Plan of Care Reviewed With:   patient   family  Overall Patient Progress: improving  Goal: Patient-Specific Goal (Individualized)  Description: You can add care plan individualizations to a care plan. Examples of Individualization might be:  \"Parent requests to be called daily at 9am for status\", \"I have a hard time hearing out of my right ear\", or \"Do not touch me to wake me up as it startles  me\".  Outcome: Adequate for Care Transition  Goal: Absence of Hospital-Acquired Illness or Injury  Outcome: Adequate for Care Transition  Intervention: Identify and Manage Fall Risk  Recent Flowsheet Documentation  Taken 5/22/2024 0820 by Era Carr RN  Safety Promotion/Fall Prevention:   activity supervised   clutter free environment maintained   lighting " adjusted   mobility aid in reach   nonskid shoes/slippers when out of bed   safety round/check completed  Intervention: Prevent Skin Injury  Recent Flowsheet Documentation  Taken 5/22/2024 0820 by Era Carr RN  Skin Protection: adhesive use limited  Device Skin Pressure Protection: absorbent pad utilized/changed  Intervention: Prevent and Manage VTE (Venous Thromboembolism) Risk  Recent Flowsheet Documentation  Taken 5/22/2024 0820 by Era Carr RN  VTE Prevention/Management: SCDs (sequential compression devices) off  Intervention: Prevent Infection  Recent Flowsheet Documentation  Taken 5/22/2024 0820 by Era Carr RN  Infection Prevention:   rest/sleep promoted   single patient room provided  Goal: Optimal Comfort and Wellbeing  Outcome: Adequate for Care Transition  Intervention: Monitor Pain and Promote Comfort  Recent Flowsheet Documentation  Taken 5/22/2024 0820 by Era Carr RN  Pain Management Interventions: declines  Goal: Readiness for Transition of Care  Outcome: Adequate for Care Transition     Problem: Skin Injury Risk Increased  Goal: Skin Health and Integrity  Outcome: Adequate for Care Transition  Intervention: Plan: Nurse Driven Intervention: Positioning  Recent Flowsheet Documentation  Taken 5/22/2024 0820 by Era Carr RN  Plan: Positioning Interventions: REPOSITION Left/Right (No supine) q2h  Intervention: Plan: Nurse Driven Intervention: Moisture Management  Recent Flowsheet Documentation  Taken 5/22/2024 0820 by Era Carr RN  Moisture Interventions:   Encourage regular toileting   Incontinence pad  Intervention: Plan: Nurse Driven Intervention: Friction and Shear  Recent Flowsheet Documentation  Taken 5/22/2024 0820 by Era Carr RN  Friction/Shear Interventions: HOB 30 degrees or less  Intervention: Optimize Skin Protection  Recent Flowsheet Documentation  Taken 5/22/2024 1411 by Era Carr RN  Activity Management:  ambulated to bathroom  Taken 5/22/2024 0820 by Era Carr RN  Skin Protection: adhesive use limited  Activity Management:   ambulated to bathroom   up in chair     Problem: Fall Injury Risk  Goal: Absence of Fall and Fall-Related Injury  Outcome: Adequate for Care Transition  Intervention: Identify and Manage Contributors  Recent Flowsheet Documentation  Taken 5/22/2024 0820 by Era Carr RN  Medication Review/Management: medications reviewed  Intervention: Promote Injury-Free Environment  Recent Flowsheet Documentation  Taken 5/22/2024 0820 by Era Carr RN  Safety Promotion/Fall Prevention:   activity supervised   clutter free environment maintained   lighting adjusted   mobility aid in reach   nonskid shoes/slippers when out of bed   safety round/check completed     Problem: Orthopaedic Fracture  Goal: Absence of Bleeding  Outcome: Adequate for Care Transition  Intervention: Monitor and Manage Fracture Bleeding  Recent Flowsheet Documentation  Taken 5/22/2024 0820 by Era Carr RN  Bleeding Management: dressing monitored  Goal: Bowel Elimination  Outcome: Adequate for Care Transition  Intervention: Promote Effective Bowel Elimination  Recent Flowsheet Documentation  Taken 5/22/2024 0820 by Era Carr RN  Bowel Elimination Management: toileting offered  Bowel Elimination Promotion:   adequate fluid intake promoted   ambulation promoted  Goal: Absence of Embolism Signs and Symptoms  Outcome: Adequate for Care Transition  Intervention: Prevent or Manage Embolism Risk  Recent Flowsheet Documentation  Taken 5/22/2024 0820 by Era Carr RN  VTE Prevention/Management: SCDs (sequential compression devices) off  Goal: Fracture Stability  Outcome: Adequate for Care Transition  Goal: Optimal Functional Ability  Outcome: Adequate for Care Transition  Intervention: Optimize Functional Ability  Recent Flowsheet Documentation  Taken 5/22/2024 1411 by Era Carr  RN  Activity Management: ambulated to bathroom  Taken 5/22/2024 0820 by Era Carr RN  Activity Management:   ambulated to bathroom   up in chair  Goal: Absence of Infection Signs and Symptoms  Outcome: Adequate for Care Transition  Intervention: Prevent or Manage Infection  Recent Flowsheet Documentation  Taken 5/22/2024 0820 by Era Carr RN  Infection Management: aseptic technique maintained  Goal: Effective Tissue Perfusion  Outcome: Adequate for Care Transition  Goal: Optimal Pain Control and Function  Outcome: Adequate for Care Transition  Intervention: Manage Acute Orthopaedic-Related Pain  Recent Flowsheet Documentation  Taken 5/22/2024 0820 by Era Carr RN  Pain Management Interventions: declines  Goal: Effective Oxygenation and Ventilation  Outcome: Adequate for Care Transition  Intervention: Promote Airway Secretion Clearance  Recent Flowsheet Documentation  Taken 5/22/2024 1411 by Era Carr RN  Activity Management: ambulated to bathroom  Taken 5/22/2024 0820 by Era Carr RN  Cough And Deep Breathing: done with encouragement  Activity Management:   ambulated to bathroom   up in chair

## 2024-05-22 NOTE — PROGRESS NOTES
Orthopedic Surgery  Riddhi Aguilar  05/22/2024     Admit Date:  5/19/2024  POD: 2 Days Post-Op   Procedure(s):  Closed reduction percutaneous pinning, left hip     Patient resting comfortably in chair.    Pain controlled.  Tolerating oral intake.    Denies nausea or vomiting  Denies chest pain or shortness of breath    Temp:  [97.1  F (36.2  C)-98.9  F (37.2  C)] 97.1  F (36.2  C)  Pulse:  [69-72] 69  Resp:  [12-26] 12  BP: ()/(31-63) 103/48  SpO2:  [85 %-97 %] 97 %    Alert and oriented  Dressing is peeling distally, changed dressing at bedside.   Minimal erythema of the surrounding skin.   Bilateral calves are soft, non-tender.  Left lower extremity is NVI.  Sensation intact bilateral lower extremities  Patient able to resist dorsi and plantar flexion bilaterally  +Dp pulse    Labs:  Recent Labs   Lab Test 05/22/24  0757 05/21/24  0704 05/20/24  0309 05/14/24  1036 02/05/24  0529 01/27/24  0525   WBC  --   --  7.6  --  4.9 10.6   HGB 8.8* 9.2* 8.7*   < > 9.4* 9.7*   PLT  --   --  304  --  331 260    < > = values in this interval not displayed.       1. PLAN:   Continue ASA 325mg for DVT prophylaxis.     Mobilize with PT/OT    WBAT Left LE with walker.     Continue current pain regiment.   Dressings: Keep intact.  Change if >60% saturated or peeling off.    Follow-up: 2 weeks post-op with Dr Merino team    2. Disposition   Anticipate d/c to TCU when medically cleared and progressing in PT.  Ortho stable for discharge.     Trinidad Newell PA-C

## 2024-05-23 ENCOUNTER — TELEPHONE (OUTPATIENT)
Dept: PHARMACY | Facility: OTHER | Age: 89
End: 2024-05-23

## 2024-05-23 ENCOUNTER — DOCUMENTATION ONLY (OUTPATIENT)
Dept: GERIATRICS | Facility: CLINIC | Age: 89
End: 2024-05-23
Payer: COMMERCIAL

## 2024-05-23 ENCOUNTER — TRANSITIONAL CARE UNIT VISIT (OUTPATIENT)
Dept: GERIATRICS | Facility: CLINIC | Age: 89
End: 2024-05-23
Payer: COMMERCIAL

## 2024-05-23 ENCOUNTER — PATIENT OUTREACH (OUTPATIENT)
Dept: CARE COORDINATION | Facility: CLINIC | Age: 89
End: 2024-05-23

## 2024-05-23 VITALS
WEIGHT: 187.4 LBS | TEMPERATURE: 98.3 F | HEIGHT: 61 IN | RESPIRATION RATE: 18 BRPM | DIASTOLIC BLOOD PRESSURE: 63 MMHG | SYSTOLIC BLOOD PRESSURE: 133 MMHG | OXYGEN SATURATION: 92 % | HEART RATE: 70 BPM | BODY MASS INDEX: 35.38 KG/M2

## 2024-05-23 DIAGNOSIS — H40.059 BORDERLINE GLAUCOMA WITH OCULAR HYPERTENSION, UNSPECIFIED LATERALITY: ICD-10-CM

## 2024-05-23 DIAGNOSIS — D64.9 ANEMIA, UNSPECIFIED TYPE: ICD-10-CM

## 2024-05-23 DIAGNOSIS — I10 ESSENTIAL HYPERTENSION, BENIGN: ICD-10-CM

## 2024-05-23 DIAGNOSIS — S72.002A LEFT DISPLACED FEMORAL NECK FRACTURE (H): Primary | ICD-10-CM

## 2024-05-23 DIAGNOSIS — N18.32 STAGE 3B CHRONIC KIDNEY DISEASE (H): ICD-10-CM

## 2024-05-23 DIAGNOSIS — R60.0 PERIPHERAL EDEMA: ICD-10-CM

## 2024-05-23 DIAGNOSIS — F33.0 MAJOR DEPRESSIVE DISORDER, RECURRENT EPISODE, MILD (H): ICD-10-CM

## 2024-05-23 DIAGNOSIS — S52.125D CLOSED NONDISPLACED FRACTURE OF HEAD OF LEFT RADIUS WITH ROUTINE HEALING, SUBSEQUENT ENCOUNTER: ICD-10-CM

## 2024-05-23 PROCEDURE — 36415 COLL VENOUS BLD VENIPUNCTURE: CPT | Mod: ORL | Performed by: PHYSICIAN ASSISTANT

## 2024-05-23 PROCEDURE — P9604 ONE-WAY ALLOW PRORATED TRIP: HCPCS | Mod: ORL | Performed by: PHYSICIAN ASSISTANT

## 2024-05-23 PROCEDURE — 86481 TB AG RESPONSE T-CELL SUSP: CPT | Mod: ORL | Performed by: PHYSICIAN ASSISTANT

## 2024-05-23 PROCEDURE — 99309 SBSQ NF CARE MODERATE MDM 30: CPT | Performed by: PHYSICIAN ASSISTANT

## 2024-05-23 RX ORDER — CHOLECALCIFEROL (VITAMIN D3) 50 MCG
1 TABLET ORAL DAILY
COMMUNITY

## 2024-05-23 RX ORDER — ACETAMINOPHEN 500 MG
1000 TABLET ORAL 3 TIMES DAILY
Status: SHIPPED | DISCHARGE
Start: 2024-05-23

## 2024-05-23 NOTE — PROGRESS NOTES
Clinic Care Coordination Contact  Care Coordination Transition Communication    Clinical Data: Patient was hospitalized at [facility] from 5/19/24 to 5/23/24 with diagnosis of Acute traumatic incomplete left femoral neck fracture  Acute traumatic left radial head fracture:   -Status post closed reduction and percutaneous pinning of the left hip on 5/20     Assessment: Patient has transitioned to TCU/ARU for short term rehabilitation:    Facility Name: [facility]  Transition Communication:  Notified facility of Primary Care- Care Coordination support via Epic fax.    Plan: Care Coordinator will await notification from facility staff informing of patient's discharge plans/needs. Care Coordinator will review chart and outreach to facility staff every 4 weeks and as needed.     Alyse Cain,  [facility]  Clinic Care Coordinator  [facility]  [phone]  [email or web address]

## 2024-05-23 NOTE — PLAN OF CARE
"Physical Therapy Discharge Summary    Reason for therapy discharge:    Discharged to home with outpatient therapy.    Progress towards therapy goal(s). See goals on Care Plan in [facility] electronic health record for goal details.  Goals met    Therapy recommendation(s):    Continued therapy is recommended.  Rationale/Recommendations: \"A x 1 with FWW; symptomatic dizziness and hypotension during gait-but improved from previous session \"  "

## 2024-05-23 NOTE — PROGRESS NOTES
[facility] GERIATRICS    PRIMARY CARE PROVIDER AND CLINIC:  Shun Prado MD, [address] / [facility] [address]  Chief Complaint   Patient presents with    Hospital F/U      [place] Medical Record Number:  [id number]  Place of Service where encounter took place:  [facility] ([facility]) [[id number]]    Riddhi Aguilar  is a 89 year old  (2/15/1931), admitted to the above facility from  [facility]. Hospital stay 5/19/24 through 5/22/24..   HPI:    Notes from hospitalization reviewed including H&P, orthopedic consult and D/c summary    Riddhi Aguilar is an exceptionally pleasant 89-year-old female with a past medical history of hypertension, CKD and anemia with recent hospitalization at [facility].  Suffered a fall outside her independent living apartment.  Found to have a left femoral neck fracture and hairline radial head fracture.  Orthopedics was consulted.  Underwent pinning of femoral neck fracture.  Assessed by therapies and discharged to TCU    Spoke with therapy, unclear regarding weightbearing status on left upper extremity    Riddhi is evaluated in her room.  Overall doing well.  Minimal pain.  Did not utilize oxycodone much during hospital stay.  Agrees to take Tylenol schedule III times daily.  Discussed had some variable BPs during hospital stay.  She was discharged with specific parameters for her blood pressure.  Will continue with these parameters for now.  If consistently holding 1 or more meds can consider adjustments.  Is room to taper down on lisinopril.  Would prefer to stay on spironolactone due to her history of edema.    Review of nursing home EMR: -[id number]    CODE STATUS/ADVANCE DIRECTIVES DISCUSSION:  No CPR- Do NOT Intubate  DNR / DNI  ALLERGIES: No Known Allergies   PAST MEDICAL HISTORY:   Past Medical History:   Diagnosis Date    Anemia     baseline 10-11    Arthritis     Bleeding ulcer     duodenal ulcer    CKD (chronic kidney  disease) stage 3, GFR 30-59 ml/min (H)     Essential hypertension, benign     Osteoporosis     Pure hypercholesterolemia     Spinal stenosis     Unspecified cataract     Unspecified glaucoma(365.9)       PAST SURGICAL HISTORY:   has a past surgical history that includes NONSPECIFIC PROCEDURE; NONSPECIFIC PROCEDURE; Breast surgery; Dilation and curettage, hysteroscopy diagnostic, combined (02/06/2014); Incision and drainage perineal, combined (02/06/2014); Excise lesion lower extremity (Right, 08/29/2017); appendectomy; Combined cystoscopy, retrogrades, exchange stent ureter(s) (Left, 01/24/2024); Combined Cystoscopy, Retrogrades, Ureteroscopy, Laser Holmium Lithotripsy Ureter(S), Insert Stent (Left, 02/20/2024); Cystoscopy, remove stent(s), combined (Left, 02/20/2024); Cystoscopy; and Closed reduction, percutaneous pinning hip (Left, 5/20/2024).  FAMILY HISTORY: family history includes Breast Cancer in her sister and sister; Cancer in her brother.  SOCIAL HISTORY:   reports that she has never smoked. She has never used smokeless tobacco. She reports that she does not drink alcohol and does not use drugs.  Patient's living condition: lives alone    Post Discharge Medication Reconciliation Status:   MED REC REQUIRED  Post Medication Reconciliation Status: discharge medications reconciled and changed, per note/orders       Current Outpatient Medications   Medication Sig Dispense Refill    acetaminophen (TYLENOL) 500 MG tablet Take 2 tablets (1,000 mg) by mouth 3 times daily      aspirin (ASA) 325 MG EC tablet Take 1 tablet (325 mg) by mouth daily      atorvastatin (LIPITOR) 10 MG tablet TAKE 1 TABLET(10 MG) BY MOUTH DAILY 90 tablet 2    buPROPion (WELLBUTRIN XL) 300 MG 24 hr tablet TAKE 1 TABLET(300 MG) BY MOUTH EVERY MORNING 90 tablet 3    dorzolamide-timolol (COSOPT) 22.3-6.8 MG/ML ophthalmic solution Place 1 drop into both eyes 2 times daily      lisinopril (ZESTRIL) 40 MG tablet Take 1 tablet (40 mg) by mouth daily  "Hold for SBP <140 initially, revisit w/ TCU provider.      multivitamin w/minerals (THERA-VIT-M) tablet Take 1 tablet by mouth daily      oxyCODONE (ROXICODONE) 5 MG tablet Take 0.5 tablets (2.5 mg) by mouth every 4 hours as needed for moderate to severe pain 12 tablet 0    senna-docusate (SENOKOT-S/PERICOLACE) 8.6-50 MG tablet Take 1 tablet by mouth 2 times daily as needed for constipation      sertraline (ZOLOFT) 25 MG tablet Take 25 mg by mouth daily      spironolactone (ALDACTONE) 25 MG tablet Take 1 tablet (25 mg) by mouth daily Hold for SBP <130 or DBP <55 or dizziness initially, revisit w/ tcu provider. 90 tablet 3    verapamil ER (CALAN-SR) 120 MG CR tablet TAKE 1 TABLET BY MOUTH EVERY 12 HOURS.  Hold for SBP < 135 initially, revisit w/ TCU provider. 180 tablet 11    vitamin D3 (CHOLECALCIFEROL) 50 mcg (2000 units) tablet Take 1 tablet by mouth daily       No current facility-administered medications for this visit.       ROS:  10 point ROS of systems including Constitutional, Eyes, Respiratory, Cardiovascular, Gastroenterology, Genitourinary, Integumentary, Musculoskeletal, Psychiatric were all negative except for pertinent positives noted in my HPI.    Vitals:  /63   Pulse 70   Temp 98.3  F (36.8  C)   Resp 18   Ht 1.549 m (5' 1\")   Wt 85 kg (187 lb 6.4 oz)   SpO2 92%   BMI 35.41 kg/m    Exam:  Physical Exam  Vitals (Facility EMR) reviewed.   Constitutional:       General: She is not in acute distress.  HENT:      Head: Normocephalic and atraumatic.   Eyes:      General: No scleral icterus.  Cardiovascular:      Rate and Rhythm: Normal rate and regular rhythm.      Heart sounds: No murmur heard.  Pulmonary:      Effort: Pulmonary effort is normal.      Breath sounds: No wheezing or rales.   Musculoskeletal:      Right lower leg: Edema present.      Left lower leg: Edema present.   Skin:     General: Skin is warm and dry.      Findings: No rash.   Neurological:      Mental Status: She is alert. " Mental status is at baseline.   Psychiatric:         Behavior: Behavior normal.     '    Lab/Diagnostic data:  Recent labs in [facility] reviewed by me today.  and Most Recent 3 CBC's:  Recent Labs   Lab Test 05/22/24 0757 05/21/24  0704 05/20/24  0309 05/14/24  1036 02/05/24  0529 01/27/24  0525   WBC  --   --  7.6  --  4.9 10.6   HGB 8.8* 9.2* 8.7*   < > 9.4* 9.7*   MCV  --   --  100  --  104* 103*   PLT  --   --  304  --  331 260    < > = values in this interval not displayed.     Most Recent 3 BMP's:  Recent Labs   Lab Test 05/22/24 0757 05/21/24  0704 05/20/24  0309 05/14/24  1036 02/05/24  0529 01/30/24  0515   NA  --   --  135 137 139 136   POTASSIUM  --   --  3.9 4.1 3.9 3.9   CHLORIDE  --   --  101  --  106 102   CO2  --   --  25  --  23 23   BUN  --   --  22.6  --  26.5* 20.9   CR  --   --  0.90 0.96* 1.00* 0.89   ANIONGAP  --   --  9  --  10 11   TAMMY  --   --  9.3  --  9.0 8.6   GLC 86 109* 96  --  71 91     Most Recent 2 LFT's:  Recent Labs   Lab Test 01/23/24 2036 12/27/18  1415   AST 33 27   ALT 16 23   ALKPHOS 68 46   BILITOTAL 2.1* 1.2     Most Recent 3 Hemoglobins:  Recent Labs   Lab Test 05/22/24 0757 05/21/24 0704 05/20/24  0309   HGB 8.8* 9.2* 8.7*     EXAM: MR HIP LEFT W/O CONTRAST  LOCATION: [facility]  DATE: 5/20/2024     INDICATION: fall , pain, negative XRay  COMPARISON: None.  TECHNIQUE: Unenhanced.     FINDINGS:     LEFT HIP:   -Labrum: No evidence of a detached labral tear. No paralabral cyst.   -Cartilage: Normal.  -Joint space: small hip joint effusion   -Joint capsule/ligaments: Intact joint capsule. Ligamentum teres is intact.  -Morphology: Alpha Angle is normal. No bony morphologic changes associated with femoroacetabular impingement.     RIGHT HIP: No fracture, osteonecrosis, or joint effusion.     MUSCLES AND TENDONS:   -Gluteal: No tendon tear or tendinopathy. No trochanteric bursitis.  -Proximal hamstring: No tendon tear or tendinopathy.   -Iliopsoas: No  tendon tear or tendinopathy. No bursitis.  -Rectus femoris origin: No tear or tendinopathy.     BONES:   -Bone marrow edema is seen within the femoral neck extending to the subcapital portions of the femoral head, with areas of linear marrow fracturing at the femoral neck subcapital junction (image 17, series 5; image 28, series 2), the marrow fracturing   does not extend completely across the femoral neck. The cortex is not disrupted.   -SI joints are normal. Visualized lumbar spine is normal.     SOFT TISSUES:   -Soft tissue stranding edema is seen overlying the greater trochanter and lateral aspect of the proximal femur consistent with soft tissue contusions. There is a trace amount of fluid within the greater trochanter bursa (image 13, series 4)     INTRA-PELVIC CONTENTS:  -Visualized portions are normal.                                                                      IMPRESSION:  1.  Bone marrow edema of the femoral neck extending to the subcapital junction of the femoral head and neck where there is incomplete marrow fracturing extending from the superolateral aspect of the femoral neck centrally, the fracturing does not extend   across the entirety of the femoral neck. (Image 17, series 5; image 28, series 2).   2.  Small volume of free fluid within the greater trochanter, favored reflect traumatic trochanteric bursitis given history of fall  3.  Soft tissue contusions overlying the greater trochanter and proximal femur, consistent with soft tissue contusions from fall    EXAM: XR ELBOW LEFT 2 VIEWS  LOCATION: [facility]  DATE: 5/20/2024     INDICATION: fall, pain  COMPARISON: None.                                                                      IMPRESSION: A transverse ill-defined linear lucency is seen in the radial head without overlying cortical disruption, with a small elbow joint effusion, suspicious for a nondisplaced fracture. A small degenerative olecranon spur is  present.    ASSESSMENT/PLAN:  Left femoral neck fracture status post percutaneous pinning 5/20  -Adjust Tylenol to 1000 mg 3 times daily scheduled  - Continue as needed oxycodone  - Vitamin D 2000 units daily.  Check vitamin D level  - WBAT  - PT/OT  - DVT prophylaxis full dose aspirin daily  - Follow-up with orthopedics in 2 weeks      Left radial head fracture: Orthopedics recommends nonoperative management  - No issues with pain.  Personally coordinated with orthopedics LISA and confirmed today that she is weightbearing as tolerated in LUE.   - PT/OT    Hypertension: [Spironolactone 25 mg daily, verapamil 1 g ER daily, lisinopril 40 mg daily ] BP labile during hospital stay  - Continues on PTA regimen with parameters to hold lisinopril for SBP less than 140, spironolactone for SBP less than 130 and verapamil for SBP less than 135.  Continue.  Monitor frequency of meds being held.  Consider GDR as indicated    CKD stage III: Baseline creatinine 0.9-1-  Estimated Creatinine Clearance: 38.7 mL/min (based on SCr of 0.9 mg/dL).  -Monitor as clinically appropriate    Edema:  - Continue spironolactone    Acute on chronic anemia: Discharge hemoglobin 8.8  - CBC 5/28    Depression  -Continue Wellbutrin    Glaucoma:  - Continue eyedrops    This note was completed in part using Dragon voice recognition software. Although reviewed after completion, some word and grammatical errors may occur.      Electronically signed by:  Edie Alexis PA-C

## 2024-05-23 NOTE — TELEPHONE ENCOUNTER
[facility] Recruitment: [facility]     Referral outreach attempt #1 on May 23, 2024      Outcome: left voicemail- Call back number [phone]    Katerin Mcdermott  [facility]

## 2024-05-23 NOTE — LETTER
5/23/2024        RE: Riddhi Aguilar  [address]  [facility] [address]        [facility] GERIATRICS    PRIMARY CARE PROVIDER AND CLINIC:  Shun Prado MD, [address] / [place] [address]  Chief Complaint   Patient presents with     Hospital F/U      [place] Medical Record Number:  [id number]  Place of Service where encounter took place:  [facility] ([facility]) [[id number]]    Riddhi Aguilar  is a 89 year old  (2/15/1931), admitted to the above facility from  [facility]. Hospital stay 5/19/24 through 5/22/24..   HPI:    Notes from hospitalization reviewed including H&P, orthopedic consult and D/c summary    Riddhi Aguilar is an exceptionally pleasant 89-year-old female with a past medical history of hypertension, CKD and anemia with recent hospitalization at [facility].  Suffered a fall outside her independent living apartment.  Found to have a left femoral neck fracture and hairline radial head fracture.  Orthopedics was consulted.  Underwent pinning of femoral neck fracture.  Assessed by therapies and discharged to U    Spoke with therapy, unclear regarding weightbearing status on left upper extremity    Riddhi is evaluated in her room.  Overall doing well.  Minimal pain.  Did not utilize oxycodone much during hospital stay.  Agrees to take Tylenol schedule III times daily.  Discussed had some variable BPs during hospital stay.  She was discharged with specific parameters for her blood pressure.  Will continue with these parameters for now.  If consistently holding 1 or more meds can consider adjustments.  Is room to taper down on lisinopril.  Would prefer to stay on spironolactone due to her history of edema.    Review of nursing home EMR: -134    CODE STATUS/ADVANCE DIRECTIVES DISCUSSION:  No CPR- Do NOT Intubate  DNR / DNI  ALLERGIES: No Known Allergies   PAST MEDICAL HISTORY:   Past Medical History:   Diagnosis Date      Anemia     baseline 10-11     Arthritis      Bleeding ulcer     duodenal ulcer     CKD (chronic kidney disease) stage 3, GFR 30-59 ml/min (H)      Essential hypertension, benign      Osteoporosis      Pure hypercholesterolemia      Spinal stenosis      Unspecified cataract      Unspecified glaucoma(365.9)       PAST SURGICAL HISTORY:   has a past surgical history that includes NONSPECIFIC PROCEDURE; NONSPECIFIC PROCEDURE; Breast surgery; Dilation and curettage, hysteroscopy diagnostic, combined (02/06/2014); Incision and drainage perineal, combined (02/06/2014); Excise lesion lower extremity (Right, 08/29/2017); appendectomy; Combined cystoscopy, retrogrades, exchange stent ureter(s) (Left, 01/24/2024); Combined Cystoscopy, Retrogrades, Ureteroscopy, Laser Holmium Lithotripsy Ureter(S), Insert Stent (Left, 02/20/2024); Cystoscopy, remove stent(s), combined (Left, 02/20/2024); Cystoscopy; and Closed reduction, percutaneous pinning hip (Left, 5/20/2024).  FAMILY HISTORY: family history includes Breast Cancer in her sister and sister; Cancer in her brother.  SOCIAL HISTORY:   reports that she has never smoked. She has never used smokeless tobacco. She reports that she does not drink alcohol and does not use drugs.  Patient's living condition: lives alone    Post Discharge Medication Reconciliation Status:   MED REC REQUIRED  Post Medication Reconciliation Status: discharge medications reconciled and changed, per note/orders       Current Outpatient Medications   Medication Sig Dispense Refill     acetaminophen (TYLENOL) 500 MG tablet Take 2 tablets (1,000 mg) by mouth 3 times daily       aspirin (ASA) 325 MG EC tablet Take 1 tablet (325 mg) by mouth daily       atorvastatin (LIPITOR) 10 MG tablet TAKE 1 TABLET(10 MG) BY MOUTH DAILY 90 tablet 2     buPROPion (WELLBUTRIN XL) 300 MG 24 hr tablet TAKE 1 TABLET(300 MG) BY MOUTH EVERY MORNING 90 tablet 3     dorzolamide-timolol (COSOPT) 22.3-6.8 MG/ML ophthalmic solution  "Place 1 drop into both eyes 2 times daily       lisinopril (ZESTRIL) 40 MG tablet Take 1 tablet (40 mg) by mouth daily Hold for SBP <140 initially, revisit w/ TCU provider.       multivitamin w/minerals (THERA-VIT-M) tablet Take 1 tablet by mouth daily       oxyCODONE (ROXICODONE) 5 MG tablet Take 0.5 tablets (2.5 mg) by mouth every 4 hours as needed for moderate to severe pain 12 tablet 0     senna-docusate (SENOKOT-S/PERICOLACE) 8.6-50 MG tablet Take 1 tablet by mouth 2 times daily as needed for constipation       sertraline (ZOLOFT) 25 MG tablet Take 25 mg by mouth daily       spironolactone (ALDACTONE) 25 MG tablet Take 1 tablet (25 mg) by mouth daily Hold for SBP <130 or DBP <55 or dizziness initially, revisit w/ tcu provider. 90 tablet 3     verapamil ER (CALAN-SR) 120 MG CR tablet TAKE 1 TABLET BY MOUTH EVERY 12 HOURS.  Hold for SBP < 135 initially, revisit w/ TCU provider. 180 tablet 11     vitamin D3 (CHOLECALCIFEROL) 50 mcg (2000 units) tablet Take 1 tablet by mouth daily       No current facility-administered medications for this visit.       ROS:  10 point ROS of systems including Constitutional, Eyes, Respiratory, Cardiovascular, Gastroenterology, Genitourinary, Integumentary, Musculoskeletal, Psychiatric were all negative except for pertinent positives noted in my HPI.    Vitals:  /63   Pulse 70   Temp 98.3  F (36.8  C)   Resp 18   Ht 1.549 m (5' 1\")   Wt 85 kg (187 lb 6.4 oz)   SpO2 92%   BMI 35.41 kg/m    Exam:  Physical Exam  Vitals (Facility EMR) reviewed.   Constitutional:       General: She is not in acute distress.  HENT:      Head: Normocephalic and atraumatic.   Eyes:      General: No scleral icterus.  Cardiovascular:      Rate and Rhythm: Normal rate and regular rhythm.      Heart sounds: No murmur heard.  Pulmonary:      Effort: Pulmonary effort is normal.      Breath sounds: No wheezing or rales.   Musculoskeletal:      Right lower leg: Edema present.      Left lower leg: Edema " present.   Skin:     General: Skin is warm and dry.      Findings: No rash.   Neurological:      Mental Status: She is alert. Mental status is at baseline.   Psychiatric:         Behavior: Behavior normal.     '    Lab/Diagnostic data:  Recent labs in [facility] reviewed by me today.  and Most Recent 3 CBC's:  Recent Labs   Lab Test 05/22/24 0757 05/21/24  0704 05/20/24  0309 05/14/24  1036 02/05/24  0529 01/27/24  0525   WBC  --   --  7.6  --  4.9 10.6   HGB 8.8* 9.2* 8.7*   < > 9.4* 9.7*   MCV  --   --  100  --  104* 103*   PLT  --   --  304  --  331 260    < > = values in this interval not displayed.     Most Recent 3 BMP's:  Recent Labs   Lab Test 05/22/24 0757 05/21/24 0704 05/20/24  0309 05/14/24  1036 02/05/24  0529 01/30/24  0515   NA  --   --  135 137 139 136   POTASSIUM  --   --  3.9 4.1 3.9 3.9   CHLORIDE  --   --  101  --  106 102   CO2  --   --  25  --  23 23   BUN  --   --  22.6  --  26.5* 20.9   CR  --   --  0.90 0.96* 1.00* 0.89   ANIONGAP  --   --  9  --  10 11   TAMMY  --   --  9.3  --  9.0 8.6   GLC 86 109* 96  --  71 91     Most Recent 2 LFT's:  Recent Labs   Lab Test 01/23/24 2036 12/27/18  1415   AST 33 27   ALT 16 23   ALKPHOS 68 46   BILITOTAL 2.1* 1.2     Most Recent 3 Hemoglobins:  Recent Labs   Lab Test 05/22/24 0757 05/21/24 0704 05/20/24  0309   HGB 8.8* 9.2* 8.7*     EXAM: MR HIP LEFT W/O CONTRAST  LOCATION: [facility]  DATE: 5/20/2024     INDICATION: fall , pain, negative XRay  COMPARISON: None.  TECHNIQUE: Unenhanced.     FINDINGS:     LEFT HIP:   -Labrum: No evidence of a detached labral tear. No paralabral cyst.   -Cartilage: Normal.  -Joint space: small hip joint effusion   -Joint capsule/ligaments: Intact joint capsule. Ligamentum teres is intact.  -Morphology: Alpha Angle is normal. No bony morphologic changes associated with femoroacetabular impingement.     RIGHT HIP: No fracture, osteonecrosis, or joint effusion.     MUSCLES AND TENDONS:   -Gluteal: No  tendon tear or tendinopathy. No trochanteric bursitis.  -Proximal hamstring: No tendon tear or tendinopathy.   -Iliopsoas: No tendon tear or tendinopathy. No bursitis.  -Rectus femoris origin: No tear or tendinopathy.     BONES:   -Bone marrow edema is seen within the femoral neck extending to the subcapital portions of the femoral head, with areas of linear marrow fracturing at the femoral neck subcapital junction (image 17, series 5; image 28, series 2), the marrow fracturing   does not extend completely across the femoral neck. The cortex is not disrupted.   -SI joints are normal. Visualized lumbar spine is normal.     SOFT TISSUES:   -Soft tissue stranding edema is seen overlying the greater trochanter and lateral aspect of the proximal femur consistent with soft tissue contusions. There is a trace amount of fluid within the greater trochanter bursa (image 13, series 4)     INTRA-PELVIC CONTENTS:  -Visualized portions are normal.                                                                      IMPRESSION:  1.  Bone marrow edema of the femoral neck extending to the subcapital junction of the femoral head and neck where there is incomplete marrow fracturing extending from the superolateral aspect of the femoral neck centrally, the fracturing does not extend   across the entirety of the femoral neck. (Image 17, series 5; image 28, series 2).   2.  Small volume of free fluid within the greater trochanter, favored reflect traumatic trochanteric bursitis given history of fall  3.  Soft tissue contusions overlying the greater trochanter and proximal femur, consistent with soft tissue contusions from fall    EXAM: XR ELBOW LEFT 2 VIEWS  LOCATION: [facility]  DATE: 5/20/2024     INDICATION: fall, pain  COMPARISON: None.                                                                      IMPRESSION: A transverse ill-defined linear lucency is seen in the radial head without overlying cortical  disruption, with a small elbow joint effusion, suspicious for a nondisplaced fracture. A small degenerative olecranon spur is present.    ASSESSMENT/PLAN:  Left femoral neck fracture status post percutaneous pinning 5/20  -Adjust Tylenol to 1000 mg 3 times daily scheduled  - Continue as needed oxycodone  - Vitamin D 2000 units daily.  Check vitamin D level  - WBAT  - PT/OT  - DVT prophylaxis full dose aspirin daily  - Follow-up with orthopedics in 2 weeks      Left radial head fracture: Orthopedics recommends nonoperative management  - No issues with pain.  Personally coordinated with orthopedics LISA and confirmed today that she is weightbearing as tolerated in LUE.   - PT/OT    Hypertension: [Spironolactone 25 mg daily, verapamil 1 g ER daily, lisinopril 40 mg daily ] BP labile during hospital stay  - Continues on PTA regimen with parameters to hold lisinopril for SBP less than 140, spironolactone for SBP less than 130 and verapamil for SBP less than 135.  Continue.  Monitor frequency of meds being held.  Consider GDR as indicated    CKD stage III: Baseline creatinine 0.9-1-  Estimated Creatinine Clearance: 38.7 mL/min (based on SCr of 0.9 mg/dL).  -Monitor as clinically appropriate    Edema:  - Continue spironolactone    Acute on chronic anemia: Discharge hemoglobin 8.8  - CBC 5/28    Depression  -Continue Wellbutrin    Glaucoma:  - Continue eyedrops    This note was completed in part using Dragon voice recognition software. Although reviewed after completion, some word and grammatical errors may occur.      Electronically signed by:  Edie Alexis PA-C                    Sincerely,        Edie Alexis PA-C

## 2024-05-23 NOTE — LETTER
[facility]   To:   [facility] TCU SW          Please give to facility    From:   Alyse Cain  [facility]  Care Coordinator   [facility]   P: [phone]   [email or web address]   Patient Name:  Riddhi Aguilar YOB: 1931   Admit date: 5/23/24      *Information Needed:  Please contact me when the patient will discharge (or if they will move to long term care)- include the discharge date, disposition, and main diagnosis   If the patient is discharged with home care services, please provide the name of the agency    Also- Please inform me if a care conference is being held.   Phone, Fax or Email with information                              Thank you

## 2024-05-24 ENCOUNTER — LAB REQUISITION (OUTPATIENT)
Dept: LAB | Facility: CLINIC | Age: 89
End: 2024-05-24
Payer: COMMERCIAL

## 2024-05-24 DIAGNOSIS — S72.002D FRACTURE OF UNSPECIFIED PART OF NECK OF LEFT FEMUR, SUBSEQUENT ENCOUNTER FOR CLOSED FRACTURE WITH ROUTINE HEALING: ICD-10-CM

## 2024-05-24 DIAGNOSIS — D64.9 ANEMIA, UNSPECIFIED: ICD-10-CM

## 2024-05-24 DIAGNOSIS — S72.002A LEFT DISPLACED FEMORAL NECK FRACTURE (H): ICD-10-CM

## 2024-05-24 RX ORDER — OXYCODONE HYDROCHLORIDE 5 MG/1
2.5 TABLET ORAL EVERY 4 HOURS PRN
Qty: 20 TABLET | Refills: 0 | Status: SHIPPED | OUTPATIENT
Start: 2024-05-24

## 2024-05-25 LAB
GAMMA INTERFERON BACKGROUND BLD IA-ACNC: 0 IU/ML
M TB IFN-G BLD-IMP: NEGATIVE
M TB IFN-G CD4+ BCKGRND COR BLD-ACNC: 1.45 IU/ML
MITOGEN IGNF BCKGRD COR BLD-ACNC: 0.08 IU/ML
MITOGEN IGNF BCKGRD COR BLD-ACNC: 0.1 IU/ML
QUANTIFERON MITOGEN: 1.45 IU/ML
QUANTIFERON NIL TUBE: 0 IU/ML
QUANTIFERON TB1 TUBE: 0.1 IU/ML
QUANTIFERON TB2 TUBE: 0.08

## 2024-05-28 ENCOUNTER — TRANSITIONAL CARE UNIT VISIT (OUTPATIENT)
Dept: GERIATRICS | Facility: CLINIC | Age: 89
End: 2024-05-28
Payer: COMMERCIAL

## 2024-05-28 VITALS
DIASTOLIC BLOOD PRESSURE: 68 MMHG | HEART RATE: 80 BPM | OXYGEN SATURATION: 92 % | RESPIRATION RATE: 18 BRPM | HEIGHT: 61 IN | SYSTOLIC BLOOD PRESSURE: 150 MMHG | WEIGHT: 187.4 LBS | BODY MASS INDEX: 35.38 KG/M2 | TEMPERATURE: 97.6 F

## 2024-05-28 DIAGNOSIS — S72.002A LEFT DISPLACED FEMORAL NECK FRACTURE (H): ICD-10-CM

## 2024-05-28 DIAGNOSIS — F32.9 MAJOR DEPRESSIVE DISORDER, REMISSION STATUS UNSPECIFIED, UNSPECIFIED WHETHER RECURRENT: ICD-10-CM

## 2024-05-28 DIAGNOSIS — I10 BENIGN ESSENTIAL HYPERTENSION: ICD-10-CM

## 2024-05-28 DIAGNOSIS — D62 ABLA (ACUTE BLOOD LOSS ANEMIA): ICD-10-CM

## 2024-05-28 DIAGNOSIS — S52.125D CLOSED NONDISPLACED FRACTURE OF HEAD OF LEFT RADIUS WITH ROUTINE HEALING, SUBSEQUENT ENCOUNTER: ICD-10-CM
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
## 2024-05-28 DIAGNOSIS — M81.0 OSTEOPOROSIS, UNSPECIFIED OSTEOPOROSIS TYPE, UNSPECIFIED PATHOLOGICAL FRACTURE PRESENCE: ICD-10-CM

## 2024-05-28 LAB
HGB BLD-MCNC: 8.3 G/DL (ref 11.7–15.7)
VIT D+METAB SERPL-MCNC: 29 NG/ML (ref 20–50)

## 2024-05-28 PROCEDURE — P9604 ONE-WAY ALLOW PRORATED TRIP: HCPCS | Mod: ORL | Performed by: PHYSICIAN ASSISTANT

## 2024-05-28 PROCEDURE — 99309 SBSQ NF CARE MODERATE MDM 30: CPT | Performed by: INTERNAL MEDICINE

## 2024-05-28 PROCEDURE — 82306 VITAMIN D 25 HYDROXY: CPT | Mod: ORL | Performed by: PHYSICIAN ASSISTANT

## 2024-05-28 PROCEDURE — 36415 COLL VENOUS BLD VENIPUNCTURE: CPT | Mod: ORL | Performed by: PHYSICIAN ASSISTANT

## 2024-05-28 PROCEDURE — 85018 HEMOGLOBIN: CPT | Mod: ORL | Performed by: PHYSICIAN ASSISTANT

## 2024-05-28 NOTE — LETTER
5/28/2024        RE: Riddhi Aguilar  [address]  [place] [facility] [place] [place] [address]        [place] GERIATRIC SERVICES  PHYSICIAN NOTE    PRIMARY CARE PROVIDER AND CLINIC:  Shun Prado MD, [address] / [place] [address]    Chief Complaint   Patient presents with     Hospital F/U     [place] Medical Record Number:  [id number]  Place of Service where encounter took place:  [facility] ([facility]) [[id number]]    Riddhi Aguilar is a 89 year old (2/15/1931), admitted to the above facility from  [facility]. Hospital stay 5/19/24 through 5/22/24. Admitted to this facility for  rehab, medical management, and nursing care.     HPI:    HPI information obtained from: facility chart records, facility staff, patient report, and [facility] chart review.     Brief summary of hospital course: Riddhi Aguilar is a robust 89yoF admitted after losing her balance and falling unfortunately sustaining a left radial head fracture and incomplete left femoral neck fracture. Underwent closed reduction with percutaneous pinning of the left hip on 5/20/24 without complication and left radial head fracture treated nonoperatively.  mg daily for DVT prophylaxis. BP somewhat fluctuant in the hospital but ultimately able to discharge back on home regimen. Unfortunately missing an [place] cruise as a result of this injury. Transitioned to U for rehab/cares.    Updates on status since skilled nursing admission: Recent vitals: Afebrile, -150/50-80s with HR 60-80s and weight in 180s#. Rare use of PRN Oxycodone on review of MAR.  In seeing her in her room today she is needing a PRN oxycodone.  Admits she does not like to use it but feel she actually could do a better job of utilizing it more than she is to help control pain to help her get through the current therapies.  Looks forward to discharging home when able.  Is surprisingly upbeat despite her frustration at herself  regarding her fall prior to admission and thus missing her scheduled [place] cruise.  Has pain that radiates from her left hip all the way down to her left calf where there is some fading ecchymosis.  Is aware of her hemoglobin being slightly lower than her baseline but understands that is expected after her fall with injury as her body recovers.  She is a retired RN.  Thankfully no problems with her left upper extremity in terms of pain or range of motion.  Appreciates the staff.  Is following her bowels as they are slightly irregular though she has PRNs available.    CODE STATUS/ADVANCE DIRECTIVES DISCUSSION:   DNR / DNI  Patient's living condition: lives alone    ALLERGIES: Patient has no known allergies.    Past Medical History:   Diagnosis Date     Anemia     baseline 10-11     Arthritis      Bleeding ulcer     duodenal ulcer     CKD (chronic kidney disease) stage 3, GFR 30-59 ml/min (H)      Essential hypertension, benign      MDD (major depressive disorder)      Nephrolithiasis      Osteoporosis      Pure hypercholesterolemia      Spinal stenosis      Unspecified cataract      Unspecified glaucoma(365.9)       Past Surgical History:   Procedure Laterality Date     APPENDECTOMY       BREAST SURGERY      lumpectomy     CLOSED REDUCTION, PERCUTANEOUS PINNING HIP Left 5/20/2024    Procedure: Closed reduction percutaneous pinning, left hip;  Surgeon: Jose Luis Merino MD;  Location: [facility] OR     COMBINED CYSTOSCOPY, RETROGRADES, EXCHANGE STENT URETER(S) Left 01/24/2024    Procedure: Cystoscopy, left retrograde pyelogram, left JJ stent placement, <1hr physician fluoroscopy time;  Surgeon: Aureliano Brandt MD;  Location: [facility] OR     COMBINED CYSTOSCOPY, RETROGRADES, URETEROSCOPY, LASER HOLMIUM LITHOTRIPSY URETER(S), INSERT STENT Left 02/20/2024    Procedure: Cystoscopy, left diagnostic ureteroscopy, left retrograde pyelogram, left ureteral stent removal, fluoroscopic interpretation <1 hour physician time;  Surgeon:  Richie Jaquez MD;  Location: [facility] OR     CYSTOSCOPY       CYSTOSCOPY, REMOVE STENT(S), COMBINED Left 02/20/2024    Procedure: Cystoscopy, remove stent(s), combined;  Surgeon: Richie Jaquez MD;  Location: [facility] OR     DILATION AND CURETTAGE, HYSTEROSCOPY DIAGNOSTIC, COMBINED  02/06/2014    Procedure: COMBINED DILATION AND CURETTAGE, HYSTEROSCOPY DIAGNOSTIC;  DILATION AND CURETTAGE, HYSTEROSCOPY, POLYPECTOMY, INCISION AND DRAINAGE OF SEBACEOUS CYST ;  Surgeon: Serena Zamora MD;  Location:  [facility]     EXCISE LESION LOWER EXTREMITY Right 08/29/2017    Procedure: EXCISE LESION LOWER EXTREMITY;  WIDE EXCISIONAL BIOPSY OF RIGHT ANKLE BASAL CELL CARCINOMA;  Surgeon: Juan Luis Flores MD;  Location:  OR     INCISION AND DRAINAGE PERINEAL, COMBINED  02/06/2014    Procedure: COMBINED INCISION AND DRAINAGE PERINEAL;;  Surgeon: Serena Zamora MD;  Location:  [facility]     ZZC NONSPECIFIC PROCEDURE      Repair of buckled retina of rt eye     [facility] NONSPECIFIC PROCEDURE      Appy         Post-discharge medication reconciliation status: Reviewed and updated in [facility] according to facility MAR    Current Outpatient Medications   Medication Sig Dispense Refill     acetaminophen (TYLENOL) 500 MG tablet Take 2 tablets (1,000 mg) by mouth 3 times daily       aspirin (ASA) 325 MG EC tablet Take 1 tablet (325 mg) by mouth daily       atorvastatin (LIPITOR) 10 MG tablet TAKE 1 TABLET(10 MG) BY MOUTH DAILY 90 tablet 2     buPROPion (WELLBUTRIN XL) 300 MG 24 hr tablet TAKE 1 TABLET(300 MG) BY MOUTH EVERY MORNING 90 tablet 3     dorzolamide-timolol (COSOPT) 22.3-6.8 MG/ML ophthalmic solution Place 1 drop into both eyes 2 times daily       lisinopril (ZESTRIL) 40 MG tablet Take 1 tablet (40 mg) by mouth daily Hold for SBP <140 initially, revisit w/ TCU provider.       multivitamin w/minerals (THERA-VIT-M) tablet Take 1 tablet by mouth daily       oxyCODONE (ROXICODONE) 5 MG tablet Take 0.5 tablets (2.5 mg) by mouth every 4 hours as needed  "for moderate to severe pain 20 tablet 0     senna-docusate (SENOKOT-S/PERICOLACE) 8.6-50 MG tablet Take 1 tablet by mouth 2 times daily as needed for constipation       sertraline (ZOLOFT) 25 MG tablet Take 25 mg by mouth daily       spironolactone (ALDACTONE) 25 MG tablet Take 1 tablet (25 mg) by mouth daily Hold for SBP <130 or DBP <55 or dizziness initially, revisit w/ tcu provider. 90 tablet 3     verapamil ER (CALAN-SR) 120 MG CR tablet TAKE 1 TABLET BY MOUTH EVERY 12 HOURS.  Hold for SBP < 135 initially, revisit w/ TCU provider. 180 tablet 11     vitamin D3 (CHOLECALCIFEROL) 50 mcg (2000 units) tablet Take 1 tablet by mouth daily         ROS:  4 point ROS including Respiratory, CV, GI and , other than that noted in the HPI,  is negative    Exam:  BP (!) 150/68   Pulse 80   Temp 97.6  F (36.4  C)   Resp 18   Ht 1.549 m (5' 1\")   Wt 85 kg (187 lb 6.4 oz)   SpO2 92%   BMI 35.41 kg/m    Alert, pleasant, nicely groomed, sitting up on the edge of the bed in no acute distress  Bilateral hearing aids  Moist oral mucosa  Heart tones regular with 2/6 systolic murmur  Breathing nonlabored on room air, no cough, clear posteriorly  Left hip Aquacel in place without saturation  Fading ecchymosis along left hip down to the left calf without signs of overt hematoma  No significant lower extremity edema  Some fading ecchymosis around left elbow but good range of motion including flexion extension and pronation supination without difficulty  Normal speech, no tremor  Mood euthymic    Lab/Diagnostic data:  Labs today 5/28/24: Hgb 8.3 and Vitamin D 29    Most Recent 3 CBC's:  Recent Labs   Lab Test 05/28/24  0634 05/22/24  0757 05/21/24  0704 05/20/24  0309 05/14/24  1036 02/05/24  0529 01/27/24  0525   WBC  --   --   --  7.6  --  4.9 10.6   HGB 8.3* 8.8* 9.2* 8.7*   < > 9.4* 9.7*   MCV  --   --   --  100  --  104* 103*   PLT  --   --   --  304  --  331 260    < > = values in this interval not displayed.     Most Recent " 3 BMP's:  Recent Labs   Lab Test 05/22/24  0757 05/21/24  0704 05/20/24  0309 05/14/24  1036 02/05/24  0529 01/30/24  0515   NA  --   --  135 137 139 136   POTASSIUM  --   --  3.9 4.1 3.9 3.9   CHLORIDE  --   --  101  --  106 102   CO2  --   --  25  --  23 23   BUN  --   --  22.6  --  26.5* 20.9   CR  --   --  0.90 0.96* 1.00* 0.89   ANIONGAP  --   --  9  --  10 11   TAMMY  --   --  9.3  --  9.0 8.6   GLC 86 109* 96  --  71 91     Most Recent 2 LFT's:  Recent Labs   Lab Test 01/23/24 2036 12/27/18  1415   AST 33 27   ALT 16 23   ALKPHOS 68 46   BILITOTAL 2.1* 1.2     Most Recent TSH and T4:  Recent Labs   Lab Test 05/14/24  1036   TSH 5.28*   T4 1.09     Most Recent Hemoglobin A1c:  Recent Labs   Lab Test 05/14/24  1036   A1C 5.3     Hospital imaging:  Xray L elbow:  IMPRESSION: A transverse ill-defined linear lucency is seen in the radial head without overlying cortical disruption, with a small elbow joint effusion, suspicious for a nondisplaced fracture. A small degenerative olecranon spur is present.     MRI L hip:  MPRESSION:  1.  Bone marrow edema of the femoral neck extending to the subcapital junction of the femoral head and neck where there is incomplete marrow fracturing extending from the superolateral aspect of the femoral neck centrally, the fracturing does not extend   across the entirety of the femoral neck. (Image 17, series 5; image 28, series 2).   2.  Small volume of free fluid within the greater trochanter, favored reflect traumatic trochanteric bursitis given history of fall  3.  Soft tissue contusions overlying the greater trochanter and proximal femur, consistent with soft tissue contusions from fall    ASSESSMENT/PLAN:  Fall, subsequent encounter  Left displaced femoral neck fracture (H)  Osteoporosis, unspecified osteoporosis type, unspecified pathological fracture presence  ABLA (acute blood loss anemia)  S/p surgical repair without incident  Has slightly low Hgb at baseline; now not  surprisingly in 8s to follow periodically (significant ecchymosis noted)   mg daily for DVT prophy  On scheduled Tylenol with sparingly used PRN Oxycodone though she may use it some more to help her get by more with therapies as she thinks she could benefit from this  Has PRN bowel regimen available  Physical therapy and occupational therapy  TCO follow up with Dr. Merino is scheduled 6/5/24 to remove Aquacel  Continue MVI and Vit D with satisfactory lab today  Further outpatient osteoporosis management per PCP - ? [facility]?    Closed nondisplaced fracture of head of left radius with routine healing, subsequent encounter  No pain or debility  Treated non-operatively  Per my colleagues' conversation with ortho, may WBAT    Benign essential hypertension  -150/50-80s with HR 60-80s  BP had been softer in hospital, now satisfactory  She monitors for orthostatic symptoms  Continues on home agents    Major depressive disorder, remission status unspecified, unspecified whether recurrent  On Wellbutrin and Sertraline  Seems surprisingly upbeat given situation and missing her cruise  Of note, later after my visit I noticed per PCP earlier this month she had tapered off of Sertraline as found it ineffective and was remaining on Wellbutrin alone  However on hospital admission medication reconciliation, the pharmacist put Sertraline back on her list and she is taking it now  Would ask in the future if wants to remain on it      Electronically signed by:  Adrianna Miller DO      Sincerely,        Adrianna Miller DO

## 2024-05-28 NOTE — PROGRESS NOTES
[facility]  PHYSICIAN NOTE    PRIMARY CARE PROVIDER AND CLINIC:  Shun Prado MD, [address] / [place] [address]    Chief Complaint   Patient presents with    Hospital F/U     [facility] Medical Record Number:  [id number]  Place of Service where encounter took place:  [facility] ([facility]) [[id number]]    iRddhi Aguilar is a 89 year old (2/15/1931), admitted to the above facility from  [facility]. Hospital stay 5/19/24 through 5/22/24. Admitted to this facility for  rehab, medical management, and nursing care.     HPI:    HPI information obtained from: facility chart records, facility staff, patient report, and [facility] chart review.     Brief summary of hospital course: Riddhi Aguilar is a robust 89yoF admitted after losing her balance and falling unfortunately sustaining a left radial head fracture and incomplete left femoral neck fracture. Underwent closed reduction with percutaneous pinning of the left hip on 5/20/24 without complication and left radial head fracture treated nonoperatively.  mg daily for DVT prophylaxis. BP somewhat fluctuant in the hospital but ultimately able to discharge back on home regimen. Unfortunately missing an hoc[place] cruise as a result of this injury. Transitioned to U for rehab/cares.    Updates on status since skilled nursing admission: Recent vitals: Afebrile, -150/50-80s with HR 60-80s and weight in 180s#. Rare use of PRN Oxycodone on review of MAR.  In seeing her in her room today she is needing a PRN oxycodone.  Admits she does not like to use it but feel she actually could do a better job of utilizing it more than she is to help control pain to help her get through the current therapies.  Looks forward to discharging home when able.  Is surprisingly upbeat despite her frustration at herself regarding her fall prior to admission and thus missing her scheduled [place] cruise.  Has pain that radiates  from her left hip all the way down to her left calf where there is some fading ecchymosis.  Is aware of her hemoglobin being slightly lower than her baseline but understands that is expected after her fall with injury as her body recovers.  She is a retired RN.  Thankfully no problems with her left upper extremity in terms of pain or range of motion.  Appreciates the staff.  Is following her bowels as they are slightly irregular though she has PRNs available.    CODE STATUS/ADVANCE DIRECTIVES DISCUSSION:   DNR / DNI  Patient's living condition: lives alone    ALLERGIES: Patient has no known allergies.    Past Medical History:   Diagnosis Date    Anemia     baseline 10-11    Arthritis     Bleeding ulcer     duodenal ulcer    CKD (chronic kidney disease) stage 3, GFR 30-59 ml/min (H)     Essential hypertension, benign     MDD (major depressive disorder)     Nephrolithiasis     Osteoporosis     Pure hypercholesterolemia     Spinal stenosis     Unspecified cataract     Unspecified glaucoma(365.9)       Past Surgical History:   Procedure Laterality Date    APPENDECTOMY      BREAST SURGERY      lumpectomy    CLOSED REDUCTION, PERCUTANEOUS PINNING HIP Left 5/20/2024    Procedure: Closed reduction percutaneous pinning, left hip;  Surgeon: Jose Luis Merino MD;  Location: [facility] OR    COMBINED CYSTOSCOPY, RETROGRADES, EXCHANGE STENT URETER(S) Left 01/24/2024    Procedure: Cystoscopy, left retrograde pyelogram, left JJ stent placement, <1hr physician fluoroscopy time;  Surgeon: Aureliano Brandt MD;  Location: [facility] OR    COMBINED CYSTOSCOPY, RETROGRADES, URETEROSCOPY, LASER HOLMIUM LITHOTRIPSY URETER(S), INSERT STENT Left 02/20/2024    Procedure: Cystoscopy, left diagnostic ureteroscopy, left retrograde pyelogram, left ureteral stent removal, fluoroscopic interpretation <1 hour physician time;  Surgeon: Richie Jaquez MD;  Location: [facility] OR    CYSTOSCOPY      CYSTOSCOPY, REMOVE STENT(S), COMBINED Left 02/20/2024    Procedure:  Cystoscopy, remove stent(s), combined;  Surgeon: Richie Jaquez MD;  Location:  OR    DILATION AND CURETTAGE, HYSTEROSCOPY DIAGNOSTIC, COMBINED  02/06/2014    Procedure: COMBINED DILATION AND CURETTAGE, HYSTEROSCOPY DIAGNOSTIC;  DILATION AND CURETTAGE, HYSTEROSCOPY, POLYPECTOMY, INCISION AND DRAINAGE OF SEBACEOUS CYST ;  Surgeon: Serena Zamora MD;  Location:  [facility]    EXCISE LESION LOWER EXTREMITY Right 08/29/2017    Procedure: EXCISE LESION LOWER EXTREMITY;  WIDE EXCISIONAL BIOPSY OF RIGHT ANKLE BASAL CELL CARCINOMA;  Surgeon: Juan Luis Flores MD;  Location:  OR    INCISION AND DRAINAGE PERINEAL, COMBINED  02/06/2014    Procedure: COMBINED INCISION AND DRAINAGE PERINEAL;;  Surgeon: Serena Zamora MD;  Location:  [facility]    [facility] NONSPECIFIC PROCEDURE      Repair of buckled retina of rt eye    [facility] NONSPECIFIC PROCEDURE      Appy         Post-discharge medication reconciliation status: Reviewed and updated in [facility] according to facility MAR    Current Outpatient Medications   Medication Sig Dispense Refill    acetaminophen (TYLENOL) 500 MG tablet Take 2 tablets (1,000 mg) by mouth 3 times daily      aspirin (ASA) 325 MG EC tablet Take 1 tablet (325 mg) by mouth daily      atorvastatin (LIPITOR) 10 MG tablet TAKE 1 TABLET(10 MG) BY MOUTH DAILY 90 tablet 2    buPROPion (WELLBUTRIN XL) 300 MG 24 hr tablet TAKE 1 TABLET(300 MG) BY MOUTH EVERY MORNING 90 tablet 3    dorzolamide-timolol (COSOPT) 22.3-6.8 MG/ML ophthalmic solution Place 1 drop into both eyes 2 times daily      lisinopril (ZESTRIL) 40 MG tablet Take 1 tablet (40 mg) by mouth daily Hold for SBP <140 initially, revisit w/ TCU provider.      multivitamin w/minerals (THERA-VIT-M) tablet Take 1 tablet by mouth daily      oxyCODONE (ROXICODONE) 5 MG tablet Take 0.5 tablets (2.5 mg) by mouth every 4 hours as needed for moderate to severe pain 20 tablet 0    senna-docusate (SENOKOT-S/PERICOLACE) 8.6-50 MG tablet Take 1 tablet by mouth 2 times daily as  "needed for constipation      sertraline (ZOLOFT) 25 MG tablet Take 25 mg by mouth daily      spironolactone (ALDACTONE) 25 MG tablet Take 1 tablet (25 mg) by mouth daily Hold for SBP <130 or DBP <55 or dizziness initially, revisit w/ tcu provider. 90 tablet 3    verapamil ER (CALAN-SR) 120 MG CR tablet TAKE 1 TABLET BY MOUTH EVERY 12 HOURS.  Hold for SBP < 135 initially, revisit w/ TCU provider. 180 tablet 11    vitamin D3 (CHOLECALCIFEROL) 50 mcg (2000 units) tablet Take 1 tablet by mouth daily         ROS:  4 point ROS including Respiratory, CV, GI and , other than that noted in the HPI,  is negative    Exam:  BP (!) 150/68   Pulse 80   Temp 97.6  F (36.4  C)   Resp 18   Ht 1.549 m (5' 1\")   Wt 85 kg (187 lb 6.4 oz)   SpO2 92%   BMI 35.41 kg/m    Alert, pleasant, nicely groomed, sitting up on the edge of the bed in no acute distress  Bilateral hearing aids  Moist oral mucosa  Heart tones regular with 2/6 systolic murmur  Breathing nonlabored on room air, no cough, clear posteriorly  Left hip Aquacel in place without saturation  Fading ecchymosis along left hip down to the left calf without signs of overt hematoma  No significant lower extremity edema  Some fading ecchymosis around left elbow but good range of motion including flexion extension and pronation supination without difficulty  Normal speech, no tremor  Mood euthymic    Lab/Diagnostic data:  Labs today 5/28/24: Hgb 8.3 and Vitamin D 29    Most Recent 3 CBC's:  Recent Labs   Lab Test 05/28/24  0634 05/22/24  0757 05/21/24  0704 05/20/24  0309 05/14/24  1036 02/05/24  0529 01/27/24  0525   WBC  --   --   --  7.6  --  4.9 10.6   HGB 8.3* 8.8* 9.2* 8.7*   < > 9.4* 9.7*   MCV  --   --   --  100  --  104* 103*   PLT  --   --   --  304  --  331 260    < > = values in this interval not displayed.     Most Recent 3 BMP's:  Recent Labs   Lab Test 05/22/24  0757 05/21/24  0704 05/20/24  0309 05/14/24  1036 02/05/24  0529 01/30/24  0515   NA  --   --  135 " 137 139 136   POTASSIUM  --   --  3.9 4.1 3.9 3.9   CHLORIDE  --   --  101  --  106 102   CO2  --   --  25  --  23 23   BUN  --   --  22.6  --  26.5* 20.9   CR  --   --  0.90 0.96* 1.00* 0.89   ANIONGAP  --   --  9  --  10 11   TAMMY  --   --  9.3  --  9.0 8.6   GLC 86 109* 96  --  71 91     Most Recent 2 LFT's:  Recent Labs   Lab Test 01/23/24 2036 12/27/18  1415   AST 33 27   ALT 16 23   ALKPHOS 68 46   BILITOTAL 2.1* 1.2     Most Recent TSH and T4:  Recent Labs   Lab Test 05/14/24  1036   TSH 5.28*   T4 1.09     Most Recent Hemoglobin A1c:  Recent Labs   Lab Test 05/14/24  1036   A1C 5.3     Hospital imaging:  Xray L elbow:  IMPRESSION: A transverse ill-defined linear lucency is seen in the radial head without overlying cortical disruption, with a small elbow joint effusion, suspicious for a nondisplaced fracture. A small degenerative olecranon spur is present.     MRI L hip:  MPRESSION:  1.  Bone marrow edema of the femoral neck extending to the subcapital junction of the femoral head and neck where there is incomplete marrow fracturing extending from the superolateral aspect of the femoral neck centrally, the fracturing does not extend   across the entirety of the femoral neck. (Image 17, series 5; image 28, series 2).   2.  Small volume of free fluid within the greater trochanter, favored reflect traumatic trochanteric bursitis given history of fall  3.  Soft tissue contusions overlying the greater trochanter and proximal femur, consistent with soft tissue contusions from fall    ASSESSMENT/PLAN:  Fall, subsequent encounter  Left displaced femoral neck fracture (H)  Osteoporosis, unspecified osteoporosis type, unspecified pathological fracture presence  ABLA (acute blood loss anemia)  S/p surgical repair without incident  Has slightly low Hgb at baseline; now not surprisingly in 8s to follow periodically (significant ecchymosis noted)   mg daily for DVT prophy  On scheduled Tylenol with sparingly used  PRN Oxycodone though she may use it some more to help her get by more with therapies as she thinks she could benefit from this  Has PRN bowel regimen available  Physical therapy and occupational therapy  TCO follow up with Dr. Merino is scheduled 6/5/24 to remove Aquacel  Continue MVI and Vit D with satisfactory lab today  Further outpatient osteoporosis management per PCP - ? [facility]?    Closed nondisplaced fracture of head of left radius with routine healing, subsequent encounter  No pain or debility  Treated non-operatively  Per my colleagues' conversation with ortho, may WBAT    Benign essential hypertension  -150/50-80s with HR 60-80s  BP had been softer in hospital, now satisfactory  She monitors for orthostatic symptoms  Continues on home agents    Major depressive disorder, remission status unspecified, unspecified whether recurrent  On Wellbutrin and Sertraline  Seems surprisingly upbeat given situation and missing her cruise  Of note, later after my visit I noticed per PCP earlier this month she had tapered off of Sertraline as found it ineffective and was remaining on Wellbutrin alone  However on hospital admission medication reconciliation, the pharmacist put Sertraline back on her list and she is taking it now  Would ask in the future if wants to remain on it      Electronically signed by:  Adrianna Miller DO

## 2024-06-04 ENCOUNTER — TELEPHONE (OUTPATIENT)
Dept: FAMILY MEDICINE | Facility: CLINIC | Age: 89
End: 2024-06-04
Payer: COMMERCIAL

## 2024-06-04 NOTE — TELEPHONE ENCOUNTER
[facility] Recruitment: [facility]     Referral outreach attempt #3 on June 4, 2024      Outcome: left voicemail- Call back number [phone]  Patient is ENRIQUE Sinclair [facility]  -[facility]  [phone]

## 2024-06-05 ENCOUNTER — TRANSITIONAL CARE UNIT VISIT (OUTPATIENT)
Dept: GERIATRICS | Facility: CLINIC | Age: 89
End: 2024-06-05
Payer: COMMERCIAL

## 2024-06-05 VITALS
OXYGEN SATURATION: 98 % | DIASTOLIC BLOOD PRESSURE: 66 MMHG | TEMPERATURE: 97.5 F | HEART RATE: 65 BPM | SYSTOLIC BLOOD PRESSURE: 126 MMHG | HEIGHT: 60 IN | RESPIRATION RATE: 18 BRPM | BODY MASS INDEX: 35.18 KG/M2 | WEIGHT: 179.2 LBS

## 2024-06-05 DIAGNOSIS — I10 ESSENTIAL HYPERTENSION, BENIGN: Primary | ICD-10-CM

## 2024-06-05 DIAGNOSIS — F33.0 MAJOR DEPRESSIVE DISORDER, RECURRENT EPISODE, MILD (H): ICD-10-CM

## 2024-06-05 DIAGNOSIS — S72.002A LEFT DISPLACED FEMORAL NECK FRACTURE (H): ICD-10-CM

## 2024-06-05 PROCEDURE — 99309 SBSQ NF CARE MODERATE MDM 30: CPT | Performed by: PHYSICIAN ASSISTANT

## 2024-06-05 NOTE — PROGRESS NOTES
Chief Complaint   Patient presents with    Nursing Home Acute       HPI:  Riddhi Aguilar is a 89 year old  (2/15/1931), who is being seen today for an episodic care visit at: [facility] ([facility]) [[id number]].     Brief summary: Riddhi Aguilar is an exceptionally pleasant 89-year-old female with a past medical history of hypertension, CKD and anemia with recent hospitalization at [facility].  Suffered a fall outside her independent living apartment.  Found to have a left femoral neck fracture and hairline radial head fracture.  Orthopedics was consulted.  Underwent pinning of femoral neck fracture.  Assessed by therapies and discharged to U    Today's concern is: Doing very well.  Minimal pain.  Using minimal oxycodone.  May be discharging in the next week.  SBP is appropriate without hypotension but reviewed facility MAR and intermittently holding various medications due to parameters.  Riddhi indicates she would like to take less medication overall.  Would like to stand spironolactone due to history of edema.  Will discontinue verapamil and continue to monitor.  Denies constipation additionally as follow-up from my colleagues note patient confirms she is no longer on Zoloft and would like this discontinued      Review of nursing home EMR:-142, Wt 179.2      Allergies, and PMH/PSH reviewed in [employer] today.    REVIEW OF SYSTEMS:  4 point ROS including Respiratory, CV, GI and , other than that noted in the HPI,  is negative    Objective:   /66   Pulse 65   Temp 97.5  F (36.4  C)   Resp 18   Ht 1.524 m (5')   Wt 81.3 kg (179 lb 3.2 oz)   SpO2 98%   BMI 35.00 kg/m      Physical Exam  Vitals (Facility EMR) reviewed.   Constitutional:       General: She is not in acute distress.  HENT:      Head: Normocephalic and atraumatic.   Eyes:      General: No scleral icterus.  Cardiovascular:      Rate and Rhythm: Normal rate and regular rhythm.      Heart sounds: No murmur heard.  Pulmonary:       Effort: Pulmonary effort is normal.   Musculoskeletal:      Comments: Mild lower extremity edema.  Aquacel dressing in place to left lower extremity.  No drainage   Skin:     General: Skin is warm and dry.      Findings: No rash.   Neurological:      Mental Status: She is alert. Mental status is at baseline.   Psychiatric:         Behavior: Behavior normal.          MED REC REQUIRED  Post Medication Reconciliation Status: discharge medications reconciled and changed, per note/orders      Recent labs in [facility] reviewed by me today.  and Most Recent 3 CBC's:  Recent Labs   Lab Test 05/28/24  0634 05/22/24 0757 05/21/24  0704 05/20/24  0309 05/14/24  1036 02/05/24  0529 01/27/24  0525   WBC  --   --   --  7.6  --  4.9 10.6   HGB 8.3* 8.8* 9.2* 8.7*   < > 9.4* 9.7*   MCV  --   --   --  100  --  104* 103*   PLT  --   --   --  304  --  331 260    < > = values in this interval not displayed.     Most Recent 3 BMP's:  Recent Labs   Lab Test 05/22/24  0757 05/21/24  0704 05/20/24  0309 05/14/24  1036 02/05/24  0529 01/30/24  0515   NA  --   --  135 137 139 136   POTASSIUM  --   --  3.9 4.1 3.9 3.9   CHLORIDE  --   --  101  --  106 102   CO2  --   --  25  --  23 23   BUN  --   --  22.6  --  26.5* 20.9   CR  --   --  0.90 0.96* 1.00* 0.89   ANIONGAP  --   --  9  --  10 11   TAMMY  --   --  9.3  --  9.0 8.6   GLC 86 109* 96  --  71 91      Vitamin D, Total (25-Hydroxy)  20 - 50 ng/mL 29   Comment: optimum       Assessment/Plan:  Left femoral neck fracture status post percutaneous pinning 5/20  -Continue scheduled Tylenol and as needed oxycodone for  - Continue as needed oxycodone  - Vitamin D 2000 units daily.  Vitamin D level within normal limits at U  - WBAT  - PT/OT  - DVT prophylaxis full dose aspirin daily  - Follow-up with orthopedics 6/10      Left radial head fracture: Orthopedics recommends nonoperative management  - Denies pain.  WBAT  - PT/OT    Hypertension: [Spironolactone 25 mg daily, verapamil 120 mg ER bid   lisinopril 40 mg daily ] BP labile during hospital stay  - BP meds periodically being held due to parameters.  - Discontinue verapamil  - Continue lisinopril and spironolactone    CKD stage III: Baseline creatinine 0.9-1-  Estimated Creatinine Clearance: 38.7 mL/min (based on SCr of 0.9 mg/dL).  -Monitor as clinically appropriate    Edema:  - Continue spironolactone    Acute on chronic anemia: Discharge hemoglobin 8.8  - CBC 5/28    Depression  -Continue Wellbutrin  -No longer on Zoloft, patient confirms.  Will discontinue at TCU    Glaucoma:  - Continue eyedrops      Orders:  As above      Electronically signed by: Edie Alexis PA-C

## 2024-06-05 NOTE — LETTER
6/5/2024      Riddhi Aguilar  [address]  [place] [id number]          Chief Complaint   Patient presents with     Nursing Home Acute       HPI:  Riddhi Aguilar is a 89 year old  (2/15/1931), who is being seen today for an episodic care visit at: [facility] ([facility]) [[id number]].     Brief summary: Riddhi Aguilar is an exceptionally pleasant 89-year-old female with a past medical history of hypertension, CKD and anemia with recent hospitalization at [facility].  Suffered a fall outside her independent living apartment.  Found to have a left femoral neck fracture and hairline radial head fracture.  Orthopedics was consulted.  Underwent pinning of femoral neck fracture.  Assessed by therapies and discharged to [facility]    Today's concern is: Doing very well.  Minimal pain.  Using minimal oxycodone.  May be discharging in the next week.  SBP is appropriate without hypotension but reviewed facility MAR and intermittently holding various medications due to parameters.  Riddhi indicates she would like to take less medication overall.  Would like to stand spironolactone due to history of edema.  Will discontinue verapamil and continue to monitor.  Denies constipation additionally as follow-up from my colleagues note patient confirms she is no longer on Zoloft and would like this discontinued      Review of nursing home EMR:-142, Wt 179.2      Allergies, and PMH/PSH reviewed in [employer] today.    REVIEW OF SYSTEMS:  4 point ROS including Respiratory, CV, GI and , other than that noted in the HPI,  is negative    Objective:   /66   Pulse 65   Temp 97.5  F (36.4  C)   Resp 18   Ht 1.524 m (5')   Wt 81.3 kg (179 lb 3.2 oz)   SpO2 98%   BMI 35.00 kg/m      Physical Exam  Vitals (Facility EMR) reviewed.   Constitutional:       General: She is not in acute distress.  HENT:      Head: Normocephalic and atraumatic.   Eyes:      General: No scleral icterus.  Cardiovascular:      Rate  and Rhythm: Normal rate and regular rhythm.      Heart sounds: No murmur heard.  Pulmonary:      Effort: Pulmonary effort is normal.   Musculoskeletal:      Comments: Mild lower extremity edema.  Aquacel dressing in place to left lower extremity.  No drainage   Skin:     General: Skin is warm and dry.      Findings: No rash.   Neurological:      Mental Status: She is alert. Mental status is at baseline.   Psychiatric:         Behavior: Behavior normal.          MED REC REQUIRED  Post Medication Reconciliation Status: discharge medications reconciled and changed, per note/orders      Recent labs in [facility] reviewed by me today.  and Most Recent 3 CBC's:  Recent Labs   Lab Test 05/28/24  0634 05/22/24  0757 05/21/24  0704 05/20/24  0309 05/14/24  1036 02/05/24  0529 01/27/24  0525   WBC  --   --   --  7.6  --  4.9 10.6   HGB 8.3* 8.8* 9.2* 8.7*   < > 9.4* 9.7*   MCV  --   --   --  100  --  104* 103*   PLT  --   --   --  304  --  331 260    < > = values in this interval not displayed.     Most Recent 3 BMP's:  Recent Labs   Lab Test 05/22/24  0757 05/21/24  0704 05/20/24  0309 05/14/24  1036 02/05/24  0529 01/30/24  0515   NA  --   --  135 137 139 136   POTASSIUM  --   --  3.9 4.1 3.9 3.9   CHLORIDE  --   --  101  --  106 102   CO2  --   --  25  --  23 23   BUN  --   --  22.6  --  26.5* 20.9   CR  --   --  0.90 0.96* 1.00* 0.89   ANIONGAP  --   --  9  --  10 11   TAMMY  --   --  9.3  --  9.0 8.6   GLC 86 109* 96  --  71 91      Vitamin D, Total (25-Hydroxy)  20 - 50 ng/mL 29   Comment: optimum       Assessment/Plan:  Left femoral neck fracture status post percutaneous pinning 5/20  -Continue scheduled Tylenol and as needed oxycodone for  - Continue as needed oxycodone  - Vitamin D 2000 units daily.  Vitamin D level within normal limits at TCU  - WBAT  - PT/OT  - DVT prophylaxis full dose aspirin daily  - Follow-up with orthopedics 6/10      Left radial head fracture: Orthopedics recommends nonoperative management  - Denies  pain.  WBAT  - PT/OT    Hypertension: [Spironolactone 25 mg daily, verapamil 120 mg ER bid  lisinopril 40 mg daily ] BP labile during hospital stay  - BP meds periodically being held due to parameters.  - Discontinue verapamil  - Continue lisinopril and spironolactone    CKD stage III: Baseline creatinine 0.9-1-  Estimated Creatinine Clearance: 38.7 mL/min (based on SCr of 0.9 mg/dL).  -Monitor as clinically appropriate    Edema:  - Continue spironolactone    Acute on chronic anemia: Discharge hemoglobin 8.8  - CBC 5/28    Depression  -Continue Wellbutrin  -No longer on Zoloft, patient confirms.  Will discontinue at TCU    Glaucoma:  - Continue eyedrops      Orders:  As above      Electronically signed by: Edie Alexis PA-C       Sincerely,        Edie Alexis PA-C

## 2024-06-06 RX ORDER — LISINOPRIL 40 MG/1
40 TABLET ORAL DAILY
Status: SHIPPED
Start: 2024-06-06

## 2024-06-06 RX ORDER — SPIRONOLACTONE 25 MG/1
25 TABLET ORAL DAILY
Qty: 90 TABLET | Refills: 3 | Status: SHIPPED | OUTPATIENT
Start: 2024-06-06

## 2024-06-07 ENCOUNTER — DISCHARGE SUMMARY NURSING HOME (OUTPATIENT)
Dept: GERIATRICS | Facility: CLINIC | Age: 89
End: 2024-06-07
Payer: COMMERCIAL

## 2024-06-07 VITALS
TEMPERATURE: 97.7 F | HEIGHT: 60 IN | BODY MASS INDEX: 35.18 KG/M2 | DIASTOLIC BLOOD PRESSURE: 57 MMHG | HEART RATE: 82 BPM | SYSTOLIC BLOOD PRESSURE: 144 MMHG | RESPIRATION RATE: 18 BRPM | WEIGHT: 179.2 LBS | OXYGEN SATURATION: 95 %

## 2024-06-07 DIAGNOSIS — I10 BENIGN ESSENTIAL HYPERTENSION: ICD-10-CM

## 2024-06-07 DIAGNOSIS — D62 ABLA (ACUTE BLOOD LOSS ANEMIA): ICD-10-CM

## 2024-06-07 DIAGNOSIS — N18.32 STAGE 3B CHRONIC KIDNEY DISEASE (H): ICD-10-CM

## 2024-06-07 DIAGNOSIS — I10 ESSENTIAL HYPERTENSION, BENIGN: ICD-10-CM

## 2024-06-07 DIAGNOSIS — S72.002A LEFT DISPLACED FEMORAL NECK FRACTURE (H): Primary | ICD-10-CM

## 2024-06-07 PROCEDURE — 99316 NF DSCHRG MGMT 30 MIN+: CPT | Performed by: PHYSICIAN ASSISTANT

## 2024-06-07 NOTE — PROGRESS NOTES
[facility] GERIATRICS DISCHARGE SUMMARY  PATIENT'S NAME: Riddhi Aguilar  YOB: 1931  MEDICAL RECORD NUMBER:  [id number]  Place of Service where encounter took place:  [facility] ([facility]) [[id number]]    PRIMARY CARE PROVIDER AND CLINIC RESPONSIBLE AFTER TRANSFER:   Shun Prado MD, [address] / [place] [address]    G Provider     Transferring providers: Edie Alexis PA-C, Adrianna Miller DO  Recent Hospitalization/ED:  [facility] Hospital stay 5/19/24 to 5/22/24.  Date of SNF Admission: May 22, 2024  Date of SNF (anticipated) Discharge: June 08, 2024  Discharged to: previous independent home  Cognitive Scores: SLUMS: 20/30 and CPT: 4.7/5.6  Physical Function: Ambulating 500 ft with FWW  DME: No new DME needed    CODE STATUS/ADVANCE DIRECTIVES DISCUSSION:  No CPR- Do NOT Intubate   ALLERGIES: Patient has no known allergies.    Summary of nursing facility stay:   Riddhi Aguilar is an exceptionally pleasant 89-year-old female with a past medical history of hypertension, CKD and anemia with recent hospitalization at [facility].  Suffered a fall outside her independent living apartment.  Found to have a left femoral neck fracture and hairline radial head fracture.  Orthopedics was consulted.  Underwent pinning of femoral neck fracture.  Assessed by therapies and discharged to TCU.    Riddhi is evaluated today to discuss discharge.  Planning to discharge home tomorrow with home care.  Doing extremely well.  No use of oxycodone on the past 3 days but would like to go with some supply.  Only plans to use it if absolutely necessary.  BP overall appropriate with discontinuation of verapamil.  Ranging primarily in the 130s.  Has orthopedic follow-up on Monday which she plans to attend    Left femoral neck fracture status post percutaneous pinning 5/20  -Continue scheduled Tylenol and as needed oxycodone.  Will discharge with remaining oxycodone  at the facility  - Vitamin D 2000 units daily.  Vitamin D level within normal limits at TCU  - WBAT  - PT/OT  - DVT prophylaxis full dose aspirin daily  - Follow-up with orthopedics 6/10      Left radial head fracture: Orthopedics recommends nonoperative management  - Denies pain.  WBAT  - PT/OT    Hypertension: [Spironolactone 25 mg daily, verapamil 120 mg ER bid  lisinopril 40 mg daily ] BP labile during hospital stay  - Verapamil discontinued at TCU due to BP meds frequently being held from parameters.  BP appropriate off verapamil  - Continue lisinopril and spironolactone    CKD stage III: Baseline creatinine 0.9-1-  Estimated Creatinine Clearance: 38.7 mL/min (based on SCr of 0.9 mg/dL).  -Monitor as clinically appropriate    Edema:  - Continue spironolactone    Acute on chronic anemia: Discharge hemoglobin 8.8  - CBC 5/28 stable at 8.3    Depression  -Continue Wellbutrin    Glaucoma:  - Continue eyedrops    Discharge Medications:  MED REC REQUIRED  Post Medication Reconciliation Status: discharge medications reconciled and changed, per note/orders     Current Outpatient Medications   Medication Sig Dispense Refill    acetaminophen (TYLENOL) 500 MG tablet Take 2 tablets (1,000 mg) by mouth 3 times daily      aspirin (ASA) 325 MG EC tablet Take 1 tablet (325 mg) by mouth daily      atorvastatin (LIPITOR) 10 MG tablet TAKE 1 TABLET(10 MG) BY MOUTH DAILY 90 tablet 2    buPROPion (WELLBUTRIN XL) 300 MG 24 hr tablet TAKE 1 TABLET(300 MG) BY MOUTH EVERY MORNING 90 tablet 3    dorzolamide-timolol (COSOPT) 22.3-6.8 MG/ML ophthalmic solution Place 1 drop into both eyes 2 times daily      lisinopril (ZESTRIL) 40 MG tablet Take 1 tablet (40 mg) by mouth daily Hold for SBP <110      multivitamin w/minerals (THERA-VIT-M) tablet Take 1 tablet by mouth daily      oxyCODONE (ROXICODONE) 5 MG tablet Take 0.5 tablets (2.5 mg) by mouth every 4 hours as needed for moderate to severe pain 20 tablet 0    senna-docusate  (SENOKOT-S/PERICOLACE) 8.6-50 MG tablet Take 1 tablet by mouth 2 times daily as needed for constipation      spironolactone (ALDACTONE) 25 MG tablet Take 1 tablet (25 mg) by mouth daily Hold for SBP <110 or DBP <55 or dizziness initially, revisit w/ tcu provider. 90 tablet 3    vitamin D3 (CHOLECALCIFEROL) 50 mcg (2000 units) tablet Take 1 tablet by mouth daily            Controlled medications:   Patient to discharge with remaining facility supply of oxycodone     Past Medical History:   Past Medical History:   Diagnosis Date    Anemia     baseline 10-11    Arthritis     Bleeding ulcer     duodenal ulcer    CKD (chronic kidney disease) stage 3, GFR 30-59 ml/min (H)     Essential hypertension, benign     MDD (major depressive disorder)     Nephrolithiasis     Osteoporosis     Pure hypercholesterolemia     Spinal stenosis     Unspecified cataract     Unspecified glaucoma(365.9)      Physical Exam:   Vitals: BP (!) 144/57   Pulse 82   Temp 97.7  F (36.5  C)   Resp 18   Ht 1.524 m (5')   Wt 81.3 kg (179 lb 3.2 oz)   SpO2 95%   BMI 35.00 kg/m    BMI: Body mass index is 35 kg/m .  Physical Exam  Vitals (Facility EMR) reviewed.   Constitutional:       General: She is not in acute distress.  HENT:      Head: Normocephalic and atraumatic.   Eyes:      General: No scleral icterus.  Cardiovascular:      Rate and Rhythm: Normal rate and regular rhythm.      Heart sounds: No murmur heard.  Pulmonary:      Effort: Pulmonary effort is normal.      Breath sounds: No wheezing.   Musculoskeletal:      Right lower leg: No edema.      Left lower leg: No edema.   Skin:     General: Skin is warm and dry.      Findings: No rash.   Neurological:      Mental Status: She is alert. Mental status is at baseline.   Psychiatric:         Behavior: Behavior normal.           SNF labs: Recent labs in EPIC reviewed by me today.  and Most Recent 3 CBC's:  Recent Labs   Lab Test 05/28/24  0634 05/22/24  0757 05/21/24  0704 05/20/24  0309  05/14/24  1036 02/05/24  0529 01/27/24  0525   WBC  --   --   --  7.6  --  4.9 10.6   HGB 8.3* 8.8* 9.2* 8.7*   < > 9.4* 9.7*   MCV  --   --   --  100  --  104* 103*   PLT  --   --   --  304  --  331 260    < > = values in this interval not displayed.     Most Recent 3 BMP's:  Recent Labs   Lab Test 05/22/24  0757 05/21/24  0704 05/20/24  0309 05/14/24  1036 02/05/24  0529 01/30/24  0515   NA  --   --  135 137 139 136   POTASSIUM  --   --  3.9 4.1 3.9 3.9   CHLORIDE  --   --  101  --  106 102   CO2  --   --  25  --  23 23   BUN  --   --  22.6  --  26.5* 20.9   CR  --   --  0.90 0.96* 1.00* 0.89   ANIONGAP  --   --  9  --  10 11   TAMMY  --   --  9.3  --  9.0 8.6   GLC 86 109* 96  --  71 91     Most Recent 2 LFT's:  Recent Labs   Lab Test 01/23/24 2036 12/27/18  1415   AST 33 27   ALT 16 23   ALKPHOS 68 46   BILITOTAL 2.1* 1.2     Most Recent 3 Hemoglobins:  Recent Labs   Lab Test 05/28/24  0634 05/22/24 0757 05/21/24  0704   HGB 8.3* 8.8* 9.2*       DISCHARGE PLAN:  Follow up labs: No labs orders/due  Medical Follow Up:      Follow up with primary care provider in 1-2 weeks   Follow-up with orthopedics as scheduled 6/10  [facility] scheduled appointments:  Appointments in Next Year      Jun 07, 2024  9:30 AM  Discharge Summary with Edie Alexis PA-C  [facility] ([facility] ) [phone]     May 20, 2025 11:30 AM  (Arrive by 11:10 AM)  Annual Wellness Visit with Shun Prado MD  [facility] ([facility] ) [phone]           Discharge Services: Home Care:  Occupational Therapy, Physical Therapy, Registered Nurse, and Home Health Aide  Discharge Instructions Verbalized to Patient at Discharge:   Weight bearing restrictions:  Weight bearing as tolerated.     TOTAL DISCHARGE TIME:   Greater than 30 minutes  Electronically signed by:  Edie Alexis PA-C

## 2024-06-07 NOTE — PATIENT INSTRUCTIONS
Riddhi Aguilar  YOB: 1931                                   Discharge Date: 6/8/24      PHYSICIAN's DISCHARGE SUMMARY / ORDER SHEET  1. Discharge to: Home with home care  2. Medications:      Current Outpatient Medications   Medication Sig Dispense Refill    acetaminophen (TYLENOL) 500 MG tablet Take 2 tablets (1,000 mg) by mouth 3 times daily      aspirin (ASA) 325 MG EC tablet Take 1 tablet (325 mg) by mouth daily      atorvastatin (LIPITOR) 10 MG tablet TAKE 1 TABLET(10 MG) BY MOUTH DAILY 90 tablet 2    buPROPion (WELLBUTRIN XL) 300 MG 24 hr tablet TAKE 1 TABLET(300 MG) BY MOUTH EVERY MORNING 90 tablet 3    dorzolamide-timolol (COSOPT) 22.3-6.8 MG/ML ophthalmic solution Place 1 drop into both eyes 2 times daily      lisinopril (ZESTRIL) 40 MG tablet Take 1 tablet (40 mg) by mouth daily Hold for SBP <110      multivitamin w/minerals (THERA-VIT-M) tablet Take 1 tablet by mouth daily      oxyCODONE (ROXICODONE) 5 MG tablet Take 0.5 tablets (2.5 mg) by mouth every 4 hours as needed for moderate to severe pain 20 tablet 0    senna-docusate (SENOKOT-S/PERICOLACE) 8.6-50 MG tablet Take 1 tablet by mouth 2 times daily as needed for constipation      spironolactone (ALDACTONE) 25 MG tablet Take 1 tablet (25 mg) by mouth daily Hold for SBP <110 or DBP <55 or dizziness initially, revisit w/ tcu provider. 90 tablet 3    vitamin D3 (CHOLECALCIFEROL) 50 mcg (2000 units) tablet Take 1 tablet by mouth daily        DISCHARGE PLAN:  Follow up labs: No labs orders/due  Medical Follow Up:                 Follow up with primary care provider in 1-2 weeks              Follow-up with orthopedics as scheduled 6/10  [facility] scheduled appointments:  Appointments in Next Year       Jun 07, 2024  9:30 AM  Discharge Summary with Edie Alexis PA-C  [facility] ([facility] ) [phone]      May 20, 2025 11:30 AM  (Arrive by 11:10 AM)  Annual Wellness Visit with Shun  SAMI Prado MD  [facility] ([facility] ) [phone]             Discharge Services: Home Care:  Occupational Therapy, Physical Therapy, Registered Nurse, and Home Health Aide  Discharge Instructions Verbalized to Patient at Discharge:   Weight bearing restrictions:  Weight bearing as tolerated.        Discharge patient to home with current medications and treatments  Discharge Home with Home care as above  - Nursing call in 30 days supply of needed meds to pharmacy of choice upon discharge. Future refills by PCP Dr. Shun Prado with phone number [phone].  - Patient to discharge with remaining facility supply of oxycodone 5 mg  - Please send home with original of these discharge instructions and copy for chart.       ______________________________  Edie Alexis PA-C   [facility]                   6/7/2024

## 2024-06-07 NOTE — LETTER
6/7/2024      Riddhi Aguilar  [address]  [place] [address]        [facility] GERIATRICS DISCHARGE SUMMARY  PATIENT'S NAME: Riddhi Aguilar  YOB: 1931  MEDICAL RECORD NUMBER:  [id number]  Place of Service where encounter took place:  [facility] ([facility]) [[id number]]    PRIMARY CARE PROVIDER AND CLINIC RESPONSIBLE AFTER TRANSFER:   Shun Prado MD, [address] / [place] [address]    [facility] [place] [place] Provider     Transferring providers: Edie Alexis PA-C, Adrianna Miller DO  Recent Hospitalization/ED:  [facility] Hospital stay 5/19/24 to 5/22/24.  Date of SNF Admission: May 22, 2024  Date of SNF (anticipated) Discharge: June 08, 2024  Discharged to: previous independent home  Cognitive Scores: SLUMS: 20/30 and CPT: 4.7/5.6  Physical Function: Ambulating 500 ft with FWW  DME: No new DME needed    CODE STATUS/ADVANCE DIRECTIVES DISCUSSION:  No CPR- Do NOT Intubate   ALLERGIES: Patient has no known allergies.    Summary of nursing facility stay:   Riddhi Aguilar is an exceptionally pleasant 89-year-old female with a past medical history of hypertension, CKD and anemia [facility]  Suffered a fall outside her independent living apartment.  Found to have a left femoral neck fracture and hairline radial head fracture.  Orthopedics was consulted.  Underwent pinning of femoral neck fracture.  Assessed by therapies and discharged to U.    Riddhi is evaluated today to discuss discharge.  Planning to discharge home tomorrow with home care.  Doing extremely well.  No use of oxycodone on the past 3 days but would like to go with some supply.  Only plans to use it if absolutely necessary.  BP overall appropriate with discontinuation of verapamil.  Ranging primarily in the 130s.  Has orthopedic follow-up on Monday which she plans to attend    Left femoral neck fracture status post percutaneous pinning  5/20  -Continue scheduled Tylenol and as needed oxycodone.  Will discharge with remaining oxycodone at the facility  - Vitamin D 2000 units daily.  Vitamin D level within normal limits at TCU  - WBAT  - PT/OT  - DVT prophylaxis full dose aspirin daily  - Follow-up with orthopedics 6/10      Left radial head fracture: Orthopedics recommends nonoperative management  - Denies pain.  WBAT  - PT/OT    Hypertension: [Spironolactone 25 mg daily, verapamil 120 mg ER bid  lisinopril 40 mg daily ] BP labile during hospital stay  - Verapamil discontinued at TCU due to BP meds frequently being held from parameters.  BP appropriate off verapamil  - Continue lisinopril and spironolactone    CKD stage III: Baseline creatinine 0.9-1-  Estimated Creatinine Clearance: 38.7 mL/min (based on SCr of 0.9 mg/dL).  -Monitor as clinically appropriate    Edema:  - Continue spironolactone    Acute on chronic anemia: Discharge hemoglobin 8.8  - CBC 5/28 stable at 8.3    Depression  -Continue Wellbutrin    Glaucoma:  - Continue eyedrops    Discharge Medications:  MED REC REQUIRED  Post Medication Reconciliation Status: discharge medications reconciled and changed, per note/orders     Current Outpatient Medications   Medication Sig Dispense Refill     acetaminophen (TYLENOL) 500 MG tablet Take 2 tablets (1,000 mg) by mouth 3 times daily       aspirin (ASA) 325 MG EC tablet Take 1 tablet (325 mg) by mouth daily       atorvastatin (LIPITOR) 10 MG tablet TAKE 1 TABLET(10 MG) BY MOUTH DAILY 90 tablet 2     buPROPion (WELLBUTRIN XL) 300 MG 24 hr tablet TAKE 1 TABLET(300 MG) BY MOUTH EVERY MORNING 90 tablet 3     dorzolamide-timolol (COSOPT) 22.3-6.8 MG/ML ophthalmic solution Place 1 drop into both eyes 2 times daily       lisinopril (ZESTRIL) 40 MG tablet Take 1 tablet (40 mg) by mouth daily Hold for SBP <110       multivitamin w/minerals (THERA-VIT-M) tablet Take 1 tablet by mouth daily       oxyCODONE (ROXICODONE) 5 MG tablet Take 0.5 tablets (2.5  mg) by mouth every 4 hours as needed for moderate to severe pain 20 tablet 0     senna-docusate (SENOKOT-S/PERICOLACE) 8.6-50 MG tablet Take 1 tablet by mouth 2 times daily as needed for constipation       spironolactone (ALDACTONE) 25 MG tablet Take 1 tablet (25 mg) by mouth daily Hold for SBP <110 or DBP <55 or dizziness initially, revisit w/ tcu provider. 90 tablet 3     vitamin D3 (CHOLECALCIFEROL) 50 mcg (2000 units) tablet Take 1 tablet by mouth daily            Controlled medications:   Patient to discharge with remaining facility supply of oxycodone     Past Medical History:   Past Medical History:   Diagnosis Date     Anemia     baseline 10-11     Arthritis      Bleeding ulcer     duodenal ulcer     CKD (chronic kidney disease) stage 3, GFR 30-59 ml/min (H)      Essential hypertension, benign      MDD (major depressive disorder)      Nephrolithiasis      Osteoporosis      Pure hypercholesterolemia      Spinal stenosis      Unspecified cataract      Unspecified glaucoma(365.9)      Physical Exam:   Vitals: BP (!) 144/57   Pulse 82   Temp 97.7  F (36.5  C)   Resp 18   Ht 1.524 m (5')   Wt 81.3 kg (179 lb 3.2 oz)   SpO2 95%   BMI 35.00 kg/m    BMI: Body mass index is 35 kg/m .  Physical Exam  Vitals (Facility EMR) reviewed.   Constitutional:       General: She is not in acute distress.  HENT:      Head: Normocephalic and atraumatic.   Eyes:      General: No scleral icterus.  Cardiovascular:      Rate and Rhythm: Normal rate and regular rhythm.      Heart sounds: No murmur heard.  Pulmonary:      Effort: Pulmonary effort is normal.      Breath sounds: No wheezing.   Musculoskeletal:      Right lower leg: No edema.      Left lower leg: No edema.   Skin:     General: Skin is warm and dry.      Findings: No rash.   Neurological:      Mental Status: She is alert. Mental status is at baseline.   Psychiatric:         Behavior: Behavior normal.           SNF labs: Recent labs in [facility] reviewed by me today.  and  Most Recent 3 CBC's:  Recent Labs   Lab Test 05/28/24  0634 05/22/24  0757 05/21/24  0704 05/20/24 0309 05/14/24 1036 02/05/24  0529 01/27/24  0525   WBC  --   --   --  7.6  --  4.9 10.6   HGB 8.3* 8.8* 9.2* 8.7*   < > 9.4* 9.7*   MCV  --   --   --  100  --  104* 103*   PLT  --   --   --  304  --  331 260    < > = values in this interval not displayed.     Most Recent 3 BMP's:  Recent Labs   Lab Test 05/22/24 0757 05/21/24  0704 05/20/24 0309 05/14/24 1036 02/05/24  0529 01/30/24  0515   NA  --   --  135 137 139 136   POTASSIUM  --   --  3.9 4.1 3.9 3.9   CHLORIDE  --   --  101  --  106 102   CO2  --   --  25  --  23 23   BUN  --   --  22.6  --  26.5* 20.9   CR  --   --  0.90 0.96* 1.00* 0.89   ANIONGAP  --   --  9  --  10 11   TAMMY  --   --  9.3  --  9.0 8.6   GLC 86 109* 96  --  71 91     Most Recent 2 LFT's:  Recent Labs   Lab Test 01/23/24 2036 12/27/18  1415   AST 33 27   ALT 16 23   ALKPHOS 68 46   BILITOTAL 2.1* 1.2     Most Recent 3 Hemoglobins:  Recent Labs   Lab Test 05/28/24 0634 05/22/24 0757 05/21/24  0704   HGB 8.3* 8.8* 9.2*       DISCHARGE PLAN:  Follow up labs: No labs orders/due  Medical Follow Up:      Follow up with primary care provider in 1-2 weeks   Follow-up with orthopedics as scheduled 6/10  [facility] scheduled appointments:  Appointments in Next Year      Jun 07, 2024  9:30 AM  Discharge Summary with Edie Alexis PA-C  [facility] ([facility] ) [phone]     May 20, 2025 11:30 AM  (Arrive by 11:10 AM)  Annual Wellness Visit with Shun Prado MD  [facility] ([facility] ) [phone]           Discharge Services: Home Care:  Occupational Therapy, Physical Therapy, Registered Nurse, and Home Health Aide  Discharge Instructions Verbalized to Patient at Discharge:   Weight bearing restrictions:  Weight bearing as tolerated.     TOTAL DISCHARGE TIME:   Greater than 30  minutes  Electronically signed by:  Edie Alexis PA-C                   Sincerely,        Edie Alexis PA-C

## 2024-06-07 NOTE — LETTER
6/7/2024      Riddhi Aguilar  [address]  [place] [address]        [facility] GERIATRICS DISCHARGE SUMMARY  PATIENT'S NAME: Riddhi Aguilar  YOB: 1931  MEDICAL RECORD NUMBER:  [id number]  Place of Service where encounter took place:  [facility] ([facility]) [[id number]]    PRIMARY CARE PROVIDER AND CLINIC RESPONSIBLE AFTER TRANSFER:   Shun Prado MD, [address] / [place] [address]    [facility] Provider     Transferring providers: Edie Alexis PA-C, Adrianna Miller DO  Recent Hospitalization/ED:  [facility] Hospital stay 5/19/24 to 5/22/24.  Date of SNF Admission: May 22, 2024  Date of SNF (anticipated) Discharge: June 08, 2024  Discharged to: previous independent home  Cognitive Scores: SLUMS: 20/30 and CPT: 4.7/5.6  Physical Function: Ambulating 500 ft with FWW  DME: No new DME needed    CODE STATUS/ADVANCE DIRECTIVES DISCUSSION:  No CPR- Do NOT Intubate   ALLERGIES: Patient has no known allergies.    Summary of nursing facility stay:   Riddhi Aguilar is an exceptionally pleasant 89-year-old female with a past medical history of hypertension, CKD and anemia with recent hospitalization at [facility].  Suffered a fall outside her independent living apartment.  Found to have a left femoral neck fracture and hairline radial head fracture.  Orthopedics was consulted.  Underwent pinning of femoral neck fracture.  Assessed by therapies and discharged to U.    Riddhi is evaluated today to discuss discharge.  Planning to discharge home tomorrow with home care.  Doing extremely well.  No use of oxycodone on the past 3 days but would like to go with some supply.  Only plans to use it if absolutely necessary.  BP overall appropriate with discontinuation of verapamil.  Ranging primarily in the 130s.  Has orthopedic follow-up on Monday which she plans to attend    Left femoral neck fracture status post percutaneous pinning  5/20  -Continue scheduled Tylenol and as needed oxycodone.  Will discharge with remaining oxycodone at the facility  - Vitamin D 2000 units daily.  Vitamin D level within normal limits at TCU  - WBAT  - PT/OT  - DVT prophylaxis full dose aspirin daily  - Follow-up with orthopedics 6/10      Left radial head fracture: Orthopedics recommends nonoperative management  - Denies pain.  WBAT  - PT/OT    Hypertension: [Spironolactone 25 mg daily, verapamil 120 mg ER bid  lisinopril 40 mg daily ] BP labile during hospital stay  - Verapamil discontinued at TCU due to BP meds frequently being held from parameters.  BP appropriate off verapamil  - Continue lisinopril and spironolactone    CKD stage III: Baseline creatinine 0.9-1-  Estimated Creatinine Clearance: 38.7 mL/min (based on SCr of 0.9 mg/dL).  -Monitor as clinically appropriate    Edema:  - Continue spironolactone    Acute on chronic anemia: Discharge hemoglobin 8.8  - CBC 5/28 stable at 8.3    Depression  -Continue Wellbutrin    Glaucoma:  - Continue eyedrops    Discharge Medications:  MED REC REQUIRED  Post Medication Reconciliation Status: discharge medications reconciled and changed, per note/orders     Current Outpatient Medications   Medication Sig Dispense Refill     acetaminophen (TYLENOL) 500 MG tablet Take 2 tablets (1,000 mg) by mouth 3 times daily       aspirin (ASA) 325 MG EC tablet Take 1 tablet (325 mg) by mouth daily       atorvastatin (LIPITOR) 10 MG tablet TAKE 1 TABLET(10 MG) BY MOUTH DAILY 90 tablet 2     buPROPion (WELLBUTRIN XL) 300 MG 24 hr tablet TAKE 1 TABLET(300 MG) BY MOUTH EVERY MORNING 90 tablet 3     dorzolamide-timolol (COSOPT) 22.3-6.8 MG/ML ophthalmic solution Place 1 drop into both eyes 2 times daily       lisinopril (ZESTRIL) 40 MG tablet Take 1 tablet (40 mg) by mouth daily Hold for SBP <110       multivitamin w/minerals (THERA-VIT-M) tablet Take 1 tablet by mouth daily       oxyCODONE (ROXICODONE) 5 MG tablet Take 0.5 tablets (2.5  mg) by mouth every 4 hours as needed for moderate to severe pain 20 tablet 0     senna-docusate (SENOKOT-S/PERICOLACE) 8.6-50 MG tablet Take 1 tablet by mouth 2 times daily as needed for constipation       spironolactone (ALDACTONE) 25 MG tablet Take 1 tablet (25 mg) by mouth daily Hold for SBP <110 or DBP <55 or dizziness initially, revisit w/ tcu provider. 90 tablet 3     vitamin D3 (CHOLECALCIFEROL) 50 mcg (2000 units) tablet Take 1 tablet by mouth daily            Controlled medications:   Patient to discharge with remaining facility supply of oxycodone     Past Medical History:   Past Medical History:   Diagnosis Date     Anemia     baseline 10-11     Arthritis      Bleeding ulcer     duodenal ulcer     CKD (chronic kidney disease) stage 3, GFR 30-59 ml/min (H)      Essential hypertension, benign      MDD (major depressive disorder)      Nephrolithiasis      Osteoporosis      Pure hypercholesterolemia      Spinal stenosis      Unspecified cataract      Unspecified glaucoma(365.9)      Physical Exam:   Vitals: BP (!) 144/57   Pulse 82   Temp 97.7  F (36.5  C)   Resp 18   Ht 1.524 m (5')   Wt 81.3 kg (179 lb 3.2 oz)   SpO2 95%   BMI 35.00 kg/m    BMI: Body mass index is 35 kg/m .  Physical Exam  Vitals (Facility EMR) reviewed.   Constitutional:       General: She is not in acute distress.  HENT:      Head: Normocephalic and atraumatic.   Eyes:      General: No scleral icterus.  Cardiovascular:      Rate and Rhythm: Normal rate and regular rhythm.      Heart sounds: No murmur heard.  Pulmonary:      Effort: Pulmonary effort is normal.      Breath sounds: No wheezing.   Musculoskeletal:      Right lower leg: No edema.      Left lower leg: No edema.   Skin:     General: Skin is warm and dry.      Findings: No rash.   Neurological:      Mental Status: She is alert. Mental status is at baseline.   Psychiatric:         Behavior: Behavior normal.           SNF labs: Recent labs in [facility] reviewed by me today.  and  Most Recent 3 CBC's:  Recent Labs   Lab Test 05/28/24  0634 05/22/24  0757 05/21/24  0704 05/20/24 0309 05/14/24 1036 02/05/24  0529 01/27/24  0525   WBC  --   --   --  7.6  --  4.9 10.6   HGB 8.3* 8.8* 9.2* 8.7*   < > 9.4* 9.7*   MCV  --   --   --  100  --  104* 103*   PLT  --   --   --  304  --  331 260    < > = values in this interval not displayed.     Most Recent 3 BMP's:  Recent Labs   Lab Test 05/22/24 0757 05/21/24  0704 05/20/24 0309 05/14/24 1036 02/05/24  0529 01/30/24  0515   NA  --   --  135 137 139 136   POTASSIUM  --   --  3.9 4.1 3.9 3.9   CHLORIDE  --   --  101  --  106 102   CO2  --   --  25  --  23 23   BUN  --   --  22.6  --  26.5* 20.9   CR  --   --  0.90 0.96* 1.00* 0.89   ANIONGAP  --   --  9  --  10 11   TAMMY  --   --  9.3  --  9.0 8.6   GLC 86 109* 96  --  71 91     Most Recent 2 LFT's:  Recent Labs   Lab Test 01/23/24 2036 12/27/18  1415   AST 33 27   ALT 16 23   ALKPHOS 68 46   BILITOTAL 2.1* 1.2     Most Recent 3 Hemoglobins:  Recent Labs   Lab Test 05/28/24 0634 05/22/24 0757 05/21/24  0704   HGB 8.3* 8.8* 9.2*       DISCHARGE PLAN:  Follow up labs: No labs orders/due  Medical Follow Up:      Follow up with primary care provider in 1-2 weeks   Follow-up with orthopedics as scheduled 6/10  [facility] scheduled appointments:  Appointments in Next Year      Jun 07, 2024  9:30 AM  Discharge Summary with Edie Alexis PA-C  [facility] ([facility] ) [phone]     May 20, 2025 11:30 AM  (Arrive by 11:10 AM)  Annual Wellness Visit with Shun Prado MD  [facility] ([facility] ) [phone]           Discharge Services: Home Care:  Occupational Therapy, Physical Therapy, Registered Nurse, and Home Health Aide  Discharge Instructions Verbalized to Patient at Discharge:   Weight bearing restrictions:  Weight bearing as tolerated.     TOTAL DISCHARGE TIME:   Greater than 30  minutes  Electronically signed by:  Edie Alexis PA-C                     Sincerely,        Edie Alexis PA-C

## 2024-06-11 ENCOUNTER — TELEPHONE (OUTPATIENT)
Dept: FAMILY MEDICINE | Facility: CLINIC | Age: 89
End: 2024-06-11
Payer: COMMERCIAL

## 2024-06-11 NOTE — TELEPHONE ENCOUNTER
Home Care is calling regarding an established patient with [facility].       Requesting orders from: Shun Prado  Provider is following patient: Yes  Is this a 60-day recertification request?  No    Orders Requested    Skilled Nursing  Request for delay in care, service is not able to be provided within same scheduled day.   Services should have been provided by tomorrow but patient will be seen on 06/14/24 due to patient request.    Information was gathered and will be sent to provider for review.  RN will contact Home Care with information after provider review.  Confirmed ok to leave a detailed message with call back.  Contact information confirmed and updated as needed.    Hina Aguero, RN  [facility]

## 2024-06-17 NOTE — TELEPHONE ENCOUNTER
Home Care is calling regarding an established patient with [facility].       Requesting orders from: Shun Prado  Provider is following patient: Yes  Is this a 60-day recertification request?  No    Orders Requested    Physical Therapy  Request for initial evaluation and treatment (one time)     Occupational Therapy  Request for initial evaluation and treatment (one time)     Verbal orders given.  Home Care will send orders for provider to sign.  Confirmed ok to leave a detailed message with call back.  Contact information confirmed and updated as needed.    Angeles Jimenez RN

## 2024-06-19 ENCOUNTER — TELEPHONE (OUTPATIENT)
Dept: FAMILY MEDICINE | Facility: CLINIC | Age: 89
End: 2024-06-19
Payer: COMMERCIAL

## 2024-06-19 NOTE — TELEPHONE ENCOUNTER
Home Care is calling regarding an established patient with [facility].       Requesting orders from: Shun Prado  Provider is following patient: Yes  Is this a 60-day recertification request?  No    Orders Requested    Physical Therapy  Request for initial certification (first set of orders)   Frequency:  6 visits over net 8 weeks    Information was gathered and will be sent to provider for review.  RN will contact Home Care with information after provider review.  Confirmed ok to leave a detailed message with call back.  Contact information confirmed and updated as needed.    Yahaira Elliott RN

## 2024-06-21 ENCOUNTER — VIRTUAL VISIT (OUTPATIENT)
Dept: FAMILY MEDICINE | Facility: CLINIC | Age: 89
End: 2024-06-21
Payer: COMMERCIAL

## 2024-06-21 DIAGNOSIS — Z13.21 ENCOUNTER FOR VITAMIN DEFICIENCY SCREENING: ICD-10-CM

## 2024-06-21 DIAGNOSIS — D64.9 ANEMIA, UNSPECIFIED TYPE: Primary | ICD-10-CM

## 2024-06-21 DIAGNOSIS — I10 ESSENTIAL HYPERTENSION, BENIGN: ICD-10-CM

## 2024-06-21 DIAGNOSIS — M89.9 DISORDER OF BONE, UNSPECIFIED: ICD-10-CM

## 2024-06-21 PROCEDURE — 99495 TRANSJ CARE MGMT MOD F2F 14D: CPT | Mod: 95 | Performed by: INTERNAL MEDICINE

## 2024-06-21 NOTE — PROGRESS NOTES
Riddhi is a 89 year old who is being evaluated via a billable video visit.    How would you like to obtain your AVS? MyChart  If the video visit is dropped, the invitation should be resent by: Text to cell phone: [phone]  Will anyone else be joining your video visit? No      Assessment & Plan   Problem List Items Addressed This Visit       Essential hypertension, benign    Relevant Orders    Basic metabolic panel  (Ca, Cl, CO2, Creat, Gluc, K, Na, BUN)     Other Visit Diagnoses       Anemia, unspecified type    -  Primary    Relevant Orders    CBC with platelets    Ferritin    Iron and iron binding capacity    Encounter for vitamin deficiency screening        Relevant Orders    Vitamin D Deficiency    Disorder of bone, unspecified        Relevant Orders    Vitamin D Deficiency           Continue to monitor blood pressure and call us with readings.Patient reports she is not taking lisinopril, and not taking verapamil.  Only taking spironolactone.  Reports blood pressure has been adequate in the mid 120s over mid 50s to 60s.  She remains on spironolactone.  Will check chemistry again in a months.  Will do some workup for her anemia although she has chronic anemia.  There was slight decrease postsurgery possible dilution effect.  Will check ferritin iron studies.  Her vitamin B12 was normal.  Currently on calcium vitamin D supplements.  She keeps ambulating as tolerated with a walker.  Continue follow-up recommendation of orthopedics.  Currently is not in distress, no signs or symptoms of infection.  Tolerating feeds.  All questions answered.  Continues with PT.  She has home health.     MED REC REQUIRED  Post Medication Reconciliation Status: discharge medications reconciled, continue medications without change  See Patient Instructions      Subjective   Riddhi is a 89 year old, presenting for the following health issues:  Hospital F/U and Foot Problems (NEW)        6/21/2024     3:48 PM   Additional Questions    Roomed by Mary   Accompanied by DAUGHTER       Video Start Time:  3:59 PM    [facility]       Hospital Follow-up Visit:    Hospital/Nursing Home/IP Rehab Facility: [facility]   Date of Admission: Hospital: 5/19/2024 5/22/2024  Date of Discharge: Hospital: 5/22/2024 6/8/2024  Reason(s) for Admission:     Left femoral neck fracture status post percutaneous pinning 5/20   Left radial head fracture   Hypertension   CKD stage III   Edema   Acute on chronic anemia   Depression   Glaucoma     Was the patient in the ICU or did the patient experience delirium during hospitalization?  No  Do you have any other stressors you would like to discuss with your provider? No    Problems taking medications regularly:  None  Medication changes since discharge: None  Problems adhering to non-medication therapy:  None    Summary of hospitalization:  [facility] discharge summary reviewed  Diagnostic Tests/Treatments reviewed.  Follow up needed: none  Other Healthcare Providers Involved in Patient s Care:         None  Update since discharge: stable.         Plan of care communicated with patient and family           Patient presenting for follow-up from hospital discharge.  89-year-old female with past medical history of hypertension chronic kidney disease anemia suffered a fall outside her independent living apartment found to have left femoral neck fracture hairline radial head fracture.  Underwent pinning of femoral neck fracture.  Since discharge patient has been doing well she has been using walker which she has for the last 10 years.  She started PT.  Her blood pressure was found to be low so was discontinued at the TCU and blood pressure was found appropriate.  She remains on spironolactone as she reports.  On review of records Xarelto was discontinued but she continues on lisinopril and spironolactone.  His chronic kidney disease stage  III.  She had acute on chronic anemia with a discharge hemoglobin of 8.8.  She has history of gout, depression.  Patient reports blood pressure has been ranging anywhere between 125-126/55-60.  She is tolerating feeds.  No breathing issues.  No dizziness or change of mental status.  She is keeping adequately hydrated.  She has an upcoming appoint with orthopedics as well.  Review of Systems  Constitutional, HEENT, cardiovascular, pulmonary, gi and gu systems are negative, except as otherwise noted.      Objective           Vitals:  No vitals were obtained today due to virtual visit.    Physical Exam   GENERAL: alert and no distress  EYES: Eyes grossly normal to inspection.  No discharge or erythema, or obvious scleral/conjunctival abnormalities.  RESP: No audible wheeze, cough, or visible cyanosis.    SKIN: Visible skin clear. No significant rash, abnormal pigmentation or lesions.  NEURO: Cranial nerves grossly intact.  Mentation and speech appropriate for age.  PSYCH: Appropriate affect, tone, and pace of words    Lab Requisition on 05/28/2024   Component Date Value Ref Range Status    Vitamin D, Total (25-Hydroxy) 05/28/2024 29  20 - 50 ng/mL Final    optimum levels    Hemoglobin 05/28/2024 8.3 (L)  11.7 - 15.7 g/dL Final         Video-Visit Details    Type of service:  Video Visit   Video End Time: 4:14 PM  Originating Location (pt. Location): Home    Distant Location (provider location):  On-site  Platform used for Video Visit: Ant  Signed Electronically by: Comfort Rueda MD

## 2024-06-24 ENCOUNTER — PATIENT OUTREACH (OUTPATIENT)
Dept: CARE COORDINATION | Facility: CLINIC | Age: 89
End: 2024-06-24
Payer: COMMERCIAL

## 2024-06-24 ENCOUNTER — TELEPHONE (OUTPATIENT)
Dept: PHARMACY | Facility: OTHER | Age: 89
End: 2024-06-24
Payer: COMMERCIAL

## 2024-06-24 NOTE — PROGRESS NOTES
Clinic Care Coordination Contact  Care Coordination Clinician Chart Review    Situation: Patient chart reviewed by care coordinator.    Background: Clinic Care Coordination Referral received from inpatient care team for transition handoff communication following hospital admission.    Assessment: Upon chart review, patient is not a candidate for Primary Care Clinic Care Coordination enrollment due to reason stated below:  Patient already had follow up visit from PCP and discharged from TCU with homecare established.  No further needs identified by PCP at post-hospital visit.    Plan/Recommendations: Clinic Care Coordination Referral/order cancelled. RN/SW CC will perform no further monitoring/outreaches at this time and will remain available as needed. If new needs arise, a new Care Coordination Referral may be placed.    Alyse Cain,  [facility]  Clinic Care Coordinator  [facility]  [phone]  [email or web address]

## 2024-06-24 NOTE — TELEPHONE ENCOUNTER
MTM referral from: Patient's insurance ([employer] products)    MTM referral outreach attempt #2 on June 24, 2024    Outcome: Declined      Sincerely,         Salvador Rivera, PharmD     Medication Therapy Management (MTM) Pharmacist

## 2024-06-25 ENCOUNTER — LAB (OUTPATIENT)
Dept: LAB | Facility: CLINIC | Age: 89
End: 2024-06-25
Payer: COMMERCIAL

## 2024-06-25 DIAGNOSIS — M89.9 DISORDER OF BONE, UNSPECIFIED: ICD-10-CM

## 2024-06-25 DIAGNOSIS — D64.9 ANEMIA, UNSPECIFIED TYPE: ICD-10-CM

## 2024-06-25 DIAGNOSIS — Z13.21 ENCOUNTER FOR VITAMIN DEFICIENCY SCREENING: ICD-10-CM

## 2024-06-25 DIAGNOSIS — I10 ESSENTIAL HYPERTENSION, BENIGN: ICD-10-CM

## 2024-06-25 LAB
ERYTHROCYTE [DISTWIDTH] IN BLOOD BY AUTOMATED COUNT: 25.3 % (ref 10–15)
HCT VFR BLD AUTO: 28.9 % (ref 35–47)
HGB BLD-MCNC: 9 G/DL (ref 11.7–15.7)
MCH RBC QN AUTO: 31.6 PG (ref 26.5–33)
MCHC RBC AUTO-ENTMCNC: 31.1 G/DL (ref 31.5–36.5)
MCV RBC AUTO: 101 FL (ref 78–100)
PLATELET # BLD AUTO: 390 10E3/UL (ref 150–450)
RBC # BLD AUTO: 2.85 10E6/UL (ref 3.8–5.2)
WBC # BLD AUTO: 6.1 10E3/UL (ref 4–11)

## 2024-06-25 PROCEDURE — 80048 BASIC METABOLIC PNL TOTAL CA: CPT

## 2024-06-25 PROCEDURE — 83540 ASSAY OF IRON: CPT

## 2024-06-25 PROCEDURE — 85027 COMPLETE CBC AUTOMATED: CPT

## 2024-06-25 PROCEDURE — 83550 IRON BINDING TEST: CPT

## 2024-06-25 PROCEDURE — 82306 VITAMIN D 25 HYDROXY: CPT

## 2024-06-25 PROCEDURE — 82728 ASSAY OF FERRITIN: CPT | Mod: 95 | Performed by: INTERNAL MEDICINE

## 2024-06-25 PROCEDURE — 36415 COLL VENOUS BLD VENIPUNCTURE: CPT

## 2024-06-26 LAB
ANION GAP SERPL CALCULATED.3IONS-SCNC: 10 MMOL/L (ref 7–15)
BUN SERPL-MCNC: 23.3 MG/DL (ref 8–23)
CALCIUM SERPL-MCNC: 9.9 MG/DL (ref 8.2–9.6)
CHLORIDE SERPL-SCNC: 104 MMOL/L (ref 98–107)
CREAT SERPL-MCNC: 1.18 MG/DL (ref 0.51–0.95)
DEPRECATED HCO3 PLAS-SCNC: 25 MMOL/L (ref 22–29)
EGFRCR SERPLBLD CKD-EPI 2021: 43 ML/MIN/1.73M2
FERRITIN SERPL-MCNC: 547 NG/ML (ref 11–328)
GLUCOSE SERPL-MCNC: 79 MG/DL (ref 70–99)
IRON BINDING CAPACITY (ROCHE): 272 UG/DL (ref 240–430)
IRON SATN MFR SERPL: 22 % (ref 15–46)
IRON SERPL-MCNC: 61 UG/DL (ref 37–145)
POTASSIUM SERPL-SCNC: 5 MMOL/L (ref 3.4–5.3)
SODIUM SERPL-SCNC: 139 MMOL/L (ref 135–145)
VIT D+METAB SERPL-MCNC: 38 NG/ML (ref 20–50)

## 2024-07-01 ENCOUNTER — TRANSFERRED RECORDS (OUTPATIENT)
Dept: HEALTH INFORMATION MANAGEMENT | Facility: CLINIC | Age: 89
End: 2024-07-01
Payer: COMMERCIAL

## 2024-07-11 ENCOUNTER — PATIENT OUTREACH (OUTPATIENT)
Dept: CARE COORDINATION | Facility: CLINIC | Age: 89
End: 2024-07-11
Payer: COMMERCIAL

## 2024-07-11 NOTE — PROGRESS NOTES
Clinic Care Coordination Contact  Care Coordination Clinician Chart Review    Situation: Patient chart reviewed by care coordinator.    Background: Clinic Care Coordination Referral received from inpatient care team for transition handoff communication following hospital admission.    Assessment: Upon chart review, patient is not a candidate for Primary Care Clinic Care Coordination enrollment due to reason stated below:  Pt has already had follow up visit with PCP and no needs identifie.d     Plan/Recommendations: Clinic Care Coordination Referral/order cancelled. RN/SW CC will perform no further monitoring/outreaches at this time and will remain available as needed. If new needs arise, a new Care Coordination Referral may be placed.    Alyse Cain,  [facility]  Clinic Care Coordinator  [facility]  [phone]  [email or web address]

## 2024-12-26 ENCOUNTER — VIRTUAL VISIT (OUTPATIENT)
Dept: FAMILY MEDICINE | Facility: CLINIC | Age: 89
End: 2024-12-26
Payer: COMMERCIAL

## 2024-12-26 DIAGNOSIS — M54.50 CHRONIC MIDLINE LOW BACK PAIN WITHOUT SCIATICA: Primary | ICD-10-CM

## 2024-12-26 DIAGNOSIS — G89.29 CHRONIC MIDLINE LOW BACK PAIN WITHOUT SCIATICA: Primary | ICD-10-CM

## 2024-12-26 RX ORDER — BUPROPION HYDROCHLORIDE 150 MG/1
150 TABLET ORAL EVERY MORNING
COMMUNITY

## 2024-12-26 ASSESSMENT — PATIENT HEALTH QUESTIONNAIRE - PHQ9
10. IF YOU CHECKED OFF ANY PROBLEMS, HOW DIFFICULT HAVE THESE PROBLEMS MADE IT FOR YOU TO DO YOUR WORK, TAKE CARE OF THINGS AT HOME, OR GET ALONG WITH OTHER PEOPLE: SOMEWHAT DIFFICULT
SUM OF ALL RESPONSES TO PHQ QUESTIONS 1-9: 5
SUM OF ALL RESPONSES TO PHQ QUESTIONS 1-9: 5

## 2024-12-26 NOTE — PROGRESS NOTES
Riddhi is a 89 year old who is being evaluated via a billable video visit.    How would you like to obtain your AVS? MyChart  If the video visit is dropped, the invitation should be resent by: Text to cell phone: [phone]  Will anyone else be joining your video visit? Mary Ann, Daughter-in-law      Patient added a Wellbutrin 150 mg XL daily,currently taking both     Assessment & Plan     Chronic midline low back pain without sciatica  Recommend lumbar x-ray to make sure there is not a compression fracture contributing to 1 months of worsening pain.  If no fracture, see pain management to see if there is some nonsurgical intervention that might improve her quality of life  Until then, recommended using Tylenol on a scheduled basis as directed as well as topical lidocaine 4% patches available over-the-counter  - Pain Management  Referral; Future  - XR Lumbar Spine 2/3 Views; Future          FUTURE APPOINTMENTS:       - Follow-up for annual visit or as needed    Subjective   Riddhi is a 89 year old, presenting for the following health issues:  Back Pain (Follow up/)        6/21/2024     3:48 PM   Additional Questions   Roomed by Mary   Accompanied by DAUGHTER       Video Start Time: 9:38 AM    History of Present Illness       Back Pain:  She presents for follow up of back pain. Patient's back pain is a new problem.    Original cause of back pain: other  First noticed back pain: more than 1 month ago  Patient feels back pain: comes and goesLocation of back pain:  Right lower back, left lower back, right side of waist and left side of waist  Description of back pain: burning and dull ache  Back pain spreads: nowhere    Since patient first noticed back pain, pain is: always present, but gets better and worse  Does back pain interfere with her job:  Yes  On a scale of 1-10 (10 being the worst), patient describes pain as:  9  What makes back pain worse: sitting, standing and twisting   Acupuncture: not  tried  Acetaminophen: helpful  Activity or exercise: not helpful  Chiropractor:  Not tried  Cold: not tried  Heat: not tried  Massage: helpful  Muscle relaxants: not tried  Opioids: not tried  Physical Therapy: not tried  Rest: helpful  Steroid Injection: not tried  Stretching: not tried  Surgery: not tried  TENS unit: not tried  Topical pain relievers: not tried  Other healthcare providers patient is seeing for back pain: None    She eats 2-3 servings of fruits and vegetables daily.She consumes 0 sweetened beverage(s) daily.She exercises with enough effort to increase her heart rate 10 to 19 minutes per day.  She exercises with enough effort to increase her heart rate 3 or less days per week.   She is taking medications regularly.       Low back pain without radicular symptoms  Present for more than 10 years but worse over the last 1 months  No antecedent trauma 1 month ago  Takes Tylenol when she remembers to take it  Wonders if that may be some interventions such as an injection that might improve her back pain?            Objective           Vitals:  No vitals were obtained today due to virtual visit.    Physical Exam   GENERAL: alert and no distress  EYES: Eyes grossly normal to inspection.  No discharge or erythema, or obvious scleral/conjunctival abnormalities.  RESP: No audible wheeze, cough, or visible cyanosis.    SKIN: Visible skin clear. No significant rash, abnormal pigmentation or lesions.  NEURO: Cranial nerves grossly intact.  Mentation and speech appropriate for age.  PSYCH: Appropriate affect, tone, and pace of words          Video-Visit Details    Type of service:  Video Visit   Video End Time:9:57 AM  Originating Location (pt. Location): Home    Distant Location (provider location):  On-site  Platform used for Video Visit: Ant  Signed Electronically by: Shun Prado MD

## 2025-01-01 ENCOUNTER — APPOINTMENT (OUTPATIENT)
Dept: ULTRASOUND IMAGING | Facility: CLINIC | Age: OVER 89
DRG: 291 | End: 2025-01-01
Attending: INTERNAL MEDICINE
Payer: COMMERCIAL

## 2025-01-01 PROCEDURE — 76705 ECHO EXAM OF ABDOMEN: CPT

## 2025-01-03 ENCOUNTER — HOSPITAL ENCOUNTER (OUTPATIENT)
Dept: GENERAL RADIOLOGY | Facility: CLINIC | Age: OVER 89
Discharge: HOME | End: 2025-01-03
Attending: INTERNAL MEDICINE | Admitting: INTERNAL MEDICINE
Payer: COMMERCIAL

## 2025-01-03 DIAGNOSIS — G89.29 CHRONIC MIDLINE LOW BACK PAIN WITHOUT SCIATICA: ICD-10-CM

## 2025-01-03 DIAGNOSIS — M54.50 CHRONIC MIDLINE LOW BACK PAIN WITHOUT SCIATICA: ICD-10-CM

## 2025-01-03 PROCEDURE — 72100 X-RAY EXAM L-S SPINE 2/3 VWS: CPT

## 2025-01-04 NOTE — RESULT ENCOUNTER NOTE
The following letter pertains to your most recent diagnostic tests:    Good news! The low back x-ray does NOT show any acutely dangerous new findings in the back when compared to one year ago.  Hopefully, the medical spine specialist/chronic pain management specialists can discuss additional pain management options with you.        Sincerely,    Dr. Prado

## 2025-01-20 ENCOUNTER — OFFICE VISIT (OUTPATIENT)
Dept: PALLIATIVE MEDICINE | Facility: CLINIC | Age: OVER 89
End: 2025-01-20
Attending: INTERNAL MEDICINE
Payer: COMMERCIAL

## 2025-01-20 VITALS — OXYGEN SATURATION: 98 % | HEART RATE: 76 BPM | SYSTOLIC BLOOD PRESSURE: 178 MMHG | DIASTOLIC BLOOD PRESSURE: 95 MMHG

## 2025-01-20 DIAGNOSIS — M54.50 CHRONIC MIDLINE LOW BACK PAIN WITHOUT SCIATICA: ICD-10-CM

## 2025-01-20 DIAGNOSIS — G89.29 CHRONIC INTRACTABLE PAIN: ICD-10-CM

## 2025-01-20 DIAGNOSIS — M47.816 SPONDYLOSIS OF LUMBAR REGION WITHOUT MYELOPATHY OR RADICULOPATHY: Primary | ICD-10-CM

## 2025-01-20 DIAGNOSIS — S32.020S CLOSED COMPRESSION FRACTURE OF L2 LUMBAR VERTEBRA, SEQUELA: ICD-10-CM

## 2025-01-20 DIAGNOSIS — G89.29 CHRONIC MIDLINE LOW BACK PAIN WITHOUT SCIATICA: ICD-10-CM

## 2025-01-20 PROCEDURE — G2211 COMPLEX E/M VISIT ADD ON: HCPCS | Performed by: NURSE PRACTITIONER

## 2025-01-20 PROCEDURE — 99205 OFFICE O/P NEW HI 60 MIN: CPT | Performed by: NURSE PRACTITIONER

## 2025-01-20 RX ORDER — ACETAMINOPHEN AND CODEINE PHOSPHATE 300; 30 MG/1; MG/1
1 TABLET ORAL DAILY PRN
Qty: 15 TABLET | Refills: 0 | Status: SHIPPED | OUTPATIENT
Start: 2025-01-20 | End: 2025-02-04

## 2025-01-20 ASSESSMENT — PAIN SCALES - PAIN ENJOYMENT GENERAL ACTIVITY SCALE (PEG)
INTERFERED_GENERAL_ACTIVITY: 8
INTERFERED_GENERAL_ACTIVITY: 8
INTERFERED_ENJOYMENT_LIFE: 8
PEG_TOTALSCORE: 8
AVG_PAIN_PASTWEEK: 8
INTERFERED_ENJOYMENT_LIFE: 8
AVG_PAIN_PASTWEEK: 8
PEG_TOTALSCORE: 8

## 2025-01-20 ASSESSMENT — ANXIETY QUESTIONNAIRES
4. TROUBLE RELAXING: SEVERAL DAYS
GAD7 TOTAL SCORE: 1
IF YOU CHECKED OFF ANY PROBLEMS ON THIS QUESTIONNAIRE, HOW DIFFICULT HAVE THESE PROBLEMS MADE IT FOR YOU TO DO YOUR WORK, TAKE CARE OF THINGS AT HOME, OR GET ALONG WITH OTHER PEOPLE: SOMEWHAT DIFFICULT
2. NOT BEING ABLE TO STOP OR CONTROL WORRYING: NOT AT ALL
GAD7 TOTAL SCORE: 1
1. FEELING NERVOUS, ANXIOUS, OR ON EDGE: NOT AT ALL
GAD7 TOTAL SCORE: 1
5. BEING SO RESTLESS THAT IT IS HARD TO SIT STILL: NOT AT ALL
6. BECOMING EASILY ANNOYED OR IRRITABLE: NOT AT ALL
8. IF YOU CHECKED OFF ANY PROBLEMS, HOW DIFFICULT HAVE THESE MADE IT FOR YOU TO DO YOUR WORK, TAKE CARE OF THINGS AT HOME, OR GET ALONG WITH OTHER PEOPLE?: SOMEWHAT DIFFICULT
7. FEELING AFRAID AS IF SOMETHING AWFUL MIGHT HAPPEN: NOT AT ALL
3. WORRYING TOO MUCH ABOUT DIFFERENT THINGS: NOT AT ALL
7. FEELING AFRAID AS IF SOMETHING AWFUL MIGHT HAPPEN: NOT AT ALL

## 2025-01-20 ASSESSMENT — PAIN SCALES - GENERAL: PAINLEVEL_OUTOF10: SEVERE PAIN (8)

## 2025-01-20 NOTE — PROGRESS NOTES
January 20, 2025        COMPREHENSIVE PAIN CLINIC INITIAL EVALUATION    I had the pleasure of meeting Ms. Riddhi Aguilar on 1/20/2025 in the [facility] in consult for Dr. Shun Prado, PCP with regards to her pain.  The patient is a 89 year old female with past medical history of chronic L) shoulder pain, low back pain, L1 compression fx, anxiety/depression, duodenal ulcer with hemorrhage, HTN, chronic intractable pain who presents for evaluation of chronic pain.  UDS and opioid agreement completed on    .      History of of chronic pain on initial exam 1/20/2025                               Subjective:  Patient endorses chronic pain in low back that started around 1990 without a precipitating event.    Patient has not had any spine surgery in the past.  She had a fall and needed L) hip pinning 5/20/2024 by Dr. Merino.   The patient describes the pain as intermittent to constant, burning, aching.  Patient has numbness and tingling in R) arm which is intermittent.  Patient reports weakness in upper and lower extremities.  She reports that the pain is made worse by standing, walking too long.  Her pain is improved with sitting, and laying in her hospital bed.   She rates her currenty pain score at 8/10, but it can be as low as 2/10 or as severe as 8/10.  Physical therapy was completed for her shoulder pain but not for her lumbar pain. She has balance problems.      Patient endorses anxiety and depression.  Patient does not follow with a mental health care provider.  Patient does not exercise on a regular basis.      Progress Notes Reviewed:  12/26/2024 Dr. Shun Prado, PCP  7/11/2024 Alyse Cain, [facility]  06/21/2024 Dr. Comfort Rueda, Internal Medicine - [facility]    She denies any new problems with falls or balance, any new numbness or weakness of the arms or legs, any new bowel or bladder incontinence, any night sweats or unexplained fevers, or any sudden or unexpected weight loss.  He denies saddle  anesthesia. She uses a rolllng walker.    Riddhi Aguilar has not been seen at a pain clinic in the past.        Current Treatments:  Wellbutrin XL 450mg daily  Acetaminophen 500mg 2 tabs BID - TID  Heat is helpful  Lidocaine Patch helps a little    Anticoagulation:  none      Previous Medication Treatments Included:  Anti-convulsants: never tried gabapentin or lyrica  Muscle relaxors: She does not remember the name  Anti-depressants: never tried duloxetine  Benzodiazapine's: no  Acetaminophen/NSAIDs: acetaminophen is helpful  Topicals: lidocaine patches  Opioids: no      Other Treatments Have Included:  Physical therapy: for shoulder pain  Pain Psychology: no  Chiropractic: no  Acupuncture: no  TENs Unit: no  Injections: KENR[facility] at [facility] 10 years ago  Surgeries: no  Dry Needling: no  Massage:no    Implantable devices:  none      Past Medical History:  Medical history reviewed.  Past Medical History:   Diagnosis Date    Anemia     baseline 10-11    Arthritis     Bleeding ulcer     duodenal ulcer    CKD (chronic kidney disease) stage 3, GFR 30-59 ml/min (H)     Essential hypertension, benign     MDD (major depressive disorder)     Nephrolithiasis     Osteoporosis     Pure hypercholesterolemia     Spinal stenosis     Unspecified cataract     Unspecified glaucoma(365.9)       Patient Active Problem List   Diagnosis    Essential hypertension, benign    Family Hist of Malignant Neoplasm-- breast cancer    Borderline glaucoma with ocular hypertension, unspecified laterality    Duodenal ulcer with hemorrhage    Major depressive disorder, recurrent episode, mild    Personal history of malignant neoplasm of breast    Chronic left shoulder pain    Left displaced femoral neck fracture (H)    Fall, initial encounter    Closed nondisplaced fracture of head of left radius, initial encounter         Past Surgical History:  Pertinent surgical history reviewed.  Past Surgical History:   Procedure Laterality Date     APPENDECTOMY      BREAST SURGERY      lumpectomy    CLOSED REDUCTION, PERCUTANEOUS PINNING HIP Left 5/20/2024    Procedure: Closed reduction percutaneous pinning, left hip;  Surgeon: Jose Luis Merino MD;  Location: [facility] OR    COMBINED CYSTOSCOPY, RETROGRADES, EXCHANGE STENT URETER(S) Left 01/24/2024    Procedure: Cystoscopy, left retrograde pyelogram, left JJ stent placement, <1hr physician fluoroscopy time;  Surgeon: Aureliano Brandt MD;  Location: [facility] OR    COMBINED CYSTOSCOPY, RETROGRADES, URETEROSCOPY, LASER HOLMIUM LITHOTRIPSY URETER(S), INSERT STENT Left 02/20/2024    Procedure: Cystoscopy, left diagnostic ureteroscopy, left retrograde pyelogram, left ureteral stent removal, fluoroscopic interpretation <1 hour physician time;  Surgeon: Richie Jaquez MD;  Location: [facility] OR    CYSTOSCOPY      CYSTOSCOPY, REMOVE STENT(S), COMBINED Left 02/20/2024    Procedure: Cystoscopy, remove stent(s), combined;  Surgeon: Richie Jaquez MD;  Location:  OR    DILATION AND CURETTAGE, HYSTEROSCOPY DIAGNOSTIC, COMBINED  02/06/2014    Procedure: COMBINED DILATION AND CURETTAGE, HYSTEROSCOPY DIAGNOSTIC;  DILATION AND CURETTAGE, HYSTEROSCOPY, POLYPECTOMY, INCISION AND DRAINAGE OF SEBACEOUS CYST ;  Surgeon: Serena Zamora MD;  Location: [facility]    EXCISE LESION LOWER EXTREMITY Right 08/29/2017    Procedure: EXCISE LESION LOWER EXTREMITY;  WIDE EXCISIONAL BIOPSY OF RIGHT ANKLE BASAL CELL CARCINOMA;  Surgeon: Juan Luis Flores MD;  Location:  OR    INCISION AND DRAINAGE PERINEAL, COMBINED  02/06/2014    Procedure: COMBINED INCISION AND DRAINAGE PERINEAL;;  Surgeon: Serena Zamora MD;  Location: [facility]    ZZ NONSPECIFIC PROCEDURE      Repair of buckled retina of rt eye    Z NONSPECIFIC PROCEDURE      Appy          Medications: Pertinent medications reviewed.  Current Outpatient Medications   Medication Sig Dispense Refill    acetaminophen (TYLENOL) 500 MG tablet Take 2 tablets (1,000 mg) by mouth 3 times daily  (Patient taking differently: Take 1,000 mg by mouth every 4 hours as needed.)      atorvastatin (LIPITOR) 10 MG tablet TAKE 1 TABLET(10 MG) BY MOUTH DAILY 90 tablet 2    buPROPion (WELLBUTRIN XL) 150 MG 24 hr tablet Take 150 mg by mouth every morning.      buPROPion (WELLBUTRIN XL) 300 MG 24 hr tablet TAKE 1 TABLET(300 MG) BY MOUTH EVERY MORNING 90 tablet 3    dorzolamide-timolol (COSOPT) 22.3-6.8 MG/ML ophthalmic solution Place 1 drop into both eyes 2 times daily      multivitamin w/minerals (THERA-VIT-M) tablet Take 1 tablet by mouth daily      spironolactone (ALDACTONE) 25 MG tablet Take 1 tablet (25 mg) by mouth daily Hold for SBP <110 or DBP <55 or dizziness initially, revisit w/ tcu provider. 90 tablet 3    vitamin D3 (CHOLECALCIFEROL) 50 mcg (2000 units) tablet Take 1 tablet by mouth daily         [place] Prescription Monitoring Program reviewed 1/20/2025.  No concern for abuse or misuse of controlled medications based on this report.  5/24/2024 Oxycodone 5mg 18 tabs for 6 days.        Allergies: Pertinent allergies reviewed.   No Known Allergies    Family History:   family history includes Breast Cancer in her sister and sister; Cancer in her brother.    Social History:   She is  and lives in an apartment in [place], [place].  She is independent in ADL's.  She denies any history of chemical dependency.    She  reports that she has never smoked. She has never used smokeless tobacco. She reports that she does not drink alcohol and does not use drugs.  Social History     Social History Narrative    Not on file         Review of Systems:      (Positive responses bolded)  GENERAL: fever/chills, fatigue, general unwell feeling, weight gain/loss  HEAD/EYES:  headache, dizziness, or vision changes  EARS/NOSE/THROAT: nosebleeds, hearing loss, sinus infection, earache, tinnitus  IMMUNE:  allergies, cancer, immune deficiency, or infections  SKIN:  itching, rash, hives  HEME/Lymphatic: anemia, easy bruising, easy  bleeding  RESPIRATORY: cough, wheezing, or shortness of breath  CARDIOVASCULAR/Circulation: extremity edema, syncope, hypertension, tachycardia, or angina  GASTROINTESTINAL: abdominal pain, nausea/emesis, diarrhea, constipation, hematochezia, or melena  ENDOCRINE:  diabetes, steroid use, thyroid disease or osteoporosis  MUSCULOSKELETAL: myalgias, joint pain, stiffness, neck pain, back pain, arthritis, or gout  GENITOURINARY: frequency, urgency, dysuria, difficulty voiding, hematuria or incontinence  NEUROLOGIC: weakness, numbness, paresthesias, seizure, tremor, stroke or memory loss  PSYCHIATRIC: depression, anxiety, stress, suicidal thoughts/attempts or mood swings      Physical Exam:  BP (!) 178/95   Pulse 76   SpO2 98%       Constitutional: She is oriented to person, place, and time.  She appears well-developed and well-nourished. She is not in acute distress.   HENT:     Head: Normocephalic and atraumatic.     Eyes: Pupils are equal, round, and reactive to light. EOM are normal. No scleral icterus.   Pulmonary/Chest:  NWOB. No respiratory distress.   Neurological: She is alert and oriented to person, place, and time. Coordination grossly normal.    Skin: Skin is warm and dry. She is not diaphoretic.   Psychiatric: She has a normal mood and affect. Her behavior is normal. Judgment and thought content normal.  Patient answers questions appropriately.  MSK: Gait is in a forward flexed position using a rolling walker.      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 Chronic L2 compression fracture, unchanged. Multilevel  moderate degenerative disc  disease. Severe lower lumbar spine facet  arthropathy. Vascular calcifications. Left hip hardware.     GERMAN MAURICE MD       EMG:  na      Diagnosis:      Plan on initial consult on 1/20/2025:  A multimodal plan was developed today to treat your pain.  Multimodal analgesia is a strategy that reduces reliance on opioids through the use of non-opioid analgesics and therapies that have different mechanisms of action.      Diagnostics:   Will obtain lumbar MRI.        Medications:  Tylenol #3 1 tabs daily PRN 15 tabs for 15 days.  We discussed indications, risks and benefits of the opioids. Discussed using the lowest most effective dose for the shortest duration of time in order to minimize side effects which patient is agreeable.        The following OTC pain medications may be helpful, use as directed: Voltaren Gel 1%, CBD products, Arnica products, Capsaicin products, Australian Dream Cream, Epson It, Lidocaine Patch, Solanpas, Biofreeze, Aspercream, Tiger Balm and Toni Emu cream.  Apply heat or cold PRN.      Therapies:  Discussed TENs unit.      Interventions:  none       Follow up:   Return to clinic in 2 wks to review lumbar MRI.        LUCY Bernal, WENDYP  [facility] [place]LLING TIME DOCUMENTATION:   The total TIME spent on this patient on the date of the encounter/appointment was 62 minutes.            Answers submitted by the patient for this visit:  Patient Health Questionnaire (G7) (Submitted on 1/20/2025)  NICK 7 TOTAL SCORE: 1

## 2025-01-20 NOTE — PATIENT INSTRUCTIONS
Plan on initial consult on 1/20/2025:  A multimodal plan was developed today to treat your pain.  Multimodal analgesia is a strategy that reduces reliance on opioids through the use of non-opioid analgesics and therapies that have different mechanisms of action.      Diagnostics:   Will obtain lumbar MRI.        Medications:  Tylenol #3 1 tabs daily PRN 15 tabs for 15 days.    The following OTC pain medications may be helpful, use as directed: Voltaren Gel 1%, CBD products, Arnica products, Capsaicin products, Australian Dream Cream, Epson It, Lidocaine Patch, Solanpas, Biofreeze, Aspercream, Tiger Balm and Toni Emu cream.  Apply heat or cold PRN.      Therapies:  Discussed TENs unit.      Interventions:       Follow up:     Return to clinic in 2 wks to review lumbar MRI.        LUCY Bernal, DARIAN  [facility] [place]        Clinic Number:  [phone]   Call with any questions about your care and for scheduling assistance.   Calls are returned Monday through Friday between 8 AM and 4:30 PM. We usually get back to you within 2 business days depending on the issue/request.    If we are prescribing your medications:  For opioid medication refills, call the clinic or send a [employer] message 7 days in advance.  Please include:  Name of requested medication  Name of the pharmacy.  For non-opioid medications, call your pharmacy directly to request a refill. Please allow 3-4 days to be processed.   Per [place] State Law:  All controlled substance prescriptions must be filled within 30 days of being written.    For those controlled substances allowing refills, pickup must occur within 30 days of last fill.      We believe regular attendance is key to your success in our program!    Any time you are unable to keep your appointment we ask that you call us at least 24 hours in advance to cancel.This will allow us to offer the appointment time to another patient.    Multiple missed appointments may lead to dismissal from the clinic.

## 2025-02-01 DIAGNOSIS — E78.5 HYPERLIPIDEMIA LDL GOAL <130: ICD-10-CM

## 2025-02-03 RX ORDER — ATORVASTATIN CALCIUM 10 MG/1
10 TABLET, FILM COATED ORAL DAILY
Qty: 90 TABLET | Refills: 0 | Status: SHIPPED | OUTPATIENT
Start: 2025-02-03

## 2025-02-08 ENCOUNTER — HOSPITAL ENCOUNTER (OUTPATIENT)
Dept: MRI IMAGING | Facility: CLINIC | Age: OVER 89
Discharge: HOME | End: 2025-02-08
Attending: NURSE PRACTITIONER | Admitting: NURSE PRACTITIONER
Payer: COMMERCIAL

## 2025-02-08 DIAGNOSIS — M47.816 SPONDYLOSIS OF LUMBAR REGION WITHOUT MYELOPATHY OR RADICULOPATHY: ICD-10-CM

## 2025-02-08 PROCEDURE — 72148 MRI LUMBAR SPINE W/O DYE: CPT

## 2025-02-17 ENCOUNTER — OFFICE VISIT (OUTPATIENT)
Dept: PALLIATIVE MEDICINE | Facility: CLINIC | Age: OVER 89
End: 2025-02-17
Payer: COMMERCIAL

## 2025-02-17 VITALS — DIASTOLIC BLOOD PRESSURE: 85 MMHG | SYSTOLIC BLOOD PRESSURE: 171 MMHG | HEART RATE: 86 BPM | OXYGEN SATURATION: 98 %

## 2025-02-17 DIAGNOSIS — G89.29 CHRONIC INTRACTABLE PAIN: ICD-10-CM

## 2025-02-17 DIAGNOSIS — M47.816 SPONDYLOSIS OF LUMBAR REGION WITHOUT MYELOPATHY OR RADICULOPATHY: Primary | ICD-10-CM

## 2025-02-17 PROCEDURE — 99215 OFFICE O/P EST HI 40 MIN: CPT | Performed by: NURSE PRACTITIONER

## 2025-02-17 ASSESSMENT — PAIN SCALES - PAIN ENJOYMENT GENERAL ACTIVITY SCALE (PEG)
INTERFERED_ENJOYMENT_LIFE: 9
INTERFERED_GENERAL_ACTIVITY: 9
PEG_TOTALSCORE: 8
INTERFERED_ENJOYMENT_LIFE: 9
PEG_TOTALSCORE: 8
AVG_PAIN_PASTWEEK: 6
AVG_PAIN_PASTWEEK: 6
INTERFERED_GENERAL_ACTIVITY: 9

## 2025-02-17 ASSESSMENT — PAIN SCALES - GENERAL: PAINLEVEL_OUTOF10: MODERATE PAIN (6)

## 2025-02-17 NOTE — PROGRESS NOTES
February 17, 2025      COMPREHENSIVE PAIN CLINIC FOLLOW UP EVALUATION    I had the pleasure of meeting Ms. Riddhi Aguilar on 1/20/2025 in the [facility] in consult for Dr. Shun Prado, PCP with regards to her pain.  The patient is a 89 year old female with past medical history of chronic L) shoulder pain, low back pain, L1 compression fx, anxiety/depression, duodenal ulcer with hemorrhage, HTN, chronic intractable pain who presents for evaluation of chronic pain.  UDS and opioid agreement completed on    .        Updates since last appointment on 1/20/2025 initial consult.  She presents with her daughter in law. She is using a rolling walker.    She is taking acetaminophen 2 tabs once or twice daily.  On rare occasions TID.      She did not think Ty #3 was that effective.  She does not want to continue with Ty#3.      Interventions/Injections: none      Progress Notes Reviewed:  none to review    Any hospitalizations/ER/UC visits since last appointment:  no  Any falls/accidents since last appointment:  no      Primary Pain :  Patient endorses chronic pain in low back that started around 1990 without a precipitating event.    Patient has not had any spine surgery in the past.  She had a fall and needed L) hip pinning 5/20/2024 by Dr. Merino.                 Characteristics:  Any changes in pain characteristics since last appointment?  no    The patient describes the pain as intermittent to constant, burning, aching.  Patient has numbness and tingling in R) arm which is intermittent.  Patient reports weakness in upper and lower extremities.  She has balance problems.    What makes the pain better:  Her pain is improved with sitting, and laying in her hospital bed.   What makes the pain worse:  She reports that the pain is made worse by standing, walking too long.    2/17/2025 current pain on 0/10 VAS:   6    Worst pain:  8    Best pain:   2    She rates her currenty pain score at 8/10, but it can be as  low as 2/10 or as severe as 8/10.      Current Pain Related Medications:  Any medications changes since last appointment: no    Tylenol #3 - Tylenol #3 1 tabs daily PRN 15 tabs for 15 days.   Wellbutrin XL 450mg daily  Acetaminophen 500mg 2 tabs BID - TID  Heat is helpful  Lidocaine Patch helps a little    Anticoagulation:  none      Therapies discuss on initial consult:   Physical therapy, Pain Psychology, TENs unit, Grounding Mat, Frequency Specific Micro Current, Anti-inflammatory Lifestyle    Employment:  She is retired.    Exercise:  Physical therapy was completed for her shoulder pain but not for her lumbar pain.  Patient does not exercise on a regular basis.    Hobbies:      Mental Health:    Patient endorses anxiety and depression.  Patient does not follow with a mental health care provider.             Plan on initial consult on 1/20/2025:  A multimodal plan was developed today to treat your pain.  Multimodal analgesia is a strategy that reduces reliance on opioids through the use of non-opioid analgesics and therapies that have different mechanisms of action.      Diagnostics:   Will obtain lumbar MRI.        Medications:  Tylenol #3 1 tabs daily PRN 15 tabs for 15 days.  We discussed indications, risks and benefits of the opioids. Discussed using the lowest most effective dose for the shortest duration of time in order to minimize side effects which patient is agreeable.        The following OTC pain medications may be helpful, use as directed: Voltaren Gel 1%, CBD products, Arnica products, Capsaicin products, Australian Dream Cream, Epson It, Lidocaine Patch, Solanpas, Biofreeze, Aspercream, Tiger Balm and Toni Emu cream.  Apply heat or cold PRN.      Therapies:  Discussed TENs unit.      Interventions:  none       Follow up:   Return to clinic in 2 wks to review lumbar MRI.        ____________________________________________________________________________  History of of chronic pain on initial exam  1/20/2025                               Subjective:  Patient endorses chronic pain in low back that started around 1990 without a precipitating event.    Patient has not had any spine surgery in the past.  She had a fall and needed L) hip pinning 5/20/2024 by Dr. Merino.   The patient describes the pain as intermittent to constant, burning, aching.  Patient has numbness and tingling in R) arm which is intermittent.  Patient reports weakness in upper and lower extremities.  She reports that the pain is made worse by standing, walking too long.  Her pain is improved with sitting, and laying in her hospital bed.   She rates her currenty pain score at 8/10, but it can be as low as 2/10 or as severe as 8/10.  Physical therapy was completed for her shoulder pain but not for her lumbar pain. She has balance problems.      Patient endorses anxiety and depression.  Patient does not follow with a mental health care provider.  Patient does not exercise on a regular basis.      Progress Notes Reviewed:  12/26/2024 Dr. Shun Prado, PCP  7/11/2024 Alyse Cain, [facility]  06/21/2024 Dr. Comfort Rueda, Internal Medicine - [facility]    She denies any new problems with falls or balance, any new numbness or weakness of the arms or legs, any new bowel or bladder incontinence, any night sweats or unexplained fevers, or any sudden or unexpected weight loss.  He denies saddle anesthesia. She uses a rolllng walker.    Riddhi Aguilar has not been seen at a pain clinic in the past.        Current Treatments:  Wellbutrin XL 450mg daily  Acetaminophen 500mg 2 tabs BID - TID  Heat is helpful  Lidocaine Patch helps a little    Anticoagulation:  none      Previous Medication Treatments Included:  Anti-convulsants: never tried gabapentin or lyrica  Muscle relaxors: She does not remember the name  Anti-depressants: never tried duloxetine  Benzodiazapine's: no  Acetaminophen/NSAIDs: acetaminophen is helpful  Topicals: lidocaine patches  Opioids:  no      Other Treatments Have Included:  Physical therapy: for shoulder pain  Pain Psychology: no  Chiropractic: no  Acupuncture: no  TENs Unit: no  Injections: [facility]I at [facility] 10 years ago  Surgeries: no  Dry Needling: no  Massage:no    Implantable devices:  none      Past Medical History:  Medical history reviewed.  Past Medical History:   Diagnosis Date    Anemia     baseline 10-11    Arthritis     Bleeding ulcer     duodenal ulcer    CKD (chronic kidney disease) stage 3, GFR 30-59 ml/min (H)     Essential hypertension, benign     MDD (major depressive disorder)     Nephrolithiasis     Osteoporosis     Pure hypercholesterolemia     Spinal stenosis     Unspecified cataract     Unspecified glaucoma(365.9)       Patient Active Problem List   Diagnosis    Essential hypertension, benign    Family Hist of Malignant Neoplasm-- breast cancer    Borderline glaucoma with ocular hypertension, unspecified laterality    Duodenal ulcer with hemorrhage    Major depressive disorder, recurrent episode, mild    Personal history of malignant neoplasm of breast    Chronic left shoulder pain    Left displaced femoral neck fracture (H)    Fall, initial encounter    Closed nondisplaced fracture of head of left radius, initial encounter         Past Surgical History:  Pertinent surgical history reviewed.  Past Surgical History:   Procedure Laterality Date    APPENDECTOMY      BREAST SURGERY      lumpectomy    CLOSED REDUCTION, PERCUTANEOUS PINNING HIP Left 5/20/2024    Procedure: Closed reduction percutaneous pinning, left hip;  Surgeon: Jose Luis Merino MD;  Location: [facility] OR    COMBINED CYSTOSCOPY, RETROGRADES, EXCHANGE STENT URETER(S) Left 01/24/2024    Procedure: Cystoscopy, left retrograde pyelogram, left JJ stent placement, <1hr physician fluoroscopy time;  Surgeon: Aureliano Brandt MD;  Location: [facility] OR    COMBINED CYSTOSCOPY, RETROGRADES, URETEROSCOPY, LASER HOLMIUM LITHOTRIPSY URETER(S), INSERT STENT Left  02/20/2024    Procedure: Cystoscopy, left diagnostic ureteroscopy, left retrograde pyelogram, left ureteral stent removal, fluoroscopic interpretation <1 hour physician time;  Surgeon: Richie Jaquez MD;  Location: [facility] OR    CYSTOSCOPY      CYSTOSCOPY, REMOVE STENT(S), COMBINED Left 02/20/2024    Procedure: Cystoscopy, remove stent(s), combined;  Surgeon: Richie Jaquez MD;  Location: [facility] OR    DILATION AND CURETTAGE, HYSTEROSCOPY DIAGNOSTIC, COMBINED  02/06/2014    Procedure: COMBINED DILATION AND CURETTAGE, HYSTEROSCOPY DIAGNOSTIC;  DILATION AND CURETTAGE, HYSTEROSCOPY, POLYPECTOMY, INCISION AND DRAINAGE OF SEBACEOUS CYST ;  Surgeon: Serena Zamora MD;  Location: [facility]    EXCISE LESION LOWER EXTREMITY Right 08/29/2017    Procedure: EXCISE LESION LOWER EXTREMITY;  WIDE EXCISIONAL BIOPSY OF RIGHT ANKLE BASAL CELL CARCINOMA;  Surgeon: Juan Luis Flores MD;  Location:  OR    INCISION AND DRAINAGE PERINEAL, COMBINED  02/06/2014    Procedure: COMBINED INCISION AND DRAINAGE PERINEAL;;  Surgeon: Serena Zamora MD;  Location:  NONSPECIFIC PROCEDURE      Repair of buckled retina of rt eye    [facility] NONSPECIFIC PROCEDURE      Appy          Medications: Pertinent medications reviewed.  Current Outpatient Medications   Medication Sig Dispense Refill    acetaminophen (TYLENOL) 500 MG tablet Take 2 tablets (1,000 mg) by mouth 3 times daily      atorvastatin (LIPITOR) 10 MG tablet TAKE 1 TABLET(10 MG) BY MOUTH DAILY 90 tablet 0    buPROPion (WELLBUTRIN XL) 150 MG 24 hr tablet Take 150 mg by mouth every morning.      buPROPion (WELLBUTRIN XL) 300 MG 24 hr tablet TAKE 1 TABLET(300 MG) BY MOUTH EVERY MORNING 90 tablet 3    dorzolamide-timolol (COSOPT) 22.3-6.8 MG/ML ophthalmic solution Place 1 drop into both eyes 2 times daily      multivitamin w/minerals (THERA-VIT-M) tablet Take 1 tablet by mouth daily      spironolactone (ALDACTONE) 25 MG tablet Take 1 tablet (25 mg) by mouth daily Hold for SBP <110 or  DBP <55 or dizziness initially, revisit w/ tcu provider. 90 tablet 3    vitamin D3 (CHOLECALCIFEROL) 50 mcg (2000 units) tablet Take 1 tablet by mouth daily         [place] Prescription Monitoring Program reviewed 2/17/2025.  No concern for abuse or misuse of controlled medications based on this report.  1/21/2025 Ty #3 7 tabs for 7 days  5/24/2024 Oxycodone 5mg 18 tabs for 6 days.          THE 4 A's OF OPIOID MAINTENANCE ANALGESIA    Analgesia: Patient reports decrease pain on current pain medications and doses at a tolerable level.    Activity: Patient is independent in ADL's and reports improved function with current pain regimen    Adverse effects: Patient denies any significant negative side effects from the pain medications.    Adherence to Rx protocol: Patient is taking the pain medications as prescribed with no signs of misuse or abuse.      Allergies: Pertinent allergies reviewed.   No Known Allergies    Family History:   family history includes Breast Cancer in her sister and sister; Cancer in her brother.    Social History:   She is  and lives in a senior houseing apartment in [place], [place].  She is independent in ADL's.  She denies any history of chemical dependency.    She  reports that she has never smoked. She has never used smokeless tobacco. She reports that she does not drink alcohol and does not use drugs.  Social History     Social History Narrative    Not on file         Physical Exam:  BP (!) 171/85   Pulse 86   SpO2 98%     Constitutional: She is oriented to person, place, and time.  She appears well-developed and well-nourished. She is not in acute distress.   HENT:     Head: Normocephalic and atraumatic.     Eyes: Pupils are equal, round, and reactive to light. EOM are normal. No scleral icterus.   Pulmonary/Chest:  NWOB. No respiratory distress.   Neurological: She is alert and oriented to person, place, and time. Coordination grossly normal.    Skin: Skin is warm and dry. She is not  diaphoretic.   Psychiatric: She has a normal mood and affect. Her behavior is normal. Judgment and thought content normal.  Patient answers questions appropriately.  MSK: Gait is in a forward flexed position using a rolling walker.      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 Chronic L2 compression fracture, unchanged. Multilevel  moderate degenerative disc disease. Severe lower lumbar spine facet  arthropathy. Vascular calcifications. Left hip hardware.     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    Continue acetaminophen max dose 3,000mg daily. Patient acknowledged understanding.    Follow-up as needed.      LUCY Bernal, FNP  [facility] Locations        BILLING TIME DOCUMENTATION:   The total TIME spent on this patient on the date of the encounter/appointment was 41 minutes.

## 2025-02-17 NOTE — PATIENT INSTRUCTIONS
Plan on 2/17/2025:  Discontinue tylenol #3.    Continue acetaminophen max dose 3,000mg daily. Patient acknowledged understanding.    Follow-up as needed.      LUCY Bernal, DARIAN  [facility]          Clinic Number:  [phone]   Call with any questions about your care and for scheduling assistance.   Calls are returned Monday through Friday between 8 AM and 4:30 PM. We usually get back to you within 2 business days depending on the issue/request.    If we are prescribing your medications:  For opioid medication refills, call the clinic or send a [email or web address] message 7 days in advance.  Please include:  Name of requested medication  Name of the pharmacy.  For non-opioid medications, call your pharmacy directly to request a refill. Please allow 3-4 days to be processed.   Per [place] State Law:  All controlled substance prescriptions must be filled within 30 days of being written.    For those controlled substances allowing refills, pickup must occur within 30 days of last fill.      We believe regular attendance is key to your success in our program!    Any time you are unable to keep your appointment we ask that you call us at least 24 hours in advance to cancel.This will allow us to offer the appointment time to another patient.   Multiple missed appointments may lead to dismissal from the clinic.

## 2025-02-18 ENCOUNTER — TRANSFERRED RECORDS (OUTPATIENT)
Dept: HEALTH INFORMATION MANAGEMENT | Facility: CLINIC | Age: OVER 89
End: 2025-02-18
Payer: COMMERCIAL

## 2025-04-12 ENCOUNTER — HEALTH MAINTENANCE LETTER (OUTPATIENT)
Age: OVER 89
End: 2025-04-12

## 2025-04-17 ENCOUNTER — APPOINTMENT (OUTPATIENT)
Dept: GENERAL RADIOLOGY | Facility: CLINIC | Age: OVER 89
DRG: 291 | End: 2025-04-17
Attending: EMERGENCY MEDICINE
Payer: COMMERCIAL

## 2025-04-17 ENCOUNTER — HOSPITAL ENCOUNTER (INPATIENT)
Facility: CLINIC | Age: OVER 89
DRG: 291 | End: 2025-04-17
Attending: EMERGENCY MEDICINE | Admitting: INTERNAL MEDICINE
Payer: COMMERCIAL

## 2025-04-17 DIAGNOSIS — I50.9 CONGESTIVE HEART FAILURE, UNSPECIFIED HF CHRONICITY, UNSPECIFIED HEART FAILURE TYPE (H): ICD-10-CM

## 2025-04-17 DIAGNOSIS — K21.9 GASTROESOPHAGEAL REFLUX DISEASE WITHOUT ESOPHAGITIS: ICD-10-CM

## 2025-04-17 DIAGNOSIS — K59.00 CONSTIPATION, UNSPECIFIED CONSTIPATION TYPE: ICD-10-CM

## 2025-04-17 DIAGNOSIS — S52.125A CLOSED NONDISPLACED FRACTURE OF HEAD OF LEFT RADIUS, INITIAL ENCOUNTER: Primary | ICD-10-CM

## 2025-04-17 DIAGNOSIS — R79.89 ELEVATED TROPONIN: ICD-10-CM

## 2025-04-17 DIAGNOSIS — I48.92 ATRIAL FLUTTER WITH RAPID VENTRICULAR RESPONSE (H): ICD-10-CM

## 2025-04-17 LAB
ANION GAP SERPL CALCULATED.3IONS-SCNC: 13 MMOL/L (ref 7–15)
ATRIAL RATE - MUSE: 155 BPM
BASOPHILS # BLD AUTO: 0.1 10E3/UL (ref 0–0.2)
BASOPHILS NFR BLD AUTO: 1 %
BUN SERPL-MCNC: 26.1 MG/DL (ref 8–23)
CALCIUM SERPL-MCNC: 9.7 MG/DL (ref 8.8–10.4)
CHLORIDE SERPL-SCNC: 101 MMOL/L (ref 98–107)
CREAT SERPL-MCNC: 1.17 MG/DL (ref 0.51–0.95)
D DIMER PPP FEU-MCNC: 0.31 UG/ML FEU (ref 0–0.5)
DIASTOLIC BLOOD PRESSURE - MUSE: NORMAL MMHG
EGFRCR SERPLBLD CKD-EPI 2021: 43 ML/MIN/1.73M2
EOSINOPHIL # BLD AUTO: 0.1 10E3/UL (ref 0–0.7)
EOSINOPHIL NFR BLD AUTO: 1 %
ERYTHROCYTE [DISTWIDTH] IN BLOOD BY AUTOMATED COUNT: 25.4 % (ref 10–15)
GLUCOSE SERPL-MCNC: 94 MG/DL (ref 70–99)
HCO3 SERPL-SCNC: 23 MMOL/L (ref 22–29)
HCT VFR BLD AUTO: 31.6 % (ref 35–47)
HGB BLD-MCNC: 10.6 G/DL (ref 11.7–15.7)
HOLD SPECIMEN: NORMAL
IMM GRANULOCYTES # BLD: 0 10E3/UL
IMM GRANULOCYTES NFR BLD: 1 %
INTERPRETATION ECG - MUSE: NORMAL
LYMPHOCYTES # BLD AUTO: 1 10E3/UL (ref 0.8–5.3)
LYMPHOCYTES NFR BLD AUTO: 12 %
MCH RBC QN AUTO: 33.4 PG (ref 26.5–33)
MCHC RBC AUTO-ENTMCNC: 33.5 G/DL (ref 31.5–36.5)
MCV RBC AUTO: 100 FL (ref 78–100)
MONOCYTES # BLD AUTO: 1.4 10E3/UL (ref 0–1.3)
MONOCYTES NFR BLD AUTO: 17 %
NEUTROPHILS # BLD AUTO: 5.7 10E3/UL (ref 1.6–8.3)
NEUTROPHILS NFR BLD AUTO: 68 %
NRBC # BLD AUTO: 0 10E3/UL
NRBC BLD AUTO-RTO: 0 /100
NT-PROBNP SERPL-MCNC: 7701 PG/ML (ref 0–1800)
P AXIS - MUSE: NORMAL DEGREES
PLATELET # BLD AUTO: 336 10E3/UL (ref 150–450)
POTASSIUM SERPL-SCNC: 4.4 MMOL/L (ref 3.4–5.3)
PR INTERVAL - MUSE: 126 MS
QRS DURATION - MUSE: 96 MS
QT - MUSE: 302 MS
QTC - MUSE: 485 MS
R AXIS - MUSE: -29 DEGREES
RBC # BLD AUTO: 3.17 10E6/UL (ref 3.8–5.2)
SODIUM SERPL-SCNC: 137 MMOL/L (ref 135–145)
SYSTOLIC BLOOD PRESSURE - MUSE: NORMAL MMHG
T AXIS - MUSE: 137 DEGREES
TROPONIN T SERPL HS-MCNC: 118 NG/L
VENTRICULAR RATE- MUSE: 155 BPM
WBC # BLD AUTO: 8.4 10E3/UL (ref 4–11)

## 2025-04-17 PROCEDURE — 84484 ASSAY OF TROPONIN QUANT: CPT | Performed by: EMERGENCY MEDICINE

## 2025-04-17 PROCEDURE — 93005 ELECTROCARDIOGRAM TRACING: CPT

## 2025-04-17 PROCEDURE — 71045 X-RAY EXAM CHEST 1 VIEW: CPT

## 2025-04-17 PROCEDURE — 36415 COLL VENOUS BLD VENIPUNCTURE: CPT | Performed by: EMERGENCY MEDICINE

## 2025-04-17 PROCEDURE — 250N000011 HC RX IP 250 OP 636: Performed by: EMERGENCY MEDICINE

## 2025-04-17 PROCEDURE — 80048 BASIC METABOLIC PNL TOTAL CA: CPT | Performed by: EMERGENCY MEDICINE

## 2025-04-17 PROCEDURE — 99223 1ST HOSP IP/OBS HIGH 75: CPT | Performed by: INTERNAL MEDICINE

## 2025-04-17 PROCEDURE — 99285 EMERGENCY DEPT VISIT HI MDM: CPT | Mod: 25

## 2025-04-17 PROCEDURE — 120N000001 HC R&B MED SURG/OB

## 2025-04-17 PROCEDURE — 85379 FIBRIN DEGRADATION QUANT: CPT | Performed by: EMERGENCY MEDICINE

## 2025-04-17 PROCEDURE — 83880 ASSAY OF NATRIURETIC PEPTIDE: CPT | Performed by: EMERGENCY MEDICINE

## 2025-04-17 PROCEDURE — 85004 AUTOMATED DIFF WBC COUNT: CPT | Performed by: EMERGENCY MEDICINE

## 2025-04-17 RX ORDER — FUROSEMIDE 10 MG/ML
40 INJECTION INTRAMUSCULAR; INTRAVENOUS ONCE
Status: COMPLETED | OUTPATIENT
Start: 2025-04-17 | End: 2025-04-17

## 2025-04-17 RX ORDER — ACETAMINOPHEN 650 MG/1
650 SUPPOSITORY RECTAL EVERY 4 HOURS PRN
Status: DISCONTINUED | OUTPATIENT
Start: 2025-04-17 | End: 2025-04-21 | Stop reason: HOSPADM

## 2025-04-17 RX ORDER — DILTIAZEM HCL/D5W 125 MG/125
5-15 PLASTIC BAG, INJECTION (ML) INTRAVENOUS CONTINUOUS
Status: DISCONTINUED | OUTPATIENT
Start: 2025-04-17 | End: 2025-04-18

## 2025-04-17 RX ORDER — FAMOTIDINE 20 MG/1
20 TABLET, FILM COATED ORAL 2 TIMES DAILY
Status: DISCONTINUED | OUTPATIENT
Start: 2025-04-17 | End: 2025-04-19

## 2025-04-17 RX ORDER — CALCIUM CARBONATE 500 MG/1
1000 TABLET, CHEWABLE ORAL 4 TIMES DAILY PRN
Status: DISCONTINUED | OUTPATIENT
Start: 2025-04-17 | End: 2025-04-21 | Stop reason: HOSPADM

## 2025-04-17 RX ORDER — DILTIAZEM HYDROCHLORIDE 5 MG/ML
0.25 INJECTION INTRAVENOUS ONCE
Status: COMPLETED | OUTPATIENT
Start: 2025-04-17 | End: 2025-04-17

## 2025-04-17 RX ORDER — ACETAMINOPHEN 325 MG/1
650 TABLET ORAL EVERY 4 HOURS PRN
Status: DISCONTINUED | OUTPATIENT
Start: 2025-04-17 | End: 2025-04-21 | Stop reason: HOSPADM

## 2025-04-17 RX ORDER — AMOXICILLIN 250 MG
2 CAPSULE ORAL 2 TIMES DAILY PRN
Status: DISCONTINUED | OUTPATIENT
Start: 2025-04-17 | End: 2025-04-21 | Stop reason: HOSPADM

## 2025-04-17 RX ORDER — ONDANSETRON 4 MG/1
4 TABLET, ORALLY DISINTEGRATING ORAL EVERY 6 HOURS PRN
Status: DISCONTINUED | OUTPATIENT
Start: 2025-04-17 | End: 2025-04-21 | Stop reason: HOSPADM

## 2025-04-17 RX ORDER — POLYETHYLENE GLYCOL 3350 17 G/17G
17 POWDER, FOR SOLUTION ORAL DAILY
Status: DISCONTINUED | OUTPATIENT
Start: 2025-04-18 | End: 2025-04-21 | Stop reason: HOSPADM

## 2025-04-17 RX ORDER — ONDANSETRON 2 MG/ML
4 INJECTION INTRAMUSCULAR; INTRAVENOUS EVERY 6 HOURS PRN
Status: DISCONTINUED | OUTPATIENT
Start: 2025-04-17 | End: 2025-04-21 | Stop reason: HOSPADM

## 2025-04-17 RX ORDER — LIDOCAINE 40 MG/G
CREAM TOPICAL
Status: DISCONTINUED | OUTPATIENT
Start: 2025-04-17 | End: 2025-04-21 | Stop reason: HOSPADM

## 2025-04-17 RX ORDER — AMOXICILLIN 250 MG
1 CAPSULE ORAL 2 TIMES DAILY PRN
Status: DISCONTINUED | OUTPATIENT
Start: 2025-04-17 | End: 2025-04-21 | Stop reason: HOSPADM

## 2025-04-17 RX ORDER — ENOXAPARIN SODIUM 100 MG/ML
40 INJECTION SUBCUTANEOUS EVERY 24 HOURS
Status: DISCONTINUED | OUTPATIENT
Start: 2025-04-18 | End: 2025-04-18

## 2025-04-17 RX ADMIN — FUROSEMIDE 40 MG: 10 INJECTION, SOLUTION INTRAVENOUS at 23:23

## 2025-04-17 RX ADMIN — DILTIAZEM HYDROCHLORIDE 21.25 MG: 5 INJECTION, SOLUTION INTRAVENOUS at 23:29

## 2025-04-17 RX ADMIN — Medication 5 MG/HR: at 23:30

## 2025-04-17 ASSESSMENT — COLUMBIA-SUICIDE SEVERITY RATING SCALE - C-SSRS
1. IN THE PAST MONTH, HAVE YOU WISHED YOU WERE DEAD OR WISHED YOU COULD GO TO SLEEP AND NOT WAKE UP?: NO
2. HAVE YOU ACTUALLY HAD ANY THOUGHTS OF KILLING YOURSELF IN THE PAST MONTH?: NO
6. HAVE YOU EVER DONE ANYTHING, STARTED TO DO ANYTHING, OR PREPARED TO DO ANYTHING TO END YOUR LIFE?: NO

## 2025-04-17 ASSESSMENT — ACTIVITIES OF DAILY LIVING (ADL)
ADLS_ACUITY_SCORE: 57
ADLS_ACUITY_SCORE: 57

## 2025-04-18 ENCOUNTER — APPOINTMENT (OUTPATIENT)
Dept: CARDIOLOGY | Facility: CLINIC | Age: OVER 89
DRG: 291 | End: 2025-04-18
Attending: INTERNAL MEDICINE
Payer: COMMERCIAL

## 2025-04-18 VITALS
BODY MASS INDEX: 37.73 KG/M2 | SYSTOLIC BLOOD PRESSURE: 125 MMHG | OXYGEN SATURATION: 96 % | HEART RATE: 70 BPM | WEIGHT: 187.17 LBS | DIASTOLIC BLOOD PRESSURE: 70 MMHG | HEIGHT: 59 IN | RESPIRATION RATE: 18 BRPM | TEMPERATURE: 98.3 F

## 2025-04-18 LAB
ALBUMIN SERPL BCG-MCNC: 4.2 G/DL (ref 3.5–5.2)
ALP SERPL-CCNC: 46 U/L (ref 40–150)
ALT SERPL W P-5'-P-CCNC: 15 U/L (ref 0–50)
ANION GAP SERPL CALCULATED.3IONS-SCNC: 14 MMOL/L (ref 7–15)
AST SERPL W P-5'-P-CCNC: 34 U/L (ref 0–45)
ATRIAL RATE - MUSE: 155 BPM
ATRIAL RATE - MUSE: 357 BPM
ATRIAL RATE - MUSE: 357 BPM
BASOPHILS # BLD AUTO: 0.1 10E3/UL (ref 0–0.2)
BASOPHILS NFR BLD AUTO: 1 %
BILIRUB SERPL-MCNC: 2.1 MG/DL
BUN SERPL-MCNC: 24.4 MG/DL (ref 8–23)
CALCIUM SERPL-MCNC: 9.5 MG/DL (ref 8.8–10.4)
CHLORIDE SERPL-SCNC: 103 MMOL/L (ref 98–107)
CREAT SERPL-MCNC: 1.09 MG/DL (ref 0.51–0.95)
DIASTOLIC BLOOD PRESSURE - MUSE: NORMAL MMHG
EGFRCR SERPLBLD CKD-EPI 2021: 47 ML/MIN/1.73M2
EOSINOPHIL # BLD AUTO: 0.2 10E3/UL (ref 0–0.7)
EOSINOPHIL NFR BLD AUTO: 2 %
ERYTHROCYTE [DISTWIDTH] IN BLOOD BY AUTOMATED COUNT: 25.4 % (ref 10–15)
GLUCOSE SERPL-MCNC: 88 MG/DL (ref 70–99)
HCO3 SERPL-SCNC: 24 MMOL/L (ref 22–29)
HCT VFR BLD AUTO: 31.8 % (ref 35–47)
HGB BLD-MCNC: 10.6 G/DL (ref 11.7–15.7)
IMM GRANULOCYTES # BLD: 0 10E3/UL
IMM GRANULOCYTES NFR BLD: 0 %
INTERPRETATION ECG - MUSE: NORMAL
LACTATE SERPL-SCNC: 1.3 MMOL/L (ref 0.7–2)
LVEF ECHO: NORMAL
LYMPHOCYTES # BLD AUTO: 0.9 10E3/UL (ref 0.8–5.3)
LYMPHOCYTES NFR BLD AUTO: 13 %
MCH RBC QN AUTO: 33.3 PG (ref 26.5–33)
MCHC RBC AUTO-ENTMCNC: 33.3 G/DL (ref 31.5–36.5)
MCV RBC AUTO: 100 FL (ref 78–100)
MONOCYTES # BLD AUTO: 1.4 10E3/UL (ref 0–1.3)
MONOCYTES NFR BLD AUTO: 20 %
NEUTROPHILS # BLD AUTO: 4.5 10E3/UL (ref 1.6–8.3)
NEUTROPHILS NFR BLD AUTO: 63 %
NRBC # BLD AUTO: 0 10E3/UL
NRBC BLD AUTO-RTO: 0 /100
P AXIS - MUSE: 129 DEGREES
P AXIS - MUSE: 129 DEGREES
P AXIS - MUSE: NORMAL DEGREES
PLATELET # BLD AUTO: 308 10E3/UL (ref 150–450)
POTASSIUM SERPL-SCNC: 3.9 MMOL/L (ref 3.4–5.3)
PR INTERVAL - MUSE: 126 MS
PR INTERVAL - MUSE: NORMAL MS
PR INTERVAL - MUSE: NORMAL MS
PROT SERPL-MCNC: 6.3 G/DL (ref 6.4–8.3)
QRS DURATION - MUSE: 100 MS
QRS DURATION - MUSE: 100 MS
QRS DURATION - MUSE: 96 MS
QT - MUSE: 302 MS
QT - MUSE: 392 MS
QT - MUSE: 392 MS
QTC - MUSE: 449 MS
QTC - MUSE: 449 MS
QTC - MUSE: 485 MS
R AXIS - MUSE: -12 DEGREES
R AXIS - MUSE: -12 DEGREES
R AXIS - MUSE: -29 DEGREES
RBC # BLD AUTO: 3.18 10E6/UL (ref 3.8–5.2)
SODIUM SERPL-SCNC: 141 MMOL/L (ref 135–145)
SYSTOLIC BLOOD PRESSURE - MUSE: NORMAL MMHG
T AXIS - MUSE: 127 DEGREES
T AXIS - MUSE: 127 DEGREES
T AXIS - MUSE: 137 DEGREES
T4 FREE SERPL-MCNC: 1.16 NG/DL (ref 0.9–1.7)
TROPONIN T SERPL HS-MCNC: 119 NG/L
TROPONIN T SERPL HS-MCNC: 128 NG/L
TSH SERPL DL<=0.005 MIU/L-ACNC: 5.84 UIU/ML (ref 0.3–4.2)
VENTRICULAR RATE- MUSE: 155 BPM
VENTRICULAR RATE- MUSE: 79 BPM
VENTRICULAR RATE- MUSE: 79 BPM
WBC # BLD AUTO: 7.1 10E3/UL (ref 4–11)

## 2025-04-18 PROCEDURE — 84439 ASSAY OF FREE THYROXINE: CPT | Performed by: INTERNAL MEDICINE

## 2025-04-18 PROCEDURE — 250N000011 HC RX IP 250 OP 636: Performed by: INTERNAL MEDICINE

## 2025-04-18 PROCEDURE — 83605 ASSAY OF LACTIC ACID: CPT | Performed by: INTERNAL MEDICINE

## 2025-04-18 PROCEDURE — 84443 ASSAY THYROID STIM HORMONE: CPT | Performed by: INTERNAL MEDICINE

## 2025-04-18 PROCEDURE — 250N000013 HC RX MED GY IP 250 OP 250 PS 637: Performed by: INTERNAL MEDICINE

## 2025-04-18 PROCEDURE — 120N000001 HC R&B MED SURG/OB

## 2025-04-18 PROCEDURE — 93306 TTE W/DOPPLER COMPLETE: CPT | Mod: 26 | Performed by: INTERNAL MEDICINE

## 2025-04-18 PROCEDURE — 99232 SBSQ HOSP IP/OBS MODERATE 35: CPT | Performed by: INTERNAL MEDICINE

## 2025-04-18 PROCEDURE — 36415 COLL VENOUS BLD VENIPUNCTURE: CPT | Performed by: INTERNAL MEDICINE

## 2025-04-18 PROCEDURE — 99222 1ST HOSP IP/OBS MODERATE 55: CPT | Mod: 25 | Performed by: INTERNAL MEDICINE

## 2025-04-18 PROCEDURE — 84484 ASSAY OF TROPONIN QUANT: CPT | Performed by: INTERNAL MEDICINE

## 2025-04-18 PROCEDURE — 84484 ASSAY OF TROPONIN QUANT: CPT | Performed by: EMERGENCY MEDICINE

## 2025-04-18 PROCEDURE — 36415 COLL VENOUS BLD VENIPUNCTURE: CPT | Performed by: EMERGENCY MEDICINE

## 2025-04-18 PROCEDURE — 85004 AUTOMATED DIFF WBC COUNT: CPT | Performed by: INTERNAL MEDICINE

## 2025-04-18 PROCEDURE — 93306 TTE W/DOPPLER COMPLETE: CPT

## 2025-04-18 PROCEDURE — 82040 ASSAY OF SERUM ALBUMIN: CPT | Performed by: INTERNAL MEDICINE

## 2025-04-18 RX ORDER — DORZOLAMIDE HYDROCHLORIDE AND TIMOLOL MALEATE 20; 5 MG/ML; MG/ML
1 SOLUTION/ DROPS OPHTHALMIC 2 TIMES DAILY
Status: DISCONTINUED | OUTPATIENT
Start: 2025-04-18 | End: 2025-04-21 | Stop reason: HOSPADM

## 2025-04-18 RX ORDER — ACETAMINOPHEN 500 MG
1000 TABLET ORAL 3 TIMES DAILY PRN
COMMUNITY
End: 2025-04-29

## 2025-04-18 RX ORDER — ATORVASTATIN CALCIUM 10 MG/1
10 TABLET, FILM COATED ORAL DAILY
Status: DISCONTINUED | OUTPATIENT
Start: 2025-04-18 | End: 2025-04-21 | Stop reason: HOSPADM

## 2025-04-18 RX ORDER — DILTIAZEM HYDROCHLORIDE 30 MG/1
30 TABLET, FILM COATED ORAL EVERY 6 HOURS SCHEDULED
Status: DISCONTINUED | OUTPATIENT
Start: 2025-04-18 | End: 2025-04-18

## 2025-04-18 RX ORDER — SPIRONOLACTONE 25 MG
12.5 TABLET ORAL DAILY
Status: DISCONTINUED | OUTPATIENT
Start: 2025-04-18 | End: 2025-04-21 | Stop reason: HOSPADM

## 2025-04-18 RX ORDER — BUPROPION HYDROCHLORIDE 150 MG/1
450 TABLET ORAL EVERY MORNING
Status: DISCONTINUED | OUTPATIENT
Start: 2025-04-18 | End: 2025-04-21 | Stop reason: HOSPADM

## 2025-04-18 RX ORDER — METOPROLOL TARTRATE 25 MG/1
25 TABLET, FILM COATED ORAL 2 TIMES DAILY
Status: DISCONTINUED | OUTPATIENT
Start: 2025-04-18 | End: 2025-04-18

## 2025-04-18 RX ORDER — ACETAMINOPHEN 500 MG
1000 TABLET ORAL 3 TIMES DAILY
Status: DISCONTINUED | OUTPATIENT
Start: 2025-04-18 | End: 2025-04-18

## 2025-04-18 RX ORDER — BUPROPION HYDROCHLORIDE 150 MG/1
300 TABLET ORAL EVERY MORNING
Status: DISCONTINUED | OUTPATIENT
Start: 2025-04-18 | End: 2025-04-18

## 2025-04-18 RX ORDER — FUROSEMIDE 10 MG/ML
40 INJECTION INTRAMUSCULAR; INTRAVENOUS ONCE
Status: COMPLETED | OUTPATIENT
Start: 2025-04-18 | End: 2025-04-18

## 2025-04-18 RX ORDER — VITAMIN B COMPLEX
50 TABLET ORAL DAILY
Status: DISCONTINUED | OUTPATIENT
Start: 2025-04-18 | End: 2025-04-21 | Stop reason: HOSPADM

## 2025-04-18 RX ORDER — DILTIAZEM HYDROCHLORIDE 5 MG/ML
10 INJECTION INTRAVENOUS ONCE
Status: COMPLETED | OUTPATIENT
Start: 2025-04-18 | End: 2025-04-18

## 2025-04-18 RX ORDER — FUROSEMIDE 10 MG/ML
40 INJECTION INTRAMUSCULAR; INTRAVENOUS ONCE
Status: DISCONTINUED | OUTPATIENT
Start: 2025-04-18 | End: 2025-04-18

## 2025-04-18 RX ORDER — METOPROLOL TARTRATE 50 MG
50 TABLET ORAL 2 TIMES DAILY
Status: DISCONTINUED | OUTPATIENT
Start: 2025-04-18 | End: 2025-04-21 | Stop reason: HOSPADM

## 2025-04-18 RX ADMIN — DILTIAZEM HYDROCHLORIDE 10 MG: 5 INJECTION, SOLUTION INTRAVENOUS at 05:36

## 2025-04-18 RX ADMIN — FUROSEMIDE 40 MG: 10 INJECTION, SOLUTION INTRAVENOUS at 06:36

## 2025-04-18 RX ADMIN — DORZOLAMIDE HYDROCHLORIDE AND TIMOLOL MALEATE 1 DROP: 20; 5 SOLUTION/ DROPS OPHTHALMIC at 10:29

## 2025-04-18 RX ADMIN — POLYETHYLENE GLYCOL 3350 17 G: 17 POWDER, FOR SOLUTION ORAL at 08:19

## 2025-04-18 RX ADMIN — METOPROLOL TARTRATE 50 MG: 50 TABLET, FILM COATED ORAL at 20:34

## 2025-04-18 RX ADMIN — DILTIAZEM HYDROCHLORIDE 30 MG: 30 TABLET, FILM COATED ORAL at 01:59

## 2025-04-18 RX ADMIN — SPIRONOLACTONE 12.5 MG: 25 TABLET, FILM COATED ORAL at 13:48

## 2025-04-18 RX ADMIN — APIXABAN 5 MG: 5 TABLET, FILM COATED ORAL at 20:34

## 2025-04-18 RX ADMIN — Medication 50 MCG: at 10:29

## 2025-04-18 RX ADMIN — BUPROPION HYDROCHLORIDE 450 MG: 150 TABLET, EXTENDED RELEASE ORAL at 10:29

## 2025-04-18 RX ADMIN — APIXABAN 2.5 MG: 2.5 TABLET, FILM COATED ORAL at 01:59

## 2025-04-18 RX ADMIN — METOPROLOL TARTRATE 50 MG: 50 TABLET, FILM COATED ORAL at 08:19

## 2025-04-18 RX ADMIN — FAMOTIDINE 20 MG: 20 TABLET, FILM COATED ORAL at 20:34

## 2025-04-18 RX ADMIN — APIXABAN 2.5 MG: 2.5 TABLET, FILM COATED ORAL at 08:19

## 2025-04-18 RX ADMIN — DORZOLAMIDE HYDROCHLORIDE AND TIMOLOL MALEATE 1 DROP: 20; 5 SOLUTION/ DROPS OPHTHALMIC at 20:37

## 2025-04-18 RX ADMIN — FAMOTIDINE 20 MG: 20 TABLET, FILM COATED ORAL at 08:19

## 2025-04-18 RX ADMIN — ATORVASTATIN CALCIUM 10 MG: 10 TABLET, FILM COATED ORAL at 10:29

## 2025-04-18 ASSESSMENT — ACTIVITIES OF DAILY LIVING (ADL)
ADLS_ACUITY_SCORE: 56
ADLS_ACUITY_SCORE: 57
ADLS_ACUITY_SCORE: 57
ADLS_ACUITY_SCORE: 56
ADLS_ACUITY_SCORE: 56
ADLS_ACUITY_SCORE: 65
ADLS_ACUITY_SCORE: 52
ADLS_ACUITY_SCORE: 56
ADLS_ACUITY_SCORE: 57
ADLS_ACUITY_SCORE: 65
ADLS_ACUITY_SCORE: 53
ADLS_ACUITY_SCORE: 56
ADLS_ACUITY_SCORE: 52
ADLS_ACUITY_SCORE: 57
ADLS_ACUITY_SCORE: 57
ADLS_ACUITY_SCORE: 56
ADLS_ACUITY_SCORE: 59
ADLS_ACUITY_SCORE: 56
ADLS_ACUITY_SCORE: 57
ADLS_ACUITY_SCORE: 56
DEPENDENT_IADLS:: CLEANING;TRANSPORTATION

## 2025-04-18 NOTE — PLAN OF CARE
Temp: 98  F (36.7  C) Temp src: Oral BP: 124/50 Pulse: 65   Resp: 17 SpO2: 99 % O2 Device: None (Room air)       A/O4. Up A1 walker/gaitbelt. Tele Aflutter CVR. Purewick in place, changed. Mild edema BLE. Denies pain, SOB. Possible discharge tomorrow. Alarms on.     Goal Outcome Evaluation:      Plan of Care Reviewed With: patient    Overall Patient Progress: improvingOverall Patient Progress: improving    Outcome Evaluation: Breathing better, good urine output

## 2025-04-18 NOTE — SIGNIFICANT EVENT
Significant Event Note    Time of event: 5:24 AM April 18, 2025    Description of event:  Patient's diltiazem drip had been shut off at 1 AM and she maintained decent heart rate still in now she is once again tachycardic    Plan:  Will try 1 dose of IV diltiazem push if not then we will start a diltiazem drip and change the status to IMC    Discussed with: bedside nurse    Deedee Parker MD

## 2025-04-18 NOTE — CONSULTS
Care Management Initial Consult    General Information  Assessment completed with: Patient,    Type of CM/SW Visit: Initial Assessment    Primary Care Provider verified and updated as needed:     Readmission within the last 30 days: no previous admission in last 30 days      Reason for Consult: discharge planning  Advance Care Planning: Advance Care Planning Reviewed: no concerns identified       Communication Assessment  Patient's communication style: spoken language (English or Bilingual)    Hearing Difficulty or Deaf: yes   Wear Glasses or Blind: yes    Cognitive  Cognitive/Neuro/Behavioral: WDL                      Living Environment:   People in home: alone     Current living Arrangements: independent living facility - [facility]  Able to return to prior arrangements: yes    Family/Social Support:  Care provided by: self  Provides care for: no one  Marital Status:   Support system: Children          Description of Support System: Supportive, Involved    Support Assessment: Adequate family and caregiver support    Current Resources:   Patient receiving home care services: No  Community Resources: None  Equipment currently used at home: grab bar, toilet, grab bar, tub/shower, walker, rolling, other (see comments) (lift chair, reacher, sock aid)  Supplies currently used at home:      Employment/Financial:  Employment Status: retired        Financial Concerns: none      Does the patient's insurance plan have a 3 day qualifying hospital stay waiver?  Yes     Which insurance plan 3 day waiver is available? Alternative insurance waiver    Will the waiver be used for post-acute placement? No    Lifestyle & Psychosocial Needs:  Social Drivers of Health     Food Insecurity: Low Risk  (4/18/2025)    Food Insecurity     Within the past 12 months, did you worry that your food would run out before you got money to buy more?: No     Within the past 12 months, did the food you bought just not last and you didn t have  money to get more?: No   Depression: Not at risk (2/17/2025)    PHQ-2     PHQ-2 Score: 0   Housing Stability: Low Risk  (4/18/2025)    Housing Stability     Do you have housing? : Yes     Are you worried about losing your housing?: No   Tobacco Use: Low Risk  (2/17/2025)    Patient History     Smoking Tobacco Use: Never     Smokeless Tobacco Use: Never     Passive Exposure: Not on file   Financial Resource Strain: High Risk (4/18/2025)    Financial Resource Strain     Within the past 12 months, have you or your family members you live with been unable to get utilities (heat, electricity) when it was really needed?: Yes   Alcohol Use: Not on file   Transportation Needs: Low Risk  (4/18/2025)    Transportation Needs     Within the past 12 months, has lack of transportation kept you from medical appointments, getting your medicines, non-medical meetings or appointments, work, or from getting things that you need?: No   Physical Activity: Unknown (5/14/2024)    Exercise Vital Sign     Days of Exercise per Week: 4 days     Minutes of Exercise per Session: Not on file   Interpersonal Safety: Low Risk  (4/18/2025)    Interpersonal Safety     Do you feel physically and emotionally safe where you currently live?: Yes     Within the past 12 months, have you been hit, slapped, kicked or otherwise physically hurt by someone?: No     Within the past 12 months, have you been humiliated or emotionally abused in other ways by your partner or ex-partner?: No   Stress: Not on file (11/9/2024)   Social Connections: Unknown (5/14/2024)    Social Connection and Isolation Panel [NHANES]     Frequency of Communication with Friends and Family: Not on file     Frequency of Social Gatherings with Friends and Family: Once a week     Attends Hinduism Services: Not on file     Active Member of Clubs or Organizations: Not on file     Attends Club or Organization Meetings: Not on file     Marital Status: Not on file   Health Literacy: Not on  file       Functional Status:  Prior to admission patient needed assistance:   Dependent ADLs:: Ambulation-walker, Independent  Dependent IADLs:: Cleaning, Transportation     Mental Health Status:  Mental Health Status: No Current Concerns       Chemical Dependency Status:  Chemical Dependency Status: No Current Concerns           Values/Beliefs:  Spiritual, Cultural Beliefs, Christianity Practices, Values that affect care: no             Discussed  Partnership in Safe Discharge Planning  document with patient/family: No    Additional Information:  SW reviewed chart and met with patient to complete discharge planning consult. Patient lives in an independent apartment at [facility]. She was previously independent with ADLs, med mgmt and uses a 4WW.  She gets cleaning help every other week and family assists with shopping, transportation to appts. Patient reports she got up with nursing today and felt a bit woozy but near her baseline mobility. Cardiology consult ordered. She is hopeful to return home at discharge but is aware SW available if needs arise. She reports family will be able to transport her home at discharge.     Next Steps: CECILLE will follow for discharge planning.    Muriel Rodgers, JANIW  [phone]

## 2025-04-18 NOTE — ED PROVIDER NOTES
"  Emergency Department Note      History of Present Illness     Chief Complaint   Tachycardia    HPI   Riddhi Aguilar is a 89 year old female with a history as noted below who presents to the ED for tachycardia. Over the last four days, the patient has been feeling a bit weak with walking. This has progressively worsened. Today, she noted shortness of breath and tachycardic when walking/getting up. She states she is worried she may fall when walking. She states she was fine when sitting. She has never had anything like this. She denies any chest or abdominal pain. No dark stools or vomiting. No new leg pain or swelling. No urinary symptoms. No fever, cough, congestion, or chills. She denies history of atrial fibrillation or flutter.     Independent Historian   None    Review of External Notes   ***    Past Medical History   Medical History and Problem List   Sensorineural hearing loss  Chronic rhinitis  Myringitis  Tympanic membrane perforation  Duodenal ulcer  Hypertension  Depression  Femur fracture  Breast cancer  Arthritis  Chronic kidney disease  Nephrolithiasis  Osteoporosis  Spinal stenosis  Glaucoma  Cataract  Anemia    Medications   Spironolactone  Bupropion  Atorvastatin  Dorzolamide-Timolol  Verapamil  Hydrochlorothiazide    Surgical History   Appendectomy  Breast lumpectomy  Cystoscopy  Dilation and curettage  Lower extremity lesion excision    Physical Exam   Patient Vitals for the past 24 hrs:   BP Temp Temp src Pulse Resp SpO2 Height Weight   04/17/25 2240 -- -- -- (!) 154 -- 99 % -- --   04/17/25 2214 -- -- -- (!) 154 -- 98 % -- --   04/17/25 2154 131/75 -- -- (!) 162 -- 100 % -- --   04/17/25 2150 (!) 143/85 98.3  F (36.8  C) Temporal (!) 157 18 99 % 1.499 m (4' 11\") 84.9 kg (187 lb 2.7 oz)     Physical Exam  ***    Diagnostics   Lab Results   Labs Ordered and Resulted from Time of ED Arrival to Time of ED Departure   BASIC METABOLIC PANEL - Abnormal       Result Value    Sodium 137      Potassium " "4.4      Chloride 101      Carbon Dioxide (CO2) 23      Anion Gap 13      Urea Nitrogen 26.1 (*)     Creatinine 1.17 (*)     GFR Estimate 43 (*)     Calcium 9.7      Glucose 94     TROPONIN T, HIGH SENSITIVITY - Abnormal    Troponin T, High Sensitivity 118 (*)    NT PROBNP INPATIENT - Abnormal    N terminal Pro BNP Inpatient 7,701 (*)    CBC WITH PLATELETS AND DIFFERENTIAL - Abnormal    WBC Count 8.4      RBC Count 3.17 (*)     Hemoglobin 10.6 (*)     Hematocrit 31.6 (*)           MCH 33.4 (*)     MCHC 33.5      RDW 25.4 (*)     Platelet Count 336      % Neutrophils 68      % Lymphocytes 12      % Monocytes 17      % Eosinophils 1      % Basophils 1      % Immature Granulocytes 1      NRBCs per 100 WBC 0      Absolute Neutrophils 5.7      Absolute Lymphocytes 1.0      Absolute Monocytes 1.4 (*)     Absolute Eosinophils 0.1      Absolute Basophils 0.1      Absolute Immature Granulocytes 0.0      Absolute NRBCs 0.0     D DIMER QUANTITATIVE - Normal    D-Dimer Quantitative 0.31     TROPONIN T, HIGH SENSITIVITY     Imaging   XR Chest Port 1 View   Final Result   IMPRESSION: The heart is unchanged in size and appearance dense mitral annular calcifications are again identified. There are is mild central pulmonary venous congestion with minimal bibasilar interstitial edema. Discoid atelectasis is seen in the left    perihilar and infrahilar lung zones.        EKG   ECG taken at 2200, ECG read at 2212  Atrial flutter  Left ventricular hypertrophy with repolarization abnormality  Abnormal ECG   Rate 155 bpm. FL interval 126 ms. QRS duration 96 ms. QT/QTc 302/485 ms. P-R-T axes * -29 137.    EKG  ECG taken at 2351, ECG read at ***  ***   *** as compared to prior, dated ***/***/***.  Rate 79 bpm. FL interval * ms. QRS duration 100 ms. QT/QTc 392/449 ms. P-R-T axes 129 -12 127.     Independent Interpretation   {IndependentReview:991481::\"None\"}    ED Course    Medications Administered   Medications - No data to " display    Procedures   Procedures     Discussion of Management   Admitting hospitalist, Dr. Parker, as noted.     ED Course   ED Course as of 04/17/25 2311   Thu Apr 17, 2025 2201 I obtained history and examined the patient as noted above.     2307 I rechecked and updated the patient. We discussed lab results and admission to the hospital.    2311 I spoke with Dr. Parker, of the hospitalist team, regarding the patient. They accepted the patient for admission to the hospital.       Additional Documentation  None    Medical Decision Making / Diagnosis   CMS Diagnoses: None    MIPS       None    MDM   Riddhi Aguilar is a 89 year old female ***    Disposition   The patient was admitted to the hospital.     Diagnosis   No diagnosis found.     Discharge Medications   New Prescriptions    No medications on file     Scribe Disclosure:  I, Star Thomas, am serving as a scribe at 10:08 PM on 4/17/2025 to document services personally performed by Ofelia Short MD based on my observations and the provider's statements to me.      pain.  EKG showed atrial flutter with 2-1 conduction.  This was consistent while on monitor in the emergency department.  Laboratory studies showed elevated troponin, mild creatinine elevation, hemoglobin 10.6, elevated troponin, elevated BNP.  This is likely rate related demand ischemia.  Discussed anticoagulation with admitting hospitalist who prefers to defer at this time.  Patient does have history of bleeding ulcer.  No report of acute bleeding symptoms today.  She reports brown stool.  Likely this is new atrial flutter driving demand ischemia and heart failure with elevated BNP.  She does also have some peripheral edema.  Lasix given.  In collaboration with the admitting hospitalist, diltiazem was started.  This provided excellent rate control.  She did have periods where she appeared to change in sinus rhythm with this as well.  Patient require admission for ongoing care.  Discussed with  admitted to the hospital service.    This is likely primary atrial fibrillation.  D dimer was obtained and negative.  I felt this is sufficient to exclude PE.  No infectious symptoms.  No fever or leukocytosis to suggest underlying infection as a cause of her atrial flutter.    Disposition   The patient was admitted to the hospital.     Diagnosis     ICD-10-CM    1. Atrial flutter with rapid ventricular response (H)  I48.92       2. Elevated troponin  R79.89       3. Congestive heart failure, unspecified HF chronicity, unspecified heart failure type (H)  I50.9          Ofelia Short MD      Scribe Disclosure:  BEN Graves Martha, am serving as a scribe at 10:08 PM on 4/17/2025 to document services personally performed by Ofelia Short MD based on my observations and the provider's statements to me.        Ofelia Short MD  04/18/25 0042

## 2025-04-18 NOTE — H&P
"[facility]    History and Physical - Hospitalist Service       Date of Admission:  4/17/2025    Assessment & Plan      Riddhi Aguilar is a 89 year old female admitted on 4/17/2025. She ***    ***        Diet:  ***  DVT Prophylaxis: Enoxaparin (Lovenox) SQ  Martines Catheter: Not present  Lines: None     Cardiac Monitoring: ACTIVE order. Indication: Tachyarrhythmias, acute (48 hours)  Code Status:  DNR/DNI    Clinically Significant Risk Factors Present on Admission   { TIP  This section helps capture the illness of the patient on admission.     - Review diagnoses highlighted in blue; right click, edit & delete if not appropriate   - If blank, no additional diagnoses identified   :96702}                # Hypertension: Noted on problem list      # Anemia: based on hgb <11       # Obesity: Estimated body mass index is 37.8 kg/m  as calculated from the following:    Height as of this encounter: 1.499 m (4' 11\").    Weight as of this encounter: 84.9 kg (187 lb 2.7 oz).              Disposition Plan   {TIP  It is advised to update the Medical Readiness for Discharge [MRD] daily, until the patient is 'Ready Now.' Last Documentation-    . Use the SmartList below to update for today:124370}  Medically Ready for Discharge: Anticipated in 2-4 Days           Deedee Parker MD  Hospitalist Service  [facility]  Securely message with [employer] (more info)  Text page via [employer] Paging/Directory     ______________________________________________________________________    Chief Complaint   Fast heart rate    History is obtained from the patient, electronic health record, and emergency department physician    History of Present Illness   Riddhi Aguilar is a 89 year old female who has a past medical history of osteoporosis, osteoarthritis, kidney stones, dyslipidemia, glaucoma, spinal stenosis, ureteral stones, recent history of femoral neck fracture mechanical fall related, past history of " duodenal ulcer, DNR/DNI status.  Patient is being admitted through the emergency department where she complained of palpitations on presentation.  She states that on walking today she felt that her heart was racing and she had the sensation which was similar to her feeling when she had duodenal ulcer.  On arrival she was noted to be in atrial flutter with heart rate in the 150s.  She was noted to be also in pulmonary edema on imaging and clinical evaluation.  She has been requested for admission.  At the time of request for admission patient has not received any medications but it is intended by the ER provider to administer diltiazem as well as furosemide to the patient.  Further review of records she does not have a prior history of atrial arrhythmias though she does have a history of dilated atria as well as pulmonary artery hypertension based upon her cardiac echogram approximately 16 months ago.      Past Medical History    Past Medical History:   Diagnosis Date    Anemia     baseline 10-11    Arthritis     Bleeding ulcer     duodenal ulcer    CKD (chronic kidney disease) stage 3, GFR 30-59 ml/min (H)     Essential hypertension, benign     MDD (major depressive disorder)     Nephrolithiasis     Osteoporosis     Pure hypercholesterolemia     Spinal stenosis     Unspecified cataract     Unspecified glaucoma(365.9)        Past Surgical History   Past Surgical History:   Procedure Laterality Date    APPENDECTOMY      BREAST SURGERY      lumpectomy    CLOSED REDUCTION, PERCUTANEOUS PINNING HIP Left 5/20/2024    Procedure: Closed reduction percutaneous pinning, left hip;  Surgeon: Jose Luis Merino MD;  Location: [facility] OR    COMBINED CYSTOSCOPY, RETROGRADES, EXCHANGE STENT URETER(S) Left 01/24/2024    Procedure: Cystoscopy, left retrograde pyelogram, left JJ stent placement, <1hr physician fluoroscopy time;  Surgeon: Aureliano Brandt MD;  Location: [facility] OR    COMBINED CYSTOSCOPY, RETROGRADES, URETEROSCOPY, LASER  HOLMIUM LITHOTRIPSY URETER(S), INSERT STENT Left 02/20/2024    Procedure: Cystoscopy, left diagnostic ureteroscopy, left retrograde pyelogram, left ureteral stent removal, fluoroscopic interpretation <1 hour physician time;  Surgeon: Richie Jaquez MD;  Location: [facility] OR    CYSTOSCOPY      CYSTOSCOPY, REMOVE STENT(S), COMBINED Left 02/20/2024    Procedure: Cystoscopy, remove stent(s), combined;  Surgeon: Richie Jaquez MD;  Location: [facility] OR    DILATION AND CURETTAGE, HYSTEROSCOPY DIAGNOSTIC, COMBINED  02/06/2014    Procedure: COMBINED DILATION AND CURETTAGE, HYSTEROSCOPY DIAGNOSTIC;  DILATION AND CURETTAGE, HYSTEROSCOPY, POLYPECTOMY, INCISION AND DRAINAGE OF SEBACEOUS CYST ;  Surgeon: Serena Zamora MD;  Location:  [facility]    EXCISE LESION LOWER EXTREMITY Right 08/29/2017    Procedure: EXCISE LESION LOWER EXTREMITY;  WIDE EXCISIONAL BIOPSY OF RIGHT ANKLE BASAL CELL CARCINOMA;  Surgeon: Juan Luis Flores MD;  Location:  OR    INCISION AND DRAINAGE PERINEAL, COMBINED  02/06/2014    Procedure: COMBINED INCISION AND DRAINAGE PERINEAL;;  Surgeon: Serena Zamora MD;  [facility]    Z NONSPECIFIC PROCEDURE      Repair of buckled retina of rt eye    Z NONSPECIFIC PROCEDURE      Appy       Prior to Admission Medications   Prior to Admission Medications   Prescriptions Last Dose Informant Patient Reported? Taking?   acetaminophen (TYLENOL) 500 MG tablet   No No   Sig: Take 2 tablets (1,000 mg) by mouth 3 times daily   atorvastatin (LIPITOR) 10 MG tablet   No No   Sig: TAKE 1 TABLET(10 MG) BY MOUTH DAILY   buPROPion (WELLBUTRIN XL) 150 MG 24 hr tablet   Yes No   Sig: Take 150 mg by mouth every morning.   buPROPion (WELLBUTRIN XL) 300 MG 24 hr tablet   No No   Sig: TAKE 1 TABLET(300 MG) BY MOUTH EVERY MORNING   dorzolamide-timolol (COSOPT) 22.3-6.8 MG/ML ophthalmic solution   Yes No   Sig: Place 1 drop into both eyes 2 times daily   multivitamin w/minerals (THERA-VIT-M) tablet   Yes No   Sig: Take 1 tablet  "by mouth daily   spironolactone (ALDACTONE) 25 MG tablet   No No   Sig: Take 1 tablet (25 mg) by mouth daily Hold for SBP <110 or DBP <55 or dizziness initially, revisit w/ tcu provider.   vitamin D3 (CHOLECALCIFEROL) 50 mcg (2000 units) tablet   Yes No   Sig: Take 1 tablet by mouth daily      Facility-Administered Medications: None      {Additional Note Sections (OPTIONAL)  :007394}     Physical Exam   Vital Signs: Temp: 98.3  F (36.8  C) Temp src: Temporal BP: 131/75 Pulse: (!) 154   Resp: 18 SpO2: 99 % O2 Device: None (Room air)    Weight: 187 lbs 2.73 oz    {Recommend personal SmartPhrase or Notewriter for exam (OPTIONAL)    :[id number]}    Medical Decision Making   { TIP   MDM Calculator    MDM grid (w/ times)    Coding Support Chat  Billing is now based on time OR medical decision making complexity. Medical decision making included in your A&P does NOT need to be re-documented here.    :90556}    {Time  :385157::\"*** MINUTES SPENT BY ME on the date of service doing chart review, history, exam, documentation & further activities per the note.\"}      Data   {INSERT LABS/IMAGING (OPTIONAL)   :742507}  " patient's social history and updated it with pertinent information if needed.  Social History     Tobacco Use    Smoking status: Never    Smokeless tobacco: Never   Substance Use Topics    Alcohol use: No     Alcohol/week: 0.0 standard drinks of alcohol    Drug use: No         Family History   I have reviewed this patient's family history and updated it with pertinent information if needed.  Family History   Problem Relation Age of Onset    Breast Cancer Sister     Breast Cancer Sister     Cancer Brother         bone cancer in arm         Allergies   No Known Allergies     Physical Exam   Vital Signs: Temp: 98.3  F (36.8  C) Temp src: Temporal BP: 131/75 Pulse: (!) 154   Resp: 18 SpO2: 99 % O2 Device: None (Room air)    Weight: 187 lbs 2.73 oz    General Appearance: Alert awake oriented x 3 no acute distress elderly lady appears much younger than stated age  Respiratory: Clear to auscultation  Cardiovascular: Irregularly irregular  GI: Soft nontender bowel sounds are present  Skin: Lower extremity skin changes consistent with chronic venous stasis  Other: 2+ lower extremity edema    Medical Decision Making       75 MINUTES SPENT BY ME on the date of service doing chart review, history, exam, documentation & further activities per the note.      Data   ------------------------- PAST 24 HR DATA REVIEWED -----------------------------------------------    I have personally reviewed the following data over the past 24 hrs:    8.4  \   10.6 (L)   / 336     137 101 26.1 (H) /  94   4.4 23 1.17 (H) \     Trop: 119 (HH) BNP: 7,701 (H)     INR:  N/A PTT:  N/A   D-dimer:  0.31 Fibrinogen:  N/A       Imaging results reviewed over the past 24 hrs:   Recent Results (from the past 24 hours)   XR Chest Port 1 View    Narrative    EXAM: XR CHEST PORT 1 VIEW  LOCATION: [facility]  DATE: 4/17/2025    INDICATION: shortness of breath  COMPARISON: 1/23/2024.      Impression    IMPRESSION: The heart is unchanged in  size and appearance dense mitral annular calcifications are again identified. There are is mild central pulmonary venous congestion with minimal bibasilar interstitial edema. Discoid atelectasis is seen in the left   perihilar and infrahilar lung zones.

## 2025-04-18 NOTE — PHARMACY-ADMISSION MEDICATION HISTORY
Pharmacist Admission Medication History    Admission medication history is complete. The information provided in this note is only as accurate as the sources available at the time of the update.    Information Source(s): Patient via in-person    Pertinent Information: None    Changes made to PTA medication list:  Added: None  Deleted: None  Changed: APAP from TID to TID PRN    Allergies reviewed with patient and updates made in EHR: yes    Medication History Completed By: Marylou Ballard RPH 4/18/2025 8:40 AM    PTA Med List   Medication Sig Last Dose/Taking    acetaminophen (TYLENOL) 500 MG tablet Take 2 tablets (1,000 mg) by mouth 3 times daily (Patient taking differently: Take 1,000 mg by mouth 3 times daily as needed for pain.) Past Week    atorvastatin (LIPITOR) 10 MG tablet TAKE 1 TABLET(10 MG) BY MOUTH DAILY 4/17/2025 Morning    buPROPion (WELLBUTRIN XL) 150 MG 24 hr tablet Take 150 mg by mouth every morning. 4/17/2025 Morning    buPROPion (WELLBUTRIN XL) 300 MG 24 hr tablet TAKE 1 TABLET(300 MG) BY MOUTH EVERY MORNING 4/17/2025 Morning    dorzolamide-timolol (COSOPT) 22.3-6.8 MG/ML ophthalmic solution Place 1 drop into both eyes 2 times daily 4/17/2025 Morning    multivitamin w/minerals (THERA-VIT-M) tablet Take 1 tablet by mouth daily 4/17/2025 Morning    spironolactone (ALDACTONE) 25 MG tablet Take 1 tablet (25 mg) by mouth daily Hold for SBP <110 or DBP <55 or dizziness initially, revisit w/ tcu provider. 4/17/2025 Morning    vitamin D3 (CHOLECALCIFEROL) 50 mcg (2000 units) tablet Take 1 tablet by mouth daily 4/17/2025 Morning

## 2025-04-18 NOTE — CONSULTS
[facility]    Cardiology Consultation     Riddhi Aguilar MRN#: [id number]   YOB: 1931 Age: 89 year old     Date of Admission:  4/17/2025    Consult Indication:  atrial flutter     Assessment & Plan     # Atrial flutter with RVR, new diagnosis, [id number]  # Elevated troponin, flat trajectory, no angina, normal LV function and wall motion, most consistent with demand ischemia  # Mild mitral stenosis, mild-mod aortic stenosis   # CKD  # Prior GIB, many years ago, nothing recent    - Rate now controlled, will transition to metoprolol tartrate 50 mg twice daily  - Discussed anticoagulation for the prevention of stroke, risks of bleeding weighed against benefits of stroke prevention.  Patient denies any recent GI bleed, though did have a duodenal ulcer many years ago.  After an in-depth discussion, patient would like to continue with apixaban that was started earlier, will increase dosage to 5 mg twice daily since her weight 84 kg and creatinine is 1.09.  Discussed monitoring signs/symptoms of GIB.    - Discussed the elevated troponin level, options for further evaluation/management including noninvasive stress testing or continued medical management.  Discussed risk/benefits.  Patient strongly wishes to pursue a conservative strategy, declines further cardiac testing, reiterates her DNR/DNI status.  - Will hold off on further diuresis since she is now euvolemic and rate controlled, she also has this aortic stenosis and endorses some orthostatic symptoms at baseline, recommend decreasing the spironolactone to 12.5 mg daily, I am hesitant to start additional scheduled regular diuretics  - Recommend monitoring for another day, if heart rates are well-controlled and she feels well, can discharge home tomorrow.  Cardiology will sign off, recommend Zio patch monitor on discharge, ordered labs in 1 week (BMP, CBC), will order follow-up in cardiology clinic through discharge navigator        Please do not hesitate to page with any questions or concerns.     Albaro Shirley MD, [facility]  Cardiology  April 18, 2025    Voice recognition software utilized.   Moderate complexity     History of Present Illness     Patient is a 89-year-old with history of CKD, prior duodenal ulcer, who presented yesterday 4/17/2025 with generalized weakness, found to be in atrial flutter with RVR.    Over the past several days patient has been experiencing generalized weakness.  In the ED heart rate was around 155 bpm.  Personally reviewed ECG demonstrating atrial flutter with RVR at around 150 bpm, 2: 1 conduction.  Labs notable for BUN 26.1, creatinine 1.17, NT proBNP 7700, high-sensitivity troponin 118 and flat in trajectory.  Hemoglobin 10.6, stable from baseline.  D-dimer negative.  Chest x-ray with mild central pulmonary venous congestion and minimal bibasilar interstitial edema.  Lactate 1.3.  Patient was started on diuretics and diltiazem gtt. which resulted in successful rate control.  Patient was admitted to the Internal Medicine service.  Patient was started on apixaban.    TTE 4/18/2025  Interpretation Summary     1. The left ventricle is normal in size. Moderately increased left ventricular  wall thickness is noted. Left ventricular systolic function is normal. The  visual ejection fraction is 60-65%. Diastolic function not assessed due to  atrial fibrillation. No regional wall motion abnormalities noted.  2. The right ventricle is normal size. The right ventricular systolic function  is normal.  3. There is severe biatrial enlargement. There is no color Doppler evidence of  an atrial shunt.  4. Mild mitral stenosis.  5. Mild to moderate valvular aortic stenosis. The mean AoV pressure gradient  is 15.7 mmHg. The peak AoV pressure gradient is 24.0 mmHg. The calculated  aortic valve are is 1.3 cm^2.  6. No pericardial effusion.  7. In comparison to the previous report dated 01/25/2024, there has been  a  mild interval increase in transaortic gradients.      Past Medical History   Past Medical History:   Diagnosis Date    Anemia     baseline 10-11    Arthritis     Bleeding ulcer     duodenal ulcer    CKD (chronic kidney disease) stage 3, GFR 30-59 ml/min (H)     Essential hypertension, benign     MDD (major depressive disorder)     Nephrolithiasis     Osteoporosis     Pure hypercholesterolemia     Spinal stenosis     Unspecified cataract     Unspecified glaucoma(365.9)        Past Surgical History   Past Surgical History:   Procedure Laterality Date    APPENDECTOMY      BREAST SURGERY      lumpectomy    CLOSED REDUCTION, PERCUTANEOUS PINNING HIP Left 5/20/2024    Procedure: Closed reduction percutaneous pinning, left hip;  Surgeon: Jose Luis Merino MD;  Location: [facility] OR    COMBINED CYSTOSCOPY, RETROGRADES, EXCHANGE STENT URETER(S) Left 01/24/2024    Procedure: Cystoscopy, left retrograde pyelogram, left JJ stent placement, <1hr physician fluoroscopy time;  Surgeon: Aureliano Brandt MD;  Location: [facility] OR    COMBINED CYSTOSCOPY, RETROGRADES, URETEROSCOPY, LASER HOLMIUM LITHOTRIPSY URETER(S), INSERT STENT Left 02/20/2024    Procedure: Cystoscopy, left diagnostic ureteroscopy, left retrograde pyelogram, left ureteral stent removal, fluoroscopic interpretation <1 hour physician time;  Surgeon: Richie Jaquez MD;  Location: [facility] OR    CYSTOSCOPY      CYSTOSCOPY, REMOVE STENT(S), COMBINED Left 02/20/2024    Procedure: Cystoscopy, remove stent(s), combined;  Surgeon: Richie Jaquez MD;  Location:  OR    DILATION AND CURETTAGE, HYSTEROSCOPY DIAGNOSTIC, COMBINED  02/06/2014    Procedure: COMBINED DILATION AND CURETTAGE, HYSTEROSCOPY DIAGNOSTIC;  DILATION AND CURETTAGE, HYSTEROSCOPY, POLYPECTOMY, INCISION AND DRAINAGE OF SEBACEOUS CYST ;  Surgeon: Serena Zamora MD;  Location: [facility]    EXCISE LESION LOWER EXTREMITY Right 08/29/2017    Procedure: EXCISE LESION LOWER EXTREMITY;  WIDE EXCISIONAL BIOPSY OF RIGHT ANKLE  BASAL CELL CARCINOMA;  Surgeon: Juan Luis Flores MD;  Location:  OR    INCISION AND DRAINAGE PERINEAL, COMBINED  02/06/2014    Procedure: COMBINED INCISION AND DRAINAGE PERINEAL;;  Surgeon: Serena Zamora MD;  Location:  [facility]    [facility] NONSPECIFIC PROCEDURE      Repair of buckled retina of rt eye    [facility] NONSPECIFIC PROCEDURE      Appy       Prior to Admission Medications   Prior to Admission Medications   Prescriptions Last Dose Informant Patient Reported? Taking?   acetaminophen (TYLENOL) 500 MG tablet   No No   Sig: Take 2 tablets (1,000 mg) by mouth 3 times daily   atorvastatin (LIPITOR) 10 MG tablet   No No   Sig: TAKE 1 TABLET(10 MG) BY MOUTH DAILY   buPROPion (WELLBUTRIN XL) 150 MG 24 hr tablet   Yes No   Sig: Take 150 mg by mouth every morning.   buPROPion (WELLBUTRIN XL) 300 MG 24 hr tablet   No No   Sig: TAKE 1 TABLET(300 MG) BY MOUTH EVERY MORNING   dorzolamide-timolol (COSOPT) 22.3-6.8 MG/ML ophthalmic solution   Yes No   Sig: Place 1 drop into both eyes 2 times daily   multivitamin w/minerals (THERA-VIT-M) tablet   Yes No   Sig: Take 1 tablet by mouth daily   spironolactone (ALDACTONE) 25 MG tablet   No No   Sig: Take 1 tablet (25 mg) by mouth daily Hold for SBP <110 or DBP <55 or dizziness initially, revisit w/ tcu provider.   vitamin D3 (CHOLECALCIFEROL) 50 mcg (2000 units) tablet   Yes No   Sig: Take 1 tablet by mouth daily      Facility-Administered Medications: None     Current Facility-Administered Medications   Medication Dose Route Frequency Provider Last Rate Last Admin    acetaminophen (TYLENOL) tablet 650 mg  650 mg Oral Q4H PRN Deedee Parker MD        Or    acetaminophen (TYLENOL) Suppository 650 mg  650 mg Rectal Q4H PRN Deedee Parker MD        acetaminophen (TYLENOL) tablet 1,000 mg  1,000 mg Oral TID Deidre Pickard MD        apixaban ANTICOAGULANT (ELIQUIS) tablet 5 mg  5 mg Oral BID Albaro Shirley MD        atorvastatin (LIPITOR) tablet 10 mg  10 mg Oral Daily Neeraj  Deidre Munguia MD        benzocaine-menthol (CHLORASEPTIC) 6-10 MG lozenge 1 lozenge  1 lozenge Buccal Q1H PRN Deedee Parker MD        buPROPion (WELLBUTRIN XL) 24 hr tablet 150 mg  150 mg Oral QAM Deidre Pickard MD        buPROPion (WELLBUTRIN XL) 24 hr tablet 300 mg  300 mg Oral QAM Deidre Pickard MD        calcium carbonate (TUMS) chewable tablet 1,000 mg  1,000 mg Oral 4x Daily PRN Deedee Parker MD        dorzolamide-timolol (COSOPT) ophthalmic solution 1 drop  1 drop Both Eyes BID Deidre Pickard MD        famotidine (PEPCID) tablet 20 mg  20 mg Oral BID Deedee Parker MD   20 mg at 04/18/25 0819    lidocaine (LMX4) cream   Topical Q1H PRN Deedee Parker MD        lidocaine 1 % 0.1-1 mL  0.1-1 mL Other Q1H PRN Deedee Parker MD        metoprolol tartrate (LOPRESSOR) tablet 50 mg  50 mg Oral BID Albaro Shirley MD   50 mg at 04/18/25 0819    miconazole (MICATIN) 2 % powder   Topical BID PRN Deedee Parker MD        ondansetron (ZOFRAN ODT) ODT tab 4 mg  4 mg Oral Q6H PRN Deedee Parker MD        Or    ondansetron (ZOFRAN) injection 4 mg  4 mg Intravenous Q6H PRN Deedee Parker MD        polyethylene glycol (MIRALAX) Packet 17 g  17 g Oral Daily Deedee Parker MD   17 g at 04/18/25 0819    senna-docusate (SENOKOT-S/PERICOLACE) 8.6-50 MG per tablet 1 tablet  1 tablet Oral BID PRN Deedee Parker MD        Or    senna-docusate (SENOKOT-S/PERICOLACE) 8.6-50 MG per tablet 2 tablet  2 tablet Oral BID PRN Deedee Parker MD        sodium chloride (PF) 0.9% PF flush 3 mL  3 mL Intracatheter Q8H Deedee Parker MD   3 mL at 04/18/25 0819    sodium chloride (PF) 0.9% PF flush 3 mL  3 mL Intracatheter q1 min prn Deedee Parker MD        Vitamin D3 (CHOLECALCIFEROL) tablet 50 mcg  50 mcg Oral Daily Deidre Pickard MD         Current Facility-Administered Medications   Medication Dose Route Frequency Provider Last Rate Last Admin    acetaminophen (TYLENOL) tablet 650 mg  650 mg Oral Q4H PRN  Deedee Parker MD        Or    acetaminophen (TYLENOL) Suppository 650 mg  650 mg Rectal Q4H PRN Deedee Parker MD        acetaminophen (TYLENOL) tablet 1,000 mg  1,000 mg Oral TID Deidre Pickard MD        apixaban ANTICOAGULANT (ELIQUIS) tablet 5 mg  5 mg Oral BID Albaro Shirley MD        atorvastatin (LIPITOR) tablet 10 mg  10 mg Oral Daily Deidre Pickard MD        benzocaine-menthol (CHLORASEPTIC) 6-10 MG lozenge 1 lozenge  1 lozenge Buccal Q1H PRN Deedee Parker MD        buPROPion (WELLBUTRIN XL) 24 hr tablet 150 mg  150 mg Oral QAM Deidre Pickard MD        buPROPion (WELLBUTRIN XL) 24 hr tablet 300 mg  300 mg Oral QAM Deidre Pickard MD        calcium carbonate (TUMS) chewable tablet 1,000 mg  1,000 mg Oral 4x Daily PRN Deedee Parker MD        dorzolamide-timolol (COSOPT) ophthalmic solution 1 drop  1 drop Both Eyes BID Deidre Pickard MD        famotidine (PEPCID) tablet 20 mg  20 mg Oral BID Deedee Parker MD   20 mg at 04/18/25 0819    lidocaine (LMX4) cream   Topical Q1H PRN Deedee Parker MD        lidocaine 1 % 0.1-1 mL  0.1-1 mL Other Q1H PRN Deedee Parker MD        metoprolol tartrate (LOPRESSOR) tablet 50 mg  50 mg Oral BID Albaro Shirley MD   50 mg at 04/18/25 0819    miconazole (MICATIN) 2 % powder   Topical BID PRN Deedee Parker MD        ondansetron (ZOFRAN ODT) ODT tab 4 mg  4 mg Oral Q6H PRN Deedee Parker MD        Or    ondansetron (ZOFRAN) injection 4 mg  4 mg Intravenous Q6H PRN Deedee Parker MD        polyethylene glycol (MIRALAX) Packet 17 g  17 g Oral Daily Deedee Parker MD   17 g at 04/18/25 0819    senna-docusate (SENOKOT-S/PERICOLACE) 8.6-50 MG per tablet 1 tablet  1 tablet Oral BID PRDeedee Mirza MD        Or    senna-docusate (SENOKOT-S/PERICOLACE) 8.6-50 MG per tablet 2 tablet  2 tablet Oral BID Deedee Aguirre MD        sodium chloride (PF) 0.9% PF flush 3 mL  3 mL Intracatheter Q8H Deedee Parker MD   3 mL at 04/18/25 0819     sodium chloride (PF) 0.9% PF flush 3 mL  3 mL Intracatheter q1 min prn Deedee Parker MD        Vitamin D3 (CHOLECALCIFEROL) tablet 50 mcg  50 mcg Oral Daily Deidre Pickard MD         Allergies   No Known Allergies    Social History    reports that she has never smoked. She has never used smokeless tobacco. She reports that she does not drink alcohol and does not use drugs.    Family History   I have reviewed this patient's family history and updated it with pertinent information if needed.  Family History   Problem Relation Age of Onset    Breast Cancer Sister     Breast Cancer Sister     Cancer Brother         bone cancer in arm          Review of Systems   A comprehensive review of system was performed and is negative other than that noted in the HPI or here.     Physical Exam   Vital Signs with Ranges  Temp:  [98  F (36.7  C)-98.3  F (36.8  C)] 98  F (36.7  C)  Pulse:  [] 96  Resp:  [18] 18  BP: (110-143)/(49-85) 120/49  SpO2:  [93 %-100 %] 99 %  Wt Readings from Last 4 Encounters:   25 84 kg (185 lb 3 oz)   24 81.3 kg (179 lb 3.2 oz)   24 81.3 kg (179 lb 3.2 oz)   24 85 kg (187 lb 6.4 oz)     I/O last 3 completed shifts:  In: 60 [P.O.:60]  Out: 1900 [Urine:1900]    Vital signs were personally reviewed:  Temperatures:  Current - Temp: 98  F (36.7  C); Max - Temp  Av.2  F (36.8  C)  Min: 98  F (36.7  C)  Max: 98.3  F (36.8  C)  Respiration range: Resp  Av  Min: 18  Max: 18  Pulse range: Pulse  Av.5  Min: 70  Max: 162  Blood pressure range: Systolic (24hrs), Av , Min:110 , Max:143   ; Diastolic (24hrs), Av, Min:49, Max:85    Pulse oximetry range: SpO2  Av.5 %  Min: 93 %  Max: 100 %    Intake/Output Summary (Last 24 hours) at 2025 0836  Last data filed at 2025 0733  Gross per 24 hour   Intake 60 ml   Output 2650 ml   Net -2590 ml     185 lbs 2.98 oz  Body mass index is 37.4 kg/m .   Body surface area is 1.87 meters squared.    Physical  "Exam:   General/Constitutional: appears stated age, in no apparent distress, appears to be well nourished  Respiratory: clear to auscultation bilaterally, no wheezes, no rales, no increased work of breathing  Cardiovascular: JVP normal, regular rate, regular rhythm, 1/6 SUNNY at the RUSB    Laboratory tests personally reviewed:   CMP  Recent Labs   Lab 04/18/25  0559 04/17/25  2204    137   POTASSIUM 3.9 4.4   CHLORIDE 103 101   CO2 24 23   ANIONGAP 14 13   GLC 88 94   BUN 24.4* 26.1*   CR 1.09* 1.17*   GFRESTIMATED 47* 43*   TAMMY 9.5 9.7   PROTTOTAL 6.3*  --    ALBUMIN 4.2  --    BILITOTAL 2.1*  --    ALKPHOS 46  --    AST 34  --    ALT 15  --      CBC  Recent Labs   Lab 04/18/25  0559 04/17/25  2204   WBC 7.1 8.4   RBC 3.18* 3.17*   HGB 10.6* 10.6*   HCT 31.8* 31.6*    100   MCH 33.3* 33.4*   MCHC 33.3 33.5   RDW 25.4* 25.4*    336     INRNo lab results found in last 7 days.  Lab Results   Component Value Date    TROPI <0.012 01/16/2010     Recent Labs   Lab Test 05/14/24  1036 05/09/23  1216 04/27/22  1121   CHOL  --  127 132   HDL  --  56 66   LDL 67 57 51   TRIG  --  69 76     Lab Results   Component Value Date    A1C 5.3 05/14/2024     TSH   Date Value Ref Range Status   05/14/2024 5.28 (H) 0.30 - 4.20 uIU/mL Final   05/10/2021 3.45 0.40 - 4.00 mU/L Final       Clinically Significant Risk Factors Present on Admission                   # Hypertension: Noted on problem list  # Acute heart failure with preserved ejection fraction: heart failure noted on problem list, last echo with EF >50%, and receiving IV diuretics     # Anemia: based on hgb <11       # Obesity: Estimated body mass index is 37.4 kg/m  as calculated from the following:    Height as of this encounter: 1.499 m (4' 11\").    Weight as of this encounter: 84 kg (185 lb 3 oz).           Clinically Significant Risk Factors Present on Admission                   # Hypertension: Noted on problem list  # Acute heart failure with preserved " "ejection fraction: heart failure noted on problem list, last echo with EF >50%, and receiving IV diuretics     # Anemia: based on hgb <11       # Obesity: Estimated body mass index is 37.4 kg/m  as calculated from the following:    Height as of this encounter: 1.499 m (4' 11\").    Weight as of this encounter: 84 kg (185 lb 3 oz).       # Financial/Environmental Concerns: none    "

## 2025-04-18 NOTE — ED TRIAGE NOTES
"Pt reports heart rate has been up to 156 all day today. Pt reports increased SOB when up to walk. Pt reports \"it feels like it did when I had a bleeding ulcer\".        "

## 2025-04-18 NOTE — PROGRESS NOTES
"[facility]    Hospitalist Progress Note  Name: Riddhi Aguilar    MRN: [id number]  Provider: Deidre Pickard MD  Date of Service: 04/18/2025    Assessment & Plan   Summary of Stay: Riddhi Aguilar is a 89 year old female who was admitted on 4/17/2025 with New onset A-fib with RVR. Her past medical history of osteoporosis, osteoarthritis, kidney stones, dyslipidemia, glaucoma, spinal stenosis, ureteral stones, recent history of femoral neck fracture mechanical fall related, past history of duodenal ulcer in 2006, DNR/DNI status.     New onset A-fib with RVR  New diagnosis of heart failure  Elevated troponin: Demand ischemia versus ACS in setting of new onset A-fib  - Patient received diltiazem bolus and was switched to diltiazem drip for rate control  -Telemetry  - Echo ordered pending  - Cardiology consulted  - Started on Eliquis  - Metoprolol started for rate control and possible underlying ACS  - ?Aspirin (? H/o duodenal ulcer) continue beta-blocker, prior to admission Lipitor    H/o HTN  - Prior to admission on Aldactone  - Started on Aldactone at a lower dose    Hyperlipidemia  -Continue statins    Osteoarthritis  Osteoporosis  Prior fracture  -Prior to admission regimen    Duodenal ulcer  - Continue famotidine    Clinically Significant Risk Factors Present on Admission        # Anemia: based on hgb <11       # Obesity: Estimated body mass index is 37.4 kg/m  as calculated from the following:    Height as of this encounter: 1.499 m (4' 11\").    Weight as of this encounter: 84 kg (185 lb 3 oz).                  DVT Prophylaxis: DOAC  Code Status: No CPR- Do NOT Intubate    Disposition:   Medically Ready for Discharge: Anticipated in 2-4 Days        Interval History   Assumed care reviewed chart denies any chest pain shortness of breath lightheadedness dizziness.  Rate is now well-controlled.  More than 10 point review of system was carried out was otherwise negative.  Total time spent in direct " "patient care and coordination of care is more than 35 minutes    -Data reviewed today: I reviewed all new labs and imaging reports over the last 24 hours. I personally reviewed atrial flutter with RVR.    Physical Exam   Temp: 98  F (36.7  C) Temp src: Oral BP: 120/49 Pulse: 96 (Simultaneous filing. User may be unaware of other data.)   Resp: 18 SpO2: 99 % O2 Device: None (Room air)    Vitals:    04/17/25 2150 04/18/25 0100 04/18/25 0501   Weight: 84.9 kg (187 lb 2.7 oz) 84.7 kg (186 lb 11.7 oz) 84 kg (185 lb 3 oz)     Vital Signs with Ranges  Temp:  [98  F (36.7  C)-98.3  F (36.8  C)] 98  F (36.7  C)  Pulse:  [] 96  Resp:  [18] 18  BP: (110-143)/(49-85) 120/49  SpO2:  [93 %-100 %] 99 %  I/O last 3 completed shifts:  In: 60 [P.O.:60]  Out: 1900 [Urine:1900]      General Appearance:  Alert awake oriented x 3 no acute distress elderly lady appears much younger than stated age  Respiratory: Clear to auscultation  Cardiovascular: Irregularly irregular  GI: Soft nontender bowel sounds are present  Skin: Lower extremity skin changes consistent with chronic venous stasis  Other:  + lower extremity edema       Medications   Current Facility-Administered Medications   Medication Dose Route Frequency Provider Last Rate Last Admin     Current Facility-Administered Medications   Medication Dose Route Frequency Provider Last Rate Last Admin    apixaban ANTICOAGULANT (ELIQUIS) tablet 5 mg  5 mg Oral BID Albaro Shirley MD        famotidine (PEPCID) tablet 20 mg  20 mg Oral BID Deedee Parker MD   20 mg at 04/18/25 0819    metoprolol tartrate (LOPRESSOR) tablet 50 mg  50 mg Oral BID Albaro Shirley MD   50 mg at 04/18/25 0819    polyethylene glycol (MIRALAX) Packet 17 g  17 g Oral Daily Deedee Parker MD   17 g at 04/18/25 0819    sodium chloride (PF) 0.9% PF flush 3 mL  3 mL Intracatheter Q8H Deedee Parker MD   3 mL at 04/18/25 0819     Data     No results for input(s): \"PH\", \"PHV\", \"PO2\", \"PO2V\", \"SAT\", \"PCO2\", \"PCO2V\", " "\"HCO3\", \"HCO3V\" in the last 168 hours.  Recent Labs   Lab 04/18/25  0559 04/17/25  2204   WBC 7.1 8.4   HGB 10.6* 10.6*   HCT 31.8* 31.6*    100    336     Recent Labs   Lab 04/18/25  0559 04/17/25  2204    137   POTASSIUM 3.9 4.4   CHLORIDE 103 101   CO2 24 23   ANIONGAP 14 13   GLC 88 94   BUN 24.4* 26.1*   CR 1.09* 1.17*   GFRESTIMATED 47* 43*   TAMMY 9.5 9.7     7-Day Micro Results       No results found for the last 168 hours.          Recent Labs   Lab 04/17/25  2204   NTBNPI 7,701*     Recent Labs   Lab 04/18/25 0559 04/17/25  2204   HGB 10.6* 10.6*     Recent Labs   Lab 04/18/25  0559   AST 34   ALT 15   ALKPHOS 46   BILITOTAL 2.1*     No results for input(s): \"INR\" in the last 168 hours.  Recent Labs   Lab 04/18/25  0126   LACT 1.3     No results for input(s): \"LIPASE\" in the last 168 hours.  No results for input(s): \"TROPONIN\", \"TROPI\", \"TROPR\", \"TROPONINIS\" in the last 168 hours.    Invalid input(s): \"TROPT\", \"TROP\", \"TROPONINIES\", \"TNIH\"  No results for input(s): \"COLOR\", \"APPEARANCE\", \"URINEGLC\", \"URINEBILI\", \"URINEKETONE\", \"SG\", \"UBLD\", \"URINEPH\", \"PROTEIN\", \"UROBILINOGEN\", \"NITRITE\", \"LEUKEST\", \"RBCU\", \"WBCU\" in the last 168 hours.    Recent Results (from the past 24 hours)   XR Chest Port 1 View    Narrative    EXAM: XR CHEST PORT 1 VIEW  LOCATION: [facility]  DATE: 4/17/2025    INDICATION: shortness of breath  COMPARISON: 1/23/2024.      Impression    IMPRESSION: The heart is unchanged in size and appearance dense mitral annular calcifications are again identified. There are is mild central pulmonary venous congestion with minimal bibasilar interstitial edema. Discoid atelectasis is seen in the left   perihilar and infrahilar lung zones.          "

## 2025-04-18 NOTE — PLAN OF CARE
"Pt A&Ox4, VSS, RA, tele Aflutter CVR. Dilt gtt stopped at 0109 and PO dilt given. Getting PO eliquis and IV Lasix. +2/+3 BLE edema. Regular diet. A1 walker and gb. Home walker in room. Echo TBD. Cards to see.    0530: PT HR did go up to 100-130s. IVP dilt given.    Goal Outcome Evaluation:      Plan of Care Reviewed With: patient    Overall Patient Progress: improvingOverall Patient Progress: improving    Outcome Evaluation: Dilt gtt off at 0100. PO dilt given. HR Aflutter CVR      Problem: Adult Inpatient Plan of Care  Goal: Plan of Care Review  Description: The Plan of Care Review/Shift note should be completed every shift.  The Outcome Evaluation is a brief statement about your assessment that the patient is improving, declining, or no change.  This information will be displayed automatically on your shiftnote.  Outcome: Progressing  Flowsheets (Taken 4/18/2025 0237)  Outcome Evaluation: Dilt gtt off at 0100. PO dilt given. HR Aflutter CVR  Plan of Care Reviewed With: patient  Overall Patient Progress: improving  Goal: Patient-Specific Goal (Individualized)  Description: You can add care plan individualizations to a care plan. Examples of Individualization might be:  \"Parent requests to be called daily at 9am for status\", \"I have a hard time hearing out of my right ear\", or \"Do not touch me to wake me up as it startlesme\".  Outcome: Progressing  Goal: Absence of Hospital-Acquired Illness or Injury  Outcome: Progressing  Intervention: Identify and Manage Fall Risk  Recent Flowsheet Documentation  Taken 4/18/2025 0100 by Severson, Amy C, RN  Safety Promotion/Fall Prevention:   activity supervised   safety round/check completed  Intervention: Prevent and Manage VTE (Venous Thromboembolism) Risk  Recent Flowsheet Documentation  Taken 4/18/2025 0100 by Severson, Amy C, RN  VTE Prevention/Management: (eliquis) other (see comments)  Intervention: Prevent Infection  Recent Flowsheet Documentation  Taken 4/18/2025 0100 by " Severson, Amy C, RN  Infection Prevention:   rest/sleep promoted   single patient room provided  Goal: Optimal Comfort and Wellbeing  Outcome: Progressing  Goal: Readiness for Transition of Care  Outcome: Progressing  Intervention: Mutually Develop Transition Plan  Recent Flowsheet Documentation  Taken 4/18/2025 0115 by Severson, Amy C, RN  Equipment Currently Used at Home: walker, rolling     Problem: Delirium  Goal: Optimal Coping  Outcome: Progressing  Goal: Improved Behavioral Control  Outcome: Progressing  Intervention: Minimize Safety Risk  Recent Flowsheet Documentation  Taken 4/18/2025 0100 by Severson, Amy C, RN  Enhanced Safety Measures: room near unit station  Goal: Improved Attention and Thought Clarity  Outcome: Progressing  Goal: Improved Sleep  Outcome: Progressing     Problem: Skin Injury Risk Increased  Goal: Skin Health and Integrity  Outcome: Progressing  Intervention: Plan: Nurse Driven Intervention: Moisture Management  Recent Flowsheet Documentation  Taken 4/18/2025 0100 by Severson, Amy C, RN  Moisture Interventions:   Encourage regular toileting   Urinary collection device   Incontinence pad  Intervention: Optimize Skin Protection  Recent Flowsheet Documentation  Taken 4/18/2025 0100 by Severson, Amy C, RN  Activity Management: activity adjusted per tolerance     Problem: Heart Failure  Goal: Optimal Coping  Outcome: Progressing  Goal: Optimal Cardiac Output  Outcome: Progressing  Goal: Stable Heart Rate and Rhythm  Outcome: Progressing  Goal: Fluid and Electrolyte Balance  Outcome: Progressing  Goal: Optimal Functional Ability  Outcome: Progressing  Intervention: Optimize Functional Ability  Recent Flowsheet Documentation  Taken 4/18/2025 0100 by Severson, Amy C, RN  Activity Management: activity adjusted per tolerance  Goal: Improved Oral Intake  Outcome: Progressing  Goal: Effective Oxygenation and Ventilation  Outcome: Progressing  Intervention: Promote Airway Secretion Clearance  Recent  Flowsheet Documentation  Taken 4/18/2025 0100 by Severson, Amy C, RN  Activity Management: activity adjusted per tolerance  Goal: Effective Breathing Pattern During Sleep  Outcome: Progressing  Intervention: Monitor and Manage Obstructive Sleep Apnea  Recent Flowsheet Documentation  Taken 4/18/2025 0100 by Severson, Amy C, RN  Medication Review/Management:   medications reviewed   dosing adjusted     Problem: Dysrhythmia  Goal: Normalized Cardiac Rhythm  Outcome: Progressing  Intervention: Monitor and Manage Cardiac Rhythm Effect  Recent Flowsheet Documentation  Taken 4/18/2025 0100 by Severson, Amy C, RN  VTE Prevention/Management: (eliquis) other (see comments)

## 2025-04-18 NOTE — ED NOTES
"[facility]  ED Nurse Handoff Report    ED Chief complaint: Tachycardia  . ED Diagnosis:   Final diagnoses:   Atrial flutter with rapid ventricular response (H)   Elevated troponin   Congestive heart failure, unspecified HF chronicity, unspecified heart failure type (H)       Allergies: No Known Allergies    Code Status: DNR / DNI    Activity level - Baseline/Home:  independent.  Activity Level - Current:   assist of 1.   Lift room needed: No.   Bariatric: No   Needed: No   Isolation: No.   Infection: Not Applicable.     Respiratory status: Room air    Vital Signs (within 30 minutes):   Vitals:    04/17/25 2330 04/17/25 2333 04/17/25 2337 04/17/25 2345   BP: 136/77      Pulse: (!) 152 81 81 79   Resp:       Temp:       TempSrc:       SpO2: 97% 93% 97% 97%   Weight:       Height:           Cardiac Rhythm:  ,      Pain level:    Patient confused: No.   Patient Falls Risk: assistive device/personal items within reach.   Elimination Status:  Has yet to void to RN knowledge       Patient Report - Initial Complaint: Riddhi Aguilar is a 89 year old female with a history as noted below who presents to the ED for tachycardia. Over the last four days, the patient has been feeling a bit weak with walking. This has progressively worsened. Today, she noted shortness of breath and tachycardic when walking/getting up. She states she is worried she may fall when walking. She states she was fine when sitting. She has never had anything like this. She denies any chest or abdominal pain. No dark stools or vomiting. No new leg pain or swelling. No urinary symptoms. No fever, cough, congestion, or chills. She denies history of atrial fibrillation or flutter. .   Focused Assessment: Pt reports heart rate has been up to 156 all day today. Pt reports increased SOB when up to walk. Pt reports \"it feels like it did when I had a bleeding ulcer\".      Abnormal Results:   Labs Ordered and Resulted from Time of " ED Arrival to Time of ED Departure   BASIC METABOLIC PANEL - Abnormal       Result Value    Sodium 137      Potassium 4.4      Chloride 101      Carbon Dioxide (CO2) 23      Anion Gap 13      Urea Nitrogen 26.1 (*)     Creatinine 1.17 (*)     GFR Estimate 43 (*)     Calcium 9.7      Glucose 94     TROPONIN T, HIGH SENSITIVITY - Abnormal    Troponin T, High Sensitivity 118 (*)    NT PROBNP INPATIENT - Abnormal    N terminal Pro BNP Inpatient 7,701 (*)    CBC WITH PLATELETS AND DIFFERENTIAL - Abnormal    WBC Count 8.4      RBC Count 3.17 (*)     Hemoglobin 10.6 (*)     Hematocrit 31.6 (*)           MCH 33.4 (*)     MCHC 33.5      RDW 25.4 (*)     Platelet Count 336      % Neutrophils 68      % Lymphocytes 12      % Monocytes 17      % Eosinophils 1      % Basophils 1      % Immature Granulocytes 1      NRBCs per 100 WBC 0      Absolute Neutrophils 5.7      Absolute Lymphocytes 1.0      Absolute Monocytes 1.4 (*)     Absolute Eosinophils 0.1      Absolute Basophils 0.1      Absolute Immature Granulocytes 0.0      Absolute NRBCs 0.0     D DIMER QUANTITATIVE - Normal    D-Dimer Quantitative 0.31     TROPONIN T, HIGH SENSITIVITY        XR Chest Port 1 View   Final Result   IMPRESSION: The heart is unchanged in size and appearance dense mitral annular calcifications are again identified. There are is mild central pulmonary venous congestion with minimal bibasilar interstitial edema. Discoid atelectasis is seen in the left    perihilar and infrahilar lung zones.          Treatments provided: SEE MAR/notes  Family Comments: At bedside   OBS brochure/video discussed/provided to patient:  No  ED Medications:   Medications   diltiazem (CARDIZEM) 125 mg in dextrose 5 % 125 mL infusion (5 mg/hr Intravenous $New Bag 4/17/25 1550)   lidocaine 1 % 0.1-1 mL (has no administration in time range)   lidocaine (LMX4) cream (has no administration in time range)   sodium chloride (PF) 0.9% PF flush 3 mL (3 mLs Intracatheter $Given  4/17/25 6979)   sodium chloride (PF) 0.9% PF flush 3 mL (has no administration in time range)   acetaminophen (TYLENOL) tablet 650 mg (has no administration in time range)     Or   acetaminophen (TYLENOL) Suppository 650 mg (has no administration in time range)   polyethylene glycol (MIRALAX) Packet 17 g (has no administration in time range)   senna-docusate (SENOKOT-S/PERICOLACE) 8.6-50 MG per tablet 1 tablet (has no administration in time range)     Or   senna-docusate (SENOKOT-S/PERICOLACE) 8.6-50 MG per tablet 2 tablet (has no administration in time range)   ondansetron (ZOFRAN ODT) ODT tab 4 mg (has no administration in time range)     Or   ondansetron (ZOFRAN) injection 4 mg (has no administration in time range)   calcium carbonate (TUMS) chewable tablet 1,000 mg (has no administration in time range)   benzocaine-menthol (CHLORASEPTIC) 6-10 MG lozenge 1 lozenge (has no administration in time range)   miconazole (MICATIN) 2 % powder (has no administration in time range)   famotidine (PEPCID) tablet 20 mg (has no administration in time range)   enoxaparin ANTICOAGULANT (LOVENOX) injection 40 mg (has no administration in time range)   diltiazem (CARDIZEM) injection 21.25 mg (21.25 mg Intravenous $Given 4/17/25 2943)   furosemide (LASIX) injection 40 mg (40 mg Intravenous $Given 4/17/25 4466)       Drips infusing:  Yes  For the majority of the shift this patient was Green.   Interventions performed were N/A.    Sepsis treatment initiated: No    Cares/treatment/interventions/medications to be completed following ED care: N/A    ED Nurse Name: Nando Taveras RN  12:00 AM

## 2025-04-18 NOTE — CONSULTS
Patient has Medicare Advantage through [employer] sponsored by an employer.    Xarelto/Eliquis:  $35/mo.    Audra Paez  Pharmacy Technician/Liaison, Discharge Pharmacy   [phone] (voice or text)  [email or web address]  Pharmacy test claims are estimates and may not reflect final costs.  Suggested alternatives aim to be cost-effective and may not be therapeutically equivalent as this consult is informational and does not constitute medical advice.  Clinical decisions should be made by qualified healthcare providers.

## 2025-04-19 ENCOUNTER — APPOINTMENT (OUTPATIENT)
Dept: PHYSICAL THERAPY | Facility: CLINIC | Age: OVER 89
DRG: 291 | End: 2025-04-19
Attending: INTERNAL MEDICINE
Payer: COMMERCIAL

## 2025-04-19 ENCOUNTER — ORDERS ONLY (AUTO-RELEASED) (OUTPATIENT)
Dept: MEDSURG UNIT | Facility: CLINIC | Age: OVER 89
End: 2025-04-19
Payer: COMMERCIAL

## 2025-04-19 DIAGNOSIS — I48.92 ATRIAL FLUTTER WITH RAPID VENTRICULAR RESPONSE (H): ICD-10-CM

## 2025-04-19 LAB
ANION GAP SERPL CALCULATED.3IONS-SCNC: 14 MMOL/L (ref 7–15)
BUN SERPL-MCNC: 32.3 MG/DL (ref 8–23)
CALCIUM SERPL-MCNC: 9.4 MG/DL (ref 8.8–10.4)
CHLORIDE SERPL-SCNC: 102 MMOL/L (ref 98–107)
CREAT SERPL-MCNC: 1.15 MG/DL (ref 0.51–0.95)
EGFRCR SERPLBLD CKD-EPI 2021: 44 ML/MIN/1.73M2
ERYTHROCYTE [DISTWIDTH] IN BLOOD BY AUTOMATED COUNT: 25.9 % (ref 10–15)
GLUCOSE SERPL-MCNC: 97 MG/DL (ref 70–99)
HCO3 SERPL-SCNC: 23 MMOL/L (ref 22–29)
HCT VFR BLD AUTO: 36.3 % (ref 35–47)
HGB BLD-MCNC: 11.9 G/DL (ref 11.7–15.7)
MCH RBC QN AUTO: 33.4 PG (ref 26.5–33)
MCHC RBC AUTO-ENTMCNC: 32.8 G/DL (ref 31.5–36.5)
MCV RBC AUTO: 102 FL (ref 78–100)
PLATELET # BLD AUTO: 329 10E3/UL (ref 150–450)
POTASSIUM SERPL-SCNC: 4.2 MMOL/L (ref 3.4–5.3)
RBC # BLD AUTO: 3.56 10E6/UL (ref 3.8–5.2)
SODIUM SERPL-SCNC: 139 MMOL/L (ref 135–145)
WBC # BLD AUTO: 11 10E3/UL (ref 4–11)

## 2025-04-19 PROCEDURE — 97161 PT EVAL LOW COMPLEX 20 MIN: CPT | Mod: GP | Performed by: PHYSICAL THERAPIST

## 2025-04-19 PROCEDURE — 99232 SBSQ HOSP IP/OBS MODERATE 35: CPT | Performed by: INTERNAL MEDICINE

## 2025-04-19 PROCEDURE — 250N000013 HC RX MED GY IP 250 OP 250 PS 637: Performed by: INTERNAL MEDICINE

## 2025-04-19 PROCEDURE — 80048 BASIC METABOLIC PNL TOTAL CA: CPT | Performed by: INTERNAL MEDICINE

## 2025-04-19 PROCEDURE — 97530 THERAPEUTIC ACTIVITIES: CPT | Mod: GP | Performed by: PHYSICAL THERAPIST

## 2025-04-19 PROCEDURE — 85018 HEMOGLOBIN: CPT | Performed by: INTERNAL MEDICINE

## 2025-04-19 PROCEDURE — 36415 COLL VENOUS BLD VENIPUNCTURE: CPT | Performed by: INTERNAL MEDICINE

## 2025-04-19 PROCEDURE — 97116 GAIT TRAINING THERAPY: CPT | Mod: GP | Performed by: PHYSICAL THERAPIST

## 2025-04-19 PROCEDURE — 120N000001 HC R&B MED SURG/OB

## 2025-04-19 RX ORDER — FAMOTIDINE 20 MG/1
20 TABLET, FILM COATED ORAL DAILY
Status: DISCONTINUED | OUTPATIENT
Start: 2025-04-20 | End: 2025-04-21 | Stop reason: HOSPADM

## 2025-04-19 RX ADMIN — METOPROLOL TARTRATE 50 MG: 50 TABLET, FILM COATED ORAL at 20:23

## 2025-04-19 RX ADMIN — BUPROPION HYDROCHLORIDE 450 MG: 150 TABLET, EXTENDED RELEASE ORAL at 08:04

## 2025-04-19 RX ADMIN — FAMOTIDINE 20 MG: 20 TABLET, FILM COATED ORAL at 08:03

## 2025-04-19 RX ADMIN — Medication 50 MCG: at 08:04

## 2025-04-19 RX ADMIN — ATORVASTATIN CALCIUM 10 MG: 10 TABLET, FILM COATED ORAL at 08:03

## 2025-04-19 RX ADMIN — DORZOLAMIDE HYDROCHLORIDE AND TIMOLOL MALEATE 1 DROP: 20; 5 SOLUTION/ DROPS OPHTHALMIC at 08:06

## 2025-04-19 RX ADMIN — SPIRONOLACTONE 12.5 MG: 25 TABLET, FILM COATED ORAL at 08:03

## 2025-04-19 RX ADMIN — DORZOLAMIDE HYDROCHLORIDE AND TIMOLOL MALEATE 1 DROP: 20; 5 SOLUTION/ DROPS OPHTHALMIC at 20:23

## 2025-04-19 RX ADMIN — APIXABAN 5 MG: 5 TABLET, FILM COATED ORAL at 20:23

## 2025-04-19 RX ADMIN — METOPROLOL TARTRATE 50 MG: 50 TABLET, FILM COATED ORAL at 08:03

## 2025-04-19 RX ADMIN — APIXABAN 5 MG: 5 TABLET, FILM COATED ORAL at 08:03

## 2025-04-19 ASSESSMENT — ACTIVITIES OF DAILY LIVING (ADL)
ADLS_ACUITY_SCORE: 60
ADLS_ACUITY_SCORE: 57
ADLS_ACUITY_SCORE: 53
ADLS_ACUITY_SCORE: 59
ADLS_ACUITY_SCORE: 53
ADLS_ACUITY_SCORE: 53

## 2025-04-19 NOTE — PLAN OF CARE
"End of shift note: Aox4, reports numbness and tingling in R hand (baseline). Pt up 1 assist GB and walker. Purewick in place per pt request. Family at bedside during shift. Plan to discharge to TCU per pt request.    Goal Outcome Evaluation:      Plan of Care Reviewed With: patient    Overall Patient Progress: improvingOverall Patient Progress: improving    Outcome Evaluation: Denies pain. VSS, afebrile, sating well on RA. Tele A-fib CVR.      Problem: Adult Inpatient Plan of Care  Goal: Plan of Care Review  Description: The Plan of Care Review/Shift note should be completed every shift.  The Outcome Evaluation is a brief statement about your assessment that the patient is improving, declining, or no change.  This information will be displayed automatically on your shiftnote.  Outcome: Progressing  Flowsheets (Taken 4/19/2025 1842)  Outcome Evaluation: Denies pain. VSS, afebrile, sating well on RA. Tele A-fib CVR.  Plan of Care Reviewed With: patient  Overall Patient Progress: improving  Goal: Patient-Specific Goal (Individualized)  Description: You can add care plan individualizations to a care plan. Examples of Individualization might be:  \"Parent requests to be called daily at 9am for status\", \"I have a hard time hearing out of my right ear\", or \"Do not touch me to wake me up as it startlesme\".  Outcome: Progressing  Goal: Absence of Hospital-Acquired Illness or Injury  Outcome: Progressing  Intervention: Identify and Manage Fall Risk  Recent Flowsheet Documentation  Taken 4/19/2025 1827 by Floresita Strange, RN  Safety Promotion/Fall Prevention: safety round/check completed  Taken 4/19/2025 1809 by Floresita Strange, RN  Safety Promotion/Fall Prevention: safety round/check completed  Taken 4/19/2025 1730 by Floresita Strange, RN  Safety Promotion/Fall Prevention: safety round/check completed  Taken 4/19/2025 1622 by Floresita Strange, RN  Safety Promotion/Fall Prevention: safety round/check completed  Taken " 4/19/2025 1516 by Floresita Strange RN  Safety Promotion/Fall Prevention: safety round/check completed  Taken 4/19/2025 1413 by Floresita Strange RN  Safety Promotion/Fall Prevention: safety round/check completed  Taken 4/19/2025 1314 by Floresita Strange RN  Safety Promotion/Fall Prevention: safety round/check completed  Taken 4/19/2025 1230 by Floresita Strange RN  Safety Promotion/Fall Prevention: safety round/check completed  Taken 4/19/2025 1124 by Floresita Strange RN  Safety Promotion/Fall Prevention: safety round/check completed  Taken 4/19/2025 1008 by Floresita Strange RN  Safety Promotion/Fall Prevention: safety round/check completed  Taken 4/19/2025 0900 by Floresita Strange RN  Safety Promotion/Fall Prevention: safety round/check completed  Taken 4/19/2025 0724 by Floresita Strange RN  Safety Promotion/Fall Prevention: safety round/check completed  Intervention: Prevent and Manage VTE (Venous Thromboembolism) Risk  Recent Flowsheet Documentation  Taken 4/19/2025 1008 by Floresita Strange RN  VTE Prevention/Management: (PO Eliquis)   SCDs off (sequential compression devices)   other (see comments)  Intervention: Prevent Infection  Recent Flowsheet Documentation  Taken 4/19/2025 1008 by Floresita Strange RN  Infection Prevention:   hand hygiene promoted   rest/sleep promoted   single patient room provided  Goal: Optimal Comfort and Wellbeing  Outcome: Progressing  Intervention: Provide Person-Centered Care  Recent Flowsheet Documentation  Taken 4/19/2025 1008 by Floresita Strange RN  Trust Relationship/Rapport:   care explained   choices provided   questions answered   questions encouraged  Goal: Readiness for Transition of Care  Outcome: Progressing     Problem: Delirium  Goal: Optimal Coping  Outcome: Progressing  Goal: Improved Behavioral Control  Outcome: Progressing  Intervention: Minimize Safety Risk  Recent Flowsheet Documentation  Taken 4/19/2025 1008 by Floresita Strange RN  Enhanced Safety  Measures: pain management  Trust Relationship/Rapport:   care explained   choices provided   questions answered   questions encouraged  Goal: Improved Attention and Thought Clarity  Outcome: Progressing  Goal: Improved Sleep  Outcome: Progressing     Problem: Skin Injury Risk Increased  Goal: Skin Health and Integrity  Outcome: Progressing  Intervention: Plan: Nurse Driven Intervention: Moisture Management  Recent Flowsheet Documentation  Taken 4/19/2025 1008 by Floresita Strange RN  Moisture Interventions:   Encourage regular toileting   Incontinence pad   Urinary collection device  Intervention: Optimize Skin Protection  Recent Flowsheet Documentation  Taken 4/19/2025 1413 by Floresita Strange RN  Activity Management: (With PT)   activity adjusted per tolerance   ambulated in room   ambulated outside room     Problem: Heart Failure  Goal: Optimal Coping  Outcome: Progressing  Goal: Optimal Cardiac Output  Outcome: Progressing  Goal: Stable Heart Rate and Rhythm  Outcome: Progressing  Goal: Fluid and Electrolyte Balance  Outcome: Progressing  Goal: Optimal Functional Ability  Outcome: Progressing  Intervention: Optimize Functional Ability  Recent Flowsheet Documentation  Taken 4/19/2025 1413 by Floresita Strange RN  Activity Management: (With PT)   activity adjusted per tolerance   ambulated in room   ambulated outside room  Goal: Improved Oral Intake  Outcome: Progressing  Goal: Effective Oxygenation and Ventilation  Outcome: Progressing  Intervention: Promote Airway Secretion Clearance  Recent Flowsheet Documentation  Taken 4/19/2025 1413 by Floresita Strange RN  Activity Management: (With PT)   activity adjusted per tolerance   ambulated in room   ambulated outside room  Taken 4/19/2025 1008 by Floresita Strange RN  Cough And Deep Breathing: done independently per patient  Goal: Effective Breathing Pattern During Sleep  Outcome: Progressing  Intervention: Monitor and Manage Obstructive Sleep Apnea  Recent  Flowsheet Documentation  Taken 4/19/2025 1008 by Floresita Strange, RN  Medication Review/Management: medications reviewed     Problem: Dysrhythmia  Goal: Normalized Cardiac Rhythm  Outcome: Progressing  Intervention: Monitor and Manage Cardiac Rhythm Effect  Recent Flowsheet Documentation  Taken 4/19/2025 1008 by Floresita Strange, RN  VTE Prevention/Management: (PO Eliquis)   SCDs off (sequential compression devices)   other (see comments)

## 2025-04-19 NOTE — PROGRESS NOTES
"[facility]    Hospitalist Progress Note  Name: Riddhi Aguilar    MRN: [id number]  Provider: Deidre Pickard MD  Date of Service: 04/19/2025    Assessment & Plan   Summary of Stay: Riddhi Aguilar is a 89 year old female who was admitted on 4/17/2025 with New onset A-fib with RVR. Her past medical history of osteoporosis, osteoarthritis, kidney stones, dyslipidemia, glaucoma, spinal stenosis, ureteral stones, recent history of femoral neck fracture mechanical fall related, past history of duodenal ulcer in 2006, DNR/DNI status.     New onset A-fib with RVR  New diagnosis of heart failure  Elevated troponin: Demand ischemia versus ACS in setting of new onset A-fib  - Patient received diltiazem bolus and was switched to diltiazem drip for rate control  -Telemetry  - Echo  \"   1. The left ventricle is normal in size. Moderately increased left ventricular  wall thickness is noted. Left ventricular systolic function is normal. The  visual ejection fraction is 60-65%. Diastolic function not assessed due to  atrial fibrillation. No regional wall motion abnormalities noted.  2. The right ventricle is normal size. The right ventricular systolic function  is normal.  3. There is severe biatrial enlargement. There is no color Doppler evidence of  an atrial shunt.  4. Mild mitral stenosis.  5. Mild to moderate valvular aortic stenosis. The mean AoV pressure gradient  is 15.7 mmHg. The peak AoV pressure gradient is 24.0 mmHg. The calculated  aortic valve are is 1.3 cm^2.  6. No pericardial effusion.  7. In comparison to the previous report dated 01/25/2024, there has been a  mild interval increase in transaortic gradients.\"    - Cardiology consult appreciated  - Started on Eliquis  - Metoprolol started for rate control and possible underlying ACS  - ?Aspirin (? H/o duodenal ulcer) continue beta-blocker, prior to admission Lipitor    H/o HTN  - Prior to admission on Aldactone  - Started on Aldactone at a lower " "dose    Hyperlipidemia  -Continue statins    Osteoarthritis  Osteoporosis  Prior fracture  -Prior to admission regimen    Duodenal ulcer  - Continue famotidine    Clinically Significant Risk Factors             # Obesity: Estimated body mass index is 36.82 kg/m  as calculated from the following:    Height as of this encounter: 1.499 m (4' 11\").    Weight as of this encounter: 82.7 kg (182 lb 5.1 oz)., PRESENT ON ADMISSION     # Financial/Environmental Concerns: none             DVT Prophylaxis: DOAC  Code Status: No CPR- Do NOT Intubate    Disposition:   Medically Ready for Discharge: Medically ready for discharge in 1 day.  Patient lives alone and was feeling a bit unsure with her transfers.  Family is concerned about her safety PT OT and social work consulted for safe disposition.  Patient to look into options for transitional care unit if possible.        Interval History   Reviewed chart denies any chest pain shortness of breath lightheadedness dizziness.  Rate is now well-controlled.  More than 10 point review of system was carried out was otherwise negative.  Discussed with patient and son at bedside about safety at home.  Family feels patient is quite weak with her transfers and ADLs.  PT OT evaluation recommended.  More than 10 point review of system was carried out was otherwise negative.  Total time spent in direct patient care and coordination of care is more than 35 minutes    -Data reviewed today: I reviewed all new labs and imaging reports over the last 24 hours. I personally reviewed atrial flutter with RVR.    Physical Exam   Temp: 97.9  F (36.6  C) Temp src: Oral BP: 113/70 Pulse: 63   Resp: 20 SpO2: 100 % O2 Device: None (Room air)    Vitals:    04/18/25 0100 04/18/25 0501 04/19/25 0539   Weight: 84.7 kg (186 lb 11.7 oz) 84 kg (185 lb 3 oz) 82.7 kg (182 lb 5.1 oz)     Vital Signs with Ranges  Temp:  [97.9  F (36.6  C)] 97.9  F (36.6  C)  Pulse:  [] 63  Resp:  [17-20] 20  BP: (113-141)/(50-70) " "113/70  SpO2:  [96 %-100 %] 100 %  I/O last 3 completed shifts:  In: 820 [P.O.:820]  Out: 2200 [Urine:2200]      General Appearance:  Alert awake oriented x 3 no acute distress elderly lady appears much younger than stated age  Respiratory: Clear to auscultation  Cardiovascular: Irregularly irregular  GI: Soft nontender bowel sounds are present  Skin: Lower extremity skin changes consistent with chronic venous stasis  Other:  + lower extremity edema       Medications   Current Facility-Administered Medications   Medication Dose Route Frequency Provider Last Rate Last Admin     Current Facility-Administered Medications   Medication Dose Route Frequency Provider Last Rate Last Admin    apixaban ANTICOAGULANT (ELIQUIS) tablet 5 mg  5 mg Oral BID Albaro Shirley MD   5 mg at 04/19/25 0803    atorvastatin (LIPITOR) tablet 10 mg  10 mg Oral Daily Deidre Pickard MD   10 mg at 04/19/25 0803    buPROPion (WELLBUTRIN XL) 24 hr tablet 450 mg  450 mg Oral QAM Deidre Pickard MD   450 mg at 04/19/25 0804    dorzolamide-timolol (COSOPT) ophthalmic solution 1 drop  1 drop Both Eyes BID Deidre Pickard MD   1 drop at 04/19/25 0806    famotidine (PEPCID) tablet 20 mg  20 mg Oral BID Deedee Parker MD   20 mg at 04/19/25 0803    metoprolol tartrate (LOPRESSOR) tablet 50 mg  50 mg Oral BID Albaro Shirley MD   50 mg at 04/19/25 0803    polyethylene glycol (MIRALAX) Packet 17 g  17 g Oral Daily Deedee Parker MD   17 g at 04/18/25 0819    sodium chloride (PF) 0.9% PF flush 3 mL  3 mL Intracatheter Q8H Deedee Parker MD   3 mL at 04/19/25 1007    spironolactone (ALDACTONE) half-tab 12.5 mg  12.5 mg Oral Daily Albaro Shirley MD   12.5 mg at 04/19/25 0803    Vitamin D3 (CHOLECALCIFEROL) tablet 50 mcg  50 mcg Oral Daily Deidre Pickard MD   50 mcg at 04/19/25 0804     Data     No results for input(s): \"PH\", \"PHV\", \"PO2\", \"PO2V\", \"SAT\", \"PCO2\", \"PCO2V\", \"HCO3\", \"HCO3V\" in the last 168 hours.  Recent Labs   Lab " "04/19/25  0745 04/18/25  0559 04/17/25  2204   WBC 11.0 7.1 8.4   HGB 11.9 10.6* 10.6*   HCT 36.3 31.8* 31.6*   * 100 100    308 336     Recent Labs   Lab 04/19/25  0745 04/18/25  0559 04/17/25  2204    141 137   POTASSIUM 4.2 3.9 4.4   CHLORIDE 102 103 101   CO2 23 24 23   ANIONGAP 14 14 13   GLC 97 88 94   BUN 32.3* 24.4* 26.1*   CR 1.15* 1.09* 1.17*   GFRESTIMATED 44* 47* 43*   TAMMY 9.4 9.5 9.7     7-Day Micro Results       No results found for the last 168 hours.          Recent Labs   Lab 04/17/25 2204   NTBNPI 7,701*     Recent Labs   Lab 04/19/25  0745 04/18/25  0559 04/17/25 2204   HGB 11.9 10.6* 10.6*     Recent Labs   Lab 04/18/25  0559   AST 34   ALT 15   ALKPHOS 46   BILITOTAL 2.1*     No results for input(s): \"INR\" in the last 168 hours.  Recent Labs   Lab 04/18/25  0126   LACT 1.3     No results for input(s): \"LIPASE\" in the last 168 hours.  No results for input(s): \"TROPONIN\", \"TROPI\", \"TROPR\", \"TROPONINIS\" in the last 168 hours.    Invalid input(s): \"TROPT\", \"TROP\", \"TROPONINIES\", \"TNIH\"  No results for input(s): \"COLOR\", \"APPEARANCE\", \"URINEGLC\", \"URINEBILI\", \"URINEKETONE\", \"SG\", \"UBLD\", \"URINEPH\", \"PROTEIN\", \"UROBILINOGEN\", \"NITRITE\", \"LEUKEST\", \"RBCU\", \"WBCU\" in the last 168 hours.    No results found for this or any previous visit (from the past 24 hours).         "

## 2025-04-19 NOTE — PROGRESS NOTES
"   04/19/25 1324   Appointment Info   Signing Clinician's Name / Credentials (PT) Melissa Botello, DARCIE   Living Environment   People in Home alone;facility resident   Current Living Arrangements independent living facility   Home Accessibility no concerns   Transportation Anticipated family or friend will provide   Self-Care   Usual Activity Tolerance moderate   Current Activity Tolerance poor   Equipment Currently Used at Home walker, rolling;other (see comments)  (4WW; has a hospital type bed)   Fall history within last six months no  (per patient report)   Activity/Exercise/Self-Care Comment reports independence with mobility and cares at baseline   General Information   Onset of Illness/Injury or Date of Surgery 04/17/25   Referring Physician Deidre Pickard MD   Patient/Family Therapy Goals Statement (PT) go to TCU   Pertinent History of Current Problem (include personal factors and/or comorbidities that impact the POC) per chart: \"Riddhi Aguilar is a 89 year old female who was admitted on 4/17/2025 with New onset A-fib with RVR. Her past medical history of osteoporosis, osteoarthritis, kidney stones, dyslipidemia, glaucoma, spinal stenosis, ureteral stones, recent history of femoral neck fracture mechanical fall related, past history of duodenal ulcer in 2006, DNR/DNI status.'; found to have Atrial flutter with RVR,Elevated troponin, flat trajectory, no angina, normal LV function and wall motion, most consistent with demand ischemia, Mild mitral stenosis, mild-mod aortic stenosis; see medical record for further information   Existing Precautions/Restrictions fall   General Observations patient up in recliner chair upon therapist arrival   Cognition   Cognitive Status Comments appears intact during session   Pain Assessment   Patient Currently in Pain No   Integumentary/Edema   Integumentary/Edema Comments see nursing notes for further information   Posture    Posture Comments forward flexed at head and trunk "   Range of Motion (ROM)   ROM Comment UE's and LE's appear WFL; reports some stiffness in shoulders   Strength (Manual Muscle Testing)   Strength Comments functional weakness noted with mobility; decreased activity tolerance; below recent baseline   Bed Mobility   Comment, (Bed Mobility) NT; patient up in recliner chair and wanting to remain there   Transfers   Comment, (Transfers) sit>stand with min/CGA; use of walker for support   Gait/Stairs (Locomotion)   Comment, (Gait/Stairs) min/CGA and use of 4WW; mild unsteadiness   Balance   Balance Comments needs UE support of walker; no gross LOB   Sensory Examination   Sensory Perception Comments denies numbness or tingling   Clinical Impression   Criteria for Skilled Therapeutic Intervention Yes, treatment indicated   PT Diagnosis (PT) impaired functional mobility   Influenced by the following impairments weakness; decreased activity tolerance,; inpaired balance   Functional limitations due to impairments impaired independence with mobility and cares secondary to above deficits; elevated HR   Clinical Presentation (PT Evaluation Complexity) evolving   Clinical Presentation Rationale clinical judgement/level of assist   Clinical Decision Making (Complexity) low complexity   Planned Therapy Interventions (PT) balance training;bed mobility training;gait training;patient/family education;strengthening;transfer training;progressive activity/exercise   Risk & Benefits of therapy have been explained evaluation/treatment results reviewed;care plan/treatment goals reviewed;risks/benefits reviewed;current/potential barriers reviewed;participants voiced agreement with care plan;participants included;patient   Clinical Impression Comments below reported baseline level of function   PT Total Evaluation Time   PT Eval, Low Complexity Minutes (32395) 10   Physical Therapy Goals   PT Frequency 5x/week   PT Predicted Duration/Target Date for Goal Attainment 04/22/25   PT Goals Bed  Mobility;Transfers;Gait   PT: Bed Mobility Supine to/from sit;Rolling;Modified independent  (has bed rails)   PT: Transfers Sit to/from stand;Bed to/from chair;Assistive device;Modified independent   PT: Gait Rolling walker;150 feet;Modified independent   PT Discharge Planning   PT Plan progress independence , tx, gait; assess bed mobility   PT Discharge Recommendation (DC Rec) Transitional Care Facility   PT Rationale for DC Rec Patient below reported baseline level of function with weakness, decreased activity tolerance, and decreased ability to care for self in current condition; patient would benefit from ongoing PT in hospital and at TCU to maximize return to PLOF; may need to consider transition from ILF to [facility] for more support   PT Brief overview of current status A x 1 with FWW   PT Total Distance Amb During Session (feet) 60   PT Equipment Needed at Discharge walker, rolling   Physical Therapy Time and Intention   Total Session Time (sum of timed and untimed services) 10

## 2025-04-19 NOTE — PLAN OF CARE
"A&O x4. On RA. Up with A1 Gb/W. PIV saline locked. Tele A.flutter. Denies pain, SOB,N/V. Purwick in place. Possible discharge today.       Goal Outcome Evaluation:      Plan of Care Reviewed With: patient    Overall Patient Progress: improvingOverall Patient Progress: improving    Outcome Evaluation: A&O x4.On RA. Tele. PIV saline locked.    Problem: Adult Inpatient Plan of Care  Goal: Plan of Care Review  Description: The Plan of Care Review/Shift note should be completed every shift.  The Outcome Evaluation is a brief statement about your assessment that the patient is improving, declining, or no change.  This information will be displayed automatically on your shiftnote.  4/19/2025 0532 by Erin White RN  Outcome: Progressing  Flowsheets (Taken 4/19/2025 0532)  Plan of Care Reviewed With: patient  Overall Patient Progress: improving  4/19/2025 0141 by Erin White RN  Outcome: Progressing  Flowsheets (Taken 4/19/2025 0131)  Outcome Evaluation: A&O x4.On RA. Tele. PIV saline locked.  Plan of Care Reviewed With: patient  Overall Patient Progress: improving  Goal: Patient-Specific Goal (Individualized)  Description: You can add care plan individualizations to a care plan. Examples of Individualization might be:  \"Parent requests to be called daily at 9am for status\", \"I have a hard time hearing out of my right ear\", or \"Do not touch me to wake me up as it startlesme\".  4/19/2025 0532 by Erin White RN  Outcome: Progressing  4/19/2025 0141 by Erin White RN  Outcome: Progressing  Goal: Absence of Hospital-Acquired Illness or Injury  4/19/2025 0532 by Erin White RN  Outcome: Progressing  4/19/2025 0141 by Erin White RN  Outcome: Progressing  Intervention: Identify and Manage Fall Risk  Recent Flowsheet Documentation  Taken 4/18/2025 2029 by Erin White RN  Safety Promotion/Fall Prevention:   safety round/check completed   supervised activity   nonskid shoes/slippers " when out of bed   mobility aid in reach   activity supervised  Intervention: Prevent Skin Injury  Recent Flowsheet Documentation  Taken 4/18/2025 2029 by Erin White RN  Body Position:   legs elevated   weight shifting  Intervention: Prevent and Manage VTE (Venous Thromboembolism) Risk  Recent Flowsheet Documentation  Taken 4/18/2025 2029 by Erin White RN  VTE Prevention/Management: SCDs off (sequential compression devices)  Intervention: Prevent Infection  Recent Flowsheet Documentation  Taken 4/18/2025 2029 by Erin White RN  Infection Prevention:   hand hygiene promoted   rest/sleep promoted  Goal: Optimal Comfort and Wellbeing  4/19/2025 0532 by Erin White RN  Outcome: Progressing  4/19/2025 0141 by Erin White RN  Outcome: Progressing  Intervention: Provide Person-Centered Care  Recent Flowsheet Documentation  Taken 4/18/2025 2029 by Erin White RN  Trust Relationship/Rapport:   care explained   choices provided   questions answered   questions encouraged  Goal: Readiness for Transition of Care  4/19/2025 0532 by Erin White RN  Outcome: Progressing  4/19/2025 0141 by Erin White RN  Outcome: Progressing     Problem: Delirium  Goal: Optimal Coping  4/19/2025 0532 by Erin White RN  Outcome: Progressing  4/19/2025 0141 by Erin White RN  Outcome: Progressing  Goal: Improved Behavioral Control  4/19/2025 0532 by Erin White RN  Outcome: Progressing  4/19/2025 0141 by Erin White RN  Outcome: Progressing  Intervention: Minimize Safety Risk  Recent Flowsheet Documentation  Taken 4/18/2025 2029 by Erin White RN  Enhanced Safety Measures: pain management  Trust Relationship/Rapport:   care explained   choices provided   questions answered   questions encouraged  Goal: Improved Attention and Thought Clarity  4/19/2025 0532 by Erin White RN  Outcome: Progressing  4/19/2025 0141 by Erin White RN  Outcome:  Progressing  Goal: Improved Sleep  4/19/2025 0532 by Erin White RN  Outcome: Progressing  4/19/2025 0141 by Erin White RN  Outcome: Progressing     Problem: Skin Injury Risk Increased  Goal: Skin Health and Integrity  4/19/2025 0532 by Erin White RN  Outcome: Progressing  4/19/2025 0141 by Erin White RN  Outcome: Progressing  Intervention: Plan: Nurse Driven Intervention: Moisture Management  Recent Flowsheet Documentation  Taken 4/18/2025 2029 by Erin White RN  Moisture Interventions:   Encourage regular toileting   No brief in bed  Taken 4/18/2025 2000 by Erin White RN  Moisture Interventions:   Encourage regular toileting   No brief in bed   Incontinence pad  Bathing/Skin Care: incontinence care  Intervention: Optimize Skin Protection  Recent Flowsheet Documentation  Taken 4/18/2025 2029 by Erin White RN  Head of Bed (HOB) Positioning: HOB at 15 degrees     Problem: Heart Failure  Goal: Optimal Coping  4/19/2025 0532 by Erin White RN  Outcome: Progressing  4/19/2025 0141 by Erin White RN  Outcome: Progressing  Goal: Optimal Cardiac Output  4/19/2025 0532 by Erin White RN  Outcome: Progressing  4/19/2025 0141 by Erin White RN  Outcome: Progressing  Goal: Stable Heart Rate and Rhythm  4/19/2025 0532 by Erin White RN  Outcome: Progressing  4/19/2025 0141 by Erin White RN  Outcome: Progressing  Goal: Fluid and Electrolyte Balance  4/19/2025 0532 by Erin White RN  Outcome: Progressing  4/19/2025 0141 by Erin White RN  Outcome: Progressing  Goal: Optimal Functional Ability  4/19/2025 0532 by Erin White RN  Outcome: Progressing  4/19/2025 0141 by Erin White RN  Outcome: Progressing  Goal: Improved Oral Intake  4/19/2025 0532 by Erin White RN  Outcome: Progressing  4/19/2025 0141 by Boutazer, Hidayet, RN  Outcome: Progressing  Goal: Effective Oxygenation and  Ventilation  4/19/2025 0532 by Erin White RN  Outcome: Progressing  4/19/2025 0141 by Erin White RN  Outcome: Progressing  Intervention: Promote Airway Secretion Clearance  Recent Flowsheet Documentation  Taken 4/18/2025 2029 by Erin White RN  Cough And Deep Breathing: done independently per patient  Intervention: Optimize Oxygenation and Ventilation  Recent Flowsheet Documentation  Taken 4/18/2025 2029 by Erin White RN  Head of Bed (HOB) Positioning: HOB at 15 degrees  Goal: Effective Breathing Pattern During Sleep  4/19/2025 0532 by Erin White RN  Outcome: Progressing  4/19/2025 0141 by Erin White RN  Outcome: Progressing  Intervention: Monitor and Manage Obstructive Sleep Apnea  Recent Flowsheet Documentation  Taken 4/18/2025 2029 by Erin White RN  Medication Review/Management: medications reviewed     Problem: Dysrhythmia  Goal: Normalized Cardiac Rhythm  4/19/2025 0532 by Erin White RN  Outcome: Progressing  4/19/2025 0141 by Erin White RN  Outcome: Progressing  Intervention: Monitor and Manage Cardiac Rhythm Effect  Recent Flowsheet Documentation  Taken 4/18/2025 2029 by Erin White RN  VTE Prevention/Management: SCDs off (sequential compression devices)

## 2025-04-19 NOTE — PROGRESS NOTES
Care Management Follow Up    Length of Stay (days): 2    Expected Discharge Date: 04/20/2025     Concerns to be Addressed: discharge planning     Patient plan of care discussed at interdisciplinary rounds: Yes    Anticipated Discharge Disposition: Transitional Care    Patient/family educated on Medicare website which has current facility and service quality ratings: yes  Education Provided on the Discharge Plan: Yes  Patient/Family in Agreement with the Plan: yes    Referrals Placed by CM/SW: Post Acute Facilities  Private pay costs discussed: private room/amenity fees    Discussed  Partnership in Safe Discharge Planning  document with patient/family: No     Handoff Completed: Yes, [facility] PCP: Internal handoff referral completed    Additional Information:  Therapy is recommending TCU and pt is in agreement and would like a referral sent to [facility] in a private room, referral sent.  Pt states family will be able to provide transportation at discharge.    Next Steps: Follow up on TCU referral.    Lauren Auguste RN   Inpatient Care Coordination  [facility]   Phone: [phone]

## 2025-04-20 ENCOUNTER — APPOINTMENT (OUTPATIENT)
Dept: OCCUPATIONAL THERAPY | Facility: CLINIC | Age: OVER 89
DRG: 291 | End: 2025-04-20
Attending: INTERNAL MEDICINE
Payer: COMMERCIAL

## 2025-04-20 LAB — PLATELET # BLD AUTO: 314 10E3/UL (ref 150–450)

## 2025-04-20 PROCEDURE — 97165 OT EVAL LOW COMPLEX 30 MIN: CPT | Mod: GO | Performed by: OCCUPATIONAL THERAPIST

## 2025-04-20 PROCEDURE — 99232 SBSQ HOSP IP/OBS MODERATE 35: CPT | Performed by: INTERNAL MEDICINE

## 2025-04-20 PROCEDURE — 36415 COLL VENOUS BLD VENIPUNCTURE: CPT | Performed by: INTERNAL MEDICINE

## 2025-04-20 PROCEDURE — 85049 AUTOMATED PLATELET COUNT: CPT | Performed by: INTERNAL MEDICINE

## 2025-04-20 PROCEDURE — 250N000013 HC RX MED GY IP 250 OP 250 PS 637: Performed by: INTERNAL MEDICINE

## 2025-04-20 PROCEDURE — 250N000013 HC RX MED GY IP 250 OP 250 PS 637: Performed by: STUDENT IN AN ORGANIZED HEALTH CARE EDUCATION/TRAINING PROGRAM

## 2025-04-20 PROCEDURE — 120N000001 HC R&B MED SURG/OB

## 2025-04-20 RX ORDER — LIDOCAINE 4 G/G
1 PATCH TOPICAL
Status: DISCONTINUED | OUTPATIENT
Start: 2025-04-20 | End: 2025-04-21 | Stop reason: HOSPADM

## 2025-04-20 RX ORDER — LIDOCAINE 4 G/G
1 PATCH TOPICAL
Status: DISCONTINUED | OUTPATIENT
Start: 2025-04-20 | End: 2025-04-20

## 2025-04-20 RX ORDER — METHOCARBAMOL 500 MG/1
500 TABLET, FILM COATED ORAL ONCE
Status: COMPLETED | OUTPATIENT
Start: 2025-04-20 | End: 2025-04-20

## 2025-04-20 RX ADMIN — DORZOLAMIDE HYDROCHLORIDE AND TIMOLOL MALEATE 1 DROP: 20; 5 SOLUTION/ DROPS OPHTHALMIC at 20:48

## 2025-04-20 RX ADMIN — DORZOLAMIDE HYDROCHLORIDE AND TIMOLOL MALEATE 1 DROP: 20; 5 SOLUTION/ DROPS OPHTHALMIC at 09:07

## 2025-04-20 RX ADMIN — METOPROLOL TARTRATE 50 MG: 50 TABLET, FILM COATED ORAL at 20:46

## 2025-04-20 RX ADMIN — ACETAMINOPHEN 650 MG: 325 TABLET, FILM COATED ORAL at 04:00

## 2025-04-20 RX ADMIN — Medication 50 MCG: at 08:55

## 2025-04-20 RX ADMIN — ATORVASTATIN CALCIUM 10 MG: 10 TABLET, FILM COATED ORAL at 08:54

## 2025-04-20 RX ADMIN — METOPROLOL TARTRATE 50 MG: 50 TABLET, FILM COATED ORAL at 09:14

## 2025-04-20 RX ADMIN — BUPROPION HYDROCHLORIDE 450 MG: 150 TABLET, EXTENDED RELEASE ORAL at 08:54

## 2025-04-20 RX ADMIN — LIDOCAINE 1 PATCH: 4 PATCH TOPICAL at 06:07

## 2025-04-20 RX ADMIN — APIXABAN 5 MG: 5 TABLET, FILM COATED ORAL at 20:44

## 2025-04-20 RX ADMIN — APIXABAN 5 MG: 5 TABLET, FILM COATED ORAL at 08:53

## 2025-04-20 RX ADMIN — METHOCARBAMOL 500 MG: 500 TABLET ORAL at 06:01

## 2025-04-20 RX ADMIN — SPIRONOLACTONE 12.5 MG: 25 TABLET, FILM COATED ORAL at 09:14

## 2025-04-20 RX ADMIN — FAMOTIDINE 20 MG: 20 TABLET, FILM COATED ORAL at 08:54

## 2025-04-20 RX ADMIN — POLYETHYLENE GLYCOL 3350 17 G: 17 POWDER, FOR SOLUTION ORAL at 08:53

## 2025-04-20 ASSESSMENT — ACTIVITIES OF DAILY LIVING (ADL)
ADLS_ACUITY_SCORE: 59
ADLS_ACUITY_SCORE: 58
ADLS_ACUITY_SCORE: 58
ADLS_ACUITY_SCORE: 59
ADLS_ACUITY_SCORE: 58
ADLS_ACUITY_SCORE: 59
ADLS_ACUITY_SCORE: 58
ADLS_ACUITY_SCORE: 59
ADLS_ACUITY_SCORE: 61
ADLS_ACUITY_SCORE: 58
ADLS_ACUITY_SCORE: 59
ADLS_ACUITY_SCORE: 59
ADLS_ACUITY_SCORE: 58

## 2025-04-20 NOTE — PROVIDER NOTIFICATION
[employer] paged for pt left loower back pain MD aware and put in a one time order of robaxin and PRN lidocane patch

## 2025-04-20 NOTE — PROGRESS NOTES
[facility] Cardiology Progress Note       Assessment and Plan:   See below, can continue same meds, will sign off            Interval History:   I was by Dr. Pickard to reevaluate this delightful 89-year-old lady.  Dr. Shirley's  Note reviewed.  There are some concerns with pauses.  I personally reviewed her telemetry.  She has maximum pauses of 2.8 ms in the setting of atrial flutter.  She is not symptomatic from them and can continue the same dose of metoprolol unchanged.    She has no PND orthopnea or worsening ankle swelling.  She tells me that she will get out of breath if she exerts herself with 10 minutes and this would not be unexpected in the lady who is 89.  I suspect the symptoms may be related to her age.                  Medications:     Current Facility-Administered Medications   Medication Dose Route Frequency Provider Last Rate Last Admin    acetaminophen (TYLENOL) tablet 650 mg  650 mg Oral Q4H PRN Deedee Parker MD   650 mg at 04/20/25 0400    Or    acetaminophen (TYLENOL) Suppository 650 mg  650 mg Rectal Q4H PRN Deedee Parker MD        apixaban ANTICOAGULANT (ELIQUIS) tablet 5 mg  5 mg Oral BID Albaro Shirley MD   5 mg at 04/20/25 0853    atorvastatin (LIPITOR) tablet 10 mg  10 mg Oral Daily Deidre Pcikard MD   10 mg at 04/20/25 0854    benzocaine-menthol (CHLORASEPTIC) 6-10 MG lozenge 1 lozenge  1 lozenge Buccal Q1H PRN Deedee Parker MD        buPROPion (WELLBUTRIN XL) 24 hr tablet 450 mg  450 mg Oral QAM Deidre Pickard MD   450 mg at 04/20/25 0854    calcium carbonate (TUMS) chewable tablet 1,000 mg  1,000 mg Oral 4x Daily PRN Deedee Parker MD        dorzolamide-timolol (COSOPT) ophthalmic solution 1 drop  1 drop Both Eyes BID Deidre Pickard MD   1 drop at 04/20/25 0907    famotidine (PEPCID) tablet 20 mg  20 mg Oral Daily Deedee Parker MD   20 mg at 04/20/25 0854    Lidocaine (LIDOCARE) 4 % Patch 1 patch  1 patch Transdermal Q24H Carmel Tan MD   1 patch at  "25 0607    lidocaine (LMX4) cream   Topical Q1H PRN Deedee Parker MD        lidocaine 1 % 0.1-1 mL  0.1-1 mL Other Q1H PRN Deedee Parker MD        metoprolol tartrate (LOPRESSOR) tablet 50 mg  50 mg Oral BID Albaro Shirley MD   50 mg at 25 0914    miconazole (MICATIN) 2 % powder   Topical BID PRN Deedee Parker MD        ondansetron (ZOFRAN ODT) ODT tab 4 mg  4 mg Oral Q6H PRN Deedee Parker MD        Or    ondansetron (ZOFRAN) injection 4 mg  4 mg Intravenous Q6H PRN Deedee Parker MD        polyethylene glycol (MIRALAX) Packet 17 g  17 g Oral Daily Deedee Parker MD   17 g at 25 0853    senna-docusate (SENOKOT-S/PERICOLACE) 8.6-50 MG per tablet 1 tablet  1 tablet Oral BID PRN Deedee Parker MD        Or    senna-docusate (SENOKOT-S/PERICOLACE) 8.6-50 MG per tablet 2 tablet  2 tablet Oral BID PRN Deedee Parker MD        sodium chloride (PF) 0.9% PF flush 3 mL  3 mL Intracatheter Q8H Deedee Parker MD   3 mL at 25 0856    sodium chloride (PF) 0.9% PF flush 3 mL  3 mL Intracatheter q1 min prn Deedee Parker MD   3 mL at 25 2027    spironolactone (ALDACTONE) half-tab 12.5 mg  12.5 mg Oral Daily Albaro Shirley MD   12.5 mg at 25 0914    Vitamin D3 (CHOLECALCIFEROL) tablet 50 mcg  50 mcg Oral Daily Deidre Pickard MD   50 mcg at 25 0855             Physical Exam:   Blood pressure (!) 166/68, pulse 88, temperature 97.4  F (36.3  C), temperature source Oral, resp. rate 22, height 1.499 m (4' 11\"), weight 81.1 kg (178 lb 14.4 oz), SpO2 97%, not currently breastfeeding.  Wt Readings from Last 4 Encounters:   25 81.1 kg (178 lb 14.4 oz)   24 81.3 kg (179 lb 3.2 oz)   24 81.3 kg (179 lb 3.2 oz)   24 85 kg (187 lb 6.4 oz)         Vital Sign Ranges  Temperature Temp  Av.5  F (36.4  C)  Min: 96.5  F (35.8  C)  Max: 98  F (36.7  C)   Blood pressure Systolic (24hrs), Av , Min:102 , Max:166        Diastolic (24hrs), Av, Min:47, Max:83    "   Pulse Pulse  Av  Min: 80  Max: 93   Respirations Resp  Av.7  Min: 22  Max: 28   Pulse oximetry SpO2  Av.7 %  Min: 96 %  Max: 97 %         Intake/Output Summary (Last 24 hours) at 2025 1421  Last data filed at 2025 1113  Gross per 24 hour   Intake 730 ml   Output 200 ml   Net 530 ml          Cardiovascular:   Normal apical impulse, irregular rate and rhythm, normal S1 and S2, no S3 or S4, and soft systolic murmur noted   Chest clear.  No peripheral oedema         Data:     Labs:  Lab Results   Component Value Date     2025     2021    Lab Results   Component Value Date    CHLORIDE 102 2025    CHLORIDE 106 2022    CHLORIDE 107 2021    Lab Results   Component Value Date    BUN 32.3 2025    BUN 28 2022    BUN 25 2021      Lab Results   Component Value Date    POTASSIUM 4.2 2025    POTASSIUM 4.6 2022    POTASSIUM 4.1 2021    Lab Results   Component Value Date    CO2 23 2025    CO2 26 2022    CO2 29 2021    Lab Results   Component Value Date    CR 1.15 2025    CR 1.03 2021        Lab Results   Component Value Date    WBC 11.0 2025    HGB 11.9 2025    HCT 36.3 2025     (H) 2025     2025     Lab Results   Component Value Date    TROPI <0.012 2010           Attestation:  I have reviewed today's vital signs, notes, medications, labs and imaging.         DR DEBORAH ALAS MD 2025  2:21 PM

## 2025-04-20 NOTE — PLAN OF CARE
"End of shift note: Aox4, denies numbness and tingling . Pt up 1 assist GB and walker. Pt reports REDDY and dizziness when ambulating. Had multiple 2-3 sec pauses, hospitalist and cardiologist aware. Plan to discharge to TCU per pt request.    Goal Outcome Evaluation:      Plan of Care Reviewed With: patient    Overall Patient Progress: no changeOverall Patient Progress: no change    Outcome Evaluation: Denies pain. VSS, afebrile, sating well on RA. Tele A-fib CVR.      Problem: Adult Inpatient Plan of Care  Goal: Plan of Care Review  Description: The Plan of Care Review/Shift note should be completed every shift.  The Outcome Evaluation is a brief statement about your assessment that the patient is improving, declining, or no change.  This information will be displayed automatically on your shiftnote.  Outcome: Progressing  Flowsheets (Taken 4/20/2025 1842)  Outcome Evaluation: Denies pain. VSS, afebrile, sating well on RA. Tele A-fib CVR.  Plan of Care Reviewed With: patient  Overall Patient Progress: no change  Goal: Patient-Specific Goal (Individualized)  Description: You can add care plan individualizations to a care plan. Examples of Individualization might be:  \"Parent requests to be called daily at 9am for status\", \"I have a hard time hearing out of my right ear\", or \"Do not touch me to wake me up as it startlesme\".  Outcome: Progressing  Goal: Absence of Hospital-Acquired Illness or Injury  Outcome: Progressing  Intervention: Identify and Manage Fall Risk  Recent Flowsheet Documentation  Taken 4/20/2025 1840 by Floresita Strange, RN  Safety Promotion/Fall Prevention: safety round/check completed  Taken 4/20/2025 1757 by Floresita Strange, RN  Safety Promotion/Fall Prevention: safety round/check completed  Taken 4/20/2025 1707 by Floresita Strange, RN  Safety Promotion/Fall Prevention: safety round/check completed  Taken 4/20/2025 1532 by Floresita Strange, RN  Safety Promotion/Fall Prevention: safety round/check " completed  Taken 4/20/2025 1430 by Floresita Strange RN  Safety Promotion/Fall Prevention: safety round/check completed  Taken 4/20/2025 1328 by Floresita Strange RN  Safety Promotion/Fall Prevention: safety round/check completed  Taken 4/20/2025 1213 by Floresita Strange RN  Safety Promotion/Fall Prevention: safety round/check completed  Taken 4/20/2025 1145 by Floresita Strange RN  Safety Promotion/Fall Prevention: safety round/check completed  Taken 4/20/2025 1041 by Floresita Strange RN  Safety Promotion/Fall Prevention: safety round/check completed  Taken 4/20/2025 0951 by Florestia Strange RN  Safety Promotion/Fall Prevention: safety round/check completed  Taken 4/20/2025 0856 by Floresita Strange RN  Safety Promotion/Fall Prevention: safety round/check completed  Taken 4/20/2025 0721 by Floresita Strange RN  Safety Promotion/Fall Prevention: safety round/check completed  Intervention: Prevent and Manage VTE (Venous Thromboembolism) Risk  Recent Flowsheet Documentation  Taken 4/20/2025 0856 by Floresita Strange RN  VTE Prevention/Management: (PO Eliquis)   SCDs off (sequential compression devices)   other (see comments)  Intervention: Prevent Infection  Recent Flowsheet Documentation  Taken 4/20/2025 0856 by Floresita Strange RN  Infection Prevention:   rest/sleep promoted   single patient room provided   hand hygiene promoted  Goal: Optimal Comfort and Wellbeing  Outcome: Progressing  Goal: Readiness for Transition of Care  Outcome: Progressing     Problem: Delirium  Goal: Optimal Coping  Outcome: Progressing  Goal: Improved Behavioral Control  Outcome: Progressing  Intervention: Minimize Safety Risk  Recent Flowsheet Documentation  Taken 4/20/2025 0856 by Floresita Strange RN  Enhanced Safety Measures: pain management  Goal: Improved Attention and Thought Clarity  Outcome: Progressing  Goal: Improved Sleep  Outcome: Progressing     Problem: Skin Injury Risk Increased  Goal: Skin Health and  Integrity  Outcome: Progressing  Intervention: Plan: Nurse Driven Intervention: Moisture Management  Recent Flowsheet Documentation  Taken 4/20/2025 0856 by Floresita Strange RN  Moisture Interventions: Encourage regular toileting  Bathing/Skin Care: (face washed) --  Intervention: Optimize Skin Protection  Recent Flowsheet Documentation  Taken 4/20/2025 1757 by Floresita Strange RN  Activity Management:   ambulated to bathroom   activity adjusted per tolerance  Taken 4/20/2025 0856 by Floresita Strange RN  Activity Management:   activity adjusted per tolerance   ambulated to bathroom   up in chair     Problem: Heart Failure  Goal: Optimal Coping  Outcome: Progressing  Goal: Optimal Cardiac Output  Outcome: Progressing  Goal: Stable Heart Rate and Rhythm  Outcome: Progressing  Goal: Fluid and Electrolyte Balance  Outcome: Progressing  Goal: Optimal Functional Ability  Outcome: Progressing  Intervention: Optimize Functional Ability  Recent Flowsheet Documentation  Taken 4/20/2025 1757 by Floresita Strange RN  Activity Management:   ambulated to bathroom   activity adjusted per tolerance  Taken 4/20/2025 0856 by Floresita Strange RN  Activity Management:   activity adjusted per tolerance   ambulated to bathroom   up in chair  Goal: Improved Oral Intake  Outcome: Progressing  Goal: Effective Oxygenation and Ventilation  Outcome: Progressing  Intervention: Promote Airway Secretion Clearance  Recent Flowsheet Documentation  Taken 4/20/2025 1757 by Floresita Strange RN  Activity Management:   ambulated to bathroom   activity adjusted per tolerance  Taken 4/20/2025 0856 by Floresita Strange RN  Cough And Deep Breathing: done independently per patient  Activity Management:   activity adjusted per tolerance   ambulated to bathroom   up in chair  Goal: Effective Breathing Pattern During Sleep  Outcome: Progressing  Intervention: Monitor and Manage Obstructive Sleep Apnea  Recent Flowsheet Documentation  Taken 4/20/2025 0856  by Floresita Strange, RN  Medication Review/Management: medications reviewed     Problem: Dysrhythmia  Goal: Normalized Cardiac Rhythm  Outcome: Progressing  Intervention: Monitor and Manage Cardiac Rhythm Effect  Recent Flowsheet Documentation  Taken 4/20/2025 0856 by Floresita Strange, RN  VTE Prevention/Management: (PO Eliquis)   SCDs off (sequential compression devices)   other (see comments)

## 2025-04-20 NOTE — PROGRESS NOTES
"[facility]    Hospitalist Progress Note  Name: Riddhi Aguilar    MRN: [id number]  Provider: Deidre Pickard MD  Date of Service: 04/20/2025    Assessment & Plan   Summary of Stay: Riddhi Aguilar is a 89 year old female who was admitted on 4/17/2025 with New onset A-fib with RVR. Her past medical history of osteoporosis, osteoarthritis, kidney stones, dyslipidemia, glaucoma, spinal stenosis, ureteral stones, recent history of femoral neck fracture mechanical fall related, past history of duodenal ulcer in 2006, DNR/DNI status.     4/20/2025: Telemetry showed some pauses up to 2.5 seconds.  Patient was symptomatic with lightheadedness and shortness of breath.  Will discuss with cardiology to adjust beta-blocker if necessary    New onset A-fib with RVR  New diagnosis of heart failure  Elevated troponin: Demand ischemia versus ACS in setting of new onset A-fib  - Patient received diltiazem bolus and was switched to diltiazem drip for rate control  -Telemetry  - Echo  \"   1. The left ventricle is normal in size. Moderately increased left ventricular  wall thickness is noted. Left ventricular systolic function is normal. The  visual ejection fraction is 60-65%. Diastolic function not assessed due to  atrial fibrillation. No regional wall motion abnormalities noted.  2. The right ventricle is normal size. The right ventricular systolic function  is normal.  3. There is severe biatrial enlargement. There is no color Doppler evidence of  an atrial shunt.  4. Mild mitral stenosis.  5. Mild to moderate valvular aortic stenosis. The mean AoV pressure gradient  is 15.7 mmHg. The peak AoV pressure gradient is 24.0 mmHg. The calculated  aortic valve are is 1.3 cm^2.  6. No pericardial effusion.  7. In comparison to the previous report dated 01/25/2024, there has been a  mild interval increase in transaortic gradients.\"    - Cardiology consult appreciated  - Started on Eliquis  - Metoprolol started for rate control " "and possible underlying ACS  - ?Aspirin (? H/o duodenal ulcer) continue beta-blocker, prior to admission Lipitor    H/o HTN  - Prior to admission on Aldactone  - Started on Aldactone at a lower dose    Hyperlipidemia  -Continue statins    Osteoarthritis  Osteoporosis  Prior fracture  -Prior to admission regimen    Duodenal ulcer  - Continue famotidine    Clinically Significant Risk Factors             # Obesity: Estimated body mass index is 36.13 kg/m  as calculated from the following:    Height as of this encounter: 1.499 m (4' 11\").    Weight as of this encounter: 81.1 kg (178 lb 14.4 oz)., PRESENT ON ADMISSION     # Financial/Environmental Concerns: none             DVT Prophylaxis: DOAC  Code Status: No CPR- Do NOT Intubate    Disposition:   Medically Ready for Discharge: Medically ready for discharge in 1 day.  Patient lives alone and was feeling a bit unsure with her transfers.  Family is concerned about her safety PT OT and social work consulted for safe disposition.  Patient to look into options for transitional care unit if possible.        Interval History   Reviewed chart patient did have some lightheadedness and shortness of breath also noted some pauses on the monitor.  Overall feels well but complains of generalized malaise and weakness.  Care plan discussed with cardiology no significant change.  Continue beta-blocker.  PT OT evaluation recommended.  More than 10 point review of system was carried out was otherwise negative.  Total time spent in direct patient care and coordination of care is more than 35 minutes    -Data reviewed today: I reviewed all new labs and imaging reports over the last 24 hours. I personally reviewed atrial flutter with RVR.    Physical Exam   Temp: 97.4  F (36.3  C) Temp src: Oral BP: (!) 166/68 (after ambulation) Pulse: 88   Resp: 22 SpO2: 97 % O2 Device: None (Room air)    Vitals:    04/18/25 0501 04/19/25 0539 04/20/25 0603   Weight: 84 kg (185 lb 3 oz) 82.7 kg (182 lb " 5.1 oz) 81.1 kg (178 lb 14.4 oz)     Vital Signs with Ranges  Temp:  [96.5  F (35.8  C)-98  F (36.7  C)] 97.4  F (36.3  C)  Pulse:  [80-93] 88  Resp:  [22-28] 22  BP: (102-166)/(47-83) 166/68  SpO2:  [96 %-97 %] 97 %  I/O last 3 completed shifts:  In: 370 [P.O.:370]  Out: 200 [Urine:200]      General Appearance:  Alert awake oriented x 3 no acute distress elderly lady appears much younger than stated age  Respiratory: Clear to auscultation  Cardiovascular: Irregularly irregular  GI: Soft nontender bowel sounds are present  Skin: Lower extremity skin changes consistent with chronic venous stasis  Other:  + lower extremity edema       Medications   Current Facility-Administered Medications   Medication Dose Route Frequency Provider Last Rate Last Admin     Current Facility-Administered Medications   Medication Dose Route Frequency Provider Last Rate Last Admin    apixaban ANTICOAGULANT (ELIQUIS) tablet 5 mg  5 mg Oral BID Albaro Shirley MD   5 mg at 04/20/25 0853    atorvastatin (LIPITOR) tablet 10 mg  10 mg Oral Daily Dedire Pickard MD   10 mg at 04/20/25 0854    buPROPion (WELLBUTRIN XL) 24 hr tablet 450 mg  450 mg Oral QAM Deidre Pickard MD   450 mg at 04/20/25 0854    dorzolamide-timolol (COSOPT) ophthalmic solution 1 drop  1 drop Both Eyes BID Deidre Pickard MD   1 drop at 04/20/25 0907    famotidine (PEPCID) tablet 20 mg  20 mg Oral Daily Deedee Parker MD   20 mg at 04/20/25 0854    Lidocaine (LIDOCARE) 4 % Patch 1 patch  1 patch Transdermal Q24H Carmel Tan MD   1 patch at 04/20/25 0607    metoprolol tartrate (LOPRESSOR) tablet 50 mg  50 mg Oral BID Albaro Shirley MD   50 mg at 04/20/25 0914    polyethylene glycol (MIRALAX) Packet 17 g  17 g Oral Daily Deedee Parker MD   17 g at 04/20/25 0853    sodium chloride (PF) 0.9% PF flush 3 mL  3 mL Intracatheter Q8H Deedee Parker MD   3 mL at 04/20/25 0856    spironolactone (ALDACTONE) half-tab 12.5 mg  12.5 mg Oral Daily Albaro Shirley MD   12.5  "mg at 04/20/25 0914    Vitamin D3 (CHOLECALCIFEROL) tablet 50 mcg  50 mcg Oral Daily Deidre Pickard MD   50 mcg at 04/20/25 0855     Data     No results for input(s): \"PH\", \"PHV\", \"PO2\", \"PO2V\", \"SAT\", \"PCO2\", \"PCO2V\", \"HCO3\", \"HCO3V\" in the last 168 hours.  Recent Labs   Lab 04/20/25  0823 04/19/25  0745 04/18/25  0559 04/17/25  2204   WBC  --  11.0 7.1 8.4   HGB  --  11.9 10.6* 10.6*   HCT  --  36.3 31.8* 31.6*   MCV  --  102* 100 100    329 308 336     Recent Labs   Lab 04/19/25  0745 04/18/25  0559 04/17/25  2204    141 137   POTASSIUM 4.2 3.9 4.4   CHLORIDE 102 103 101   CO2 23 24 23   ANIONGAP 14 14 13   GLC 97 88 94   BUN 32.3* 24.4* 26.1*   CR 1.15* 1.09* 1.17*   GFRESTIMATED 44* 47* 43*   TAMMY 9.4 9.5 9.7     7-Day Micro Results       No results found for the last 168 hours.          Recent Labs   Lab 04/17/25  2204   NTBNPI 7,701*     Recent Labs   Lab 04/19/25  0745 04/18/25  0559 04/17/25  2204   HGB 11.9 10.6* 10.6*     Recent Labs   Lab 04/18/25  0559   AST 34   ALT 15   ALKPHOS 46   BILITOTAL 2.1*     No results for input(s): \"INR\" in the last 168 hours.  Recent Labs   Lab 04/18/25  0126   LACT 1.3     No results for input(s): \"LIPASE\" in the last 168 hours.  No results for input(s): \"TROPONIN\", \"TROPI\", \"TROPR\", \"TROPONINIS\" in the last 168 hours.    Invalid input(s): \"TROPT\", \"TROP\", \"TROPONINIES\", \"TNIH\"  No results for input(s): \"COLOR\", \"APPEARANCE\", \"URINEGLC\", \"URINEBILI\", \"URINEKETONE\", \"SG\", \"UBLD\", \"URINEPH\", \"PROTEIN\", \"UROBILINOGEN\", \"NITRITE\", \"LEUKEST\", \"RBCU\", \"WBCU\" in the last 168 hours.    No results found for this or any previous visit (from the past 24 hours).         "

## 2025-04-20 NOTE — PLAN OF CARE
"A&O 4 on RA on Tele A-fib RVR.had two pauses 2.1 and 2.3 pt. had  two wounds on buttocks cleansed and 4x4  meplix applied assist of 1 with gait belt.Oral metoprolol.platelet count start on 4/20/2025 6AM Draw  pt refused pure  wick placement.EF 60--65%patient complained of one sided back pain on left side not radiating PRN acetaminophen given has not help  pr ice a one time order of  robaxin tab given and lidocane patch applied to right lower back          Goal Outcome Evaluation:      Plan of Care Reviewed With: patient    Overall Patient Progress: improvingOverall Patient Progress: improving           Problem: Adult Inpatient Plan of Care  Goal: Plan of Care Review  Description: The Plan of Care Review/Shift note should be completed every shift.  The Outcome Evaluation is a brief statement about your assessment that the patient is improving, declining, or no change.  This information will be displayed automatically on your shiftnote.  Outcome: Progressing  Flowsheets (Taken 4/20/2025 0305)  Plan of Care Reviewed With: patient  Overall Patient Progress: improving  Goal: Patient-Specific Goal (Individualized)  Description: You can add care plan individualizations to a care plan. Examples of Individualization might be:  \"Parent requests to be called daily at 9am for status\", \"I have a hard time hearing out of my right ear\", or \"Do not touch me to wake me up as it startlesme\".  Outcome: Progressing  Goal: Absence of Hospital-Acquired Illness or Injury  Outcome: Progressing  Intervention: Identify and Manage Fall Risk  Recent Flowsheet Documentation  Taken 4/19/2025 2037 by Sandhya Pina RN  Safety Promotion/Fall Prevention: safety round/check completed  Intervention: Prevent and Manage VTE (Venous Thromboembolism) Risk  Recent Flowsheet Documentation  Taken 4/19/2025 2037 by Sandhya Pina RN  VTE Prevention/Management: (on elquis) SCDs off (sequential compression devices)  Goal: Optimal Comfort and " Wellbeing  Outcome: Progressing  Goal: Readiness for Transition of Care  Outcome: Progressing     Problem: Delirium  Goal: Optimal Coping  Outcome: Progressing  Goal: Improved Behavioral Control  Outcome: Progressing  Goal: Improved Attention and Thought Clarity  Outcome: Progressing  Goal: Improved Sleep  Outcome: Progressing     Problem: Skin Injury Risk Increased  Goal: Skin Health and Integrity  Outcome: Progressing  Intervention: Plan: Nurse Driven Intervention: Moisture Management  Recent Flowsheet Documentation  Taken 4/19/2025 2037 by Sandhya Pina RN  Moisture Interventions: Encourage regular toileting     Problem: Heart Failure  Goal: Optimal Coping  Outcome: Progressing  Goal: Optimal Cardiac Output  Outcome: Progressing  Goal: Stable Heart Rate and Rhythm  Outcome: Progressing  Goal: Fluid and Electrolyte Balance  Outcome: Progressing  Goal: Optimal Functional Ability  Outcome: Progressing  Goal: Improved Oral Intake  Outcome: Progressing  Goal: Effective Oxygenation and Ventilation  Outcome: Progressing  Goal: Effective Breathing Pattern During Sleep  Outcome: Progressing     Problem: Dysrhythmia  Goal: Normalized Cardiac Rhythm  Outcome: Progressing  Intervention: Monitor and Manage Cardiac Rhythm Effect  Recent Flowsheet Documentation  Taken 4/19/2025 2037 by Sandhya Pina RN  VTE Prevention/Management: (on elquis) SCDs off (sequential compression devices)

## 2025-04-20 NOTE — PROGRESS NOTES
"   04/20/25 1513   Appointment Info   Signing Clinician's Name / Credentials (OT) Derrick Aguilar EdD, OTR/L   Rehab Comments (OT) Initial evaluation and treatment   Living Environment   People in Home alone   Current Living Arrangements independent living facility  (Pt lives at [facility], says she has been there 6 years.  Is motivated to stay in Independent Living)   Home Accessibility no concerns   Transportation Anticipated family or friend will provide   Living Environment Comments Pt has one son and a daugther in town.  Daugther-in-law is a nurse and handles most everyday things for pt including rides, medications, groceries, and appointments.  Pt cooks her own meals, does not like the dining room food there   Self-Care   Usual Activity Tolerance moderate   Current Activity Tolerance poor   Equipment Currently Used at Home walker, rolling;shower chair;grab bar, toilet;grab bar, tub/shower;dressing device   Fall history within last six months no   Activity/Exercise/Self-Care Comment Pt had a fall with a broken femur about a year ago.  Says it has slowed her down   General Information   Onset of Illness/Injury or Date of Surgery 04/19/25   Referring Physician Deidre Pickard   Patient/Family Therapy Goal Statement (OT) return home, pt is open to rehab as needed   Additional Occupational Profile Info/Pertinent History of Current Problem per chart: \"Riddhi Aguilar is a 89 year old female who was admitted on 4/17/2025 with New onset A-fib with RVR. Her past medical history of osteoporosis, osteoarthritis, kidney stones, dyslipidemia, glaucoma, spinal stenosis, ureteral stones, recent history of femoral neck fracture mechanical fall related, past history of duodenal ulcer in 2006, DNR/DNI status.'; found to have Atrial flutter with RVR,Elevated troponin, flat trajectory, no angina, normal LV function and wall motion, most consistent with demand ischemia, Mild mitral stenosis, mild-mod aortic stenosis; see medical " "record for further information   Performance Patterns (Routines, Roles, Habits) pt is fairly independent with activities, says she does not \"want to bother\" her family with her needs.  Son was present for part of session, appears very supportive   Existing Precautions/Restrictions fall   General Observations and Info pt in a recliner, willing to participate   Cognitive Status Examination   Orientation Status orientation to person, place and time   Visual Perception   Visual Impairment/Limitations corrective lenses full-time   Pain Assessment   Patient Currently in Pain No   Range of Motion Comprehensive   General Range of Motion no range of motion deficits identified   Comment, General Range of Motion pt appears to have functional UE ROM   Strength Comprehensive (MMT)   General Manual Muscle Testing (MMT) Assessment upper extremity strength deficits identified   Comment, General Manual Muscle Testing (MMT) Assessment pt has some mild to moderate general UE weakness.  Also complains about lowered endurance with activiites   Coordination   Upper Extremity Coordination No deficits were identified   Bed Mobility   Comment (Bed Mobility) Pt sitting in a recliner.  Declined to move as her family came in for an [place] celebration shortly after I came in.  Per PT eval note pt was Min A/CGA with mobility during their session.  Pt estimates she gets fatigued after trying 5-10 minutes of activity.   Clinical Impression   Criteria for Skilled Therapeutic Interventions Met (OT) Yes, treatment indicated   OT Diagnosis decreased independence in ADLS   Assessment of Occupational Performance 3-5 Performance Deficits   OT Total Evaluation Time   OT Eval, Low Complexity Minutes (03053) 15   OT Goals   Therapy Frequency (OT) 5 times/week   OT Predicted Duration/Target Date for Goal Attainment 04/27/25   OT Goals Hygiene/Grooming;Toilet Transfer/Toileting;OT Goal 1;OT Goal 2   OT: Hygiene/Grooming modified independent;using adaptive " equipment;while standing   OT: Toilet Transfer/Toileting Modified independent;toilet transfer;cleaning and garment management;using adaptive equipment;within precautions   OT: Goal 1 Pt will complete TB dressing with SBA and AE as needed   OT: Goal 2 Pt will identify 3 EC techniques to increase endurance and independence in ADLs   OT Discharge Planning   OT Plan OT; Endurance ADLS for grooming, dressing, bathroom transfers.  Incorporate EC as appropriate.  Add UE exericise to increase endurance as needed.   OT Discharge Recommendation (DC Rec) Transitional Care Facility   OT Rationale for DC Rec Pt demonstrates general weakness and deconditioning for her daily routines with an inability to manage her daily ADLS and IADLS without additional help and support.  Would benefit from skilled OT here and at TCU to increase endurance, check safety for mobility and transfers, start UE strengthening program, and extend use of EC to help her endurance.   OT Brief overview of current status Goals of therapy will be to address safe mobility and ADLS and make recommendations for discharge to the next level of care.  Pt and RN will continue to follow all fall risk precautions as documented by RN staff while hospitalized.   OT Total Distance Amb During Session (feet) 0   Total Session Time   Total Session Time (sum of timed and untimed services) 15

## 2025-04-21 ENCOUNTER — DOCUMENTATION ONLY (OUTPATIENT)
Dept: GERIATRICS | Facility: CLINIC | Age: OVER 89
End: 2025-04-21
Payer: COMMERCIAL

## 2025-04-21 ENCOUNTER — LAB REQUISITION (OUTPATIENT)
Dept: LAB | Facility: CLINIC | Age: OVER 89
End: 2025-04-21
Payer: COMMERCIAL

## 2025-04-21 ENCOUNTER — MEDICAL CORRESPONDENCE (OUTPATIENT)
Dept: HEALTH INFORMATION MANAGEMENT | Facility: CLINIC | Age: OVER 89
End: 2025-04-21

## 2025-04-21 ENCOUNTER — PATIENT OUTREACH (OUTPATIENT)
Dept: CARE COORDINATION | Facility: CLINIC | Age: OVER 89
End: 2025-04-21
Payer: COMMERCIAL

## 2025-04-21 VITALS
DIASTOLIC BLOOD PRESSURE: 61 MMHG | WEIGHT: 180.5 LBS | SYSTOLIC BLOOD PRESSURE: 140 MMHG | BODY MASS INDEX: 36.39 KG/M2 | TEMPERATURE: 97.8 F | OXYGEN SATURATION: 100 % | HEIGHT: 59 IN | RESPIRATION RATE: 18 BRPM | HEART RATE: 75 BPM

## 2025-04-21 DIAGNOSIS — Z11.1 ENCOUNTER FOR SCREENING FOR RESPIRATORY TUBERCULOSIS: ICD-10-CM

## 2025-04-21 PROCEDURE — 250N000013 HC RX MED GY IP 250 OP 250 PS 637: Performed by: INTERNAL MEDICINE

## 2025-04-21 PROCEDURE — 99239 HOSP IP/OBS DSCHRG MGMT >30: CPT | Performed by: INTERNAL MEDICINE

## 2025-04-21 RX ORDER — LIDOCAINE 4 G/G
1 PATCH TOPICAL EVERY 24 HOURS
DISCHARGE
Start: 2025-04-21

## 2025-04-21 RX ORDER — AMOXICILLIN 250 MG
1 CAPSULE ORAL 2 TIMES DAILY PRN
DISCHARGE
Start: 2025-04-21

## 2025-04-21 RX ORDER — FAMOTIDINE 20 MG/1
20 TABLET, FILM COATED ORAL DAILY
DISCHARGE
Start: 2025-04-22

## 2025-04-21 RX ORDER — METOPROLOL TARTRATE 50 MG
50 TABLET ORAL 2 TIMES DAILY
DISCHARGE
Start: 2025-04-21

## 2025-04-21 RX ORDER — ACETAMINOPHEN 325 MG/1
650 TABLET ORAL EVERY 4 HOURS PRN
DISCHARGE
Start: 2025-04-21 | End: 2025-04-25

## 2025-04-21 RX ORDER — SPIRONOLACTONE 25 MG/1
12.5 TABLET ORAL DAILY
DISCHARGE
Start: 2025-04-22

## 2025-04-21 RX ADMIN — APIXABAN 5 MG: 5 TABLET, FILM COATED ORAL at 09:21

## 2025-04-21 RX ADMIN — BUPROPION HYDROCHLORIDE 450 MG: 150 TABLET, EXTENDED RELEASE ORAL at 09:21

## 2025-04-21 RX ADMIN — SPIRONOLACTONE 12.5 MG: 25 TABLET, FILM COATED ORAL at 09:21

## 2025-04-21 RX ADMIN — FAMOTIDINE 20 MG: 20 TABLET, FILM COATED ORAL at 09:21

## 2025-04-21 RX ADMIN — POLYETHYLENE GLYCOL 3350 17 G: 17 POWDER, FOR SOLUTION ORAL at 09:21

## 2025-04-21 RX ADMIN — Medication 50 MCG: at 09:20

## 2025-04-21 RX ADMIN — METOPROLOL TARTRATE 50 MG: 50 TABLET, FILM COATED ORAL at 09:21

## 2025-04-21 RX ADMIN — DORZOLAMIDE HYDROCHLORIDE AND TIMOLOL MALEATE 1 DROP: 20; 5 SOLUTION/ DROPS OPHTHALMIC at 09:21

## 2025-04-21 RX ADMIN — ATORVASTATIN CALCIUM 10 MG: 10 TABLET, FILM COATED ORAL at 09:21

## 2025-04-21 ASSESSMENT — ACTIVITIES OF DAILY LIVING (ADL)
ADLS_ACUITY_SCORE: 61
ADLS_ACUITY_SCORE: 61
ADLS_ACUITY_SCORE: 58
ADLS_ACUITY_SCORE: 61
ADLS_ACUITY_SCORE: 61
ADLS_ACUITY_SCORE: 58
ADLS_ACUITY_SCORE: 58
ADLS_ACUITY_SCORE: 61
ADLS_ACUITY_SCORE: 58
ADLS_ACUITY_SCORE: 58

## 2025-04-21 NOTE — PLAN OF CARE
"Goal Outcome Evaluation:    VSS. Denies pain/SOB. Tele Afib CVR. EF 60-65%. Continues on metoprolol, eliquis. Feels weak when ambulating, reports that she has had some REDDY. A1 gaitbelt and walker. Mail out Zio patch at discharge. Discharge to TCU planned for today.    Pt discharged to [facility] with son as transport. Went over [facility]. Questions asked and answered. Pt verbalized understanding. Pt discharged at 1210 with all belongings, paperwork, and medications.      Plan of Care Reviewed With: patient    Overall Patient Progress: improving    Outcome Evaluation: VSS. Denies pain/SOB. Tele afib CVR. Continues on metoprolol, eliquis. Feels weak when ambulating, some REDDY. PT/OT following. Up A1. Discharge to TCU planned for today.    Problem: Adult Inpatient Plan of Care  Goal: Plan of Care Review  Description: The Plan of Care Review/Shift note should be completed every shift.  The Outcome Evaluation is a brief statement about your assessment that the patient is improving, declining, or no change.  This information will be displayed automatically on your shiftnote.  Outcome: Adequate for Care Transition  Flowsheets (Taken 4/21/2025 1117)  Outcome Evaluation: VSS. Denies pain/SOB. Tele afib CVR. Continues on metoprolol, eliquis. Feels weak when ambulating, some REDDY. PT/OT following. Up A1. Discharge to TCU planned for today.  Plan of Care Reviewed With: patient  Overall Patient Progress: improving  Goal: Patient-Specific Goal (Individualized)  Description: You can add care plan individualizations to a care plan. Examples of Individualization might be:  \"Parent requests to be called daily at 9am for status\", \"I have a hard time hearing out of my right ear\", or \"Do not touch me to wake me up as it startlesme\".  Outcome: Adequate for Care Transition  Goal: Absence of Hospital-Acquired Illness or Injury  Outcome: Adequate for Care Transition  Intervention: Identify and Manage Fall Risk  Recent Flowsheet Documentation  Taken " 4/21/2025 0930 by Nancy Flores, RN  Safety Promotion/Fall Prevention:   activity supervised   clutter free environment maintained   lighting adjusted   nonskid shoes/slippers when out of bed   safety round/check completed  Intervention: Prevent Skin Injury  Recent Flowsheet Documentation  Taken 4/21/2025 0930 by Nancy Flores, RN  Body Position: position changed independently  Skin Protection:   adhesive use limited   tubing/devices free from skin contact  Intervention: Prevent and Manage VTE (Venous Thromboembolism) Risk  Recent Flowsheet Documentation  Taken 4/21/2025 0930 by Nancy Flores, RN  VTE Prevention/Management: (eliquis) other (see comments)  Intervention: Prevent Infection  Recent Flowsheet Documentation  Taken 4/21/2025 0930 by Nancy Flores, RN  Infection Prevention:   rest/sleep promoted   single patient room provided   hand hygiene promoted   equipment surfaces disinfected  Goal: Optimal Comfort and Wellbeing  Outcome: Adequate for Care Transition  Intervention: Provide Person-Centered Care  Recent Flowsheet Documentation  Taken 4/21/2025 0930 by Nancy Flores RN  Trust Relationship/Rapport:   care explained   questions answered  Goal: Readiness for Transition of Care  Outcome: Adequate for Care Transition     Problem: Delirium  Goal: Optimal Coping  Outcome: Adequate for Care Transition  Goal: Improved Behavioral Control  Outcome: Adequate for Care Transition  Intervention: Minimize Safety Risk  Recent Flowsheet Documentation  Taken 4/21/2025 0930 by Nancy Flores RN  Trust Relationship/Rapport:   care explained   questions answered  Goal: Improved Attention and Thought Clarity  Outcome: Adequate for Care Transition  Goal: Improved Sleep  Outcome: Adequate for Care Transition     Problem: Skin Injury Risk Increased  Goal: Skin Health and Integrity  Outcome: Adequate for Care Transition  Intervention: Plan: Nurse Driven Intervention: Moisture Management  Recent Flowsheet  Documentation  Taken 4/21/2025 0930 by Nancy Flores RN  Moisture Interventions:   Encourage regular toileting   Incontinence pad  Intervention: Optimize Skin Protection  Recent Flowsheet Documentation  Taken 4/21/2025 0930 by Nancy Flores RN  Pressure Reduction Techniques:   frequent weight shift encouraged   heels elevated off bed  Skin Protection:   adhesive use limited   tubing/devices free from skin contact  Activity Management: activity adjusted per tolerance  Head of Bed (HOB) Positioning: HOB at 20-30 degrees     Problem: Heart Failure  Goal: Optimal Coping  Outcome: Adequate for Care Transition  Goal: Optimal Cardiac Output  Outcome: Adequate for Care Transition  Goal: Stable Heart Rate and Rhythm  Outcome: Adequate for Care Transition  Goal: Fluid and Electrolyte Balance  Outcome: Adequate for Care Transition  Goal: Optimal Functional Ability  Outcome: Adequate for Care Transition  Intervention: Optimize Functional Ability  Recent Flowsheet Documentation  Taken 4/21/2025 0930 by Nancy Flores RN  Activity Management: activity adjusted per tolerance  Goal: Improved Oral Intake  Outcome: Adequate for Care Transition  Goal: Effective Oxygenation and Ventilation  Outcome: Adequate for Care Transition  Intervention: Promote Airway Secretion Clearance  Recent Flowsheet Documentation  Taken 4/21/2025 0930 by Nancy Flores RN  Activity Management: activity adjusted per tolerance  Intervention: Optimize Oxygenation and Ventilation  Recent Flowsheet Documentation  Taken 4/21/2025 0930 by Nancy Flores RN  Head of Bed (HOB) Positioning: HOB at 20-30 degrees  Goal: Effective Breathing Pattern During Sleep  Outcome: Adequate for Care Transition  Intervention: Monitor and Manage Obstructive Sleep Apnea  Recent Flowsheet Documentation  Taken 4/21/2025 0930 by Nancy Flores RN  Medication Review/Management: medications reviewed     Problem: Dysrhythmia  Goal: Normalized Cardiac Rhythm  Outcome: Adequate  for Care Transition  Intervention: Monitor and Manage Cardiac Rhythm Effect  Recent Flowsheet Documentation  Taken 4/21/2025 0930 by Nancy Flores, RN  VTE Prevention/Management: (eliquis) other (see comments)

## 2025-04-21 NOTE — PLAN OF CARE
"A&O4 ASSIST of 1.On RA.Has SOB when bending or ambulating for too long.on tele A-fib/a-flutter cvr.Cleaned wound with wound cleanser and applied meplix .plan is to discharge to a TCU.cardiology,PT is following.      Goal Outcome Evaluation:      Plan of Care Reviewed With: patient    Overall Patient Progress: improvingOverall Patient Progress: improving    Problem: Adult Inpatient Plan of Care  Goal: Plan of Care Review  Description: The Plan of Care Review/Shift note should be completed every shift.  The Outcome Evaluation is a brief statement about your assessment that the patient is improving, declining, or no change.  This information will be displayed automatically on your shiftnote.  Outcome: Progressing  Flowsheets (Taken 4/21/2025 5651)  Plan of Care Reviewed With: patient  Overall Patient Progress: improving  Goal: Patient-Specific Goal (Individualized)  Description: You can add care plan individualizations to a care plan. Examples of Individualization might be:  \"Parent requests to be called daily at 9am for status\", \"I have a hard time hearing out of my right ear\", or \"Do not touch me to wake me up as it startlesme\".  Outcome: Progressing  Goal: Absence of Hospital-Acquired Illness or Injury  Outcome: Progressing  Intervention: Identify and Manage Fall Risk  Recent Flowsheet Documentation  Taken 4/20/2025 2300 by Sandhya Pina RN  Safety Promotion/Fall Prevention: safety round/check completed  Intervention: Prevent and Manage VTE (Venous Thromboembolism) Risk  Recent Flowsheet Documentation  Taken 4/20/2025 2300 by Sandhya Pina RN  VTE Prevention/Management: (pt on elquis) SCDs off (sequential compression devices)  Goal: Optimal Comfort and Wellbeing  Outcome: Progressing  Goal: Readiness for Transition of Care  Outcome: Progressing     Problem: Skin Injury Risk Increased  Goal: Skin Health and Integrity  Outcome: Not Progressing  Intervention: Plan: Nurse Driven Intervention: Moisture " Management  Recent Flowsheet Documentation  Taken 4/20/2025 2300 by Sandhya Pina, RN  Moisture Interventions:   Encourage regular toileting   No brief in bed

## 2025-04-21 NOTE — PLAN OF CARE
Occupational Therapy Discharge Summary    Reason for therapy discharge:    Discharged to transitional care facility.    Progress towards therapy goal(s). See goals on Care Plan in [facility] electronic health record for goal details.  Goals partially met.  Barriers to achieving goals:   discharge from facility.    Therapy recommendation(s):    Continued therapy is recommended.  Rationale/Recommendations:  Per treating therapist, pt would benefit from continued OT in TCU. See OT notes for details.

## 2025-04-21 NOTE — PLAN OF CARE
Physical Therapy Discharge Summary    Reason for therapy discharge:    Discharged to transitional care facility.    Progress towards therapy goal(s). See goals on Care Plan in [facility] electronic health record for goal details.  Goals partially met.  Barriers to achieving goals:   discharge from facility.    Therapy recommendation(s):    Continued therapy is recommended.  Rationale/Recommendations:  Per treating therapist, pt would benefit from continued PT in TCU. See PT notes for details.

## 2025-04-21 NOTE — PHARMACY-MEDICATION TEACHING
Discharge medication review for this patient is complete.   Patient was not counseled or given any education materials as discharged to LTC, TCU facility, Memory Care Facility, etc.  See [facility] for allergy information, prior to admission medications and immunization status.   Pharmacist assisted with medication reconciliation of discharge medications with PTA medications.      Discharge Medication List     Review of your medicines        START taking        Dose / Directions   apixaban ANTICOAGULANT 5 MG tablet  Commonly known as: ELIQUIS  Indication: Atrial Fibrillation Not Caused By A Heart Valve Problem      Dose: 5 mg  Take 1 tablet (5 mg) by mouth 2 times daily.  Refills: 0     famotidine 20 MG tablet  Commonly known as: PEPCID  Used for: Gastroesophageal reflux disease without esophagitis      Dose: 20 mg  Start taking on: April 22, 2025  Take 1 tablet (20 mg) by mouth daily.  Refills: 0     Lidocaine 4 % Patch  Commonly known as: LIDOCARE  Used for: Closed nondisplaced fracture of head of left radius, initial encounter      Dose: 1 patch  Place 1 patch over 12 hours onto the skin every 24 hours. To prevent lidocaine toxicity, patient should be patch free for 12 hrs daily.  Refills: 0     metoprolol tartrate 50 MG tablet  Commonly known as: LOPRESSOR      Dose: 50 mg  Take 1 tablet (50 mg) by mouth 2 times daily.  Refills: 0     senna-docusate 8.6-50 MG tablet  Commonly known as: SENOKOT-S/PERICOLACE  Used for: Constipation, unspecified constipation type      Dose: 1 tablet  Take 1 tablet by mouth 2 times daily as needed for constipation.  Refills: 0            CONTINUE these medicines which may have CHANGED, or have new prescriptions. If we are uncertain of the size of tablets/capsules you have at home, strength may be listed as something that might have changed.        Dose / Directions   * acetaminophen 500 MG tablet  Commonly known as: TYLENOL  This may have changed: Another medication with the same name was  added. Make sure you understand how and when to take each.      Dose: 1,000 mg  Take 1,000 mg by mouth 3 times daily as needed for mild pain.  Refills: 0     * acetaminophen 325 MG tablet  Commonly known as: TYLENOL  This may have changed: You were already taking a medication with the same name, and this prescription was added. Make sure you understand how and when to take each.  Used for: Closed nondisplaced fracture of head of left radius, initial encounter      Dose: 650 mg  Take 2 tablets (650 mg) by mouth every 4 hours as needed for mild pain, other, headaches or fever (and adjunct with moderate or severe pain or per patient request).  Refills: 0           * This list has 2 medication(s) that are the same as other medications prescribed for you. Read the directions carefully, and ask your doctor or other care provider to review them with you.                CONTINUE these medicines which have NOT CHANGED        Dose / Directions   atorvastatin 10 MG tablet  Commonly known as: LIPITOR  Used for: Hyperlipidemia LDL goal <130      Dose: 10 mg  TAKE 1 TABLET(10 MG) BY MOUTH DAILY  Quantity: 90 tablet  Refills: 0     * buPROPion 300 MG 24 hr tablet  Commonly known as: WELLBUTRIN XL  Used for: Major depressive disorder, recurrent episode, mild      TAKE 1 TABLET(300 MG) BY MOUTH EVERY MORNING  Quantity: 90 tablet  Refills: 3     * buPROPion 150 MG 24 hr tablet  Commonly known as: WELLBUTRIN XL      Dose: 150 mg  Take 150 mg by mouth every morning.  Refills: 0     dorzolamide-timolol 2-0.5 % ophthalmic solution  Commonly known as: COSOPT      Dose: 1 drop  Place 1 drop into both eyes 2 times daily  Refills: 0     multivitamin w/minerals tablet      Dose: 1 tablet  Take 1 tablet by mouth daily  Refills: 0     spironolactone 25 MG tablet  Commonly known as: ALDACTONE  Used for: Essential hypertension, benign, Major depressive disorder, recurrent episode, mild      Dose: 25 mg  Take 1 tablet (25 mg) by mouth daily Hold  for SBP <110 or DBP <55 or dizziness initially, revisit w/ tcu provider.  Quantity: 90 tablet  Refills: 3     vitamin D3 50 mcg (2000 units) tablet  Commonly known as: CHOLECALCIFEROL      Dose: 1 tablet  Take 1 tablet by mouth daily  Refills: 0           * This list has 2 medication(s) that are the same as other medications prescribed for you. Read the directions carefully, and ask your doctor or other care provider to review them with you.

## 2025-04-21 NOTE — DISCHARGE SUMMARY
"[facility]  Discharge Summary  Hospitalist      Date of Admission:  4/17/2025  Date of Discharge:  4/21/2025  Provider:  Deidre Pickard MD  Date of Service (when I last saw the patient): 04/21/25      Primary Provider: Shun Prado          Discharge Diagnosis:     Discharge Diagnoses   New onset A-fib with RVR  New diagnosis of heart failure  Elevated troponin    Other medical issues:  Past Medical History:   Diagnosis Date    Anemia     baseline 10-11    Arthritis     Bleeding ulcer     duodenal ulcer    CKD (chronic kidney disease) stage 3, GFR 30-59 ml/min (H)     Essential hypertension, benign     MDD (major depressive disorder)     Nephrolithiasis     Osteoporosis     Pure hypercholesterolemia     Spinal stenosis     Unspecified cataract     Unspecified glaucoma(365.9)           History of Present Illness   Riddhi Aguilar is an 89 year old female who presented with lightheadedness and shortness of breath.  Please see the admission history and physical for full details.    Hospital Course     Riddhi Aguilar was admitted on 4/17/2025. Sheis a 89 year old female who was admitted  with New onset A-fib with RVR. Her past medical history of osteoporosis, osteoarthritis, kidney stones, dyslipidemia, glaucoma, spinal stenosis, ureteral stones, recent history of femoral neck fracture mechanical fall related, past history of duodenal ulcer in 2006, DNR/DNI status.      The following problems were addressed during her hospitalization:    New onset A-fib with RVR  New diagnosis of heart failure  Elevated troponin: Demand ischemia versus ACS in setting of new onset A-fib  - Patient received diltiazem bolus and was switched to diltiazem drip for rate control and was later transitioned to beta-blocker with adequate rate control.  She is can be discharged on metoprolol  - Echo  \" 1. The left ventricle is normal in size. Moderately increased left ventricular  wall thickness is noted. Left " "ventricular systolic function is normal. The  visual ejection fraction is 60-65%. Diastolic function not assessed due to  atrial fibrillation. No regional wall motion abnormalities noted.  2. The right ventricle is normal size. The right ventricular systolic function  is normal.3. There is severe biatrial enlargement. There is no color Doppler evidence of  an atrial shunt.4. Mild mitral stenosis.5. Mild to moderate valvular aortic stenosis. The mean AoV pressure gradient is 15.7 mmHg. The peak AoV pressure gradient is 24.0 mmHg. The calculated  aortic valve are is 1.3 cm^2.6. No pericardial effusion.7. In comparison to the previous report dated 01/25/2024, there has been a mild interval increase in transaortic gradients.\"     - Cardiology consult appreciated.  They would like to continue current regimen with metoprolol and anticoagulation.  - Started on Eliquis  - Metoprolol started for rate control    continue beta-blocker, prior to admission Lipitor     H/o HTN  - Prior to admission on Aldactone  - Started on Aldactone at a lower dose     Hyperlipidemia  -Continue statins     Osteoarthritis  Osteoporosis  Prior fracture  -Prior to admission regimen     Duodenal ulcer  - Continue famotidine          Significant Results and Procedures   As noted above    Pending Results   Unresulted Labs Ordered in the Past 30 Days of this Admission       No orders found from 3/18/2025 to 4/18/2025.            Code Status   DNR / DNI       Primary Care Physician   Shun Prado    Physical Exam   Temp: 97.8  F (36.6  C) Temp src: Oral BP: (!) 140/61 Pulse: 75   Resp: 18 SpO2: 100 % O2 Device: None (Room air)    Vitals:    04/19/25 0539 04/20/25 0603 04/21/25 0522   Weight: 82.7 kg (182 lb 5.1 oz) 81.1 kg (178 lb 14.4 oz) 81.9 kg (180 lb 8 oz)     Vital Signs with Ranges  Temp:  [97.4  F (36.3  C)-97.8  F (36.6  C)] 97.8  F (36.6  C)  Pulse:  [57-98] 75  Resp:  [18-24] 18  BP: (121-140)/(41-81) 140/61  SpO2:  [98 %-100 %] 100 " %  I/O last 3 completed shifts:  In: 1030 [P.O.:1030]  Out: -     General Appearance:  Alert awake oriented x 3 no acute distress elderly lady appears much younger than stated age  Respiratory: Clear to auscultation  Cardiovascular: Irregularly irregular  GI: Soft nontender bowel sounds are present  Skin: Lower extremity skin changes consistent with chronic venous stasis  Other:  + lower extremity edema         Discharge Disposition   Discharged to short-term care facility    Consultations This Hospital Stay   CARDIOLOGY IP CONSULT  PHARMACY IP CONSULT  PHARMACY IP CONSULT  PHARMACY LIAISON FOR MEDICATION COVERAGE CONSULT  PHARMACY DISCHARGE EDUCATION BY PHARMACIST  CARE MANAGEMENT / SOCIAL WORK IP CONSULT  PHYSICAL THERAPY ADULT IP CONSULT  OCCUPATIONAL THERAPY ADULT IP CONSULT  PHYSICAL THERAPY ADULT IP CONSULT  OCCUPATIONAL THERAPY ADULT IP CONSULT    Time Spent on this Encounter   I, Deidre Pickard MD, personally saw the patient today and spent greater than 30 minutes discharging this patient.    Discharge Orders      Basic metabolic panel     CBC with platelets     Primary Care - Care Coordination Referral      Follow-Up with Cardiology PK      Primary Care Referral      General info for SNF    Length of Stay Estimate: Short Term Care: Estimated # of Days <30  Condition at Discharge: Improving  Level of care:skilled   Rehabilitation Potential: Fair  Admission H&P remains valid and up-to-date: Yes  Recent Chemotherapy: N/A  Use Nursing Home Standing Orders: Yes     Mantoux instructions    Give two-step Mantoux (PPD) Per Facility Policy Yes     Follow Up and recommended labs and tests    Follow up with [facility] physician.  The following labs/tests are recommended: Patient metabolic panel and hemoglobin patient was started on anticoagulation during hospitalization.  Home provider to monitor patient's heart rate and vitals.  Patient has scheduled follow-up with cardiology..     Reason for your hospital  stay    Please refer to discharge summary.     Activity - Up with nursing assistance     Physical Therapy Adult Consult    Evaluate and treat as clinically indicated.    Reason: Conditioning and weakness     Occupational Therapy Adult Consult    Evaluate and treat as clinically indicated.    Reason: Deconditioning and weakness     Fall precautions     Diet    Follow this diet upon discharge: Current Diet:Orders Placed This Encounter      Combination Diet Regular Diet Adult     Zio Patch Mail Out     Discharge Medications   Current Discharge Medication List        START taking these medications    Details   !! acetaminophen (TYLENOL) 325 MG tablet Take 2 tablets (650 mg) by mouth every 4 hours as needed for mild pain, other, headaches or fever (and adjunct with moderate or severe pain or per patient request).    Associated Diagnoses: Closed nondisplaced fracture of head of left radius, initial encounter      apixaban ANTICOAGULANT (ELIQUIS) 5 MG tablet Take 1 tablet (5 mg) by mouth 2 times daily.    Associated Diagnoses: Atrial flutter with rapid ventricular response (H)      famotidine (PEPCID) 20 MG tablet Take 1 tablet (20 mg) by mouth daily.    Associated Diagnoses: Gastroesophageal reflux disease without esophagitis      Lidocaine (LIDOCARE) 4 % Patch Place 1 patch over 12 hours onto the skin every 24 hours. To prevent lidocaine toxicity, patient should be patch free for 12 hrs daily.    Associated Diagnoses: Closed nondisplaced fracture of head of left radius, initial encounter      metoprolol tartrate (LOPRESSOR) 50 MG tablet Take 1 tablet (50 mg) by mouth 2 times daily.    Associated Diagnoses: Atrial flutter with rapid ventricular response (H)      senna-docusate (SENOKOT-S/PERICOLACE) 8.6-50 MG tablet Take 1 tablet by mouth 2 times daily as needed for constipation.    Associated Diagnoses: Constipation, unspecified constipation type       !! - Potential duplicate medications found. Please discuss with  provider.        CONTINUE these medications which have NOT CHANGED    Details   !! acetaminophen (TYLENOL) 500 MG tablet Take 1,000 mg by mouth 3 times daily as needed for mild pain.      atorvastatin (LIPITOR) 10 MG tablet TAKE 1 TABLET(10 MG) BY MOUTH DAILY  Qty: 90 tablet, Refills: 0    Associated Diagnoses: Hyperlipidemia LDL goal <130      !! buPROPion (WELLBUTRIN XL) 150 MG 24 hr tablet Take 150 mg by mouth every morning.      !! buPROPion (WELLBUTRIN XL) 300 MG 24 hr tablet TAKE 1 TABLET(300 MG) BY MOUTH EVERY MORNING  Qty: 90 tablet, Refills: 3    Associated Diagnoses: Major depressive disorder, recurrent episode, mild      dorzolamide-timolol (COSOPT) 22.3-6.8 MG/ML ophthalmic solution Place 1 drop into both eyes 2 times daily      multivitamin w/minerals (THERA-VIT-M) tablet Take 1 tablet by mouth daily      spironolactone (ALDACTONE) 12.5 MG tablet Take 12.5 mg tablet daily  hold for SBP <110 or DBP <55 or dizziness initially, revisit w/ tcu provider.  Qty: 90 tablet, Refills: 3    Associated Diagnoses: Essential hypertension, benign; Major depressive disorder, recurrent episode, mild      vitamin D3 (CHOLECALCIFEROL) 50 mcg (2000 units) tablet Take 1 tablet by mouth daily       !! - Potential duplicate medications found. Please discuss with provider.        Allergies   No Known Allergies  Data   Most Recent 3 CBC's:  Recent Labs   Lab Test 04/20/25  0823 04/19/25  0745 04/18/25  0559 04/17/25  2204   WBC  --  11.0 7.1 8.4   HGB  --  11.9 10.6* 10.6*   MCV  --  102* 100 100    329 308 336      Most Recent 3 BMP's:  Recent Labs   Lab Test 04/19/25  0745 04/18/25  0559 04/17/25  2204    141 137   POTASSIUM 4.2 3.9 4.4   CHLORIDE 102 103 101   CO2 23 24 23   BUN 32.3* 24.4* 26.1*   CR 1.15* 1.09* 1.17*   ANIONGAP 14 14 13   TAMMY 9.4 9.5 9.7   GLC 97 88 94     Most Recent 2 LFT's:  Recent Labs   Lab Test 04/18/25  0559 01/23/24 2036   AST 34 33   ALT 15 16   ALKPHOS 46 68   BILITOTAL 2.1* 2.1*      Most Recent INR's and Anticoagulation Dosing History:  Anticoagulation Dose History          Latest Ref Rng & Units 2012   Recent Dosing and Labs   INR 0.86 - 1.14 1.05      Most Recent 3 Troponin's:No lab results found.  Most Recent Cholesterol Panel:  Recent Labs   Lab Test 24  1036 23  1216   CHOL  --  127   LDL 67 57   HDL  --  56   TRIG  --  69     Most Recent 6 Bacteria Isolates From Any Culture (See EPIC Reports for Culture Details):No lab results found.  Most Recent TSH, T4 and A1c Labs:  Recent Labs   Lab Test 25  0559 24  1036   TSH 5.84* 5.28*   T4 1.16 1.09   A1C  --  5.3     Results for orders placed or performed during the hospital encounter of 25   XR Chest Port 1 View    Narrative    EXAM: XR CHEST PORT 1 VIEW  LOCATION: [facility]  DATE: 2025    INDICATION: shortness of breath  COMPARISON: 2024.      Impression    IMPRESSION: The heart is unchanged in size and appearance dense mitral annular calcifications are again identified. There are is mild central pulmonary venous congestion with minimal bibasilar interstitial edema. Discoid atelectasis is seen in the left   perihilar and infrahilar lung zones.   Echocardiogram Complete     Value    LVEF  60-65%    Narrative    [id number]  [id number]  [id number]  [id number]^WILFRID^JOSELO     [facility]  Echocardiography Laboratory  [address] [place] [address]     Name: KLEVER BROWN  MRN: [id number]  : 02/15/1931  Study Date: 2025 07:42 AM  Age: 89 yrs  Gender: Female  Patient Location: [facility]  Reason For Study: Atrial Flutter  Ordering Physician: JOSELO YUEN  Performed By: Cecelia Valentine     BSA: 1.8 m2  Height: 59 in  Weight: 187 lb  BP: 136/77 mmHg  ______________________________________________________________________________  Procedure  Echocardiogram with two-dimensional, color and spectral  Doppler.  ______________________________________________________________________________  Interpretation Summary     1. The left ventricle is normal in size. Moderately increased left ventricular  wall thickness is noted. Left ventricular systolic function is normal. The  visual ejection fraction is 60-65%. Diastolic function not assessed due to  atrial fibrillation. No regional wall motion abnormalities noted.  2. The right ventricle is normal size. The right ventricular systolic function  is normal.  3. There is severe biatrial enlargement. There is no color Doppler evidence of  an atrial shunt.  4. Mild mitral stenosis.  5. Mild to moderate valvular aortic stenosis. The mean AoV pressure gradient  is 15.7 mmHg. The peak AoV pressure gradient is 24.0 mmHg. The calculated  aortic valve are is 1.3 cm^2.  6. No pericardial effusion.  7. In comparison to the previous report dated 01/25/2024, there has been a  mild interval increase in transaortic gradients.  ______________________________________________________________________________  Left Ventricle  The left ventricle is normal in size. Moderately increased left ventricular  wall thickness is noted. Left ventricular systolic function is normal. The  visual ejection fraction is 60-65%. Diastolic function not assessed due to  atrial fibrillation. No regional wall motion abnormalities noted.     Right Ventricle  The right ventricle is normal size. The right ventricular systolic function is  normal.     Atria  There is severe biatrial enlargement. There is no color Doppler evidence of an  atrial shunt.     Mitral Valve  There is moderate to severe mitral annular calcification. There is mild mitral  stenosis. The mean mitral valve gradient is 3.6 mmHg. The peak mitral valve  gradient is 7.8 mmHg. (HR 97 bpm).     Tricuspid Valve  There is trace to mild tricuspid regurgitation. The right ventricular systolic  pressure is approximated at 29.0 mmHg plus the right atrial  pressure.     Aortic Valve  There is mild trileaflet aortic sclerosis. No aortic regurgitation is present.  Mild to moderate valvular aortic stenosis. The mean AoV pressure gradient is  15.7 mmHg. The peak AoV pressure gradient is 24.0 mmHg. The calculated aortic  valve are is 1.3 cm^2.     Pulmonic Valve  There is trace pulmonic valvular regurgitation. There is no pulmonic valvular  stenosis.     Vessels  The aortic root is normal size. Normal size ascending aorta. The inferior vena  cava is normal.     Pericardium  There is no pericardial effusion.     Rhythm  The rhythm was atrial flutter.  ______________________________________________________________________________  MMode/2D Measurements & Calculations  IVSd: 1.1 cm  LVIDd: 4.4 cm  LVIDs: 2.5 cm  LVPWd: 1.2 cm  FS: 43.4 %  LV mass(C)d: 188.6 grams  LV mass(C)dI: 105.2 grams/m2  Ao root diam: 2.8 cm  asc Aorta Diam: 3.3 cm  LVOT diam: 1.9 cm  LVOT area: 2.8 cm2  Ao root diam index Ht(cm/m): 1.8  Ao root diam index BSA (cm/m2): 1.5  Asc Ao diam index BSA (cm/m2): 1.8  Asc Ao diam index Ht(cm/m): 2.2  LA Volume (BP): 142.0 ml     LA Volume Index (BP): 79.3 ml/m2  RWT: 0.56  TAPSE: 1.5 cm     Doppler Measurements & Calculations  MV max P.8 mmHg  MV mean PG: 3.6 mmHg  MV V2 VTI: 30.7 cm  MVA(VTI): 2.0 cm2  Ao V2 max: 242.2 cm/sec  Ao max P.0 mmHg  Ao V2 mean: 189.4 cm/sec  Ao mean PG: 15.7 mmHg  Ao V2 VTI: 48.1 cm  OLIVIER(I,D): 1.3 cm2  OLIVIER(V,D): 1.5 cm2  LV V1 max P.7 mmHg  LV V1 max: 129.0 cm/sec  LV V1 VTI: 21.3 cm  SV(LVOT): 60.1 ml  SI(LVOT): 33.5 ml/m2  TR max freeman: 267.0 cm/sec  TR max P.0 mmHg  AV Freeman Ratio (DI): 0.53  OLIVIER Index (cm2/m2): 0.70     ______________________________________________________________________________  Report approved by: Isabell Akbar MD on 2025 09:33 AM                 Disclaimer: This note consists of symbols derived from keyboarding, dictation and/or voice recognition software. As a result, there may be errors in  the script that have gone undetected. Please consider this when interpreting information found in this chart.

## 2025-04-21 NOTE — PROGRESS NOTES
Care Management Discharge Note    Discharge Date: 04/21/2025     Discharge Disposition: Transitional Care    Discharge Transportation: family will provide    Private pay costs discussed: private room/amenity fees    Does the patient's insurance plan have a 3 day qualifying hospital stay waiver?  Yes     Which insurance plan 3 day waiver is available? Alternative insurance waiver    Will the waiver be used for post-acute placement? No    PAS Confirmation Code:  [id number]  Patient/family educated on Medicare website which has current facility and service quality ratings: yes    Education Provided on the Discharge Plan: Yes  Persons Notified of Discharge Plans: Patient, hospitalist, bedside RN,   Patient/Family in Agreement with the Plan: yes    Handoff Referral Completed: Yes, [facility] PCP: Internal handoff referral completed    Additional Information:  Patient is medically ready to discharge today to TCU. [facility] is able to admit patient today into a private room. Met with patient at bedside who is in agreement with discharge today and is happy with AV having a private room for her, she is in agreement with private room fees. Pt has spoken with her son and, he will be here at 1200 to drive patient to TCU. Signed discharge orders have been faxed to [facility]. No further discharge needs noted.       You have successfully submitted the preadmission screening (PAS) to the Senior [employer] Line on:  Created On  4/21/2025 9:15 AM    Your confirmation number is:  [id number]    Results  Level of Care:  Based on the information you provided, it appears this person meets level of care for purposes of MA payment.   OBRA:  It appears this person does not need an OBRA Level II assessment.         Michaelle Sanchez, RN  Care Coordinator  [facility]  [phone]

## 2025-04-22 ENCOUNTER — TRANSITIONAL CARE UNIT VISIT (OUTPATIENT)
Dept: GERIATRICS | Facility: CLINIC | Age: OVER 89
End: 2025-04-22
Payer: COMMERCIAL

## 2025-04-22 VITALS
RESPIRATION RATE: 18 BRPM | HEART RATE: 76 BPM | HEIGHT: 60 IN | BODY MASS INDEX: 35.18 KG/M2 | WEIGHT: 179.2 LBS | OXYGEN SATURATION: 100 % | DIASTOLIC BLOOD PRESSURE: 82 MMHG | SYSTOLIC BLOOD PRESSURE: 147 MMHG | TEMPERATURE: 97.4 F

## 2025-04-22 DIAGNOSIS — I48.92 ATRIAL FLUTTER WITH RAPID VENTRICULAR RESPONSE (H): Primary | ICD-10-CM

## 2025-04-22 DIAGNOSIS — N18.4 CHRONIC KIDNEY DISEASE, STAGE IV (SEVERE) (H): ICD-10-CM

## 2025-04-22 DIAGNOSIS — F32.9 MAJOR DEPRESSIVE DISORDER, REMISSION STATUS UNSPECIFIED, UNSPECIFIED WHETHER RECURRENT: ICD-10-CM

## 2025-04-22 DIAGNOSIS — Z87.19 H/O: DUODENAL ULCER: ICD-10-CM

## 2025-04-22 DIAGNOSIS — I10 ESSENTIAL HYPERTENSION, BENIGN: ICD-10-CM

## 2025-04-22 DIAGNOSIS — I10 BENIGN ESSENTIAL HYPERTENSION: ICD-10-CM

## 2025-04-22 PROCEDURE — 99309 SBSQ NF CARE MODERATE MDM 30: CPT | Performed by: PHYSICIAN ASSISTANT

## 2025-04-22 PROCEDURE — 36415 COLL VENOUS BLD VENIPUNCTURE: CPT | Mod: ORL | Performed by: INTERNAL MEDICINE

## 2025-04-22 PROCEDURE — 86481 TB AG RESPONSE T-CELL SUSP: CPT | Mod: ORL | Performed by: INTERNAL MEDICINE

## 2025-04-22 PROCEDURE — P9604 ONE-WAY ALLOW PRORATED TRIP: HCPCS | Mod: ORL | Performed by: INTERNAL MEDICINE

## 2025-04-22 NOTE — PROGRESS NOTES
[facility] GERIATRICS    PRIMARY CARE PROVIDER AND CLINIC:  Shun Prado MD, [address] / [place] [address]  Chief Complaint   Patient presents with    Hospital F/U      [facility] Medical Record Number:  [id number]  Place of Service where encounter took place:  [facility] ([facility]) [[id number]]    Riddhi Aguilar  is a 89 year old  (2/15/1931), admitted to the above facility from  [facility]. Hospital stay 4/17/25 through 4/21/25..   HPI:    Notes from hospitalization reviewed including H&P Cardiology consult and D/c summary    Riddhi Aguilar is an exceptionally pleasant 89-year-old female with a past medical history of duodenal ulcer, CKD, hypertension, depression, previous femur fracture who was recently hospitalized at [facility].  Presented for evaluation of palpitations.  Found to have atrial flutter with RVR.  Cardiology consulted and recommended rate control and initiation of anticoagulation.  Deconditioned compared to baseline to discharge to [facility]    Riddhi is evaluated in her room.  Overall doing well.  Feels much better today compared to yesterday.  Denies palpitations.  Hospitalization and med changes reviewed.  Does have a history of duodenal ulcer many years ago. Monitoring for melena and hematochezia discussed. Lives in independent apartment.      Review of nursing home EMR: -147    CODE STATUS/ADVANCE DIRECTIVES DISCUSSION:  Prior  DNR / DNI  ALLERGIES: No Known Allergies   PAST MEDICAL HISTORY:   Past Medical History:   Diagnosis Date    Anemia     baseline 10-11    Arthritis     Bleeding ulcer     duodenal ulcer    CKD (chronic kidney disease) stage 3, GFR 30-59 ml/min (H)     Essential hypertension, benign     MDD (major depressive disorder)     Nephrolithiasis     Osteoporosis     Pure hypercholesterolemia     Spinal stenosis     Unspecified cataract     Unspecified glaucoma(365.9)       PAST SURGICAL HISTORY:   has a past surgical  history that includes NONSPECIFIC PROCEDURE; NONSPECIFIC PROCEDURE; Breast surgery; Dilation and curettage, hysteroscopy diagnostic, combined (02/06/2014); Incision and drainage perineal, combined (02/06/2014); Excise lesion lower extremity (Right, 08/29/2017); appendectomy; Combined cystoscopy, retrogrades, exchange stent ureter(s) (Left, 01/24/2024); Combined Cystoscopy, Retrogrades, Ureteroscopy, Laser Holmium Lithotripsy Ureter(S), Insert Stent (Left, 02/20/2024); Cystoscopy, remove stent(s), combined (Left, 02/20/2024); Cystoscopy; and Closed reduction, percutaneous pinning hip (Left, 5/20/2024).  FAMILY HISTORY: family history includes Breast Cancer in her sister and sister; Cancer in her brother.  SOCIAL HISTORY:   reports that she has never smoked. She has never used smokeless tobacco. She reports that she does not drink alcohol and does not use drugs.  Patient's living condition: lives alone    Post Discharge Medication Reconciliation Status:   MED REC REQUIRED  Post Medication Reconciliation Status: discharge medications reconciled and changed, per note/orders       Current Outpatient Medications   Medication Sig Dispense Refill    acetaminophen (TYLENOL) 325 MG tablet Take 2 tablets (650 mg) by mouth every 4 hours as needed for mild pain, other, headaches or fever (and adjunct with moderate or severe pain or per patient request).      apixaban ANTICOAGULANT (ELIQUIS) 5 MG tablet Take 1 tablet (5 mg) by mouth 2 times daily.      atorvastatin (LIPITOR) 10 MG tablet TAKE 1 TABLET(10 MG) BY MOUTH DAILY 90 tablet 0    buPROPion (WELLBUTRIN XL) 150 MG 24 hr tablet Take 150 mg by mouth every morning.      buPROPion (WELLBUTRIN XL) 300 MG 24 hr tablet TAKE 1 TABLET(300 MG) BY MOUTH EVERY MORNING 90 tablet 3    dorzolamide-timolol (COSOPT) 22.3-6.8 MG/ML ophthalmic solution Place 1 drop into both eyes 2 times daily      famotidine (PEPCID) 20 MG tablet Take 1 tablet (20 mg) by mouth daily.      Lidocaine  (LIDOCARE) 4 % Patch Place 1 patch over 12 hours onto the skin every 24 hours. To prevent lidocaine toxicity, patient should be patch free for 12 hrs daily.      acetaminophen (TYLENOL) 500 MG tablet Take 1,000 mg by mouth 3 times daily as needed for mild pain.      metoprolol tartrate (LOPRESSOR) 50 MG tablet Take 1 tablet (50 mg) by mouth 2 times daily.      multivitamin w/minerals (THERA-VIT-M) tablet Take 1 tablet by mouth daily      senna-docusate (SENOKOT-S/PERICOLACE) 8.6-50 MG tablet Take 1 tablet by mouth 2 times daily as needed for constipation.      spironolactone (ALDACTONE) 25 MG tablet Take 0.5 tablets (12.5 mg) by mouth daily.      vitamin D3 (CHOLECALCIFEROL) 50 mcg (2000 units) tablet Take 1 tablet by mouth daily       No current facility-administered medications for this visit.       ROS:  10 point ROS of systems including Constitutional, Eyes, Respiratory, Cardiovascular, Gastroenterology, Genitourinary, Integumentary, Musculoskeletal, Psychiatric were all negative except for pertinent positives noted in my HPI.    Vitals:  BP (!) 147/82   Pulse 76   Temp 97.4  F (36.3  C)   Resp 18   Ht 1.524 m (5')   Wt 81.3 kg (179 lb 3.2 oz)   SpO2 100%   BMI 35.00 kg/m    Exam:  Physical Exam  Vitals (Facility EMR) reviewed.   Constitutional:       General: She is not in acute distress.  HENT:      Head: Normocephalic and atraumatic.   Eyes:      General: No scleral icterus.  Cardiovascular:      Rate and Rhythm: Normal rate and regular rhythm.   Pulmonary:      Effort: Pulmonary effort is normal.   Musculoskeletal:      Right lower leg: Edema present.      Left lower leg: Edema present.   Skin:     General: Skin is warm and dry.      Findings: No rash.   Neurological:      Mental Status: She is alert. Mental status is at baseline.   Psychiatric:         Behavior: Behavior normal.         Lab/Diagnostic data:  Recent labs in Psychiatric reviewed by me today.  and Most Recent 3 CBC's:  Recent Labs   Lab Test  04/20/25  0823 04/19/25  0745 04/18/25  0559 04/17/25  2204   WBC  --  11.0 7.1 8.4   HGB  --  11.9 10.6* 10.6*   MCV  --  102* 100 100    329 308 336     Most Recent 3 BMP's:  Recent Labs   Lab Test 04/19/25  0745 04/18/25  0559 04/17/25  2204    141 137   POTASSIUM 4.2 3.9 4.4   CHLORIDE 102 103 101   CO2 23 24 23   BUN 32.3* 24.4* 26.1*   CR 1.15* 1.09* 1.17*   ANIONGAP 14 14 13   TAMMY 9.4 9.5 9.7   GLC 97 88 94     Most Recent 2 LFT's:  Recent Labs   Lab Test 04/18/25  0559 01/23/24  2036   AST 34 33   ALT 15 16   ALKPHOS 46 68   BILITOTAL 2.1* 2.1*     Most Recent TSH and T4:  Recent Labs   Lab Test 04/18/25  0559   TSH 5.84*   T4 1.16     Most Recent Hemoglobin A1c:  Recent Labs   Lab Test 05/14/24  1036   A1C 5.3         Procedure  Echocardiogram with two-dimensional, color and spectral Doppler.  ______________________________________________________________________________  Interpretation Summary     1. The left ventricle is normal in size. Moderately increased left ventricular  wall thickness is noted. Left ventricular systolic function is normal. The  visual ejection fraction is 60-65%. Diastolic function not assessed due to  atrial fibrillation. No regional wall motion abnormalities noted.  2. The right ventricle is normal size. The right ventricular systolic function  is normal.  3. There is severe biatrial enlargement. There is no color Doppler evidence of  an atrial shunt.  4. Mild mitral stenosis.  5. Mild to moderate valvular aortic stenosis. The mean AoV pressure gradient  is 15.7 mmHg. The peak AoV pressure gradient is 24.0 mmHg. The calculated  aortic valve are is 1.3 cm^2.  6. No pericardial effusion.  7. In comparison to the previous report dated 01/25/2024, there has been a  mild interval increase in transaortic gradients.  ____________________________________________    ASSESSMENT/PLAN:  New onset atrial flutter with RVR: Cardiology consulted.  Conservative management recommended.   Metoprolol and apixaban added  Continue rate control with metoprolol  Continue apixaban  Zio patch ordered on discharge. Discussed with patient ok to bring from home and initiate at  TCU  Cardiology follow-up  CBC and BMP 4/28    Hypertension: Metoprolol added.  Spironolactone discontinued  Continue metoprolol spironolactone  Monitor vitals    Physical deconditioning  PT/OT/social work    History of duodenal ulcer: Occurred over 20 years ago  Famotidine started during hospital stay  CBC 4/28      CKD: Estimated Creatinine Clearance: 28.2 mL/min (A) (based on SCr of 1.15 mg/dL (H)).  BMP 4/28    Depression  Continue bupropion 450 mg daily      This note was completed in part using Dragon voice recognition software. Although reviewed after completion, some word and grammatical errors may occur.      Electronically signed by:  Edie Alexis PA-C

## 2025-04-22 NOTE — LETTER
4/22/2025      Riddhi Aguilar  [address]   [address]  [place] [address]        [facility] GERIATRICS    PRIMARY CARE PROVIDER AND CLINIC:  Shun Prado MD, [address] / [place] [address]  Chief Complaint   Patient presents with     Hospital F/U      [place] Medical Record Number:  [id number]  Place of Service where encounter took place:  [facility] ([facility]) [[id number]]    Riddhi Aguilar  is a 89 year old  (2/15/1931), admitted to the above facility from  [facility]. Hospital stay 4/17/25 through 4/21/25..   HPI:    Notes from hospitalization reviewed including H&P Cardiology consult and D/c summary    Riddhi Aguilar is an exceptionally pleasant 89-year-old female with a past medical history of duodenal ulcer, CKD, hypertension, depression, previous femur fracture who was recently hospitalized at [facility].  Presented for evaluation of palpitations.  Found to have atrial flutter with RVR.  Cardiology consulted and recommended rate control and initiation of anticoagulation.  Deconditioned compared to baseline to discharge to U    Riddhi is evaluated in her room.  Overall doing well.  Feels much better today compared to yesterday.  Denies palpitations.  Hospitalization and med changes reviewed.  Does have a history of duodenal ulcer many years ago. Monitoring for melena and hematochezia discussed. Lives in independent apartment.      Review of nursing home EMR: -147    CODE STATUS/ADVANCE DIRECTIVES DISCUSSION:  Prior  DNR / DNI  ALLERGIES: No Known Allergies   PAST MEDICAL HISTORY:   Past Medical History:   Diagnosis Date     Anemia     baseline 10-11     Arthritis      Bleeding ulcer     duodenal ulcer     CKD (chronic kidney disease) stage 3, GFR 30-59 ml/min (H)      Essential hypertension, benign      MDD (major depressive disorder)      Nephrolithiasis      Osteoporosis      Pure hypercholesterolemia      Spinal stenosis       Unspecified cataract      Unspecified glaucoma(365.9)       PAST SURGICAL HISTORY:   has a past surgical history that includes NONSPECIFIC PROCEDURE; NONSPECIFIC PROCEDURE; Breast surgery; Dilation and curettage, hysteroscopy diagnostic, combined (02/06/2014); Incision and drainage perineal, combined (02/06/2014); Excise lesion lower extremity (Right, 08/29/2017); appendectomy; Combined cystoscopy, retrogrades, exchange stent ureter(s) (Left, 01/24/2024); Combined Cystoscopy, Retrogrades, Ureteroscopy, Laser Holmium Lithotripsy Ureter(S), Insert Stent (Left, 02/20/2024); Cystoscopy, remove stent(s), combined (Left, 02/20/2024); Cystoscopy; and Closed reduction, percutaneous pinning hip (Left, 5/20/2024).  FAMILY HISTORY: family history includes Breast Cancer in her sister and sister; Cancer in her brother.  SOCIAL HISTORY:   reports that she has never smoked. She has never used smokeless tobacco. She reports that she does not drink alcohol and does not use drugs.  Patient's living condition: lives alone    Post Discharge Medication Reconciliation Status:   MED REC REQUIRED  Post Medication Reconciliation Status: discharge medications reconciled and changed, per note/orders       Current Outpatient Medications   Medication Sig Dispense Refill     acetaminophen (TYLENOL) 325 MG tablet Take 2 tablets (650 mg) by mouth every 4 hours as needed for mild pain, other, headaches or fever (and adjunct with moderate or severe pain or per patient request).       apixaban ANTICOAGULANT (ELIQUIS) 5 MG tablet Take 1 tablet (5 mg) by mouth 2 times daily.       atorvastatin (LIPITOR) 10 MG tablet TAKE 1 TABLET(10 MG) BY MOUTH DAILY 90 tablet 0     buPROPion (WELLBUTRIN XL) 150 MG 24 hr tablet Take 150 mg by mouth every morning.       buPROPion (WELLBUTRIN XL) 300 MG 24 hr tablet TAKE 1 TABLET(300 MG) BY MOUTH EVERY MORNING 90 tablet 3     dorzolamide-timolol (COSOPT) 22.3-6.8 MG/ML ophthalmic solution Place 1 drop into both eyes 2  times daily       famotidine (PEPCID) 20 MG tablet Take 1 tablet (20 mg) by mouth daily.       Lidocaine (LIDOCARE) 4 % Patch Place 1 patch over 12 hours onto the skin every 24 hours. To prevent lidocaine toxicity, patient should be patch free for 12 hrs daily.       acetaminophen (TYLENOL) 500 MG tablet Take 1,000 mg by mouth 3 times daily as needed for mild pain.       metoprolol tartrate (LOPRESSOR) 50 MG tablet Take 1 tablet (50 mg) by mouth 2 times daily.       multivitamin w/minerals (THERA-VIT-M) tablet Take 1 tablet by mouth daily       senna-docusate (SENOKOT-S/PERICOLACE) 8.6-50 MG tablet Take 1 tablet by mouth 2 times daily as needed for constipation.       spironolactone (ALDACTONE) 25 MG tablet Take 0.5 tablets (12.5 mg) by mouth daily.       vitamin D3 (CHOLECALCIFEROL) 50 mcg (2000 units) tablet Take 1 tablet by mouth daily       No current facility-administered medications for this visit.       ROS:  10 point ROS of systems including Constitutional, Eyes, Respiratory, Cardiovascular, Gastroenterology, Genitourinary, Integumentary, Musculoskeletal, Psychiatric were all negative except for pertinent positives noted in my HPI.    Vitals:  BP (!) 147/82   Pulse 76   Temp 97.4  F (36.3  C)   Resp 18   Ht 1.524 m (5')   Wt 81.3 kg (179 lb 3.2 oz)   SpO2 100%   BMI 35.00 kg/m    Exam:  Physical Exam  Vitals (Facility EMR) reviewed.   Constitutional:       General: She is not in acute distress.  HENT:      Head: Normocephalic and atraumatic.   Eyes:      General: No scleral icterus.  Cardiovascular:      Rate and Rhythm: Normal rate and regular rhythm.   Pulmonary:      Effort: Pulmonary effort is normal.   Musculoskeletal:      Right lower leg: Edema present.      Left lower leg: Edema present.   Skin:     General: Skin is warm and dry.      Findings: No rash.   Neurological:      Mental Status: She is alert. Mental status is at baseline.   Psychiatric:         Behavior: Behavior normal.          Lab/Diagnostic data:  Recent labs in [facility] reviewed by me today.  and Most Recent 3 CBC's:  Recent Labs   Lab Test 04/20/25  0823 04/19/25  0745 04/18/25  0559 04/17/25  2204   WBC  --  11.0 7.1 8.4   HGB  --  11.9 10.6* 10.6*   MCV  --  102* 100 100    329 308 336     Most Recent 3 BMP's:  Recent Labs   Lab Test 04/19/25  0745 04/18/25  0559 04/17/25  2204    141 137   POTASSIUM 4.2 3.9 4.4   CHLORIDE 102 103 101   CO2 23 24 23   BUN 32.3* 24.4* 26.1*   CR 1.15* 1.09* 1.17*   ANIONGAP 14 14 13   TAMMY 9.4 9.5 9.7   GLC 97 88 94     Most Recent 2 LFT's:  Recent Labs   Lab Test 04/18/25  0559 01/23/24  2036   AST 34 33   ALT 15 16   ALKPHOS 46 68   BILITOTAL 2.1* 2.1*     Most Recent TSH and T4:  Recent Labs   Lab Test 04/18/25  0559   TSH 5.84*   T4 1.16     Most Recent Hemoglobin A1c:  Recent Labs   Lab Test 05/14/24  1036   A1C 5.3         Procedure  Echocardiogram with two-dimensional, color and spectral Doppler.  ______________________________________________________________________________  Interpretation Summary     1. The left ventricle is normal in size. Moderately increased left ventricular  wall thickness is noted. Left ventricular systolic function is normal. The  visual ejection fraction is 60-65%. Diastolic function not assessed due to  atrial fibrillation. No regional wall motion abnormalities noted.  2. The right ventricle is normal size. The right ventricular systolic function  is normal.  3. There is severe biatrial enlargement. There is no color Doppler evidence of  an atrial shunt.  4. Mild mitral stenosis.  5. Mild to moderate valvular aortic stenosis. The mean AoV pressure gradient  is 15.7 mmHg. The peak AoV pressure gradient is 24.0 mmHg. The calculated  aortic valve are is 1.3 cm^2.  6. No pericardial effusion.  7. In comparison to the previous report dated 01/25/2024, there has been a  mild interval increase in transaortic  gradients.  ____________________________________________    ASSESSMENT/PLAN:  New onset atrial flutter with RVR: Cardiology consulted.  Conservative management recommended.  Metoprolol and apixaban added  Continue rate control with metoprolol  Continue apixaban  Zio patch ordered on discharge. Discussed with patient ok to bring from home and initiate at  TCU  Cardiology follow-up  CBC and BMP 4/28    Hypertension: Metoprolol added.  Spironolactone discontinued  Continue metoprolol spironolactone  Monitor vitals    Physical deconditioning  PT/OT/social work    History of duodenal ulcer: Occurred over 20 years ago  Famotidine started during hospital stay  CBC 4/28      CKD: Estimated Creatinine Clearance: 28.2 mL/min (A) (based on SCr of 1.15 mg/dL (H)).  BMP 4/28    Depression  Continue bupropion 450 mg daily      This note was completed in part using Dragon voice recognition software. Although reviewed after completion, some word and grammatical errors may occur.      Electronically signed by:  Edie Alexis PA-C                    Sincerely,        Edie Alexis PA-C    Electronically signed

## 2025-04-23 ENCOUNTER — LAB REQUISITION (OUTPATIENT)
Dept: LAB | Facility: CLINIC | Age: OVER 89
End: 2025-04-23
Payer: COMMERCIAL

## 2025-04-23 DIAGNOSIS — I48.91 UNSPECIFIED ATRIAL FIBRILLATION (H): ICD-10-CM

## 2025-04-23 LAB
QUANTIFERON MITOGEN: 10 IU/ML
QUANTIFERON NIL TUBE: 0.05 IU/ML
QUANTIFERON TB1 TUBE: 0.24 IU/ML
QUANTIFERON TB2 TUBE: 0.19

## 2025-04-24 LAB
GAMMA INTERFERON BACKGROUND BLD IA-ACNC: 0.05 IU/ML
M TB IFN-G BLD-IMP: NEGATIVE
M TB IFN-G CD4+ BCKGRND COR BLD-ACNC: 9.95 IU/ML
MITOGEN IGNF BCKGRD COR BLD-ACNC: 0.14 IU/ML
MITOGEN IGNF BCKGRD COR BLD-ACNC: 0.19 IU/ML

## 2025-04-28 LAB
ANION GAP SERPL CALCULATED.3IONS-SCNC: 11 MMOL/L (ref 7–15)
BUN SERPL-MCNC: 35.5 MG/DL (ref 8–23)
CALCIUM SERPL-MCNC: 9.2 MG/DL (ref 8.8–10.4)
CHLORIDE SERPL-SCNC: 104 MMOL/L (ref 98–107)
CREAT SERPL-MCNC: 1.07 MG/DL (ref 0.51–0.95)
EGFRCR SERPLBLD CKD-EPI 2021: 48 ML/MIN/1.73M2
ERYTHROCYTE [DISTWIDTH] IN BLOOD BY AUTOMATED COUNT: 26 % (ref 10–15)
GLUCOSE SERPL-MCNC: 82 MG/DL (ref 70–99)
HCO3 SERPL-SCNC: 21 MMOL/L (ref 22–29)
HCT VFR BLD AUTO: 31.3 % (ref 35–47)
HGB BLD-MCNC: 9.8 G/DL (ref 11.7–15.7)
MCH RBC QN AUTO: 33 PG (ref 26.5–33)
MCHC RBC AUTO-ENTMCNC: 31.3 G/DL (ref 31.5–36.5)
MCV RBC AUTO: 105 FL (ref 78–100)
PLATELET # BLD AUTO: 382 10E3/UL (ref 150–450)
POTASSIUM SERPL-SCNC: 4.3 MMOL/L (ref 3.4–5.3)
RBC # BLD AUTO: 2.97 10E6/UL (ref 3.8–5.2)
SODIUM SERPL-SCNC: 136 MMOL/L (ref 135–145)
WBC # BLD AUTO: 8.4 10E3/UL (ref 4–11)

## 2025-04-28 PROCEDURE — 36415 COLL VENOUS BLD VENIPUNCTURE: CPT | Mod: ORL | Performed by: PHYSICIAN ASSISTANT

## 2025-04-28 PROCEDURE — 85027 COMPLETE CBC AUTOMATED: CPT | Mod: ORL | Performed by: PHYSICIAN ASSISTANT

## 2025-04-28 PROCEDURE — P9604 ONE-WAY ALLOW PRORATED TRIP: HCPCS | Mod: ORL | Performed by: PHYSICIAN ASSISTANT

## 2025-04-28 PROCEDURE — 80048 BASIC METABOLIC PNL TOTAL CA: CPT | Mod: ORL | Performed by: PHYSICIAN ASSISTANT

## 2025-04-29 ENCOUNTER — TRANSITIONAL CARE UNIT VISIT (OUTPATIENT)
Dept: GERIATRICS | Facility: CLINIC | Age: OVER 89
End: 2025-04-29
Payer: COMMERCIAL

## 2025-04-29 VITALS
RESPIRATION RATE: 16 BRPM | HEART RATE: 84 BPM | TEMPERATURE: 97.8 F | SYSTOLIC BLOOD PRESSURE: 136 MMHG | HEIGHT: 60 IN | DIASTOLIC BLOOD PRESSURE: 75 MMHG | BODY MASS INDEX: 35.6 KG/M2 | OXYGEN SATURATION: 95 % | WEIGHT: 181.3 LBS

## 2025-04-29 DIAGNOSIS — Z87.19 H/O: DUODENAL ULCER: ICD-10-CM

## 2025-04-29 DIAGNOSIS — I10 BENIGN ESSENTIAL HYPERTENSION: ICD-10-CM

## 2025-04-29 DIAGNOSIS — I48.92 ATRIAL FLUTTER WITH RAPID VENTRICULAR RESPONSE (H): ICD-10-CM

## 2025-04-29 DIAGNOSIS — R53.81 PHYSICAL DECONDITIONING: ICD-10-CM

## 2025-04-29 DIAGNOSIS — G47.00 INSOMNIA, UNSPECIFIED TYPE: Primary | ICD-10-CM

## 2025-04-29 PROCEDURE — 99309 SBSQ NF CARE MODERATE MDM 30: CPT | Performed by: PHYSICIAN ASSISTANT

## 2025-04-29 RX ORDER — ACETAMINOPHEN 500 MG
TABLET ORAL
Status: SHIPPED
Start: 2025-04-29

## 2025-04-29 NOTE — LETTER
4/29/2025      Riddhi Aguilar  [address]   [address]  [place] [address]          Chief Complaint   Patient presents with     Nursing Home Acute       HPI:  Riddhi Aguilar is a 89 year old  (2/15/1931), who is being seen today for an episodic care visit at: [facility] ([facility]) [[id number]].     Brief summary: Riddhi Aguilar is an exceptionally pleasant 89-year-old female with a past medical history of duodenal ulcer, CKD, hypertension, depression, previous femur fracture who was recently hospitalized at [facility].  Presented for evaluation of palpitations.  Found to have atrial flutter with RVR.  Cardiology consulted and recommended rate control and initiation of anticoagulation.  Deconditioned compared to baseline to discharge to U    Today's concern is: Still feels deconditioned. Not sleeping well at night. Falls asleep ok but wakes up and can't get back to sleep. Problema t home as well. Would like scheduled tylenol at HS and agrees to trial melatonin. No orthopnea. Edema stable         Review of nursing home EMR:-151, Wt 181.3,      Allergies, and PMH/PSH reviewed in [employer] today.    REVIEW OF SYSTEMS:  4 point ROS including Respiratory, CV, GI and , other than that noted in the HPI,  is negative    Objective:   /75   Pulse 84   Temp 97.8  F (36.6  C)   Resp 16   Ht 1.524 m (5')   Wt 82.2 kg (181 lb 4.8 oz)   SpO2 95%   BMI 35.41 kg/m      Physical Exam  Vitals (Facility EMR) reviewed.   Constitutional:       General: She is not in acute distress.  HENT:      Head: Normocephalic and atraumatic.   Eyes:      General: No scleral icterus.  Cardiovascular:      Rate and Rhythm: Normal rate. Rhythm irregular.   Pulmonary:      Effort: Pulmonary effort is normal.      Breath sounds: No wheezing.   Abdominal:      General: There is no distension.      Tenderness: There is no abdominal tenderness.   Musculoskeletal:      Comments: Mild non pitting edema   Skin:      General: Skin is warm and dry.      Findings: No rash.   Neurological:      Mental Status: She is alert. Mental status is at baseline.   Psychiatric:         Behavior: Behavior normal.          MED REC REQUIRED  Post Medication Reconciliation Status: discharge medications reconciled and changed, per note/orders      Recent labs in [facility] reviewed by me today.  and Most Recent 3 CBC's:  Recent Labs   Lab Test 04/28/25  0557 04/20/25  0823 04/19/25  0745 04/18/25  0559   WBC 8.4  --  11.0 7.1   HGB 9.8*  --  11.9 10.6*   *  --  102* 100    314 329 308     Most Recent 3 BMP's:  Recent Labs   Lab Test 04/28/25  0557 04/19/25  0745 04/18/25  0559    139 141   POTASSIUM 4.3 4.2 3.9   CHLORIDE 104 102 103   CO2 21* 23 24   BUN 35.5* 32.3* 24.4*   CR 1.07* 1.15* 1.09*   ANIONGAP 11 14 14   TAMMY 9.2 9.4 9.5   GLC 82 97 88       Assessment/Plan:  New onset atrial flutter with RVR: Cardiology consulted.  Conservative management recommended.  Metoprolol and apixaban added  Continue rate control with metoprolol  Continue apixaban  Wearing Ziopatch through 5/3  Cardiology follow-up 6/9/25      Insomnia:  Melatonin 3 mg at HS  Adjust tylenol to 1000 mg at hs and bid prn    Hypertension: Metoprolol added.  Spironolactone reduced  Continue metoprolol spironolactone  Monitor vitals    Physical deconditioning  PT/OT/social work    History of duodenal ulcer: Occurred over 20 years ago  Famotidine started during hospital stay  CBC 4/28 overall at baseline but down from hospital discharge. Will repeat Hgb 5/5      CKD: Estimated Creatinine Clearance: 30.6 mL/min (A) (based on SCr of 1.07 mg/dL (H)).  Monitor as clinically appropriate    Depression  Continue bupropion 450 mg daily    Right IT stage III pressure injury,p resent on admission  Continue local wound care  Facility WOC Rn following      Orders:  As above      Electronically signed by: Edie Alexis PA-C       Sincerely,        Edie Alexis  LISA    Electronically signed   None None/Airborne+Contact precautions

## 2025-04-29 NOTE — PROGRESS NOTES
Chief Complaint   Patient presents with    Nursing Home Acute       HPI:  Riddhi Aguilar is a 89 year old  (2/15/1931), who is being seen today for an episodic care visit at: [facility] ([facility]) [[id number]].     Brief summary: Riddhi Aguilar is an exceptionally pleasant 89-year-old female with a past medical history of duodenal ulcer, CKD, hypertension, depression, previous femur fracture who was recently hospitalized at [facility].  Presented for evaluation of palpitations.  Found to have atrial flutter with RVR.  Cardiology consulted and recommended rate control and initiation of anticoagulation.  Deconditioned compared to baseline to discharge to TCU    Today's concern is: Still feels deconditioned. Not sleeping well at night. Falls asleep ok but wakes up and can't get back to sleep. Problema t home as well. Would like scheduled tylenol at HS and agrees to trial melatonin. No orthopnea. Edema stable         Review of nursing home EMR:-151, Wt 181.3,      Allergies, and PMH/PSH reviewed in [employer] today.    REVIEW OF SYSTEMS:  4 point ROS including Respiratory, CV, GI and , other than that noted in the HPI,  is negative    Objective:   /75   Pulse 84   Temp 97.8  F (36.6  C)   Resp 16   Ht 1.524 m (5')   Wt 82.2 kg (181 lb 4.8 oz)   SpO2 95%   BMI 35.41 kg/m      Physical Exam  Vitals (Facility EMR) reviewed.   Constitutional:       General: She is not in acute distress.  HENT:      Head: Normocephalic and atraumatic.   Eyes:      General: No scleral icterus.  Cardiovascular:      Rate and Rhythm: Normal rate. Rhythm irregular.   Pulmonary:      Effort: Pulmonary effort is normal.      Breath sounds: No wheezing.   Abdominal:      General: There is no distension.      Tenderness: There is no abdominal tenderness.   Musculoskeletal:      Comments: Mild non pitting edema   Skin:     General: Skin is warm and dry.      Findings: No rash.   Neurological:      Mental Status: She is  alert. Mental status is at baseline.   Psychiatric:         Behavior: Behavior normal.          MED REC REQUIRED  Post Medication Reconciliation Status: discharge medications reconciled and changed, per note/orders      Recent labs in EPIC reviewed by me today.  and Most Recent 3 CBC's:  Recent Labs   Lab Test 04/28/25  0557 04/20/25  0823 04/19/25  0745 04/18/25  0559   WBC 8.4  --  11.0 7.1   HGB 9.8*  --  11.9 10.6*   *  --  102* 100    314 329 308     Most Recent 3 BMP's:  Recent Labs   Lab Test 04/28/25  0557 04/19/25  0745 04/18/25  0559    139 141   POTASSIUM 4.3 4.2 3.9   CHLORIDE 104 102 103   CO2 21* 23 24   BUN 35.5* 32.3* 24.4*   CR 1.07* 1.15* 1.09*   ANIONGAP 11 14 14   TAMMY 9.2 9.4 9.5   GLC 82 97 88       Assessment/Plan:  New onset atrial flutter with RVR: Cardiology consulted.  Conservative management recommended.  Metoprolol and apixaban added  Continue rate control with metoprolol  Continue apixaban  Wearing Ziopatch through 5/3  Cardiology follow-up 6/9/25      Insomnia:  Melatonin 3 mg at HS  Adjust tylenol to 1000 mg at hs and bid prn    Hypertension: Metoprolol added.  Spironolactone reduced  Continue metoprolol spironolactone  Monitor vitals    Physical deconditioning  PT/OT/social work    History of duodenal ulcer: Occurred over 20 years ago  Famotidine started during hospital stay  CBC 4/28 overall at baseline but down from hospital discharge. Will repeat Hgb 5/5      CKD: Estimated Creatinine Clearance: 30.6 mL/min (A) (based on SCr of 1.07 mg/dL (H)).  Monitor as clinically appropriate    Depression  Continue bupropion 450 mg daily    Right IT stage III pressure injury,p resent on admission  Continue local wound care  Facility WOC Rn following      Orders:  As above      Electronically signed by: Edie Alexis PA-C

## 2025-05-01 ENCOUNTER — LAB REQUISITION (OUTPATIENT)
Dept: LAB | Facility: CLINIC | Age: OVER 89
End: 2025-05-01
Payer: COMMERCIAL

## 2025-05-01 DIAGNOSIS — I48.91 UNSPECIFIED ATRIAL FIBRILLATION (H): ICD-10-CM

## 2025-05-02 DIAGNOSIS — F33.0 MAJOR DEPRESSIVE DISORDER, RECURRENT EPISODE, MILD: Primary | ICD-10-CM

## 2025-05-02 NOTE — TELEPHONE ENCOUNTER
MC message also sent to pt.         Patient Contact    Attempt # 1    Was call answered?  No.  Left message on voicemail with information to call me back.

## 2025-05-05 LAB — HGB BLD-MCNC: 10.3 G/DL (ref 11.7–15.7)

## 2025-05-05 PROCEDURE — P9604 ONE-WAY ALLOW PRORATED TRIP: HCPCS | Mod: ORL | Performed by: PHYSICIAN ASSISTANT

## 2025-05-05 PROCEDURE — 85018 HEMOGLOBIN: CPT | Mod: ORL | Performed by: PHYSICIAN ASSISTANT

## 2025-05-05 PROCEDURE — 36415 COLL VENOUS BLD VENIPUNCTURE: CPT | Mod: ORL | Performed by: PHYSICIAN ASSISTANT

## 2025-05-05 RX ORDER — BUPROPION HYDROCHLORIDE 150 MG/1
150 TABLET ORAL EVERY MORNING
Qty: 90 TABLET | Refills: 3 | Status: SHIPPED | OUTPATIENT
Start: 2025-05-05

## 2025-05-08 ENCOUNTER — TRANSFERRED RECORDS (OUTPATIENT)
Dept: HEALTH INFORMATION MANAGEMENT | Facility: CLINIC | Age: OVER 89
End: 2025-05-08

## 2025-05-08 ENCOUNTER — LAB REQUISITION (OUTPATIENT)
Dept: LAB | Facility: CLINIC | Age: OVER 89
End: 2025-05-08
Payer: COMMERCIAL

## 2025-05-08 ENCOUNTER — TRANSITIONAL CARE UNIT VISIT (OUTPATIENT)
Dept: GERIATRICS | Facility: CLINIC | Age: OVER 89
End: 2025-05-08
Payer: COMMERCIAL

## 2025-05-08 VITALS
DIASTOLIC BLOOD PRESSURE: 89 MMHG | HEART RATE: 83 BPM | HEIGHT: 60 IN | OXYGEN SATURATION: 91 % | SYSTOLIC BLOOD PRESSURE: 153 MMHG | RESPIRATION RATE: 18 BRPM | TEMPERATURE: 97.2 F | WEIGHT: 178.7 LBS | BODY MASS INDEX: 35.08 KG/M2

## 2025-05-08 DIAGNOSIS — R06.02 SHORTNESS OF BREATH: ICD-10-CM

## 2025-05-08 DIAGNOSIS — I35.0 AORTIC VALVE STENOSIS, ETIOLOGY OF CARDIAC VALVE DISEASE UNSPECIFIED: ICD-10-CM

## 2025-05-08 DIAGNOSIS — R06.02 SHORTNESS OF BREATH: Primary | ICD-10-CM

## 2025-05-08 DIAGNOSIS — Z87.19 H/O: DUODENAL ULCER: ICD-10-CM

## 2025-05-08 DIAGNOSIS — I10 BENIGN ESSENTIAL HYPERTENSION: ICD-10-CM

## 2025-05-08 DIAGNOSIS — I48.92 ATRIAL FLUTTER WITH RAPID VENTRICULAR RESPONSE (H): ICD-10-CM

## 2025-05-08 DIAGNOSIS — N18.4 CHRONIC KIDNEY DISEASE, STAGE IV (SEVERE) (H): ICD-10-CM

## 2025-05-08 LAB — CV ZIO PRELIM RESULTS: NORMAL

## 2025-05-08 RX ORDER — SPIRONOLACTONE 25 MG/1
25 TABLET ORAL DAILY
Status: SHIPPED | DISCHARGE
Start: 2025-05-08

## 2025-05-08 NOTE — LETTER
5/8/2025      Riddhi Aguilar  [address]  [facility] [place] [facility] [id number]          Chief Complaint   Patient presents with     Nursing Home Acute       HPI:  Riddhi Aguilar is a 89 year old  (2/15/1931), who is being seen today for an episodic care visit at: [facility] ([facility]) [[id number]].     Brief summary: Riddhi Aguilar is an exceptionally pleasant 89-year-old female with a past medical history of duodenal ulcer, CKD, hypertension, depression, previous femur fracture who was recently hospitalized at [facility].  Presented for evaluation of palpitations.  Found to have atrial flutter with RVR.  Cardiology consulted and recommended rate control and initiation of anticoagulation.  Deconditioned compared to baseline to discharge to [facility]    Today's concern is: Riddhi is evaluated for follow-up.  Has complained of some mild dyspnea on exertion since admission.  Unfortunately, this is now progressed to shortness of breath at rest.  Feeling more winded.  No orthopnea.  Does have some worsening edema and a small open wound on her ankle that was leaking fluid.  No palpitations or dizziness.  No chest pain.  Zio patch was completed and sent back but results remain pending.      Review of nursing home EMR:-153, Wt 178.7, HR 66-84       Allergies, and PMH/PSH reviewed in [employer] today.    REVIEW OF SYSTEMS:  4 point ROS including Respiratory, CV, GI and , other than that noted in the HPI,  is negative    Objective:   BP (!) 153/89   Pulse 83   Temp 97.2  F (36.2  C)   Resp 18   Ht 1.524 m (5')   Wt 81.1 kg (178 lb 11.2 oz)   SpO2 (!) 91%   BMI 34.90 kg/m      Physical Exam  Vitals (Facility EMR) reviewed.   Constitutional:       General: She is not in acute distress.  HENT:      Head: Normocephalic and atraumatic.   Eyes:      General: No scleral icterus.  Cardiovascular:      Rate and Rhythm: Normal rate. Rhythm irregular.      Heart sounds: Murmur heard.   Pulmonary:      Effort:  Pulmonary effort is normal.   Musculoskeletal:      Right lower leg: Edema present.      Left lower leg: Edema present.   Skin:     General: Skin is warm and dry.      Comments: Small circular open wound on the anterior left ankle.  Kerlix slightly damp with clear fluid.   Neurological:      Mental Status: She is alert. Mental status is at baseline.   Psychiatric:         Behavior: Behavior normal.          MED REC REQUIRED  Post Medication Reconciliation Status: discharge medications reconciled and changed, per note/orders      Recent labs in [facility] reviewed by me today.  and Most Recent 3 CBC's:  Recent Labs   Lab Test 05/05/25  0545 04/28/25  0557 04/20/25  0823 04/19/25  0745 04/18/25  0559   WBC  --  8.4  --  11.0 7.1   HGB 10.3* 9.8*  --  11.9 10.6*   MCV  --  105*  --  102* 100   PLT  --  382 314 329 308     Most Recent 3 BMP's:  Recent Labs   Lab Test 04/28/25  0557 04/19/25  0745 04/18/25  0559    139 141   POTASSIUM 4.3 4.2 3.9   CHLORIDE 104 102 103   CO2 21* 23 24   BUN 35.5* 32.3* 24.4*   CR 1.07* 1.15* 1.09*   ANIONGAP 11 14 14   TAMMY 9.2 9.4 9.5   GLC 82 97 88     Most Recent TSH and T4:  Recent Labs   Lab Test 04/18/25  0559   TSH 5.84*   T4 1.16     Most Recent Hemoglobin A1c:  Recent Labs   Lab Test 05/14/24  1036   A1C 5.3       Assessment/Plan:  Shortness of breath  Aortic Stenosis  HFpEF: Vital signs stable.  Weight stable as well.  Does have lower extremity edema.  Recent TTE with preserved ejection fraction, moderate aortic stenosis, and biatrial enlargement.  Increase spironolactone back to previous dose of 25 mg daily  Chest x-ray, 2 view  Labs tomorrow including CBC, BMP and BNP  If work up unrevealing we will see if we can move up Cardiology appointment. Perhaps she is not tolerating Afib.    New onset atrial flutter with RVR: Cardiology consulted.  Conservative management recommended.  Metoprolol and apixaban added  Continue rate control with metoprolol  Continue apixaban  Completed  Zio patch.  Results pending.  Cardiology follow-up 6/9/25    Left ankle wound  Daily dressings with Adaptic, ABD and Kerlix      Insomnia:  Continue melatonin    Hypertension: Metoprolol added.  Spironolactone reduced on hospital discharge  BP overall stable.  Will increase spironolactone back to 25 mg daily given lower extremity  Continue metoprolol  Monitor vitals    Physical deconditioning  PT/OT/social work    History of duodenal ulcer: Occurred over 20 years ago  Famotidine started during hospital stay  Labs not completed 5/5 as ordered.  Given worsening dyspnea repeat CBC 5/5      CKD: Estimated Creatinine Clearance: 30.6 mL/min (A) (based on SCr of 1.07 mg/dL (H)).  BMP 5/5    Depression  Continue bupropion 450 mg daily    Right IT stage III pressure injury,p resent on admission  Continue local wound care  Facility WOC Rn following  Orders:  As above      Electronically signed by: Edie Alexis PA-C         Sincerely,        Edie Alexis PA-C    Electronically signed

## 2025-05-08 NOTE — PROGRESS NOTES
Chief Complaint   Patient presents with    Nursing Home Acute       HPI:  Riddhi Aguilar is a 89 year old  (2/15/1931), who is being seen today for an episodic care visit at: [facility] ([facility]) [[id number]].     Brief summary: Riddhi Aguilar is an exceptionally pleasant 89-year-old female with a past medical history of duodenal ulcer, CKD, hypertension, depression, previous femur fracture who was recently hospitalized at [facility].  Presented for evaluation of palpitations.  Found to have atrial flutter with RVR.  Cardiology consulted and recommended rate control and initiation of anticoagulation.  Deconditioned compared to baseline to discharge to U    Today's concern is: Riddhi is evaluated for follow-up.  Has complained of some mild dyspnea on exertion since admission.  Unfortunately, this is now progressed to shortness of breath at rest.  Feeling more winded.  No orthopnea.  Does have some worsening edema and a small open wound on her ankle that was leaking fluid.  No palpitations or dizziness.  No chest pain.  Zio patch was completed and sent back but results remain pending.      Review of nursing home EMR:-153, Wt 178.7, HR 66-84       Allergies, and PMH/PSH reviewed in [employer] today.    REVIEW OF SYSTEMS:  4 point ROS including Respiratory, CV, GI and , other than that noted in the HPI,  is negative    Objective:   BP (!) 153/89   Pulse 83   Temp 97.2  F (36.2  C)   Resp 18   Ht 1.524 m (5')   Wt 81.1 kg (178 lb 11.2 oz)   SpO2 (!) 91%   BMI 34.90 kg/m      Physical Exam  Vitals (Facility EMR) reviewed.   Constitutional:       General: She is not in acute distress.  HENT:      Head: Normocephalic and atraumatic.   Eyes:      General: No scleral icterus.  Cardiovascular:      Rate and Rhythm: Normal rate. Rhythm irregular.      Heart sounds: Murmur heard.   Pulmonary:      Effort: Pulmonary effort is normal.   Musculoskeletal:      Right lower leg: Edema present.      Left  lower leg: Edema present.   Skin:     General: Skin is warm and dry.      Comments: Small circular open wound on the anterior left ankle.  Kerlix slightly damp with clear fluid.   Neurological:      Mental Status: She is alert. Mental status is at baseline.   Psychiatric:         Behavior: Behavior normal.          MED REC REQUIRED  Post Medication Reconciliation Status: discharge medications reconciled and changed, per note/orders      Recent labs in [facility] reviewed by me today.  and Most Recent 3 CBC's:  Recent Labs   Lab Test 05/05/25  0545 04/28/25  0557 04/20/25  0823 04/19/25  0745 04/18/25  0559   WBC  --  8.4  --  11.0 7.1   HGB 10.3* 9.8*  --  11.9 10.6*   MCV  --  105*  --  102* 100   PLT  --  382 314 329 308     Most Recent 3 BMP's:  Recent Labs   Lab Test 04/28/25  0557 04/19/25  0745 04/18/25  0559    139 141   POTASSIUM 4.3 4.2 3.9   CHLORIDE 104 102 103   CO2 21* 23 24   BUN 35.5* 32.3* 24.4*   CR 1.07* 1.15* 1.09*   ANIONGAP 11 14 14   TAMMY 9.2 9.4 9.5   GLC 82 97 88     Most Recent TSH and T4:  Recent Labs   Lab Test 04/18/25  0559   TSH 5.84*   T4 1.16     Most Recent Hemoglobin A1c:  Recent Labs   Lab Test 05/14/24  1036   A1C 5.3       Assessment/Plan:  Shortness of breath  Aortic Stenosis  HFpEF: Vital signs stable.  Weight stable as well.  Does have lower extremity edema.  Recent TTE with preserved ejection fraction, moderate aortic stenosis, and biatrial enlargement.  Increase spironolactone back to previous dose of 25 mg daily  Chest x-ray, 2 view  Labs tomorrow including CBC, BMP and BNP  If work up unrevealing we will see if we can move up Cardiology appointment. Perhaps she is not tolerating Afib.    New onset atrial flutter with RVR: Cardiology consulted.  Conservative management recommended.  Metoprolol and apixaban added  Continue rate control with metoprolol  Continue apixaban  Completed Zio patch.  Results pending.  Cardiology follow-up 6/9/25    Left ankle wound  Daily dressings  with Adaptic, ABD and Kerlix      Insomnia:  Continue melatonin    Hypertension: Metoprolol added.  Spironolactone reduced on hospital discharge  BP overall stable.  Will increase spironolactone back to 25 mg daily given lower extremity  Continue metoprolol  Monitor vitals    Physical deconditioning  PT/OT/social work    History of duodenal ulcer: Occurred over 20 years ago  Famotidine started during hospital stay  Labs not completed 5/5 as ordered.  Given worsening dyspnea repeat CBC 5/5      CKD: Estimated Creatinine Clearance: 30.6 mL/min (A) (based on SCr of 1.07 mg/dL (H)).  BMP 5/5    Depression  Continue bupropion 450 mg daily    Right IT stage III pressure injury,p resent on admission  Continue local wound care  Facility WOC Rn following  Orders:  As above      Electronically signed by: Edie Alexis PA-C

## 2025-05-09 ENCOUNTER — RESULTS FOLLOW-UP (OUTPATIENT)
Dept: GERIATRICS | Facility: CLINIC | Age: OVER 89
End: 2025-05-09

## 2025-05-09 LAB
ANION GAP SERPL CALCULATED.3IONS-SCNC: 12 MMOL/L (ref 7–15)
BUN SERPL-MCNC: 33.3 MG/DL (ref 8–23)
CALCIUM SERPL-MCNC: 9.4 MG/DL (ref 8.8–10.4)
CHLORIDE SERPL-SCNC: 102 MMOL/L (ref 98–107)
CREAT SERPL-MCNC: 1.02 MG/DL (ref 0.51–0.95)
EGFRCR SERPLBLD CKD-EPI 2021: 51 ML/MIN/1.73M2
ERYTHROCYTE [DISTWIDTH] IN BLOOD BY AUTOMATED COUNT: 26.8 % (ref 10–15)
GLUCOSE SERPL-MCNC: 97 MG/DL (ref 70–99)
HCO3 SERPL-SCNC: 22 MMOL/L (ref 22–29)
HCT VFR BLD AUTO: 34.2 % (ref 35–47)
HGB BLD-MCNC: 11 G/DL (ref 11.7–15.7)
MCH RBC QN AUTO: 33.6 PG (ref 26.5–33)
MCHC RBC AUTO-ENTMCNC: 32.2 G/DL (ref 31.5–36.5)
MCV RBC AUTO: 105 FL (ref 78–100)
NT-PROBNP SERPL-MCNC: 6807 PG/ML (ref 0–624)
PLATELET # BLD AUTO: 323 10E3/UL (ref 150–450)
POTASSIUM SERPL-SCNC: 4.6 MMOL/L (ref 3.4–5.3)
RBC # BLD AUTO: 3.27 10E6/UL (ref 3.8–5.2)
SODIUM SERPL-SCNC: 136 MMOL/L (ref 135–145)
WBC # BLD AUTO: 10.9 10E3/UL (ref 4–11)

## 2025-05-09 PROCEDURE — P9604 ONE-WAY ALLOW PRORATED TRIP: HCPCS | Mod: ORL | Performed by: PHYSICIAN ASSISTANT

## 2025-05-09 PROCEDURE — 83880 ASSAY OF NATRIURETIC PEPTIDE: CPT | Mod: ORL | Performed by: PHYSICIAN ASSISTANT

## 2025-05-09 PROCEDURE — 80048 BASIC METABOLIC PNL TOTAL CA: CPT | Mod: ORL | Performed by: PHYSICIAN ASSISTANT

## 2025-05-09 PROCEDURE — 85027 COMPLETE CBC AUTOMATED: CPT | Mod: ORL | Performed by: PHYSICIAN ASSISTANT

## 2025-05-09 PROCEDURE — 36415 COLL VENOUS BLD VENIPUNCTURE: CPT | Mod: ORL | Performed by: PHYSICIAN ASSISTANT

## 2025-05-09 NOTE — PROGRESS NOTES
Cardiology Clinic Progress Note  Riddhi Aguilar MRN# [id number]   YOB: 1931 Age: 89 year old   Primary Cardiologist: Dr. Shirley  Reason for visit: Post hospital follow up             Assessment and Plan:   Riddhi Aguilar is a 89 year old female who is here today for posthospital follow-up.    1. HFpEF - TTE 4/2025 EF 60-65%, RV normal in size and function, severe biatrial enlargement, mild mitral stenosis, mild to moderate valvular aortic stenosis. NT proBNP 6807 5/9/25. Symptoms of REDDY and lower extremity edema  - Appears mildly volume overload, furosemide 10 mg daily for 3 days then daily as needed afterwards for weight gain or worsening lower extremity edema    2.  Persistent atrial fibrillation, atrial flutter - [id number] score of 4 (age +2, female, hypertension) on Eliquis 5 mg twice daily.  Metoprolol tartrate 50 mg twice daily for rate control  - 7-day Zio patch showed persistent atrial fibrillation, range  with average 89 bpm   - Goal for rate control given severe biatrial enlargement and age     3.  Mild to moderate aortic stenosis -TTE 4/17/2025 mean AoV pressure gradient 15.7 mmHg, peak aortic pressure gradient 24 mmHg, calculated aortic valve area 1.3 cm     4.  Mild mitral stenosis     5.  PVCs - 5% burden on Zio monitor 5/2025, metoprolol as above     6.  History of GIB - nothing recent    Today's Plan:   - Riddhi has noted worsening dyspnea on exertion since discharge from the hospital. Additionally noted worsening lower extremity edema. During her admission d-dimer was normal and she has been on anticoagulation since then for her atrial fibrillation; low suspicion for PE. NT proBNP 5/9/25 was 6807. Will trial furosemide 10 mg daily for 3 days then daily as needed for weight gain/worsening lower extremity edema. Will have RN team reach out to patient to see how she is feeling later this week    - Continue metoprolol tartrate 50 mg twice daily and Eliquis 5 mg twice daily for atrial  fibrillation. Fairly well controlled atrial fibrillation on Zio monitor.    Follow up plan:   - Follow up in 1 month to re-evaluate symptoms      Karen Waddell PA-C  [facility]        History of Presenting Illness:    Riddhi Aguilar is a very pleasant 89 year old female with atrial fibrillation/atrial flutter, mild to moderate aortic stenosis, mild mitral stenosis, hypertension, MDD, chronic left shoulder pain, and history of GI bleed.    Riddhi was admitted to the hospital 4/18 -4/21 with a chief complaint of tachycardia.  She was found to be in new onset atrial flutter.  TTE showed EF 60-65%, RV normal in size and function, severe biatrial enlargement, mild mitral stenosis, mild to moderate valvular aortic stenosis.  Discussion between cardiologist and patient it was ultimately decided to rate control patient.  She was placed on metoprolol tartrate 50 mg twice daily and Eliquis 5 mg twice daily for anticoagulation.  She does have a history of a GI bleed due to a duodenal ulcer many years ago but nothing recent.  Cardiology signed off with plans to have patient wear a ZIO monitor after discharge.  Cardiology was called back due to concerns with pauses.  Her telemetry was reviewed and she had a maximum pause of 2.8 ms in the setting of atrial flutter.  7-day Zio monitor showed 100% burden of atrial fibrillation with heart rate ranges 50 to-157 with average heart rate 89 bpm.  Also noted occasional PVCs but 5% burden.    Patient is here today for posthospital follow-up. Patient reports feeling okay. Daughter present during visit. She reports worsening REDDY since discharge. It is difficult for her to work with PT/OT in TCU due to her shortness of breath. Activities of daily living are very difficult. She denies orthopnea but does note worsening lower extremity edema. Denies chest pain, palpitations, lightheadedness, dizziness, near syncope, or syncope. Additionally she does sometime feel  unsteady on her feet. She does not feel ready to discharge from TCU.     Blood pressure 140/64 and HR 88 in clinic today. Weight 178 lbs.     Recent Labs  CBC 5/9/2025 with stable macrocytic anemia.  BMP 5/9/2025 with stable electrolytes and renal function.      Social History       Social History     Socioeconomic History    Marital status:      Spouse name: Not on file    Number of children: Not on file    Years of education: Not on file    Highest education level: Not on file   Occupational History    Not on file   Tobacco Use    Smoking status: Never    Smokeless tobacco: Never   Substance and Sexual Activity    Alcohol use: No     Alcohol/week: 0.0 standard drinks of alcohol    Drug use: No    Sexual activity: Not Currently     Partners: Male   Other Topics Concern    Parent/sibling w/ CABG, MI or angioplasty before 65F 55M? Not Asked   Social History Narrative    Not on file     Social Drivers of Health     Financial Resource Strain: High Risk (4/18/2025)    Financial Resource Strain     Within the past 12 months, have you or your family members you live with been unable to get utilities (heat, electricity) when it was really needed?: Yes   Food Insecurity: Low Risk  (4/18/2025)    Food Insecurity     Within the past 12 months, did you worry that your food would run out before you got money to buy more?: No     Within the past 12 months, did the food you bought just not last and you didn t have money to get more?: No   Transportation Needs: Low Risk  (4/18/2025)    Transportation Needs     Within the past 12 months, has lack of transportation kept you from medical appointments, getting your medicines, non-medical meetings or appointments, work, or from getting things that you need?: No   Physical Activity: Unknown (5/14/2024)    Exercise Vital Sign     Days of Exercise per Week: 4 days     Minutes of Exercise per Session: Not on file   Stress: Not on file (11/9/2024)   Social Connections: Unknown  (5/14/2024)    Social Connection and Isolation Panel [NHANES]     Frequency of Communication with Friends and Family: Not on file     Frequency of Social Gatherings with Friends and Family: Once a week     Attends Protestant Services: Not on file     Active Member of Clubs or Organizations: Not on file     Attends Club or Organization Meetings: Not on file     Marital Status: Not on file   Interpersonal Safety: Low Risk  (4/18/2025)    Interpersonal Safety     Do you feel physically and emotionally safe where you currently live?: Yes     Within the past 12 months, have you been hit, slapped, kicked or otherwise physically hurt by someone?: No     Within the past 12 months, have you been humiliated or emotionally abused in other ways by your partner or ex-partner?: No   Housing Stability: Low Risk  (4/18/2025)    Housing Stability     Do you have housing? : Yes     Are you worried about losing your housing?: No            Review of Systems:   Please see HPI         Physical Exam:   Vitals: There were no vitals taken for this visit.   Wt Readings from Last 4 Encounters:   05/09/25 81.1 kg (178 lb 11.2 oz)   05/08/25 81.1 kg (178 lb 11.2 oz)   04/29/25 82.2 kg (181 lb 4.8 oz)   04/25/25 82.6 kg (182 lb)     GEN: well nourished, in no acute distress.  HEENT:  Pupils equal, round. Sclerae nonicteric.   NECK: Supple, no masses appreciated. Mild JVD with patient.  C/V:  Irregularly irregular rhythm, normal rate, soft systolic murmur; no rub or gallop.   RESP: Respirations are unlabored. Clear to auscultation bilaterally without wheezing, rales, or rhonchi.  GI: Abdomen soft, nontender.  EXTREM: 1-2+ LE edema.  NEURO: Alert and oriented, cooperative.  SKIN: Warm and dry.        Data:   LIPID RESULTS:  Lab Results   Component Value Date    CHOL 127 05/09/2023    CHOL 126 04/12/2021    HDL 56 05/09/2023    HDL 61 04/12/2021    LDL 67 05/14/2024    LDL 57 05/09/2023    LDL 50 04/12/2021    TRIG 69 05/09/2023    TRIG 73  04/12/2021    CHOLHDLRATIO 2.9 09/21/2012     LIVER ENZYME RESULTS:  Lab Results   Component Value Date    AST 34 04/18/2025    AST 27 12/27/2018    ALT 15 04/18/2025    ALT 23 12/27/2018     CBC RESULTS:  Lab Results   Component Value Date    WBC 10.9 05/09/2025    WBC 5.2 04/26/2021    RBC 3.27 (L) 05/09/2025    RBC 2.83 (L) 04/26/2021    HGB 11.0 (L) 05/09/2025    HGB 9.9 (L) 04/26/2021    HCT 34.2 (L) 05/09/2025    HCT 30.1 (L) 04/26/2021     (H) 05/09/2025     (H) 04/26/2021    MCH 33.6 (H) 05/09/2025    MCH 35.0 (H) 04/26/2021    MCHC 32.2 05/09/2025    MCHC 32.9 04/26/2021    RDW 26.8 (H) 05/09/2025    RDW 20.0 (H) 04/26/2021     05/09/2025     04/26/2021     BMP RESULTS:  Lab Results   Component Value Date     05/09/2025     04/26/2021    POTASSIUM 4.6 05/09/2025    POTASSIUM 4.6 04/27/2022    POTASSIUM 4.1 04/26/2021    CHLORIDE 102 05/09/2025    CHLORIDE 106 04/27/2022    CHLORIDE 107 04/26/2021    CO2 22 05/09/2025    CO2 26 04/27/2022    CO2 29 04/26/2021    ANIONGAP 12 05/09/2025    ANIONGAP 7 04/27/2022    ANIONGAP 3 04/26/2021    GLC 97 05/09/2025    GLC 84 04/27/2022    GLC 79 04/26/2021    BUN 33.3 (H) 05/09/2025    BUN 28 04/27/2022    BUN 25 04/26/2021    CR 1.02 (H) 05/09/2025    CR 1.03 04/26/2021    GFRESTIMATED 51 (L) 05/09/2025    GFRESTIMATED 48 (L) 04/26/2021    GFRESTBLACK 55 (L) 04/26/2021    TAMMY 9.4 05/09/2025    TAMMY 9.1 04/26/2021      A1C RESULTS:  Lab Results   Component Value Date    A1C 5.3 05/14/2024     INR RESULTS:  Lab Results   Component Value Date    INR 1.05 12/16/2012            Medications     Current Outpatient Medications   Medication Sig Dispense Refill    acetaminophen (TYLENOL) 500 MG tablet Take 2 tablets (1,000 mg) by mouth at bedtime. May also take 2 tablets (1,000 mg) 2 times daily as needed for mild pain.      apixaban ANTICOAGULANT (ELIQUIS) 5 MG tablet Take 1 tablet (5 mg) by mouth 2 times daily.      atorvastatin  (LIPITOR) 10 MG tablet TAKE 1 TABLET(10 MG) BY MOUTH DAILY 90 tablet 0    buPROPion (WELLBUTRIN XL) 150 MG 24 hr tablet TAKE 1 TABLET(150 MG) BY MOUTH EVERY MORNING 90 tablet 3    buPROPion (WELLBUTRIN XL) 300 MG 24 hr tablet TAKE 1 TABLET(300 MG) BY MOUTH EVERY MORNING 90 tablet 3    dorzolamide-timolol (COSOPT) 22.3-6.8 MG/ML ophthalmic solution Place 1 drop into both eyes 2 times daily      famotidine (PEPCID) 20 MG tablet Take 1 tablet (20 mg) by mouth daily.      Lidocaine (LIDOCARE) 4 % Patch Place 1 patch over 12 hours onto the skin every 24 hours. To prevent lidocaine toxicity, patient should be patch free for 12 hrs daily.      melatonin 3 MG tablet Take 1 tablet (3 mg) by mouth nightly as needed for sleep.      metoprolol tartrate (LOPRESSOR) 50 MG tablet Take 1 tablet (50 mg) by mouth 2 times daily.      multivitamin w/minerals (THERA-VIT-M) tablet Take 1 tablet by mouth daily      senna-docusate (SENOKOT-S/PERICOLACE) 8.6-50 MG tablet Take 1 tablet by mouth 2 times daily as needed for constipation.      spironolactone (ALDACTONE) 25 MG tablet Take 1 tablet (25 mg) by mouth daily.      vitamin D3 (CHOLECALCIFEROL) 50 mcg (2000 units) tablet Take 1 tablet by mouth daily            Past Medical History     Past Medical History:   Diagnosis Date    Anemia     baseline 10-11    Arthritis     Bleeding ulcer     duodenal ulcer    CKD (chronic kidney disease) stage 3, GFR 30-59 ml/min (H)     Essential hypertension, benign     MDD (major depressive disorder)     Nephrolithiasis     Osteoporosis     Pure hypercholesterolemia     Spinal stenosis     Unspecified cataract     Unspecified glaucoma(365.9)      Past Surgical History:   Procedure Laterality Date    APPENDECTOMY      BREAST SURGERY      lumpectomy    CLOSED REDUCTION, PERCUTANEOUS PINNING HIP Left 5/20/2024    Procedure: Closed reduction percutaneous pinning, left hip;  Surgeon: Jose Luis Merino MD;  Location: [facility] OR    COMBINED CYSTOSCOPY,  RETROGRADES, EXCHANGE STENT URETER(S) Left 01/24/2024    Procedure: Cystoscopy, left retrograde pyelogram, left JJ stent placement, <1hr physician fluoroscopy time;  Surgeon: Aureliano Brandt MD;  Location: [facility] OR    COMBINED CYSTOSCOPY, RETROGRADES, URETEROSCOPY, LASER HOLMIUM LITHOTRIPSY URETER(S), INSERT STENT Left 02/20/2024    Procedure: Cystoscopy, left diagnostic ureteroscopy, left retrograde pyelogram, left ureteral stent removal, fluoroscopic interpretation <1 hour physician time;  Surgeon: Richie Jaquez MD;  Location: [facility] OR    CYSTOSCOPY      CYSTOSCOPY, REMOVE STENT(S), COMBINED Left 02/20/2024    Procedure: Cystoscopy, remove stent(s), combined;  Surgeon: Richie Jaquez MD;  Location:  OR    DILATION AND CURETTAGE, HYSTEROSCOPY DIAGNOSTIC, COMBINED  02/06/2014    Procedure: COMBINED DILATION AND CURETTAGE, HYSTEROSCOPY DIAGNOSTIC;  DILATION AND CURETTAGE, HYSTEROSCOPY, POLYPECTOMY, INCISION AND DRAINAGE OF SEBACEOUS CYST ;  Surgeon: Serena Zamora MD;  Location: [facility]    EXCISE LESION LOWER EXTREMITY Right 08/29/2017    Procedure: EXCISE LESION LOWER EXTREMITY;  WIDE EXCISIONAL BIOPSY OF RIGHT ANKLE BASAL CELL CARCINOMA;  Surgeon: Juan Luis Flores MD;  Location:  OR    INCISION AND DRAINAGE PERINEAL, COMBINED  02/06/2014    Procedure: COMBINED INCISION AND DRAINAGE PERINEAL;;  Surgeon: Serena Zamora MD;  Location:  [facility]    ZZC NONSPECIFIC PROCEDURE      Repair of buckled retina of rt eye    Z NONSPECIFIC PROCEDURE      Appy     Family History   Problem Relation Age of Onset    Breast Cancer Sister     Breast Cancer Sister     Cancer Brother         bone cancer in arm            Allergies   Patient has no known allergies.    The longitudinal plan of care for the diagnosis(es)/condition(s) as documented were addressed during this visit. Due to the added complexity in care, I will continue to support Riddhi in the subsequent management and with ongoing continuity of care.     35  minutes spent on the date of the encounter doing chart review, history and exam, documentation and further activities as noted above

## 2025-05-12 ENCOUNTER — RESULTS FOLLOW-UP (OUTPATIENT)
Dept: FAMILY MEDICINE | Facility: CLINIC | Age: OVER 89
End: 2025-05-12

## 2025-05-12 ENCOUNTER — TRANSITIONAL CARE UNIT VISIT (OUTPATIENT)
Dept: GERIATRICS | Facility: CLINIC | Age: OVER 89
End: 2025-05-12
Payer: COMMERCIAL

## 2025-05-12 ENCOUNTER — OFFICE VISIT (OUTPATIENT)
Dept: CARDIOLOGY | Facility: CLINIC | Age: OVER 89
End: 2025-05-12
Attending: INTERNAL MEDICINE
Payer: COMMERCIAL

## 2025-05-12 VITALS
HEART RATE: 88 BPM | SYSTOLIC BLOOD PRESSURE: 140 MMHG | HEIGHT: 60 IN | OXYGEN SATURATION: 98 % | BODY MASS INDEX: 34.95 KG/M2 | DIASTOLIC BLOOD PRESSURE: 64 MMHG | WEIGHT: 178 LBS

## 2025-05-12 VITALS
DIASTOLIC BLOOD PRESSURE: 98 MMHG | BODY MASS INDEX: 34.95 KG/M2 | HEIGHT: 60 IN | WEIGHT: 178 LBS | TEMPERATURE: 97.8 F | HEART RATE: 69 BPM | RESPIRATION RATE: 18 BRPM | OXYGEN SATURATION: 98 % | SYSTOLIC BLOOD PRESSURE: 148 MMHG

## 2025-05-12 DIAGNOSIS — I50.9 CONGESTIVE HEART FAILURE, UNSPECIFIED HF CHRONICITY, UNSPECIFIED HEART FAILURE TYPE (H): Primary | ICD-10-CM

## 2025-05-12 DIAGNOSIS — I48.92 ATRIAL FLUTTER WITH RAPID VENTRICULAR RESPONSE (H): ICD-10-CM

## 2025-05-12 DIAGNOSIS — R06.02 SHORTNESS OF BREATH: Primary | ICD-10-CM

## 2025-05-12 DIAGNOSIS — I48.20 CHRONIC ATRIAL FIBRILLATION (H): ICD-10-CM

## 2025-05-12 DIAGNOSIS — I10 BENIGN ESSENTIAL HYPERTENSION: ICD-10-CM

## 2025-05-12 DIAGNOSIS — R05.9 COUGH, UNSPECIFIED TYPE: Primary | ICD-10-CM

## 2025-05-12 PROCEDURE — 99309 SBSQ NF CARE MODERATE MDM 30: CPT | Performed by: PHYSICIAN ASSISTANT

## 2025-05-12 RX ORDER — FUROSEMIDE 20 MG/1
10 TABLET ORAL DAILY
Qty: 30 TABLET | Refills: 0 | Status: SHIPPED | OUTPATIENT
Start: 2025-05-12 | End: 2025-05-15

## 2025-05-12 NOTE — PROGRESS NOTES
Chief Complaint   Patient presents with    Nursing Home Acute       HPI:  Riddhi Aguilar is a 89 year old  (2/15/1931), who is being seen today for an episodic care visit at: [facility] ([facility] [[id number]].     Brief summary: Patient evaluated today to follow-up dyspnea visit yesterday.  Chest x-ray was obtained which showed no signs of volume overload.  Possible fullness in the left hilum, but I reviewed from previous imaging and this appears chronic/stable.  Zio patch formal read is pending but was able to review some tracings.  Primarily atrial flutter with controlled ventricular response.  Labs today currently pending.    Today's concern is: Still SOB with exertion. No Orthopnea. Weights down since admission    Wt Readings from Last 4 Encounters:   05/12/25 80.7 kg (178 lb)   05/12/25 80.7 kg (178 lb)   05/12/25 81.1 kg (178 lb 11.2 oz)   05/09/25 81.1 kg (178 lb 11.2 oz)         Review of nursing home EMR:-147, Wt 178      Allergies, and PMH/PSH reviewed in [place] today.    REVIEW OF SYSTEMS:  4 point ROS including Respiratory, CV, GI and , other than that noted in the HPI,  is negative    Objective:   BP (!) 148/98   Pulse 69   Temp 97.8  F (36.6  C)   Resp 18   Ht 1.524 m (5')   Wt 80.7 kg (178 lb)   SpO2 98%   BMI 34.76 kg/m      Physical Exam  Vitals (Facility EMR) reviewed.   Constitutional:       General: She is not in acute distress.  HENT:      Head: Normocephalic and atraumatic.   Eyes:      General: No scleral icterus.  Cardiovascular:      Rate and Rhythm: Rhythm irregular.   Pulmonary:      Effort: Pulmonary effort is normal.   Musculoskeletal:      Comments: 2+ LE edema   Skin:     General: Skin is warm and dry.      Findings: No rash.   Neurological:      Mental Status: She is alert. Mental status is at baseline.   Psychiatric:         Behavior: Behavior normal.          MED REC REQUIRED  Post Medication Reconciliation Status: discharge medications reconciled and  changed, per note/orders      Recent labs in [facility] reviewed by me today.  and Most Recent 3 CBC's:  Recent Labs   Lab Test 05/09/25  0732 05/05/25  0545 04/28/25  0557 04/20/25  0823 04/19/25  0745   WBC 10.9  --  8.4  --  11.0   HGB 11.0* 10.3* 9.8*  --  11.9   *  --  105*  --  102*     --  382 314 329     Most Recent 3 BMP's:  Recent Labs   Lab Test 05/09/25  0732 04/28/25  0557 04/19/25  0745    136 139   POTASSIUM 4.6 4.3 4.2   CHLORIDE 102 104 102   CO2 22 21* 23   BUN 33.3* 35.5* 32.3*   CR 1.02* 1.07* 1.15*   ANIONGAP 12 11 14   TAMMY 9.4 9.2 9.4   GLC 97 82 97     Most Recent 2 LFT's:  Recent Labs   Lab Test 04/18/25  0559 01/23/24 2036   AST 34 33   ALT 15 16   ALKPHOS 46 68   BILITOTAL 2.1* 2.1*       Assessment/Plan:  Shortness of breath  Aortic Stenosis  HFpEF:No hypoxia. CXR without signs of fluid over load or infiltrate. Labs with elevated but stable BNP. Hgb without worsening anemia to explain SOB. ZioPatch with Afib with CVR  Continue spironolactone  Cardiology follow up later today  .    New onset atrial flutter with RVR: Cardiology consulted.  Conservative management recommended.  Metoprolol and apixaban added  Continue rate control with metoprolol  Continue apixaban  ZioPatch with 100% Afib with CVR  Cardiology follow-up later today    Insomnia:  Continue melatonin    Hypertension:  Continue Metoprolol and Spironolactone     Physical deconditioning  PT/OT/social work    History of duodenal ulcer: Occurred over 20 years ago  Famotidine started during hospital stay  Hgb stable 5/9      CKD: Estimated Creatinine Clearance: 31.7 mL/min (A) (based on SCr of 1.02 mg/dL (H)).  BMP stable at TCU  Monitor as clinically appropriate    Depression  Continue bupropion 450 mg daily    Right IT stage III pressure injury,p resent on admission  Continue local wound care  Facility WOC Rn following    Orders:  NNO    Electronically signed by: Edie Alexis PA-C

## 2025-05-12 NOTE — PATIENT INSTRUCTIONS
It was nice seeing you today, please call [phone] if you have any questions or concerns     Plan   START furosemide 10 mg (0.5 tablet) daily for 3 days 5/13 - 5/15/25 and then as needed for weight gain and worsening lower extremity   I will have my RN team call you to see how you are doing end of the week   Continue current cardiac medications   Follow up   - Follow up in 1 month to reassess symptoms   - Scheduling phone number [phone]    Karen Waddell PA-C  [facility]

## 2025-05-12 NOTE — LETTER
5/12/2025    Shun Prado MD  [address]  [place] [address]    RE: Riddhi Aguilar       Dear Colleague,     I had the pleasure of seeing Riddhi Aguilar in the [facility].  Cardiology Clinic Progress Note  Riddhi Aguilar MRN# [id number]   YOB: 1931 Age: 89 year old   Primary Cardiologist: Dr. Shirley  Reason for visit: Post hospital follow up             Assessment and Plan:   Riddhi Aguilar is a 89 year old female who is here today for posthospital follow-up.    1. HFpEF - TTE 4/2025 EF 60-65%, RV normal in size and function, severe biatrial enlargement, mild mitral stenosis, mild to moderate valvular aortic stenosis. NT proBNP 6807 5/9/25. Symptoms of REDDY and lower extremity edema  - Appears mildly volume overload, furosemide 10 mg daily for 3 days then daily as needed afterwards for weight gain or worsening lower extremity edema    2.  Persistent atrial fibrillation, atrial flutter - [id number] score of 4 (age +2, female, hypertension) on Eliquis 5 mg twice daily.  Metoprolol tartrate 50 mg twice daily for rate control  - 7-day Zio patch showed persistent atrial fibrillation, range  with average 89 bpm   - Goal for rate control given severe biatrial enlargement and age     3.  Mild to moderate aortic stenosis -TTE 4/17/2025 mean AoV pressure gradient 15.7 mmHg, peak aortic pressure gradient 24 mmHg, calculated aortic valve area 1.3 cm     4.  Mild mitral stenosis     5.  PVCs - 5% burden on Zio monitor 5/2025, metoprolol as above     6.  History of GIB - nothing recent    Today's Plan:   - Riddhi has noted worsening dyspnea on exertion since discharge from the hospital. Additionally noted worsening lower extremity edema. During her admission d-dimer was normal and she has been on anticoagulation since then for her atrial fibrillation; low suspicion for PE. NT proBNP 5/9/25 was 6807. Will trial furosemide 10 mg daily for 3 days then daily as needed for weight  gain/worsening lower extremity edema. Will have RN team reach out to patient to see how she is feeling later this week    - Continue metoprolol tartrate 50 mg twice daily and Eliquis 5 mg twice daily for atrial fibrillation. Fairly well controlled atrial fibrillation on Zio monitor.    Follow up plan:   - Follow up in 1 month to re-evaluate symptoms      Karen Waddell PA-C  [facility]        History of Presenting Illness:    Riddhi Aguilar is a very pleasant 89 year old female with atrial fibrillation/atrial flutter, mild to moderate aortic stenosis, mild mitral stenosis, hypertension, MDD, chronic left shoulder pain, and history of GI bleed.    Riddhi was admitted to the hospital 4/18 -4/21 with a chief complaint of tachycardia.  She was found to be in new onset atrial flutter.  TTE showed EF 60-65%, RV normal in size and function, severe biatrial enlargement, mild mitral stenosis, mild to moderate valvular aortic stenosis.  Discussion between cardiologist and patient it was ultimately decided to rate control patient.  She was placed on metoprolol tartrate 50 mg twice daily and Eliquis 5 mg twice daily for anticoagulation.  She does have a history of a GI bleed due to a duodenal ulcer many years ago but nothing recent.  Cardiology signed off with plans to have patient wear a ZIO monitor after discharge.  Cardiology was called back due to concerns with pauses.  Her telemetry was reviewed and she had a maximum pause of 2.8 ms in the setting of atrial flutter.  7-day Zio monitor showed 100% burden of atrial fibrillation with heart rate ranges 50 to-157 with average heart rate 89 bpm.  Also noted occasional PVCs but 5% burden.    Patient is here today for posthospital follow-up. Patient reports feeling okay. Daughter present during visit. She reports worsening REDDY since discharge. It is difficult for her to work with PT/OT in TCU due to her shortness of breath. Activities of daily living are  very difficult. She denies orthopnea but does note worsening lower extremity edema. Denies chest pain, palpitations, lightheadedness, dizziness, near syncope, or syncope. Additionally she does sometime feel unsteady on her feet. She does not feel ready to discharge from TCU.     Blood pressure 140/64 and HR 88 in clinic today. Weight 178 lbs.     Recent Labs  CBC 5/9/2025 with stable macrocytic anemia.  BMP 5/9/2025 with stable electrolytes and renal function.      Social History       Social History     Socioeconomic History     Marital status:      Spouse name: Not on file     Number of children: Not on file     Years of education: Not on file     Highest education level: Not on file   Occupational History     Not on file   Tobacco Use     Smoking status: Never     Smokeless tobacco: Never   Substance and Sexual Activity     Alcohol use: No     Alcohol/week: 0.0 standard drinks of alcohol     Drug use: No     Sexual activity: Not Currently     Partners: Male   Other Topics Concern     Parent/sibling w/ CABG, MI or angioplasty before 65F 55M? Not Asked   Social History Narrative     Not on file     Social Drivers of Health     Financial Resource Strain: High Risk (4/18/2025)    Financial Resource Strain      Within the past 12 months, have you or your family members you live with been unable to get utilities (heat, electricity) when it was really needed?: Yes   Food Insecurity: Low Risk  (4/18/2025)    Food Insecurity      Within the past 12 months, did you worry that your food would run out before you got money to buy more?: No      Within the past 12 months, did the food you bought just not last and you didn t have money to get more?: No   Transportation Needs: Low Risk  (4/18/2025)    Transportation Needs      Within the past 12 months, has lack of transportation kept you from medical appointments, getting your medicines, non-medical meetings or appointments, work, or from getting things that you need?:  No   Physical Activity: Unknown (5/14/2024)    Exercise Vital Sign      Days of Exercise per Week: 4 days      Minutes of Exercise per Session: Not on file   Stress: Not on file (11/9/2024)   Social Connections: Unknown (5/14/2024)    Social Connection and Isolation Panel [NHANES]      Frequency of Communication with Friends and Family: Not on file      Frequency of Social Gatherings with Friends and Family: Once a week      Attends [employer] Services: Not on file      Active Member of Clubs or Organizations: Not on file      Attends Club or Organization Meetings: Not on file      Marital Status: Not on file   Interpersonal Safety: Low Risk  (4/18/2025)    Interpersonal Safety      Do you feel physically and emotionally safe where you currently live?: Yes      Within the past 12 months, have you been hit, slapped, kicked or otherwise physically hurt by someone?: No      Within the past 12 months, have you been humiliated or emotionally abused in other ways by your partner or ex-partner?: No   Housing Stability: Low Risk  (4/18/2025)    Housing Stability      Do you have housing? : Yes      Are you worried about losing your housing?: No            Review of Systems:   Please see HPI         Physical Exam:   Vitals: There were no vitals taken for this visit.   Wt Readings from Last 4 Encounters:   05/09/25 81.1 kg (178 lb 11.2 oz)   05/08/25 81.1 kg (178 lb 11.2 oz)   04/29/25 82.2 kg (181 lb 4.8 oz)   04/25/25 82.6 kg (182 lb)     GEN: well nourished, in no acute distress.  HEENT:  Pupils equal, round. Sclerae nonicteric.   NECK: Supple, no masses appreciated. Mild JVD with patient.  C/V:  Irregularly irregular rhythm, normal rate, soft systolic murmur; no rub or gallop.   RESP: Respirations are unlabored. Clear to auscultation bilaterally without wheezing, rales, or rhonchi.  GI: Abdomen soft, nontender.  EXTREM: 1-2+ LE edema.  NEURO: Alert and oriented, cooperative.  SKIN: Warm and dry.        Data:   LIPID  RESULTS:  Lab Results   Component Value Date    CHOL 127 05/09/2023    CHOL 126 04/12/2021    HDL 56 05/09/2023    HDL 61 04/12/2021    LDL 67 05/14/2024    LDL 57 05/09/2023    LDL 50 04/12/2021    TRIG 69 05/09/2023    TRIG 73 04/12/2021    CHOLHDLRATIO 2.9 09/21/2012     LIVER ENZYME RESULTS:  Lab Results   Component Value Date    AST 34 04/18/2025    AST 27 12/27/2018    ALT 15 04/18/2025    ALT 23 12/27/2018     CBC RESULTS:  Lab Results   Component Value Date    WBC 10.9 05/09/2025    WBC 5.2 04/26/2021    RBC 3.27 (L) 05/09/2025    RBC 2.83 (L) 04/26/2021    HGB 11.0 (L) 05/09/2025    HGB 9.9 (L) 04/26/2021    HCT 34.2 (L) 05/09/2025    HCT 30.1 (L) 04/26/2021     (H) 05/09/2025     (H) 04/26/2021    MCH 33.6 (H) 05/09/2025    MCH 35.0 (H) 04/26/2021    MCHC 32.2 05/09/2025    MCHC 32.9 04/26/2021    RDW 26.8 (H) 05/09/2025    RDW 20.0 (H) 04/26/2021     05/09/2025     04/26/2021     BMP RESULTS:  Lab Results   Component Value Date     05/09/2025     04/26/2021    POTASSIUM 4.6 05/09/2025    POTASSIUM 4.6 04/27/2022    POTASSIUM 4.1 04/26/2021    CHLORIDE 102 05/09/2025    CHLORIDE 106 04/27/2022    CHLORIDE 107 04/26/2021    CO2 22 05/09/2025    CO2 26 04/27/2022    CO2 29 04/26/2021    ANIONGAP 12 05/09/2025    ANIONGAP 7 04/27/2022    ANIONGAP 3 04/26/2021    GLC 97 05/09/2025    GLC 84 04/27/2022    GLC 79 04/26/2021    BUN 33.3 (H) 05/09/2025    BUN 28 04/27/2022    BUN 25 04/26/2021    CR 1.02 (H) 05/09/2025    CR 1.03 04/26/2021    GFRESTIMATED 51 (L) 05/09/2025    GFRESTIMATED 48 (L) 04/26/2021    GFRESTBLACK 55 (L) 04/26/2021    TAMMY 9.4 05/09/2025    TAMMY 9.1 04/26/2021      A1C RESULTS:  Lab Results   Component Value Date    A1C 5.3 05/14/2024     INR RESULTS:  Lab Results   Component Value Date    INR 1.05 12/16/2012            Medications     Current Outpatient Medications   Medication Sig Dispense Refill     acetaminophen (TYLENOL) 500 MG tablet Take 2  tablets (1,000 mg) by mouth at bedtime. May also take 2 tablets (1,000 mg) 2 times daily as needed for mild pain.       apixaban ANTICOAGULANT (ELIQUIS) 5 MG tablet Take 1 tablet (5 mg) by mouth 2 times daily.       atorvastatin (LIPITOR) 10 MG tablet TAKE 1 TABLET(10 MG) BY MOUTH DAILY 90 tablet 0     buPROPion (WELLBUTRIN XL) 150 MG 24 hr tablet TAKE 1 TABLET(150 MG) BY MOUTH EVERY MORNING 90 tablet 3     buPROPion (WELLBUTRIN XL) 300 MG 24 hr tablet TAKE 1 TABLET(300 MG) BY MOUTH EVERY MORNING 90 tablet 3     dorzolamide-timolol (COSOPT) 22.3-6.8 MG/ML ophthalmic solution Place 1 drop into both eyes 2 times daily       famotidine (PEPCID) 20 MG tablet Take 1 tablet (20 mg) by mouth daily.       Lidocaine (LIDOCARE) 4 % Patch Place 1 patch over 12 hours onto the skin every 24 hours. To prevent lidocaine toxicity, patient should be patch free for 12 hrs daily.       melatonin 3 MG tablet Take 1 tablet (3 mg) by mouth nightly as needed for sleep.       metoprolol tartrate (LOPRESSOR) 50 MG tablet Take 1 tablet (50 mg) by mouth 2 times daily.       multivitamin w/minerals (THERA-VIT-M) tablet Take 1 tablet by mouth daily       senna-docusate (SENOKOT-S/PERICOLACE) 8.6-50 MG tablet Take 1 tablet by mouth 2 times daily as needed for constipation.       spironolactone (ALDACTONE) 25 MG tablet Take 1 tablet (25 mg) by mouth daily.       vitamin D3 (CHOLECALCIFEROL) 50 mcg (2000 units) tablet Take 1 tablet by mouth daily            Past Medical History     Past Medical History:   Diagnosis Date     Anemia     baseline 10-11     Arthritis      Bleeding ulcer     duodenal ulcer     CKD (chronic kidney disease) stage 3, GFR 30-59 ml/min (H)      Essential hypertension, benign      MDD (major depressive disorder)      Nephrolithiasis      Osteoporosis      Pure hypercholesterolemia      Spinal stenosis      Unspecified cataract      Unspecified glaucoma(365.9)      Past Surgical History:   Procedure Laterality Date      APPENDECTOMY       BREAST SURGERY      lumpectomy     CLOSED REDUCTION, PERCUTANEOUS PINNING HIP Left 5/20/2024    Procedure: Closed reduction percutaneous pinning, left hip;  Surgeon: Jose Luis Merino MD;  Location: [facility] OR     COMBINED CYSTOSCOPY, RETROGRADES, EXCHANGE STENT URETER(S) Left 01/24/2024    Procedure: Cystoscopy, left retrograde pyelogram, left JJ stent placement, <1hr physician fluoroscopy time;  Surgeon: Aureliano Brandt MD;  Location: [facility] OR     COMBINED CYSTOSCOPY, RETROGRADES, URETEROSCOPY, LASER HOLMIUM LITHOTRIPSY URETER(S), INSERT STENT Left 02/20/2024    Procedure: Cystoscopy, left diagnostic ureteroscopy, left retrograde pyelogram, left ureteral stent removal, fluoroscopic interpretation <1 hour physician time;  Surgeon: Richie Jaquez MD;  Location: [facility] OR     CYSTOSCOPY       CYSTOSCOPY, REMOVE STENT(S), COMBINED Left 02/20/2024    Procedure: Cystoscopy, remove stent(s), combined;  Surgeon: Richie Jaquez MD;  Location:  OR     DILATION AND CURETTAGE, HYSTEROSCOPY DIAGNOSTIC, COMBINED  02/06/2014    Procedure: COMBINED DILATION AND CURETTAGE, HYSTEROSCOPY DIAGNOSTIC;  DILATION AND CURETTAGE, HYSTEROSCOPY, POLYPECTOMY, INCISION AND DRAINAGE OF SEBACEOUS CYST ;  Surgeon: Serena Zamora MD;  Location: [facility]     EXCISE LESION LOWER EXTREMITY Right 08/29/2017    Procedure: EXCISE LESION LOWER EXTREMITY;  WIDE EXCISIONAL BIOPSY OF RIGHT ANKLE BASAL CELL CARCINOMA;  Surgeon: Juan Luis Flores MD;  Location:  OR     INCISION AND DRAINAGE PERINEAL, COMBINED  02/06/2014    Procedure: COMBINED INCISION AND DRAINAGE PERINEAL;;  Surgeon: Serena Zamora MD;  Location:  [facility]     ZZC NONSPECIFIC PROCEDURE      Repair of buckled retina of rt eye     ZZC NONSPECIFIC PROCEDURE      Appy     Family History   Problem Relation Age of Onset     Breast Cancer Sister      Breast Cancer Sister      Cancer Brother         bone cancer in arm            Allergies   Patient has no known  allergies.    The longitudinal plan of care for the diagnosis(es)/condition(s) as documented were addressed during this visit. Due to the added complexity in care, I will continue to support Riddhi in the subsequent management and with ongoing continuity of care.     35 minutes spent on the date of the encounter doing chart review, history and exam, documentation and further activities as noted above    Thank you for allowing me to participate in the care of your patient.      Sincerely,     Karen Waddell PA-C     [facility]  cc:   Albaro Shirley MD  [address]  [place] [address]

## 2025-05-12 NOTE — LETTER
5/12/2025      Riddhi Aguilar  [address]  [facility] [address]          Chief Complaint   Patient presents with     Nursing Home Acute       HPI:  Riddhi Aguilar is a 89 year old  (2/15/1931), who is being seen today for an episodic care visit at: [facility] ([facility] [[id number]].     Brief summary: Patient evaluated today to follow-up dyspnea visit yesterday.  Chest x-ray was obtained which showed no signs of volume overload.  Possible fullness in the left hilum, but I reviewed from previous imaging and this appears chronic/stable.  Zio patch formal read is pending but was able to review some tracings.  Primarily atrial flutter with controlled ventricular response.  Labs today currently pending.    Today's concern is: Still SOB with exertion. No Orthopnea. Weights down since admission    Wt Readings from Last 4 Encounters:   05/12/25 80.7 kg (178 lb)   05/12/25 80.7 kg (178 lb)   05/12/25 81.1 kg (178 lb 11.2 oz)   05/09/25 81.1 kg (178 lb 11.2 oz)         Review of nursing home EMR:-147, Wt 178      Allergies, and PMH/PSH reviewed in [facility] today.    REVIEW OF SYSTEMS:  4 point ROS including Respiratory, CV, GI and , other than that noted in the HPI,  is negative    Objective:   BP (!) 148/98   Pulse 69   Temp 97.8  F (36.6  C)   Resp 18   Ht 1.524 m (5')   Wt 80.7 kg (178 lb)   SpO2 98%   BMI 34.76 kg/m      Physical Exam  Vitals (Facility EMR) reviewed.   Constitutional:       General: She is not in acute distress.  HENT:      Head: Normocephalic and atraumatic.   Eyes:      General: No scleral icterus.  Cardiovascular:      Rate and Rhythm: Rhythm irregular.   Pulmonary:      Effort: Pulmonary effort is normal.   Musculoskeletal:      Comments: 2+ LE edema   Skin:     General: Skin is warm and dry.      Findings: No rash.   Neurological:      Mental Status: She is alert. Mental status is at baseline.   Psychiatric:         Behavior: Behavior normal.          MED REC  REQUIRED  Post Medication Reconciliation Status: discharge medications reconciled and changed, per note/orders      Recent labs in EPIC reviewed by me today.  and Most Recent 3 CBC's:  Recent Labs   Lab Test 05/09/25  0732 05/05/25  0545 04/28/25  0557 04/20/25  0823 04/19/25  0745   WBC 10.9  --  8.4  --  11.0   HGB 11.0* 10.3* 9.8*  --  11.9   *  --  105*  --  102*     --  382 314 329     Most Recent 3 BMP's:  Recent Labs   Lab Test 05/09/25  0732 04/28/25  0557 04/19/25  0745    136 139   POTASSIUM 4.6 4.3 4.2   CHLORIDE 102 104 102   CO2 22 21* 23   BUN 33.3* 35.5* 32.3*   CR 1.02* 1.07* 1.15*   ANIONGAP 12 11 14   TAMMY 9.4 9.2 9.4   GLC 97 82 97     Most Recent 2 LFT's:  Recent Labs   Lab Test 04/18/25  0559 01/23/24 2036   AST 34 33   ALT 15 16   ALKPHOS 46 68   BILITOTAL 2.1* 2.1*       Assessment/Plan:  Shortness of breath  Aortic Stenosis  HFpEF:No hypoxia. CXR without signs of fluid over load or infiltrate. Labs with elevated but stable BNP. Hgb without worsening anemia to explain SOB. ZioPatch with Afib with CVR  Continue spironolactone  Cardiology follow up later today  .    New onset atrial flutter with RVR: Cardiology consulted.  Conservative management recommended.  Metoprolol and apixaban added  Continue rate control with metoprolol  Continue apixaban  ZioPatch with 100% Afib with CVR  Cardiology follow-up later today    Insomnia:  Continue melatonin    Hypertension:  Continue Metoprolol and Spironolactone     Physical deconditioning  PT/OT/social work    History of duodenal ulcer: Occurred over 20 years ago  Famotidine started during hospital stay  Hgb stable 5/9      CKD: Estimated Creatinine Clearance: 31.7 mL/min (A) (based on SCr of 1.02 mg/dL (H)).  BMP stable at TCU  Monitor as clinically appropriate    Depression  Continue bupropion 450 mg daily    Right IT stage III pressure injury,p resent on admission  Continue local wound care  Facility WOC Rn  following    Orders:  NNO    Electronically signed by: Edei Alexis PA-C       Sincerely,        Edie Alexis PA-C    Electronically signed

## 2025-05-13 ENCOUNTER — LAB REQUISITION (OUTPATIENT)
Dept: LAB | Facility: CLINIC | Age: OVER 89
End: 2025-05-13
Payer: COMMERCIAL

## 2025-05-13 DIAGNOSIS — N18.30 CHRONIC KIDNEY DISEASE, STAGE 3 UNSPECIFIED (H): ICD-10-CM

## 2025-05-13 NOTE — TELEPHONE ENCOUNTER
Patient Contact     Attempt # 1     Was call answered?  No.  Left message on voicemail with information to call triage back.     On callback please inform pt on the PCP's note.      Devin Emanuel RN  [facility]

## 2025-05-14 ENCOUNTER — TELEPHONE (OUTPATIENT)
Dept: GERIATRICS | Facility: CLINIC | Age: OVER 89
End: 2025-05-14
Payer: COMMERCIAL

## 2025-05-14 ENCOUNTER — TELEPHONE (OUTPATIENT)
Dept: FAMILY MEDICINE | Facility: CLINIC | Age: OVER 89
End: 2025-05-14
Payer: COMMERCIAL

## 2025-05-14 NOTE — TELEPHONE ENCOUNTER
LM for patient to return our call back, also left a voicemail to John from [facility] ([phone] ) to return out call back.       Mary TUCKER MA on 5/14/2025 at 2:02 PM

## 2025-05-14 NOTE — TELEPHONE ENCOUNTER
Reason for Call:  Appointment Request    Patient requesting this type of appt:  Preventive     Requested provider: Shun Prado    Reason patient unable to be scheduled: Not within requested timeframe    When does patient want to be seen/preferred time: within a couple of weeks out     Comments: Patient is still in TCU and will need to reschedule visit on 5-20-25 to a sooner date if possible. Please call.     Could we send this information to you in [employer] or would you prefer to receive a phone call?:   Patient would prefer a phone call   Okay to leave a detailed message?: Yes at Other phone number:  [phone]     Call taken on 5/14/2025 at 1:37 PM by Carly Oliver

## 2025-05-14 NOTE — TELEPHONE ENCOUNTER
Triage outreach    Attempt number 1    Left message to call back at [phone].    Columba RN  [facility]

## 2025-05-14 NOTE — TELEPHONE ENCOUNTER
"[facility] Triage Call    Provider: Edie Alexis PA-C  Facility: [facility] Type:  TCU    Caller: Michelle  Call Back Number: [phone]    Allergies:  No Known Allergies       SBAR:     S-(situation): Nursing is calling to update about wt increase  Wt today is 195 before breakfast      B-(background): Lasix 10mg was started this am, BMP ordered for 5/16 5/9- NT- proBNP 6807    A-(assessment): Vitals: BP:  152/92  P:: 66  R:: 18  SPO2: 96% R/A Temp.:  97.      No cough, No SOB, LS Clear, pt doesn't feel any different   ESTEFANY LE is 2+- per baseline  Compression stalkings in place   R-(recommendations): any new orders?       Telephone encounter sent to:  Adrianna Miller, DO    Please send response/orders to \"Geriatrics Nurse Pool\"    Yara Gutierrez RN      "

## 2025-05-14 NOTE — TELEPHONE ENCOUNTER
NNO based on clinical symptoms. Thanks for the update. Let's see how Lasix works. Continue daily weight checks.

## 2025-05-15 ENCOUNTER — TELEPHONE (OUTPATIENT)
Dept: CARDIOLOGY | Facility: CLINIC | Age: OVER 89
End: 2025-05-15

## 2025-05-15 ENCOUNTER — TRANSITIONAL CARE UNIT VISIT (OUTPATIENT)
Dept: GERIATRICS | Facility: CLINIC | Age: OVER 89
End: 2025-05-15
Payer: COMMERCIAL

## 2025-05-15 VITALS
RESPIRATION RATE: 16 BRPM | DIASTOLIC BLOOD PRESSURE: 94 MMHG | TEMPERATURE: 97.5 F | OXYGEN SATURATION: 97 % | WEIGHT: 195.8 LBS | HEART RATE: 83 BPM | HEIGHT: 60 IN | SYSTOLIC BLOOD PRESSURE: 154 MMHG | BODY MASS INDEX: 38.44 KG/M2

## 2025-05-15 DIAGNOSIS — I48.20 CHRONIC ATRIAL FIBRILLATION (H): ICD-10-CM

## 2025-05-15 DIAGNOSIS — I10 BENIGN ESSENTIAL HYPERTENSION: Primary | ICD-10-CM

## 2025-05-15 DIAGNOSIS — I50.9 ACUTE CONGESTIVE HEART FAILURE, UNSPECIFIED HEART FAILURE TYPE (H): ICD-10-CM

## 2025-05-15 DIAGNOSIS — I35.0 AORTIC VALVE STENOSIS, ETIOLOGY OF CARDIAC VALVE DISEASE UNSPECIFIED: ICD-10-CM

## 2025-05-15 RX ORDER — FUROSEMIDE 20 MG/1
20 TABLET ORAL DAILY
Status: SHIPPED
Start: 2025-05-15

## 2025-05-15 NOTE — TELEPHONE ENCOUNTER
Attempted to call patient to assess symptoms after starting furosemide x 3 days then as needed for weight gain or worsening lower extremity edema, left message for patient to call back.  SANTOSH Khanan RN

## 2025-05-15 NOTE — TELEPHONE ENCOUNTER
----- Message from Karen Waddell sent at 5/12/2025  2:16 PM CDT -----  Hello Can we check on how patient is feeling at the end of this week 5/15-5/16. I saw her in follow up today, having REDDY and started low dose furosemide for a few days. Thanks, Karen Waddell, PA-C5/12/2025 2:16 PM

## 2025-05-15 NOTE — PROGRESS NOTES
Chief Complaint   Patient presents with    RECHECK       HPI:  Riddhi Aguilar is a 89 year old  (2/15/1931), who is being seen today for an episodic care visit at: [facility] ([facility]) [[id number]].     Brief summary:  Patient evaluated today to follow-up dyspnea visit yesterday.  Chest x-ray was obtained which showed no signs of volume overload.  Possible fullness in the left hilum, but I reviewed from previous imaging and this appears chronic/stable.  Zio patch formal read is pending but was able to review some tracings.  Primarily atrial flutter with controlled ventricular response.  Labs today currently pending.    Today's concern is: Patient evaluated to follow up weight gain. Had lasix 10 mg/d added at cardiology appt earlier this week. Weight now noted to be up 10 lbs.     Riddhi is evaluated in her room. Thankfully REDDY is not worse and no SOB at rest or orthopnea. Edema is worse. Has not noted increased in UO with 10 mg of Lasix. Has been on lasix in the past at higher dose and noted more of response.        Review of nursing home EMR:-1454, Wt 195.8        Allergies, and PMH/PSH reviewed in [facility] today.    REVIEW OF SYSTEMS:  4 point ROS including Respiratory, CV, GI and , other than that noted in the HPI,  is negative    Objective:   BP (!) 154/94   Pulse 83   Temp 97.5  F (36.4  C)   Resp 16   Ht 1.524 m (5')   Wt 88.8 kg (195 lb 12.8 oz)   SpO2 97%   BMI 38.24 kg/m      Physical Exam  Vitals (Facility EMR) reviewed.   Constitutional:       General: She is not in acute distress.  HENT:      Head: Normocephalic and atraumatic.   Eyes:      General: No scleral icterus.  Cardiovascular:      Rate and Rhythm: Normal rate. Rhythm irregular.   Pulmonary:      Effort: Pulmonary effort is normal.      Breath sounds: No wheezing or rales.   Musculoskeletal:      Right lower leg: Edema present.      Left lower leg: Edema present.   Skin:     General: Skin is warm and dry.      Findings: No  rash.   Neurological:      Mental Status: She is alert. Mental status is at baseline.   Psychiatric:         Behavior: Behavior normal.          MED REC REQUIRED  Post Medication Reconciliation Status: discharge medications reconciled and changed, per note/orders      Recent labs in EPIC reviewed by me today.     Assessment/Plan:  Acute HFpEF  Aortic Stenosis  HFpEF:No hypoxia. CXR 5/9 and 5/12 without signs of fluid over load or infiltrate. Labs with elevated but stable BNP. Hgb without worsening anemia to explain SOB. ZioPatch with Afib with CVR. Lasix 10 mg daily add at Cardiology appointment but weights and LE edema increasing  Increase lasix to 20 mg/d. Coordinated with RN to give additional 10 mg today  Continue spironolactone  BMP 5/16    New onset atrial flutter with RVR: Cardiology consulted.  Conservative management recommended.  Metoprolol and apixaban added  Continue rate control with metoprolol  Continue apixaban  ZioPatch with 100% Afib with CVR    Insomnia:  Continue melatonin    Hypertension:  Continue Metoprolol and Spironolactone     Physical deconditioning  PT/OT/social work    History of duodenal ulcer: Occurred over 20 years ago  Famotidine started during hospital stay  Hgb stable 5/9      CKD: Estimated Creatinine Clearance: 31.7 mL/min (A) (based on SCr of 1.02 mg/dL (H)).  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

## 2025-05-15 NOTE — LETTER
5/15/2025      Riddhi Aguilar  [address]  [place] [id number]          Chief Complaint   Patient presents with     RECHECK       HPI:  Riddhi Aguilar is a 89 year old  (2/15/1931), who is being seen today for an episodic care visit at: [facility] ([facility]) [[id number]].     Brief summary:  Patient evaluated today to follow-up dyspnea visit yesterday.  Chest x-ray was obtained which showed no signs of volume overload.  Possible fullness in the left hilum, but I reviewed from previous imaging and this appears chronic/stable.  Zio patch formal read is pending but was able to review some tracings.  Primarily atrial flutter with controlled ventricular response.  Labs today currently pending.    Today's concern is: Patient evaluated to follow up weight gain. Had lasix 10 mg/d added at cardiology appt earlier this week. Weight now noted to be up 10 lbs.     Riddhi is evaluated in her room. Thankfully REDDY is not worse and no SOB at rest or orthopnea. Edema is worse. Has not noted increased in UO with 10 mg of Lasix. Has been on lasix in the past at higher dose and noted more of response.        Review of nursing home EMR:-1454, Wt 195.8        Allergies, and PMH/PSH reviewed in [facility] today.    REVIEW OF SYSTEMS:  4 point ROS including Respiratory, CV, GI and , other than that noted in the HPI,  is negative    Objective:   BP (!) 154/94   Pulse 83   Temp 97.5  F (36.4  C)   Resp 16   Ht 1.524 m (5')   Wt 88.8 kg (195 lb 12.8 oz)   SpO2 97%   BMI 38.24 kg/m      Physical Exam  Vitals (Facility EMR) reviewed.   Constitutional:       General: She is not in acute distress.  HENT:      Head: Normocephalic and atraumatic.   Eyes:      General: No scleral icterus.  Cardiovascular:      Rate and Rhythm: Normal rate. Rhythm irregular.   Pulmonary:      Effort: Pulmonary effort is normal.      Breath sounds: No wheezing or rales.   Musculoskeletal:      Right lower leg: Edema present.       Left lower leg: Edema present.   Skin:     General: Skin is warm and dry.      Findings: No rash.   Neurological:      Mental Status: She is alert. Mental status is at baseline.   Psychiatric:         Behavior: Behavior normal.          MED REC REQUIRED  Post Medication Reconciliation Status: discharge medications reconciled and changed, per note/orders      Recent labs in [facility] reviewed by me today.     Assessment/Plan:  Acute HFpEF  Aortic Stenosis  HFpEF:No hypoxia. CXR 5/9 and 5/12 without signs of fluid over load or infiltrate. Labs with elevated but stable BNP. Hgb without worsening anemia to explain SOB. ZioPatch with Afib with CVR. Lasix 10 mg daily add at Cardiology appointment but weights and LE edema increasing  Increase lasix to 20 mg/d. Coordinated with RN to give additional 10 mg today  Continue spironolactone  BMP 5/16    New onset atrial flutter with RVR: Cardiology consulted.  Conservative management recommended.  Metoprolol and apixaban added  Continue rate control with metoprolol  Continue apixaban  ZioPatch with 100% Afib with CVR    Insomnia:  Continue melatonin    Hypertension:  Continue Metoprolol and Spironolactone     Physical deconditioning  PT/OT/social work    History of duodenal ulcer: Occurred over 20 years ago  Famotidine started during hospital stay  Hgb stable 5/9      CKD: Estimated Creatinine Clearance: 31.7 mL/min (A) (based on SCr of 1.02 mg/dL (H)).  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

## 2025-05-15 NOTE — TELEPHONE ENCOUNTER
Patient Contact     Attempt # 3     Was call answered?  No.  Left message on voicemail with information to call triage back.     On callback please inform pt on the PCP note.      Devin Emanuel RN  [facility]

## 2025-05-16 ENCOUNTER — RESULTS FOLLOW-UP (OUTPATIENT)
Dept: GERIATRICS | Facility: CLINIC | Age: OVER 89
End: 2025-05-16

## 2025-05-16 LAB
ANION GAP SERPL CALCULATED.3IONS-SCNC: 13 MMOL/L (ref 7–15)
BUN SERPL-MCNC: 28.6 MG/DL (ref 8–23)
CALCIUM SERPL-MCNC: 9.2 MG/DL (ref 8.8–10.4)
CHLORIDE SERPL-SCNC: 102 MMOL/L (ref 98–107)
CREAT SERPL-MCNC: 1.07 MG/DL (ref 0.51–0.95)
EGFRCR SERPLBLD CKD-EPI 2021: 48 ML/MIN/1.73M2
GLUCOSE SERPL-MCNC: 87 MG/DL (ref 70–99)
HCO3 SERPL-SCNC: 24 MMOL/L (ref 22–29)
POTASSIUM SERPL-SCNC: 3.9 MMOL/L (ref 3.4–5.3)
SODIUM SERPL-SCNC: 139 MMOL/L (ref 135–145)

## 2025-05-16 PROCEDURE — 80048 BASIC METABOLIC PNL TOTAL CA: CPT | Mod: ORL | Performed by: INTERNAL MEDICINE

## 2025-05-16 PROCEDURE — P9604 ONE-WAY ALLOW PRORATED TRIP: HCPCS | Mod: ORL | Performed by: INTERNAL MEDICINE

## 2025-05-16 PROCEDURE — 36415 COLL VENOUS BLD VENIPUNCTURE: CPT | Mod: ORL | Performed by: INTERNAL MEDICINE

## 2025-05-16 NOTE — TELEPHONE ENCOUNTER
Attempted to call patient, left message for patient to call back.  Called patient's daughter, daughter states that she still has some shortness of breath when she is trying to get dressed or pull up her pants etc. but states overall her breathing is much better and states she is able to do a lot more activity with physical therapy and seems overall more active since  starting the furosemide.  Daughter is not aware if her edema has improved and states she is in a facility so they are doing her weights and not aware of weights.  Daughter has been out of town.  Await patient's return call for further updates.  SANTOSH Khanna RN

## 2025-05-19 NOTE — TELEPHONE ENCOUNTER
Attempted to call patient to assess symptoms after starting furosemide x 3 days then as needed for weight gain or worsening lower extremity edema, left message for patient to call back.  SANTOSH Khanna RN

## 2025-05-19 NOTE — TELEPHONE ENCOUNTER
Patient called back, states she feels overall she is doing much better.  Patient states her breathing is much better, states she she was able to walk around her building today and only had to stop once.  Patient states the swelling in her ankles is much better on the right a little bit better on the left.  Patient states she thinks her weight was 194.1 pounds today.  Patient states she does have a sore on her left leg and states she did seen the wound clinic today which is managing that. Patient advised to call if things do not continue to improve otherwise to follow-up 6/13/2025 as scheduled.  Patient verbalized understanding. Will update Karen Waddell PA-C  to review.  SANTOSH Khanna RN

## 2025-05-20 ENCOUNTER — TRANSITIONAL CARE UNIT VISIT (OUTPATIENT)
Dept: GERIATRICS | Facility: CLINIC | Age: OVER 89
End: 2025-05-20
Payer: COMMERCIAL

## 2025-05-20 VITALS
TEMPERATURE: 96 F | HEART RATE: 88 BPM | WEIGHT: 194.2 LBS | RESPIRATION RATE: 16 BRPM | BODY MASS INDEX: 38.13 KG/M2 | DIASTOLIC BLOOD PRESSURE: 75 MMHG | HEIGHT: 60 IN | SYSTOLIC BLOOD PRESSURE: 152 MMHG | OXYGEN SATURATION: 98 %

## 2025-05-20 DIAGNOSIS — I35.0 AORTIC VALVE STENOSIS, ETIOLOGY OF CARDIAC VALVE DISEASE UNSPECIFIED: ICD-10-CM

## 2025-05-20 DIAGNOSIS — I50.9 ACUTE CONGESTIVE HEART FAILURE, UNSPECIFIED HEART FAILURE TYPE (H): ICD-10-CM

## 2025-05-20 DIAGNOSIS — L03.116 LEFT LEG CELLULITIS: Primary | ICD-10-CM

## 2025-05-20 DIAGNOSIS — I48.20 CHRONIC ATRIAL FIBRILLATION (H): ICD-10-CM

## 2025-05-20 DIAGNOSIS — I10 BENIGN ESSENTIAL HYPERTENSION: ICD-10-CM

## 2025-05-20 PROCEDURE — 99309 SBSQ NF CARE MODERATE MDM 30: CPT | Performed by: PHYSICIAN ASSISTANT

## 2025-05-20 NOTE — LETTER
5/20/2025      Riddhi Aguilar  [address]  [place] [facility] [id number]          Chief Complaint   Patient presents with     Nursing Home Acute       HPI:  Riddhi Aguilar is a 89 year old  (2/15/1931), who is being seen today for an episodic care visit at: [facility] ([facility]) [[id number]].     Brief summary: Riddhi Aguilar is an exceptionally pleasant 89-year-old female with a past medical history of duodenal ulcer, CKD, hypertension, depression, previous femur fracture who was recently hospitalized at [facility].  Presented for evaluation of palpitations.  Found to have atrial flutter with RVR.  Cardiology consulted and recommended rate control and initiation of anticoagulation.  Deconditioned compared to baseline to discharge to [facility]    Today's concern is: Riddhi is evaluated today in follow-up.  Felt that the increase furosemide of 20 mg daily was working well.  Has noted increased urine output.  Yesterday left lower extremity with increasing pain and swelling.  Afebrile.  Shortness of breath is improved.    Spoke with RN who indicates LLE appearance change from the weekend. More erythema, swelling and weeping    Family updated via phone      Review of nursing home EMR:-572233.2, Wt 193.8    Wt Readings from Last 4 Encounters:   05/20/25 88.1 kg (194 lb 3.2 oz)   05/15/25 88.8 kg (195 lb 12.8 oz)   05/12/25 80.7 kg (178 lb)   05/12/25 80.7 kg (178 lb)        Allergies, and PMH/PSH reviewed in EPIC today.    REVIEW OF SYSTEMS:  4 point ROS including Respiratory, CV, GI and , other than that noted in the HPI,  is negative    Objective:   BP (!) 152/75   Pulse 88   Temp (!) 96  F (35.6  C)   Resp 16   Ht 1.524 m (5')   Wt 88.1 kg (194 lb 3.2 oz)   SpO2 98%   BMI 37.93 kg/m      Physical Exam  Vitals (Facility EMR) reviewed.   Constitutional:       General: She is not in acute distress.  HENT:      Head: Normocephalic and atraumatic.   Eyes:      General: No scleral  icterus.  Cardiovascular:      Rate and Rhythm: Normal rate. Rhythm irregular.   Pulmonary:      Effort: Pulmonary effort is normal.      Breath sounds: No wheezing or rales.   Musculoskeletal:      Comments: Right lower extremity with trace-1+ lower extremity edema.  Left lower extremity with 2-3+ edema.  Multiple areas of skin breakdown.  Mild erythema and warmth.  Tender to palpation.   Skin:     General: Skin is warm and dry.      Findings: No rash.   Neurological:      Mental Status: She is alert. Mental status is at baseline.   Psychiatric:         Behavior: Behavior normal.                  MED REC REQUIRED  Post Medication Reconciliation Status: discharge medications reconciled and changed, per note/orders      Recent labs in [facility] reviewed by me today.  and Most Recent 3 CBC's:  Recent Labs   Lab Test 05/09/25  0732 05/05/25  0545 04/28/25  0557 04/20/25  0823 04/19/25  0745   WBC 10.9  --  8.4  --  11.0   HGB 11.0* 10.3* 9.8*  --  11.9   *  --  105*  --  102*     --  382 314 329     Most Recent 3 BMP's:  Recent Labs   Lab Test 05/16/25  0729 05/09/25  0732 04/28/25  0557    136 136   POTASSIUM 3.9 4.6 4.3   CHLORIDE 102 102 104   CO2 24 22 21*   BUN 28.6* 33.3* 35.5*   CR 1.07* 1.02* 1.07*   ANIONGAP 13 12 11   TAMMY 9.2 9.4 9.2   GLC 87 97 82     Most Recent 2 LFT's:  Recent Labs   Lab Test 04/18/25  0559 01/23/24 2036   AST 34 33   ALT 15 16   ALKPHOS 46 68   BILITOTAL 2.1* 2.1*       Assessment/Plan:  LLE Cellulitis: Developed worsening edema.  Small weeping ulcer on left lower extremity.  Now with worsening swelling, mild erythema and warmth as well as pain.  Concern for developing cellulitis. Estimated Creatinine Clearance: 31.7 mL/min (A) (based on SCr of 1.07 mg/dL (H)).  Cephalexin 250 mg 3 times daily x 7 days  CBC and BMP 5/21    Acute HFpEF  Aortic Stenosis  HFpEF:No hypoxia. CXR 5/9 and 5/12 without signs of fluid over load or infiltrate. Labs with elevated but stable BNP. Hgb  without worsening anemia to explain SOB. ZioPatch with Afib with CVR. Lasix 10 mg daily added at Cardiology apt 5/12 but increased to 20 mg/d at TCU due to increasing edema and weights  Continue Lasix 20 mg daily.  Does seem to be diuresing  Continue spironolactone  BMP 5/21    New onset atrial flutter with RVR: Cardiology consulted.  Conservative management recommended.  Metoprolol and apixaban added  Continue rate control with metoprolol  Continue apixaban  ZioPatch with 100% Afib with CVR    Insomnia:  Continue melatonin    Hypertension:  Continue Metoprolol and Spironolactone     Physical deconditioning  PT/OT/social work    History of duodenal ulcer: Occurred over 20 years ago  Famotidine started during hospital stay  Hgb stable 5/9      CKD: Estimated Creatinine Clearance: 31.7 mL/min (A) (based on SCr of 1.02 mg/dL (H)).  BMP stable at TCU      Depression  Continue bupropion 450 mg daily    Right IT stage III pressure injury,p resent on admission  Continue local wound care  Facility WOC Rn following    Orders:  As above      Electronically signed by: Edie Alexis PA-C       Sincerely,        Edie Alexis PA-C    Electronically signed

## 2025-05-20 NOTE — PROGRESS NOTES
Chief Complaint   Patient presents with    Nursing Home Acute       HPI:  Riddhi Aguilar is a 89 year old  (2/15/1931), who is being seen today for an episodic care visit at: [facility] ([facility]) [[id number]].     Brief summary: Riddhi Aguilar is an exceptionally pleasant 89-year-old female with a past medical history of duodenal ulcer, CKD, hypertension, depression, previous femur fracture who was recently hospitalized at [facility].  Presented for evaluation of palpitations.  Found to have atrial flutter with RVR.  Cardiology consulted and recommended rate control and initiation of anticoagulation.  Deconditioned compared to baseline to discharge to U    Today's concern is: Riddhi is evaluated today in follow-up.  Felt that the increase furosemide of 20 mg daily was working well.  Has noted increased urine output.  Yesterday left lower extremity with increasing pain and swelling.  Afebrile.  Shortness of breath is improved.    Spoke with RN who indicates LLE appearance change from the weekend. More erythema, swelling and weeping    Family updated via phone      Review of nursing home EMR:-083903.2, Wt 193.8    Wt Readings from Last 4 Encounters:   05/20/25 88.1 kg (194 lb 3.2 oz)   05/15/25 88.8 kg (195 lb 12.8 oz)   05/12/25 80.7 kg (178 lb)   05/12/25 80.7 kg (178 lb)        Allergies, and PMH/PSH reviewed in [facility]IC today.    REVIEW OF SYSTEMS:  4 point ROS including Respiratory, CV, GI and , other than that noted in the HPI,  is negative    Objective:   BP (!) 152/75   Pulse 88   Temp (!) 96  F (35.6  C)   Resp 16   Ht 1.524 m (5')   Wt 88.1 kg (194 lb 3.2 oz)   SpO2 98%   BMI 37.93 kg/m      Physical Exam  Vitals (Facility EMR) reviewed.   Constitutional:       General: She is not in acute distress.  HENT:      Head: Normocephalic and atraumatic.   Eyes:      General: No scleral icterus.  Cardiovascular:      Rate and Rhythm: Normal rate. Rhythm irregular.   Pulmonary:       Effort: Pulmonary effort is normal.      Breath sounds: No wheezing or rales.   Musculoskeletal:      Comments: Right lower extremity with trace-1+ lower extremity edema.  Left lower extremity with 2-3+ edema.  Multiple areas of skin breakdown.  Mild erythema and warmth.  Tender to palpation.   Skin:     General: Skin is warm and dry.      Findings: No rash.   Neurological:      Mental Status: She is alert. Mental status is at baseline.   Psychiatric:         Behavior: Behavior normal.                  MED REC REQUIRED  Post Medication Reconciliation Status: discharge medications reconciled and changed, per note/orders      Recent labs in [facility] reviewed by me today.  and Most Recent 3 CBC's:  Recent Labs   Lab Test 05/09/25  0732 05/05/25  0545 04/28/25  0557 04/20/25  0823 04/19/25  0745   WBC 10.9  --  8.4  --  11.0   HGB 11.0* 10.3* 9.8*  --  11.9   *  --  105*  --  102*     --  382 314 329     Most Recent 3 BMP's:  Recent Labs   Lab Test 05/16/25  0729 05/09/25  0732 04/28/25  0557    136 136   POTASSIUM 3.9 4.6 4.3   CHLORIDE 102 102 104   CO2 24 22 21*   BUN 28.6* 33.3* 35.5*   CR 1.07* 1.02* 1.07*   ANIONGAP 13 12 11   TAMMY 9.2 9.4 9.2   GLC 87 97 82     Most Recent 2 LFT's:  Recent Labs   Lab Test 04/18/25  0559 01/23/24 2036   AST 34 33   ALT 15 16   ALKPHOS 46 68   BILITOTAL 2.1* 2.1*       Assessment/Plan:  LLE Cellulitis: Developed worsening edema.  Small weeping ulcer on left lower extremity.  Now with worsening swelling, mild erythema and warmth as well as pain.  Concern for developing cellulitis. Estimated Creatinine Clearance: 31.7 mL/min (A) (based on SCr of 1.07 mg/dL (H)).  Cephalexin 250 mg 3 times daily x 7 days  CBC and BMP 5/21    Acute HFpEF  Aortic Stenosis  HFpEF:No hypoxia. CXR 5/9 and 5/12 without signs of fluid over load or infiltrate. Labs with elevated but stable BNP. Hgb without worsening anemia to explain SOB. ZioPatch with Afib with CVR. Lasix 10 mg daily added at  Cardiology apt 5/12 but increased to 20 mg/d at TCU due to increasing edema and weights  Continue Lasix 20 mg daily.  Does seem to be diuresing  Continue spironolactone  BMP 5/21    New onset atrial flutter with RVR: Cardiology consulted.  Conservative management recommended.  Metoprolol and apixaban added  Continue rate control with metoprolol  Continue apixaban  ZioPatch with 100% Afib with CVR    Insomnia:  Continue melatonin    Hypertension:  Continue Metoprolol and Spironolactone     Physical deconditioning  PT/OT/social work    History of duodenal ulcer: Occurred over 20 years ago  Famotidine started during hospital stay  Hgb stable 5/9      CKD: Estimated Creatinine Clearance: 31.7 mL/min (A) (based on SCr of 1.02 mg/dL (H)).  BMP stable at TCU      Depression  Continue bupropion 450 mg daily    Right IT stage III pressure injury,p resent on admission  Continue local wound care  Facility WOC Rn following    Orders:  As above      Electronically signed by: Edie Alexis PA-C

## 2025-05-21 ENCOUNTER — TRANSITIONAL CARE UNIT VISIT (OUTPATIENT)
Dept: GERIATRICS | Facility: CLINIC | Age: OVER 89
End: 2025-05-21
Payer: COMMERCIAL

## 2025-05-21 ENCOUNTER — LAB REQUISITION (OUTPATIENT)
Dept: LAB | Facility: CLINIC | Age: OVER 89
End: 2025-05-21
Payer: COMMERCIAL

## 2025-05-21 VITALS
TEMPERATURE: 97.4 F | OXYGEN SATURATION: 92 % | BODY MASS INDEX: 38.05 KG/M2 | SYSTOLIC BLOOD PRESSURE: 151 MMHG | RESPIRATION RATE: 18 BRPM | HEIGHT: 60 IN | HEART RATE: 82 BPM | DIASTOLIC BLOOD PRESSURE: 89 MMHG | WEIGHT: 193.8 LBS

## 2025-05-21 DIAGNOSIS — E66.01 CLASS 2 SEVERE OBESITY WITH BODY MASS INDEX (BMI) OF 35 TO 39.9 WITH SERIOUS COMORBIDITY (H): ICD-10-CM

## 2025-05-21 DIAGNOSIS — E80.7 DISORDER OF BILIRUBIN METABOLISM, UNSPECIFIED: ICD-10-CM

## 2025-05-21 DIAGNOSIS — R17 ELEVATED BILIRUBIN: ICD-10-CM

## 2025-05-21 DIAGNOSIS — L03.116 LEFT LEG CELLULITIS: ICD-10-CM

## 2025-05-21 DIAGNOSIS — R53.81 PHYSICAL DECONDITIONING: ICD-10-CM

## 2025-05-21 DIAGNOSIS — I50.23 ACUTE ON CHRONIC SYSTOLIC HEART FAILURE (H): ICD-10-CM

## 2025-05-21 DIAGNOSIS — E66.812 CLASS 2 SEVERE OBESITY WITH BODY MASS INDEX (BMI) OF 35 TO 39.9 WITH SERIOUS COMORBIDITY (H): ICD-10-CM

## 2025-05-21 DIAGNOSIS — I89.0 LYMPHEDEMA: ICD-10-CM

## 2025-05-21 DIAGNOSIS — L03.116 CELLULITIS OF LEFT LOWER LIMB: ICD-10-CM

## 2025-05-21 DIAGNOSIS — I48.91 ATRIAL FIBRILLATION, UNSPECIFIED TYPE (H): Primary | ICD-10-CM

## 2025-05-21 NOTE — PROGRESS NOTES
[place] [facility]  PHYSICIAN NOTE    PRIMARY CARE PROVIDER AND CLINIC:  Shun Prado MD, [address] / [place] [address]    Chief Complaint   Patient presents with    Hospital F/U     [place] Medical Record Number:  [id number]  Place of Service where encounter took place:  [facility] ([facility]) [[id number]]    Riddhi Aguilar is a 89 year old (2/15/1931), admitted to the above facility from  [facility]. Hospital stay 4/17/25 through 4/21/25. Admitted to this facility for  rehab, medical management, and nursing care.     HPI:    HPI information obtained from: facility chart records, facility staff, patient report, and [facility] chart review.     Brief summary of hospital course: Riddhi Aguilar is a 89yoF who presented to the hospital with palpitations found to have new onset afib with RVR. ***    Updates on status since skilled nursing admission: Recent vitals: Afebrile, -150/60-90, HR 60-80 and weight fluctuant throughout TCU stay needing increased Spironolactone and addition of Lasix. Initially ranged 178-182# but then crept up to max 197# and most recently this morning weighed 194#. Has BMP scheduled for tomorrow. This morning also first dose of Keflex antibiotic ordered by my colleague yesterday afternoon for cellulitis of LLE. Cognitive scores: SLUMS 24/30 and CPT 4.7/5.6.     Riddhi is is seen sitting up in her chair with her legs elevated today.  Welcomes a visit.  Does feel that she has been on Lasix in the past and can tell that the current 20 mg dosing is effective for her.  Having good urinary output.  Does not feel any shortness of breath and was happy with that when she was getting ready for the day this morning as well as no dyspnea with physical therapy.  She is aware that she was started on Keflex this morning for left lower extremity cellulitis.  Has had a couple of doses by the time I saw her this afternoon.  She is pleased overall with  already feeling less pain in that extremity as well as says that she feels more clearheaded.  Says usually she tolerates antibiotics well without any GI symptoms.  Welcomes a picture of it today after I took down the dressings which were saturated already from this morning.  Nursing will kindly replace.  Otherwise she has no acute care concerns.    CODE STATUS/ADVANCE DIRECTIVES DISCUSSION:   DNR / DNI  Patient's living condition: independent apartment    ALLERGIES: Patient has no known allergies.    Past Medical History:   Diagnosis Date    Anemia     baseline 10-11    Arthritis     Bleeding ulcer     duodenal ulcer    CKD (chronic kidney disease) stage 3, GFR 30-59 ml/min (H)     Essential hypertension, benign     MDD (major depressive disorder)     Nephrolithiasis     Osteoporosis     Pure hypercholesterolemia     Spinal stenosis     Unspecified cataract     Unspecified glaucoma(365.9)       Past Surgical History:   Procedure Laterality Date    APPENDECTOMY      BREAST SURGERY      lumpectomy    CLOSED REDUCTION, PERCUTANEOUS PINNING HIP Left 5/20/2024    Procedure: Closed reduction percutaneous pinning, left hip;  Surgeon: Jose Luis Merino MD;  Location: [facility] OR    COMBINED CYSTOSCOPY, RETROGRADES, EXCHANGE STENT URETER(S) Left 01/24/2024    Procedure: Cystoscopy, left retrograde pyelogram, left JJ stent placement, <1hr physician fluoroscopy time;  Surgeon: Aureliano Brandt MD;  Location: [facility] OR    COMBINED CYSTOSCOPY, RETROGRADES, URETEROSCOPY, LASER HOLMIUM LITHOTRIPSY URETER(S), INSERT STENT Left 02/20/2024    Procedure: Cystoscopy, left diagnostic ureteroscopy, left retrograde pyelogram, left ureteral stent removal, fluoroscopic interpretation <1 hour physician time;  Surgeon: Richie Jaquez MD;  Location: [facility] OR    CYSTOSCOPY      CYSTOSCOPY, REMOVE STENT(S), COMBINED Left 02/20/2024    Procedure: Cystoscopy, remove stent(s), combined;  Surgeon: Richie Jaquez MD;  Location: [facility] OR    DILATION AND  CURETTAGE, HYSTEROSCOPY DIAGNOSTIC, COMBINED  02/06/2014    Procedure: COMBINED DILATION AND CURETTAGE, HYSTEROSCOPY DIAGNOSTIC;  DILATION AND CURETTAGE, HYSTEROSCOPY, POLYPECTOMY, INCISION AND DRAINAGE OF SEBACEOUS CYST ;  Surgeon: Serena Zamora MD;  Location:  [facility]    EXCISE LESION LOWER EXTREMITY Right 08/29/2017    Procedure: EXCISE LESION LOWER EXTREMITY;  WIDE EXCISIONAL BIOPSY OF RIGHT ANKLE BASAL CELL CARCINOMA;  Surgeon: Juan Luis Flores MD;  Location:  OR    INCISION AND DRAINAGE PERINEAL, COMBINED  02/06/2014    Procedure: COMBINED INCISION AND DRAINAGE PERINEAL;;  Surgeon: Serena Zamora MD;  Location:  [facility]    [facility] NONSPECIFIC PROCEDURE      Repair of buckled retina of rt eye    [facility] NONSPECIFIC PROCEDURE      Appy         Post-discharge medication reconciliation status: Reviewed and updated in [facility] according to facility MAR    Current Outpatient Medications   Medication Sig Dispense Refill    apixaban ANTICOAGULANT (ELIQUIS) 5 MG tablet Take 1 tablet (5 mg) by mouth 2 times daily.      atorvastatin (LIPITOR) 10 MG tablet TAKE 1 TABLET(10 MG) BY MOUTH DAILY 90 tablet 0    buPROPion (WELLBUTRIN XL) 150 MG 24 hr tablet TAKE 1 TABLET(150 MG) BY MOUTH EVERY MORNING 90 tablet 3    buPROPion (WELLBUTRIN XL) 300 MG 24 hr tablet TAKE 1 TABLET(300 MG) BY MOUTH EVERY MORNING 90 tablet 3    cephALEXin (KEFLEX) 250 MG capsule Take 1 capsule (250 mg) by mouth 3 times daily for 7 days.      dorzolamide-timolol (COSOPT) 22.3-6.8 MG/ML ophthalmic solution Place 1 drop into both eyes 2 times daily      famotidine (PEPCID) 20 MG tablet Take 1 tablet (20 mg) by mouth daily.      furosemide (LASIX) 20 MG tablet Take 1 tablet (20 mg) by mouth daily.      Lidocaine (LIDOCARE) 4 % Patch Place 1 patch over 12 hours onto the skin every 24 hours. To prevent lidocaine toxicity, patient should be patch free for 12 hrs daily.      melatonin 3 MG tablet Take 3 mg by mouth at bedtime.      acetaminophen (TYLENOL) 500 MG  tablet Take 2 tablets (1,000 mg) by mouth at bedtime. May also take 2 tablets (1,000 mg) 2 times daily as needed for mild pain.      metoprolol tartrate (LOPRESSOR) 50 MG tablet Take 1 tablet (50 mg) by mouth 2 times daily.      multivitamin w/minerals (THERA-VIT-M) tablet Take 1 tablet by mouth daily      senna-docusate (SENOKOT-S/PERICOLACE) 8.6-50 MG tablet Take 1 tablet by mouth 2 times daily as needed for constipation.      spironolactone (ALDACTONE) 25 MG tablet Take 1 tablet (25 mg) by mouth daily.      vitamin D3 (CHOLECALCIFEROL) 50 mcg (2000 units) tablet Take 1 tablet by mouth daily         ROS:  4 point ROS including Respiratory, CV, GI and , other than that noted in the HPI,  is negative    Exam:  BP (!) 151/89   Pulse 82   Temp 97.4  F (36.3  C)   Resp 18   Ht 1.524 m (5')   Wt 87.9 kg (193 lb 12.8 oz)   SpO2 92%   BMI 37.85 kg/m    Alert, pleasant, robust for age, sitting up in a chair, nicely groomed, nontoxic-appearing  Moist oral mucosa  Heart tones irregularly irregular rate controlled without murmur rub or gallop  Breathing unlabored on room air, no cough, clear anteriorly  Abdomen overweight  Lymphedema bilateral lower extremities left greater than right  Left lower extremity with some weeping anterior shin skin breakdown related to lymphedema with associated cellulitis and warmth as pictured below  Normal speech, no tremor, engaged in visit  Mood euthymic      Lab/Diagnostic data:  Most Recent 3 CBC's:  Recent Labs   Lab Test 05/09/25  0732 05/05/25  0545 04/28/25  0557 04/20/25  0823 04/19/25  0745   WBC 10.9  --  8.4  --  11.0   HGB 11.0* 10.3* 9.8*  --  11.9   *  --  105*  --  102*     --  382 314 329     Most Recent 3 BMP's:  Recent Labs   Lab Test 05/16/25  0729 05/09/25  0732 04/28/25  0557    136 136   POTASSIUM 3.9 4.6 4.3   CHLORIDE 102 102 104   CO2 24 22 21*   BUN 28.6* 33.3* 35.5*   CR 1.07* 1.02* 1.07*   ANIONGAP 13 12 11   TAMMY 9.2 9.4 9.2   GLC 87 97  82     Most Recent 2 LFT's:  Recent Labs   Lab Test 04/18/25  0559 01/23/24 2036   AST 34 33   ALT 15 16   ALKPHOS 46 68   BILITOTAL 2.1* 2.1*     Most Recent TSH and T4:  Recent Labs   Lab Test 04/18/25  0559   TSH 5.84*   T4 1.16     Most Recent Hemoglobin A1c:  Recent Labs   Lab Test 05/14/24  1036   A1C 5.3       ASSESSMENT/PLAN:  Atrial fibrillation, unspecified type (H)  Says she does not feel the palpitations anymore like she did when she presented to the hospital  Rate controlled and is anticoagulated  Has followed up with cardiology and has another appointment scheduled on June 13    Acute on chronic systolic heart failure (H)  Lymphedema  Class 2 severe obesity with body mass index (BMI) of 35 to 39.9 with serious comorbidity (H)  Continue to follow weight trend which is finally starting to slightly go down although understand there will be slight fluctuations  Note she is on a regular diet that we could change to low-sodium if continue to have struggles with lymphedema  Not currently wearing compression but consider depending on her cellulitis and how it may heal  That she is elevating her legs today during my visit  Also glad that she is feeling no respiratory symptoms  Continue Lasix 20 mg daily; also on higher dose spironolactone increased from 12.5 mg a day up to a dose currently of 25 mg a day  Does have BMP scheduled for tomorrow  Again cardiology is scheduled on June 13    Left leg cellulitis  Pictured as above and started on Keflex earlier today  Already she is feeling some subjective improvement in the level of less pain in that leg as well as more clearheaded  Appreciate staff who will help with wrapping  Agree that with some superficial skin breakdown in the setting of her lymphedema that she may continue to have some weeping in time and may need to consider more formal lymphedema wraps with therapy if struggling to heal  She does say that she had history of cellulitis on the right leg in the  past that took several months to heal    Elevated bilirubin  Noted a couple of times recently  No other LFT elevation  No specific symptoms  Could be related perhaps to hepatic congestion  Repeat hepatic panel tomorrow with her BMP for the trend    Physical deconditioning   Continue onsite therapy to help increase strength and to help with discharge planning      Electronically signed by:  Adrianna Miller DO

## 2025-05-21 NOTE — LETTER
5/21/2025      Riddhi Aguilar  [id number]   [address]  [place] [id number]        No notes on file      Sincerely,        Adrianna Miller, DO    Electronically signed   "dyspnea with physical therapy.  She is aware that she was started on Keflex this morning for left lower extremity cellulitis.  Has had a couple of doses by the time I saw her this afternoon.  She is pleased overall with already feeling less pain in that extremity as well as says that she feels more \"clear headed\".  Says usually she tolerates antibiotics well without any GI symptoms.  Welcomes a picture of it today after I took down the dressings which were saturated already from this morning.  Nursing will kindly replace with clean dressings.  Otherwise she has no acute care concerns.    CODE STATUS/ADVANCE DIRECTIVES DISCUSSION:   DNR / DNI  Patient's living condition: independent apartment    ALLERGIES: Patient has no known allergies.    Past Medical History:   Diagnosis Date     Anemia     baseline 10-11     Arthritis      Bleeding ulcer     duodenal ulcer     CKD (chronic kidney disease) stage 3, GFR 30-59 ml/min (H)      Essential hypertension, benign      MDD (major depressive disorder)      Nephrolithiasis      Osteoporosis      Pure hypercholesterolemia      Spinal stenosis      Unspecified cataract      Unspecified glaucoma(365.9)       Past Surgical History:   Procedure Laterality Date     APPENDECTOMY       BREAST SURGERY      lumpectomy     CLOSED REDUCTION, PERCUTANEOUS PINNING HIP Left 5/20/2024    Procedure: Closed reduction percutaneous pinning, left hip;  Surgeon: Jsoe Luis Merino MD;  Location: [facility] OR     COMBINED CYSTOSCOPY, RETROGRADES, EXCHANGE STENT URETER(S) Left 01/24/2024    Procedure: Cystoscopy, left retrograde pyelogram, left JJ stent placement, <1hr physician fluoroscopy time;  Surgeon: Aureliano Brandt MD;  Location: [facility] OR     COMBINED CYSTOSCOPY, RETROGRADES, URETEROSCOPY, LASER HOLMIUM LITHOTRIPSY URETER(S), INSERT STENT Left 02/20/2024    Procedure: Cystoscopy, left diagnostic ureteroscopy, left retrograde pyelogram, left ureteral stent removal, fluoroscopic interpretation <1 " hour physician time;  Surgeon: Richie Jaquez MD;  Location: [facility] OR     CYSTOSCOPY       CYSTOSCOPY, REMOVE STENT(S), COMBINED Left 02/20/2024    Procedure: Cystoscopy, remove stent(s), combined;  Surgeon: Richie Jaquez MD;  Location: [facility] OR     DILATION AND CURETTAGE, HYSTEROSCOPY DIAGNOSTIC, COMBINED  02/06/2014    Procedure: COMBINED DILATION AND CURETTAGE, HYSTEROSCOPY DIAGNOSTIC;  DILATION AND CURETTAGE, HYSTEROSCOPY, POLYPECTOMY, INCISION AND DRAINAGE OF SEBACEOUS CYST ;  Surgeon: Serena Zamora MD;  [facility]     EXCISE LESION LOWER EXTREMITY Right 08/29/2017    Procedure: EXCISE LESION LOWER EXTREMITY;  WIDE EXCISIONAL BIOPSY OF RIGHT ANKLE BASAL CELL CARCINOMA;  Surgeon: Juan Luis Flores MD;  Location: [facility] OR     INCISION AND DRAINAGE PERINEAL, COMBINED  02/06/2014    Procedure: COMBINED INCISION AND DRAINAGE PERINEAL;;  Surgeon: Serena Zamora MD;  Location:  [facility]     [facility] NONSPECIFIC PROCEDURE      Repair of buckled retina of rt eye     [facility] NONSPECIFIC PROCEDURE      Appy         Post-discharge medication reconciliation status: Reviewed and updated in [facility] according to facility MAR    Current Outpatient Medications   Medication Sig Dispense Refill     apixaban ANTICOAGULANT (ELIQUIS) 5 MG tablet Take 1 tablet (5 mg) by mouth 2 times daily.       atorvastatin (LIPITOR) 10 MG tablet TAKE 1 TABLET(10 MG) BY MOUTH DAILY 90 tablet 0     buPROPion (WELLBUTRIN XL) 150 MG 24 hr tablet TAKE 1 TABLET(150 MG) BY MOUTH EVERY MORNING 90 tablet 3     buPROPion (WELLBUTRIN XL) 300 MG 24 hr tablet TAKE 1 TABLET(300 MG) BY MOUTH EVERY MORNING 90 tablet 3     cephALEXin (KEFLEX) 250 MG capsule Take 1 capsule (250 mg) by mouth 3 times daily for 7 days.       dorzolamide-timolol (COSOPT) 22.3-6.8 MG/ML ophthalmic solution Place 1 drop into both eyes 2 times daily       famotidine (PEPCID) 20 MG tablet Take 1 tablet (20 mg) by mouth daily.       furosemide (LASIX) 20 MG tablet Take 1 tablet (20 mg) by  mouth daily.       Lidocaine (LIDOCARE) 4 % Patch Place 1 patch over 12 hours onto the skin every 24 hours. To prevent lidocaine toxicity, patient should be patch free for 12 hrs daily.       melatonin 3 MG tablet Take 3 mg by mouth at bedtime.       acetaminophen (TYLENOL) 500 MG tablet Take 2 tablets (1,000 mg) by mouth at bedtime. May also take 2 tablets (1,000 mg) 2 times daily as needed for mild pain.       metoprolol tartrate (LOPRESSOR) 50 MG tablet Take 1 tablet (50 mg) by mouth 2 times daily.       multivitamin w/minerals (THERA-VIT-M) tablet Take 1 tablet by mouth daily       senna-docusate (SENOKOT-S/PERICOLACE) 8.6-50 MG tablet Take 1 tablet by mouth 2 times daily as needed for constipation.       spironolactone (ALDACTONE) 25 MG tablet Take 1 tablet (25 mg) by mouth daily.       vitamin D3 (CHOLECALCIFEROL) 50 mcg (2000 units) tablet Take 1 tablet by mouth daily         ROS:  4 point ROS including Respiratory, CV, GI and , other than that noted in the HPI,  is negative    Exam:  BP (!) 151/89   Pulse 82   Temp 97.4  F (36.3  C)   Resp 18   Ht 1.524 m (5')   Wt 87.9 kg (193 lb 12.8 oz)   SpO2 92%   BMI 37.85 kg/m    Alert, pleasant, robust for age, sitting up in a chair, nicely groomed, nontoxic-appearing  Moist oral mucosa  Heart tones irregularly irregular rate controlled without murmur rub or gallop  Breathing unlabored on room air, no cough, clear anteriorly  Abdomen overweight  Lymphedema bilateral lower extremities left greater than right  Left lower extremity with some weeping anterior shin skin breakdown related to lymphedema with associated cellulitis and warmth as pictured below  Normal speech, no tremor, engaged in visit  Mood euthymic      Lab/Diagnostic data:  Most Recent 3 CBC's:  Recent Labs   Lab Test 05/09/25  0732 05/05/25  0545 04/28/25  0557 04/20/25  0823 04/19/25  0745   WBC 10.9  --  8.4  --  11.0   HGB 11.0* 10.3* 9.8*  --  11.9   *  --  105*  --  102*      --  382 314 329     Most Recent 3 BMP's:  Recent Labs   Lab Test 05/16/25  0729 05/09/25  0732 04/28/25  0557    136 136   POTASSIUM 3.9 4.6 4.3   CHLORIDE 102 102 104   CO2 24 22 21*   BUN 28.6* 33.3* 35.5*   CR 1.07* 1.02* 1.07*   ANIONGAP 13 12 11   TAMMY 9.2 9.4 9.2   GLC 87 97 82     Most Recent 2 LFT's:  Recent Labs   Lab Test 04/18/25  0559 01/23/24 2036   AST 34 33   ALT 15 16   ALKPHOS 46 68   BILITOTAL 2.1* 2.1*     Most Recent TSH and T4:  Recent Labs   Lab Test 04/18/25  0559   TSH 5.84*   T4 1.16     Most Recent Hemoglobin A1c:  Recent Labs   Lab Test 05/14/24  1036   A1C 5.3       TTE:  Interpretation Summary  1. The left ventricle is normal in size. Moderately increased left ventricular  wall thickness is noted. Left ventricular systolic function is normal. The  visual ejection fraction is 60-65%. Diastolic function not assessed due to  atrial fibrillation. No regional wall motion abnormalities noted.  2. The right ventricle is normal size. The right ventricular systolic function  is normal.  3. There is severe biatrial enlargement. There is no color Doppler evidence of  an atrial shunt.  4. Mild mitral stenosis.  5. Mild to moderate valvular aortic stenosis. The mean AoV pressure gradient  is 15.7 mmHg. The peak AoV pressure gradient is 24.0 mmHg. The calculated  aortic valve are is 1.3 cm^2.  6. No pericardial effusion.  7. In comparison to the previous report dated 01/25/2024, there has been a  mild interval increase in transaortic gradients.      ASSESSMENT/PLAN:  Atrial fibrillation, unspecified type (H)  Says she does not feel the palpitations anymore like she did when she presented to the hospital  Rate controlled and is anticoagulated  Zio already completed 7 days noting 100% afib burden with average HR 89 bpm and occasional PVCs (asymptomatic)  Has followed up with cardiology and has another appointment scheduled on June 13    Acute on chronic systolic heart failure  (H)  Lymphedema  Class 2 severe obesity with body mass index (BMI) of 35 to 39.9 with serious comorbidity (H)  Continue to follow weight trend which is finally starting to slightly go down although understand there will be slight fluctuations  Note she is on a regular diet that we could change to low-sodium if continue to have struggles with lymphedema  Not currently wearing compression but consider depending on her cellulitis and how it may heal  Glad she is elevating her legs today during my visit  Also glad that she is feeling no respiratory symptoms  Continue Lasix 20 mg daily; also on higher dose spironolactone increased from 12.5 mg a day up to a dose currently of 25 mg a day and she feels the good UO  Does have BMP scheduled for tomorrow  Again cardiology is scheduled on June 13    Left leg cellulitis  Pictured as above and started on Keflex earlier today  Already she is feeling some subjective improvement in the level of less pain in that leg as well as more clear headed  Appreciate staff who will help with wrapping  Agree that with some superficial skin breakdown in the setting of her lymphedema that she may continue to have some weeping in time and may need to consider more formal lymphedema wraps with therapy if struggling to heal  She does say that she had history of cellulitis on the right leg in the past that took several months to heal    Elevated bilirubin  Noted a couple of times recently  No other LFT elevation  No specific symptoms  Could be related perhaps to hepatic congestion  Repeat hepatic panel tomorrow with her BMP for the trend  Consider outpatient imaging if labs remain elevated    Physical deconditioning   Continue onsite therapy to help increase strength and to help with discharge planning      Electronically signed by:  Adrianna Miller DO      Sincerely,        Adrianna Miller, DO    Electronically signed

## 2025-05-22 ENCOUNTER — RESULTS FOLLOW-UP (OUTPATIENT)
Dept: FAMILY MEDICINE | Facility: CLINIC | Age: OVER 89
End: 2025-05-22

## 2025-05-22 LAB
ALBUMIN SERPL BCG-MCNC: 3.5 G/DL (ref 3.5–5.2)
ALP SERPL-CCNC: 79 U/L (ref 40–150)
ALT SERPL W P-5'-P-CCNC: 40 U/L (ref 0–50)
ANION GAP SERPL CALCULATED.3IONS-SCNC: 17 MMOL/L (ref 7–15)
AST SERPL W P-5'-P-CCNC: 44 U/L (ref 0–45)
BILIRUB DIRECT SERPL-MCNC: 1.14 MG/DL (ref 0–0.45)
BILIRUB SERPL-MCNC: 3.3 MG/DL
BUN SERPL-MCNC: 34.5 MG/DL (ref 8–23)
CALCIUM SERPL-MCNC: 8.6 MG/DL (ref 8.8–10.4)
CHLORIDE SERPL-SCNC: 95 MMOL/L (ref 98–107)
CREAT SERPL-MCNC: 1.1 MG/DL (ref 0.51–0.95)
EGFRCR SERPLBLD CKD-EPI 2021: 46 ML/MIN/1.73M2
GLUCOSE SERPL-MCNC: 92 MG/DL (ref 70–99)
HCO3 SERPL-SCNC: 22 MMOL/L (ref 22–29)
POTASSIUM SERPL-SCNC: 3.9 MMOL/L (ref 3.4–5.3)
PROT SERPL-MCNC: 5.5 G/DL (ref 6.4–8.3)
SODIUM SERPL-SCNC: 134 MMOL/L (ref 135–145)

## 2025-05-22 PROCEDURE — P9604 ONE-WAY ALLOW PRORATED TRIP: HCPCS | Mod: ORL | Performed by: PHYSICIAN ASSISTANT

## 2025-05-22 PROCEDURE — 36415 COLL VENOUS BLD VENIPUNCTURE: CPT | Mod: ORL | Performed by: PHYSICIAN ASSISTANT

## 2025-05-22 PROCEDURE — 80053 COMPREHEN METABOLIC PANEL: CPT | Mod: ORL | Performed by: PHYSICIAN ASSISTANT

## 2025-05-23 ENCOUNTER — RESULTS FOLLOW-UP (OUTPATIENT)
Dept: GERIATRICS | Facility: CLINIC | Age: OVER 89
End: 2025-05-23

## 2025-05-25 ENCOUNTER — TELEPHONE (OUTPATIENT)
Dept: GERIATRICS | Facility: CLINIC | Age: OVER 89
End: 2025-05-25
Payer: COMMERCIAL

## 2025-05-25 NOTE — TELEPHONE ENCOUNTER
Riddhi Aguilar is a 89 year old  (2/15/1931), Nurse called today to report: Patient's fasting blood sugar this morning was 78 had 5 units of Lantus scheduled which was held.  She was getting ready to eat breakfast and eats well okay to give the 5 units        Electronically signed by:   Ayse Aviles, CNP

## 2025-05-25 NOTE — TELEPHONE ENCOUNTER
Riddhi Aguilar is a 89 year old  (2/15/1931), Nurse called today to report: Patient slightly disoriented this morning and hallucinating thinking there was a man sitting next to her bed.  Per the nurse she was just waking up and reported she did not sleep well last night normally she is alert and oriented which she is back to her baseline at this time.  Vital signs are stable blood pressure 126/82 oxygen 99% on room air pulse 93 temp 97.7 labs reviewed she did have a sodium of 134 on 5/22/2025 creatinine 1.1 labs ordered for Tuesday BMP CBC         Electronically signed by:   Ayse Aviles, CNP

## 2025-05-26 ENCOUNTER — APPOINTMENT (OUTPATIENT)
Dept: CT IMAGING | Facility: CLINIC | Age: OVER 89
DRG: 871 | End: 2025-05-26
Attending: STUDENT IN AN ORGANIZED HEALTH CARE EDUCATION/TRAINING PROGRAM
Payer: COMMERCIAL

## 2025-05-26 ENCOUNTER — HOSPITAL ENCOUNTER (INPATIENT)
Facility: CLINIC | Age: OVER 89
DRG: 871 | End: 2025-05-26
Attending: STUDENT IN AN ORGANIZED HEALTH CARE EDUCATION/TRAINING PROGRAM | Admitting: INTERNAL MEDICINE
Payer: COMMERCIAL

## 2025-05-26 DIAGNOSIS — A41.9 SEPSIS, DUE TO UNSPECIFIED ORGANISM, UNSPECIFIED WHETHER ACUTE ORGAN DYSFUNCTION PRESENT (H): ICD-10-CM

## 2025-05-26 DIAGNOSIS — L03.116 LEFT LEG CELLULITIS: ICD-10-CM

## 2025-05-26 DIAGNOSIS — K59.00 CONSTIPATION, UNSPECIFIED CONSTIPATION TYPE: ICD-10-CM

## 2025-05-26 DIAGNOSIS — S01.01XA SCALP LACERATION, INITIAL ENCOUNTER: ICD-10-CM

## 2025-05-26 DIAGNOSIS — L89.313 PRESSURE INJURY OF RIGHT BUTTOCK, STAGE 3 (H): ICD-10-CM

## 2025-05-26 DIAGNOSIS — R52 PAIN: ICD-10-CM
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
## 2025-05-26 DIAGNOSIS — L89.322 PRESSURE INJURY OF LEFT ISCHIUM, STAGE 2 (H): ICD-10-CM

## 2025-05-26 DIAGNOSIS — Z71.89 OTHER SPECIFIED COUNSELING: Chronic | ICD-10-CM

## 2025-05-26 DIAGNOSIS — R21 RASH: ICD-10-CM

## 2025-05-26 DIAGNOSIS — L97.822 VENOUS STASIS ULCER OF OTHER PART OF LEFT LOWER LEG WITH FAT LAYER EXPOSED WITHOUT VARICOSE VEINS (H): Primary | ICD-10-CM

## 2025-05-26 DIAGNOSIS — I87.2 VENOUS STASIS ULCER OF OTHER PART OF LEFT LOWER LEG WITH FAT LAYER EXPOSED WITHOUT VARICOSE VEINS (H): Primary | ICD-10-CM

## 2025-05-26 LAB
ALBUMIN SERPL BCG-MCNC: 3.8 G/DL (ref 3.5–5.2)
ALP SERPL-CCNC: 79 U/L (ref 40–150)
ALT SERPL W P-5'-P-CCNC: 55 U/L (ref 0–50)
ANION GAP SERPL CALCULATED.3IONS-SCNC: 12 MMOL/L (ref 7–15)
AST SERPL W P-5'-P-CCNC: 48 U/L (ref 0–45)
BASOPHILS # BLD AUTO: 0.1 10E3/UL (ref 0–0.2)
BASOPHILS NFR BLD AUTO: 1 %
BILIRUB SERPL-MCNC: 2 MG/DL
BUN SERPL-MCNC: 33.2 MG/DL (ref 8–23)
CALCIUM SERPL-MCNC: 8.9 MG/DL (ref 8.8–10.4)
CHLORIDE SERPL-SCNC: 99 MMOL/L (ref 98–107)
CREAT SERPL-MCNC: 1.05 MG/DL (ref 0.51–0.95)
EGFRCR SERPLBLD CKD-EPI 2021: 49 ML/MIN/1.73M2
EOSINOPHIL # BLD AUTO: 0.1 10E3/UL (ref 0–0.7)
EOSINOPHIL NFR BLD AUTO: 0 %
ERYTHROCYTE [DISTWIDTH] IN BLOOD BY AUTOMATED COUNT: 26.9 % (ref 10–15)
GLUCOSE SERPL-MCNC: 95 MG/DL (ref 70–99)
HCO3 SERPL-SCNC: 25 MMOL/L (ref 22–29)
HCT VFR BLD AUTO: 35.5 % (ref 35–47)
HGB BLD-MCNC: 11.7 G/DL (ref 11.7–15.7)
HOLD SPECIMEN: NORMAL
IMM GRANULOCYTES # BLD: 0.2 10E3/UL
IMM GRANULOCYTES NFR BLD: 1 %
LACTATE SERPL-SCNC: 2.2 MMOL/L (ref 0.7–2)
LACTATE SERPL-SCNC: 2.3 MMOL/L (ref 0.7–2)
LYMPHOCYTES # BLD AUTO: 0.6 10E3/UL (ref 0.8–5.3)
LYMPHOCYTES NFR BLD AUTO: 3 %
MCH RBC QN AUTO: 34.6 PG (ref 26.5–33)
MCHC RBC AUTO-ENTMCNC: 33 G/DL (ref 31.5–36.5)
MCV RBC AUTO: 105 FL (ref 78–100)
MONOCYTES # BLD AUTO: 1.6 10E3/UL (ref 0–1.3)
MONOCYTES NFR BLD AUTO: 9 %
NEUTROPHILS # BLD AUTO: 14.2 10E3/UL (ref 1.6–8.3)
NEUTROPHILS NFR BLD AUTO: 85 %
NRBC # BLD AUTO: 0.1 10E3/UL
NRBC BLD AUTO-RTO: 1 /100
PLATELET # BLD AUTO: 358 10E3/UL (ref 150–450)
POTASSIUM SERPL-SCNC: 4.3 MMOL/L (ref 3.4–5.3)
PROT SERPL-MCNC: 6.1 G/DL (ref 6.4–8.3)
RBC # BLD AUTO: 3.38 10E6/UL (ref 3.8–5.2)
SODIUM SERPL-SCNC: 136 MMOL/L (ref 135–145)
WBC # BLD AUTO: 16.7 10E3/UL (ref 4–11)

## 2025-05-26 PROCEDURE — 72125 CT NECK SPINE W/O DYE: CPT

## 2025-05-26 PROCEDURE — 36415 COLL VENOUS BLD VENIPUNCTURE: CPT | Performed by: STUDENT IN AN ORGANIZED HEALTH CARE EDUCATION/TRAINING PROGRAM

## 2025-05-26 PROCEDURE — 80048 BASIC METABOLIC PNL TOTAL CA: CPT | Performed by: STUDENT IN AN ORGANIZED HEALTH CARE EDUCATION/TRAINING PROGRAM

## 2025-05-26 PROCEDURE — 258N000003 HC RX IP 258 OP 636: Performed by: STUDENT IN AN ORGANIZED HEALTH CARE EDUCATION/TRAINING PROGRAM

## 2025-05-26 PROCEDURE — 93005 ELECTROCARDIOGRAM TRACING: CPT

## 2025-05-26 PROCEDURE — 0HQ0XZZ REPAIR SCALP SKIN, EXTERNAL APPROACH: ICD-10-PCS | Performed by: STUDENT IN AN ORGANIZED HEALTH CARE EDUCATION/TRAINING PROGRAM

## 2025-05-26 PROCEDURE — 99222 1ST HOSP IP/OBS MODERATE 55: CPT | Performed by: INTERNAL MEDICINE

## 2025-05-26 PROCEDURE — 250N000011 HC RX IP 250 OP 636: Performed by: STUDENT IN AN ORGANIZED HEALTH CARE EDUCATION/TRAINING PROGRAM

## 2025-05-26 PROCEDURE — 83605 ASSAY OF LACTIC ACID: CPT | Performed by: STUDENT IN AN ORGANIZED HEALTH CARE EDUCATION/TRAINING PROGRAM

## 2025-05-26 PROCEDURE — 120N000001 HC R&B MED SURG/OB

## 2025-05-26 PROCEDURE — 85014 HEMATOCRIT: CPT | Performed by: STUDENT IN AN ORGANIZED HEALTH CARE EDUCATION/TRAINING PROGRAM

## 2025-05-26 PROCEDURE — 87640 STAPH A DNA AMP PROBE: CPT | Performed by: STUDENT IN AN ORGANIZED HEALTH CARE EDUCATION/TRAINING PROGRAM

## 2025-05-26 PROCEDURE — 87040 BLOOD CULTURE FOR BACTERIA: CPT | Performed by: STUDENT IN AN ORGANIZED HEALTH CARE EDUCATION/TRAINING PROGRAM

## 2025-05-26 PROCEDURE — 12006 RPR S/N/A/GEN/TRK20.1-30.0CM: CPT

## 2025-05-26 PROCEDURE — 99285 EMERGENCY DEPT VISIT HI MDM: CPT | Mod: 25

## 2025-05-26 PROCEDURE — 70450 CT HEAD/BRAIN W/O DYE: CPT

## 2025-05-26 RX ORDER — AMOXICILLIN 250 MG
1 CAPSULE ORAL 2 TIMES DAILY PRN
Status: DISCONTINUED | OUTPATIENT
Start: 2025-05-26 | End: 2025-05-30 | Stop reason: HOSPADM

## 2025-05-26 RX ORDER — PROCHLORPERAZINE MALEATE 5 MG/1
5 TABLET ORAL EVERY 6 HOURS PRN
Status: DISCONTINUED | OUTPATIENT
Start: 2025-05-26 | End: 2025-05-30 | Stop reason: HOSPADM

## 2025-05-26 RX ORDER — ATORVASTATIN CALCIUM 10 MG/1
10 TABLET, FILM COATED ORAL DAILY
Status: DISCONTINUED | OUTPATIENT
Start: 2025-05-27 | End: 2025-05-30 | Stop reason: HOSPADM

## 2025-05-26 RX ORDER — ACETAMINOPHEN 500 MG
1000 TABLET ORAL 2 TIMES DAILY PRN
Status: DISCONTINUED | OUTPATIENT
Start: 2025-05-26 | End: 2025-05-27

## 2025-05-26 RX ORDER — ACETAMINOPHEN 500 MG
1000 TABLET ORAL AT BEDTIME
Status: DISCONTINUED | OUTPATIENT
Start: 2025-05-26 | End: 2025-05-28

## 2025-05-26 RX ORDER — HYDROMORPHONE HYDROCHLORIDE 2 MG/1
2 TABLET ORAL EVERY 4 HOURS PRN
Status: DISCONTINUED | OUTPATIENT
Start: 2025-05-26 | End: 2025-05-29

## 2025-05-26 RX ORDER — SPIRONOLACTONE 25 MG/1
25 TABLET ORAL DAILY
Status: DISCONTINUED | OUTPATIENT
Start: 2025-05-27 | End: 2025-05-30 | Stop reason: HOSPADM

## 2025-05-26 RX ORDER — HYDROMORPHONE HCL IN WATER/PF 6 MG/30 ML
0.2 PATIENT CONTROLLED ANALGESIA SYRINGE INTRAVENOUS
Status: DISCONTINUED | OUTPATIENT
Start: 2025-05-26 | End: 2025-05-29

## 2025-05-26 RX ORDER — DORZOLAMIDE HYDROCHLORIDE AND TIMOLOL MALEATE 20; 5 MG/ML; MG/ML
1 SOLUTION/ DROPS OPHTHALMIC 2 TIMES DAILY
Status: DISCONTINUED | OUTPATIENT
Start: 2025-05-26 | End: 2025-05-30 | Stop reason: HOSPADM

## 2025-05-26 RX ORDER — ONDANSETRON 2 MG/ML
4 INJECTION INTRAMUSCULAR; INTRAVENOUS EVERY 6 HOURS PRN
Status: DISCONTINUED | OUTPATIENT
Start: 2025-05-26 | End: 2025-05-30 | Stop reason: HOSPADM

## 2025-05-26 RX ORDER — HYDROMORPHONE HCL IN WATER/PF 6 MG/30 ML
0.4 PATIENT CONTROLLED ANALGESIA SYRINGE INTRAVENOUS
Status: DISCONTINUED | OUTPATIENT
Start: 2025-05-26 | End: 2025-05-29

## 2025-05-26 RX ORDER — LIDOCAINE 40 MG/G
CREAM TOPICAL
Status: DISCONTINUED | OUTPATIENT
Start: 2025-05-26 | End: 2025-05-30 | Stop reason: HOSPADM

## 2025-05-26 RX ORDER — FAMOTIDINE 20 MG/1
20 TABLET, FILM COATED ORAL DAILY PRN
Status: DISCONTINUED | OUTPATIENT
Start: 2025-05-27 | End: 2025-05-30 | Stop reason: HOSPADM

## 2025-05-26 RX ORDER — ONDANSETRON 4 MG/1
4 TABLET, ORALLY DISINTEGRATING ORAL EVERY 6 HOURS PRN
Status: DISCONTINUED | OUTPATIENT
Start: 2025-05-26 | End: 2025-05-30 | Stop reason: HOSPADM

## 2025-05-26 RX ORDER — SALIVA STIMULANT COMB. NO.3
1 SPRAY, NON-AEROSOL (ML) MUCOUS MEMBRANE 4 TIMES DAILY
Status: DISCONTINUED | OUTPATIENT
Start: 2025-05-27 | End: 2025-05-30 | Stop reason: HOSPADM

## 2025-05-26 RX ORDER — CEFAZOLIN SODIUM 1 G/3ML
1 INJECTION, POWDER, FOR SOLUTION INTRAMUSCULAR; INTRAVENOUS EVERY 8 HOURS
Status: DISCONTINUED | OUTPATIENT
Start: 2025-05-26 | End: 2025-05-28

## 2025-05-26 RX ORDER — CEFTRIAXONE 2 G/1
2 INJECTION, POWDER, FOR SOLUTION INTRAMUSCULAR; INTRAVENOUS ONCE
Status: COMPLETED | OUTPATIENT
Start: 2025-05-26 | End: 2025-05-27

## 2025-05-26 RX ORDER — ACETAMINOPHEN 325 MG/1
650 TABLET ORAL EVERY 4 HOURS PRN
Status: DISCONTINUED | OUTPATIENT
Start: 2025-05-26 | End: 2025-05-28

## 2025-05-26 RX ORDER — FUROSEMIDE 10 MG/ML
40 INJECTION INTRAMUSCULAR; INTRAVENOUS EVERY 12 HOURS
Status: COMPLETED | OUTPATIENT
Start: 2025-05-27 | End: 2025-05-28

## 2025-05-26 RX ORDER — AMOXICILLIN 250 MG
2 CAPSULE ORAL 2 TIMES DAILY PRN
Status: DISCONTINUED | OUTPATIENT
Start: 2025-05-26 | End: 2025-05-30 | Stop reason: HOSPADM

## 2025-05-26 RX ORDER — MAGNESIUM HYDROXIDE/ALUMINUM HYDROXICE/SIMETHICONE 120; 1200; 1200 MG/30ML; MG/30ML; MG/30ML
30 SUSPENSION ORAL EVERY 4 HOURS PRN
Status: DISCONTINUED | OUTPATIENT
Start: 2025-05-26 | End: 2025-05-30 | Stop reason: HOSPADM

## 2025-05-26 RX ORDER — CALCIUM CARBONATE 500 MG/1
1000 TABLET, CHEWABLE ORAL 4 TIMES DAILY PRN
Status: DISCONTINUED | OUTPATIENT
Start: 2025-05-26 | End: 2025-05-30 | Stop reason: HOSPADM

## 2025-05-26 RX ORDER — METOPROLOL TARTRATE 50 MG
50 TABLET ORAL 2 TIMES DAILY
Status: DISCONTINUED | OUTPATIENT
Start: 2025-05-26 | End: 2025-05-30 | Stop reason: HOSPADM

## 2025-05-26 RX ORDER — ACETAMINOPHEN 650 MG/1
650 SUPPOSITORY RECTAL EVERY 4 HOURS PRN
Status: DISCONTINUED | OUTPATIENT
Start: 2025-05-26 | End: 2025-05-30 | Stop reason: HOSPADM

## 2025-05-26 RX ADMIN — CEFTRIAXONE 2 G: 2 INJECTION, POWDER, FOR SOLUTION INTRAMUSCULAR; INTRAVENOUS at 23:09

## 2025-05-26 RX ADMIN — SODIUM CHLORIDE 500 ML: 9 INJECTION, SOLUTION INTRAVENOUS at 22:58

## 2025-05-26 ASSESSMENT — ACTIVITIES OF DAILY LIVING (ADL)
ADLS_ACUITY_SCORE: 59

## 2025-05-26 ASSESSMENT — COLUMBIA-SUICIDE SEVERITY RATING SCALE - C-SSRS
6. HAVE YOU EVER DONE ANYTHING, STARTED TO DO ANYTHING, OR PREPARED TO DO ANYTHING TO END YOUR LIFE?: NO
2. HAVE YOU ACTUALLY HAD ANY THOUGHTS OF KILLING YOURSELF IN THE PAST MONTH?: NO
1. IN THE PAST MONTH, HAVE YOU WISHED YOU WERE DEAD OR WISHED YOU COULD GO TO SLEEP AND NOT WAKE UP?: NO

## 2025-05-27 LAB
ALBUMIN UR-MCNC: NEGATIVE MG/DL
ANION GAP SERPL CALCULATED.3IONS-SCNC: 12 MMOL/L (ref 7–15)
APPEARANCE UR: CLEAR
ATRIAL RATE - MUSE: 330 BPM
BACTERIA #/AREA URNS HPF: ABNORMAL /HPF
BILIRUB UR QL STRIP: NEGATIVE
BUN SERPL-MCNC: 31.5 MG/DL (ref 8–23)
CALCIUM SERPL-MCNC: 8.9 MG/DL (ref 8.8–10.4)
CHLORIDE SERPL-SCNC: 102 MMOL/L (ref 98–107)
COLOR UR AUTO: YELLOW
CREAT SERPL-MCNC: 0.97 MG/DL (ref 0.51–0.95)
DIASTOLIC BLOOD PRESSURE - MUSE: NORMAL MMHG
EGFRCR SERPLBLD CKD-EPI 2021: 54 ML/MIN/1.73M2
ERYTHROCYTE [DISTWIDTH] IN BLOOD BY AUTOMATED COUNT: 26.8 % (ref 10–15)
GLUCOSE SERPL-MCNC: 109 MG/DL (ref 70–99)
GLUCOSE UR STRIP-MCNC: NEGATIVE MG/DL
HCO3 SERPL-SCNC: 25 MMOL/L (ref 22–29)
HCT VFR BLD AUTO: 32.2 % (ref 35–47)
HGB BLD-MCNC: 10.6 G/DL (ref 11.7–15.7)
HGB UR QL STRIP: NEGATIVE
INTERPRETATION ECG - MUSE: NORMAL
KETONES UR STRIP-MCNC: NEGATIVE MG/DL
LEUKOCYTE ESTERASE UR QL STRIP: ABNORMAL
MCH RBC QN AUTO: 34.3 PG (ref 26.5–33)
MCHC RBC AUTO-ENTMCNC: 32.9 G/DL (ref 31.5–36.5)
MCV RBC AUTO: 104 FL (ref 78–100)
MRSA DNA SPEC QL NAA+PROBE: POSITIVE
MUCOUS THREADS #/AREA URNS LPF: PRESENT /LPF
NITRATE UR QL: NEGATIVE
P AXIS - MUSE: NORMAL DEGREES
PH UR STRIP: 5.5 [PH] (ref 5–7)
PLATELET # BLD AUTO: 343 10E3/UL (ref 150–450)
POTASSIUM SERPL-SCNC: 4.3 MMOL/L (ref 3.4–5.3)
PR INTERVAL - MUSE: NORMAL MS
QRS DURATION - MUSE: 110 MS
QT - MUSE: 314 MS
QTC - MUSE: 417 MS
R AXIS - MUSE: 11 DEGREES
RBC # BLD AUTO: 3.09 10E6/UL (ref 3.8–5.2)
RBC URINE: 1 /HPF
SA TARGET DNA: POSITIVE
SODIUM SERPL-SCNC: 139 MMOL/L (ref 135–145)
SP GR UR STRIP: 1.02 (ref 1–1.03)
SQUAMOUS EPITHELIAL: 1 /HPF
SYSTOLIC BLOOD PRESSURE - MUSE: NORMAL MMHG
T AXIS - MUSE: 127 DEGREES
UROBILINOGEN UR STRIP-MCNC: 2 MG/DL
VENTRICULAR RATE- MUSE: 106 BPM
WBC # BLD AUTO: 18.5 10E3/UL (ref 4–11)
WBC URINE: 4 /HPF

## 2025-05-27 PROCEDURE — 80048 BASIC METABOLIC PNL TOTAL CA: CPT | Performed by: INTERNAL MEDICINE

## 2025-05-27 PROCEDURE — 85027 COMPLETE CBC AUTOMATED: CPT | Performed by: INTERNAL MEDICINE

## 2025-05-27 PROCEDURE — 99233 SBSQ HOSP IP/OBS HIGH 50: CPT | Performed by: HOSPITALIST

## 2025-05-27 PROCEDURE — 97602 WOUND(S) CARE NON-SELECTIVE: CPT

## 2025-05-27 PROCEDURE — 120N000001 HC R&B MED SURG/OB

## 2025-05-27 PROCEDURE — 250N000013 HC RX MED GY IP 250 OP 250 PS 637: Performed by: INTERNAL MEDICINE

## 2025-05-27 PROCEDURE — G0463 HOSPITAL OUTPT CLINIC VISIT: HCPCS | Mod: 25

## 2025-05-27 PROCEDURE — 36415 COLL VENOUS BLD VENIPUNCTURE: CPT | Performed by: STUDENT IN AN ORGANIZED HEALTH CARE EDUCATION/TRAINING PROGRAM

## 2025-05-27 PROCEDURE — 36415 COLL VENOUS BLD VENIPUNCTURE: CPT | Performed by: INTERNAL MEDICINE

## 2025-05-27 PROCEDURE — 250N000013 HC RX MED GY IP 250 OP 250 PS 637: Performed by: HOSPITALIST

## 2025-05-27 PROCEDURE — 81001 URINALYSIS AUTO W/SCOPE: CPT | Performed by: INTERNAL MEDICINE

## 2025-05-27 PROCEDURE — 87040 BLOOD CULTURE FOR BACTERIA: CPT | Performed by: STUDENT IN AN ORGANIZED HEALTH CARE EDUCATION/TRAINING PROGRAM

## 2025-05-27 PROCEDURE — 250N000011 HC RX IP 250 OP 636: Performed by: INTERNAL MEDICINE

## 2025-05-27 RX ORDER — BUPROPION HYDROCHLORIDE 150 MG/1
300 TABLET ORAL EVERY MORNING
Status: DISCONTINUED | OUTPATIENT
Start: 2025-05-28 | End: 2025-05-30 | Stop reason: HOSPADM

## 2025-05-27 RX ORDER — DIPHENHYDRAMINE HCL 25 MG
25 CAPSULE ORAL EVERY 6 HOURS PRN
Status: DISCONTINUED | OUTPATIENT
Start: 2025-05-27 | End: 2025-05-30 | Stop reason: HOSPADM

## 2025-05-27 RX ORDER — BUPROPION HYDROCHLORIDE 150 MG/1
150 TABLET ORAL EVERY MORNING
Status: DISCONTINUED | OUTPATIENT
Start: 2025-05-28 | End: 2025-05-30 | Stop reason: HOSPADM

## 2025-05-27 RX ORDER — MULTIPLE VITAMINS W/ MINERALS TAB 9MG-400MCG
1 TAB ORAL DAILY
Status: DISCONTINUED | OUTPATIENT
Start: 2025-05-27 | End: 2025-05-30 | Stop reason: HOSPADM

## 2025-05-27 RX ORDER — ACETAMINOPHEN 500 MG
1000 TABLET ORAL EVERY EVENING
Status: ON HOLD | COMMUNITY
End: 2025-05-30

## 2025-05-27 RX ORDER — ACETAMINOPHEN 500 MG
1000 TABLET ORAL 2 TIMES DAILY PRN
Status: ON HOLD | COMMUNITY
End: 2025-05-30

## 2025-05-27 RX ADMIN — DORZOLAMIDE HYDROCHLORIDE AND TIMOLOL MALEATE 1 DROP: 20; 5 SOLUTION OPHTHALMIC at 08:36

## 2025-05-27 RX ADMIN — Medication 1 SPRAY: at 20:13

## 2025-05-27 RX ADMIN — MICONAZOLE NITRATE: 20 POWDER TOPICAL at 15:25

## 2025-05-27 RX ADMIN — CEFAZOLIN 1 G: 1 INJECTION, POWDER, FOR SOLUTION INTRAMUSCULAR; INTRAVENOUS at 23:56

## 2025-05-27 RX ADMIN — SPIRONOLACTONE 25 MG: 25 TABLET, FILM COATED ORAL at 08:33

## 2025-05-27 RX ADMIN — METOPROLOL TARTRATE 50 MG: 50 TABLET, FILM COATED ORAL at 20:12

## 2025-05-27 RX ADMIN — ACETAMINOPHEN 650 MG: 325 TABLET, FILM COATED ORAL at 09:18

## 2025-05-27 RX ADMIN — DORZOLAMIDE HYDROCHLORIDE AND TIMOLOL MALEATE 1 DROP: 20; 5 SOLUTION OPHTHALMIC at 20:13

## 2025-05-27 RX ADMIN — FUROSEMIDE 40 MG: 10 INJECTION, SOLUTION INTRAMUSCULAR; INTRAVENOUS at 23:58

## 2025-05-27 RX ADMIN — ACETAMINOPHEN 1000 MG: 500 TABLET ORAL at 01:08

## 2025-05-27 RX ADMIN — CEFAZOLIN 1 G: 1 INJECTION, POWDER, FOR SOLUTION INTRAMUSCULAR; INTRAVENOUS at 08:36

## 2025-05-27 RX ADMIN — ATORVASTATIN CALCIUM 10 MG: 10 TABLET, FILM COATED ORAL at 08:33

## 2025-05-27 RX ADMIN — FUROSEMIDE 40 MG: 10 INJECTION, SOLUTION INTRAMUSCULAR; INTRAVENOUS at 01:07

## 2025-05-27 RX ADMIN — ACETAMINOPHEN 1000 MG: 500 TABLET ORAL at 20:12

## 2025-05-27 RX ADMIN — CEFAZOLIN 1 G: 1 INJECTION, POWDER, FOR SOLUTION INTRAMUSCULAR; INTRAVENOUS at 16:52

## 2025-05-27 RX ADMIN — DIPHENHYDRAMINE HYDROCHLORIDE 25 MG: 25 CAPSULE ORAL at 13:58

## 2025-05-27 RX ADMIN — METOPROLOL TARTRATE 50 MG: 50 TABLET, FILM COATED ORAL at 08:33

## 2025-05-27 RX ADMIN — FUROSEMIDE 40 MG: 10 INJECTION, SOLUTION INTRAMUSCULAR; INTRAVENOUS at 12:17

## 2025-05-27 RX ADMIN — METOPROLOL TARTRATE 50 MG: 50 TABLET, FILM COATED ORAL at 01:09

## 2025-05-27 RX ADMIN — Medication 1 TABLET: at 17:02

## 2025-05-27 RX ADMIN — CEFAZOLIN 1 G: 1 INJECTION, POWDER, FOR SOLUTION INTRAMUSCULAR; INTRAVENOUS at 01:07

## 2025-05-27 ASSESSMENT — ACTIVITIES OF DAILY LIVING (ADL)
ADLS_ACUITY_SCORE: 61
ADLS_ACUITY_SCORE: 73
ADLS_ACUITY_SCORE: 51
ADLS_ACUITY_SCORE: 73
ADLS_ACUITY_SCORE: 51
ADLS_ACUITY_SCORE: 61
ADLS_ACUITY_SCORE: 73
ADLS_ACUITY_SCORE: 61
ADLS_ACUITY_SCORE: 61
ADLS_ACUITY_SCORE: 73
ADLS_ACUITY_SCORE: 61
ADLS_ACUITY_SCORE: 51
ADLS_ACUITY_SCORE: 61
ADLS_ACUITY_SCORE: 51
ADLS_ACUITY_SCORE: 51
ADLS_ACUITY_SCORE: 61
ADLS_ACUITY_SCORE: 61

## 2025-05-27 NOTE — PROGRESS NOTES
[facility]    Medicine Progress Note - Hospitalist Service    Date of Admission:  5/26/2025    Assessment & Plan    Riddhi Aguilar is a 89 year old female with a PMH significant for A-fib on Eliquis, CHFpEF, osteoporosis, osteoarthritis, kidney stones, dyslipidemia, glaucoma, spinal stenosis, duodenal ulce, nephrolithiasis, breast cancer, left femoral neck and left radial head fracture following mechanical requiring admission to the hospital, chronic kidney stage III, lymphedema, chronic anemia, glaucoma among other who presents today to ED from long-term care facility after episode of fall found to have head trauma and worsening left lower extremity cellulitis and admitted on 5/26/2025.      Mechanical fall  Scalp laceration due to fall.  Right elbow abrasion due to fall.  - Patient fell down in his room and sustained trauma to her head with laceration requiring 24 staples .  - CT scan of the head and neck without any acute abnormality .  - Patient is on Eliquis, cont holding today but consider resuming tomorrow  - PT and wound team following     Sepsis  Left lower extremity cellulitis with bouts of ruptured blisters  Bilateral lower extremity lymphedema.  - Patient has bilateral lower extremity edema, signs of cellulitis on the left leg.  - She has leukocytosis, lactate of 2.3, site of infection, was tachycardic earlier.  - Gentle IV fluid hydration, total of 500 cc ordered.  - She is at risk of fluid overload due to underlying CHFpEF.  - Patient got a dose of IV Rocephin, will change to Ancef and await further improvement. MRSA screen is positive but does not look like MRSA infection  -cont IV lasix today for LE edema but watch Mario marcelino. So far down 1.2L and legs improved     A-fib/flutter  CHFpEF.  - She has underlying lymphedema, has been on diuretics,  Lasix and spironolactone.  - She was on DOAC for A-fib at this time it will be held and can be restarted if no sign of bleeding and  hemoglobin is stable.  -Continue tele monitoring.  -gentle diuresis as needed to help heal the wound and treat the infection/cellulitis.  - Echo done recently showed EF of 60 to 65%.  Diastolic function not assessed due to A-fib at the time.        hx CKD stage III, Glaucoma, Hyperlipidemia, MDD, Chronic macrocytic anemia, Breast cancer  -resume home wellbutrin          Diet: Combination Diet Regular Diet Adult; 2 gm NA Diet    DVT Prophylaxis: hold DOAC  Martines Catheter: Not present  Lines: None     Cardiac Monitoring: None  Code Status: No CPR- Do NOT Intubate        Social Drivers of Health    Financial Resource Strain: Low Risk  (5/27/2025)    Financial Resource Strain     Within the past 12 months, have you or your family members you live with been unable to get utilities (heat, electricity) when it was really needed?: No   Recent Concern: Financial Resource Strain - High Risk (4/18/2025)    Financial Resource Strain     Within the past 12 months, have you or your family members you live with been unable to get utilities (heat, electricity) when it was really needed?: Yes   Physical Activity: Unknown (5/14/2024)    Exercise Vital Sign     Days of Exercise per Week: 4 days   Social Connections: Unknown (5/14/2024)    Social Connection and Isolation Panel [NHANES]     Frequency of Social Gatherings with Friends and Family: Once a week          Disposition Plan     Medically Ready for Discharge: Anticipated in 2-4 Days             Matt Hahn DO  Hospitalist Service  [facility]  Securely message with [email or web address] (more info)  Text page via [employer] Paging/Directory   ______________________________________________________________________    Interval History   Still with some pain, reports poor sleep and some generalized itching. Unclear if this is related to antibitoics, no known allergies. No fever or chills and cellulitis and LE edema is improving per daughter    Physical Exam   Vital Signs:  Temp: 97.5  F (36.4  C) Temp src: Axillary BP: 125/69 Pulse: 80   Resp: 20 SpO2: 93 % O2 Device: None (Room air)    Weight: 203 lbs 9.6 oz    Constitutional: fatigued, somnolent, and cooperative  Respiratory: no increased work of breathing, good air exchange, and clear to auscultation  Cardiovascular: regular rate and rhythm, no murmur noted, bilateral LE edema  GI: normal bowel sounds, soft, and non-distended  Skin: scalp wound wrapped, LLE cellulitis covered by dressing with resolving erythema  Neurologic: somnolent but interactive, somewhat disoriented but following ocmmands and moving extremities    Medical Decision Making       45 MINUTES SPENT BY ME on the date of service doing chart review, history, exam, documentation & further activities per the note.      Data   ------------------------- PAST 24 HR DATA REVIEWED -----------------------------------------------    I have personally reviewed the following data over the past 24 hrs:    18.5 (H)  \   10.6 (L)   / 343     139 102 31.5 (H) /  109 (H)   4.3 25 0.97 (H) \     ALT: 55 (H) AST: 48 (H) AP: 79 TBILI: 2.0 (H)   ALB: 3.8 TOT PROTEIN: 6.1 (L) LIPASE: N/A     Procal: N/A CRP: N/A Lactic Acid: 2.3 (H)         Imaging results reviewed over the past 24 hrs:   Recent Results (from the past 24 hours)   CT Head w/o Contrast    Narrative    EXAM: CT HEAD W/O CONTRAST, CT CERVICAL SPINE W/O CONTRAST  LOCATION: [facility]  DATE: 5/26/2025    INDICATION: Fall. Traumatic head and neck injury. Head strike.  COMPARISON: CT head dated 1/23/2013.  TECHNIQUE:   1) Routine CT Head without IV contrast. Multiplanar reformats. Dose reduction techniques were used.  2) Routine CT Cervical Spine without IV contrast. Multiplanar reformats. Dose reduction techniques were used.    FINDINGS:   HEAD CT:   INTRACRANIAL CONTENTS: No intracranial hemorrhage, extraaxial collection, or mass effect.  No CT evidence of acute infarct. Moderate presumed chronic small  vessel ischemic changes. Mild generalized volume loss. No hydrocephalus.     VISUALIZED ORBITS/SINUSES/MASTOIDS: Prior bilateral cataract surgery. Visualized portions of the orbits are otherwise unremarkable. Chronic-appearing complete opacification of the right maxillary sinus. No middle ear or mastoid effusion.    BONES/SOFT TISSUES: No calvarial fracture. High posterior scalp soft tissue swelling with laceration and skin staples.    CERVICAL SPINE CT:   VERTEBRA: Decreased osseous mineralization. Straightening of the normal cervical lordosis. No definite acute fracture or posttraumatic subluxation.     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 There is severe associated spinal canal stenosis at the levels of C1-C2, C2-C3, C3-C4   (asymmetric to the left), C4-C5 and C5-C6. At C2-C3, there is mild bilateral neural foraminal stenosis. At C3-C4, there is moderate-to-severe right and moderate-to-severe left neural foraminal stenosis. At C4-C5, there is moderate bilateral neural   foraminal stenosis. At C5-C6, there is severe right and moderate-to-severe left neural foraminal stenosis. At C6-C7, there is mild left neural foraminal stenosis. At C7-T1, there is moderate-to-severe left neural foraminal stenosis.    PARASPINAL: Subcentimeter left thyroid lobe nodule measuring 3 mm. Visualized lung fields are clear.      Impression    IMPRESSION:  HEAD CT:  1.  No CT evidence for acute intracranial process.  2.  Brain atrophy and presumed chronic microvascular ischemic changes as above.    CERVICAL SPINE CT:  1.  No CT evidence for acute fracture or post traumatic subluxation.  2.  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  3.  Multilevel degenerative changes of the cervical spine, as above.   CT Cervical Spine  w/o Contrast    Narrative    EXAM: CT HEAD W/O CONTRAST, CT CERVICAL SPINE W/O CONTRAST  LOCATION: [facility]  DATE: 5/26/2025    INDICATION: Fall. Traumatic head and neck injury. Head strike.  COMPARISON: CT head dated 1/23/2013.  TECHNIQUE:   1) Routine CT Head without IV contrast. Multiplanar reformats. Dose reduction techniques were used.  2) Routine CT Cervical Spine without IV contrast. Multiplanar reformats. Dose reduction techniques were used.    FINDINGS:   HEAD CT:   INTRACRANIAL CONTENTS: No intracranial hemorrhage, extraaxial collection, or mass effect.  No CT evidence of acute infarct. Moderate presumed chronic small vessel ischemic changes. Mild generalized volume loss. No hydrocephalus.     VISUALIZED ORBITS/SINUSES/MASTOIDS: Prior bilateral cataract surgery. Visualized portions of the orbits are otherwise unremarkable. Chronic-appearing complete opacification of the right maxillary sinus. No middle ear or mastoid effusion.    BONES/SOFT TISSUES: No calvarial fracture. High posterior scalp soft tissue swelling with laceration and skin staples.    CERVICAL SPINE CT:   VERTEBRA: Decreased osseous mineralization. Straightening of the normal cervical lordosis. No definite acute fracture or posttraumatic subluxation.     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 There is severe associated spinal canal stenosis at the levels of C1-C2, C2-C3, C3-C4   (asymmetric to the left), C4-C5 and C5-C6. At C2-C3, there is mild bilateral neural foraminal stenosis. At C3-C4, there is moderate-to-severe right and moderate-to-severe left neural foraminal stenosis. At C4-C5, there is moderate bilateral neural   foraminal stenosis. At C5-C6, there is severe right and moderate-to-severe left neural foraminal stenosis. At C6-C7, there is mild left neural foraminal stenosis. At C7-T1, there  is moderate-to-severe left neural foraminal stenosis.    PARASPINAL: Subcentimeter left thyroid lobe nodule measuring 3 mm. Visualized lung fields are clear.      Impression    IMPRESSION:  HEAD CT:  1.  No CT evidence for acute intracranial process.  2.  Brain atrophy and presumed chronic microvascular ischemic changes as above.    CERVICAL SPINE CT:  1.  No CT evidence for acute fracture or post traumatic subluxation.  2.  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  3.  Multilevel degenerative changes of the cervical spine, as above.      61

## 2025-05-27 NOTE — ED TRIAGE NOTES
"Pt here by EMS after falling at her living facility tonight. Pt states she has an ongoing infection in left leg, her leg \"gave out\" and she hit the back of her head against her chest of drawers. Pt on Eliquis. Active bleeding from back of head, per EMS they gave gone through 4 ABD pads in attempt to bandage wound. Pt denies LOC. Pt utilizes walker to move at baseline. Pt in A-Flutter upon arrival, hx of Afib. Pt DNR/DNI.  Pt extremely pale upon arrival, alert, in C-collar. BG with EMS 89.        "

## 2025-05-27 NOTE — PHARMACY-ADMISSION MEDICATION HISTORY
Pharmacist Admission Medication History    Admission medication history is complete. The information provided in this note is only as accurate as the sources available at the time of the update.    Information Source(s): Facility (U/NH/) medication list/MAR via MAR from [facility]    Pertinent Information:Cephalexin is scheduled to be completed on 5/28/25    Changes made to PTA medication list:  Added: None  Deleted: None  Changed:  the acetaminophen orders to be one scheduled entry and one as needed entry    Allergies reviewed with patient and updates made in EHR: no    Medication History Completed By: Dagn Berumen [facility] 5/27/2025 10:07 AM    PTA Med List   Medication Sig Last Dose/Taking    acetaminophen (TYLENOL) 500 MG tablet Take 1,000 mg by mouth every evening. 5/26/2025 Evening    acetaminophen (TYLENOL) 500 MG tablet Take 1,000 mg by mouth 2 times daily as needed for pain. 5/24/2025 Morning    apixaban ANTICOAGULANT (ELIQUIS) 5 MG tablet Take 1 tablet (5 mg) by mouth 2 times daily. 5/26/2025 at  3:10 PM    atorvastatin (LIPITOR) 10 MG tablet TAKE 1 TABLET(10 MG) BY MOUTH DAILY 5/26/2025 Morning    buPROPion (WELLBUTRIN XL) 150 MG 24 hr tablet TAKE 1 TABLET(150 MG) BY MOUTH EVERY MORNING 5/26/2025 Morning    buPROPion (WELLBUTRIN XL) 300 MG 24 hr tablet TAKE 1 TABLET(300 MG) BY MOUTH EVERY MORNING 5/26/2025 Morning    cephALEXin (KEFLEX) 250 MG capsule Take 1 capsule (250 mg) by mouth 3 times daily for 7 days. 5/26/2025 Evening    dorzolamide-timolol (COSOPT) 22.3-6.8 MG/ML ophthalmic solution Place 1 drop into both eyes 2 times daily 5/26/2025 at  3:10 PM    famotidine (PEPCID) 20 MG tablet Take 1 tablet (20 mg) by mouth daily. 5/26/2025 Morning    furosemide (LASIX) 20 MG tablet Take 1 tablet (20 mg) by mouth daily. 5/26/2025 Morning    Lidocaine (LIDOCARE) 4 % Patch Place 1 patch over 12 hours onto the skin every 24 hours. To prevent lidocaine toxicity, patient  should be patch free for 12 hrs daily. 5/23/2025 Evening    melatonin 3 MG tablet Take 3 mg by mouth at bedtime. 5/26/2025 Evening    metoprolol tartrate (LOPRESSOR) 50 MG tablet Take 1 tablet (50 mg) by mouth 2 times daily. 5/26/2025 at  3:10 PM    multivitamin w/minerals (THERA-VIT-M) tablet Take 1 tablet by mouth daily 5/26/2025 Morning    senna-docusate (SENOKOT-S/PERICOLACE) 8.6-50 MG tablet Take 1 tablet by mouth 2 times daily as needed for constipation. Taking As Needed    spironolactone (ALDACTONE) 25 MG tablet Take 1 tablet (25 mg) by mouth daily. 5/26/2025 Morning    vitamin D3 (CHOLECALCIFEROL) 50 mcg (2000 units) tablet Take 1 tablet by mouth daily 5/26/2025 Morning

## 2025-05-27 NOTE — PLAN OF CARE
"[id number]    Inpatient Progress Note:  A & O X 4. Family at bedside during admission and attentive with patient cares. On IV abx Ancef-See MAR. Positive for MRSA. On contact  precaution .    /84 (BP Location: Right arm)   Pulse 89   Temp 97.7  F (36.5  C) (Oral)   Resp 20   Ht 1.499 m (4' 11\")   Wt 92.4 kg (203 lb 9.6 oz)   SpO2 99%   BMI 41.12 kg/m       Problem: Adult Inpatient Plan of Care  Goal: Plan of Care Review  Description: The Plan of Care Review/Shift note should be completed every shift.  The Outcome Evaluation is a brief statement about your assessment that the patient is improving, declining, or no change.  This information will be displayed automatically on your shift  note.  Outcome: Progressing  Goal: Patient-Specific Goal (Individualized)  Description: You can add care plan individualizations to a care plan. Examples of Individualization might be:  \"Parent requests to be called daily at 9am for status\", \"I have a hard time hearing out of my right ear\", or \"Do not touch me to wake me up as it startles  me\".  Outcome: Progressing  Goal: Absence of Hospital-Acquired Illness or Injury  Outcome: Progressing  Intervention: Identify and Manage Fall Risk  Recent Flowsheet Documentation  Taken 5/27/2025 0024 by Lanie Quiñonez RN  Safety Promotion/Fall Prevention:   activity supervised   room near nurse's station   clutter free environment maintained  Intervention: Prevent Skin Injury  Recent Flowsheet Documentation  Taken 5/27/2025 0024 by Lanie Quiñonez RN  Body Position: position changed independently  Intervention: Prevent Infection  Recent Flowsheet Documentation  Taken 5/27/2025 0024 by Lanie Quiñonez RN  Infection Prevention:   hand hygiene promoted   rest/sleep promoted  Goal: Optimal Comfort and Wellbeing  Outcome: Progressing  Intervention: Monitor Pain and Promote Comfort  Recent Flowsheet Documentation  Taken 5/27/2025 0103 by Lanie Quiñonez RN  Pain " Management Interventions: medication (see MAR)  Intervention: Provide Person-Centered Care  Recent Flowsheet Documentation  Taken 5/27/2025 0024 by Lanie Quiñonez RN  Trust Relationship/Rapport: care explained  Goal: Readiness for Transition of Care  Outcome: Progressing  Flowsheets (Taken 5/27/2025 0017)  Transportation Anticipated: family or friend will provide  Intervention: Mutually Develop Transition Plan  Recent Flowsheet Documentation  Taken 5/27/2025 0017 by Lanie Quiñonez, RN  Transportation Anticipated: family or friend will provide  Patient/Family Anticipated Services at Transition: rehabilitation services  Patient/Family Anticipates Transition to: (TCU) long-term care facility     Problem: Delirium  Goal: Optimal Coping  Outcome: Progressing  Goal: Improved Behavioral Control  Outcome: Progressing  Intervention: Minimize Safety Risk  Recent Flowsheet Documentation  Taken 5/27/2025 0024 by Lanie Quiñonez RN  Enhanced Safety Measures: pain management  Trust Relationship/Rapport: care explained  Goal: Improved Attention and Thought Clarity  Outcome: Progressing  Goal: Improved Sleep  Outcome: Progressing    Will continue to provide supportive cares.     Lanie Quiñonez RN

## 2025-05-27 NOTE — ED NOTES
Bed: ED09  Expected date:   Expected time:   Means of arrival:   Comments:  Lety Armendariz-Room 9 when clean

## 2025-05-27 NOTE — ED NOTES
Bed: ED28  Expected date: 5/26/25  Expected time: 10:58 PM  Means of arrival:   Comments:  ED 9 - Ndelema

## 2025-05-27 NOTE — H&P
[facility]    History and Physical - Hospitalist Service       Date of Admission:  5/26/2025    Assessment & Plan      Riddhi Aguilar is a 89 year old female with a PMH significant for A-fib on Eliquis, CHFpEF, osteoporosis, osteoarthritis, kidney stones, dyslipidemia, glaucoma, spinal stenosis, duodenal ulce, nephrolithiasis, breast cancer, left femoral neck and left radial head fracture following mechanical requiring admission to the hospital, chronic kidney stage III, lymphedema, chronic anemia, glaucoma among other who presents today to ED from long-term care facility after episode of fall found to have head trauma and worsening left lower extremity cellulitis and admitted on 5/26/2025.     Mechanical fall  Scalp laceration due to fall.  Right elbow abrasion due to fall.  - Patient fell down in his room and sustained trauma to her head with laceration requiring 24 staples .  - Currently pressure dressed .  - Wound care as instructed .  - Pain control restarted on oral and IV Dilaudid low-dose.  - CT scan of the head and neck without any acute abnormality .  - Patient is on Eliquis which will be held due to increased risk of further bleeding .  - If mental status changes consider repeating CT scan of the head .  - PT will evaluate the patient.  - Monitor CBC.  - Watch for any sign of bleed    Sepsis  Left lower extremity cellulitis with bouts of ruptured blisters  Bilateral lower extremity lymphedema.  - Patient has bilateral lower extremity edema, signs of cellulitis on the left leg.  - She has leukocytosis, lactate of 2.3, site of infection, was tachycardic earlier.  - Gentle IV fluid hydration, total of 500 cc ordered.  - She is at risk of fluid overload due to underlying CHFpEF.  - Will resume her diuretics tomorrow.  - Lactate is more or less stable.  - Patient got a dose of IV Rocephin, will change to Ancef.  - Will order urinalysis.  - No cough, not hypoxic. Chest x-ray not ordered  "at this time.  - Adonay will be consulted    A-fib/flutter  CHFpEF.  - She has underlying lymphedema, has been on diuretics,  Lasix and spironolactone.  - She was on DOAC for A-fib at this time it will be held and can be restarted if no sign of bleeding and hemoglobin is stable.  -Continue tele monitoring.  -Resume diuretics tomorrow, gentle diuresis as needed to help heal the wound and treat the infection/cellulitis.  - Echo done recently showed EF of 60 to 65%.  Diastolic function not assessed due to A-fib at the time.      Other chronic medical conditions.  Resume PTA meds when is reviewed  CKD stage III  Glaucoma  Hyperlipidemia  MDD  Chronic macrocytic anemia  Breast cancer        Diet:  Advance diet to regular as tolerated  DVT Prophylaxis: DOAC, now on hold due to bleeding from scalp laceration and right elbow  Martines Catheter: Not present  Lines: None     Cardiac Monitoring: None  Code Status:  DNR/DNI    Clinically Significant Risk Factors Present on Admission                # Drug Induced Coagulation Defect: home medication list includes an anticoagulant medication    # Hypertension: Noted on problem list  # Chronic heart failure with preserved ejection fraction: heart failure noted on problem list and last echo with EF >50%          # Morbid Obesity: Estimated body mass index is 41.12 kg/m  as calculated from the following:    Height as of this encounter: 1.499 m (4' 11\").    Weight as of this encounter: 92.4 kg (203 lb 9.6 oz).       # Financial/Environmental Concerns:           Disposition Plan     Medically Ready for Discharge: Anticipated in 2-4 Days           Rory Lainez MD  Hospitalist Service  [facility]  Securely message with [employer] (more info)  Text page via [employer] Paging/Directory     ______________________________________________________________________    Chief Complaint   Fall and head trauma with laceration, left lower extremity cellulitis    History is obtained " from the patient and from patient's son and daughter in law    History of Present Illness   Riddhi Aguilar is a 89 year old female with PMH significant for A-fib on Eliquis, CHFpEF, osteoporosis, osteoarthritis, kidney stones, dyslipidemia, glaucoma, spinal stenosis, duodenal ulce, nephrolithiasis, breast cancer, left femoral neck and left radial head fracture following mechanical requiring admission to the hospital, chronic kidney stage III, lymphedema, chronic anemia, glaucoma among other who presents today to ED from long-term care facility after episode of fall.  Patient tripped over her leg while using her walker and hit her head on a drawer.  She stated both of her legs were weak and gave out.  She did not lose consciousness, denied any headache or blurring of vision.  After she fell down she noted bleeding and the bleed for about few minutes prior to help arrival.  She had a laceration on the back of her head and also had bandaged wound on her left leg.  The left lower extremity ulceration and infection is started as described and progressed to infection, and she is being on Keflex for the past 2 weeks without significant improvement.  Today the redness, pain progressed upwards to below the knee area.  Patient denied any knee pain, hip pain or back pain.  She also denied any new shortness of breath, cough, runny nose or sore throat.  She denied any urinary symptoms.  She has intermittent constipation followed by diarrhea after she gets stool softeners.  ED workup: CMP unremarkable except BUN of 63, creatinine 1.05, ALT 55, AST 48.  Initial lactate was 2.2, repeat is 2.3, WBC 16.7, .  Hemoglobin 11.7.    CT cervical spine no acute abnormality.  CT head no evidence of acute intracranial process.        Past Medical History    Past Medical History:   Diagnosis Date    Anemia     baseline 10-11    Arthritis     Bleeding ulcer     duodenal ulcer    CKD (chronic kidney disease) stage 3, GFR 30-59 ml/min (H)      Essential hypertension, benign     MDD (major depressive disorder)     Nephrolithiasis     Osteoporosis     Pure hypercholesterolemia     Spinal stenosis     Unspecified cataract     Unspecified glaucoma(365.9)        Past Surgical History   Past Surgical History:   Procedure Laterality Date    APPENDECTOMY      BREAST SURGERY      lumpectomy    CLOSED REDUCTION, PERCUTANEOUS PINNING HIP Left 5/20/2024    Procedure: Closed reduction percutaneous pinning, left hip;  Surgeon: Jose Luis Merino MD;  Location: [facility] OR    COMBINED CYSTOSCOPY, RETROGRADES, EXCHANGE STENT URETER(S) Left 01/24/2024    Procedure: Cystoscopy, left retrograde pyelogram, left JJ stent placement, <1hr physician fluoroscopy time;  Surgeon: Aureliano Brandt MD;  Location: [facility] OR    COMBINED CYSTOSCOPY, RETROGRADES, URETEROSCOPY, LASER HOLMIUM LITHOTRIPSY URETER(S), INSERT STENT Left 02/20/2024    Procedure: Cystoscopy, left diagnostic ureteroscopy, left retrograde pyelogram, left ureteral stent removal, fluoroscopic interpretation <1 hour physician time;  Surgeon: Richie Jaquez MD;  Location: [facility] OR    CYSTOSCOPY      CYSTOSCOPY, REMOVE STENT(S), COMBINED Left 02/20/2024    Procedure: Cystoscopy, remove stent(s), combined;  Surgeon: Richie Jaquez MD;  Location:  OR    DILATION AND CURETTAGE, HYSTEROSCOPY DIAGNOSTIC, COMBINED  02/06/2014    Procedure: COMBINED DILATION AND CURETTAGE, HYSTEROSCOPY DIAGNOSTIC;  DILATION AND CURETTAGE, HYSTEROSCOPY, POLYPECTOMY, INCISION AND DRAINAGE OF SEBACEOUS CYST ;  Surgeon: Serena Zamora MD;  Location: [facility]    EXCISE LESION LOWER EXTREMITY Right 08/29/2017    Procedure: EXCISE LESION LOWER EXTREMITY;  WIDE EXCISIONAL BIOPSY OF RIGHT ANKLE BASAL CELL CARCINOMA;  Surgeon: Juan Luis Flores MD;  Location:  OR    INCISION AND DRAINAGE PERINEAL, COMBINED  02/06/2014    Procedure: COMBINED INCISION AND DRAINAGE PERINEAL;;  Surgeon: Serena Zamora MD;  Location: [facility] NONSPECIFIC  PROCEDURE      Repair of buckled retina of rt eye    [facility] NONSPECIFIC PROCEDURE      Appy       Prior to Admission Medications   Prior to Admission Medications   Prescriptions Last Dose Informant Patient Reported? Taking?   Lidocaine (LIDOCARE) 4 % Patch   No No   Sig: Place 1 patch over 12 hours onto the skin every 24 hours. To prevent lidocaine toxicity, patient should be patch free for 12 hrs daily.   acetaminophen (TYLENOL) 500 MG tablet   No No   Sig: Take 2 tablets (1,000 mg) by mouth at bedtime. May also take 2 tablets (1,000 mg) 2 times daily as needed for mild pain.   apixaban ANTICOAGULANT (ELIQUIS) 5 MG tablet   No No   Sig: Take 1 tablet (5 mg) by mouth 2 times daily.   atorvastatin (LIPITOR) 10 MG tablet  Self No No   Sig: TAKE 1 TABLET(10 MG) BY MOUTH DAILY   buPROPion (WELLBUTRIN XL) 150 MG 24 hr tablet   No No   Sig: TAKE 1 TABLET(150 MG) BY MOUTH EVERY MORNING   buPROPion (WELLBUTRIN XL) 300 MG 24 hr tablet  Self No No   Sig: TAKE 1 TABLET(300 MG) BY MOUTH EVERY MORNING   cephALEXin (KEFLEX) 250 MG capsule   No No   Sig: Take 1 capsule (250 mg) by mouth 3 times daily for 7 days.   dorzolamide-timolol (COSOPT) 22.3-6.8 MG/ML ophthalmic solution  Self Yes No   Sig: Place 1 drop into both eyes 2 times daily   famotidine (PEPCID) 20 MG tablet   No No   Sig: Take 1 tablet (20 mg) by mouth daily.   furosemide (LASIX) 20 MG tablet   No No   Sig: Take 1 tablet (20 mg) by mouth daily.   melatonin 3 MG tablet   Yes No   Sig: Take 3 mg by mouth at bedtime.   metoprolol tartrate (LOPRESSOR) 50 MG tablet   No No   Sig: Take 1 tablet (50 mg) by mouth 2 times daily.   multivitamin w/minerals (THERA-VIT-M) tablet  Self Yes No   Sig: Take 1 tablet by mouth daily   senna-docusate (SENOKOT-S/PERICOLACE) 8.6-50 MG tablet   No No   Sig: Take 1 tablet by mouth 2 times daily as needed for constipation.   spironolactone (ALDACTONE) 25 MG tablet   No No   Sig: Take 1 tablet (25 mg) by mouth daily.   vitamin D3  (CHOLECALCIFEROL) 50 mcg (2000 units) tablet  Self Yes No   Sig: Take 1 tablet by mouth daily      Facility-Administered Medications: None        Review of Systems    The 5 point Review of Systems is negative other than noted in the HPI or here.      Physical Exam   Vital Signs: Temp: 98.2  F (36.8  C) Temp src: Oral BP: (!) 145/80 Pulse: 97   Resp: 22 SpO2: 100 %      Weight: 203 lbs 9.6 oz    General Appearance: Awake and alert, does not seem to be in distress, with laceration stapled and pressure dressed  Respiratory: Good air entry bilaterally, no wheezing crackles or rales  Cardiovascular: Sinus tubular, no gallop or murmur  GI: Soft, nontender, nondistended, positive bowel sounds  Skin: Redness, warmness left lower extremity, areas of oozing noted on the anterolateral aspect distal leg on the left side.  Right leg no redness or warmth or blister.      Medical Decision Making       65 MINUTES SPENT BY ME on the date of service doing chart review, history, exam, documentation & further activities per the note.      Data   Imaging results reviewed over the past 24 hrs:   Recent Results (from the past 24 hours)   CT Head w/o Contrast    Narrative    EXAM: CT HEAD W/O CONTRAST, CT CERVICAL SPINE W/O CONTRAST  LOCATION: [facility]  DATE: 5/26/2025    INDICATION: Fall. Traumatic head and neck injury. Head strike.  COMPARISON: CT head dated 1/23/2013.  TECHNIQUE:   1) Routine CT Head without IV contrast. Multiplanar reformats. Dose reduction techniques were used.  2) Routine CT Cervical Spine without IV contrast. Multiplanar reformats. Dose reduction techniques were used.    FINDINGS:   HEAD CT:   INTRACRANIAL CONTENTS: No intracranial hemorrhage, extraaxial collection, or mass effect.  No CT evidence of acute infarct. Moderate presumed chronic small vessel ischemic changes. Mild generalized volume loss. No hydrocephalus.     VISUALIZED ORBITS/SINUSES/MASTOIDS: Prior bilateral cataract surgery.  Visualized portions of the orbits are otherwise unremarkable. Chronic-appearing complete opacification of the right maxillary sinus. No middle ear or mastoid effusion.    BONES/SOFT TISSUES: No calvarial fracture. High posterior scalp soft tissue swelling with laceration and skin staples.    CERVICAL SPINE CT:   VERTEBRA: Decreased osseous mineralization. Straightening of the normal cervical lordosis. No definite acute fracture or posttraumatic subluxation.     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 There is severe associated spinal canal stenosis at the levels of C1-C2, C2-C3, C3-C4   (asymmetric to the left, C4-C5 and C5-C6. At C2-C3, there is mild bilateral neural foraminal stenosis. At C3-C4, there is moderate-to-severe right and moderate-to-severe left neural foraminal stenosis. At C4-C5, there is moderate bilateral neural   foraminal stenosis. At C5-C6, there is severe right and moderate-to-severe left neural foraminal stenosis. At C6-C7, there is mild left neural foraminal stenosis. At C7-T1, there is moderate-to-severe left neural foraminal stenosis.    PARASPINAL: Subcentimeter left thyroid lobe nodule measuring 3 mm. Visualized lung fields are clear.      Impression    IMPRESSION:  HEAD CT:  1.  No CT evidence for acute intracranial process.  2.  Brain atrophy and presumed chronic microvascular ischemic changes as above.    CERVICAL SPINE CT:  1.  No CT evidence for acute fracture or post traumatic subluxation.  2.  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  3.  Multilevel degenerative changes of the cervical spine, as above.   CT Cervical Spine w/o Contrast    Narrative    EXAM: CT HEAD W/O CONTRAST, CT CERVICAL SPINE W/O CONTRAST  LOCATION: [facility]  DATE:  5/26/2025    INDICATION: Fall. Traumatic head and neck injury. Head strike.  COMPARISON: CT head dated 1/23/2013.  TECHNIQUE:   1) Routine CT Head without IV contrast. Multiplanar reformats. Dose reduction techniques were used.  2) Routine CT Cervical Spine without IV contrast. Multiplanar reformats. Dose reduction techniques were used.    FINDINGS:   HEAD CT:   INTRACRANIAL CONTENTS: No intracranial hemorrhage, extraaxial collection, or mass effect.  No CT evidence of acute infarct. Moderate presumed chronic small vessel ischemic changes. Mild generalized volume loss. No hydrocephalus.     VISUALIZED ORBITS/SINUSES/MASTOIDS: Prior bilateral cataract surgery. Visualized portions of the orbits are otherwise unremarkable. Chronic-appearing complete opacification of the right maxillary sinus. No middle ear or mastoid effusion.    BONES/SOFT TISSUES: No calvarial fracture. High posterior scalp soft tissue swelling with laceration and skin staples.    CERVICAL SPINE CT:   VERTEBRA: Decreased osseous mineralization. Straightening of the normal cervical lordosis. No definite acute fracture or posttraumatic subluxation.     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 There is severe associated spinal canal stenosis at the levels of C1-C2, C2-C3, C3-C4   (asymmetric to the left, C4-C5 and C5-C6. At C2-C3, there is mild bilateral neural foraminal stenosis. At C3-C4, there is moderate-to-severe right and moderate-to-severe left neural foraminal stenosis. At C4-C5, there is moderate bilateral neural   foraminal stenosis. At C5-C6, there is severe right and moderate-to-severe left neural foraminal stenosis. At C6-C7, there is mild left neural foraminal stenosis. At C7-T1, there is moderate-to-severe left neural foraminal stenosis.    PARASPINAL: Subcentimeter left thyroid lobe nodule measuring 3 mm. Visualized lung  fields are clear.      Impression    IMPRESSION:  HEAD CT:  1.  No CT evidence for acute intracranial process.  2.  Brain atrophy and presumed chronic microvascular ischemic changes as above.    CERVICAL SPINE CT:  1.  No CT evidence for acute fracture or post traumatic subluxation.  2.  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  3.  Multilevel degenerative changes of the cervical spine, as above.     Recent Labs   Lab 05/26/25 2025 05/22/25 0527   WBC 16.7*  --    HGB 11.7  --    *  --      --     134*   POTASSIUM 4.3 3.9   CHLORIDE 99 95*   CO2 25 22   BUN 33.2* 34.5*   CR 1.05* 1.10*   ANIONGAP 12 17*   TAMMY 8.9 8.6*   GLC 95 92   ALBUMIN 3.8 3.5   PROTTOTAL 6.1* 5.5*   BILITOTAL 2.0* 3.3*   ALKPHOS 79 79   ALT 55* 40   AST 48* 44

## 2025-05-27 NOTE — PLAN OF CARE
ROOM # ED 28    Living Situation (if not independent, order SW consult): TCU  Facility name: [facility]  : Vahe ( Son ) [phone], or Mary Ann ( Daughter  in law)  [phone]    Activity level at baseline: Wheel chair bound ,( Independent with walker at baseline )  Activity level on admit: A X 2 with cares     Who will be transporting you at discharge: Vahe ( son), or Mary Ann ( Daughter)     Patient registered to observation; given Patient Bill of Rights; given the opportunity to ask questions about observation status and their plan of care.  Patient has been oriented to the observation room, bathroom and call light is in place.    Discussed discharge goals and expectations with patient/family.

## 2025-05-27 NOTE — PROVIDER NOTIFICATION
Patient has redness under her (L) breast and has Miconazole ordered (has bilateral masses in breast (Dr. Hahn updated). Patient also has abrasion/redness under (L) abdominal fold. Bruising noted on bilateral mid/low back/hips. Scratches on hands and abdomen. Pressure injury on buttock. (R) elbow abrasion has a Mepilex on (L) leg cellulitis marked and patient continues on Ancef Q8hrs. Head has 25 sutures in place and dressing changed. Contact for MRSA. Patient has been WC bound for awhile now daughter-in-law states. Normally lives at the [place], but has been at the [place]. Patient has not been OOB.

## 2025-05-27 NOTE — ED NOTES
"[facility]  ED Nurse Handoff Report    ED Chief complaint: Fall  . ED Diagnosis:   Final diagnoses:   Fall, initial encounter   Scalp laceration, initial encounter   Left leg cellulitis   Sepsis, due to unspecified organism, unspecified whether acute organ dysfunction present (H)       Allergies: No Known Allergies    Code Status: DNR / DNI    Activity level - Baseline/Home:  walker.  Activity Level - Current:   in bed.   Lift room needed: No.   Bariatric: No   Needed: No   Isolation: No.   Infection: Not Applicable.     Respiratory status: Room air    Vital Signs (within 30 minutes):   Vitals:    05/26/25 2244 05/26/25 2257 05/26/25 2300 05/26/25 2307   BP:  (!) 144/83 (!) 145/80    Pulse: 98 102 97 97   Resp: 20 22 25 22   Temp:       TempSrc:       SpO2: 97%   100%   Weight:       Height:           Cardiac Rhythm:  ,      Pain level:    Patient confused: Yes, intermittently, per family..   Patient Falls Risk: arm band in place, patient and family education, assistive device/personal items within reach, and activity supervised.   Elimination Status: Unable to void yet    Patient Report - Initial Complaint: Fall.   Focused Assessment: Per MD note: \"Riddhi Aguilar is a 89 year old female currently on Eliquis with a history of spinal stenosis, hypertension, hyperlipidemia, CKD and cervical radiculopathy presenting for evaluation after a fall. The patient was brought by EMS from [facility] after she tripped over her leg while using her walker and hit her head on her drawer. Riddhi explained that both of her legs were weak and gave out. She denies any  loss of consciousness or vomiting. The patient currently has a cut on the back of her head that is bandaged and a bandaged wound on her left leg. The wound started off as a scrap and Riddhi has been taking Keflex for the past two weeks for her wound. She has had no knee pain, new back pain, neck pain, chest pain, shortness of breath, " "abdominal pain, numbness or recent falls. Ridhdi uses a walker at home and normally has double vision in her right eye. \"    Patient is alert and oriented, able to make needs known. Denies pain aside from when MD stapling head.      Abnormal Results:   Labs Ordered and Resulted from Time of ED Arrival to Time of ED Departure   COMPREHENSIVE METABOLIC PANEL - Abnormal       Result Value    Sodium 136      Potassium 4.3      Carbon Dioxide (CO2) 25      Anion Gap 12      Urea Nitrogen 33.2 (*)     Creatinine 1.05 (*)     GFR Estimate 49 (*)     Calcium 8.9      Chloride 99      Glucose 95      Alkaline Phosphatase 79      AST 48 (*)     ALT 55 (*)     Protein Total 6.1 (*)     Albumin 3.8      Bilirubin Total 2.0 (*)    LACTIC ACID WHOLE BLOOD WITH 1X REPEAT IN 2 HR WHEN >2 - Abnormal    Lactic Acid, Initial 2.2 (*)    CBC WITH PLATELETS AND DIFFERENTIAL - Abnormal    WBC Count 16.7 (*)     RBC Count 3.38 (*)     Hemoglobin 11.7      Hematocrit 35.5       (*)     MCH 34.6 (*)     MCHC 33.0      RDW 26.9 (*)     Platelet Count 358      % Neutrophils 85      % Lymphocytes 3      % Monocytes 9      % Eosinophils 0      % Basophils 1      % Immature Granulocytes 1      NRBCs per 100 WBC 1 (*)     Absolute Neutrophils 14.2 (*)     Absolute Lymphocytes 0.6 (*)     Absolute Monocytes 1.6 (*)     Absolute Eosinophils 0.1      Absolute Basophils 0.1      Absolute Immature Granulocytes 0.2      Absolute NRBCs 0.1     LACTIC ACID WHOLE BLOOD - Abnormal    Lactic Acid 2.3 (*)    BLOOD CULTURE   MRSA MSSA PCR, NASAL SWAB   BLOOD CULTURE        CT Cervical Spine w/o Contrast   Preliminary Result   IMPRESSION:   HEAD CT:   1.  No CT evidence for acute intracranial process.   2.  Brain atrophy and presumed chronic microvascular ischemic changes as above.      CERVICAL SPINE CT:   1.  No CT evidence for acute fracture or post traumatic subluxation.   2.  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   3.  Multilevel degenerative changes of the cervical spine, as above.      CT Head w/o Contrast   Preliminary Result   IMPRESSION:   HEAD CT:   1.  No CT evidence for acute intracranial process.   2.  Brain atrophy and presumed chronic microvascular ischemic changes as above.      CERVICAL SPINE CT:   1.  No CT evidence for acute fracture or post traumatic subluxation.   2.  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   3.  Multilevel degenerative changes of the cervical spine, as above.          Treatments provided: See MAR, bandaging wound  Family Comments: At bedside  OBS brochure/video discussed/provided to patient:  No  ED Medications:   Medications   cefTRIAXone (ROCEPHIN) 2 g vial to attach to  ml bag for ADULTS or NS 50 ml bag for PEDS (2 g Intravenous $New Bag 5/26/25 0532)   sodium chloride 0.9% BOLUS 500 mL (500 mLs Intravenous $New Bag 5/26/25 0135)       Drips infusing:  Yes  For the majority of the shift this patient was Green.   Interventions performed were NA.    Sepsis treatment initiated: Yes    Per the ED Provider, Time Zero for severe sepsis or septic shock is:  2025    3 Hour Severe Sepsis Bundle Completion:  1. Initial Lactic Acid Result:   Recent Labs   Lab Test 05/26/25 2304 05/26/25 2025 04/18/25  0126   LACT 2.3* 2.2* 1.3     2. Blood Cultures before Antibiotics: 1 Set of cultures before abx, unable to get second set, lab consulted to get second set.   3. Broad Spectrum Antibiotics Administered:     Anti-infectives (From now, onward)      Start     Dose/Rate Route Frequency Ordered Stop    05/26/25 2055  cefTRIAXone (ROCEPHIN) 2 g vial to attach to  ml bag for ADULTS or NS 50 ml bag for PEDS         2 g  over 30 Minutes Intravenous ONCE 05/26/25 2054            4. 500 ml of IV fluids have been given so far      6 Hour  Severe Sepsis Bundle Completion:    1. Repeat Lactic Acid Level: Last result   Lab Results   Component Value Date    LACT 2.3 (H) 05/26/2025     2. Patient currently on Vasopressors =  No    Cares/treatment/interventions/medications to be completed following ED care: See MAR    ED Nurse Name: Rylee Rubin RN  11:31 PM  RECEIVING UNIT ED HANDOFF REVIEW    Above ED Nurse Handoff Report was reviewed: Yes  Reviewed by: Yina Stephens RN on May 27, 2025 at 11:31 AM

## 2025-05-27 NOTE — DISCHARGE INSTRUCTIONS
Left leg wound(s): Daily   1. Cleanse with Vashe and dry  2. Apply xeroform gauze cut to fit to wound  3. Cover with ABD and wrap with kerlix     Buttock wound(s): Every 3 days  1. Cleanse with wound cleanser and dry  2. Apply Mepilex 4x4 to each wound     Scalp wound(s): Daily and as needed when saturated  1. Cleanse with wound cleanser and dry  2. Apply adaptic over incisions  3. Cover with ABD and wrap with kerlix

## 2025-05-27 NOTE — CONSULTS
[facility]  WO Nurse Inpatient Assessment     Consulted for: Left leg, buttocks    WO nurse follow-up plan: weekly    Patient History (according to provider note(s):      Riddhi Aguilar is a 89 year old female with a PMH significant for A-fib on Eliquis, CHFpEF, osteoporosis, osteoarthritis, kidney stones, dyslipidemia, glaucoma, spinal stenosis, duodenal ulce, nephrolithiasis, breast cancer, left femoral neck and left radial head fracture following mechanical requiring admission to the hospital, chronic kidney stage III, lymphedema, chronic anemia, glaucoma among other who presents today to ED from long-term care facility after episode of fall found to have head trauma and worsening left lower extremity cellulitis and admitted on 5/26/2025.     Assessment:      Areas visualized during today's visit: Left leg, buttocks, head    Wound location: Left leg  Last photo: 5/27/25    Wound due to: Venous Ulcer  Wound history/plan of care: Patient with venous ulcer starting about a month ago but recently started draining heavily.  Over the weekend area became red and warm.  After diuretics in ED wound has now dried out significantly.    Wound base: 90 % Dry drainage, 10 % Fibrin and Slough     Palpation of the wound bed: normal      Drainage: small     Description of drainage: serosanguinous     Measurements (length x width x depth, in cm): 12  x 4  x  0.1 cm      Tunneling: N/A     Undermining: N/A  Periwound skin: Erythema- blanchable      Color: pink      Temperature: warm  Odor: none  Pain: moderate during assessment  Pain interventions prior to dressing change: slow and gentle cares   Treatment goal: Heal  and Remove necrotic tissue  STATUS: initial assessment  Supplies ordered: ordered Vashe, xerofoam     Pressure Injury Location: Right buttock  Last photo: 5/27/25    Wound type: Pressure Injury     Pressure Injury Stage: 3, present on admission   Wound history/plan of care:   Patient reports  "chronic injury which has been slowly getting better.  Previous injury on left as well which is now healed.  Wound base: 100 % Pink     Palpation of the wound bed: normal      Drainage: small     Description of drainage: serosanguinous     Measurements (length x width x depth, in cm) 1.5  x 0.5  x  0.3 cm      Tunneling N/A     Undermining up to 0.3 cm from 7-11 o'clock  Periwound skin: Scar tissue      Color: pink      Temperature: normal   Odor: none  Pain: mild  Pain intervention prior to dressing change: slow and gentle cares   Treatment goal: Heal   STATUS: initial assessment  Supplies ordered: gathered    My PI Risk Assessment     Sensory Perception: 3 - Slightly Limited     Moisture: 3 - Occasionally moist      Activity: 2 - Chairfast     Mobility: 2 - Very limited     Nutrition: 2 - Probably inadequate      Friction/Shear: 2 - Potential problem      TOTAL: 14      Pressure Injury Location: Bilateral IT  Last photo: 5/27/25    Wound type: Pressure Injury     Pressure Injury Stage: 2, present on admission   Wound history/plan of care:   Chronic pressure injuries patient reports are slowly improving.   Wound base:  100% Dermis     Palpation of the wound bed: normal      Drainage: small     Description of drainage: serosanguinous     Measurements (length x width x depth, in cm): Left IT is 0.5x1x0.2cm, Right IT is 0.5x0.5cm     Tunneling N/A     Undermining N/A  Periwound skin: Intact      Color: normal and consistent with surrounding tissue      Temperature: normal   Odor: none  Pain: denies   Pain intervention prior to dressing change: N/A  Treatment goal: Heal   STATUS: initial assessment  Supplies ordered: gathered      Wound location: Posterior scalp  Last photo: 5/27/25    Wound due to: Trauma  Wound history/plan of care: Patient with significant scalp laceration after falling.  Laceration repaired with staples in ED.    Wound base: 6x6cm reverse \"L\" shaped incision with staples.  Surrounding this are " "several large areas of dry drainage.  Will need head cleaned well in coming days but currently still bleeding too much to aggressively clean area.       Palpation of the wound bed: normal      Drainage: large     Description of drainage: bloody     Measurements (length x width x depth, in cm): see above    Tunneling: N/A     Undermining: N/A  Periwound skin: Intact      Color: normal and consistent with surrounding tissue      Temperature: normal   Odor: none  Pain: mild  Pain interventions prior to dressing change: slow and gentle cares   Treatment goal: Heal   STATUS: initial assessment  Supplies ordered: at bedside      Treatment Plan:     Left leg wound(s): Daily   Cleanse with Vashe and dry  Apply xeroform gauze ([id number]) cut to fit to wound  Cover with ABD and wrap with kerlix     Right buttock and bilateral IT wound(s): Every 3 days  Cleanse with wound cleanser and dry  Apply Mepilex 4x4 to each wound     Scalp wound(s): Daily and prn when saturated  Cleanse with wound cleanser and dry  Apply adaptic over incisions  Cover with ABD and wrap with kerlix    Pressure Injury Prevention (PIP) Plan:  If patient is declining pressure injury prevention interventions: Explore reason why and address patient's concerns, Educate on pressure injury risk and prevention intervention(s), If patient is still declining, document \"informed refusal\" , and Ensure Care team is aware ( provider, charge nurse, etc)  Mattress: Follow bed algorithm, add Low Air Loss (Air+) mattress pump if skin is very moist or constantly moist.   HOB: Maintain at or below 30 degrees, unless contraindicated  Repositioning in bed: Every 1-2 hours  and Left/right positioning; avoid supine  Heels: Pillows under calves  Chair positioning: Chair cushion (#[id number])    If patient has a buttock pressure injury, or high risk for PI use chair cushion or SPS.  Moisture Management: Avoid brief in bed  Under Devices: Inspect skin under all medical devices during " skin inspection , Ensure tubes are stabilized without tension, and Ensure patient is not lying on medical devices or equipment when repositioned  Ask provider to discontinue device when no longer needed.       Orders: Written    RECOMMEND PRIMARY TEAM ORDER: None, at this time  Education provided: importance of repositioning, plan of care, and Off-loading pressure  Discussed plan of care with: Patient, Family, and Nurse  Notify WOC if wound(s) deteriorate.  Nursing to notify the Provider(s) and re-consult the WOC Nurse if new skin concern.    DATA:     Current support surface: Standard  Standard gel mattress (Isoflex)  Containment of urine/stool: Incontinence Protocol  BMI: Body mass index is 41.12 kg/m .   Active diet order: Orders Placed This Encounter      Combination Diet Regular Diet Adult; 2 gm NA Diet     Output: I/O last 3 completed shifts:  In: -   Out: 1400 [Urine:1400]     Labs:   Recent Labs   Lab 05/27/25  0801 05/26/25 2025   ALBUMIN  --  3.8   HGB 10.6* 11.7   WBC 18.5* 16.7*     Pressure injury risk assessment:   Sensory Perception: 4-->no impairment  Moisture: 3-->occasionally moist  Activity: 2-->chairfast  Mobility: 3-->slightly limited  Nutrition: 3-->adequate  Friction and Shear: 3-->no apparent problem  Syed Score: 18    Lv Saini RN CWOCN  Contact Via [facility] Nurse ([place])  Dept. Office Number: [phone]

## 2025-05-27 NOTE — PROVIDER NOTIFICATION
DATE/TIME OF CALL RECEIVED FROM LAB:  05/27/25 at 4:36 AM   LAB TEST: MRSA  LAB VALUE:  Positive for MRSA  PROVIDER NOTIFIED?: Yes  PROVIDER NAME: [facility]-Cover Provider   DATE/TIME LAB VALUE REPORTED TO PROVIDER: 01:32  MECHANISM OF PROVIDER NOTIFICATION: Page  PROVIDER RESPONSE: Awaiting

## 2025-05-27 NOTE — ED PROVIDER NOTES
Emergency Department Note      History of Present Illness     Chief Complaint   Fall    HPI   Riddhi Aguilar is a 89 year old female currently on Eliquis with a history of spinal stenosis, hypertension, hyperlipidemia,, A-fib, CKD and cervical radiculopathy presenting for evaluation after a fall. The patient was brought by EMS from [facility] after she tripped over her leg while using her walker and hit her head on her drawer. Riddhi explained that both of her legs were weak and gave out. She denies any loss of consciousness or vomiting. The patient currently has a cut on the back of her head that is bandaged and a bandaged wound on her left leg. The wound started off as a scrap and Riddhi has been taking Keflex for the past two weeks for her wound. She has had no knee pain, new back pain, neck pain, chest pain, shortness of breath, abdominal pain, numbness or recent falls. Riddhi uses a walker at home and normally has double vision in her right eye.     Independent Historian   EMS as detailed above.    Review of External Notes   April 21, 2025-discharge summary for atrial fibrillation with RVR with new heart failure diagnosis.    Past Medical History     Medical History and Problem List   Anemia  Arthritis  Bleeding ulcer  CKD   hypertension  MDD  Nephrolithiasis  Osteoporosis  Hypercholesterolemia  Spinal stenosis  Cataract  Glaucoma  Hyperlipidemia   Cervical radiculopathy   Brachial neuritis   Malignant neoplasm of breast   Mastodynia   Macular pucker right eye     Medications   Apixaban   Atorvastatin   Bupropion   Cephalexin   Famotidine   Furosemide   Metoprolol tartate   Senokot   Spironolactone       Surgical History   Appendectomy   Lumpectomy breast  Hip reduction left   Stent ureter left x 2  Cystoscopy x 2  Hysteroscopy   Excise lesion lower extremity   Incision and drainage perineal   Buckled retina of right eye       Physical Exam     Patient Vitals for the past 24 hrs:   BP Temp Temp src Pulse Resp  "SpO2 Height Weight   05/26/25 2307 -- -- -- 97 22 100 % -- --   05/26/25 2300 (!) 145/80 -- -- 97 25 -- -- --   05/26/25 2257 (!) 144/83 -- -- 102 22 -- -- --   05/26/25 2244 -- -- -- 98 20 97 % -- --   05/26/25 2243 -- -- -- 94 12 97 % -- --   05/26/25 2242 -- -- -- 93 21 98 % -- --   05/26/25 2241 -- -- -- 99 20 98 % -- --   05/26/25 2240 -- -- -- 101 12 99 % -- --   05/26/25 2239 -- -- -- 107 15 99 % -- --   05/26/25 2200 (!) 147/64 -- -- 97 26 98 % -- --   05/26/25 2130 (!) 147/91 -- -- 101 23 98 % -- --   05/26/25 2059 -- 98.2  F (36.8  C) Oral -- -- -- 1.499 m (4' 11\") 92.4 kg (203 lb 9.6 oz)   05/26/25 2047 -- -- -- 116 11 -- -- --   05/26/25 2046 -- -- -- 105 23 95 % -- --   05/26/25 2045 (!) 158/116 -- -- 105 19 -- -- --   05/26/25 2037 -- -- -- 91 22 95 % -- --   05/26/25 2034 -- -- -- 111 23 100 % -- --   05/26/25 2030 (!) 139/99 -- -- 100 29 -- -- --   05/26/25 2021 -- -- -- 103 23 -- -- --   05/26/25 2018 (!) 154/79 -- -- 102 -- -- -- --   05/26/25 2017 -- -- -- 111 11 -- -- --   05/26/25 2016 -- -- -- 98 12 -- -- --   05/26/25 2014 -- -- -- -- -- 95 % -- --     Physical Exam  GENERAL: Patient in C collar, lying flat in gurney.  Alert.  Attentive.  HEAD: Large skin flap roughly 24cm on scalp oozing blood   EYES: Anicteric. PERRL  NOSE: No active bleeding  MOUTH: Moist mucosa  THROAT: Patent airway. Pharynx clear.   NECK: In c-collar. No midline spinal tenderness  BACK: No midline spinal tenderness, crepitus or gross deformity  CHEST: No rib tenderness to palpation  CV: Tachycardic and irregular, no murmurs, rubs or gallops  PULM: CTAB with good aeration; no retractions, rales, rhonchi, or wheezing  ABD: Soft, nontender, nondistended, no guarding, no peritoneal signs, no bruising  DERM:  Old contusion left flank-patient family reports from a fall weeks ago.  Skin warm and dry  EXTREMITY: Left lower leg diffuse erythema, tracking up to proximal hamstring. Open wound left medial ankle . Leg without " crepitus.  Few clear blisters.  Weeping serous fluid.  No pus. 3+ bilateral LE edema, worse on left leg.  Moving all extremities but weakness with lifting left leg.  PELVIS: Stable  VASCULAR: Left leg 1+ DP pulse.       Diagnostics     Lab Results   Labs Ordered and Resulted from Time of ED Arrival to Time of ED Departure   COMPREHENSIVE METABOLIC PANEL - Abnormal       Result Value    Sodium 136      Potassium 4.3      Carbon Dioxide (CO2) 25      Anion Gap 12      Urea Nitrogen 33.2 (*)     Creatinine 1.05 (*)     GFR Estimate 49 (*)     Calcium 8.9      Chloride 99      Glucose 95      Alkaline Phosphatase 79      AST 48 (*)     ALT 55 (*)     Protein Total 6.1 (*)     Albumin 3.8      Bilirubin Total 2.0 (*)    LACTIC ACID WHOLE BLOOD WITH 1X REPEAT IN 2 HR WHEN >2 - Abnormal    Lactic Acid, Initial 2.2 (*)    CBC WITH PLATELETS AND DIFFERENTIAL - Abnormal    WBC Count 16.7 (*)     RBC Count 3.38 (*)     Hemoglobin 11.7      Hematocrit 35.5       (*)     MCH 34.6 (*)     MCHC 33.0      RDW 26.9 (*)     Platelet Count 358      % Neutrophils 85      % Lymphocytes 3      % Monocytes 9      % Eosinophils 0      % Basophils 1      % Immature Granulocytes 1      NRBCs per 100 WBC 1 (*)     Absolute Neutrophils 14.2 (*)     Absolute Lymphocytes 0.6 (*)     Absolute Monocytes 1.6 (*)     Absolute Eosinophils 0.1      Absolute Basophils 0.1      Absolute Immature Granulocytes 0.2      Absolute NRBCs 0.1     LACTIC ACID WHOLE BLOOD - Abnormal    Lactic Acid 2.3 (*)    ROUTINE UA WITH MICROSCOPIC REFLEX TO CULTURE   BLOOD CULTURE   MRSA MSSA PCR, NASAL SWAB   BLOOD CULTURE       Imaging   CT Cervical Spine w/o Contrast   Preliminary Result   IMPRESSION:   HEAD CT:   1.  No CT evidence for acute intracranial process.   2.  Brain atrophy and presumed chronic microvascular ischemic changes as above.      CERVICAL SPINE CT:   1.  No CT evidence for acute fracture or post traumatic subluxation.   2.  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   3.  Multilevel degenerative changes of the cervical spine, as above.      CT Head w/o Contrast   Preliminary Result   IMPRESSION:   HEAD CT:   1.  No CT evidence for acute intracranial process.   2.  Brain atrophy and presumed chronic microvascular ischemic changes as above.      CERVICAL SPINE CT:   1.  No CT evidence for acute fracture or post traumatic subluxation.   2.  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   3.  Multilevel degenerative changes of the cervical spine, as above.          EKG   ECG taken at 2016, ECG read at 2052  Atrial flutter with variable AV block   Left ventricular hypertrophy with repolarization abnormality (Sriram product)   Abnormal ECG   NO STEMI  No prior ECG comparison  Rate 106 bpm. CT interval * ms. QRS duration 110 ms. QT/QTc 314/417 ms. P-R-T axes * 11 127.    Independent Interpretation   CT Head: No intracranial hemorrhage.    ED Course      Medications Administered   Medications   acetaminophen (TYLENOL) tablet 1,000 mg (has no administration in time range)   atorvastatin (LIPITOR) tablet 10 mg (has no administration in time range)   metoprolol tartrate (LOPRESSOR) tablet 50 mg (has no administration in time range)   dorzolamide-timolol (COSOPT) ophthalmic solution 1 drop (has no administration in time range)   famotidine (PEPCID) tablet 20 mg (has no administration in time range)   spironolactone (ALDACTONE) tablet 25 mg (has no administration in time range)   ceFAZolin (ANCEF) 1 g vial to attach to  ml bag for ADULT or 50 ml bag for PEDS (has no administration in time range)   lidocaine 1 % 0.1-1 mL (has no administration in time range)   lidocaine (LMX4) cream (has no administration in time range)   sodium chloride (PF) 0.9% PF flush 3 mL (has no  administration in time range)   sodium chloride (PF) 0.9% PF flush 3 mL (has no administration in time range)   acetaminophen (TYLENOL) tablet 650 mg (has no administration in time range)     Or   acetaminophen (TYLENOL) Suppository 650 mg (has no administration in time range)   senna-docusate (SENOKOT-S/PERICOLACE) 8.6-50 MG per tablet 1 tablet (has no administration in time range)     Or   senna-docusate (SENOKOT-S/PERICOLACE) 8.6-50 MG per tablet 2 tablet (has no administration in time range)   ondansetron (ZOFRAN ODT) ODT tab 4 mg (has no administration in time range)     Or   ondansetron (ZOFRAN) injection 4 mg (has no administration in time range)   calcium carbonate (TUMS) chewable tablet 1,000 mg (has no administration in time range)   benzocaine-menthol (CHLORASEPTIC) 6-10 MG lozenge 1 lozenge (has no administration in time range)   artificial saliva (BIOTENE MT) solution 1 spray (has no administration in time range)   miconazole (MICATIN) 2 % powder (has no administration in time range)   menthol-zinc oxide (CALMOSEPTINE) 0.44-20.6 % ointment OINT (has no administration in time range)   furosemide (LASIX) injection 40 mg (has no administration in time range)   Patient is already receiving anticoagulation with heparin, enoxaparin (LOVENOX), warfarin (COUMADIN)  or other anticoagulant medication (has no administration in time range)   HYDROmorphone (DILAUDID) half-tab 1 mg (has no administration in time range)   HYDROmorphone (DILAUDID) tablet 2 mg (has no administration in time range)   HYDROmorphone (DILAUDID) injection 0.2 mg (has no administration in time range)   HYDROmorphone (DILAUDID) injection 0.4 mg (has no administration in time range)   prochlorperazine (COMPAZINE) injection 5 mg (has no administration in time range)     Or   prochlorperazine (COMPAZINE) tablet 5 mg (has no administration in time range)   alum & mag hydroxide-simethicone (MAALOX) suspension 30 mL (has no administration in time  range)   cefTRIAXone (ROCEPHIN) 2 g vial to attach to  ml bag for ADULTS or NS 50 ml bag for PEDS (2 g Intravenous $New Bag 5/26/25 5911)   sodium chloride 0.9% BOLUS 500 mL (500 mLs Intravenous $New Bag 5/26/25 9518)       Procedures   Procedures     Laceration Repair      Procedure: Laceration Repair    Indication: Laceration    Consent: Verbal    Tetanus status reviewed    Location:  Back of the head    Length: 24 cm    Preparation: Irrigation with Sterile Saline.    Anesthesia/Sedation: None      Treatment/Exploration: Wound explored, no foreign bodies found     Closure: The wound was closed with 24 staples.    Patient Status: The patient tolerated the procedure well: Yes. There were no complications.     Discussion of Management   Admitting Hospitalist, Dr. Simmons    ED Course   ED Course as of 05/27/25 0055   Mon May 26, 2025   2019 I obtained the history and examined the patient as noted above.      2052 I gave the patient antibiotics.    2251 I spoke with Dr. Simmons, hospitalist, who accepted the patient for admission.        Additional Documentation  None    Medical Decision Making / Diagnosis     CMS Diagnoses: IV Antibiotics given and/or elevated Lactate of 2.3 and no sepsis note found - Delete this reminder and enter the sepsis note or '.edcms' before signing chart.>>>The patient has signs of sepsis   Sepsis ED evaluation   The patient has signs of sepsis as evidenced by:  1. Presence of 2 SIRS criteria, suspected infection, AND  2. Organ dysfunction: Lactic Acidosis with value >2.0 due to sepsis    Sepsis Care Initiation: Starting at 2052 until specified. Prior to this documentation, sepsis, severe sepsis, or septic shock was NOT thought to be a significant cause of illness. This order represents the first time infection was seriously considered to be affecting the patient.    Lactic Acid Results:  Recent Labs   Lab Test 05/26/25 2304 05/26/25 2025 04/18/25  0126   LACT 2.3* 2.2* 1.3       3  Hour Bundle 6 Hour Bundle (Reassessment)   Blood Cultures before IV Antibiotics: Yes  Antibiotics given: see below  Prehospital fluid volume (mL):                     Total fluids given (ED +Pre-hospital): 500 cc IV fluid-limited due to lower extremity edema and recent CHF diagnosis.   Repeat Lactic Acid Level: Ordered by reflex for 2 hours after initial lactic acid collection.  Vasopressors: none  Repeat perfusion exam: I attest to having performed a repeat sepsis exam and assessment of perfusion at 2300 and improved.   BMI Readings from Last 1 Encounters:   05/26/25 41.12 kg/m        Anti-infectives (From admission through now)      Start     Dose/Rate Route Frequency Ordered Stop    05/26/25 2355  ceFAZolin (ANCEF) 1 g vial to attach to  ml bag for ADULT or 50 ml bag for PEDS         1 g  over 30 Minutes Intravenous EVERY 8 HOURS 05/26/25 2353 05/26/25 2055  cefTRIAXone (ROCEPHIN) 2 g vial to attach to  ml bag for ADULTS or NS 50 ml bag for PEDS         2 g  over 30 Minutes Intravenous ONCE 05/26/25 2054 05/26/25 2339                MIPS   None               [facility]   Riddhi Aguilar is a 89 year old female presenting after mechanical fall due to left leg weakness which I suspect is secondary to a diffuse cellulitis of the left leg.  She has some pain in the left leg but it is not severe.  She had been on Keflex for this but it has not been effective.  After evaluating patient, I promptly ordered blood cultures in addition to labs and lactate.  Patient did have a significant scalp laceration that had to be repaired first and then patient had to go to CT prior to nursing be able to focus on getting the blood cultures and then giving antibiotics.  Patient had a very large scalp flap laceration.  We thoroughly irrigated it with sterile saline and then I stapled it to create hemostasis as it was significantly oozing.  24 staples placed and then I placed a dressing around the head.  CT head thankfully  negative for bleed and CT C-spine negative for acute fracture.  C-spine clinically cleared then.  Labs showing leukocytosis and elevated lactate.  Not much change on the repeat lactate, however limited fluids due to the CHF.  She is not hypotensive.  MRSA nares pending.  Will hold on vancomycin in my discussion with the hospitalist.  Patient to be admitted.  Patient's family made aware of plan.    Disposition   The patient was admitted to the hospital.     Diagnosis     ICD-10-CM    1. Fall, initial encounter  W19.XXXA       2. Scalp laceration, initial encounter  S01.01XA       3. Left leg cellulitis  L03.116       4. Sepsis, due to unspecified organism, unspecified whether acute organ dysfunction present (H)  A41.9            Discharge Medications   New Prescriptions    No medications on file         Scribe Disclosure:  I, Evelyn Sexton, am serving as a scribe at 8:20 PM on 5/26/2025 to document services personally performed by Pranay Nur MD based on my observations and the provider's statements to me.        Pranay Nur MD  05/27/25 0055

## 2025-05-27 NOTE — PLAN OF CARE
ROOM # 207    Living Situation (if not independent, order SW consult): Lives at the [facility], but was at [facility] for TCU   Facility name:  : Mary Ann (daughter) [phone]    Activity level at baseline: WC bound   Activity level on admit: x2 assist possible lift    Who will be transporting you at discharge: [facility] vs [facility]     Patient registered to observation; given Patient Bill of Rights; given the opportunity to ask questions about observation status and their plan of care.  Patient has been oriented to the observation room, bathroom and call light is in place.    Discussed discharge goals and expectations with patient/family.           Goal Outcome Evaluation:

## 2025-05-28 ENCOUNTER — APPOINTMENT (OUTPATIENT)
Dept: PHYSICAL THERAPY | Facility: CLINIC | Age: OVER 89
DRG: 871 | End: 2025-05-28
Attending: INTERNAL MEDICINE
Payer: COMMERCIAL

## 2025-05-28 PROCEDURE — 250N000011 HC RX IP 250 OP 636: Performed by: INTERNAL MEDICINE

## 2025-05-28 PROCEDURE — 250N000011 HC RX IP 250 OP 636: Performed by: HOSPITALIST

## 2025-05-28 PROCEDURE — 999N000197 HC STATISTIC WOC PT EDUCATION, 0-15 MIN

## 2025-05-28 PROCEDURE — 120N000001 HC R&B MED SURG/OB

## 2025-05-28 PROCEDURE — 250N000013 HC RX MED GY IP 250 OP 250 PS 637: Performed by: INTERNAL MEDICINE

## 2025-05-28 PROCEDURE — 97110 THERAPEUTIC EXERCISES: CPT | Mod: GP | Performed by: PHYSICAL THERAPIST

## 2025-05-28 PROCEDURE — 250N000013 HC RX MED GY IP 250 OP 250 PS 637: Performed by: HOSPITALIST

## 2025-05-28 PROCEDURE — 97161 PT EVAL LOW COMPLEX 20 MIN: CPT | Mod: GP | Performed by: PHYSICAL THERAPIST

## 2025-05-28 PROCEDURE — 258N000003 HC RX IP 258 OP 636: Performed by: HOSPITALIST

## 2025-05-28 PROCEDURE — 97530 THERAPEUTIC ACTIVITIES: CPT | Mod: GP | Performed by: PHYSICAL THERAPIST

## 2025-05-28 PROCEDURE — 99233 SBSQ HOSP IP/OBS HIGH 50: CPT | Performed by: HOSPITALIST

## 2025-05-28 RX ORDER — BISACODYL 10 MG
10 SUPPOSITORY, RECTAL RECTAL DAILY PRN
Status: DISCONTINUED | OUTPATIENT
Start: 2025-05-28 | End: 2025-05-30 | Stop reason: HOSPADM

## 2025-05-28 RX ORDER — POLYETHYLENE GLYCOL 3350 17 G/17G
17 POWDER, FOR SOLUTION ORAL DAILY
Status: DISCONTINUED | OUTPATIENT
Start: 2025-05-28 | End: 2025-05-30 | Stop reason: HOSPADM

## 2025-05-28 RX ORDER — LACTULOSE 10 G/15ML
10 SOLUTION ORAL 2 TIMES DAILY PRN
Status: DISCONTINUED | OUTPATIENT
Start: 2025-05-28 | End: 2025-05-30 | Stop reason: HOSPADM

## 2025-05-28 RX ORDER — ACETAMINOPHEN 500 MG
1000 TABLET ORAL EVERY 8 HOURS PRN
Status: DISCONTINUED | OUTPATIENT
Start: 2025-05-28 | End: 2025-05-30 | Stop reason: HOSPADM

## 2025-05-28 RX ORDER — FUROSEMIDE 20 MG/1
20 TABLET ORAL DAILY
Status: DISCONTINUED | OUTPATIENT
Start: 2025-05-28 | End: 2025-05-30 | Stop reason: HOSPADM

## 2025-05-28 RX ORDER — FUROSEMIDE 10 MG/ML
40 INJECTION INTRAMUSCULAR; INTRAVENOUS ONCE
Status: COMPLETED | OUTPATIENT
Start: 2025-05-28 | End: 2025-05-28

## 2025-05-28 RX ADMIN — BUPROPION HYDROCHLORIDE 150 MG: 150 TABLET, EXTENDED RELEASE ORAL at 07:47

## 2025-05-28 RX ADMIN — MICONAZOLE NITRATE: 20 POWDER TOPICAL at 13:58

## 2025-05-28 RX ADMIN — METOPROLOL TARTRATE 50 MG: 50 TABLET, FILM COATED ORAL at 07:47

## 2025-05-28 RX ADMIN — Medication 1 TABLET: at 07:47

## 2025-05-28 RX ADMIN — ATORVASTATIN CALCIUM 10 MG: 10 TABLET, FILM COATED ORAL at 07:47

## 2025-05-28 RX ADMIN — FUROSEMIDE 40 MG: 10 INJECTION, SOLUTION INTRAVENOUS at 09:31

## 2025-05-28 RX ADMIN — VANCOMYCIN HYDROCHLORIDE 1250 MG: 10 INJECTION, POWDER, LYOPHILIZED, FOR SOLUTION INTRAVENOUS at 10:56

## 2025-05-28 RX ADMIN — CEFAZOLIN 1 G: 1 INJECTION, POWDER, FOR SOLUTION INTRAMUSCULAR; INTRAVENOUS at 07:48

## 2025-05-28 RX ADMIN — Medication 1 SPRAY: at 07:49

## 2025-05-28 RX ADMIN — SENNOSIDES AND DOCUSATE SODIUM 2 TABLET: 50; 8.6 TABLET ORAL at 07:50

## 2025-05-28 RX ADMIN — ACETAMINOPHEN 650 MG: 325 TABLET, FILM COATED ORAL at 02:17

## 2025-05-28 RX ADMIN — Medication 1 SPRAY: at 11:50

## 2025-05-28 RX ADMIN — SPIRONOLACTONE 25 MG: 25 TABLET, FILM COATED ORAL at 07:47

## 2025-05-28 RX ADMIN — POLYETHYLENE GLYCOL 3350 17 G: 17 POWDER, FOR SOLUTION ORAL at 08:21

## 2025-05-28 RX ADMIN — Medication 1 SPRAY: at 20:47

## 2025-05-28 RX ADMIN — DORZOLAMIDE HYDROCHLORIDE AND TIMOLOL MALEATE 1 DROP: 20; 5 SOLUTION OPHTHALMIC at 07:49

## 2025-05-28 RX ADMIN — LACTULOSE 10 G: 20 SOLUTION ORAL at 08:32

## 2025-05-28 RX ADMIN — ACETAMINOPHEN 1000 MG: 500 TABLET ORAL at 20:51

## 2025-05-28 RX ADMIN — METOPROLOL TARTRATE 50 MG: 50 TABLET, FILM COATED ORAL at 20:47

## 2025-05-28 RX ADMIN — APIXABAN 5 MG: 5 TABLET, FILM COATED ORAL at 20:47

## 2025-05-28 RX ADMIN — DORZOLAMIDE HYDROCHLORIDE AND TIMOLOL MALEATE 1 DROP: 20; 5 SOLUTION OPHTHALMIC at 20:47

## 2025-05-28 RX ADMIN — BUPROPION HYDROCHLORIDE 300 MG: 150 TABLET, EXTENDED RELEASE ORAL at 07:51

## 2025-05-28 RX ADMIN — APIXABAN 5 MG: 5 TABLET, FILM COATED ORAL at 09:31

## 2025-05-28 RX ADMIN — ACETAMINOPHEN 650 MG: 325 TABLET, FILM COATED ORAL at 07:44

## 2025-05-28 ASSESSMENT — ACTIVITIES OF DAILY LIVING (ADL)
ADLS_ACUITY_SCORE: 68
ADLS_ACUITY_SCORE: 69
ADLS_ACUITY_SCORE: 68
ADLS_ACUITY_SCORE: 74
ADLS_ACUITY_SCORE: 69
ADLS_ACUITY_SCORE: 69
ADLS_ACUITY_SCORE: 68
ADLS_ACUITY_SCORE: 69
ADLS_ACUITY_SCORE: 68
ADLS_ACUITY_SCORE: 70
ADLS_ACUITY_SCORE: 70
ADLS_ACUITY_SCORE: 69
ADLS_ACUITY_SCORE: 68
ADLS_ACUITY_SCORE: 68
ADLS_ACUITY_SCORE: 69
ADLS_ACUITY_SCORE: 70
ADLS_ACUITY_SCORE: 70
DEPENDENT_IADLS:: CLEANING;TRANSPORTATION
ADLS_ACUITY_SCORE: 68
ADLS_ACUITY_SCORE: 69
ADLS_ACUITY_SCORE: 68
ADLS_ACUITY_SCORE: 70
ADLS_ACUITY_SCORE: 68
ADLS_ACUITY_SCORE: 69

## 2025-05-28 NOTE — CONSULTS
Care Management Initial Consult    General Information  Assessment completed with: Patient, Children, Met with pt and dtr Mary Ann at bedside  Type of CM/SW Visit: Initial Assessment    Primary Care Provider verified and updated as needed: Yes   Readmission within the last 30 days: no previous admission in last 30 days      Reason for Consult: discharge planning    Communication Assessment  Patient's communication style: spoken language (English or Bilingual)    Hearing Difficulty or Deaf: yes   Wear Glasses or Blind: yes    Cognitive  Cognitive/Neuro/Behavioral: WDL  Level of Consciousness: alert  Arousal Level: opens eyes spontaneously  Orientation: oriented x 4  Mood/Behavior: cooperative  Best Language: 0 - No aphasia  Speech: clear    Living Environment:   People in home: alone     Current living Arrangements: independent living facility      Able to return to prior arrangements: no     Family/Social Support:  Care provided by: self  Provides care for: no one  Marital Status:   Support system: Children          Description of Support System: Supportive, Involved    Support Assessment: Adequate family and caregiver support    Current Resources:   Patient receiving home care services: No  Community Resources: Transitional Care  Equipment currently used at home: walker, rolling  Supplies currently used at home: None    Lifestyle & Psychosocial Needs:  Social Drivers of Health     Food Insecurity: Low Risk  (5/27/2025)    Food Insecurity     Within the past 12 months, did you worry that your food would run out before you got money to buy more?: No     Within the past 12 months, did the food you bought just not last and you didn t have money to get more?: No   Depression: Not at risk (2/17/2025)    PHQ-2     PHQ-2 Score: 0   Housing Stability: Low Risk  (5/27/2025)    Housing Stability     Do you have housing? : Yes     Are you worried about losing your housing?: No   Tobacco Use: Low Risk  (5/12/2025)     Patient History     Smoking Tobacco Use: Never     Smokeless Tobacco Use: Never     Passive Exposure: Not on file   Financial Resource Strain: Low Risk  (5/27/2025)    Financial Resource Strain     Within the past 12 months, have you or your family members you live with been unable to get utilities (heat, electricity) when it was really needed?: No   Recent Concern: Financial Resource Strain - High Risk (4/18/2025)    Financial Resource Strain     Within the past 12 months, have you or your family members you live with been unable to get utilities (heat, electricity) when it was really needed?: Yes   Alcohol Use: Not on file   Transportation Needs: Low Risk  (5/27/2025)    Transportation Needs     Within the past 12 months, has lack of transportation kept you from medical appointments, getting your medicines, non-medical meetings or appointments, work, or from getting things that you need?: No   Physical Activity: Unknown (5/14/2024)    Exercise Vital Sign     Days of Exercise per Week: 4 days     Minutes of Exercise per Session: Not on file   Interpersonal Safety: Low Risk  (5/27/2025)    Interpersonal Safety     Do you feel physically and emotionally safe where you currently live?: Yes     Within the past 12 months, have you been hit, slapped, kicked or otherwise physically hurt by someone?: No     Within the past 12 months, have you been humiliated or emotionally abused in other ways by your partner or ex-partner?: No   Stress: Not on file (11/9/2024)   Social Connections: Unknown (5/14/2024)    Social Connection and Isolation Panel [NHANES]     Frequency of Communication with Friends and Family: Not on file     Frequency of Social Gatherings with Friends and Family: Once a week     Attends [employer] Services: Not on file     Active Member of Clubs or Organizations: Not on file     Attends Club or Organization Meetings: Not on file     Marital Status: Not on file   Health Literacy: Not on file     Functional  Status:  Prior to admission patient needed assistance:   Dependent ADLs:: Ambulation-walker  Dependent IADLs:: Cleaning, Transportation  Assesssment of Functional Status: Needs placement in a SNF/TCF for rehabilitation    Discussed  Partnership in Safe Discharge Planning  document with patient/family: Yes    Additional Information:  Met with pt and dtr Mary Ann at bedside to discuss discharge planning. At baseline pt resides at [facility] but has been at [facility] from 4/21-5/26. Pt/dtr reports that she has a bedhold at [facility] but would like to explore if other TCU's have availability. Referrals sent to 3-5 star rated facilities on Medicare.gov website per pt/dtr request. Will follow-up with responses once known.     Next Steps: Follow-up on referrals.     Sheila Long RN BSN   Inpatient Care Coordination  [facility]   Phone [phone]

## 2025-05-28 NOTE — PLAN OF CARE
"[id number]    Inpatient Progress Note:    /55 (BP Location: Right arm)   Pulse 85   Temp 98.5  F (36.9  C) (Axillary)   Resp 18   Ht 1.499 m (4' 11\")   Wt 92.4 kg (203 lb 9.6 oz)   SpO2 97%   BMI 41.12 kg/m         Orientation: A&Ox4 forgetful  Neuro: Neuro checks q4hrs- intact, Pt has mild R eye drooping (per pt & family is baseline)  Pain status: Complains of R elbow pain, managed w tylenol  Activity: Ax2 pivot (WC baseline)  Peripheral edema: Edema in BUE- IV Lasix  (DW)  Resp: WDL, RA, denies SOB  Cardiac: X, Tachy  GI: WDL  :  X, external cath  Skin: X, 24 Scalp staples, LLE cellulitis, R elbow, Bottom- WOC following- daily wound cares- per orders  LDA: PIV  Infusions: SL- Ancef q8hrs  Pertinent Labs:   Diet: Regular 2gm Na  Consults: PT/WOC  Discharge Plan: TBD comes from [facility])    Will continue to monitor and provide cares.     Carina Booker RN       Problem: Adult Inpatient Plan of Care  Goal: Plan of Care Review  Description: The Plan of Care Review/Shift note should be completed every shift.  The Outcome Evaluation is a brief statement about your assessment that the patient is improving, declining, or no change.  This information will be displayed automatically on your shift  note.  Outcome: Progressing  Flowsheets (Taken 5/28/2025 0113)  Outcome Evaluation: A&Ox4 forgetful at times. Scalp lacteration- 24 staples. Neuro checks q4hrs. Head CT- no acute. WOC following- LLE cellulitis, R Elbow, Bottom, back. Wound care per orders. LLE wound care done overnight. Ancef q8hrs. Lasix IV. PT to consult. Regular diet. Contact- MRSA.  Plan of Care Reviewed With: patient  Overall Patient Progress: no change  Goal: Patient-Specific Goal (Individualized)  Description: You can add care plan individualizations to a care plan. Examples of Individualization might be:  \"Parent requests to be called daily at 9am for status\", \"I have a hard time hearing out of my right ear\", or \"Do not touch me to wake " "me up as it startles  me\".  Outcome: Progressing  Goal: Absence of Hospital-Acquired Illness or Injury  Outcome: Progressing  Intervention: Identify and Manage Fall Risk  Recent Flowsheet Documentation  Taken 5/27/2025 2038 by Carina Booker RN  Safety Promotion/Fall Prevention:   assistive device/personal items within reach   activity supervised   clutter free environment maintained  Intervention: Prevent Skin Injury  Recent Flowsheet Documentation  Taken 5/27/2025 2038 by Carina Booker RN  Body Position:   turned   right  Intervention: Prevent Infection  Recent Flowsheet Documentation  Taken 5/27/2025 2038 by Carina Booker RN  Infection Prevention:   hand hygiene promoted   personal protective equipment utilized  Goal: Optimal Comfort and Wellbeing  Outcome: Progressing  Goal: Readiness for Transition of Care  Outcome: Progressing     Problem: Delirium  Goal: Optimal Coping  Outcome: Progressing  Goal: Improved Behavioral Control  Outcome: Progressing  Intervention: Minimize Safety Risk  Recent Flowsheet Documentation  Taken 5/27/2025 2038 by Carina Booker RN  Enhanced Safety Measures:   pain management   review medications for side effects with activity  Goal: Improved Attention and Thought Clarity  Outcome: Progressing  Goal: Improved Sleep  Outcome: Progressing   Goal Outcome Evaluation:      Plan of Care Reviewed With: patient    Overall Patient Progress: no changeOverall Patient Progress: no change    Outcome Evaluation: A&Ox4 forgetful at times. Scalp lacteration- 24 staples. Neuro checks q4hrs. Head CT- no acute. WOC following- LLE cellulitis, R Elbow, Bottom, back. Wound care per orders. LLE wound care done overnight. Ancef q8hrs. Lasix IV. PT to consult. Regular diet. Contact- MRSA.          "

## 2025-05-28 NOTE — PLAN OF CARE
Goal Outcome Evaluation:      Plan of Care Reviewed With: patient, child    Overall Patient Progress: improvingOverall Patient Progress: improving    Outcome Evaluation: Planning for discharge to TCU

## 2025-05-28 NOTE — PROGRESS NOTES
[facility]    Medicine Progress Note - Hospitalist Service    Date of Admission:  5/26/2025    Assessment & Plan    Riddhi Aguilar is a 89 year old female with a history of A-fib on Eliquis, CHFpEF, osteoporosis, osteoarthritis, kidney stones, dyslipidemia, glaucoma, spinal stenosis, duodenal ulcer, nephrolithiasis, breast cancer, left femoral neck and left radial head fracture following mechanical fall, chronic kidney stage III, lymphedema, chronic anemia, glaucoma who was admitted on 5/26/2025 after an episode of fall found to have head trauma and worsening left lower extremity cellulitis.    Changing ancef to vanco today     Mechanical fall  Scalp laceration   Right elbow abrasion     - patient tripped at her facility    - scalp laceration with 24 staples placed in the ED on 5/26, removal in 7-10 days    - to be seen by PT    - seen by wound care    Sepsis  Left lower extremity cellulitis   Bilateral lower extremity lymphedema    - patient has chronic bilateral lower extremity edema    - received ceftriaxone in the ED and then admitted with ancef    - MRSA screen +, so will change to vanco (also no improvement in elevated WBC), sensitivities pending    - received lasix 40mg Q8 on admission    - gave another 40mg IV x1 today    - resume home lasix 20mg tomorrow    - weight 203lb to 182lb    - -4900 ml off    A-fib/flutter  CHFpEF    - cont home spironolactone.    - resume eliquis    - diuresed as above    - to resume home lasix dosing    - Echo done recently showed EF of 60 to 65%.  Diastolic function not assessed due to A-fib at the time.      Hx CKD stage III, Glaucoma, Hyperlipidemia, MDD, Chronic macrocytic anemia, Breast cancer    - resume home wellbutrin    Right Buttock Stage 3 Pressure Injury, POA; Bilateral IT Pressure Injury, POA     Called son Vahe and daughter in law Mary Ann: had to leave messages for both    Addendum: was told by PT that patient complained of blurry vision.  Patient re-assessed. Daughter in law is in the room. Patient has her glasses on. They are for reading and distance. She said her vision is better now. She feels like it was blurry earlier because it was the first time she was up out of bed.         Diet: Combination Diet Regular Diet Adult; 2 gm NA Diet    DVT Prophylaxis: cont eliquis  Martines Catheter: Not present  Lines: None     Cardiac Monitoring: None  Code Status: No CPR- Do NOT Intubate        Social Drivers of Health    Financial Resource Strain: Low Risk  (5/27/2025)    Financial Resource Strain     Within the past 12 months, have you or your family members you live with been unable to get utilities (heat, electricity) when it was really needed?: No   Recent Concern: Financial Resource Strain - High Risk (4/18/2025)    Financial Resource Strain     Within the past 12 months, have you or your family members you live with been unable to get utilities (heat, electricity) when it was really needed?: Yes   Physical Activity: Unknown (5/14/2024)    Exercise Vital Sign     Days of Exercise per Week: 4 days   Social Connections: Unknown (5/14/2024)    Social Connection and Isolation Panel [NHANES]     Frequency of Social Gatherings with Friends and Family: Once a week          Disposition Plan     Medically Ready for Discharge: Anticipated in 2-4 Days      Augustine Chakraborty MD  Hospitalist Service  [facility]  Securely message with [employer] (more info)  Text page via [facility] Paging/Directory   ______________________________________________________________________    Interval History     I assumed care of the patient today.  She is in bed.  She has no complaints of chest pain or shortness of breath.  She has some right elbow pain.  She is eating and drinking.  Otherwise she is doing well.  She does feel constipated.    Physical Exam   Vital Signs: Temp: 98.1  F (36.7  C) Temp src: Oral BP: 130/81 Pulse: 81   Resp: 16 SpO2: 97 % O2 Device: None (Room  air)    Weight: 182 lbs 15.71 oz    Constitutional: fatigued, somnolent, and cooperative  Respiratory: no increased work of breathing, good air exchange, and clear to auscultation  Cardiovascular: regular rate and rhythm, no murmur noted, bilateral LE edema  GI: normal bowel sounds, soft, and non-distended  Skin: scalp wound wrapped, LLE cellulitis covered by dressing. Does have wound on left leg with erythema. Some erythema of right foot. Minimal edema      Medical Decision Making       45 MINUTES SPENT BY ME on the date of service doing chart review, history, exam, documentation & further activities per the note.      Data   ------------------------- PAST 24 HR DATA REVIEWED -----------------------------------------------        Imaging results reviewed over the past 24 hrs:   No results found for this or any previous visit (from the past 24 hours).

## 2025-05-28 NOTE — PHARMACY-VANCOMYCIN DOSING SERVICE
Pharmacy Vancomycin Initial Note  Date of Service May 28, 2025  Patient's  2/15/1931  89 year old, female    Indication: Skin and Soft Tissue Infection.    Current estimated CrCl = Estimated Creatinine Clearance: 46.5 mL/min (A) (based on SCr of 0.97 mg/dL (H)).    Creatinine for last 3 days  2025:  8:25 PM Creatinine 1.05 mg/dL  2025:  8:01 AM Creatinine 0.97 mg/dL    Recent Vancomycin Level(s) for last 3 days  No results found for requested labs within last 3 days.      Vancomycin IV Administrations (past 72 hours)        No vancomycin orders with administrations in past 72 hours.          Nephrotoxins and other renal medications (From now, onward)      Start     Dose/Rate Route Frequency Ordered Stop    25 1030  vancomycin (VANCOCIN) 1,250 mg in 0.9% NaCl 262.5 mL intermittent infusion         1,250 mg  over 90 Minutes Intravenous EVERY 24 HOURS 25 1013      25 0900  furosemide (LASIX) tablet 20 mg        Note to Pharmacy: PTA Sig:Take 1 tablet (20 mg) by mouth daily.      20 mg Oral DAILY 25 0858              Contrast Orders - past 72 hours (72h ago, onward)      None     [employer] Prediction of Planned Initial Vancomycin Regimen  Regimen: 1250 mg IV every 24 hours.  Exposure target: AUC24 (range) 400-600 mg/L.hr   AUC24,ss: 557 mg/L.hr  Probability of AUC24 > 400: 84 %  Ctrough,ss: 17.8 mg/L  Probability of Ctrough,ss > 20: 38 %  Probability of nephrotoxicity (Lodise EDIN ): 14 %    Plan:  Start vancomycin 1250 mg IV q24h.   Vancomycin monitoring method: AUC.  Vancomycin therapeutic monitoring goal: 400-600 mg*h/L.  Pharmacy will check vancomycin levels as appropriate in 1-3 Days.    Serum creatinine levels will be ordered a minimum of twice weekly.      Janet Archibald [facility]

## 2025-05-28 NOTE — PROGRESS NOTES
05/28/25 1223   Appointment Info   Signing Clinician's Name / Credentials (PT) Carina Roach DPT   Living Environment   People in Home facility resident   Current Living Arrangements assisted living   Home Accessibility no concerns   Living Environment Comments Pt comes from [facility], but was living in a facility prior to TCU   Self-Care   Usual Activity Tolerance moderate   Current Activity Tolerance poor   Regular Exercise No   Equipment Currently Used at Home walker, rolling   Fall history within last six months yes   Number of times patient has fallen within last six months 3   Activity/Exercise/Self-Care Comment Pt was participating in therapies at TCU, but just prior to admission had seen a decline in ability to tolerate and participate, likely due to infection in hindsight   General Information   Onset of Illness/Injury or Date of Surgery 05/26/25   Referring Physician Rory Lainez MD   Patient/Family Therapy Goals Statement (PT) Return to TCU   Pertinent History of Current Problem (include personal factors and/or comorbidities that impact the POC) Riddhi Aguilar is a 89 year old female with a history of A-fib on Eliquis, CHFpEF, osteoporosis, osteoarthritis, kidney stones, dyslipidemia, glaucoma, spinal stenosis, duodenal ulcer, nephrolithiasis, breast cancer, left femoral neck and left radial head fracture following mechanical fall, chronic kidney stage III, lymphedema, chronic anemia, glaucoma who was admitted on 5/26/2025 after an episode of fall found to have head trauma and worsening left lower extremity cellulitis.   Existing Precautions/Restrictions fall   Weight-Bearing Status - LLE weight-bearing as tolerated   Weight-Bearing Status - RLE full weight-bearing   Cognition   Affect/Mental Status (Cognition) WFL   Orientation Status (Cognition) oriented x 4   Follows Commands (Cognition) WFL   Pain Assessment   Patient Currently in Pain Yes, see Vital Sign flowsheet   Integumentary/Edema    Integumentary/Edema Comments Dressing intact to L LE and head   Posture    Posture Forward head position;Protracted shoulders   Range of Motion (ROM)   Range of Motion ROM is WFL   Strength (Manual Muscle Testing)   Strength (Manual Muscle Testing) Deficits observed during functional mobility   Bed Mobility   Comment, (Bed Mobility) sup>sit with wilfred; Sit>sup with modA   Transfers   Comment, (Transfers) sit<>stand with maxA   Gait/Stairs (Locomotion)   Comment, (Gait/Stairs) unable to ambulate due to L LE pain   Balance   Balance Comments intact sitting; requires external assist for standing   Sensory Examination   Sensory Perception patient reports no sensory changes   Coordination   Coordination no deficits were identified   Muscle Tone   Muscle Tone no deficits were identified   Clinical Impression   Criteria for Skilled Therapeutic Intervention Yes, treatment indicated   PT Diagnosis (PT) Impaired functional mobility   Influenced by the following impairments Pain, weakness, deconditioning, impaired balance   Functional limitations due to impairments Difficulty with  bed mobility, transfers, ambulation   Clinical Presentation (PT Evaluation Complexity) stable   Clinical Presentation Rationale clinical reasoning   Clinical Decision Making (Complexity) low complexity   Planned Therapy Interventions (PT) balance training;bed mobility training;gait training;patient/family education;strengthening;transfer training;progressive activity/exercise   Risk & Benefits of therapy have been explained evaluation/treatment results reviewed;care plan/treatment goals reviewed;risks/benefits reviewed;current/potential barriers reviewed;participants voiced agreement with care plan;participants included;patient   PT Total Evaluation Time   PT Eval, Low Complexity Minutes ([id number]) 12   Physical Therapy Goals   PT Frequency 5x/week   PT Predicted Duration/Target Date for Goal Attainment 06/04/25   PT Goals Bed Mobility;Transfers;Gait    PT: Bed Mobility Supervision/stand-by assist;Supine to/from sit   PT: Transfers Minimal assist;Sit to/from stand   PT: Gait Rolling walker;Supervision/stand-by assist;25 feet   PT Discharge Planning   PT Discharge Recommendation (DC Rec) Transitional Care Facility   PT Rationale for DC Rec at this time the patient is presenting below baseline mobility, requiring assist for all transfers and gait. Recommend continued PT in the TCU setting to promote improved functional mobility. Pt is showing good participation and motivation in IP PT.   PT Brief overview of current status rec Ax2 w/sarastedy   PT Total Distance Amb During Session (feet) 0   Physical Therapy Time and Intention   Total Session Time (sum of timed and untimed services) 12

## 2025-05-28 NOTE — PROGRESS NOTES
[facility] Nurse Inpatient Assessment     Consulted for: Left leg, buttocks    Summary:  Spoke with bedside RN who reports left leg is now dry and scabbed.  OK to leave left leg open to air.  Did not reassess wounds today, previous assessment from 5/27/25 listed below    [facility] nurse follow-up plan: weekly    Patient History (according to provider note(s):      Riddhi Aguilar is a 89 year old female with a PMH significant for A-fib on Eliquis, CHFpEF, osteoporosis, osteoarthritis, kidney stones, dyslipidemia, glaucoma, spinal stenosis, duodenal ulce, nephrolithiasis, breast cancer, left femoral neck and left radial head fracture following mechanical requiring admission to the hospital, chronic kidney stage III, lymphedema, chronic anemia, glaucoma among other who presents today to ED from long-term care facility after episode of fall found to have head trauma and worsening left lower extremity cellulitis and admitted on 5/26/2025.     Assessment:      Areas visualized during today's visit: Left leg, buttocks, head    Wound location: Left leg  Last photo: 5/27/25    Wound due to: Venous Ulcer  Wound history/plan of care: Patient with venous ulcer starting about a month ago but recently started draining heavily.  Over the weekend area became red and warm.  After diuretics in ED wound has now dried out significantly.    Wound base: 90 % Dry drainage, 10 % Fibrin and Slough     Palpation of the wound bed: normal      Drainage: small     Description of drainage: serosanguinous     Measurements (length x width x depth, in cm): 12  x 4  x  0.1 cm      Tunneling: N/A     Undermining: N/A  Periwound skin: Erythema- blanchable      Color: pink      Temperature: warm  Odor: none  Pain: moderate during assessment  Pain interventions prior to dressing change: slow and gentle cares   Treatment goal: Heal  and Remove necrotic tissue  STATUS: initial assessment  Supplies ordered: ordered Vashe, xerofoam      Pressure Injury Location: Right buttock  Last photo: 5/27/25    Wound type: Pressure Injury     Pressure Injury Stage: 3, present on admission   Wound history/plan of care:   Patient reports chronic injury which has been slowly getting better.  Previous injury on left as well which is now healed.  Wound base: 100 % Pink     Palpation of the wound bed: normal      Drainage: small     Description of drainage: serosanguinous     Measurements (length x width x depth, in cm) 1.5  x 0.5  x  0.3 cm      Tunneling N/A     Undermining up to 0.3 cm from 7-11 o'clock  Periwound skin: Scar tissue      Color: pink      Temperature: normal   Odor: none  Pain: mild  Pain intervention prior to dressing change: slow and gentle cares   Treatment goal: Heal   STATUS: initial assessment  Supplies ordered: gathered    My PI Risk Assessment     Sensory Perception: 3 - Slightly Limited     Moisture: 3 - Occasionally moist      Activity: 2 - Chairfast     Mobility: 2 - Very limited     Nutrition: 2 - Probably inadequate      Friction/Shear: 2 - Potential problem      TOTAL: 14      Pressure Injury Location: Bilateral IT  Last photo: 5/27/25    Wound type: Pressure Injury     Pressure Injury Stage: 2, present on admission   Wound history/plan of care:   Chronic pressure injuries patient reports are slowly improving.   Wound base:  100% Dermis     Palpation of the wound bed: normal      Drainage: small     Description of drainage: serosanguinous     Measurements (length x width x depth, in cm): Left IT is 0.5x1x0.2cm, Right IT is 0.5x0.5cm     Tunneling N/A     Undermining N/A  Periwound skin: Intact      Color: normal and consistent with surrounding tissue      Temperature: normal   Odor: none  Pain: denies   Pain intervention prior to dressing change: N/A  Treatment goal: Heal   STATUS: initial assessment  Supplies ordered: gathered      Wound location: Posterior scalp  Last photo: 5/27/25    Wound due to: Trauma  Wound history/plan of  "care: Patient with significant scalp laceration after falling.  Laceration repaired with staples in ED.    Wound base: 6x6cm reverse \"L\" shaped incision with staples.  Surrounding this are several large areas of dry drainage.  Will need head cleaned well in coming days but currently still bleeding too much to aggressively clean area.       Palpation of the wound bed: normal      Drainage: large     Description of drainage: bloody     Measurements (length x width x depth, in cm): see above    Tunneling: N/A     Undermining: N/A  Periwound skin: Intact      Color: normal and consistent with surrounding tissue      Temperature: normal   Odor: none  Pain: mild  Pain interventions prior to dressing change: slow and gentle cares   Treatment goal: Heal   STATUS: initial assessment  Supplies ordered: at bedside      Treatment Plan:     Left leg wound(s): Leave open to air    Right buttock and bilateral IT wound(s): Every 3 days  Cleanse with wound cleanser and dry  Apply Mepilex 4x4 to each wound     Scalp wound(s): Daily and prn when saturated  Cleanse with wound cleanser and dry  Apply adaptic over incisions  Cover with ABD and wrap with kerlix    Pressure Injury Prevention (PIP) Plan:  If patient is declining pressure injury prevention interventions: Explore reason why and address patient's concerns, Educate on pressure injury risk and prevention intervention(s), If patient is still declining, document \"informed refusal\" , and Ensure Care team is aware ( provider, charge nurse, etc)  Mattress: Follow bed algorithm, add Low Air Loss (Air+) mattress pump if skin is very moist or constantly moist.   HOB: Maintain at or below 30 degrees, unless contraindicated  Repositioning in bed: Every 1-2 hours  and Left/right positioning; avoid supine  Heels: Pillows under calves  Chair positioning: Chair cushion (#[id number])    If patient has a buttock pressure injury, or high risk for PI use chair cushion or SPS.  Moisture Management: " Avoid brief in bed  Under Devices: Inspect skin under all medical devices during skin inspection , Ensure tubes are stabilized without tension, and Ensure patient is not lying on medical devices or equipment when repositioned  Ask provider to discontinue device when no longer needed.       Orders: Updated    RECOMMEND PRIMARY TEAM ORDER: None, at this time  Education provided: importance of repositioning, plan of care, and Off-loading pressure  Discussed plan of care with: Patient, Family, and Nurse  Notify WO if wound(s) deteriorate.  Nursing to notify the Provider(s) and re-consult the WO Nurse if new skin concern.    DATA:     Current support surface: Standard  Standard gel mattress (Isoflex)  Containment of urine/stool: Incontinence Protocol  BMI: Body mass index is 36.96 kg/m .   Active diet order: Orders Placed This Encounter      Combination Diet Regular Diet Adult; 2 gm NA Diet     Output: I/O last 3 completed shifts:  In: 800 [P.O.:800]  Out: 4500 [Urine:4500]     Labs:   Recent Labs   Lab 05/27/25  0801 05/26/25 2025   ALBUMIN  --  3.8   HGB 10.6* 11.7   WBC 18.5* 16.7*     Pressure injury risk assessment:   Sensory Perception: 4-->no impairment  Moisture: 3-->occasionally moist  Activity: 2-->chairfast  Mobility: 3-->slightly limited  Nutrition: 3-->adequate  Friction and Shear: 3-->no apparent problem  Syed Score: 18    Lv Saini RN CWOCN  Contact Via [facility] Nurse ([place])  Dept. Office Number: [phone]

## 2025-05-28 NOTE — PLAN OF CARE
"Inpatient: [id number]    Dx: Fall with Scalp Laceration  Sepsis r/t LLE Cellulitis          Neurological exam noted to be at baseline per patient family at bedside - Somnolent but oriented x4. Speech is garbled. Weak right handed  and right pronator drift present. Weaker motor response (+4) in LLE related to pain. Intermittent numbness to all extremities These findings per family are at baseline and assuring that there are not concerning findings that are different from her baseline.           Ancef given for LLE cellulitis - skin warm to touch, red, with possible weeping noted. Offered blue wedge in patient used briefly and due to discomfort discontinued using.         Dressing to scalp is CDI.           BP (!) 142/66 (BP Location: Right arm)   Pulse 92   Temp 97.7  F (36.5  C) (Oral)   Resp 18   Ht 1.499 m (4' 11\")   Wt 92.4 kg (203 lb 9.6 oz)   SpO2 94%   BMI 41.12 kg/m              Goal Outcome Evaluation:                            "

## 2025-05-28 NOTE — PLAN OF CARE
"Patient is alert and oriented, VS WNL. Bowel sounds active, patient received senna and miralax for constipation, patient rated elbow pain 4/10 which was managed with tylenol. Patient is on a regular diet and saline locked, patient is an Ax2 with a magali steady for transfers, external catheter in place, all wound cares completed, patient has 25 staples to head. Plan: Patient to discharge to TCU once medically cleared.  /79 (BP Location: Right arm)   Pulse 79   Temp 97.7  F (36.5  C) (Oral)   Resp 16   Ht 1.499 m (4' 11\")   Wt 83 kg (182 lb 15.7 oz)   SpO2 97%   BMI 36.96 kg/m    Problem: Adult Inpatient Plan of Care  Goal: Plan of Care Review  Description: The Plan of Care Review/Shift note should be completed every shift.  The Outcome Evaluation is a brief statement about your assessment that the patient is improving, declining, or no change.  This information will be displayed automatically on your shift  note.  Outcome: Progressing  Flowsheets (Taken 5/28/2025 1321)  Plan of Care Reviewed With:   patient   child  Overall Patient Progress: improving  Goal: Patient-Specific Goal (Individualized)  Description: You can add care plan individualizations to a care plan. Examples of Individualization might be:  \"Parent requests to be called daily at 9am for status\", \"I have a hard time hearing out of my right ear\", or \"Do not touch me to wake me up as it startles  me\".  Outcome: Progressing  Goal: Absence of Hospital-Acquired Illness or Injury  Outcome: Progressing  Intervention: Identify and Manage Fall Risk  Recent Flowsheet Documentation  Taken 5/28/2025 0759 by Ranjan Lambert, RN  Safety Promotion/Fall Prevention:   assistive device/personal items within reach   activity supervised   clutter free environment maintained  Intervention: Prevent Skin Injury  Recent Flowsheet Documentation  Taken 5/28/2025 0759 by Ranjan Lambert, RN  Body Position:   left   supine, legs elevated  Intervention: " Prevent Infection  Recent Flowsheet Documentation  Taken 5/28/2025 0759 by Ranjan Lambert RN  Infection Prevention:   hand hygiene promoted   personal protective equipment utilized  Goal: Optimal Comfort and Wellbeing  Outcome: Progressing  Intervention: Monitor Pain and Promote Comfort  Recent Flowsheet Documentation  Taken 5/28/2025 0900 by Ranjan Lambert RN  Pain Management Interventions: medication (see MAR)  Intervention: Provide Person-Centered Care  Recent Flowsheet Documentation  Taken 5/28/2025 0759 by Ranjan Lambert RN  Trust Relationship/Rapport: care explained  Goal: Readiness for Transition of Care  Outcome: Progressing     Problem: Delirium  Goal: Optimal Coping  Outcome: Progressing  Goal: Improved Behavioral Control  Outcome: Progressing  Intervention: Minimize Safety Risk  Recent Flowsheet Documentation  Taken 5/28/2025 0759 by Ranjan Lambert RN  Enhanced Safety Measures:   pain management   review medications for side effects with activity  Trust Relationship/Rapport: care explained  Goal: Improved Attention and Thought Clarity  Outcome: Progressing  Goal: Improved Sleep  Outcome: Progressing   Goal Outcome Evaluation:      Plan of Care Reviewed With: patient, child    Overall Patient Progress: improvingOverall Patient Progress: improving

## 2025-05-29 VITALS
RESPIRATION RATE: 16 BRPM | BODY MASS INDEX: 36.89 KG/M2 | HEIGHT: 59 IN | OXYGEN SATURATION: 99 % | SYSTOLIC BLOOD PRESSURE: 145 MMHG | HEART RATE: 86 BPM | TEMPERATURE: 98.2 F | DIASTOLIC BLOOD PRESSURE: 84 MMHG | WEIGHT: 182.98 LBS

## 2025-05-29 LAB
ANION GAP SERPL CALCULATED.3IONS-SCNC: 13 MMOL/L (ref 7–15)
BACTERIA SPEC CULT: NORMAL
BASOPHILS # BLD AUTO: 0.1 10E3/UL (ref 0–0.2)
BASOPHILS NFR BLD AUTO: 1 %
BUN SERPL-MCNC: 26.3 MG/DL (ref 8–23)
CALCIUM SERPL-MCNC: 8.6 MG/DL (ref 8.8–10.4)
CHLORIDE SERPL-SCNC: 97 MMOL/L (ref 98–107)
CREAT SERPL-MCNC: 0.86 MG/DL (ref 0.51–0.95)
EGFRCR SERPLBLD CKD-EPI 2021: 62 ML/MIN/1.73M2
EOSINOPHIL # BLD AUTO: 0.1 10E3/UL (ref 0–0.7)
EOSINOPHIL NFR BLD AUTO: 1 %
ERYTHROCYTE [DISTWIDTH] IN BLOOD BY AUTOMATED COUNT: 26.3 % (ref 10–15)
GLUCOSE SERPL-MCNC: 96 MG/DL (ref 70–99)
HCO3 SERPL-SCNC: 25 MMOL/L (ref 22–29)
HCT VFR BLD AUTO: 34.1 % (ref 35–47)
HGB BLD-MCNC: 11.3 G/DL (ref 11.7–15.7)
IMM GRANULOCYTES # BLD: 0.2 10E3/UL
IMM GRANULOCYTES NFR BLD: 2 %
LYMPHOCYTES # BLD AUTO: 0.7 10E3/UL (ref 0.8–5.3)
LYMPHOCYTES NFR BLD AUTO: 6 %
MCH RBC QN AUTO: 34.2 PG (ref 26.5–33)
MCHC RBC AUTO-ENTMCNC: 33.1 G/DL (ref 31.5–36.5)
MCV RBC AUTO: 103 FL (ref 78–100)
MONOCYTES # BLD AUTO: 1.5 10E3/UL (ref 0–1.3)
MONOCYTES NFR BLD AUTO: 14 %
NEUTROPHILS # BLD AUTO: 8 10E3/UL (ref 1.6–8.3)
NEUTROPHILS NFR BLD AUTO: 75 %
NRBC # BLD AUTO: 0.2 10E3/UL
NRBC BLD AUTO-RTO: 2 /100
PLATELET # BLD AUTO: 336 10E3/UL (ref 150–450)
POTASSIUM SERPL-SCNC: 3.6 MMOL/L (ref 3.4–5.3)
RBC # BLD AUTO: 3.3 10E6/UL (ref 3.8–5.2)
SODIUM SERPL-SCNC: 135 MMOL/L (ref 135–145)
WBC # BLD AUTO: 10.6 10E3/UL (ref 4–11)

## 2025-05-29 PROCEDURE — 85025 COMPLETE CBC W/AUTO DIFF WBC: CPT | Performed by: HOSPITALIST

## 2025-05-29 PROCEDURE — 36415 COLL VENOUS BLD VENIPUNCTURE: CPT | Performed by: HOSPITALIST

## 2025-05-29 PROCEDURE — 99232 SBSQ HOSP IP/OBS MODERATE 35: CPT | Performed by: HOSPITALIST

## 2025-05-29 PROCEDURE — 250N000013 HC RX MED GY IP 250 OP 250 PS 637: Performed by: INTERNAL MEDICINE

## 2025-05-29 PROCEDURE — 250N000011 HC RX IP 250 OP 636: Performed by: HOSPITALIST

## 2025-05-29 PROCEDURE — 258N000003 HC RX IP 258 OP 636: Performed by: HOSPITALIST

## 2025-05-29 PROCEDURE — 250N000013 HC RX MED GY IP 250 OP 250 PS 637: Performed by: HOSPITALIST

## 2025-05-29 PROCEDURE — 120N000001 HC R&B MED SURG/OB

## 2025-05-29 PROCEDURE — 82310 ASSAY OF CALCIUM: CPT | Performed by: HOSPITALIST

## 2025-05-29 RX ADMIN — METOPROLOL TARTRATE 50 MG: 50 TABLET, FILM COATED ORAL at 20:12

## 2025-05-29 RX ADMIN — ACETAMINOPHEN 1000 MG: 500 TABLET ORAL at 04:53

## 2025-05-29 RX ADMIN — DORZOLAMIDE HYDROCHLORIDE AND TIMOLOL MALEATE 1 DROP: 20; 5 SOLUTION OPHTHALMIC at 08:58

## 2025-05-29 RX ADMIN — APIXABAN 5 MG: 5 TABLET, FILM COATED ORAL at 20:12

## 2025-05-29 RX ADMIN — BUPROPION HYDROCHLORIDE 150 MG: 150 TABLET, EXTENDED RELEASE ORAL at 08:57

## 2025-05-29 RX ADMIN — POLYETHYLENE GLYCOL 3350 17 G: 17 POWDER, FOR SOLUTION ORAL at 08:57

## 2025-05-29 RX ADMIN — METOPROLOL TARTRATE 50 MG: 50 TABLET, FILM COATED ORAL at 08:58

## 2025-05-29 RX ADMIN — Medication 1 SPRAY: at 08:56

## 2025-05-29 RX ADMIN — Medication 1 SPRAY: at 16:52

## 2025-05-29 RX ADMIN — Medication 1 SPRAY: at 11:22

## 2025-05-29 RX ADMIN — ATORVASTATIN CALCIUM 10 MG: 10 TABLET, FILM COATED ORAL at 08:58

## 2025-05-29 RX ADMIN — Medication 1 TABLET: at 08:58

## 2025-05-29 RX ADMIN — BUPROPION HYDROCHLORIDE 300 MG: 150 TABLET, EXTENDED RELEASE ORAL at 08:58

## 2025-05-29 RX ADMIN — Medication 1 SPRAY: at 20:13

## 2025-05-29 RX ADMIN — APIXABAN 5 MG: 5 TABLET, FILM COATED ORAL at 08:58

## 2025-05-29 RX ADMIN — FUROSEMIDE 20 MG: 20 TABLET ORAL at 08:58

## 2025-05-29 RX ADMIN — VANCOMYCIN HYDROCHLORIDE 1250 MG: 10 INJECTION, POWDER, LYOPHILIZED, FOR SOLUTION INTRAVENOUS at 11:23

## 2025-05-29 RX ADMIN — DORZOLAMIDE HYDROCHLORIDE AND TIMOLOL MALEATE 1 DROP: 20; 5 SOLUTION OPHTHALMIC at 20:13

## 2025-05-29 RX ADMIN — SPIRONOLACTONE 25 MG: 25 TABLET, FILM COATED ORAL at 08:58

## 2025-05-29 RX ADMIN — LACTULOSE 10 G: 20 SOLUTION ORAL at 08:58

## 2025-05-29 ASSESSMENT — ACTIVITIES OF DAILY LIVING (ADL)
ADLS_ACUITY_SCORE: 78
ADLS_ACUITY_SCORE: 74
ADLS_ACUITY_SCORE: 78
ADLS_ACUITY_SCORE: 74
ADLS_ACUITY_SCORE: 74
ADLS_ACUITY_SCORE: 78
ADLS_ACUITY_SCORE: 74
ADLS_ACUITY_SCORE: 78
ADLS_ACUITY_SCORE: 74
ADLS_ACUITY_SCORE: 78
ADLS_ACUITY_SCORE: 74
ADLS_ACUITY_SCORE: 78
ADLS_ACUITY_SCORE: 74
ADLS_ACUITY_SCORE: 74

## 2025-05-29 NOTE — PLAN OF CARE
"[id number]    Inpatient Progress Note:    /69 (BP Location: Right arm)   Pulse 99   Temp 97.7  F (36.5  C) (Oral)   Resp 16   Ht 1.499 m (4' 11\")   Wt 83 kg (182 lb 15.7 oz)   SpO2 98%   BMI 36.96 kg/m         Orientation: A&Ox4, [facility]  Neuro: Neuro checks q4hrs- intact  Pain status: Tylenol for pain  Activity: Ax2 magali Steady  Peripheral edema: Edema noted in BUE and LLE  Resp: WDL, denies SOB, on RA  Cardiac: WDL, Hx a-fib on Eliquis  GI: WDL  :  X, external cath  Skin: X, 24 staples- bandaged wrapped. Mepilex on R elbow. Bottom wound cares. LLE cellulitis open to air.   LDA: PIV  Infusions: SL- IV vanco  Pertinent Labs:   Diet: Regular  Consults: WOC/PT/Sw  Discharge Plan: TCU- referrals sent    Will continue to monitor and provide cares.     Carina Booker RN       Problem: Adult Inpatient Plan of Care  Goal: Plan of Care Review  Description: The Plan of Care Review/Shift note should be completed every shift.  The Outcome Evaluation is a brief statement about your assessment that the patient is improving, declining, or no change.  This information will be displayed automatically on your shift  note.  5/29/2025 0014 by Carina Booker RN  Outcome: Progressing  Flowsheets (Taken 5/29/2025 0014)  Outcome Evaluation: A&Ox4, Ax2 magali steady, WOC/PT/SW. TCu referrals sent. Wound cares per orders. LLE elevated open to air. Abx (vanco). Neuro checks q4hrs- intact  Plan of Care Reviewed With: patient  Overall Patient Progress: improving  5/29/2025 0013 by Carina Booker RN  Outcome: Progressing  Flowsheets (Taken 5/29/2025 0013)  Outcome Evaluation: A&Ox4, Ax2 magail steady. WOC/PT/SW  Plan of Care Reviewed With: patient  Overall Patient Progress: improving  Goal: Patient-Specific Goal (Individualized)  Description: You can add care plan individualizations to a care plan. Examples of Individualization might be:  \"Parent requests to be called daily at 9am for status\", \"I have a hard time hearing out of my " "right ear\", or \"Do not touch me to wake me up as it startles  me\".  5/29/2025 0014 by Carina Booker RN  Outcome: Progressing  5/29/2025 0013 by Carina Booker RN  Outcome: Progressing  Goal: Absence of Hospital-Acquired Illness or Injury  5/29/2025 0014 by Carina Booker RN  Outcome: Progressing  5/29/2025 0013 by Carina Booker RN  Outcome: Progressing  Intervention: Identify and Manage Fall Risk  Recent Flowsheet Documentation  Taken 5/28/2025 2315 by Carina Booker RN  Safety Promotion/Fall Prevention:   assistive device/personal items within reach   activity supervised   clutter free environment maintained  Intervention: Prevent Infection  Recent Flowsheet Documentation  Taken 5/28/2025 2315 by Carina Booker RN  Infection Prevention:   hand hygiene promoted   personal protective equipment utilized  Goal: Optimal Comfort and Wellbeing  5/29/2025 0014 by Carina Bookre RN  Outcome: Progressing  5/29/2025 0013 by Carina Booker RN  Outcome: Progressing  Goal: Readiness for Transition of Care  5/29/2025 0014 by Carina Booker RN  Outcome: Progressing  5/29/2025 0013 by Carina Booker RN  Outcome: Progressing     Problem: Delirium  Goal: Optimal Coping  5/29/2025 0014 by Carina Booker RN  Outcome: Progressing  5/29/2025 0013 by Carina Booker RN  Outcome: Progressing  Goal: Improved Behavioral Control  5/29/2025 0014 by Carina Booker RN  Outcome: Progressing  5/29/2025 0013 by Carina Booker RN  Outcome: Progressing  Intervention: Minimize Safety Risk  Recent Flowsheet Documentation  Taken 5/28/2025 2315 by Carina Booker RN  Enhanced Safety Measures:   pain management   review medications for side effects with activity  Goal: Improved Attention and Thought Clarity  5/29/2025 0014 by Carina Booker RN  Outcome: Progressing  5/29/2025 0013 by Carina Booker RN  Outcome: Progressing  Goal: Improved Sleep  5/29/2025 0014 by Carina Booker, RN  Outcome: " Progressing  5/29/2025 0013 by Carina Booker, RN  Outcome: Progressing   Goal Outcome Evaluation:      Plan of Care Reviewed With: patient    Overall Patient Progress: improvingOverall Patient Progress: improving    Outcome Evaluation: A&Ox4, Ax2 magali steady. WOC/PT/SW

## 2025-05-29 NOTE — PLAN OF CARE
"Goal Outcome Evaluation:  Patient is alert and oriented x 4,VSS on RA,denies pain ,regular diet tolerated well,A x 2 sera steady,Pure wick in place,strict I&O,  /58 (BP Location: Right arm)   Pulse 83   Temp 97.7  F (36.5  C) (Oral)   Resp 16   Ht 1.499 m (4' 11\")   Wt 83 kg (182 lb 15.7 oz)   SpO2 100%   BMI 36.96 kg/m         Problem: Adult Inpatient Plan of Care  Goal: Plan of Care Review  Description: The Plan of Care Review/Shift note should be completed every shift.  The Outcome Evaluation is a brief statement about your assessment that the patient is improving, declining, or no change.  This information will be displayed automatically on your shift  note.  Outcome: Progressing  Goal: Patient-Specific Goal (Individualized)  Description: You can add care plan individualizations to a care plan. Examples of Individualization might be:  \"Parent requests to be called daily at 9am for status\", \"I have a hard time hearing out of my right ear\", or \"Do not touch me to wake me up as it startles  me\".  Outcome: Progressing  Goal: Absence of Hospital-Acquired Illness or Injury  Outcome: Progressing  Intervention: Identify and Manage Fall Risk  Recent Flowsheet Documentation  Taken 5/28/2025 1600 by Mich Garza RN  Safety Promotion/Fall Prevention:   assistive device/personal items within reach   activity supervised   clutter free environment maintained  Intervention: Prevent Skin Injury  Recent Flowsheet Documentation  Taken 5/28/2025 1600 by Mich Garza RN  Body Position:   left   supine, legs elevated  Intervention: Prevent and Manage VTE (Venous Thromboembolism) Risk  Recent Flowsheet Documentation  Taken 5/28/2025 1600 by Mich Garza RN  VTE Prevention/Management: other (see comments)  Intervention: Prevent Infection  Recent Flowsheet Documentation  Taken 5/28/2025 1600 by Mich Garza RN  Infection Prevention:   hand hygiene promoted   personal protective equipment utilized  Goal: " Optimal Comfort and Wellbeing  Outcome: Progressing  Intervention: Provide Person-Centered Care  Recent Flowsheet Documentation  Taken 5/28/2025 1600 by Mich Garza RN  Trust Relationship/Rapport: care explained  Goal: Readiness for Transition of Care  Outcome: Progressing     Problem: Delirium  Goal: Optimal Coping  Outcome: Progressing  Goal: Improved Behavioral Control  Outcome: Progressing  Intervention: Minimize Safety Risk  Recent Flowsheet Documentation  Taken 5/28/2025 1600 by Mich Garza RN  Enhanced Safety Measures:   pain management   review medications for side effects with activity  Trust Relationship/Rapport: care explained  Goal: Improved Attention and Thought Clarity  Outcome: Progressing  Goal: Improved Sleep  Outcome: Progressing

## 2025-05-29 NOTE — PLAN OF CARE
INPATIENT NOTE: CARES PROVIDED FROM [id number]  Diagnosis:  LLE Cellulitis; MRSA +  Temp: 98.3  F (36.8  C) Temp src: Oral BP: (!) 144/74 Pulse: 81   Resp: 16 SpO2: 95 % O2 Device: None (Room air)      Labs: K:3.6, WBC: 10.6  Pt is Aox4, elevated BP on RA. Denies SOB/CP. Neuro checks q4hr; intact. Head wrap in place. Has edema to BUE and BLE Left leg elevated. Constipated; given scheduled miralax and PRN Lactulose. Needed some digital disimpaction; with some relief. Hx of hemorrhoids. Up to BS with Ax2 magali steady; can strain on commode which cause some lightheadedness. WOC cares completed. Tolerating diet. PW in place. Able to make needs known. Family updated at bedside.     Pain: Denies pain.     Lines: PIV is SL.     Plan: Continue IV Abx, WOC cares, Bowel regimen to help with constipation. PT and SW following.      Plan of Care Reviewed With: patient, family  Overall Patient Progress: improving  Outcome Evaluation: AOx4, on RA. WOC orders completed, elevating LLE. IV Vanco. Constipation; did need some digital decompact, Neuro [id number]. Ax2 SS. SW following for placement.      Problem: Adult Inpatient Plan of Care  Goal: Plan of Care Review  Outcome: Progressing  Flowsheets (Taken 5/29/2025 1834)  Outcome Evaluation:   AOx4, on RA. WOC orders completed, elevating LLE. IV Vanco. Constipation   did need some digital decompact, Neuro [id number]. Ax2 SS. SW following for placement.  Plan of Care Reviewed With:   patient   family  Overall Patient Progress: improving  Goal: Patient-Specific Goal (Individualized)  Outcome: Progressing  Goal: Absence of Hospital-Acquired Illness or Injury  Outcome: Progressing  Intervention: Prevent and Manage VTE (Venous Thromboembolism) Risk  Recent Flowsheet Documentation  Taken 5/29/2025 0911 by Nuria Menchaca RN  VTE Prevention/Management: SCDs off (sequential compression devices)  Goal: Optimal Comfort and Wellbeing  Outcome: Progressing  Goal:  Readiness for Transition of Care  Outcome: Progressing     Problem: Activity Intolerance  Goal: Enhanced Capacity and Energy  Outcome: Progressing  Intervention: Optimize Activity Tolerance  Recent Flowsheet Documentation  Taken 5/29/2025 1700 by Nuria Menchaca, RN  Activity Management: activity adjusted per tolerance

## 2025-05-29 NOTE — PROGRESS NOTES
Care Management Follow Up    Length of Stay (days): 3    Expected Discharge Date: 05/30/2025     Concerns to be Addressed: discharge planning     Patient plan of care discussed at interdisciplinary rounds: Yes    Anticipated Discharge Disposition: Transitional Care     Anticipated Discharge Services: None  Anticipated Discharge DME: None    Patient/family educated on Medicare website which has current facility and service quality ratings: yes  Education Provided on the Discharge Plan: Yes  Patient/Family in Agreement with the Plan: yes    Referrals Placed by CM/SW: Post Acute Facilities  Private pay costs discussed: transportation costs    Discussed  Partnership in Safe Discharge Planning  document with patient/family: Yes:     Handoff Completed: Yes, [facility] PCP: Internal handoff referral completed    Additional Information:  CM following for discharge planning, pt has been accepted by [facility] (out-of-network so will have a 20% copay) and [facility] for tomorrow 5/30. Updated dtr-in-law Mary Ann, she would also like referral sent to [facility], referral sent. [facility]. Mary Ann is going to discuss with pt and family, will follow-up again this afternoon to try to make final TCU decision.     Update 1530: [facility] has now accepted to a private room, reviewed with Mary Ann and this is their first choice. They are able to today tomorrow after 1600. Reviewed out of pocket cost for [facility] transport, $99.20 base and $6.38 per mile to the destination. [facility] ride scheduled for tomorrow 5/30 between [id number]. TCU and Mary Ann aware.     Sheila Long RN BSN   Inpatient Care Coordination  [facility]   Phone [phone]

## 2025-05-29 NOTE — PROGRESS NOTES
[facility]    Medicine Progress Note - Hospitalist Service    Date of Admission:  5/26/2025    Assessment & Plan    Riddhi Aguilar is a 89 year old female with a history of A-fib on Eliquis, CHFpEF, osteoporosis, osteoarthritis, kidney stones, dyslipidemia, glaucoma, spinal stenosis, duodenal ulcer, nephrolithiasis, breast cancer, left femoral neck and left radial head fracture following mechanical fall, chronic kidney stage III, lymphedema, chronic anemia, glaucoma who was admitted on 5/26/2025 after an episode of fall found to have head trauma and worsening left lower extremity cellulitis.    Ancef changed to vanco on 5/28, now improving  Waiting for MRSA susceptibilities      Mechanical fall  Scalp laceration   Right elbow abrasion     - patient tripped at her facility    - scalp laceration with 24 staples placed in the ED on 5/26, removal in 7-10 days    - to be seen by PT: TCU    - seen by wound care    Sepsis  Left lower extremity cellulitis   Bilateral lower extremity lymphedema    - patient has chronic bilateral lower extremity edema    - received ceftriaxone in the ED and then admitted with ancef    - MRSA screen +, so changed to vanco on 5/28 (also no improvement in elevated WBC), sensitivities pending    - today WBC normalized, cellulitis improved    - received lasix 40mg Q8 on admission    - gave another 40mg IV x1 today    - resumed home lasix 20mg today    - weight 203lb to 182lb    - -4900 ml off    Constipation    - we are working on this    A-fib/flutter  CHFpEF    - cont home spironolactone.    - resume eliquis    - diuresed as above    - to resume home lasix dosing    - Echo done recently showed EF of 60 to 65%.  Diastolic function not assessed due to A-fib at the time.      Hx CKD stage III, Glaucoma, Hyperlipidemia, MDD, Chronic macrocytic anemia, Breast cancer    - resume home wellbutrin    Right Buttock Stage 3 Pressure Injury, POA; Bilateral IT Pressure Injury, POA      Son in room and updated        Diet: Combination Diet Regular Diet Adult; 2 gm NA Diet    DVT Prophylaxis: cont eliquis  Martines Catheter: Not present  Lines: None     Cardiac Monitoring: None  Code Status: No CPR- Do NOT Intubate        Social Drivers of Health    Financial Resource Strain: Low Risk  (5/27/2025)    Financial Resource Strain     Within the past 12 months, have you or your family members you live with been unable to get utilities (heat, electricity) when it was really needed?: No   Recent Concern: Financial Resource Strain - High Risk (4/18/2025)    Financial Resource Strain     Within the past 12 months, have you or your family members you live with been unable to get utilities (heat, electricity) when it was really needed?: Yes   Physical Activity: Unknown (5/14/2024)    Exercise Vital Sign     Days of Exercise per Week: 4 days   Social Connections: Unknown (5/14/2024)    Social Connection and Isolation Panel [NHANES]     Frequency of Social Gatherings with Friends and Family: Once a week          Disposition Plan     Medically Ready for Discharge: Anticipated in 2-4 Days      Augustine Chakraborty MD  Hospitalist Service  [facility]  Securely message with [employer] (more info)  Text page via [employer] Paging/Directory   ______________________________________________________________________    Interval History     Patient is in bed.  Her son is in the room.  Her primary complaint is the constipation she has.  No chest pain or shortness of breath.  She agrees that both of her legs look better.  She is eating and drinking.    Physical Exam   Vital Signs: Temp: 97.7  F (36.5  C) Temp src: Oral BP: 136/71 Pulse: 85   Resp: 16 SpO2: 96 % O2 Device: None (Room air)    Weight: 182 lbs 15.71 oz    Constitutional: fatigued, somnolent, and cooperative  Respiratory: no increased work of breathing, good air exchange, and clear to auscultation  Cardiovascular: regular rate and rhythm, no murmur  noted, bilateral LE edema  GI: normal bowel sounds, soft, and non-distended  Skin: scalp wound wrapped, cellulitis appears improved today. Swelling resolved      Medical Decision Making       45 MINUTES SPENT BY ME on the date of service doing chart review, history, exam, documentation & further activities per the note.      Data   ------------------------- PAST 24 HR DATA REVIEWED -----------------------------------------------    I have personally reviewed the following data over the past 24 hrs:    10.6  \   11.3 (L)   / 336     135 97 (L) 26.3 (H) /  96   3.6 25 0.86 \       Imaging results reviewed over the past 24 hrs:   No results found for this or any previous visit (from the past 24 hours).

## 2025-05-30 ENCOUNTER — APPOINTMENT (OUTPATIENT)
Dept: PHYSICAL THERAPY | Facility: CLINIC | Age: OVER 89
DRG: 871 | End: 2025-05-30
Payer: COMMERCIAL

## 2025-05-30 VITALS
HEART RATE: 88 BPM | SYSTOLIC BLOOD PRESSURE: 144 MMHG | HEIGHT: 59 IN | BODY MASS INDEX: 36.13 KG/M2 | TEMPERATURE: 98.2 F | DIASTOLIC BLOOD PRESSURE: 86 MMHG | RESPIRATION RATE: 16 BRPM | WEIGHT: 179.23 LBS | OXYGEN SATURATION: 99 %

## 2025-05-30 LAB
ANION GAP SERPL CALCULATED.3IONS-SCNC: 12 MMOL/L (ref 7–15)
BACTERIA SPEC CULT: NORMAL
BASOPHILS # BLD AUTO: 0.1 10E3/UL (ref 0–0.2)
BASOPHILS NFR BLD AUTO: 1 %
BUN SERPL-MCNC: 20.9 MG/DL (ref 8–23)
CALCIUM SERPL-MCNC: 8.9 MG/DL (ref 8.8–10.4)
CHLORIDE SERPL-SCNC: 99 MMOL/L (ref 98–107)
CREAT SERPL-MCNC: 0.9 MG/DL (ref 0.51–0.95)
EGFRCR SERPLBLD CKD-EPI 2021: 59 ML/MIN/1.73M2
EOSINOPHIL # BLD AUTO: 0.1 10E3/UL (ref 0–0.7)
EOSINOPHIL NFR BLD AUTO: 2 %
ERYTHROCYTE [DISTWIDTH] IN BLOOD BY AUTOMATED COUNT: 26.3 % (ref 10–15)
GLUCOSE SERPL-MCNC: 96 MG/DL (ref 70–99)
HCO3 SERPL-SCNC: 26 MMOL/L (ref 22–29)
HCT VFR BLD AUTO: 33.9 % (ref 35–47)
HGB BLD-MCNC: 11.5 G/DL (ref 11.7–15.7)
IMM GRANULOCYTES # BLD: 0.1 10E3/UL
IMM GRANULOCYTES NFR BLD: 1 %
LYMPHOCYTES # BLD AUTO: 0.7 10E3/UL (ref 0.8–5.3)
LYMPHOCYTES NFR BLD AUTO: 9 %
MCH RBC QN AUTO: 35 PG (ref 26.5–33)
MCHC RBC AUTO-ENTMCNC: 33.9 G/DL (ref 31.5–36.5)
MCV RBC AUTO: 103 FL (ref 78–100)
MONOCYTES # BLD AUTO: 1.4 10E3/UL (ref 0–1.3)
MONOCYTES NFR BLD AUTO: 17 %
NEUTROPHILS # BLD AUTO: 6 10E3/UL (ref 1.6–8.3)
NEUTROPHILS NFR BLD AUTO: 71 %
NRBC # BLD AUTO: 0.2 10E3/UL
NRBC BLD AUTO-RTO: 2 /100
PLATELET # BLD AUTO: 353 10E3/UL (ref 150–450)
POTASSIUM SERPL-SCNC: 3.9 MMOL/L (ref 3.4–5.3)
RBC # BLD AUTO: 3.29 10E6/UL (ref 3.8–5.2)
SODIUM SERPL-SCNC: 137 MMOL/L (ref 135–145)
VANCOMYCIN SERPL-MCNC: 7.9 UG/ML (ref ?–25)
WBC # BLD AUTO: 8.4 10E3/UL (ref 4–11)

## 2025-05-30 PROCEDURE — 97530 THERAPEUTIC ACTIVITIES: CPT | Mod: GP

## 2025-05-30 PROCEDURE — 82310 ASSAY OF CALCIUM: CPT | Performed by: HOSPITALIST

## 2025-05-30 PROCEDURE — 80202 ASSAY OF VANCOMYCIN: CPT | Performed by: HOSPITALIST

## 2025-05-30 PROCEDURE — 258N000003 HC RX IP 258 OP 636: Performed by: HOSPITALIST

## 2025-05-30 PROCEDURE — 85014 HEMATOCRIT: CPT | Performed by: HOSPITALIST

## 2025-05-30 PROCEDURE — 99239 HOSP IP/OBS DSCHRG MGMT >30: CPT | Performed by: HOSPITALIST

## 2025-05-30 PROCEDURE — 250N000011 HC RX IP 250 OP 636: Performed by: HOSPITALIST

## 2025-05-30 PROCEDURE — 250N000013 HC RX MED GY IP 250 OP 250 PS 637: Performed by: INTERNAL MEDICINE

## 2025-05-30 PROCEDURE — 250N000013 HC RX MED GY IP 250 OP 250 PS 637: Performed by: HOSPITALIST

## 2025-05-30 PROCEDURE — 36415 COLL VENOUS BLD VENIPUNCTURE: CPT | Performed by: HOSPITALIST

## 2025-05-30 RX ORDER — ACETAMINOPHEN 500 MG
1000 TABLET ORAL EVERY 8 HOURS PRN
DISCHARGE
Start: 2025-05-30

## 2025-05-30 RX ORDER — DOXYCYCLINE 100 MG/1
100 CAPSULE ORAL 2 TIMES DAILY
DISCHARGE
Start: 2025-05-31 | End: 2025-06-05

## 2025-05-30 RX ORDER — AMOXICILLIN 250 MG
1 CAPSULE ORAL 2 TIMES DAILY
DISCHARGE
Start: 2025-05-30

## 2025-05-30 RX ORDER — LACTULOSE 10 G/15ML
20 SOLUTION ORAL ONCE
Status: COMPLETED | OUTPATIENT
Start: 2025-05-30 | End: 2025-05-30

## 2025-05-30 RX ORDER — POLYETHYLENE GLYCOL 3350 17 G/17G
17 POWDER, FOR SOLUTION ORAL DAILY
DISCHARGE
Start: 2025-05-31 | End: 2025-06-02

## 2025-05-30 RX ORDER — BISACODYL 10 MG
10 SUPPOSITORY, RECTAL RECTAL DAILY PRN
DISCHARGE
Start: 2025-05-30

## 2025-05-30 RX ADMIN — LACTULOSE 20 G: 20 SOLUTION ORAL at 11:10

## 2025-05-30 RX ADMIN — Medication 1 TABLET: at 09:34

## 2025-05-30 RX ADMIN — APIXABAN 5 MG: 5 TABLET, FILM COATED ORAL at 09:34

## 2025-05-30 RX ADMIN — FUROSEMIDE 20 MG: 20 TABLET ORAL at 09:34

## 2025-05-30 RX ADMIN — BUPROPION HYDROCHLORIDE 300 MG: 150 TABLET, EXTENDED RELEASE ORAL at 09:34

## 2025-05-30 RX ADMIN — POLYETHYLENE GLYCOL 3350 17 G: 17 POWDER, FOR SOLUTION ORAL at 09:33

## 2025-05-30 RX ADMIN — Medication 1 SPRAY: at 11:13

## 2025-05-30 RX ADMIN — DORZOLAMIDE HYDROCHLORIDE AND TIMOLOL MALEATE 1 DROP: 20; 5 SOLUTION OPHTHALMIC at 09:33

## 2025-05-30 RX ADMIN — ACETAMINOPHEN 1000 MG: 500 TABLET ORAL at 01:23

## 2025-05-30 RX ADMIN — BUPROPION HYDROCHLORIDE 150 MG: 150 TABLET, EXTENDED RELEASE ORAL at 09:34

## 2025-05-30 RX ADMIN — ATORVASTATIN CALCIUM 10 MG: 10 TABLET, FILM COATED ORAL at 09:34

## 2025-05-30 RX ADMIN — METOPROLOL TARTRATE 50 MG: 50 TABLET, FILM COATED ORAL at 09:34

## 2025-05-30 RX ADMIN — SPIRONOLACTONE 25 MG: 25 TABLET, FILM COATED ORAL at 09:34

## 2025-05-30 RX ADMIN — Medication 1 SPRAY: at 09:33

## 2025-05-30 RX ADMIN — VANCOMYCIN HYDROCHLORIDE 1250 MG: 10 INJECTION, POWDER, LYOPHILIZED, FOR SOLUTION INTRAVENOUS at 12:37

## 2025-05-30 ASSESSMENT — ACTIVITIES OF DAILY LIVING (ADL)
ADLS_ACUITY_SCORE: 78

## 2025-05-30 NOTE — PLAN OF CARE
"/76 (BP Location: Right arm)   Pulse 93   Temp 97.7  F (36.5  C) (Oral)   Resp 18   Ht 1.499 m (4' 11\")   Wt 81.3 kg (179 lb 3.7 oz)   SpO2 97%   BMI 36.20 kg/m      Pt's alert & oriented x4. Able to make needs known.VSS. Denies pain/ SOB/ dizziness/ headache/n/v/ headache. Pt reports ongoing constipation. She had a small BM yesterday. Gave Miralax and Lactulose per order. Dressing to head CDI. Pure wick in place, voiding adequately. Mepilex in place on buttocks, elbows and thigh. BLE cellulitis. Neuro checks. Assist x 2 with magali HAYES.   Plan: Discharge to [facility] via WC ride between 8466-4686.     Goal Outcome Evaluation:      Plan of Care Reviewed With: patient    Overall Patient Progress: improvingOverall Patient Progress: improving    Outcome Evaluation: Pt's alert and oriented x4. Able to make needs known.    Problem: Adult Inpatient Plan of Care  Goal: Plan of Care Review  Description: The Plan of Care Review/Shift note should be completed every shift.  The Outcome Evaluation is a brief statement about your assessment that the patient is improving, declining, or no change.  This information will be displayed automatically on your shift  note.  Outcome: Progressing  Flowsheets (Taken 5/30/2025 1410)  Outcome Evaluation: Pt's alert and oriented x4. Able to make needs known.  Plan of Care Reviewed With: patient  Overall Patient Progress: improving  Goal: Patient-Specific Goal (Individualized)  Description: You can add care plan individualizations to a care plan. Examples of Individualization might be:  \"Parent requests to be called daily at 9am for status\", \"I have a hard time hearing out of my right ear\", or \"Do not touch me to wake me up as it startles  me\".  Outcome: Progressing  Goal: Absence of Hospital-Acquired Illness or Injury  Outcome: Progressing  Intervention: Identify and Manage Fall Risk  Recent Flowsheet Documentation  Taken 5/30/2025 8046 by Swati Horta RN  Safety " Promotion/Fall Prevention:   assistive device/personal items within reach   activity supervised   clutter free environment maintained  Intervention: Prevent Skin Injury  Recent Flowsheet Documentation  Taken 5/30/2025 0746 by Swati Horta RN  Body Position: position changed independently  Intervention: Prevent and Manage VTE (Venous Thromboembolism) Risk  Recent Flowsheet Documentation  Taken 5/30/2025 0746 by Swati Horta RN  VTE Prevention/Management: SCDs off (sequential compression devices)  Intervention: Prevent Infection  Recent Flowsheet Documentation  Taken 5/30/2025 0746 by Swati Horta RN  Infection Prevention:   hand hygiene promoted   personal protective equipment utilized  Goal: Optimal Comfort and Wellbeing  Outcome: Progressing  Intervention: Provide Person-Centered Care  Recent Flowsheet Documentation  Taken 5/30/2025 1201 by Swati Horta RN  Trust Relationship/Rapport: care explained  Taken 5/30/2025 0746 by Swati Horta RN  Trust Relationship/Rapport: care explained  Goal: Readiness for Transition of Care  Outcome: Progressing     Problem: Delirium  Goal: Optimal Coping  Outcome: Progressing  Goal: Improved Behavioral Control  Outcome: Progressing  Intervention: Minimize Safety Risk  Recent Flowsheet Documentation  Taken 5/30/2025 1201 by Swati Horta RN  Trust Relationship/Rapport: care explained  Taken 5/30/2025 0746 by Swati oHrta RN  Enhanced Safety Measures:   pain management   review medications for side effects with activity  Trust Relationship/Rapport: care explained  Goal: Improved Attention and Thought Clarity  Outcome: Progressing  Goal: Improved Sleep  Outcome: Progressing     Problem: Activity Intolerance  Goal: Enhanced Capacity and Energy  Outcome: Progressing  Intervention: Optimize Activity Tolerance  Recent Flowsheet Documentation  Taken 5/30/2025 0746 by Swati Horta RN  Activity Management:   ambulated to bathroom   back to bed

## 2025-05-30 NOTE — PHARMACY-VANCOMYCIN DOSING SERVICE
Pharmacy Vancomycin Note  Date of Service May 30, 2025  Patient's  2/15/1931   89 year old, female    Indication: Skin and Soft Tissue Infection  Day of Therapy: 3  Current vancomycin regimen:  1250 mg IV q24h  Current vancomycin monitoring method: AUC  Current vancomycin therapeutic monitoring goal: 400-600 mg*h/L    InsightRX Prediction of Current Vancomycin Regimen  Regimen: 1250 mg IV every 24 hours.  Exposure target: AUC24 (range) 400-600 mg/L.hr   AUC24,ss: 439 mg/L.hr  Probability of AUC24 > 400: 69 %  Ctrough,ss: 13 mg/L  Probability of Ctrough,ss > 20: 10 %  Probability of nephrotoxicity (Lodise EDIN ): 8 %    Current estimated CrCl = Estimated Creatinine Clearance: 49.1 mL/min (based on SCr of 0.9 mg/dL).    Creatinine for last 3 days  2025:  5:14 AM Creatinine 0.86 mg/dL  2025:  7:07 AM Creatinine 0.90 mg/dL    Recent Vancomycin Levels (past 3 days)  2025: 11:16 AM Vancomycin 7.9 ug/mL    Nephrotoxins and other renal medications (From now, onward)      Start     Dose/Rate Route Frequency Ordered Stop    25 0900  furosemide (LASIX) tablet 20 mg        Note to Pharmacy: PTA Sig:Take 1 tablet (20 mg) by mouth daily.      20 mg Oral DAILY 25 0858                 Contrast Orders - past 72 hours (72h ago, onward)      None            Interpretation of levels and current regimen:  Vancomycin level is reflective of -600  Has serum creatinine changed greater than 50% in last 72 hours: No  Urine output:  good urine output  Renal Function: Stable      Plan:  Continue Current Dose  Vancomycin monitoring method: AUC  Vancomycin therapeutic monitoring goal: 400-600 mg*h/L  Pharmacy will check vancomycin levels as appropriate in 3-5 Days.  Serum creatinine levels will be ordered a minimum of twice weekly.    Bart Barba [facility]

## 2025-05-30 NOTE — DISCHARGE SUMMARY
"[facility]  Hospitalist Discharge Summary      Date of Admission:  5/26/2025  Date of Discharge:  5/30/2025  Discharging Provider: Augustine Chakraborty MD  Discharge Service: Hospitalist Service    Discharge Diagnoses   Mechanical fall  Scalp laceration   Right elbow abrasion  Sepsis  Left lower extremity cellulitis   Bilateral lower extremity lymphedema    Clinically Significant Risk Factors     # Obesity: Estimated body mass index is 36.2 kg/m  as calculated from the following:    Height as of this encounter: 1.499 m (4' 11\").    Weight as of this encounter: 81.3 kg (179 lb 3.7 oz).       Follow-ups Needed After Discharge   Follow-up Appointments       Follow Up and recommended labs and tests      Follow up with Nursing home physician.  No follow up labs or test are needed.  Follow up with primary care provider in 2-3 weeks.  The following labs/tests are recommended: basic labs, review vitals.                Unresulted Labs Ordered in the Past 30 Days of this Admission       Date and Time Order Name Status Description    5/26/2025  9:03 PM Blood Culture Peripheral blood (BC) Hand, Left Preliminary     5/26/2025  8:54 PM Blood Culture Peripheral blood (BC) Arm, Right Preliminary         These results will be followed up by NA    Discharge Disposition   Discharged to rehabilitation facility  Condition at discharge: Stable    Hospital Course   Riddhi Aguilar is a 89 year old female with PMH significant for A-fib on Eliquis, CHFpEF, osteoporosis, osteoarthritis, kidney stones, dyslipidemia, glaucoma, spinal stenosis, duodenal ulce, nephrolithiasis, breast cancer, left femoral neck and left radial head fracture following mechanical requiring admission to the hospital, chronic kidney stage III, lymphedema, chronic anemia, glaucoma among other who presents today to ED from long-term care facility after episode of fall.  Patient tripped over her leg while using her walker and hit her head on a drawer.  " She stated both of her legs were weak and gave out.  She did not lose consciousness, denied any headache or blurring of vision.  After she fell down she noted bleeding and the bleed for about few minutes prior to help arrival.  She had a laceration on the back of her head and also had bandaged wound on her left leg.  The left lower extremity ulceration and infection is started as described and progressed to infection, and she is being on Keflex for the past 2 weeks without significant improvement.  Today the redness, pain progressed upwards to below the knee area.  Patient denied any knee pain, hip pain or back pain.  She also denied any new shortness of breath, cough, runny nose or sore throat.  She denied any urinary symptoms.  She has intermittent constipation followed by diarrhea after she gets stool softeners.  ED workup: CMP unremarkable except BUN of 63, creatinine 1.05, ALT 55, AST 48.  Initial lactate was 2.2, repeat is 2.3, WBC 16.7, .  Hemoglobin 11.7.    CT cervical spine no acute abnormality.  CT head no evidence of acute intracranial process.     Mechanical fall  Scalp laceration   Right elbow abrasion     - patient tripped at her facility    - scalp laceration with 24 staples placed in the ED on 5/26, removal in 7-10 days    - to be seen by PT: TCU    - seen by wound care     Sepsis  Left lower extremity cellulitis   Bilateral lower extremity lymphedema    - patient has chronic bilateral lower extremity edema    - received ceftriaxone in the ED and then admitted with ancef    - MRSA screen +, so changed to vanco on 5/28 (also no improvement in elevated WBC), sensitivities pending    - today WBC normalized, cellulitis improved    - received lasix 40mg Q8 on admission    - gave another 40mg IV x1 today    - resumed home lasix 20mg today    - weight 203lb to 182lb    - -4900 ml off     Constipation    - we are working on this     A-fib/flutter  CHFpEF    - cont home spironolactone.    - resume  eliquis    - diuresed as above    - to resume home lasix dosing    - Echo done recently showed EF of 60 to 65%.  Diastolic function not assessed due to A-fib at the time.      Hx CKD stage III, Glaucoma, Hyperlipidemia, MDD, Chronic macrocytic anemia, Breast cancer    - resume home wellbutrin     Right Buttock Stage 3 Pressure Injury, POA; Bilateral IT Pressure Injury, POA     Patient is doing well today.  She had 2 small bowel movements last night.  She still feels like she is constipated.  She has received multiple medications today.  No other new complaints.  I called the microbiology lab again today.  Yesterday they had indicated they were running susceptibilities on her MRSA that grew from the naris.  Today they state that they were not able to do susceptibilities, because it did not grow on the plate where they run the susceptibilities.  I am not sure what this means.  I did curbside infectious disease.  She recommended doxycycline.  Hopefully the MRSA is susceptible.  She will complete 5 more days of doxycycline.  We will get her shower to clean her head off before she goes.  She will need her staples removed sometime between next Tuesday Wednesday or Thursday.    Consultations This Hospital Stay   PHARMACY TO DOSE VANCO  WOUND OSTOMY CONTINENCE NURSE  IP CONSULT  PHYSICAL THERAPY ADULT IP CONSULT  WOUND OSTOMY CONTINENCE NURSE  IP CONSULT  CARE MANAGEMENT / SOCIAL WORK IP CONSULT  PHARMACY TO DOSE VANCO  PHYSICAL THERAPY ADULT IP CONSULT  OCCUPATIONAL THERAPY ADULT IP CONSULT    Code Status   No CPR- Do NOT Intubate    Time Spent on this Encounter   I, Augustine Chakraborty MD, personally saw the patient today and spent greater than 30 minutes discharging this patient.       Augustine Chakraborty MD  [facility] OBSERVATION DEPT  [address] [place] [address]  Phone: [phone]  ______________________________________________________________________    Physical Exam   Vital Signs: Temp:  97.7  F (36.5  C) Temp src: Oral BP: 133/76 Pulse: 93   Resp: 18 SpO2: 97 % O2 Device: None (Room air)    Weight: 179 lbs 3.74 oz  Constitutional: awake, alert, cooperative, no apparent distress, and appears stated age  Eyes: Lids and lashes normal, pupils equal, round and reactive to light, extra ocular muscles intact, sclera clear, conjunctiva normal  ENT: Normocephalic, without obvious abnormality, atraumatic, sinuses nontender on palpation, external ears without lesions, oral pharynx with moist mucous membranes, tonsils without erythema or exudates, gums normal and good dentition.  Cardiovascular: Normal apical impulse, regular rate and rhythm, normal S1 and S2, no S3 or S4, and no murmur noted  GI: No scars, normal bowel sounds, soft, non-distended, non-tender, no masses palpated, no hepatosplenomegally       Primary Care Physician   Shun Prado    Discharge Orders      Primary Care - Care Coordination Referral      General info for SNF    Length of Stay Estimate: Short Term Care: Estimated # of Days 31-90  Condition at Discharge: Stable  Level of care:skilled   Rehabilitation Potential: Fair  Admission H&P remains valid and up-to-date: Yes  Recent Chemotherapy: N/A  Use Nursing Home Standing Orders: Yes     Mantoux instructions    Give two-step Mantoux (PPD) Per Facility Policy Yes     Follow Up and recommended labs and tests    Follow up with Nursing home physician.  No follow up labs or test are needed.  Follow up with primary care provider in 2-3 weeks.  The following labs/tests are recommended: basic labs, review vitals.     Wound care    Left leg wound(s): Leave open to air     Right buttock and bilateral IT wound(s): Every 3 days  1. Cleanse with wound cleanser and dry  2. Apply Mepilex 4x4 to each wound      Scalp wound(s): Daily and prn when saturated  1. Cleanse with wound cleanser and dry  2. Apply adaptic over incisions  3. Cover with ABD and wrap with kerlix     Activity - Up with nursing  assistance     Reason for your hospital stay    Mechanical fall  Scalp laceration   Right elbow abrasion  Sepsis  Left lower extremity cellulitis   Bilateral lower extremity lymphedema     Additional Discharge Instructions    Elevate L leg to avoid swelling    scalp laceration with 24 staples placed in the ED on 5/26, REMOVAL in 7-10 days from 5/26     No CPR- Do NOT Intubate     Physical Therapy Adult Consult    Evaluate and treat as clinically indicated.    Reason:  weakness     Occupational Therapy Adult Consult    Evaluate and treat as clinically indicated.    Reason:  weakness     Contact Isolation    MRSA nare screen was positive     Fall precautions     Diet    Follow this diet upon discharge: Current Diet:Orders Placed This Encounter      Combination Diet Regular Diet Adult; 2 gm NA Diet       Significant Results and Procedures   Most Recent 3 CBC's:  Recent Labs   Lab Test 05/30/25  0707 05/29/25  0514 05/27/25  0801   WBC 8.4 10.6 18.5*   HGB 11.5* 11.3* 10.6*   * 103* 104*    336 343     Most Recent 3 BMP's:  Recent Labs   Lab Test 05/30/25  0707 05/29/25  0514 05/27/25  0801    135 139   POTASSIUM 3.9 3.6 4.3   CHLORIDE 99 97* 102   CO2 26 25 25   BUN 20.9 26.3* 31.5*   CR 0.90 0.86 0.97*   ANIONGAP 12 13 12   TAMMY 8.9 8.6* 8.9   GLC 96 96 109*     Most Recent 2 LFT's:  Recent Labs   Lab Test 05/26/25 2025 05/22/25  0527   AST 48* 44   ALT 55* 40   ALKPHOS 79 79   BILITOTAL 2.0* 3.3*   ,   Results for orders placed or performed during the hospital encounter of 05/26/25   CT Head w/o Contrast    Narrative    EXAM: CT HEAD W/O CONTRAST, CT CERVICAL SPINE W/O CONTRAST  LOCATION: [facility]  DATE: 5/26/2025    INDICATION: Fall. Traumatic head and neck injury. Head strike.  COMPARISON: CT head dated 1/23/2013.  TECHNIQUE:   1) Routine CT Head without IV contrast. Multiplanar reformats. Dose reduction techniques were used.  2) Routine CT Cervical Spine without IV  contrast. Multiplanar reformats. Dose reduction techniques were used.    FINDINGS:   HEAD CT:   INTRACRANIAL CONTENTS: No intracranial hemorrhage, extraaxial collection, or mass effect.  No CT evidence of acute infarct. Moderate presumed chronic small vessel ischemic changes. Mild generalized volume loss. No hydrocephalus.     VISUALIZED ORBITS/SINUSES/MASTOIDS: Prior bilateral cataract surgery. Visualized portions of the orbits are otherwise unremarkable. Chronic-appearing complete opacification of the right maxillary sinus. No middle ear or mastoid effusion.    BONES/SOFT TISSUES: No calvarial fracture. High posterior scalp soft tissue swelling with laceration and skin staples.    CERVICAL SPINE CT:   VERTEBRA: Decreased osseous mineralization. Straightening of the normal cervical lordosis. No definite acute fracture or posttraumatic subluxation.     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 There is severe associated spinal canal stenosis at the levels of C1-C2, C2-C3, C3-C4   (asymmetric to the left), C4-C5 and C5-C6. At C2-C3, there is mild bilateral neural foraminal stenosis. At C3-C4, there is moderate-to-severe right and moderate-to-severe left neural foraminal stenosis. At C4-C5, there is moderate bilateral neural   foraminal stenosis. At C5-C6, there is severe right and moderate-to-severe left neural foraminal stenosis. At C6-C7, there is mild left neural foraminal stenosis. At C7-T1, there is moderate-to-severe left neural foraminal stenosis.    PARASPINAL: Subcentimeter left thyroid lobe nodule measuring 3 mm. Visualized lung fields are clear.      Impression    IMPRESSION:  HEAD CT:  1.  No CT evidence for acute intracranial process.  2.  Brain atrophy and presumed chronic microvascular ischemic changes as above.    CERVICAL SPINE CT:  1.  No CT evidence for acute fracture or post  traumatic subluxation.  2.  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  3.  Multilevel degenerative changes of the cervical spine, as above.   CT Cervical Spine w/o Contrast    Narrative    EXAM: CT HEAD W/O CONTRAST, CT CERVICAL SPINE W/O CONTRAST  LOCATION: [facility]  DATE: 5/26/2025    INDICATION: Fall. Traumatic head and neck injury. Head strike.  COMPARISON: CT head dated 1/23/2013.  TECHNIQUE:   1) Routine CT Head without IV contrast. Multiplanar reformats. Dose reduction techniques were used.  2) Routine CT Cervical Spine without IV contrast. Multiplanar reformats. Dose reduction techniques were used.    FINDINGS:   HEAD CT:   INTRACRANIAL CONTENTS: No intracranial hemorrhage, extraaxial collection, or mass effect.  No CT evidence of acute infarct. Moderate presumed chronic small vessel ischemic changes. Mild generalized volume loss. No hydrocephalus.     VISUALIZED ORBITS/SINUSES/MASTOIDS: Prior bilateral cataract surgery. Visualized portions of the orbits are otherwise unremarkable. Chronic-appearing complete opacification of the right maxillary sinus. No middle ear or mastoid effusion.    BONES/SOFT TISSUES: No calvarial fracture. High posterior scalp soft tissue swelling with laceration and skin staples.    CERVICAL SPINE CT:   VERTEBRA: Decreased osseous mineralization. Straightening of the normal cervical lordosis. No definite acute fracture or posttraumatic subluxation.     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 There is severe associated spinal canal stenosis at the levels of C1-C2, C2-C3, C3-C4   (asymmetric to the left), C4-C5 and C5-C6. At C2-C3, there is mild bilateral neural foraminal stenosis. At C3-C4, there is moderate-to-severe right and moderate-to-severe  left neural foraminal stenosis. At C4-C5, there is moderate bilateral neural   foraminal stenosis. At C5-C6, there is severe right and moderate-to-severe left neural foraminal stenosis. At C6-C7, there is mild left neural foraminal stenosis. At C7-T1, there is moderate-to-severe left neural foraminal stenosis.    PARASPINAL: Subcentimeter left thyroid lobe nodule measuring 3 mm. Visualized lung fields are clear.      Impression    IMPRESSION:  HEAD CT:  1.  No CT evidence for acute intracranial process.  2.  Brain atrophy and presumed chronic microvascular ischemic changes as above.    CERVICAL SPINE CT:  1.  No CT evidence for acute fracture or post traumatic subluxation.  2.  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  3.  Multilevel degenerative changes of the cervical spine, as above.       Discharge Medications   Current Discharge Medication List        START taking these medications    Details   bisacodyl (DULCOLAX) 10 MG suppository Place 1 suppository (10 mg) rectally daily as needed for constipation.    Associated Diagnoses: Constipation, unspecified constipation type      doxycycline hyclate (VIBRAMYCIN) 100 MG capsule Take 1 capsule (100 mg) by mouth 2 times daily for 5 days.    Associated Diagnoses: Left leg cellulitis      miconazole (MICATIN) 2 % external powder Apply topically 2 times daily as needed for other (candidiasis/intertrigo).    Associated Diagnoses: Rash      polyethylene glycol (MIRALAX) 17 g packet Take 17 g by mouth daily.    Associated Diagnoses: Constipation, unspecified constipation type           CONTINUE these medications which have CHANGED    Details   acetaminophen (TYLENOL) 500 MG tablet Take 2 tablets (1,000 mg) by mouth every 8 hours as needed for mild pain.    Associated Diagnoses: Pain      senna-docusate (SENOKOT-S/PERICOLACE) 8.6-50 MG tablet Take 1 tablet by mouth 2 times daily.  Change to PRN if having loose stools    Associated Diagnoses: Constipation, unspecified constipation type           CONTINUE these medications which have NOT CHANGED    Details   apixaban ANTICOAGULANT (ELIQUIS) 5 MG tablet Take 1 tablet (5 mg) by mouth 2 times daily.    Associated Diagnoses: Atrial flutter with rapid ventricular response (H)      atorvastatin (LIPITOR) 10 MG tablet TAKE 1 TABLET(10 MG) BY MOUTH DAILY  Qty: 90 tablet, Refills: 0    Associated Diagnoses: Hyperlipidemia LDL goal <130      !! buPROPion (WELLBUTRIN XL) 150 MG 24 hr tablet TAKE 1 TABLET(150 MG) BY MOUTH EVERY MORNING  Qty: 90 tablet, Refills: 3    Associated Diagnoses: Major depressive disorder, recurrent episode, mild      !! buPROPion (WELLBUTRIN XL) 300 MG 24 hr tablet TAKE 1 TABLET(300 MG) BY MOUTH EVERY MORNING  Qty: 90 tablet, Refills: 3    Associated Diagnoses: Major depressive disorder, recurrent episode, mild      dorzolamide-timolol (COSOPT) 22.3-6.8 MG/ML ophthalmic solution Place 1 drop into both eyes 2 times daily      famotidine (PEPCID) 20 MG tablet Take 1 tablet (20 mg) by mouth daily.    Associated Diagnoses: Gastroesophageal reflux disease without esophagitis      furosemide (LASIX) 20 MG tablet Take 1 tablet (20 mg) by mouth daily.    Associated Diagnoses: Acute congestive heart failure, unspecified heart failure type (H)      Lidocaine (LIDOCARE) 4 % Patch Place 1 patch over 12 hours onto the skin every 24 hours. To prevent lidocaine toxicity, patient should be patch free for 12 hrs daily.    Associated Diagnoses: Closed nondisplaced fracture of head of left radius, initial encounter      melatonin 3 MG tablet Take 3 mg by mouth at bedtime.      metoprolol tartrate (LOPRESSOR) 50 MG tablet Take 1 tablet (50 mg) by mouth 2 times daily.    Associated Diagnoses: Atrial flutter with rapid ventricular response (H)      multivitamin w/minerals (THERA-VIT-M) tablet Take 1 tablet by mouth daily      spironolactone (ALDACTONE)  25 MG tablet Take 1 tablet (25 mg) by mouth daily.       !! - Potential duplicate medications found. Please discuss with provider.        STOP taking these medications       cephALEXin (KEFLEX) 250 MG capsule Comments:   Reason for Stopping:         vitamin D3 (CHOLECALCIFEROL) 50 mcg (2000 units) tablet Comments:   Reason for Stopping:             Allergies   No Known Allergies

## 2025-05-30 NOTE — PROGRESS NOTES
Care Management Discharge Note    Discharge Date: 05/30/2025     Discharge Disposition: Transitional Care    Discharge Services: None    Discharge DME: None    Discharge Transportation: family or friend will provide    Private pay costs discussed: Not applicable    Does the patient's insurance plan have a 3 day qualifying hospital stay waiver?  Yes     Which insurance plan 3 day waiver is available? Alternative insurance waiver    Will the waiver be used for post-acute placement? Yes    Patient/family educated on Medicare website which has current facility and service quality ratings: yes    Education Provided on the Discharge Plan: Yes  Persons Notified of Discharge Plans: pt/family  Patient/Family in Agreement with the Plan: yes    Handoff Referral Completed: Yes, [facility] PCP: Internal handoff referral completed    Additional Information:  Pt discharging to [facility] via [facility] transport between [id number]. Orders completed and sent. Senior Linkage Line updated with TCU change.       Sheila Long RN BSN   Inpatient Care Coordination  [facility]   Phone [phone]

## 2025-05-30 NOTE — PLAN OF CARE
"Goal Outcome Evaluation:  Care from [id number]    Inpatient Progress Note:  For complete assessment see flow sheet documentation.    BP (!) 145/84 (BP Location: Right arm)   Pulse 86   Temp 98.2  F (36.8  C) (Oral)   Resp 16   Ht 1.499 m (4' 11\")   Wt 83 kg (182 lb 15.7 oz)   SpO2 99%   BMI 36.96 kg/m     Alert & oriented.VSS.Tylenol given for pain.Neuro checks intact.Assist x 2 with magali colon. Continues to be impacted.Was able fo have a very small BM. Declined suppository or lactulose overnight stating she did not want to keep getting up to use BR at night and would rather do it during the day. Dressing to head CDI.Pure wick in place, voiding adequately.Mepilex remain to buttocks, elbows and thigh. BLE cellulitis ANA. Ongoing plan of care.        Plan of Care Reviewed With: patient    Problem: Adult Inpatient Plan of Care  Goal: Plan of Care Review  Description: The Plan of Care Review/Shift note should be completed every shift.  The Outcome Evaluation is a brief statement about your assessment that the patient is improving, declining, or no change.  This information will be displayed automatically on your shift  note.  Outcome: Progressing  Flowsheets (Taken 5/30/2025 0600)  Plan of Care Reviewed With: patient  Overall Patient Progress: improving  Goal: Patient-Specific Goal (Individualized)  Description: You can add care plan individualizations to a care plan. Examples of Individualization might be:  \"Parent requests to be called daily at 9am for status\", \"I have a hard time hearing out of my right ear\", or \"Do not touch me to wake me up as it startles  me\".  Outcome: Progressing  Goal: Absence of Hospital-Acquired Illness or Injury  Outcome: Progressing  Intervention: Identify and Manage Fall Risk  Recent Flowsheet Documentation  Taken 5/29/2025 2020 by Amna Zendejas RN  Safety Promotion/Fall Prevention:   assistive device/personal items within reach   activity supervised   clutter free environment " maintained  Intervention: Prevent Skin Injury  Recent Flowsheet Documentation  Taken 5/29/2025 2020 by Amna Zendejas RN  Body Position: position maintained  Intervention: Prevent Infection  Recent Flowsheet Documentation  Taken 5/29/2025 2020 by Amna Zendejas RN  Infection Prevention:   hand hygiene promoted   personal protective equipment utilized  Goal: Optimal Comfort and Wellbeing  Outcome: Progressing  Goal: Readiness for Transition of Care  Outcome: Progressing  Flowsheets (Taken 5/30/2025 0600)  Transportation Anticipated: family or friend will provide  Concerns to be Addressed: discharge planning  Intervention: Mutually Develop Transition Plan  Recent Flowsheet Documentation  Taken 5/30/2025 0600 by Amna Zendejas RN  Transportation Anticipated: family or friend will provide  Concerns to be Addressed: discharge planning     Problem: Delirium  Goal: Optimal Coping  Outcome: Progressing  Goal: Improved Behavioral Control  Outcome: Progressing  Intervention: Minimize Safety Risk  Recent Flowsheet Documentation  Taken 5/29/2025 2020 by Amna Zendejas RN  Enhanced Safety Measures:   pain management   review medications for side effects with activity  Goal: Improved Attention and Thought Clarity  Outcome: Progressing  Goal: Improved Sleep  Outcome: Progressing     Problem: Activity Intolerance  Goal: Enhanced Capacity and Energy  Outcome: Progressing  Intervention: Optimize Activity Tolerance  Recent Flowsheet Documentation  Taken 5/29/2025 2020 by Amna Zendejas RN  Activity Management: activity adjusted per tolerance       Overall Patient Progress: improvingOverall Patient Progress: improving

## 2025-05-31 NOTE — PLAN OF CARE
"Physical Therapy Discharge Summary     Reason for therapy discharge:    Discharged to transitional care facility.     Progress towards therapy goal(s). See goals on Care Plan in [facility] electronic health record for goal details.  Goals not met.  Barriers to achieving goals:  discharge to TCU     Therapy recommendation(s):    Continued therapy is recommended.  Rationale/Recommendations:  Per last treating therapist \"at this time the patient is presenting below baseline mobility, requiring assist for all transfers and gait. Recommend continued PT in the TCU setting to promote improved functional mobility. Pt is showing good participation and motivation in IP PT. \"    *Note based on previous PT notes, not seen by documenting therapist    "

## 2025-06-01 LAB
BACTERIA SPEC CULT: NO GROWTH
BACTERIA SPEC CULT: NO GROWTH

## 2025-06-02 ENCOUNTER — PATIENT OUTREACH (OUTPATIENT)
Dept: CARE COORDINATION | Facility: CLINIC | Age: OVER 89
End: 2025-06-02

## 2025-06-02 ENCOUNTER — TRANSITIONAL CARE UNIT VISIT (OUTPATIENT)
Dept: GERIATRICS | Facility: CLINIC | Age: OVER 89
End: 2025-06-02
Payer: COMMERCIAL

## 2025-06-02 VITALS
DIASTOLIC BLOOD PRESSURE: 67 MMHG | TEMPERATURE: 97.7 F | WEIGHT: 175 LBS | BODY MASS INDEX: 35.28 KG/M2 | SYSTOLIC BLOOD PRESSURE: 140 MMHG | HEART RATE: 82 BPM | HEIGHT: 59 IN | OXYGEN SATURATION: 95 % | RESPIRATION RATE: 16 BRPM

## 2025-06-02 DIAGNOSIS — N18.30 STAGE 3 CHRONIC KIDNEY DISEASE, UNSPECIFIED WHETHER STAGE 3A OR 3B CKD (H): ICD-10-CM

## 2025-06-02 DIAGNOSIS — R53.81 PHYSICAL DECONDITIONING: ICD-10-CM

## 2025-06-02 DIAGNOSIS — I50.30 HEART FAILURE WITH PRESERVED EJECTION FRACTION, NYHA CLASS I (H): ICD-10-CM

## 2025-06-02 DIAGNOSIS — L03.116 LEFT LEG CELLULITIS: ICD-10-CM

## 2025-06-02 DIAGNOSIS — S01.01XD LACERATION OF SCALP, SUBSEQUENT ENCOUNTER: ICD-10-CM

## 2025-06-02 DIAGNOSIS — I10 ESSENTIAL HYPERTENSION, BENIGN: ICD-10-CM

## 2025-06-02 DIAGNOSIS — K59.01 SLOW TRANSIT CONSTIPATION: ICD-10-CM
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
## 2025-06-02 DIAGNOSIS — I89.0 LYMPHEDEMA: ICD-10-CM

## 2025-06-02 NOTE — PROGRESS NOTES
[facility] GERIATRICS    PRIMARY CARE PROVIDER AND CLINIC:  Shun Prado MD, [address] / [place] [address]  Chief Complaint   Patient presents with    Hospital F/U      [place] Medical Record Number:  [id number]  Place of Service where encounter took place:  [facility]) [25]    Riddhi Aguilar  is a 89 year old  (2/15/1931), admitted to the above facility from  [facility]. Hospital stay 5/26/25 through 5/30/25..   HPI:    PMH of HFpEF, atrial fib on eliquis, HLD, CKD, OA, osteoporosis, spinal stenosis, duodenal ulcer, nephrolithiasis, breast cancer who presented after a fall.   Mechanical fall  Scalp laceration  Right elbow abrasion  Tripped while walking with walker  24 staples placed in ED on 5/26 removal in 7-10 days  LLE cellulitis  Bilateral LE lymphedema  Patient was on keflex for 2 weeks PTA for LE ulceration and infection  Ceftriaxone in ED--ancef, MRSA screen positive so IV Abx changed to vancomycin on 5/28/25--> doxycycline  IV lasix X 1, weight 203lbs to 182lbs  On exam today patient sitting up in w/c, alert, pleasant, c/o left leg pain 5/10 pain which is ongoing, denies headache or any other pain at time of exam, denies CP, palpitations, SOB, N/V/D states she has not had a BM in a few days      CODE STATUS/ADVANCE DIRECTIVES DISCUSSION:  No CPR- Do NOT Intubate  DNR / DNI  ALLERGIES: No Known Allergies   PAST MEDICAL HISTORY:   Past Medical History:   Diagnosis Date    Anemia     baseline 10-11    Arthritis     Bleeding ulcer     duodenal ulcer    CKD (chronic kidney disease) stage 3, GFR 30-59 ml/min (H)     Essential hypertension, benign     MDD (major depressive disorder)     Nephrolithiasis     Osteoporosis     Pure hypercholesterolemia     Spinal stenosis     Unspecified cataract     Unspecified glaucoma(365.9)       PAST SURGICAL HISTORY:   has a past surgical history that includes NONSPECIFIC PROCEDURE; NONSPECIFIC PROCEDURE; Breast  surgery; Dilation and curettage, hysteroscopy diagnostic, combined (02/06/2014); Incision and drainage perineal, combined (02/06/2014); Excise lesion lower extremity (Right, 08/29/2017); appendectomy; Combined cystoscopy, retrogrades, exchange stent ureter(s) (Left, 01/24/2024); Combined Cystoscopy, Retrogrades, Ureteroscopy, Laser Holmium Lithotripsy Ureter(S), Insert Stent (Left, 02/20/2024); Cystoscopy, remove stent(s), combined (Left, 02/20/2024); Cystoscopy; and Closed reduction, percutaneous pinning hip (Left, 5/20/2024).  FAMILY HISTORY: family history includes Breast Cancer in her sister and sister; Cancer in her brother.  SOCIAL HISTORY:   reports that she has never smoked. She has never used smokeless tobacco. She reports that she does not drink alcohol and does not use drugs.  Patient's living condition: lives in an assisted living facility    Post Discharge Medication Reconciliation Status:   MED REC REQUIRED  Post Medication Reconciliation Status: discharge medications reconciled and changed, per note/orders       Current Outpatient Medications   Medication Sig Dispense Refill    acetaminophen (TYLENOL) 500 MG tablet Take 2 tablets (1,000 mg) by mouth every 8 hours as needed for mild pain.      apixaban ANTICOAGULANT (ELIQUIS) 5 MG tablet Take 1 tablet (5 mg) by mouth 2 times daily.      atorvastatin (LIPITOR) 10 MG tablet TAKE 1 TABLET(10 MG) BY MOUTH DAILY 90 tablet 0    bisacodyl (DULCOLAX) 10 MG suppository Place 1 suppository (10 mg) rectally daily as needed for constipation.      buPROPion (WELLBUTRIN XL) 150 MG 24 hr tablet TAKE 1 TABLET(150 MG) BY MOUTH EVERY MORNING 90 tablet 3    buPROPion (WELLBUTRIN XL) 300 MG 24 hr tablet TAKE 1 TABLET(300 MG) BY MOUTH EVERY MORNING 90 tablet 3    dorzolamide-timolol (COSOPT) 22.3-6.8 MG/ML ophthalmic solution Place 1 drop into both eyes 2 times daily      doxycycline hyclate (VIBRAMYCIN) 100 MG capsule Take 1 capsule (100 mg) by mouth 2 times daily for 5  "days.      famotidine (PEPCID) 20 MG tablet Take 1 tablet (20 mg) by mouth daily.      furosemide (LASIX) 20 MG tablet Take 1 tablet (20 mg) by mouth daily.      Lidocaine (LIDOCARE) 4 % Patch Place 1 patch over 12 hours onto the skin every 24 hours. To prevent lidocaine toxicity, patient should be patch free for 12 hrs daily.      melatonin 3 MG tablet Take 3 mg by mouth at bedtime.      metoprolol tartrate (LOPRESSOR) 50 MG tablet Take 1 tablet (50 mg) by mouth 2 times daily.      miconazole (MICATIN) 2 % external powder Apply topically 2 times daily as needed for other (candidiasis/intertrigo).      multivitamin w/minerals (THERA-VIT-M) tablet Take 1 tablet by mouth daily      polyethylene glycol (MIRALAX) 17 g packet Take 17 g by mouth daily.      senna-docusate (SENOKOT-S/PERICOLACE) 8.6-50 MG tablet Take 1 tablet by mouth 2 times daily. Change to PRN if having loose stools      spironolactone (ALDACTONE) 25 MG tablet Take 1 tablet (25 mg) by mouth daily.       No current facility-administered medications for this visit.       ROS:  10 point ROS of systems including Constitutional, Eyes, Respiratory, Cardiovascular, Gastroenterology, Genitourinary, Integumentary, Musculoskeletal, Psychiatric were all negative except for pertinent positives noted in my HPI.    Vitals:  BP (!) 140/67   Pulse 82   Temp 97.7  F (36.5  C)   Resp 16   Ht 1.499 m (4' 11\")   Wt 79.4 kg (175 lb)   SpO2 95%   BMI 35.35 kg/m    Exam:  GENERAL APPEARANCE:  Alert, in no distress  ENT:  Mouth and posterior oropharynx normal, moist mucous membranes, [place]  EYES:  EOM, conjunctivae, lids, pupils and irises normal, PERRL  RESP:  respiratory effort and palpation of chest normal, lungs clear to auscultation , no respiratory distress  CV:  regular rate and rhythm, no murmur, rub, or gallop, peripheral edema 1+ in LE bilaterally L>R  ABDOMEN:  normal bowel sounds, soft, nontender, no hepatosplenomegaly or other masses  M/S:   patient sitting " up in w/c  SKIN:  Inspection of skin and subcutaneous tissue baseline, left eye/forehead ecchymosis in stages of healing, left leg redness, did not visualize entire leg, dressing intact  NEURO:   speech wnl  PSYCH:  affect and mood normal    Lab/Diagnostic data:  Recent labs in [facility] reviewed by me today.  and Most Recent 3 CBC's:  Recent Labs   Lab Test 05/30/25  0707 05/29/25  0514 05/27/25  0801   WBC 8.4 10.6 18.5*   HGB 11.5* 11.3* 10.6*   * 103* 104*    336 343     Most Recent 3 BMP's:  Recent Labs   Lab Test 05/30/25  0707 05/29/25  0514 05/27/25  0801    135 139   POTASSIUM 3.9 3.6 4.3   CHLORIDE 99 97* 102   CO2 26 25 25   BUN 20.9 26.3* 31.5*   CR 0.90 0.86 0.97*   ANIONGAP 12 13 12   TAMMY 8.9 8.6* 8.9   GLC 96 96 109*       ASSESSMENT/PLAN:    (W19.XXXD) Fall, subsequent encounter  (primary encounter diagnosis)  (S01.01XD) Laceration of scalp, subsequent encounter  (R53.81) Physical deconditioning  Comment: acute/ongoing  Abrasion left elbow  Plan: PT and OT, remove staples 6/4/25, WOC to follow, tylenol 1000mg q 8 hours prn    (I89.0) Lymphedema  (L03.116) Left leg cellulitis  Comment: acute/ongoing  Tx with IV ceftriaxone--vancomycin to oral doxycycline  Plan: continue doxycycline 100mg BID for 5 days  Diuretics, WOC to follow    (I10) Essential hypertension, benign  (I50.30) Heart failure with preserved ejection fraction, NYHA class I (H)  (N18.30) Stage 3 chronic kidney disease, unspecified whether stage 3a or 3b CKD (H)  Comment: acute/ongoing  Recent Echo EF 60-65%, Cr. 1.05 during hospitalization  Plan: BMP follow, continue lasix 20mg QD, metoprolol 50mg BID, aldactone 25mg QD, apixaban 5mg BID    (K59.01) Slow transit constipation  Comment: acute/ongoing  Plan: continue senna s 1 PO BID  Ordered MOM 30ml QOD  prn per home routine  DC miralax    Orders:  MOM 30ml QOD prn for constipation  DC miralax    Total time with patient visit: 48 minutes including discussions about the POC  and care coordination with the patient. Greater than 50% of total time spent with counseling and coordinating care due to reviewed internal medicine progress notes, lab and imaging results, medications and POC. Discussed medications, pain management and bowel status with patient at bedside. .   Electronically signed by:  Tonya Lynn Haase, APRN CNP

## 2025-06-02 NOTE — LETTER
[facility]   To:   Zahira NUNEZ SW          Please give to facility    From:   Alyse Cain  [facility]  Care Coordinator   [facility]   P: [phone]   [email or web address]   Patient Name:  Riddhi Aguilar YOB: 1931   Admit date: 5/30/25      *Information Needed:  Please contact me when the patient will discharge (or if they will move to long term care)- include the discharge date, disposition, and main diagnosis   If the patient is discharged with home care services, please provide the name of the agency    Also- Please inform me if a care conference is being held.   Phone, Fax or Email with information                              Thank you          Electronically signed

## 2025-06-02 NOTE — LETTER
6/2/2025      Riddhi Aguilar  [id number]  [place] [facility] [place] [address]        [facility] GERIATRICS    PRIMARY CARE PROVIDER AND CLINIC:  Shun Prado MD, [address] / [place] [address]  Chief Complaint   Patient presents with     Hospital F/U      [place] Medical Record Number:  [id number]  Place of Service where encounter took place:  [facility]) [25]    Riddhi Aguilar  is a 89 year old  (2/15/1931), admitted to the above facility from  [facility]. Hospital stay 5/26/25 through 5/30/25..   HPI:    PMH of HFpEF, atrial fib on eliquis, HLD, CKD, OA, osteoporosis, spinal stenosis, duodenal ulcer, nephrolithiasis, breast cancer who presented after a fall.   Mechanical fall  Scalp laceration  Right elbow abrasion  Tripped while walking with walker  24 staples placed in ED on 5/26 removal in 7-10 days  LLE cellulitis  Bilateral LE lymphedema  Patient was on keflex for 2 weeks PTA for LE ulceration and infection  Ceftriaxone in ED--ancef, MRSA screen positive so IV Abx changed to vancomycin on 5/28/25--> doxycycline  IV lasix X 1, weight 203lbs to 182lbs  On exam today patient sitting up in w/c, alert, pleasant, c/o left leg pain 5/10 pain which is ongoing, denies headache or any other pain at time of exam, denies CP, palpitations, SOB, N/V/D states she has not had a BM in a few days      CODE STATUS/ADVANCE DIRECTIVES DISCUSSION:  No CPR- Do NOT Intubate  DNR / DNI  ALLERGIES: No Known Allergies   PAST MEDICAL HISTORY:   Past Medical History:   Diagnosis Date     Anemia     baseline 10-11     Arthritis      Bleeding ulcer     duodenal ulcer     CKD (chronic kidney disease) stage 3, GFR 30-59 ml/min (H)      Essential hypertension, benign      MDD (major depressive disorder)      Nephrolithiasis      Osteoporosis      Pure hypercholesterolemia      Spinal stenosis      Unspecified cataract      Unspecified glaucoma(365.9)       PAST SURGICAL HISTORY:    has a past surgical history that includes NONSPECIFIC PROCEDURE; NONSPECIFIC PROCEDURE; Breast surgery; Dilation and curettage, hysteroscopy diagnostic, combined (02/06/2014); Incision and drainage perineal, combined (02/06/2014); Excise lesion lower extremity (Right, 08/29/2017); appendectomy; Combined cystoscopy, retrogrades, exchange stent ureter(s) (Left, 01/24/2024); Combined Cystoscopy, Retrogrades, Ureteroscopy, Laser Holmium Lithotripsy Ureter(S), Insert Stent (Left, 02/20/2024); Cystoscopy, remove stent(s), combined (Left, 02/20/2024); Cystoscopy; and Closed reduction, percutaneous pinning hip (Left, 5/20/2024).  FAMILY HISTORY: family history includes Breast Cancer in her sister and sister; Cancer in her brother.  SOCIAL HISTORY:   reports that she has never smoked. She has never used smokeless tobacco. She reports that she does not drink alcohol and does not use drugs.  Patient's living condition: lives in an assisted living facility    Post Discharge Medication Reconciliation Status:   MED REC REQUIRED  Post Medication Reconciliation Status: discharge medications reconciled and changed, per note/orders       Current Outpatient Medications   Medication Sig Dispense Refill     acetaminophen (TYLENOL) 500 MG tablet Take 2 tablets (1,000 mg) by mouth every 8 hours as needed for mild pain.       apixaban ANTICOAGULANT (ELIQUIS) 5 MG tablet Take 1 tablet (5 mg) by mouth 2 times daily.       atorvastatin (LIPITOR) 10 MG tablet TAKE 1 TABLET(10 MG) BY MOUTH DAILY 90 tablet 0     bisacodyl (DULCOLAX) 10 MG suppository Place 1 suppository (10 mg) rectally daily as needed for constipation.       buPROPion (WELLBUTRIN XL) 150 MG 24 hr tablet TAKE 1 TABLET(150 MG) BY MOUTH EVERY MORNING 90 tablet 3     buPROPion (WELLBUTRIN XL) 300 MG 24 hr tablet TAKE 1 TABLET(300 MG) BY MOUTH EVERY MORNING 90 tablet 3     dorzolamide-timolol (COSOPT) 22.3-6.8 MG/ML ophthalmic solution Place 1 drop into both eyes 2 times daily        "doxycycline hyclate (VIBRAMYCIN) 100 MG capsule Take 1 capsule (100 mg) by mouth 2 times daily for 5 days.       famotidine (PEPCID) 20 MG tablet Take 1 tablet (20 mg) by mouth daily.       furosemide (LASIX) 20 MG tablet Take 1 tablet (20 mg) by mouth daily.       Lidocaine (LIDOCARE) 4 % Patch Place 1 patch over 12 hours onto the skin every 24 hours. To prevent lidocaine toxicity, patient should be patch free for 12 hrs daily.       melatonin 3 MG tablet Take 3 mg by mouth at bedtime.       metoprolol tartrate (LOPRESSOR) 50 MG tablet Take 1 tablet (50 mg) by mouth 2 times daily.       miconazole (MICATIN) 2 % external powder Apply topically 2 times daily as needed for other (candidiasis/intertrigo).       multivitamin w/minerals (THERA-VIT-M) tablet Take 1 tablet by mouth daily       polyethylene glycol (MIRALAX) 17 g packet Take 17 g by mouth daily.       senna-docusate (SENOKOT-S/PERICOLACE) 8.6-50 MG tablet Take 1 tablet by mouth 2 times daily. Change to PRN if having loose stools       spironolactone (ALDACTONE) 25 MG tablet Take 1 tablet (25 mg) by mouth daily.       No current facility-administered medications for this visit.       ROS:  10 point ROS of systems including Constitutional, Eyes, Respiratory, Cardiovascular, Gastroenterology, Genitourinary, Integumentary, Musculoskeletal, Psychiatric were all negative except for pertinent positives noted in my HPI.    Vitals:  BP (!) 140/67   Pulse 82   Temp 97.7  F (36.5  C)   Resp 16   Ht 1.499 m (4' 11\")   Wt 79.4 kg (175 lb)   SpO2 95%   BMI 35.35 kg/m    Exam:  GENERAL APPEARANCE:  Alert, in no distress  ENT:  Mouth and posterior oropharynx normal, moist mucous membranes, [place]  EYES:  EOM, conjunctivae, lids, pupils and irises normal, PERRL  RESP:  respiratory effort and palpation of chest normal, lungs clear to auscultation , no respiratory distress  CV:  regular rate and rhythm, no murmur, rub, or gallop, peripheral edema 1+ in LE bilaterally " L>R  ABDOMEN:  normal bowel sounds, soft, nontender, no hepatosplenomegaly or other masses  M/S:   patient sitting up in w/c  SKIN:  Inspection of skin and subcutaneous tissue baseline, left eye/forehead ecchymosis in stages of healing, left leg redness, did not visualize entire leg, dressing intact  NEURO:   speech wnl  PSYCH:  affect and mood normal    Lab/Diagnostic data:  Recent labs in [facility] reviewed by me today.  and Most Recent 3 CBC's:  Recent Labs   Lab Test 05/30/25  0707 05/29/25  0514 05/27/25  0801   WBC 8.4 10.6 18.5*   HGB 11.5* 11.3* 10.6*   * 103* 104*    336 343     Most Recent 3 BMP's:  Recent Labs   Lab Test 05/30/25  0707 05/29/25  0514 05/27/25  0801    135 139   POTASSIUM 3.9 3.6 4.3   CHLORIDE 99 97* 102   CO2 26 25 25   BUN 20.9 26.3* 31.5*   CR 0.90 0.86 0.97*   ANIONGAP 12 13 12   TAMMY 8.9 8.6* 8.9   GLC 96 96 109*       ASSESSMENT/PLAN:    (W19.XXXD) Fall, subsequent encounter  (primary encounter diagnosis)  (S01.01XD) Laceration of scalp, subsequent encounter  (R53.81) Physical deconditioning  Comment: acute/ongoing  Abrasion left elbow  Plan: PT and OT, remove staples 6/4/25, WOC to follow, tylenol 1000mg q 8 hours prn    (I89.0) Lymphedema  (L03.116) Left leg cellulitis  Comment: acute/ongoing  Tx with IV ceftriaxone--vancomycin to oral doxycycline  Plan: continue doxycycline 100mg BID for 5 days  Diuretics, WOC to follow    (I10) Essential hypertension, benign  (I50.30) Heart failure with preserved ejection fraction, NYHA class I (H)  (N18.30) Stage 3 chronic kidney disease, unspecified whether stage 3a or 3b CKD (H)  Comment: acute/ongoing  Recent Echo EF 60-65%, Cr. 1.05 during hospitalization  Plan: BMP follow, continue lasix 20mg QD, metoprolol 50mg BID, aldactone 25mg QD, apixaban 5mg BID    (K59.01) Slow transit constipation  Comment: acute/ongoing  Plan: continue senna s 1 PO BID  Ordered MOM 30ml QOD  prn per home routine  DC miralax    Orders:  MOM 30ml QOD  prn for constipation  DC miralax    Total time with patient visit: 48 minutes including discussions about the POC and care coordination with the patient. Greater than 50% of total time spent with counseling and coordinating care due to reviewed internal medicine progress notes, lab and imaging results, medications and POC. Discussed medications, pain management and bowel status with patient at bedside. .   Electronically signed by:  Tonya Lynn Haase, APRN CNP                   Sincerely,        Tonya Lynn Haase, APRN CNP    Electronically signed

## 2025-06-02 NOTE — PROGRESS NOTES
Clinic Care Coordination Contact  Care Coordination Transition Communication    Clinical Data: Patient was hospitalized at [facility] from 5/26/25 to 5/30/25 with diagnosis of Mechanical fall  Scalp laceration   Right elbow abrasion  Sepsis  Left lower extremity cellulitis   Bilateral lower extremity lymphedema     Assessment: Patient has transitioned to TCU/ARU for short term rehabilitation:    Facility Name: [facility]  Transition Communication:  Notified facility of Primary Care- Care Coordination support via Epic fax.    Plan: Care Coordinator will await notification from facility staff informing of patient's discharge plans/needs. Care Coordinator will review chart and outreach to facility staff every 4 weeks and as needed.     Alyse Cain  [facility]  Clinic Care Coordinator  [facility]  [phone]  [email or web address]

## 2025-06-04 ENCOUNTER — DOCUMENTATION ONLY (OUTPATIENT)
Dept: OTHER | Facility: CLINIC | Age: OVER 89
End: 2025-06-04

## 2025-06-04 ENCOUNTER — DOCUMENTATION ONLY (OUTPATIENT)
Dept: GERIATRICS | Facility: CLINIC | Age: OVER 89
End: 2025-06-04

## 2025-06-04 NOTE — PROGRESS NOTES
"[facility] GERIATRICS    Chief Complaint   Patient presents with    RECHECK     HPI:  Riddhi Aguilar is a 89 year old  (2/15/1931), who is being seen today for an episodic care visit at: [facility] ([facility]) [25]. Today's concern is:   Fall/scalp laceration  Right elbow abrasion  On exam today patient sitting up in recliner chair in room, denies head pain or headache, staple have been removed from scalp and laceration is healing, no signs of infection  Patient states she does have pain in her right elbow if she puts any pressure on it, otherwise no pain  In therapy walking up to 70 feet using a 4ww with SBA, independent in room with toileting, supervision with ADL's  LLE cellulitis no c/o pain LLE, WOC following  HTN/CHF/atrial fib: BP  113/70, 145/70, 140/67 with HR 70-80 range, denies CP, palpitations, SOB   Weight 175.7 on admission, current weight 182.5lbs  CKD: see labs  Constipation patient states bowels are working    Allergies, and PMH/PSH reviewed in [facility] today.  REVIEW OF SYSTEMS:  10 point ROS of systems including Constitutional, Eyes, Respiratory, Cardiovascular, Gastroenterology, Genitourinary, Integumentary, Musculoskeletal, Psychiatric were all negative except for pertinent positives noted in my HPI.    Objective:   /70   Pulse 71   Temp 97.4  F (36.3  C)   Resp 16   Ht 1.499 m (4' 11\")   Wt 79.4 kg (175 lb)   SpO2 98%   BMI 35.35 kg/m    GENERAL APPEARANCE:  Alert, in no distress  ENT:  Mouth and posterior oropharynx normal, moist mucous membranes, [place]  EYES:  EOM, conjunctivae, lids, pupils and irises normal, PERRL  RESP:  respiratory effort and palpation of chest normal, lungs clear to auscultation , no respiratory distress  CV:  regular rate and rhythm, no murmur, rub, or gallop, peripheral edema 1+ in LE bilaterally L>R  ABDOMEN:  normal bowel sounds, soft, nontender, no hepatosplenomegaly or other masses  M/S:   sitting up in chair at bedside.   SKIN:  Inspection of skin " and subcutaneous tissue baseline, laceration to scalp healing well, no signs of infection, did not visualize LLE  NEURO:   speech wnl  PSYCH:  affect and mood normal    Recent labs in [facility] reviewed by me today.  and Most Recent 3 CBC's:  Recent Labs   Lab Test 05/30/25  0707 05/29/25  0514 05/27/25  0801   WBC 8.4 10.6 18.5*   HGB 11.5* 11.3* 10.6*   * 103* 104*    336 343     Most Recent 3 BMP's:  Recent Labs   Lab Test 05/30/25  0707 05/29/25  0514 05/27/25  0801    135 139   POTASSIUM 3.9 3.6 4.3   CHLORIDE 99 97* 102   CO2 26 25 25   BUN 20.9 26.3* 31.5*   CR 0.90 0.86 0.97*   ANIONGAP 12 13 12   TAMMY 8.9 8.6* 8.9   GLC 96 96 109*       Assessment/Plan:  (W19.XXXD) Fall, subsequent encounter  (primary encounter diagnosis)  (S01.01XD) Laceration of scalp, subsequent encounter  (R53.81) Physical deconditioning  Comment: acute/ongoing, no change  Abrasion left elbow  Plan: PT and OT, staples removed, , WOC to follow, tylenol 1000mg q 8 hours prn     (I89.0) Lymphedema  (L03.116) Left leg cellulitis  Comment: acute/ongoing,  no change  Tx with IV ceftriaxone--vancomycin to oral doxycycline  Plan: finished doxycycline, continue   Diuretics, WOC to follow     (I10) Essential hypertension, benign  (I50.30) Heart failure with preserved ejection fraction, NYHA class I (H)  (N18.30) Stage 3 chronic kidney disease, unspecified whether stage 3a or 3b CKD (H)  Comment: acute/ongoing, no change  Recent Echo EF 60-65%, Cr. 1.05 during hospitalization  Plan: BMP follow, continue lasix 20mg QD, metoprolol 50mg BID, aldactone 25mg QD, apixaban 5mg BID     (K59.01) Slow transit constipation  Comment: acute/ongoing, no change  Plan: continue senna s 1 PO BID  Ordered MOM 30ml QOD  prn per home routine  DC miralax       MED REC REQUIRED  Post Medication Reconciliation Status: medication reconcilation previously completed during another office visit      Orders:  NNO    Total time with patient visit: 46 minutes  including discussions about the POC and care coordination with the patient. Greater than 50% of total time spent with counseling and coordinating care due to reviewed nursing and therapy progress notes, medication, POC. Discussed pain control, progress in therapy and POC with patient at bedside. Discussed POC and discharge planning with IDT at facility.   Electronically signed by: Tonya Lynn Haase, APRN CNP

## 2025-06-05 ENCOUNTER — TRANSITIONAL CARE UNIT VISIT (OUTPATIENT)
Dept: GERIATRICS | Facility: CLINIC | Age: OVER 89
End: 2025-06-05
Payer: COMMERCIAL

## 2025-06-05 VITALS
HEIGHT: 59 IN | DIASTOLIC BLOOD PRESSURE: 70 MMHG | BODY MASS INDEX: 35.28 KG/M2 | SYSTOLIC BLOOD PRESSURE: 113 MMHG | OXYGEN SATURATION: 98 % | TEMPERATURE: 97.4 F | RESPIRATION RATE: 16 BRPM | WEIGHT: 175 LBS | HEART RATE: 71 BPM

## 2025-06-05 DIAGNOSIS — I50.30 HEART FAILURE WITH PRESERVED EJECTION FRACTION, NYHA CLASS I (H): ICD-10-CM

## 2025-06-05 DIAGNOSIS — I48.91 ATRIAL FIBRILLATION, UNSPECIFIED TYPE (H): ICD-10-CM

## 2025-06-05 DIAGNOSIS — K59.01 SLOW TRANSIT CONSTIPATION: ICD-10-CM

## 2025-06-05 DIAGNOSIS — N18.30 STAGE 3 CHRONIC KIDNEY DISEASE, UNSPECIFIED WHETHER STAGE 3A OR 3B CKD (H): ICD-10-CM

## 2025-06-05 DIAGNOSIS — S01.01XD LACERATION OF SCALP, SUBSEQUENT ENCOUNTER: Primary | ICD-10-CM

## 2025-06-05 DIAGNOSIS — R53.81 PHYSICAL DECONDITIONING: ICD-10-CM

## 2025-06-05 DIAGNOSIS — I89.0 LYMPHEDEMA: ICD-10-CM
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
## 2025-06-05 DIAGNOSIS — I10 ESSENTIAL HYPERTENSION, BENIGN: ICD-10-CM

## 2025-06-05 DIAGNOSIS — L03.116 LEFT LEG CELLULITIS: ICD-10-CM

## 2025-06-05 NOTE — LETTER
" 6/5/2025      Riddhi Aguilar  [address]  [facility] [address]        [facility]    Chief Complaint   Patient presents with     RECHECK     HPI:  Riddhi Aguilar is a 89 year old  (2/15/1931), who is being seen today for an episodic care visit at: [facility]) [25]. Today's concern is:   Fall/scalp laceration  Right elbow abrasion  On exam today patient sitting up in recliner chair in room, denies head pain or headache, staple have been removed from scalp and laceration is healing, no signs of infection  Patient states she does have pain in her right elbow if she puts any pressure on it, otherwise no pain  In therapy walking up to 70 feet using a 4ww with SBA, independent in room with toileting, supervision with ADL's  LLE cellulitis no c/o pain LLE, WOC following  HTN/CHF/atrial fib: BP  113/70, 145/70, 140/67 with HR 70-80 range, denies CP, palpitations, SOB   Weight 175.7 on admission, current weight 182.5lbs  CKD: see labs  Constipation patient states bowels are working    Allergies, and PMH/PSH reviewed in [facility] today.  REVIEW OF SYSTEMS:  10 point ROS of systems including Constitutional, Eyes, Respiratory, Cardiovascular, Gastroenterology, Genitourinary, Integumentary, Musculoskeletal, Psychiatric were all negative except for pertinent positives noted in my HPI.    Objective:   /70   Pulse 71   Temp 97.4  F (36.3  C)   Resp 16   Ht 1.499 m (4' 11\")   Wt 79.4 kg (175 lb)   SpO2 98%   BMI 35.35 kg/m    GENERAL APPEARANCE:  Alert, in no distress  ENT:  Mouth and posterior oropharynx normal, moist mucous membranes, [place]  EYES:  EOM, conjunctivae, lids, pupils and irises normal, PERRL  RESP:  respiratory effort and palpation of chest normal, lungs clear to auscultation , no respiratory distress  CV:  regular rate and rhythm, no murmur, rub, or gallop, peripheral edema 1+ in LE bilaterally L>R  ABDOMEN:  normal bowel sounds, soft, nontender, no " hepatosplenomegaly or other masses  M/S:   sitting up in chair at bedside.   SKIN:  Inspection of skin and subcutaneous tissue baseline, laceration to scalp healing well, no signs of infection, did not visualize LLE  NEURO:   speech wnl  PSYCH:  affect and mood normal    Recent labs in [facility] reviewed by me today.  and Most Recent 3 CBC's:  Recent Labs   Lab Test 05/30/25  0707 05/29/25  0514 05/27/25  0801   WBC 8.4 10.6 18.5*   HGB 11.5* 11.3* 10.6*   * 103* 104*    336 343     Most Recent 3 BMP's:  Recent Labs   Lab Test 05/30/25  0707 05/29/25  0514 05/27/25  0801    135 139   POTASSIUM 3.9 3.6 4.3   CHLORIDE 99 97* 102   CO2 26 25 25   BUN 20.9 26.3* 31.5*   CR 0.90 0.86 0.97*   ANIONGAP 12 13 12   TAMMY 8.9 8.6* 8.9   GLC 96 96 109*       Assessment/Plan:  (W19.XXXD) Fall, subsequent encounter  (primary encounter diagnosis)  (S01.01XD) Laceration of scalp, subsequent encounter  (R53.81) Physical deconditioning  Comment: acute/ongoing, no change  Abrasion left elbow  Plan: PT and OT, staples removed, , WOC to follow, tylenol 1000mg q 8 hours prn     (I89.0) Lymphedema  (L03.116) Left leg cellulitis  Comment: acute/ongoing,  no change  Tx with IV ceftriaxone--vancomycin to oral doxycycline  Plan: finished doxycycline, continue   Diuretics, WOC to follow     (I10) Essential hypertension, benign  (I50.30) Heart failure with preserved ejection fraction, NYHA class I (H)  (N18.30) Stage 3 chronic kidney disease, unspecified whether stage 3a or 3b CKD (H)  Comment: acute/ongoing, no change  Recent Echo EF 60-65%, Cr. 1.05 during hospitalization  Plan: BMP follow, continue lasix 20mg QD, metoprolol 50mg BID, aldactone 25mg QD, apixaban 5mg BID     (K59.01) Slow transit constipation  Comment: acute/ongoing, no change  Plan: continue senna s 1 PO BID  Ordered MOM 30ml QOD  prn per home routine  DC miralax       MED REC REQUIRED  Post Medication Reconciliation Status: medication reconcilation previously  completed during another office visit      Orders:  NNO    Total time with patient visit: 46 minutes including discussions about the POC and care coordination with the patient. Greater than 50% of total time spent with counseling and coordinating care due to reviewed nursing and therapy progress notes, medication, POC. Discussed pain control, progress in therapy and POC with patient at bedside. Discussed POC and discharge planning with IDT at facility.   Electronically signed by: Tonya Lynn Haase, APRN CNP         Sincerely,        Tonya Lynn Haase, APRN CNP    Electronically signed

## 2025-06-09 ENCOUNTER — TRANSITIONAL CARE UNIT VISIT (OUTPATIENT)
Dept: GERIATRICS | Facility: CLINIC | Age: OVER 89
End: 2025-06-09
Payer: COMMERCIAL

## 2025-06-09 VITALS
RESPIRATION RATE: 18 BRPM | DIASTOLIC BLOOD PRESSURE: 82 MMHG | HEART RATE: 83 BPM | WEIGHT: 175 LBS | BODY MASS INDEX: 35.28 KG/M2 | TEMPERATURE: 97.6 F | OXYGEN SATURATION: 96 % | SYSTOLIC BLOOD PRESSURE: 135 MMHG | HEIGHT: 59 IN

## 2025-06-09 DIAGNOSIS — I89.0 LYMPHEDEMA: ICD-10-CM

## 2025-06-09 DIAGNOSIS — S01.01XD LACERATION OF SCALP, SUBSEQUENT ENCOUNTER: Primary | ICD-10-CM

## 2025-06-09 DIAGNOSIS — I10 ESSENTIAL HYPERTENSION, BENIGN: ICD-10-CM

## 2025-06-09 DIAGNOSIS — I48.91 ATRIAL FIBRILLATION, UNSPECIFIED TYPE (H): ICD-10-CM

## 2025-06-09 DIAGNOSIS — I50.30 HEART FAILURE WITH PRESERVED EJECTION FRACTION, NYHA CLASS I (H): ICD-10-CM

## 2025-06-09 DIAGNOSIS — R53.81 PHYSICAL DECONDITIONING: ICD-10-CM

## 2025-06-09 DIAGNOSIS — L03.116 LEFT LEG CELLULITIS: ICD-10-CM

## 2025-06-09 DIAGNOSIS — N18.30 STAGE 3 CHRONIC KIDNEY DISEASE, UNSPECIFIED WHETHER STAGE 3A OR 3B CKD (H): ICD-10-CM

## 2025-06-09 DIAGNOSIS — K59.01 SLOW TRANSIT CONSTIPATION: ICD-10-CM
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
## 2025-06-09 NOTE — PROGRESS NOTES
"[facility] GERIATRICS    Chief Complaint   Patient presents with    RECHECK     HPI:  Riddhi Aguilar is a 89 year old  (2/15/1931), who is being seen today for an episodic care visit at: [facility] ([facility]) [25]. Today's concern is:   Fall/scalp laceration  Right elbow abrasion  On exam today patient sitting up in recliner chair in room, alert, pleasant, states she is feeling well, pain in head, states she has a little pain in LLE, feels she is making progress in therapy   In therapy walking up to 140 feet using a 4ww independently with supervision. independent in room with toileting, supervision with ADL's  LLE cellulitis no c/o pain LLE, WOC following  HTN/CHF/atrial fib: BP  135/82, 136/87, 113/70  with HR 80 range, denies CP, palpitations, SOB   Weight 175.7 on admission, current weight 175.6lbs  CKD: see labs  Constipation patient states bowels are working       Allergies, and PMH/PSH reviewed in [facility] today.  REVIEW OF SYSTEMS:  10 point ROS of systems including Constitutional, Eyes, Respiratory, Cardiovascular, Gastroenterology, Genitourinary, Integumentary, Musculoskeletal, Psychiatric were all negative except for pertinent positives noted in my HPI.    Objective:   /82   Pulse 83   Temp 97.6  F (36.4  C)   Resp 18   Ht 1.499 m (4' 11\")   Wt 79.4 kg (175 lb)   SpO2 96%   BMI 35.35 kg/m    GENERAL APPEARANCE:  Alert, in no distress  ENT:  Mouth and posterior oropharynx normal, moist mucous membranes, normal hearing acuity  EYES:  EOM, conjunctivae, lids, pupils and irises normal, PERRL  RESP:  respiratory effort and palpation of chest normal, lungs clear to auscultation , no respiratory distress  CV:  regular rate and rhythm, no murmur, rub, or gallop, peripheral edema 1+ in LE bilaterally L>R  ABDOMEN:  normal bowel sounds, soft, nontender, no hepatosplenomegaly or other masses  M/S:   patient sitting up in chair in room  SKIN:  Inspection of skin and subcutaneous tissue baseline, " laceration to scalp is healing well, did not visualize LLE wound  NEURO:   speech wnl  PSYCH:  affect and mood normal    Recent labs in [facility] reviewed by me today.  and Most Recent 3 CBC's:  Recent Labs   Lab Test 05/30/25  0707 05/29/25  0514 05/27/25  0801   WBC 8.4 10.6 18.5*   HGB 11.5* 11.3* 10.6*   * 103* 104*    336 343       Assessment/Plan:  (W19.XXXD) Fall, subsequent encounter  (primary encounter diagnosis)  (S01.01XD) Laceration of scalp, subsequent encounter  (R53.81) Physical deconditioning  Comment: acute/ongoing, no change  Abrasion left elbow  Plan: PT and OT, staples removed, , WOC to follow, tylenol 1000mg q 8 hours prn     (I89.0) Lymphedema  (L03.116) Left leg cellulitis  Comment: acute/ongoing,  no change  Tx with IV ceftriaxone--vancomycin to oral doxycycline  Plan: finished doxycycline, continue   Diuretics, WOC to follow     (I10) Essential hypertension, benign  (I50.30) Heart failure with preserved ejection fraction, NYHA class I (H)  (N18.30) Stage 3 chronic kidney disease, unspecified whether stage 3a or 3b CKD (H)  Comment: acute/ongoing, no change  Recent Echo EF 60-65%, Cr. 1.05 during hospitalization  Plan: BMP follow, continue lasix 20mg QD, metoprolol 50mg BID, aldactone 25mg QD, apixaban 5mg BID     (K59.01) Slow transit constipation  Comment: acute/ongoing, no change  Plan: continue senna s 1 PO BID  Ordered MOM 30ml QOD  prn per home routine  DC miralax       MED REC REQUIRED  Post Medication Reconciliation Status: medication reconcilation previously completed during another office visit      Orders:  No new orders    Total time with patient visit: 46 minutes including discussions about the POC and care coordination with the patient. Greater than 50% of total time spent with counseling and coordinating care due to reviewed nursing and therapy progress notes, medications and POC. Discussed POC and discharge planning with patient at bedside. .   Electronically signed  by: Tonya Lynn Haase, APRN CNP

## 2025-06-09 NOTE — LETTER
" 6/9/2025      Riddhi Aguilar  [address]  [facility] [address]        [facility]    Chief Complaint   Patient presents with     RECHECK     HPI:  Riddhi Aguilar is a 89 year old  (2/15/1931), who is being seen today for an episodic care visit at: [facility]) [25]. Today's concern is:   Fall/scalp laceration  Right elbow abrasion  On exam today patient sitting up in recliner chair in room, alert, pleasant, states she is feeling well, pain in head, states she has a little pain in LLE, feels she is making progress in therapy   In therapy walking up to 140 feet using a 4ww independently with supervision. independent in room with toileting, supervision with ADL's  LLE cellulitis no c/o pain LLE, WOC following  HTN/CHF/atrial fib: BP  135/82, 136/87, 113/70  with HR 80 range, denies CP, palpitations, SOB   Weight 175.7 on admission, current weight 175.6lbs  CKD: see labs  Constipation patient states bowels are working       Allergies, and PMH/PSH reviewed in [employer] today.  REVIEW OF SYSTEMS:  10 point ROS of systems including Constitutional, Eyes, Respiratory, Cardiovascular, Gastroenterology, Genitourinary, Integumentary, Musculoskeletal, Psychiatric were all negative except for pertinent positives noted in my HPI.    Objective:   /82   Pulse 83   Temp 97.6  F (36.4  C)   Resp 18   Ht 1.499 m (4' 11\")   Wt 79.4 kg (175 lb)   SpO2 96%   BMI 35.35 kg/m    GENERAL APPEARANCE:  Alert, in no distress  ENT:  Mouth and posterior oropharynx normal, moist mucous membranes, normal hearing acuity  EYES:  EOM, conjunctivae, lids, pupils and irises normal, PERRL  RESP:  respiratory effort and palpation of chest normal, lungs clear to auscultation , no respiratory distress  CV:  regular rate and rhythm, no murmur, rub, or gallop, peripheral edema 1+ in LE bilaterally L>R  ABDOMEN:  normal bowel sounds, soft, nontender, no hepatosplenomegaly or other masses  M/S:   patient " sitting up in chair in room  SKIN:  Inspection of skin and subcutaneous tissue baseline, laceration to scalp is healing well, did not visualize LLE wound  NEURO:   speech wnl  PSYCH:  affect and mood normal    Recent labs in [facility] reviewed by me today.  and Most Recent 3 CBC's:  Recent Labs   Lab Test 05/30/25  0707 05/29/25  0514 05/27/25  0801   WBC 8.4 10.6 18.5*   HGB 11.5* 11.3* 10.6*   * 103* 104*    336 343       Assessment/Plan:  (W19.XXXD) Fall, subsequent encounter  (primary encounter diagnosis)  (S01.01XD) Laceration of scalp, subsequent encounter  (R53.81) Physical deconditioning  Comment: acute/ongoing, no change  Abrasion left elbow  Plan: PT and OT, staples removed, , WOC to follow, tylenol 1000mg q 8 hours prn     (I89.0) Lymphedema  (L03.116) Left leg cellulitis  Comment: acute/ongoing,  no change  Tx with IV ceftriaxone--vancomycin to oral doxycycline  Plan: finished doxycycline, continue   Diuretics, WOC to follow     (I10) Essential hypertension, benign  (I50.30) Heart failure with preserved ejection fraction, NYHA class I (H)  (N18.30) Stage 3 chronic kidney disease, unspecified whether stage 3a or 3b CKD (H)  Comment: acute/ongoing, no change  Recent Echo EF 60-65%, Cr. 1.05 during hospitalization  Plan: BMP follow, continue lasix 20mg QD, metoprolol 50mg BID, aldactone 25mg QD, apixaban 5mg BID     (K59.01) Slow transit constipation  Comment: acute/ongoing, no change  Plan: continue senna s 1 PO BID  Ordered MOM 30ml QOD  prn per home routine  DC miralax       MED REC REQUIRED  Post Medication Reconciliation Status: medication reconcilation previously completed during another office visit      Orders:  No new orders    Total time with patient visit: 46 minutes including discussions about the POC and care coordination with the patient. Greater than 50% of total time spent with counseling and coordinating care due to reviewed nursing and therapy progress notes, medications and POC.  Discussed POC and discharge planning with patient at bedside. .   Electronically signed by: Tonya Lynn Haase, APRN CNP        Sincerely,        Tonya Lynn Haase, APRN CNP    Electronically signed

## 2025-06-11 NOTE — PROGRESS NOTES
"[facility] GERIATRICS    Chief Complaint   Patient presents with    RECHECK     HPI:  Riddhi Aguilar is a 89 year old  (2/15/1931), who is being seen today for an episodic care visit at: [facility] ([facility]) [25]. Today's concern is:   Fall/scalp laceration  Right elbow abrasion  On exam today patient sitting up in recliner chair, alert, pleasant, denies pain or discomfort at time of exam, states she feels she is gettting stronger in therapy plans to discharge to home next week   In therapy walking up to 150 feet using a 4ww independently with supervision. independent in room with toileting, supervision with ADL's  LLE cellulitis no c/o pain LLE, WOC following  HTN/CHF/atrial fib: BP  135/68, 122/71, 130/70  with HR 70 range denies CP, palpitations, SOB   Weight 175.7 on admission, current weight 175.1lbs  CKD: see labs  Constipation patient states bowels are working    Allergies, and PMH/PSH reviewed in [facility] today.  REVIEW OF SYSTEMS:  10 point ROS of systems including Constitutional, Eyes, Respiratory, Cardiovascular, Gastroenterology, Genitourinary, Integumentary, Musculoskeletal, Psychiatric were all negative except for pertinent positives noted in my HPI.    Objective:   /68   Pulse 75   Temp 98.1  F (36.7  C)   Resp 16   Ht 1.499 m (4' 11\")   Wt 79.4 kg (175 lb)   SpO2 99%   BMI 35.35 kg/m    GENERAL APPEARANCE:  Alert, in no distress  ENT:  Mouth and posterior oropharynx normal, moist mucous membranes, [place]  EYES:  EOM, conjunctivae, lids, pupils and irises normal, PERRL  RESP:  respiratory effort and palpation of chest normal, lungs clear to auscultation , no respiratory distress  CV:  regular rate and rhythm, no murmur, rub, or gallop, peripheral edema 1+ in LE bilaterally  ABDOMEN:  normal bowel sounds, soft, nontender, no hepatosplenomegaly or other masses  M/S:   patient sitting up in recliner chair  SKIN:  Inspection of skin and subcutaneous tissue baseline, did not visualize left " LE wound  NEURO:   speech wnl  PSYCH:  affect and mood normal    Recent labs in [facility] reviewed by me today.  and Most Recent 3 CBC's:  Recent Labs   Lab Test 05/30/25  0707 05/29/25  0514 05/27/25  0801   WBC 8.4 10.6 18.5*   HGB 11.5* 11.3* 10.6*   * 103* 104*    336 343     Most Recent 3 BMP's:  Recent Labs   Lab Test 05/30/25  0707 05/29/25  0514 05/27/25  0801    135 139   POTASSIUM 3.9 3.6 4.3   CHLORIDE 99 97* 102   CO2 26 25 25   BUN 20.9 26.3* 31.5*   CR 0.90 0.86 0.97*   ANIONGAP 12 13 12   TAMMY 8.9 8.6* 8.9   GLC 96 96 109*       Assessment/Plan:  W19.XXXD) Fall, subsequent encounter  (primary encounter diagnosis)  (S01.01XD) Laceration of scalp, subsequent encounter  (R53.81) Physical deconditioning  Comment: acute/ongoing, no change  Abrasion left elbow  Plan: PT and OT, staples removed, WOC to follow, tylenol 1000mg q 8 hours prn     (I89.0) Lymphedema  (L03.116) Left leg cellulitis  Comment: acute/ongoing,  no change  Tx with IV ceftriaxone--vancomycin to oral doxycycline  Plan: finished doxycycline, continue   Diuretics, WOC to follow     (I10) Essential hypertension, benign  (I50.30) Heart failure with preserved ejection fraction, NYHA class I (H)  (N18.30) Stage 3 chronic kidney disease, unspecified whether stage 3a or 3b CKD (H)  Comment: acute/ongoing, no change  Recent Echo EF 60-65%, Cr. 1.05 during hospitalization  Plan: BMP follow, continue lasix 20mg QD, metoprolol 50mg BID, aldactone 25mg QD, apixaban 5mg BID     (K59.01) Slow transit constipation  Comment: acute/ongoing, no change  Plan: continue senna s 1 PO BID  Ordered MOM 30ml QOD  prn per home routine  DC miralax       MED REC REQUIRED  Post Medication Reconciliation Status: medication reconcilation previously completed during another office visit      Orders:  No new orders    Total time with patient visit: 46 minutes including discussions about the POC and care coordination with the patient. Greater than 50% of  total time spent with counseling and coordinating care due to reviewed nursing and therapy progress notes, medications, POC. Discussed POC and discharge planning with patient at bedside, discussed POC and discharge planning with IDT at facility.   Electronically signed by: Tonya Lynn Haase, APRN CNP

## 2025-06-12 ENCOUNTER — TRANSITIONAL CARE UNIT VISIT (OUTPATIENT)
Dept: GERIATRICS | Facility: CLINIC | Age: OVER 89
End: 2025-06-12
Payer: COMMERCIAL

## 2025-06-12 VITALS
WEIGHT: 175 LBS | OXYGEN SATURATION: 99 % | HEART RATE: 75 BPM | RESPIRATION RATE: 16 BRPM | BODY MASS INDEX: 35.28 KG/M2 | SYSTOLIC BLOOD PRESSURE: 135 MMHG | DIASTOLIC BLOOD PRESSURE: 68 MMHG | HEIGHT: 59 IN | TEMPERATURE: 98.1 F

## 2025-06-12 DIAGNOSIS — R53.81 PHYSICAL DECONDITIONING: ICD-10-CM

## 2025-06-12 DIAGNOSIS — I48.91 ATRIAL FIBRILLATION, UNSPECIFIED TYPE (H): ICD-10-CM

## 2025-06-12 DIAGNOSIS — I89.0 LYMPHEDEMA: ICD-10-CM

## 2025-06-12 DIAGNOSIS — N18.30 STAGE 3 CHRONIC KIDNEY DISEASE, UNSPECIFIED WHETHER STAGE 3A OR 3B CKD (H): ICD-10-CM

## 2025-06-12 DIAGNOSIS — K59.01 SLOW TRANSIT CONSTIPATION: ICD-10-CM

## 2025-06-12 DIAGNOSIS — S01.01XD LACERATION OF SCALP, SUBSEQUENT ENCOUNTER: ICD-10-CM

## 2025-06-12 DIAGNOSIS — I50.30 HEART FAILURE WITH PRESERVED EJECTION FRACTION, NYHA CLASS I (H): ICD-10-CM

## 2025-06-12 DIAGNOSIS — I10 ESSENTIAL HYPERTENSION, BENIGN: ICD-10-CM
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
## 2025-06-12 DIAGNOSIS — L03.116 LEFT LEG CELLULITIS: Primary | ICD-10-CM

## 2025-06-12 NOTE — LETTER
" 6/12/2025      Riddhi Aguilar  [address]  [facility] [address]        [facility] GERIATRICS    Chief Complaint   Patient presents with     RECHECK     HPI:  Riddhi Aguilar is a 89 year old  (2/15/1931), who is being seen today for an episodic care visit at: [facility]) [25]. Today's concern is:   Fall/scalp laceration  Right elbow abrasion  On exam today patient sitting up in recliner chair, alert, pleasant, denies pain or discomfort at time of exam, states she feels she is gettting stronger in therapy plans to discharge to home next week   In therapy walking up to 150 feet using a 4ww independently with supervision. independent in room with toileting, supervision with ADL's  LLE cellulitis no c/o pain LLE, WOC following  HTN/CHF/atrial fib: BP  135/68, 122/71, 130/70  with HR 70 range denies CP, palpitations, SOB   Weight 175.7 on admission, current weight 175.1lbs  CKD: see labs  Constipation patient states bowels are working    Allergies, and PMH/PSH reviewed in [facility] today.  REVIEW OF SYSTEMS:  10 point ROS of systems including Constitutional, Eyes, Respiratory, Cardiovascular, Gastroenterology, Genitourinary, Integumentary, Musculoskeletal, Psychiatric were all negative except for pertinent positives noted in my HPI.    Objective:   /68   Pulse 75   Temp 98.1  F (36.7  C)   Resp 16   Ht 1.499 m (4' 11\")   Wt 79.4 kg (175 lb)   SpO2 99%   BMI 35.35 kg/m    GENERAL APPEARANCE:  Alert, in no distress  ENT:  Mouth and posterior oropharynx normal, moist mucous membranes, [place]  EYES:  EOM, conjunctivae, lids, pupils and irises normal, PERRL  RESP:  respiratory effort and palpation of chest normal, lungs clear to auscultation , no respiratory distress  CV:  regular rate and rhythm, no murmur, rub, or gallop, peripheral edema 1+ in LE bilaterally  ABDOMEN:  normal bowel sounds, soft, nontender, no hepatosplenomegaly or other masses  M/S:   patient sitting up in recliner " chair  SKIN:  Inspection of skin and subcutaneous tissue baseline, did not visualize left LE wound  NEURO:   speech wnl  PSYCH:  affect and mood normal    Recent labs in [facility] reviewed by me today.  and Most Recent 3 CBC's:  Recent Labs   Lab Test 05/30/25  0707 05/29/25  0514 05/27/25  0801   WBC 8.4 10.6 18.5*   HGB 11.5* 11.3* 10.6*   * 103* 104*    336 343     Most Recent 3 BMP's:  Recent Labs   Lab Test 05/30/25  0707 05/29/25  0514 05/27/25  0801    135 139   POTASSIUM 3.9 3.6 4.3   CHLORIDE 99 97* 102   CO2 26 25 25   BUN 20.9 26.3* 31.5*   CR 0.90 0.86 0.97*   ANIONGAP 12 13 12   TAMMY 8.9 8.6* 8.9   GLC 96 96 109*       Assessment/Plan:  W19.XXXD) Fall, subsequent encounter  (primary encounter diagnosis)  (S01.01XD) Laceration of scalp, subsequent encounter  (R53.81) Physical deconditioning  Comment: acute/ongoing, no change  Abrasion left elbow  Plan: PT and OT, staples removed, WOC to follow, tylenol 1000mg q 8 hours prn     (I89.0) Lymphedema  (L03.116) Left leg cellulitis  Comment: acute/ongoing,  no change  Tx with IV ceftriaxone--vancomycin to oral doxycycline  Plan: finished doxycycline, continue   Diuretics, WOC to follow     (I10) Essential hypertension, benign  (I50.30) Heart failure with preserved ejection fraction, NYHA class I (H)  (N18.30) Stage 3 chronic kidney disease, unspecified whether stage 3a or 3b CKD (H)  Comment: acute/ongoing, no change  Recent Echo EF 60-65%, Cr. 1.05 during hospitalization  Plan: BMP follow, continue lasix 20mg QD, metoprolol 50mg BID, aldactone 25mg QD, apixaban 5mg BID     (K59.01) Slow transit constipation  Comment: acute/ongoing, no change  Plan: continue senna s 1 PO BID  Ordered MOM 30ml QOD  prn per home routine  DC miralax       MED REC REQUIRED  Post Medication Reconciliation Status: medication reconcilation previously completed during another office visit      Orders:  No new orders    Total time with patient visit: 46 minutes including  discussions about the POC and care coordination with the patient. Greater than 50% of total time spent with counseling and coordinating care due to reviewed nursing and therapy progress notes, medications, POC. Discussed POC and discharge planning with patient at bedside, discussed POC and discharge planning with IDT at facility.   Electronically signed by: Tonya Lynn Haase, APRN CNP         Sincerely,        Tonya Lynn Haase, APRN CNP    Electronically signed

## 2025-06-16 ENCOUNTER — TRANSITIONAL CARE UNIT VISIT (OUTPATIENT)
Dept: GERIATRICS | Facility: CLINIC | Age: OVER 89
End: 2025-06-16
Payer: COMMERCIAL

## 2025-06-16 VITALS
TEMPERATURE: 98 F | HEIGHT: 59 IN | BODY MASS INDEX: 35.08 KG/M2 | DIASTOLIC BLOOD PRESSURE: 83 MMHG | SYSTOLIC BLOOD PRESSURE: 116 MMHG | HEART RATE: 94 BPM | RESPIRATION RATE: 16 BRPM | WEIGHT: 174 LBS | OXYGEN SATURATION: 99 %

## 2025-06-16 DIAGNOSIS — S01.01XD LACERATION OF SCALP, SUBSEQUENT ENCOUNTER: ICD-10-CM

## 2025-06-16 DIAGNOSIS — K59.01 SLOW TRANSIT CONSTIPATION: ICD-10-CM

## 2025-06-16 DIAGNOSIS — L03.116 LEFT LEG CELLULITIS: Primary | ICD-10-CM

## 2025-06-16 DIAGNOSIS — I50.30 HEART FAILURE WITH PRESERVED EJECTION FRACTION, NYHA CLASS I (H): ICD-10-CM

## 2025-06-16 DIAGNOSIS — R53.81 PHYSICAL DECONDITIONING: ICD-10-CM

## 2025-06-16 DIAGNOSIS — I48.91 ATRIAL FIBRILLATION, UNSPECIFIED TYPE (H): ICD-10-CM

## 2025-06-16 DIAGNOSIS — I89.0 LYMPHEDEMA: ICD-10-CM

## 2025-06-16 DIAGNOSIS — N18.30 STAGE 3 CHRONIC KIDNEY DISEASE, UNSPECIFIED WHETHER STAGE 3A OR 3B CKD (H): ICD-10-CM

## 2025-06-16 DIAGNOSIS — I10 ESSENTIAL HYPERTENSION, BENIGN: ICD-10-CM

## 2025-06-16 RX ORDER — DOXYCYCLINE 100 MG/1
100 CAPSULE ORAL 2 TIMES DAILY
Status: SHIPPED
Start: 2025-06-16

## 2025-06-16 NOTE — LETTER
" 6/16/2025      Riddhi Aguilar  [address]  [address]  [facility] [address]        [facility]    Chief Complaint   Patient presents with     RECHECK     HPI:  Riddhi Aguilar is a 89 year old  (2/15/1931), who is being seen today for an episodic care visit at: [facility]) [25]. Today's concern is:   LLE cellulitis today on exam noted left LE increased redness, wound to inner left shin area open and draining serosanguenous dranage, venous insufficiency wound, patient denies pain or discomfort, states she didn't have any pain with prior cellulitis \"until it got really bad\" patient scheduled to discharge on 6/18/25 will have to see if LLE improves  HTN/CHF/atrial fib: BP  116/83, 150/80, 127/83   with HR 80 range denies CP, palpitations, SOB   Weight 175.7 on admission, current weight 174.5lbs  CKD: see labs  Constipation patient states bowels are working  Scalp laceration/right elbow abrasion  Both healing well, no c/o pain    Allergies, and PMH/PSH reviewed in EPIC today.  REVIEW OF SYSTEMS:  10 point ROS of systems including Constitutional, Eyes, Respiratory, Cardiovascular, Gastroenterology, Genitourinary, Integumentary, Musculoskeletal, Psychiatric were all negative except for pertinent positives noted in my HPI.    Objective:   /83   Pulse 94   Temp 98  F (36.7  C)   Resp 16   Ht 1.499 m (4' 11\")   Wt 78.9 kg (174 lb)   SpO2 99%   BMI 35.14 kg/m    GENERAL APPEARANCE:  Alert, in no distress  ENT:  Mouth and posterior oropharynx normal, moist mucous membranes, [place]  EYES:  EOM, conjunctivae, lids, pupils and irises normal, PERRL  RESP:  respiratory effort and palpation of chest normal, lungs clear to auscultation , no respiratory distress  CV:  regular rate and rhythm, no murmur, rub, or gallop, peripheral edema 1+ in LE bilaterally  ABDOMEN:  normal bowel sounds, soft, nontender, no hepatosplenomegaly or other masses  M/S:   patient sitting up in recliner " chair  SKIN:  Inspection of skin and subcutaneous tissue baseline, left inner shin area with quarter sized vascular insufficiency wound noted, open and draining small amount of serosanguenous to brown drainage. Patient denies pain or discomfort, redness to lower leg below wound, no pain with palpation  NEURO:   speech wnl  PSYCH:  affect and mood normal    Recent labs in [facility] reviewed by me today.  and Most Recent 3 CBC's:  Recent Labs   Lab Test 05/30/25  0707 05/29/25  0514 05/27/25  0801   WBC 8.4 10.6 18.5*   HGB 11.5* 11.3* 10.6*   * 103* 104*    336 343     Most Recent 3 BMP's:  Recent Labs   Lab Test 05/30/25  0707 05/29/25  0514 05/27/25  0801    135 139   POTASSIUM 3.9 3.6 4.3   CHLORIDE 99 97* 102   CO2 26 25 25   BUN 20.9 26.3* 31.5*   CR 0.90 0.86 0.97*   ANIONGAP 12 13 12   TAMMY 8.9 8.6* 8.9   GLC 96 96 109*       Assessment/Plan:  (L03.116) Left leg cellulitis  (I89.0) Lymphedema  Comment: acute/recurrent  Tx with IV ceftriaxone--vancomycin to oral doxycycline  Plan: restart doxycycline 100mg BID  CBC and BMP in AM  Dressing to left LE daily cleanse with wound cleanser, apply vaseline rayray and cover loosely with kerlix, continue   Diuretics, WOC to follow     (I10) Essential hypertension, benign  (I50.30) Heart failure with preserved ejection fraction, NYHA class I (H)  (N18.30) Stage 3 chronic kidney disease, unspecified whether stage 3a or 3b CKD (H)  Comment: acute/ongoing, no change  Recent Echo EF 60-65%, Cr. 1.05 during hospitalization  Plan: BMP follow, continue lasix 20mg QD, metoprolol 50mg BID, aldactone 25mg QD, apixaban 5mg BID     (K59.01) Slow transit constipation  Comment: acute/ongoing, no change  Plan: continue senna s 1 PO BID  Ordered MOM 30ml QOD  prn per home routine  DC miralax    W19.XXXD) Fall, subsequent encounter  (primary encounter diagnosis)  (S01.01XD) Laceration of scalp, subsequent encounter  (R53.81) Physical deconditioning  Comment: acute/ongoing, no  change  Abrasion left elbow  Plan: PT and OT, staples removed, WOC to follow, tylenol 1000mg q 8 hours prn    MED REC REQUIRED  Post Medication Reconciliation Status: medication reconcilation previously completed during another office visit      Orders:  BMP and CBC in AM  Wound care daily cleanse with wound cleanser, apply vaseline gauze and cover loosely with kerlix  Doxycycline 100mg BID for 10 days      Total time with patient visit: 46 minutes including discussions about the POC and care coordination with the patient. Greater than 50% of total time spent with counseling and coordinating care due to reviewed nursing and therapy progress notes, medications, vitals, POC. Discussed new opening in LLE wound and increased redness, restart Abx and continue to monitor closely may need to hold discharge on 6/18/25 discussed with patient and nursing at length.   Electronically signed by: Tonya Lynn Haase, APRN CNP         Sincerely,        Tonya Lynn Haase, APRN CNP    Electronically signed

## 2025-06-16 NOTE — PROGRESS NOTES
"[facility] GERIATRICS    Chief Complaint   Patient presents with    RECHECK     HPI:  Riddhi Aguilar is a 89 year old  (2/15/1931), who is being seen today for an episodic care visit at: [facility] ([facility]) [25]. Today's concern is:   LLE cellulitis today on exam noted left LE increased redness, wound to inner left shin area open and draining serosanguenous dranage, venous insufficiency wound, patient denies pain or discomfort, states she didn't have any pain with prior cellulitis \"until it got really bad\" patient scheduled to discharge on 6/18/25 will have to see if LLE improves  HTN/CHF/atrial fib: BP  116/83, 150/80, 127/83   with HR 80 range denies CP, palpitations, SOB   Weight 175.7 on admission, current weight 174.5lbs  CKD: see labs  Constipation patient states bowels are working  Scalp laceration/right elbow abrasion  Both healing well, no c/o pain    Allergies, and PMH/PSH reviewed in [facility] today.  REVIEW OF SYSTEMS:  10 point ROS of systems including Constitutional, Eyes, Respiratory, Cardiovascular, Gastroenterology, Genitourinary, Integumentary, Musculoskeletal, Psychiatric were all negative except for pertinent positives noted in my HPI.    Objective:   /83   Pulse 94   Temp 98  F (36.7  C)   Resp 16   Ht 1.499 m (4' 11\")   Wt 78.9 kg (174 lb)   SpO2 99%   BMI 35.14 kg/m    GENERAL APPEARANCE:  Alert, in no distress  ENT:  Mouth and posterior oropharynx normal, moist mucous membranes, [place]  EYES:  EOM, conjunctivae, lids, pupils and irises normal, PERRL  RESP:  respiratory effort and palpation of chest normal, lungs clear to auscultation , no respiratory distress  CV:  regular rate and rhythm, no murmur, rub, or gallop, peripheral edema 1+ in LE bilaterally  ABDOMEN:  normal bowel sounds, soft, nontender, no hepatosplenomegaly or other masses  M/S:   patient sitting up in recliner chair  SKIN:  Inspection of skin and subcutaneous tissue baseline, left inner shin area with quarter " sized vascular insufficiency wound noted, open and draining small amount of serosanguenous to brown drainage. Patient denies pain or discomfort, redness to lower leg below wound, no pain with palpation  NEURO:   speech wnl  PSYCH:  affect and mood normal    Recent labs in [facility] reviewed by me today.  and Most Recent 3 CBC's:  Recent Labs   Lab Test 05/30/25  0707 05/29/25  0514 05/27/25  0801   WBC 8.4 10.6 18.5*   HGB 11.5* 11.3* 10.6*   * 103* 104*    336 343     Most Recent 3 BMP's:  Recent Labs   Lab Test 05/30/25  0707 05/29/25  0514 05/27/25  0801    135 139   POTASSIUM 3.9 3.6 4.3   CHLORIDE 99 97* 102   CO2 26 25 25   BUN 20.9 26.3* 31.5*   CR 0.90 0.86 0.97*   ANIONGAP 12 13 12   TAMMY 8.9 8.6* 8.9   GLC 96 96 109*       Assessment/Plan:  (L03.116) Left leg cellulitis  (I89.0) Lymphedema  Comment: acute/recurrent  Tx with IV ceftriaxone--vancomycin to oral doxycycline  Plan: restart doxycycline 100mg BID  CBC and BMP in AM  Dressing to left LE daily cleanse with wound cleanser, apply vaseline rayray and cover loosely with kerlix, continue   Diuretics, WOC to follow     (I10) Essential hypertension, benign  (I50.30) Heart failure with preserved ejection fraction, NYHA class I (H)  (N18.30) Stage 3 chronic kidney disease, unspecified whether stage 3a or 3b CKD (H)  Comment: acute/ongoing, no change  Recent Echo EF 60-65%, Cr. 1.05 during hospitalization  Plan: BMP follow, continue lasix 20mg QD, metoprolol 50mg BID, aldactone 25mg QD, apixaban 5mg BID     (K59.01) Slow transit constipation  Comment: acute/ongoing, no change  Plan: continue senna s 1 PO BID  Ordered MOM 30ml QOD  prn per home routine  DC miralax    W19.XXXD) Fall, subsequent encounter  (primary encounter diagnosis)  (S01.01XD) Laceration of scalp, subsequent encounter  (R53.81) Physical deconditioning  Comment: acute/ongoing, no change  Abrasion left elbow  Plan: PT and OT, staples removed, WOC to follow, tylenol 1000mg q 8  hours prn    MED REC REQUIRED  Post Medication Reconciliation Status: medication reconcilation previously completed during another office visit      Orders:  BMP and CBC in AM  Wound care daily cleanse with wound cleanser, apply vaseline gauze and cover loosely with kerlix  Doxycycline 100mg BID for 10 days      Total time with patient visit: 46 minutes including discussions about the POC and care coordination with the patient. Greater than 50% of total time spent with counseling and coordinating care due to reviewed nursing and therapy progress notes, medications, vitals, POC. Discussed new opening in LLE wound and increased redness, restart Abx and continue to monitor closely may need to hold discharge on 6/18/25 discussed with patient and nursing at length.   Electronically signed by: Tonya Lynn Haase, APRN CNP

## 2025-06-17 ENCOUNTER — DOCUMENTATION ONLY (OUTPATIENT)
Dept: GERIATRICS | Facility: CLINIC | Age: OVER 89
End: 2025-06-17

## 2025-06-18 NOTE — PROGRESS NOTES
"[facility] GERIATRICS    Chief Complaint   Patient presents with    RECHECK     HPI:  Riddhi Aguilar is a 89 year old  (2/15/1931), who is being seen today for an episodic care visit at: [facility]) [25]. Today's concern is:   LLE cellulitis today on exam patient sitting up in chair in room, denies pain or discomfort to LLE at time of exam, states she has some occasional shooting pain to LLE right above wound, LLE redness has improved since last visit.   In therapy walking up to 150 feet using an up walking moderately independent, independent with ADL's in room  HTN/CHF/atrial fib: BP  130/84, 137/80, 140/82   with HR 80 range denies CP, palpitations, SOB   Weight 175.7 on admission, current weight 177 lbs  CKD: Cr 0.86 on 6/17/25  Constipation patient states bowels are working  Scalp laceration/right elbow abrasion  Both healing well, no c/o pain       Allergies, and PMH/PSH reviewed in [facility] today.  REVIEW OF SYSTEMS:  10 point ROS of systems including Constitutional, Eyes, Respiratory, Cardiovascular, Gastroenterology, Genitourinary, Integumentary, Musculoskeletal, Psychiatric were all negative except for pertinent positives noted in my HPI.    Objective:   /84   Pulse 80   Temp 97.6  F (36.4  C)   Resp 18   Ht 1.499 m (4' 11\")   Wt 78.9 kg (174 lb)   SpO2 97%   BMI 35.14 kg/m    GENERAL APPEARANCE:  Alert, in no distress  ENT:  Mouth and posterior oropharynx normal, moist mucous membranes, normal hearing acuity  EYES:  EOM, conjunctivae, lids, pupils and irises normal, PERRL  RESP:  respiratory effort and palpation of chest normal, lungs clear to auscultation , no respiratory distress  CV:  regular rate and rhythm, no murmur, rub, or gallop, peripheral edema 1+ in LE bilaterally  ABDOMEN:  normal bowel sounds, soft, nontender, no hepatosplenomegaly or other masses  M/S:   patient sitting up in chair at bedside  SKIN:  Inspection of skin and subcutaneous tissue baseline, LLE wound " ongoing no change from previous, LLE redness has improved since last visit, continues some patchy redness below wound on left inner shin and down into inner ankle  NEURO:   speech wnl  PSYCH:  affect and mood normal    Recent labs in [facility] reviewed by me today.  and Most Recent 3 CBC's:  Recent Labs   Lab Test 05/30/25  0707 05/29/25  0514 05/27/25  0801   WBC 8.4 10.6 18.5*   HGB 11.5* 11.3* 10.6*   * 103* 104*    336 343     Most Recent 3 BMP's:  Recent Labs   Lab Test 05/30/25  0707 05/29/25  0514 05/27/25  0801    135 139   POTASSIUM 3.9 3.6 4.3   CHLORIDE 99 97* 102   CO2 26 25 25   BUN 20.9 26.3* 31.5*   CR 0.90 0.86 0.97*   ANIONGAP 12 13 12   TAMMY 8.9 8.6* 8.9   GLC 96 96 109*       Assessment/Plan:  (L03.116) Left leg cellulitis  (I89.0) Lymphedema  Comment: acute/recurrent  Tx with IV ceftriaxone--vancomycin to oral doxycycline  Plan: restart doxycycline 100mg BID  CBC and BMP in AM  Dressing to left LE daily cleanse with wound cleanser, apply vaseline rayray and cover loosely with kerlix, continue   Diuretics, WOC to follow  Doppler study to LLE stat     (I10) Essential hypertension, benign  (I50.30) Heart failure with preserved ejection fraction, NYHA class I (H)  (N18.30) Stage 3 chronic kidney disease, unspecified whether stage 3a or 3b CKD (H)  Comment: acute/ongoing, no change  Recent Echo EF 60-65%, Cr. 1.05 during hospitalization  Plan: BMP follow, continue lasix 20mg QD, metoprolol 50mg BID, aldactone 25mg QD, apixaban 5mg BID     (K59.01) Slow transit constipation  Comment: acute/ongoing, no change  Plan: continue senna s 1 PO BID  Ordered MOM 30ml QOD  prn per home routine  DC miralax     W19.XXXD) Fall, subsequent encounter  (primary encounter diagnosis)  (S01.01XD) Laceration of scalp, subsequent encounter  (R53.81) Physical deconditioning  Comment: acute/ongoing, no change  Abrasion left elbow  Plan: PT and OT, staples removed, WOC to follow, tylenol 1000mg q 8 hours prn        MED REC REQUIRED  Post Medication Reconciliation Status: medication reconcilation previously completed during another office visit      Orders:  Doppler study of left leg      Total time with patient visit: 46 minutes including discussions about the POC and care coordination with the patient. Greater than 50% of total time spent with counseling and coordinating care due to reviewed nursing and therapy progress notes, medications, POC, lab results. Discussed wound care and improvement in redness, pain in LLE and ordering a doppler study with patient at bedside. .   Electronically signed by: Tonya Lynn Haase, APRN CNP

## 2025-06-19 ENCOUNTER — TRANSITIONAL CARE UNIT VISIT (OUTPATIENT)
Dept: GERIATRICS | Facility: CLINIC | Age: OVER 89
End: 2025-06-19
Payer: COMMERCIAL

## 2025-06-19 VITALS
RESPIRATION RATE: 18 BRPM | TEMPERATURE: 97.6 F | HEART RATE: 80 BPM | WEIGHT: 174 LBS | DIASTOLIC BLOOD PRESSURE: 84 MMHG | HEIGHT: 59 IN | SYSTOLIC BLOOD PRESSURE: 130 MMHG | OXYGEN SATURATION: 97 % | BODY MASS INDEX: 35.08 KG/M2

## 2025-06-19 DIAGNOSIS — S01.01XD LACERATION OF SCALP, SUBSEQUENT ENCOUNTER: ICD-10-CM

## 2025-06-19 DIAGNOSIS — I50.30 HEART FAILURE WITH PRESERVED EJECTION FRACTION, NYHA CLASS I (H): ICD-10-CM

## 2025-06-19 DIAGNOSIS — L03.116 LEFT LEG CELLULITIS: Primary | ICD-10-CM

## 2025-06-19 DIAGNOSIS — I89.0 LYMPHEDEMA: ICD-10-CM

## 2025-06-19 DIAGNOSIS — I10 ESSENTIAL HYPERTENSION, BENIGN: ICD-10-CM

## 2025-06-19 DIAGNOSIS — N18.30 STAGE 3 CHRONIC KIDNEY DISEASE, UNSPECIFIED WHETHER STAGE 3A OR 3B CKD (H): ICD-10-CM

## 2025-06-19 DIAGNOSIS — K59.01 SLOW TRANSIT CONSTIPATION: ICD-10-CM

## 2025-06-19 DIAGNOSIS — I48.91 ATRIAL FIBRILLATION, UNSPECIFIED TYPE (H): ICD-10-CM

## 2025-06-19 DIAGNOSIS — R53.81 PHYSICAL DECONDITIONING: ICD-10-CM

## 2025-06-19 NOTE — LETTER
" 6/19/2025      Riddhi Aguilar  [address]  [facility] [address]        [facility]    Chief Complaint   Patient presents with     RECHECK     HPI:  Riddhi Aguilar is a 89 year old  (2/15/1931), who is being seen today for an episodic care visit at: [facility]) [25]. Today's concern is:   LLE cellulitis today on exam patient sitting up in chair in room, denies pain or discomfort to LLE at time of exam, states she has some occasional shooting pain to LLE right above wound, LLE redness has improved since last visit.   In therapy walking up to 150 feet using an up walking moderately independent, independent with ADL's in room  HTN/CHF/atrial fib: BP  130/84, 137/80, 140/82   with HR 80 range denies CP, palpitations, SOB   Weight 175.7 on admission, current weight 177 lbs  CKD: Cr 0.86 on 6/17/25  Constipation patient states bowels are working  Scalp laceration/right elbow abrasion  Both healing well, no c/o pain       Allergies, and PMH/PSH reviewed in EPIC today.  REVIEW OF SYSTEMS:  10 point ROS of systems including Constitutional, Eyes, Respiratory, Cardiovascular, Gastroenterology, Genitourinary, Integumentary, Musculoskeletal, Psychiatric were all negative except for pertinent positives noted in my HPI.    Objective:   /84   Pulse 80   Temp 97.6  F (36.4  C)   Resp 18   Ht 1.499 m (4' 11\")   Wt 78.9 kg (174 lb)   SpO2 97%   BMI 35.14 kg/m    GENERAL APPEARANCE:  Alert, in no distress  ENT:  Mouth and posterior oropharynx normal, moist mucous membranes, normal hearing acuity  EYES:  EOM, conjunctivae, lids, pupils and irises normal, PERRL  RESP:  respiratory effort and palpation of chest normal, lungs clear to auscultation , no respiratory distress  CV:  regular rate and rhythm, no murmur, rub, or gallop, peripheral edema 1+ in LE bilaterally  ABDOMEN:  normal bowel sounds, soft, nontender, no hepatosplenomegaly or other masses  M/S:   patient sitting up " in chair at bedside  SKIN:  Inspection of skin and subcutaneous tissue baseline, LLE wound ongoing no change from previous, LLE redness has improved since last visit, continues some patchy redness below wound on left inner shin and down into inner ankle  NEURO:   speech wnl  PSYCH:  affect and mood normal    Recent labs in [facility] reviewed by me today.  and Most Recent 3 CBC's:  Recent Labs   Lab Test 05/30/25  0707 05/29/25  0514 05/27/25  0801   WBC 8.4 10.6 18.5*   HGB 11.5* 11.3* 10.6*   * 103* 104*    336 343     Most Recent 3 BMP's:  Recent Labs   Lab Test 05/30/25  0707 05/29/25  0514 05/27/25  0801    135 139   POTASSIUM 3.9 3.6 4.3   CHLORIDE 99 97* 102   CO2 26 25 25   BUN 20.9 26.3* 31.5*   CR 0.90 0.86 0.97*   ANIONGAP 12 13 12   TAMMY 8.9 8.6* 8.9   GLC 96 96 109*       Assessment/Plan:  (L03.116) Left leg cellulitis  (I89.0) Lymphedema  Comment: acute/recurrent  Tx with IV ceftriaxone--vancomycin to oral doxycycline  Plan: restart doxycycline 100mg BID  CBC and BMP in AM  Dressing to left LE daily cleanse with wound cleanser, apply vaseline rayray and cover loosely with kerlix, continue   Diuretics, WOC to follow  Doppler study to LLE stat     (I10) Essential hypertension, benign  (I50.30) Heart failure with preserved ejection fraction, NYHA class I (H)  (N18.30) Stage 3 chronic kidney disease, unspecified whether stage 3a or 3b CKD (H)  Comment: acute/ongoing, no change  Recent Echo EF 60-65%, Cr. 1.05 during hospitalization  Plan: BMP follow, continue lasix 20mg QD, metoprolol 50mg BID, aldactone 25mg QD, apixaban 5mg BID     (K59.01) Slow transit constipation  Comment: acute/ongoing, no change  Plan: continue senna s 1 PO BID  Ordered MOM 30ml QOD  prn per home routine  DC miralax     W19.XXXD) Fall, subsequent encounter  (primary encounter diagnosis)  (S01.01XD) Laceration of scalp, subsequent encounter  (R53.81) Physical deconditioning  Comment: acute/ongoing, no change  Abrasion  left elbow  Plan: PT and OT, staples removed, WOC to follow, tylenol 1000mg q 8 hours prn       MED REC REQUIRED  Post Medication Reconciliation Status: medication reconcilation previously completed during another office visit      Orders:  Doppler study of left leg      Total time with patient visit: 46 minutes including discussions about the POC and care coordination with the patient. Greater than 50% of total time spent with counseling and coordinating care due to reviewed nursing and therapy progress notes, medications, POC, lab results. Discussed wound care and improvement in redness, pain in LLE and ordering a doppler study with patient at bedside. .   Electronically signed by: Tonya Lynn Haase, APRN CNP         Sincerely,        Tonya Lynn Haase, APRN CNP    Electronically signed

## 2025-06-23 ENCOUNTER — DISCHARGE SUMMARY NURSING HOME (OUTPATIENT)
Dept: GERIATRICS | Facility: CLINIC | Age: OVER 89
End: 2025-06-23
Payer: COMMERCIAL

## 2025-06-23 VITALS
DIASTOLIC BLOOD PRESSURE: 88 MMHG | WEIGHT: 174 LBS | HEART RATE: 80 BPM | RESPIRATION RATE: 18 BRPM | BODY MASS INDEX: 35.08 KG/M2 | OXYGEN SATURATION: 97 % | TEMPERATURE: 97.4 F | HEIGHT: 59 IN | SYSTOLIC BLOOD PRESSURE: 127 MMHG

## 2025-06-23 DIAGNOSIS — I89.0 LYMPHEDEMA: ICD-10-CM

## 2025-06-23 DIAGNOSIS — K59.01 SLOW TRANSIT CONSTIPATION: ICD-10-CM

## 2025-06-23 DIAGNOSIS — R53.81 PHYSICAL DECONDITIONING: ICD-10-CM

## 2025-06-23 DIAGNOSIS — N18.30 STAGE 3 CHRONIC KIDNEY DISEASE, UNSPECIFIED WHETHER STAGE 3A OR 3B CKD (H): ICD-10-CM

## 2025-06-23 DIAGNOSIS — L03.116 LEFT LEG CELLULITIS: Primary | ICD-10-CM

## 2025-06-23 DIAGNOSIS — I48.91 ATRIAL FIBRILLATION, UNSPECIFIED TYPE (H): ICD-10-CM

## 2025-06-23 DIAGNOSIS — I10 ESSENTIAL HYPERTENSION, BENIGN: ICD-10-CM

## 2025-06-23 DIAGNOSIS — I50.30 HEART FAILURE WITH PRESERVED EJECTION FRACTION, NYHA CLASS I (H): ICD-10-CM

## 2025-06-23 NOTE — LETTER
6/23/2025      Riddhi GANDHI Jeff  [address]   [address]  [place] [address]        [facility] GERIATRICS DISCHARGE SUMMARY  PATIENT'S NAME: Riddhi Aguilar  YOB: 1931  MEDICAL RECORD NUMBER:  [id number]  [facility]    PRIMARY CARE PROVIDER AND CLINIC RESPONSIBLE AFTER TRANSFER:   Shun Prado MD, [address] / [place] [address]    Assisted Living: [facility] ([facility])     Transferring providers: Tonya Lynn Haase, APRN CNP, Kory Cruz MD  Recent Hospitalization/ED:  [facility] stay 5/26/25 to 5/30/25.  Date of SNF Admission: May 30, 2025  Date of SNF (anticipated) Discharge: June 24, 2025  Discharged to: previous assisted living  Cognitive Scores: BIMS: 15/15 and Short blessed: 2/28  Physical Function: Ambulating 150 ft with 4ww  DME: Walker and w/c    CODE STATUS/ADVANCE DIRECTIVES DISCUSSION:  No CPR- Do NOT Intubate   ALLERGIES: Patient has no known allergies.    NURSING FACILITY COURSE   Medication Changes/Rationale:   Doxycycline 100mg BID for 10 days started 6/16/25 for increased redness LLE and open wound LLE    Summary of nursing facility stay:   (L03.116) Left leg cellulitis  (I89.0) Lymphedema  Comment: acute/recurrent  Tx with IV ceftriaxone--vancomycin to oral doxycycline  Plan: restart doxycycline 100mg BID on 6/16/25 for increased redness and open wound LLE   CBC and BMP in AM  Dressing to left LE daily cleanse with wound cleanser, apply vaseline gauze and cover loosely with kerlix change QOD, continue   Diuretics,  Doppler study to LLE on 6/19/25 negative for acute findings, bakers cyst noted left popliteal fossa   Discharge to [facility] with home PT, RN and HHA through [facility]     (I10) Essential hypertension, benign  (I50.30) Heart failure with preserved ejection fraction, NYHA class I (H)  (N18.30) Stage 3 chronic kidney disease,  unspecified whether stage 3a or 3b CKD (H)  Comment: acute/ongoing  Recent Echo EF 60-65%, Cr. 1.05 during hospitalization  Plan: continue lasix 20mg QD, metoprolol 50mg BID, aldactone 25mg QD, apixaban 5mg BID     (K59.01) Slow transit constipation  Comment: acute/ongoing  Plan: continue senna s 1 PO BID  MOM 30ml QOD  prn per home routine     W19.XXXD) Fall, subsequent encounter  (primary encounter diagnosis)  (S01.01XD) Laceration of scalp, subsequent encounter  (R53.81) Physical deconditioning  Comment: acute/ongoing  resolved  Abrasion left elbow  Plan:tylenol 1000mg q 8 hours prn    Discharge Medications:  MED REC REQUIRED  Post Medication Reconciliation Status: discharge medications reconciled and changed, per note/orders       Current Outpatient Medications   Medication Sig Dispense Refill     acetaminophen (TYLENOL) 500 MG tablet Take 2 tablets (1,000 mg) by mouth every 8 hours as needed for mild pain.       apixaban ANTICOAGULANT (ELIQUIS) 5 MG tablet Take 1 tablet (5 mg) by mouth 2 times daily.       atorvastatin (LIPITOR) 10 MG tablet TAKE 1 TABLET(10 MG) BY MOUTH DAILY 90 tablet 0     bisacodyl (DULCOLAX) 10 MG suppository Place 1 suppository (10 mg) rectally daily as needed for constipation.       buPROPion (WELLBUTRIN XL) 150 MG 24 hr tablet TAKE 1 TABLET(150 MG) BY MOUTH EVERY MORNING 90 tablet 3     buPROPion (WELLBUTRIN XL) 300 MG 24 hr tablet TAKE 1 TABLET(300 MG) BY MOUTH EVERY MORNING 90 tablet 3     dorzolamide-timolol (COSOPT) 22.3-6.8 MG/ML ophthalmic solution Place 1 drop into both eyes 2 times daily       doxycycline hyclate (VIBRAMYCIN) 100 MG capsule Take 1 capsule (100 mg) by mouth 2 times daily.       famotidine (PEPCID) 20 MG tablet Take 1 tablet (20 mg) by mouth daily.       furosemide (LASIX) 20 MG tablet Take 1 tablet (20 mg) by mouth daily.       Lidocaine (LIDOCARE) 4 % Patch Place 1 patch over 12 hours onto the skin every 24 hours. To prevent lidocaine toxicity, patient should be  "patch free for 12 hrs daily.       magnesium hydroxide (MILK OF MAGNESIA) 400 MG/5ML suspension Take 30 mLs by mouth every other day.       melatonin 3 MG tablet Take 3 mg by mouth at bedtime.       metoprolol tartrate (LOPRESSOR) 50 MG tablet Take 1 tablet (50 mg) by mouth 2 times daily.       miconazole (MICATIN) 2 % external powder Apply topically 2 times daily as needed for other (candidiasis/intertrigo).       multivitamin w/minerals (THERA-VIT-M) tablet Take 1 tablet by mouth daily       senna-docusate (SENOKOT-S/PERICOLACE) 8.6-50 MG tablet Take 1 tablet by mouth 2 times daily. Change to PRN if having loose stools       spironolactone (ALDACTONE) 25 MG tablet Take 1 tablet (25 mg) by mouth daily.            Controlled medications:   not applicable/none     Past Medical History:   Past Medical History:   Diagnosis Date     Anemia     baseline 10-11     Arthritis      Bleeding ulcer     duodenal ulcer     CKD (chronic kidney disease) stage 3, GFR 30-59 ml/min (H)      Essential hypertension, benign      MDD (major depressive disorder)      Nephrolithiasis      Osteoporosis      Pure hypercholesterolemia      Spinal stenosis      Unspecified cataract      Unspecified glaucoma(365.9)      Physical Exam:   Vitals: /88   Pulse 80   Temp 97.4  F (36.3  C)   Resp 18   Ht 1.499 m (4' 11\")   Wt 78.9 kg (174 lb)   SpO2 97%   BMI 35.14 kg/m    BMI: Body mass index is 35.14 kg/m .  GENERAL APPEARANCE:  Alert, in no distress  ENT:  Mouth and posterior oropharynx normal, moist mucous membranes, normal hearing acuity  EYES:  EOM, conjunctivae, lids, pupils and irises normal, PERRL  RESP:  respiratory effort and palpation of chest normal, lungs clear to auscultation , no respiratory distress  CV:  regular rate and rhythm, no murmur, rub, or gallop, peripheral edema 1+ in LE bilaterally  ABDOMEN:  normal bowel sounds, soft, nontender, no hepatosplenomegaly or other masses  M/S:   patient sitting up in chair at " bedside.   SKIN:  Inspection of skin and subcutaneous tissue baseline, LLE redness improved did not visualize left LE wound  NEURO:   speech wnl  PSYCH:  affect and mood normal     SNF labs: Recent labs in [facility] reviewed by me today.  and Most Recent 3 CBC's:  Recent Labs   Lab Test 05/30/25  0707 05/29/25  0514 05/27/25  0801   WBC 8.4 10.6 18.5*   HGB 11.5* 11.3* 10.6*   * 103* 104*    336 343     Most Recent 3 BMP's:  Recent Labs   Lab Test 05/30/25  0707 05/29/25  0514 05/27/25  0801    135 139   POTASSIUM 3.9 3.6 4.3   CHLORIDE 99 97* 102   CO2 26 25 25   BUN 20.9 26.3* 31.5*   CR 0.90 0.86 0.97*   ANIONGAP 12 13 12   TAMMY 8.9 8.6* 8.9   GLC 96 96 109*       DISCHARGE PLAN:  Follow up labs: No labs orders/due  Medical Follow Up:      Follow up with primary care provider in 1-2 weeks  [facility] scheduled appointments:  Appointments in Next Year      Jun 23, 2025 12:00 PM  Discharge Summary with Tonya Lynn Haase, APRN [facility] ([facility] ) [phone] Jun 24, 2025 8:30 AM  (Arrive by 8:15 AM)  ED/Hospital Follow Up with Shun Prado MD  [facility] ([facility] ) [phone]     Jul 09, 2025 3:20 PM  (Arrive by 3:15 PM)  Return Cardiology with Karen Waddell PA-C  [facility] ([facility]) [phone]     Jul 24, 2025 1:00 PM  (Arrive by 12:40 PM)  Annual Wellness Visit with Martin Padron MD  [facility] ([facility]) [phone]           Discharge Services: Home Care:  Physical Therapy, Registered Nurse, and Home Health Aide  Discharge Instructions Verbalized to Patient at Discharge:   None    TOTAL DISCHARGE TIME:   Greater than 30 minutes  Electronically signed by:  Tonya Lynn Haase, APRN CNP                     Sincerely,        Tonya Lynn Haase,  APRN CNP    Electronically signed

## 2025-06-23 NOTE — PROGRESS NOTES
[facility] GERIATRICS DISCHARGE SUMMARY  PATIENT'S NAME: Riddhi Aguilar  YOB: 1931  MEDICAL RECORD NUMBER:  [id number]  [facility]    PRIMARY CARE PROVIDER AND CLINIC RESPONSIBLE AFTER TRANSFER:   Shun Prado MD, [address] / [place] [address]    Assisted Living: [facility] ([facility])     Transferring providers: Tonya Lynn Haase, LUCY VELÁSQUEZ, Kory Cruz MD  Recent Hospitalization/ED:  [facility] stay 5/26/25 to 5/30/25.  Date of SNF Admission: May 30, 2025  Date of SNF (anticipated) Discharge: June 24, 2025  Discharged to: previous assisted living  Cognitive Scores: BIMS: 15/15 and Short blessed: 2/28  Physical Function: Ambulating 150 ft with 4ww  DME: Walker and w/c    CODE STATUS/ADVANCE DIRECTIVES DISCUSSION:  No CPR- Do NOT Intubate   ALLERGIES: Patient has no known allergies.    NURSING FACILITY COURSE   Medication Changes/Rationale:   Doxycycline 100mg BID for 10 days started 6/16/25 for increased redness LLE and open wound LLE    Summary of nursing facility stay:   (L03.116) Left leg cellulitis  (I89.0) Lymphedema  Comment: acute/recurrent  Tx with IV ceftriaxone--vancomycin to oral doxycycline  Plan: restart doxycycline 100mg BID on 6/16/25 for increased redness and open wound LLE   CBC and BMP in AM  Dressing to left LE daily cleanse with wound cleanser, apply vaseline gauze and cover loosely with kerlix change QOD, continue   Diuretics,  Doppler study to LLE on 6/19/25 negative for acute findings, bakers cyst noted left popliteal fossa   Discharge to [facility] with home PT, RN and HHA through [facility]     (I10) Essential hypertension, benign  (I50.30) Heart failure with preserved ejection fraction, NYHA class I (H)  (N18.30) Stage 3 chronic kidney disease, unspecified whether stage 3a or 3b CKD (H)  Comment: acute/ongoing  Recent Echo EF 60-65%, Cr. 1.05  during hospitalization  Plan: continue lasix 20mg QD, metoprolol 50mg BID, aldactone 25mg QD, apixaban 5mg BID     (K59.01) Slow transit constipation  Comment: acute/ongoing  Plan: continue senna s 1 PO BID  MOM 30ml QOD  prn per home routine     W19.XXXD) Fall, subsequent encounter  (primary encounter diagnosis)  (S01.01XD) Laceration of scalp, subsequent encounter  (R53.81) Physical deconditioning  Comment: acute/ongoing  resolved  Abrasion left elbow  Plan:tylenol 1000mg q 8 hours prn    Discharge Medications:  MED REC REQUIRED  Post Medication Reconciliation Status: discharge medications reconciled and changed, per note/orders       Current Outpatient Medications   Medication Sig Dispense Refill    acetaminophen (TYLENOL) 500 MG tablet Take 2 tablets (1,000 mg) by mouth every 8 hours as needed for mild pain.      apixaban ANTICOAGULANT (ELIQUIS) 5 MG tablet Take 1 tablet (5 mg) by mouth 2 times daily.      atorvastatin (LIPITOR) 10 MG tablet TAKE 1 TABLET(10 MG) BY MOUTH DAILY 90 tablet 0    bisacodyl (DULCOLAX) 10 MG suppository Place 1 suppository (10 mg) rectally daily as needed for constipation.      buPROPion (WELLBUTRIN XL) 150 MG 24 hr tablet TAKE 1 TABLET(150 MG) BY MOUTH EVERY MORNING 90 tablet 3    buPROPion (WELLBUTRIN XL) 300 MG 24 hr tablet TAKE 1 TABLET(300 MG) BY MOUTH EVERY MORNING 90 tablet 3    dorzolamide-timolol (COSOPT) 22.3-6.8 MG/ML ophthalmic solution Place 1 drop into both eyes 2 times daily      doxycycline hyclate (VIBRAMYCIN) 100 MG capsule Take 1 capsule (100 mg) by mouth 2 times daily.      famotidine (PEPCID) 20 MG tablet Take 1 tablet (20 mg) by mouth daily.      furosemide (LASIX) 20 MG tablet Take 1 tablet (20 mg) by mouth daily.      Lidocaine (LIDOCARE) 4 % Patch Place 1 patch over 12 hours onto the skin every 24 hours. To prevent lidocaine toxicity, patient should be patch free for 12 hrs daily.      magnesium hydroxide (MILK OF MAGNESIA) 400 MG/5ML suspension Take 30 mLs by  "mouth every other day.      melatonin 3 MG tablet Take 3 mg by mouth at bedtime.      metoprolol tartrate (LOPRESSOR) 50 MG tablet Take 1 tablet (50 mg) by mouth 2 times daily.      miconazole (MICATIN) 2 % external powder Apply topically 2 times daily as needed for other (candidiasis/intertrigo).      multivitamin w/minerals (THERA-VIT-M) tablet Take 1 tablet by mouth daily      senna-docusate (SENOKOT-S/PERICOLACE) 8.6-50 MG tablet Take 1 tablet by mouth 2 times daily. Change to PRN if having loose stools      spironolactone (ALDACTONE) 25 MG tablet Take 1 tablet (25 mg) by mouth daily.            Controlled medications:   not applicable/none     Past Medical History:   Past Medical History:   Diagnosis Date    Anemia     baseline 10-11    Arthritis     Bleeding ulcer     duodenal ulcer    CKD (chronic kidney disease) stage 3, GFR 30-59 ml/min (H)     Essential hypertension, benign     MDD (major depressive disorder)     Nephrolithiasis     Osteoporosis     Pure hypercholesterolemia     Spinal stenosis     Unspecified cataract     Unspecified glaucoma(365.9)      Physical Exam:   Vitals: /88   Pulse 80   Temp 97.4  F (36.3  C)   Resp 18   Ht 1.499 m (4' 11\")   Wt 78.9 kg (174 lb)   SpO2 97%   BMI 35.14 kg/m    BMI: Body mass index is 35.14 kg/m .  GENERAL APPEARANCE:  Alert, in no distress  ENT:  Mouth and posterior oropharynx normal, moist mucous membranes, normal hearing acuity  EYES:  EOM, conjunctivae, lids, pupils and irises normal, PERRL  RESP:  respiratory effort and palpation of chest normal, lungs clear to auscultation , no respiratory distress  CV:  regular rate and rhythm, no murmur, rub, or gallop, peripheral edema 1+ in LE bilaterally  ABDOMEN:  normal bowel sounds, soft, nontender, no hepatosplenomegaly or other masses  M/S:   patient sitting up in chair at bedside.   SKIN:  Inspection of skin and subcutaneous tissue baseline, LLE redness improved did not visualize left LE wound  NEURO:  "  speech wnl  PSYCH:  affect and mood normal     SNF labs: Recent labs in [facility] reviewed by me today.  and Most Recent 3 CBC's:  Recent Labs   Lab Test 05/30/25  0707 05/29/25  0514 05/27/25  0801   WBC 8.4 10.6 18.5*   HGB 11.5* 11.3* 10.6*   * 103* 104*    336 343     Most Recent 3 BMP's:  Recent Labs   Lab Test 05/30/25  0707 05/29/25  0514 05/27/25  0801    135 139   POTASSIUM 3.9 3.6 4.3   CHLORIDE 99 97* 102   CO2 26 25 25   BUN 20.9 26.3* 31.5*   CR 0.90 0.86 0.97*   ANIONGAP 12 13 12   TAMMY 8.9 8.6* 8.9   GLC 96 96 109*       DISCHARGE PLAN:  Follow up labs: No labs orders/due  Medical Follow Up:      Follow up with primary care provider in 1-2 weeks  [facility] scheduled appointments:  Appointments in Next Year      Jun 23, 2025 12:00 PM  Discharge Summary with Tonya Lynn Haase, APRN CNP  [facility] ([facility] ) [phone]     Jun 24, 2025 8:30 AM  (Arrive by 8:15 AM)  ED/Hospital Follow Up with Shun Prado MD  [facility] ([facility] ) [phone]     Jul 09, 2025 3:20 PM  (Arrive by 3:15 PM)  Return Cardiology with Karen Waddell PA-C  [facility] ([facility]) [phone]     Jul 24, 2025 1:00 PM  (Arrive by 12:40 PM)  Annual Wellness Visit with Martin Padron MD  [facility] ([facility]) [phone]           Discharge Services: Home Care:  Physical Therapy, Registered Nurse, and Home Health Aide  Discharge Instructions Verbalized to Patient at Discharge:   None    TOTAL DISCHARGE TIME:   Greater than 30 minutes  Electronically signed by:  Tonya Lynn Haase, APRN CNP

## 2025-06-25 ENCOUNTER — MEDICAL CORRESPONDENCE (OUTPATIENT)
Dept: HEALTH INFORMATION MANAGEMENT | Facility: CLINIC | Age: OVER 89
End: 2025-06-25
Payer: COMMERCIAL

## 2025-06-26 DIAGNOSIS — Z09 HOSPITAL DISCHARGE FOLLOW-UP: ICD-10-CM

## 2025-07-02 ENCOUNTER — NURSE TRIAGE (OUTPATIENT)
Dept: FAMILY MEDICINE | Facility: CLINIC | Age: OVER 89
End: 2025-07-02

## 2025-07-02 ENCOUNTER — HOSPITAL ENCOUNTER (INPATIENT)
Facility: CLINIC | Age: OVER 89
End: 2025-07-02
Attending: EMERGENCY MEDICINE | Admitting: INTERNAL MEDICINE
Payer: COMMERCIAL

## 2025-07-02 ENCOUNTER — APPOINTMENT (OUTPATIENT)
Dept: GENERAL RADIOLOGY | Facility: CLINIC | Age: OVER 89
DRG: 291 | End: 2025-07-02
Attending: EMERGENCY MEDICINE
Payer: COMMERCIAL

## 2025-07-02 DIAGNOSIS — I50.9 ACUTE CONGESTIVE HEART FAILURE, UNSPECIFIED HEART FAILURE TYPE (H): ICD-10-CM

## 2025-07-02 DIAGNOSIS — I50.33 ACUTE ON CHRONIC DIASTOLIC CONGESTIVE HEART FAILURE (H): ICD-10-CM

## 2025-07-02 DIAGNOSIS — I87.2 VENOUS STASIS ULCER OF LEFT CALF WITH FAT LAYER EXPOSED WITHOUT VARICOSE VEINS (H): Primary | ICD-10-CM

## 2025-07-02 DIAGNOSIS — L97.222 VENOUS STASIS ULCER OF LEFT CALF WITH FAT LAYER EXPOSED WITHOUT VARICOSE VEINS (H): Primary | ICD-10-CM

## 2025-07-02 LAB
ANION GAP SERPL CALCULATED.3IONS-SCNC: 16 MMOL/L (ref 7–15)
BASOPHILS # BLD AUTO: 0.1 10E3/UL (ref 0–0.2)
BASOPHILS NFR BLD AUTO: 1 %
BUN SERPL-MCNC: 27 MG/DL (ref 8–23)
CALCIUM SERPL-MCNC: 9.3 MG/DL (ref 8.8–10.4)
CHLORIDE SERPL-SCNC: 102 MMOL/L (ref 98–107)
CREAT SERPL-MCNC: 1.08 MG/DL (ref 0.51–0.95)
EGFRCR SERPLBLD CKD-EPI 2021: 47 ML/MIN/1.73M2
EOSINOPHIL # BLD AUTO: 0.2 10E3/UL (ref 0–0.7)
EOSINOPHIL NFR BLD AUTO: 1 %
ERYTHROCYTE [DISTWIDTH] IN BLOOD BY AUTOMATED COUNT: 24.8 % (ref 10–15)
GLUCOSE SERPL-MCNC: 79 MG/DL (ref 70–99)
HCO3 SERPL-SCNC: 19 MMOL/L (ref 22–29)
HCT VFR BLD AUTO: 35.3 % (ref 35–47)
HGB BLD-MCNC: 11.8 G/DL (ref 11.7–15.7)
IMM GRANULOCYTES # BLD: 0.1 10E3/UL
IMM GRANULOCYTES NFR BLD: 1 %
LYMPHOCYTES # BLD AUTO: 0.8 10E3/UL (ref 0.8–5.3)
LYMPHOCYTES NFR BLD AUTO: 7 %
MCH RBC QN AUTO: 34.5 PG (ref 26.5–33)
MCHC RBC AUTO-ENTMCNC: 33.4 G/DL (ref 31.5–36.5)
MCV RBC AUTO: 103 FL (ref 78–100)
MONOCYTES # BLD AUTO: 1.8 10E3/UL (ref 0–1.3)
MONOCYTES NFR BLD AUTO: 17 %
NEUTROPHILS # BLD AUTO: 7.5 10E3/UL (ref 1.6–8.3)
NEUTROPHILS NFR BLD AUTO: 72 %
NRBC # BLD AUTO: 0.3 10E3/UL
NRBC BLD AUTO-RTO: 3 /100
NT-PROBNP SERPL-MCNC: 7216 PG/ML (ref 0–624)
PLATELET # BLD AUTO: 234 10E3/UL (ref 150–450)
POTASSIUM SERPL-SCNC: 4.7 MMOL/L (ref 3.4–5.3)
RBC # BLD AUTO: 3.42 10E6/UL (ref 3.8–5.2)
SODIUM SERPL-SCNC: 137 MMOL/L (ref 135–145)
TROPONIN T SERPL HS-MCNC: 24 NG/L
TROPONIN T SERPL HS-MCNC: 26 NG/L
WBC # BLD AUTO: 10.4 10E3/UL (ref 4–11)

## 2025-07-02 PROCEDURE — 93005 ELECTROCARDIOGRAM TRACING: CPT

## 2025-07-02 PROCEDURE — 36415 COLL VENOUS BLD VENIPUNCTURE: CPT | Performed by: EMERGENCY MEDICINE

## 2025-07-02 PROCEDURE — 120N000001 HC R&B MED SURG/OB

## 2025-07-02 PROCEDURE — 80048 BASIC METABOLIC PNL TOTAL CA: CPT | Performed by: EMERGENCY MEDICINE

## 2025-07-02 PROCEDURE — 96374 THER/PROPH/DIAG INJ IV PUSH: CPT

## 2025-07-02 PROCEDURE — 83880 ASSAY OF NATRIURETIC PEPTIDE: CPT | Performed by: EMERGENCY MEDICINE

## 2025-07-02 PROCEDURE — 99285 EMERGENCY DEPT VISIT HI MDM: CPT | Mod: 25

## 2025-07-02 PROCEDURE — 85025 COMPLETE CBC W/AUTO DIFF WBC: CPT | Performed by: EMERGENCY MEDICINE

## 2025-07-02 PROCEDURE — 250N000011 HC RX IP 250 OP 636: Performed by: EMERGENCY MEDICINE

## 2025-07-02 PROCEDURE — 84484 ASSAY OF TROPONIN QUANT: CPT | Performed by: EMERGENCY MEDICINE

## 2025-07-02 PROCEDURE — 71046 X-RAY EXAM CHEST 2 VIEWS: CPT

## 2025-07-02 RX ORDER — FUROSEMIDE 10 MG/ML
40 INJECTION INTRAMUSCULAR; INTRAVENOUS ONCE
Status: COMPLETED | OUTPATIENT
Start: 2025-07-02 | End: 2025-07-02

## 2025-07-02 RX ADMIN — FUROSEMIDE 40 MG: 10 INJECTION, SOLUTION INTRAMUSCULAR; INTRAVENOUS at 22:56

## 2025-07-02 ASSESSMENT — ACTIVITIES OF DAILY LIVING (ADL)
ADLS_ACUITY_SCORE: 63

## 2025-07-02 ASSESSMENT — COLUMBIA-SUICIDE SEVERITY RATING SCALE - C-SSRS
1. IN THE PAST MONTH, HAVE YOU WISHED YOU WERE DEAD OR WISHED YOU COULD GO TO SLEEP AND NOT WAKE UP?: NO
6. HAVE YOU EVER DONE ANYTHING, STARTED TO DO ANYTHING, OR PREPARED TO DO ANYTHING TO END YOUR LIFE?: NO
2. HAVE YOU ACTUALLY HAD ANY THOUGHTS OF KILLING YOURSELF IN THE PAST MONTH?: NO

## 2025-07-02 NOTE — TELEPHONE ENCOUNTER
Recommend office visit appointment with me or team provider as soon as possible to evaluate weight gain and lower extremity edema.  If symptoms too severe to wait for appointment, recommend urgent care or ER if there is shortness of breath.

## 2025-07-02 NOTE — TELEPHONE ENCOUNTER
Home care calling stating pt recently moved to [facility] but have not transferred her over to facility provider yet    Update for PCP: weight gain-180 today last weight was 174, increased SOB that started last night, BLE 2+ pitting edema within the past week. Lungs clear, VSS    Spironolactone and furosemide     Please call home care back with providers recommendations as well as fax orders to Facility.  Facility Nursing Fax line: [phone] ATTN: nursing     Yahaira Elliott RN

## 2025-07-02 NOTE — LETTER
[facility]TH[facility]  [address]  [facility] [address]  [phone]    FACSIMILE TRANSMITTAL SHEET    TO: [facility]     FAX NUMBER: [phone]      FROM: Courtney TERRELL     PHONE: [phone]    DATE: 07/04/25      _____URGENT _____REVIEW ONLY _____PLEASE COMMENT____PLEASE REPLY    NOTES/COMMENTS:     Discharge orders                                      IF YOU DID NOT RECEIVE THE CORRECT NUMBER OF PAGES OR THE FAX DID NOT COME THROUGH CLEARLY, PLEASE CALL THE SENDER     CONFIDENTIALITY STATEMENT: Confidential information that may accompany this transmission contains protected health information under state and federal law and is legally privileged. This information is intended only for the use of the individual or entity named above and may be used only for carrying out treatment, payment or other healthcare operations. The recipient or person responsible for delivering this information is prohibited by law from disclosing this information without proper authorization to any other party, unless required to do so by law or regulation. If you are not the intended recipient, you are hereby notified that any review, dissemination, distribution, or copying of this message is strictly prohibited. If you have received this communication in error, please destroy the materials and contact us immediately by calling the number listed above. No response indicates that the information was received by the appropriate authorized party

## 2025-07-02 NOTE — TELEPHONE ENCOUNTER
FYI ONLY (no action needed)     Spoke with home care nurse     Pt had a 5 lb weight gain in a week   Seeing cardiology next week   Swelling goes up to knee - new in last week   SOB - started last night - difficulty breathing even at rest  Pt could not get back to her room from the bathroom due to SOB  Has a history of CHF exacerbations    Has lasix and spironolactone - has taken them already  New to her living facility, but has not seen facility provider, not switching her over until next week    Per protocol advised ER     Nurse will call daughter in law call back now and advise of recommendation to be seen in ED now    Home care nursing asking that FYI message be sent to PCP so he is aware pt is scheduled to see cardiology next week    Ryann CARRILLO, Triage RN  [facility]       Reason for Disposition   Leg swelling (e.g., ankle swelling extends up to shins or knees)   Difficulty breathing at rest    Additional Information   Negative: Sounds like a life-threatening emergency to the triager   Negative: Chest pain   Negative: Followed an insect bite and has localized swelling (e.g., small area of puffy or swollen skin)   Negative: Followed a knee injury   Negative: Ankle injury   Negative: Pregnant with leg swelling or edema    Protocols used: Ankle Swelling-A-OH, Leg Swelling and Edema-A-OH

## 2025-07-03 ENCOUNTER — PATIENT OUTREACH (OUTPATIENT)
Dept: CARE COORDINATION | Facility: CLINIC | Age: OVER 89
End: 2025-07-03
Payer: COMMERCIAL

## 2025-07-03 VITALS
WEIGHT: 179.6 LBS | DIASTOLIC BLOOD PRESSURE: 80 MMHG | HEART RATE: 82 BPM | RESPIRATION RATE: 20 BRPM | BODY MASS INDEX: 36.27 KG/M2 | TEMPERATURE: 97.2 F | OXYGEN SATURATION: 94 % | SYSTOLIC BLOOD PRESSURE: 138 MMHG

## 2025-07-03 LAB
ANION GAP SERPL CALCULATED.3IONS-SCNC: 15 MMOL/L (ref 7–15)
ATRIAL RATE - MUSE: 315 BPM
BASOPHILS # BLD AUTO: 0.1 10E3/UL (ref 0–0.2)
BASOPHILS NFR BLD AUTO: 1 %
BUN SERPL-MCNC: 25.5 MG/DL (ref 8–23)
CALCIUM SERPL-MCNC: 9.7 MG/DL (ref 8.8–10.4)
CHLORIDE SERPL-SCNC: 99 MMOL/L (ref 98–107)
CREAT SERPL-MCNC: 1.08 MG/DL (ref 0.51–0.95)
DIASTOLIC BLOOD PRESSURE - MUSE: NORMAL MMHG
EGFRCR SERPLBLD CKD-EPI 2021: 47 ML/MIN/1.73M2
EOSINOPHIL # BLD AUTO: 0 10E3/UL (ref 0–0.7)
EOSINOPHIL NFR BLD AUTO: 0 %
ERYTHROCYTE [DISTWIDTH] IN BLOOD BY AUTOMATED COUNT: 24.8 % (ref 10–15)
GLUCOSE SERPL-MCNC: 80 MG/DL (ref 70–99)
HCO3 SERPL-SCNC: 23 MMOL/L (ref 22–29)
HCT VFR BLD AUTO: 35.6 % (ref 35–47)
HGB BLD-MCNC: 11.8 G/DL (ref 11.7–15.7)
IMM GRANULOCYTES # BLD: 0.2 10E3/UL
IMM GRANULOCYTES NFR BLD: 1 %
INTERPRETATION ECG - MUSE: NORMAL
LYMPHOCYTES # BLD AUTO: 0.6 10E3/UL (ref 0.8–5.3)
LYMPHOCYTES NFR BLD AUTO: 5 %
MCH RBC QN AUTO: 34.6 PG (ref 26.5–33)
MCHC RBC AUTO-ENTMCNC: 33.1 G/DL (ref 31.5–36.5)
MCV RBC AUTO: 104 FL (ref 78–100)
MONOCYTES # BLD AUTO: 1.5 10E3/UL (ref 0–1.3)
MONOCYTES NFR BLD AUTO: 14 %
NEUTROPHILS # BLD AUTO: 8.6 10E3/UL (ref 1.6–8.3)
NEUTROPHILS NFR BLD AUTO: 78 %
NRBC # BLD AUTO: 0.3 10E3/UL
NRBC BLD AUTO-RTO: 3 /100
P AXIS - MUSE: NORMAL DEGREES
PLATELET # BLD AUTO: 344 10E3/UL (ref 150–450)
POTASSIUM SERPL-SCNC: 4.3 MMOL/L (ref 3.4–5.3)
PR INTERVAL - MUSE: NORMAL MS
QRS DURATION - MUSE: 104 MS
QT - MUSE: 372 MS
QTC - MUSE: 472 MS
R AXIS - MUSE: -12 DEGREES
RBC # BLD AUTO: 3.41 10E6/UL (ref 3.8–5.2)
SODIUM SERPL-SCNC: 137 MMOL/L (ref 135–145)
SYSTOLIC BLOOD PRESSURE - MUSE: NORMAL MMHG
T AXIS - MUSE: 134 DEGREES
VENTRICULAR RATE- MUSE: 97 BPM
WBC # BLD AUTO: 11 10E3/UL (ref 4–11)

## 2025-07-03 PROCEDURE — 99223 1ST HOSP IP/OBS HIGH 75: CPT | Performed by: INTERNAL MEDICINE

## 2025-07-03 PROCEDURE — 250N000013 HC RX MED GY IP 250 OP 250 PS 637: Performed by: INTERNAL MEDICINE

## 2025-07-03 PROCEDURE — 97602 WOUND(S) CARE NON-SELECTIVE: CPT

## 2025-07-03 PROCEDURE — 120N000001 HC R&B MED SURG/OB

## 2025-07-03 PROCEDURE — 85025 COMPLETE CBC W/AUTO DIFF WBC: CPT | Performed by: INTERNAL MEDICINE

## 2025-07-03 PROCEDURE — 36415 COLL VENOUS BLD VENIPUNCTURE: CPT | Performed by: INTERNAL MEDICINE

## 2025-07-03 PROCEDURE — 80048 BASIC METABOLIC PNL TOTAL CA: CPT | Performed by: INTERNAL MEDICINE

## 2025-07-03 PROCEDURE — G0463 HOSPITAL OUTPT CLINIC VISIT: HCPCS | Mod: 25

## 2025-07-03 PROCEDURE — 250N000011 HC RX IP 250 OP 636: Performed by: INTERNAL MEDICINE

## 2025-07-03 RX ORDER — ACETAMINOPHEN 325 MG/1
650 TABLET ORAL EVERY 4 HOURS PRN
Status: ACTIVE | OUTPATIENT
Start: 2025-07-03

## 2025-07-03 RX ORDER — FUROSEMIDE 10 MG/ML
20 INJECTION INTRAMUSCULAR; INTRAVENOUS EVERY 12 HOURS
Status: DISPENSED | OUTPATIENT
Start: 2025-07-03

## 2025-07-03 RX ORDER — BISACODYL 10 MG
10 SUPPOSITORY, RECTAL RECTAL DAILY PRN
Status: ACTIVE | OUTPATIENT
Start: 2025-07-03

## 2025-07-03 RX ORDER — FAMOTIDINE 20 MG/1
20 TABLET, FILM COATED ORAL DAILY
Status: DISPENSED | OUTPATIENT
Start: 2025-07-03

## 2025-07-03 RX ORDER — DORZOLAMIDE HYDROCHLORIDE AND TIMOLOL MALEATE 20; 5 MG/ML; MG/ML
1 SOLUTION/ DROPS OPHTHALMIC 2 TIMES DAILY
Status: DISPENSED | OUTPATIENT
Start: 2025-07-03

## 2025-07-03 RX ORDER — CALCIUM CARBONATE 500 MG/1
1000 TABLET, CHEWABLE ORAL 4 TIMES DAILY PRN
Status: ACTIVE | OUTPATIENT
Start: 2025-07-03

## 2025-07-03 RX ORDER — BUPROPION HYDROCHLORIDE 150 MG/1
150 TABLET ORAL EVERY MORNING
Status: DISPENSED | OUTPATIENT
Start: 2025-07-03

## 2025-07-03 RX ORDER — AMOXICILLIN 250 MG
2 CAPSULE ORAL 2 TIMES DAILY PRN
Status: ACTIVE | OUTPATIENT
Start: 2025-07-03

## 2025-07-03 RX ORDER — ONDANSETRON 2 MG/ML
4 INJECTION INTRAMUSCULAR; INTRAVENOUS EVERY 6 HOURS PRN
Status: ACTIVE | OUTPATIENT
Start: 2025-07-03

## 2025-07-03 RX ORDER — MULTIPLE VITAMINS W/ MINERALS TAB 9MG-400MCG
1 TAB ORAL DAILY
Status: DISPENSED | OUTPATIENT
Start: 2025-07-03

## 2025-07-03 RX ORDER — LIDOCAINE 4 G/G
1 PATCH TOPICAL EVERY 24 HOURS
Status: DISCONTINUED | OUTPATIENT
Start: 2025-07-03 | End: 2025-07-03

## 2025-07-03 RX ORDER — LIDOCAINE 4 G/G
1 PATCH TOPICAL EVERY 24 HOURS
Status: DISPENSED | OUTPATIENT
Start: 2025-07-03

## 2025-07-03 RX ORDER — ATORVASTATIN CALCIUM 10 MG/1
10 TABLET, FILM COATED ORAL DAILY
Status: DISPENSED | OUTPATIENT
Start: 2025-07-03

## 2025-07-03 RX ORDER — METOPROLOL TARTRATE 50 MG
50 TABLET ORAL 2 TIMES DAILY
Status: DISPENSED | OUTPATIENT
Start: 2025-07-03

## 2025-07-03 RX ORDER — ACETAMINOPHEN 650 MG/1
650 SUPPOSITORY RECTAL EVERY 4 HOURS PRN
Status: ACTIVE | OUTPATIENT
Start: 2025-07-03

## 2025-07-03 RX ORDER — BUPROPION HYDROCHLORIDE 150 MG/1
300 TABLET ORAL EVERY MORNING
Status: DISPENSED | OUTPATIENT
Start: 2025-07-03

## 2025-07-03 RX ORDER — LIDOCAINE 40 MG/G
CREAM TOPICAL
Status: ACTIVE | OUTPATIENT
Start: 2025-07-03

## 2025-07-03 RX ORDER — AMOXICILLIN 250 MG
1 CAPSULE ORAL 2 TIMES DAILY PRN
Status: DISPENSED | OUTPATIENT
Start: 2025-07-03

## 2025-07-03 RX ORDER — ONDANSETRON 4 MG/1
4 TABLET, ORALLY DISINTEGRATING ORAL EVERY 6 HOURS PRN
Status: ACTIVE | OUTPATIENT
Start: 2025-07-03

## 2025-07-03 RX ADMIN — APIXABAN 5 MG: 5 TABLET, FILM COATED ORAL at 20:55

## 2025-07-03 RX ADMIN — BUPROPION HYDROCHLORIDE 300 MG: 150 TABLET, EXTENDED RELEASE ORAL at 10:47

## 2025-07-03 RX ADMIN — Medication 1 TABLET: at 10:49

## 2025-07-03 RX ADMIN — METOPROLOL TARTRATE 50 MG: 50 TABLET, FILM COATED ORAL at 10:48

## 2025-07-03 RX ADMIN — DORZOLAMIDE HYDROCHLORIDE AND TIMOLOL MALEATE 1 DROP: 22.3; 6.8 SOLUTION/ DROPS OPHTHALMIC at 20:57

## 2025-07-03 RX ADMIN — ATORVASTATIN CALCIUM 10 MG: 10 TABLET, FILM COATED ORAL at 10:49

## 2025-07-03 RX ADMIN — DORZOLAMIDE HYDROCHLORIDE AND TIMOLOL MALEATE 1 DROP: 22.3; 6.8 SOLUTION/ DROPS OPHTHALMIC at 11:41

## 2025-07-03 RX ADMIN — APIXABAN 5 MG: 5 TABLET, FILM COATED ORAL at 10:49

## 2025-07-03 RX ADMIN — FUROSEMIDE 20 MG: 10 INJECTION, SOLUTION INTRAMUSCULAR; INTRAVENOUS at 20:48

## 2025-07-03 RX ADMIN — SENNOSIDES AND DOCUSATE SODIUM 1 TABLET: 50; 8.6 TABLET ORAL at 19:11

## 2025-07-03 RX ADMIN — METOPROLOL TARTRATE 50 MG: 50 TABLET, FILM COATED ORAL at 20:55

## 2025-07-03 RX ADMIN — BUPROPION HYDROCHLORIDE 150 MG: 150 TABLET, EXTENDED RELEASE ORAL at 10:48

## 2025-07-03 RX ADMIN — FAMOTIDINE 20 MG: 20 TABLET, FILM COATED ORAL at 10:48

## 2025-07-03 RX ADMIN — FUROSEMIDE 20 MG: 10 INJECTION, SOLUTION INTRAMUSCULAR; INTRAVENOUS at 09:54

## 2025-07-03 ASSESSMENT — ACTIVITIES OF DAILY LIVING (ADL)
ADLS_ACUITY_SCORE: 66
ADLS_ACUITY_SCORE: 63
ADLS_ACUITY_SCORE: 66
ADLS_ACUITY_SCORE: 63
ADLS_ACUITY_SCORE: 66
ADLS_ACUITY_SCORE: 63
ADLS_ACUITY_SCORE: 67
ADLS_ACUITY_SCORE: 66
ADLS_ACUITY_SCORE: 63
ADLS_ACUITY_SCORE: 66
ADLS_ACUITY_SCORE: 63
ADLS_ACUITY_SCORE: 66
ADLS_ACUITY_SCORE: 63
ADLS_ACUITY_SCORE: 63
ADLS_ACUITY_SCORE: 67
ADLS_ACUITY_SCORE: 63
ADLS_ACUITY_SCORE: 66

## 2025-07-03 NOTE — ED NOTES
[facility]  ED Nurse Handoff Report    ED Chief complaint: Shortness of Breath and Leg Swelling  . ED Diagnosis:   Final diagnoses:   Acute on chronic diastolic congestive heart failure (H)       Allergies: No Known Allergies    Code Status: Full Code    Activity level - Baseline/Home:  standby.  Activity Level - Current:   standby and assist of 2.   Lift room needed: No.   Bariatric: No   Needed: No   Isolation: No.   Infection: Not Applicable.     Respiratory status: Room air    Vital Signs (within 30 minutes):   Vitals:    07/02/25 1930 07/02/25 2100 07/02/25 2245 07/02/25 2300   BP: (!) 143/91 (!) 153/96 128/68    Pulse: 94 120  107   Resp:       Temp:       TempSrc:       SpO2: 97% 95%  96%       Cardiac Rhythm:  ,      Pain level:    Patient confused: No.   Patient Falls Risk: nonskid shoes/slippers when out of bed.   Elimination Status: Has voided     Patient Report - Initial Complaint: Pt arrives with family for SOB x a few hours. Recently discharged from rehab. Family reports bilateral leg swelling and weight gain in last week. Intermittent nausea/dizziness and hypertensions. Hx a fib. Takes lasix and spironolactone. VSS on RA. .   Focused Assessment: 89 year old female who presents to the ED for evaluation of dyspnea on exertion.  She has a history of heart failure with preserved ejection fraction as well as permanent A-fib.  She was admitted to the hospital recently with cellulitis and went to rehab.  Her family reports that rehab went very well for her and she developed good exercise tolerance.  Over the last week since she was discharged from rehab she has had progressive dyspnea on exertion.  She says she now has difficulty walking from her bedroom to the bathroom without having to stop due to dyspnea.  She has had increased swelling in the lower extremities.  She does take furosemide and spironolactone.  No fever.  No cough.  No chest pain.  No diarrhea.  No vomiting.       Abnormal Results:   Labs Ordered and Resulted from Time of ED Arrival to Time of ED Departure   BASIC METABOLIC PANEL - Abnormal       Result Value    Sodium 137      Potassium 4.7      Chloride 102      Carbon Dioxide (CO2) 19 (*)     Anion Gap 16 (*)     Urea Nitrogen 27.0 (*)     Creatinine 1.08 (*)     GFR Estimate 47 (*)     Calcium 9.3      Glucose 79     TROPONIN T, HIGH SENSITIVITY - Abnormal    Troponin T, High Sensitivity 24 (*)    NT-PROBNP - Abnormal    NT-proBNP 7,216 (*)    CBC WITH PLATELETS AND DIFFERENTIAL - Abnormal    WBC Count 10.4      RBC Count 3.42 (*)     Hemoglobin 11.8      Hematocrit 35.3       (*)     MCH 34.5 (*)     MCHC 33.4      RDW 24.8 (*)     Platelet Count 234      % Neutrophils 72      % Lymphocytes 7      % Monocytes 17      % Eosinophils 1      % Basophils 1      % Immature Granulocytes 1      NRBCs per 100 WBC 3 (*)     Absolute Neutrophils 7.5      Absolute Lymphocytes 0.8      Absolute Monocytes 1.8 (*)     Absolute Eosinophils 0.2      Absolute Basophils 0.1      Absolute Immature Granulocytes 0.1      Absolute NRBCs 0.3     TROPONIN T, HIGH SENSITIVITY - Abnormal    Troponin T, High Sensitivity 26 (*)         XR Chest 2 Views   Final Result   IMPRESSION: Heart size is normal. Dense mitral annular calcification. No CHF, lobar consolidation or large effusions. No pneumothorax. Degenerative changes in both shoulders and in the thoracic spine.          Treatments provided: See MAR  Family Comments: At Bedside  OBS brochure/video discussed/provided to patient:  No  ED Medications:   Medications   furosemide (LASIX) injection 40 mg (40 mg Intravenous $Given 7/2/25 7345)       Drips infusing:  No  For the majority of the shift this patient was Green.   Interventions performed were .    Sepsis treatment initiated: No    Cares/treatment/interventions/medications to be completed following ED care:     ED Nurse Name: Pinky Johnson RN  11:43 PM   RECEIVING UNIT ED  HANDOFF REVIEW    Above ED Nurse Handoff Report was reviewed: Yes  Reviewed by: Richie Brush RN on July 2, 2025 at 11:57 PM   I Penny called the ED to inform them the note was read: Yes

## 2025-07-03 NOTE — PHARMACY-ADMISSION MEDICATION HISTORY
Pharmacist Admission Medication History    Admission medication history is complete. The information provided in this note is only as accurate as the sources available at the time of the update.    Information Source(s): Patient and Family member via in-person and phone    Pertinent Information: -    Changes made to PTA medication list:  Added: None  Deleted: doxycycline (course complete)  Changed: None    Allergies reviewed with patient and updates made in EHR: unable to assess    Medication History Completed By: Miguelina Servin RPH 7/3/2025 10:11 AM    PTA Med List   Medication Sig Last Dose/Taking    acetaminophen (TYLENOL) 500 MG tablet Take 2 tablets (1,000 mg) by mouth every 8 hours as needed for mild pain. Past Week    apixaban ANTICOAGULANT (ELIQUIS) 5 MG tablet Take 1 tablet (5 mg) by mouth 2 times daily. 7/2/2025 Evening    atorvastatin (LIPITOR) 10 MG tablet TAKE 1 TABLET(10 MG) BY MOUTH DAILY 7/2/2025    bisacodyl (DULCOLAX) 10 MG suppository Place 1 suppository (10 mg) rectally daily as needed for constipation. Taking As Needed    buPROPion (WELLBUTRIN XL) 150 MG 24 hr tablet TAKE 1 TABLET(150 MG) BY MOUTH EVERY MORNING 7/2/2025    buPROPion (WELLBUTRIN XL) 300 MG 24 hr tablet TAKE 1 TABLET(300 MG) BY MOUTH EVERY MORNING 7/2/2025    dorzolamide-timolol (COSOPT) 22.3-6.8 MG/ML ophthalmic solution Place 1 drop into both eyes 2 times daily 7/2/2025    famotidine (PEPCID) 20 MG tablet Take 1 tablet (20 mg) by mouth daily. 7/2/2025    furosemide (LASIX) 20 MG tablet Take 1 tablet (20 mg) by mouth daily. 7/2/2025    Lidocaine (LIDOCARE) 4 % Patch Place 1 patch over 12 hours onto the skin every 24 hours. To prevent lidocaine toxicity, patient should be patch free for 12 hrs daily. Past Month    magnesium hydroxide (MILK OF MAGNESIA) 400 MG/5ML suspension Take 30 mLs by mouth every other day. 7/2/2025    melatonin 3 MG tablet Take 3 mg by mouth at bedtime. Taking    metoprolol tartrate (LOPRESSOR) 50 MG  tablet Take 1 tablet (50 mg) by mouth 2 times daily. 7/2/2025 Evening    miconazole (MICATIN) 2 % external powder Apply topically 2 times daily as needed for other (candidiasis/intertrigo). Taking As Needed    multivitamin w/minerals (THERA-VIT-M) tablet Take 1 tablet by mouth daily 7/2/2025    spironolactone (ALDACTONE) 25 MG tablet Take 1 tablet (25 mg) by mouth daily. 7/2/2025

## 2025-07-03 NOTE — PLAN OF CARE
"Goal Outcome Evaluation:      Plan of Care Reviewed With: patient, child    Overall Patient Progress: improvingOverall Patient Progress: improving     A&Ox4. Contact precautions maintained. VSS on room air. Tele a fib. Denies pain. Denies nausea. SOB with exertion. Baseline n/t to RUE. WOC did wound cares to LLE today. On IV lasix. IV SL. Tolerating low sat fat/na diet. Up ax1 gb/walker. Voiding, incontinent at times. Discharge plan is TBD.    Problem: Adult Inpatient Plan of Care  Goal: Plan of Care Review  Description: The Plan of Care Review/Shift note should be completed every shift.  The Outcome Evaluation is a brief statement about your assessment that the patient is improving, declining, or no change.  This information will be displayed automatically on your shift  note.  Outcome: Progressing  Flowsheets (Taken 7/3/2025 1246)  Plan of Care Reviewed With:   patient   child  Overall Patient Progress: improving  Goal: Patient-Specific Goal (Individualized)  Description: You can add care plan individualizations to a care plan. Examples of Individualization might be:  \"Parent requests to be called daily at 9am for status\", \"I have a hard time hearing out of my right ear\", or \"Do not touch me to wake me up as it startles  me\".  Outcome: Progressing  Goal: Absence of Hospital-Acquired Illness or Injury  Outcome: Progressing  Intervention: Identify and Manage Fall Risk  Recent Flowsheet Documentation  Taken 7/3/2025 1050 by Smita Looney RN  Safety Promotion/Fall Prevention: safety round/check completed  Intervention: Prevent Skin Injury  Recent Flowsheet Documentation  Taken 7/3/2025 1050 by Smita Looney RN  Skin Protection:   adhesive use limited   incontinence pads utilized  Intervention: Prevent and Manage VTE (Venous Thromboembolism) Risk  Recent Flowsheet Documentation  Taken 7/3/2025 1050 by Smita Looney RN  VTE Prevention/Management: (in chair) SCDs off (sequential compression " devices)  Intervention: Prevent Infection  Recent Flowsheet Documentation  Taken 7/3/2025 1050 by Smita Looney, RN  Infection Prevention:   hand hygiene promoted   rest/sleep promoted   single patient room provided  Goal: Optimal Comfort and Wellbeing  Outcome: Progressing  Intervention: Provide Person-Centered Care  Recent Flowsheet Documentation  Taken 7/3/2025 1050 by Smita Looney RN  Trust Relationship/Rapport: care explained  Goal: Readiness for Transition of Care  Outcome: Progressing     Problem: Delirium  Goal: Optimal Coping  Outcome: Progressing  Goal: Improved Behavioral Control  Outcome: Progressing  Intervention: Minimize Safety Risk  Recent Flowsheet Documentation  Taken 7/3/2025 1050 by Smita Looney, RN  Enhanced Safety Measures:   assistive devices when indicated   room near unit station  Trust Relationship/Rapport: care explained  Goal: Improved Attention and Thought Clarity  Outcome: Progressing  Goal: Improved Sleep  Outcome: Progressing

## 2025-07-03 NOTE — ED PROVIDER NOTES
Emergency Department Note      History of Present Illness     Chief Complaint   Shortness of Breath and Leg Swelling      HPI   Riddhi Aguilar is a 89 year old female who presents to the ED for evaluation of dyspnea on exertion.  She has a history of heart failure with preserved ejection fraction as well as permanent A-fib.  She was admitted to the hospital recently with cellulitis and went to rehab.  Her family reports that rehab went very well for her and she developed good exercise tolerance.  Over the last week since she was discharged from rehab she has had progressive dyspnea on exertion.  She says she now has difficulty walking from her bedroom to the bathroom without having to stop due to dyspnea.  She has had increased swelling in the lower extremities.  She does take furosemide and spironolactone.  No fever.  No cough.  No chest pain.  No diarrhea.  No vomiting.    Independent Historian   History taken from 2 family members who report the extent of her dysuria.    Review of External Notes   Discharge summary reviewed from May 30, 2025 when the patient was discharged with cellulitis in the setting of chronic lymphedema.    Past Medical History     Medical History and Problem List   Past Medical History:   Diagnosis Date    Anemia     Arthritis     Bleeding ulcer     CKD (chronic kidney disease) stage 3, GFR 30-59 ml/min (H)     Essential hypertension, benign     MDD (major depressive disorder)     Nephrolithiasis     Osteoporosis     Pure hypercholesterolemia     Spinal stenosis     Unspecified cataract     Unspecified glaucoma(365.9)        Medications   acetaminophen (TYLENOL) 500 MG tablet  apixaban ANTICOAGULANT (ELIQUIS) 5 MG tablet  atorvastatin (LIPITOR) 10 MG tablet  bisacodyl (DULCOLAX) 10 MG suppository  buPROPion (WELLBUTRIN XL) 150 MG 24 hr tablet  buPROPion (WELLBUTRIN XL) 300 MG 24 hr tablet  dorzolamide-timolol (COSOPT) 22.3-6.8 MG/ML ophthalmic solution  doxycycline hyclate (VIBRAMYCIN)  100 MG capsule  famotidine (PEPCID) 20 MG tablet  furosemide (LASIX) 20 MG tablet  Lidocaine (LIDOCARE) 4 % Patch  magnesium hydroxide (MILK OF MAGNESIA) 400 MG/5ML suspension  melatonin 3 MG tablet  metoprolol tartrate (LOPRESSOR) 50 MG tablet  miconazole (MICATIN) 2 % external powder  multivitamin w/minerals (THERA-VIT-M) tablet  senna-docusate (SENOKOT-S/PERICOLACE) 8.6-50 MG tablet  spironolactone (ALDACTONE) 25 MG tablet        Surgical History   Past Surgical History:   Procedure Laterality Date    APPENDECTOMY      BREAST SURGERY      lumpectomy    CLOSED REDUCTION, PERCUTANEOUS PINNING HIP Left 5/20/2024    Procedure: Closed reduction percutaneous pinning, left hip;  Surgeon: Jose Luis Merino MD;  Location: [facility] OR    COMBINED CYSTOSCOPY, RETROGRADES, EXCHANGE STENT URETER(S) Left 01/24/2024    Procedure: Cystoscopy, left retrograde pyelogram, left JJ stent placement, <1hr physician fluoroscopy time;  Surgeon: Aureliano Brandt MD;  Location: [facility] OR    COMBINED CYSTOSCOPY, RETROGRADES, URETEROSCOPY, LASER HOLMIUM LITHOTRIPSY URETER(S), INSERT STENT Left 02/20/2024    Procedure: Cystoscopy, left diagnostic ureteroscopy, left retrograde pyelogram, left ureteral stent removal, fluoroscopic interpretation <1 hour physician time;  Surgeon: Richie Jaquez MD;  Location: [facility] OR    CYSTOSCOPY      CYSTOSCOPY, REMOVE STENT(S), COMBINED Left 02/20/2024    Procedure: Cystoscopy, remove stent(s), combined;  Surgeon: Richie Jaquez MD;  Location: [facility] OR    DILATION AND CURETTAGE, HYSTEROSCOPY DIAGNOSTIC, COMBINED  02/06/2014    Procedure: COMBINED DILATION AND CURETTAGE, HYSTEROSCOPY DIAGNOSTIC;  DILATION AND CURETTAGE, HYSTEROSCOPY, POLYPECTOMY, INCISION AND DRAINAGE OF SEBACEOUS CYST ;  Surgeon: Serena Zamora MD;  Location:  [facility]    EXCISE LESION LOWER EXTREMITY Right 08/29/2017    Procedure: EXCISE LESION LOWER EXTREMITY;  WIDE EXCISIONAL BIOPSY OF RIGHT ANKLE BASAL CELL CARCINOMA;  Surgeon: Juan Luis Flores  MD Dre;  Location:  OR    INCISION AND DRAINAGE PERINEAL, COMBINED  02/06/2014    Procedure: COMBINED INCISION AND DRAINAGE PERINEAL;;  Surgeon: Serena Zamora MD;  Location: [facility] NONSPECIFIC PROCEDURE      Repair of buckled retina of rt eye    [facility] NONSPECIFIC PROCEDURE      Appy       Physical Exam     Patient Vitals for the past 24 hrs:   BP Temp Temp src Pulse Resp SpO2   07/02/25 2300 -- -- -- 107 -- 96 %   07/02/25 2245 128/68 -- -- -- -- --   07/02/25 2100 (!) 153/96 -- -- 120 -- 95 %   07/02/25 1930 (!) 143/91 -- -- 94 -- 97 %   07/02/25 1910 (!) 148/95 98.3  F (36.8  C) Oral 73 20 97 %     Physical Exam  Constitutional:       General: She is not in acute distress.     Appearance: Normal appearance. She is not diaphoretic.   HENT:      Head:      Comments: Resolving ecchymosis and hematoma over the vertex of the scalp.     Mouth/Throat:      Mouth: Mucous membranes are moist.   Eyes:      General: No scleral icterus.     Conjunctiva/sclera: Conjunctivae normal.   Cardiovascular:      Rate and Rhythm: Normal rate and regular rhythm.      Heart sounds: Normal heart sounds.   Pulmonary:      Comments: Crackles at the bases.  Abdominal:      General: Abdomen is flat. There is no distension.      Tenderness: There is no abdominal tenderness.   Musculoskeletal:      Cervical back: Neck supple.      Right lower leg: Edema present.      Left lower leg: Edema present.   Skin:     General: Skin is warm.      Findings: No rash.      Comments: There is a small wound that is dressed over the medial left calf.  No discharge or surrounding erythema to suggest cellulitis.   Neurological:      Mental Status: She is alert.           Diagnostics     Lab Results   Labs Ordered and Resulted from Time of ED Arrival to Time of ED Departure   BASIC METABOLIC PANEL - Abnormal       Result Value    Sodium 137      Potassium 4.7      Chloride 102      Carbon Dioxide (CO2) 19 (*)     Anion Gap 16 (*)     Urea Nitrogen  27.0 (*)     Creatinine 1.08 (*)     GFR Estimate 47 (*)     Calcium 9.3      Glucose 79     TROPONIN T, HIGH SENSITIVITY - Abnormal    Troponin T, High Sensitivity 24 (*)    NT-PROBNP - Abnormal    NT-proBNP 7,216 (*)    CBC WITH PLATELETS AND DIFFERENTIAL - Abnormal    WBC Count 10.4      RBC Count 3.42 (*)     Hemoglobin 11.8      Hematocrit 35.3       (*)     MCH 34.5 (*)     MCHC 33.4      RDW 24.8 (*)     Platelet Count 234      % Neutrophils 72      % Lymphocytes 7      % Monocytes 17      % Eosinophils 1      % Basophils 1      % Immature Granulocytes 1      NRBCs per 100 WBC 3 (*)     Absolute Neutrophils 7.5      Absolute Lymphocytes 0.8      Absolute Monocytes 1.8 (*)     Absolute Eosinophils 0.2      Absolute Basophils 0.1      Absolute Immature Granulocytes 0.1      Absolute NRBCs 0.3     TROPONIN T, HIGH SENSITIVITY - Abnormal    Troponin T, High Sensitivity 26 (*)        Imaging   XR Chest 2 Views   Final Result   IMPRESSION: Heart size is normal. Dense mitral annular calcification. No CHF, lobar consolidation or large effusions. No pneumothorax. Degenerative changes in both shoulders and in the thoracic spine.          EKG   ECG results from 07/02/25   EKG 12-lead, tracing only     Value    Systolic Blood Pressure     Diastolic Blood Pressure     Ventricular Rate 97    Atrial Rate 315    CT Interval     QRS Duration 104        QTc 472    P Axis     R AXIS -12    T Axis 134    Interpretation ECG      Atrial flutter with variable A-V block  Left ventricular hypertrophy with repolarization abnormality ( Sriram product )  Abnormal ECG  When compared with ECG of 26-May-2025 20:16,  QT has lengthened        Independent Interpretation   Chest x-ray independently interpreted.  There does appear to be small amount of pulmonary edema.    ED Course      Medications Administered   Medications   furosemide (LASIX) injection 40 mg (40 mg Intravenous $Given 7/2/25 5792)       Medical Decision Making /  Diagnosis     ARTIE Aguilar is a 89 year old female who presents to the ED for evaluation of dyspnea on exertion.  She has a history of CHF.  Troponin minimally elevated.  She does not have exertional chest pain.  This does not appear to be cardiac ischemia.  PE unlikely given that she is anticoagulated.  Chest x-ray does appear to show small amount edema.  She also has peripheral edema and elevated BNP.  She was given IV Lasix.  She initially considered going home.  However, she got up to ambulate only a few feet in the hallway and was significantly dyspneic.  Socially admitted for IV diuresis.    Disposition   The patient was admitted to the hospital.     Diagnosis     ICD-10-CM    1. Acute on chronic diastolic congestive heart failure (H)  I50.33             Bruce Schwarz MD  07/02/25 2585

## 2025-07-03 NOTE — CONSULTS
[facility]  WO Nurse Inpatient Assessment     Consulted for: HIRAM    WO nurse follow-up plan: weekly    Patient History (according to provider note(s):      Riddhi Aguilar is a 89 year old female patient with past medical history of atrial fibrillation/a flutter on Eliquis, CHFpEF, chronic kidney disease stage III, osteoporosis, osteoarthritis, kidney stones, dyslipidemia, glaucoma, spinal stenosis, duodenal ulcer, history of breast cancer, history of left lower extremity cellulitis, bilateral lower extremity lymphedema, chronic anemia, was brought from home for evaluation for shortness of breath and generalized weakness.  She also states that she had worsening leg swelling.  She denies noncompliance with her diuretics.     Assessment:      Areas visualized during today's visit: Focused:    Wound location: Left medial leg  Last photo: 7/3/25    Wound due to: Venous Ulcer  Wound history/plan of care: Patient with ulcer starting about 2 months ago and was draining heavily initially.  Wound improving significantly while at TCU.   Wound base: 70 % Fibrin, Slough, and Adipose tissue, 30 % Pink     Palpation of the wound bed: normal      Drainage: small     Description of drainage: serosanguinous     Measurements (length x width x depth, in cm): 1.5  x 2.5  x  0.2 cm      Tunneling: N/A     Undermining: N/A  Periwound skin: scattered areas of dry drainage up to 1x0.5cm      Color: normal and consistent with surrounding tissue      Temperature: normal   Odor: none  Pain: denies   Pain interventions prior to dressing change: N/A  Treatment goal: Heal  and Remove necrotic tissue  STATUS: initial assessment  Supplies ordered: ordered xeroform, Edemawear       Treatment Plan:     Left medial leg wound(s): Daily remove Edemawear for skin inspection, change dressing every 3 days  Cleanse with Vashe and dry  Apply Xeroform (cut to fit) to wound  Cover with Mepilex 4x4  Apply small Edemawear ([facility]#[id number])  from feet to below knees  Do not throw away Edemawear, if soiled wash out in sink and hang to dry, one pair is good for 4-6 months     Orders: Written    RECOMMEND PRIMARY TEAM ORDER: None, at this time  Education provided: plan of care  Discussed plan of care with: Patient, Family, and Nurse  Notify WOC if wound(s) deteriorate.  Nursing to notify the Provider(s) and re-consult the WOC Nurse if new skin concern.    DATA:     Current support surface: Standard  Low air loss (GILES pump, Isolibrium, Pulsate)  BMI: Body mass index is 36.27 kg/m .   Active diet order: Orders Placed This Encounter      Combination Diet Low Saturated Fat Na <2400mg Diet, No Caffeine Diet     Output: I/O last 3 completed shifts:  In: -   Out: 2200 [Urine:2200]     Labs:   Recent Labs   Lab 07/03/25  0755   HGB 11.8   WBC 11.0     Pressure injury risk assessment:   Sensory Perception: 4-->no impairment  Moisture: 3-->occasionally moist  Activity: 3-->walks occasionally  Mobility: 3-->slightly limited  Nutrition: 3-->adequate  Friction and Shear: 3-->no apparent problem  Syed Score: 19    Lv Saini RN CWOCN  Contact Via [facility] Nurse ([place])  Dept. Office Number: [phone]

## 2025-07-03 NOTE — ED TRIAGE NOTES
Pt arrives with family for SOB x a few hours. Recently discharged from rehab. Family reports bilateral leg swelling and weight gain in last week. Intermittent nausea/dizziness and hypertensions. Hx a fib. Takes lasix and spironolactone. VSS on RA.

## 2025-07-03 NOTE — PLAN OF CARE
"Goal Outcome Evaluation:      Plan of Care Reviewed With: patient    Overall Patient Progress: improvingOverall Patient Progress: improving             Arrived from ED at 0035  Denies pain, No shortness of breath at rest however has dyspnea upon exertion. RA. Voiding well with purewick overnight while sleeping, up to bathroom standby with walker GB. Tele Afib 120 rvr  Reports generalized weakness.   Plan tbd      Problem: Adult Inpatient Plan of Care  Goal: Plan of Care Review  Description: The Plan of Care Review/Shift note should be completed every shift.  The Outcome Evaluation is a brief statement about your assessment that the patient is improving, declining, or no change.  This information will be displayed automatically on your shift  note.  Outcome: Progressing  Flowsheets (Taken 7/3/2025 0426)  Plan of Care Reviewed With: patient  Overall Patient Progress: improving  Goal: Patient-Specific Goal (Individualized)  Description: You can add care plan individualizations to a care plan. Examples of Individualization might be:  \"Parent requests to be called daily at 9am for status\", \"I have a hard time hearing out of my right ear\", or \"Do not touch me to wake me up as it startles  me\".  Outcome: Progressing  Goal: Absence of Hospital-Acquired Illness or Injury  Outcome: Progressing  Intervention: Identify and Manage Fall Risk  Recent Flowsheet Documentation  Taken 7/3/2025 0159 by Richie Brush RN  Safety Promotion/Fall Prevention:   activity supervised   clutter free environment maintained   lighting adjusted   mobility aid in reach   nonskid shoes/slippers when out of bed   patient and family education   room near nurse's station   room organization consistent   safety round/check completed  Intervention: Prevent Skin Injury  Recent Flowsheet Documentation  Taken 7/3/2025 0159 by Richie Brush, RN  Body Position: position changed independently  Intervention: Prevent and Manage VTE (Venous Thromboembolism) " Risk  Recent Flowsheet Documentation  Taken 7/3/2025 0159 by Richie Brush RN  VTE Prevention/Management: SCDs off (sequential compression devices)  Intervention: Prevent Infection  Recent Flowsheet Documentation  Taken 7/3/2025 0159 by Richie Brush RN  Infection Prevention:   single patient room provided   rest/sleep promoted   equipment surfaces disinfected   personal protective equipment utilized  Goal: Optimal Comfort and Wellbeing  Outcome: Progressing  Goal: Readiness for Transition of Care  Outcome: Progressing  Intervention: Mutually Develop Transition Plan  Recent Flowsheet Documentation  Taken 7/3/2025 0100 by Richie Brush RN  Equipment Currently Used at Home: none     Problem: Delirium  Goal: Optimal Coping  Outcome: Progressing  Goal: Improved Behavioral Control  Outcome: Progressing  Intervention: Minimize Safety Risk  Recent Flowsheet Documentation  Taken 7/3/2025 0159 by Richie Brush RN  Enhanced Safety Measures:   pain management   review medications for side effects with activity  Goal: Improved Attention and Thought Clarity  Outcome: Progressing  Goal: Improved Sleep  Outcome: Progressing     Problem: Delirium  Goal: Optimal Coping  Outcome: Progressing  Goal: Improved Behavioral Control  Outcome: Progressing  Intervention: Minimize Safety Risk  Recent Flowsheet Documentation  Taken 7/3/2025 0159 by Richie Brush RN  Enhanced Safety Measures:   pain management   review medications for side effects with activity  Goal: Improved Attention and Thought Clarity  Outcome: Progressing  Goal: Improved Sleep  Outcome: Progressing

## 2025-07-03 NOTE — DISCHARGE INSTRUCTIONS
Left medial leg wound(s): Daily remove Edemawear for skin inspection, change dressing every 3 days  Cleanse with Vashe and dry  Apply Xeroform (cut to fit) to wound  Cover with Mepilex 4x4  Apply small Edemawear from feet to below knees  Do not throw away Edemawear, if soiled wash out in sink and hang to dry, one pair is good for 4-6 months

## 2025-07-03 NOTE — PROGRESS NOTES
Clinic Care Coordination Contact    Situation: Patient chart reviewed by care coordinator.    Background: Patient was hospitalized at [facility] from 5/26/25 to 5/30/25 with diagnosis of Mechanical fall  Scalp laceration   Right elbow abrasion  Sepsis  Left lower extremity cellulitis   Bilateral lower extremity lymphedema      Discharged to [facility]CU    Assessment:   Patient now inpatient as of 7/2/25 at [facility].     Plan/Recommendations:   Care Coordination will monitor for discharge as needed.     Ashley Eduardo RN Clinic Care Coordinator  [facility] Clinics: [place], [place] (on-site Wednesdays), [facility] (on-site Thursdays) & [facility].  [email or web address]  Phone: [phone]

## 2025-07-03 NOTE — H&P
[facility]    History and Physical  Hospitalist       Date of Admission:  7/2/2025  Date of Service (when I saw the patient): 07/02/25    Assessment & Plan   Riddhi Aguilar is a 89 year old female patient with past medical history of atrial fibrillation/a flutter on Eliquis, CHFpEF, chronic kidney disease stage III, osteoporosis, osteoarthritis, kidney stones, dyslipidemia, glaucoma, spinal stenosis, duodenal ulcer, history of breast cancer, history of left lower extremity cellulitis, bilateral lower extremity lymphedema, chronic anemia, was brought from home for evaluation for shortness of breath and generalized weakness.  She also states that she had worsening leg swelling.  She denies noncompliance with her diuretics.  In the ER, her vital signs showed temperature 98.3, pulse 94, blood pressure 143/91, oxygen saturation 97% on room air.  Laboratory workup showed sodium 137, potassium 4.7, creatinine 1.08, bicarb 19, anion gap 16, BNP 7216.  Troponin T high-sensitivity 24.  Repeat troponin 26.  WBC 10.4, hemoglobin 11.8.  Chest x-ray showed no evidence of infiltrates or pulmonary vascular congestion.  In the emergency room, she was given Lasix 40 mg IV.  She was admitted to the hospital for further management.    Acute on chronic CHFpEF  Chronic lymphedema  Patient has history of congestive heart failure with preserved ejection fraction.  She is on Lasix 20 mg daily and spironolactone 25 mg p.o. daily.  Her most recent echocardiogram on 4/18/2025 showed normal left ventricular function with ejection fraction of 60 to 65%.  Patient states that she has been having progressive shortness of breath for the past week.  Chest x-ray showed no evidence of pulmonary vascular congestion.  BNP elevated at 7216.  She received Lasix 40 mg IV in the ER.  - Will put her on Lasix 20 mg IV every 12 hours next dose tomorrow morning  - Will monitor input/output, daily weight, BMP  - Further diuretic dosing  adjustment based on your response to diuretic and renal function  - Will monitor on telemetry    Atrial fibrillation/flutter  Anticoagulated with Eliquis.  PTA medications include Eliquis 5 mg p.o. twice daily, metoprolol tartrate 50 mg p.o. twice daily.  -Will resume Eliquis and metoprolol  - Will monitor on telemetry    Dyslipidemia  - Resume atorvastatin    Chronic kidney disease stage III  Baseline creatinine 0.9-1.07.  Creatinine 1.08 today.  -Will monitor renal function while on IV diuretic    Left lower extremity wound  No evidence of wound infection  - Will consult C for wound evaluation and management    Glaucoma  - Will resume your eyedrops    DVT Prophylaxis: Pneumatic Compression Devices  Code Status: DNR / DNI.     Medically Ready for Discharge: Anticipated in 2-4 Days    Candis Garg MD    Primary Care Physician   Shun Prado    Chief Complaint   Shortness of breath    History is obtained from the patient and her son    History of Present Illness   Riddhi Aguilar is a 89 year old female patient with past medical history of atrial fibrillation/a flutter on Eliquis, CHFpEF, chronic kidney disease stage III, osteoporosis, osteoarthritis, kidney stones, dyslipidemia, glaucoma, spinal stenosis, duodenal ulcer, history of breast cancer, history of left lower extremity cellulitis, bilateral lower extremity lymphedema, chronic anemia, was brought from home for evaluation for shortness of breath and generalized weakness.  Patient was recently admitted to this facility after mechanical fall, sepsis and left lower extremity cellulitis.  She was discharged to transitional care unit.  She states that she was discharged from TCU a week ago.  She states that she has been having progressive shortness of breath since discharge from transitional care unit.  She also complains of generalized weakness.  She denied falls.  She has no cough, fever, vomiting, diarrhea, dysuria, urgency or frequency.  She states  that she had worsening leg swelling.  She has chronic wound on left lower extremity but denies new redness or discharge from the wound.  On arrival to emergency room, initial vital signs showed temperature 98.3, pulse 94, blood pressure 143/91, oxygen saturation 97% on room air.  Laboratory workup showed sodium 137, potassium 4.7, creatinine 1.08, bicarb 19, anion gap 16, BNP 7216.  Troponin T high-sensitivity 24.  Repeat troponin 26.  WBC 10.4, hemoglobin 11.8.  Chest x-ray showed no evidence of infiltrates or pulmonary vascular congestion.  In the emergency room, she was given Lasix 40 mg IV.  She was admitted to the hospital for further management.    Past Medical History    I have reviewed this patient's medical history and updated it with pertinent information if needed.   Past Medical History:   Diagnosis Date    Anemia     baseline 10-11    Arthritis     Bleeding ulcer     duodenal ulcer    CKD (chronic kidney disease) stage 3, GFR 30-59 ml/min (H)     Essential hypertension, benign     MDD (major depressive disorder)     Nephrolithiasis     Osteoporosis     Pure hypercholesterolemia     Spinal stenosis     Unspecified cataract     Unspecified glaucoma(365.9)        Past Surgical History   I have reviewed this patient's surgical history and updated it with pertinent information if needed.  Past Surgical History:   Procedure Laterality Date    APPENDECTOMY      BREAST SURGERY      lumpectomy    CLOSED REDUCTION, PERCUTANEOUS PINNING HIP Left 5/20/2024    Procedure: Closed reduction percutaneous pinning, left hip;  Surgeon: Jose Luis Merino MD;  Location:  OR    COMBINED CYSTOSCOPY, RETROGRADES, EXCHANGE STENT URETER(S) Left 01/24/2024    Procedure: Cystoscopy, left retrograde pyelogram, left JJ stent placement, <1hr physician fluoroscopy time;  Surgeon: Aureliano Brandt MD;  Location:  OR    COMBINED CYSTOSCOPY, RETROGRADES, URETEROSCOPY, LASER HOLMIUM LITHOTRIPSY URETER(S), INSERT STENT Left  02/20/2024    Procedure: Cystoscopy, left diagnostic ureteroscopy, left retrograde pyelogram, left ureteral stent removal, fluoroscopic interpretation <1 hour physician time;  Surgeon: Richie Jaquez MD;  Location: [facility] OR    CYSTOSCOPY      CYSTOSCOPY, REMOVE STENT(S), COMBINED Left 02/20/2024    Procedure: Cystoscopy, remove stent(s), combined;  Surgeon: Richie Jaquez MD;  Location: [facility] OR    DILATION AND CURETTAGE, HYSTEROSCOPY DIAGNOSTIC, COMBINED  02/06/2014    Procedure: COMBINED DILATION AND CURETTAGE, HYSTEROSCOPY DIAGNOSTIC;  DILATION AND CURETTAGE, HYSTEROSCOPY, POLYPECTOMY, INCISION AND DRAINAGE OF SEBACEOUS CYST ;  Surgeon: Serena Zamora MD;  Location: [facility]    EXCISE LESION LOWER EXTREMITY Right 08/29/2017    Procedure: EXCISE LESION LOWER EXTREMITY;  WIDE EXCISIONAL BIOPSY OF RIGHT ANKLE BASAL CELL CARCINOMA;  Surgeon: Juan Luis Flores MD;  Location:  OR    INCISION AND DRAINAGE PERINEAL, COMBINED  02/06/2014    Procedure: COMBINED INCISION AND DRAINAGE PERINEAL;;  Surgeon: Serena Zamora MD;  Location: [facility] NONSPECIFIC PROCEDURE      Repair of buckled retina of rt eye    [facility] NONSPECIFIC PROCEDURE      Appy       Prior to Admission Medications   Prior to Admission Medications   Prescriptions Last Dose Informant Patient Reported? Taking?   Lidocaine (LIDOCARE) 4 % Patch   No No   Sig: Place 1 patch over 12 hours onto the skin every 24 hours. To prevent lidocaine toxicity, patient should be patch free for 12 hrs daily.   acetaminophen (TYLENOL) 500 MG tablet   No No   Sig: Take 2 tablets (1,000 mg) by mouth every 8 hours as needed for mild pain.   apixaban ANTICOAGULANT (ELIQUIS) 5 MG tablet   No No   Sig: Take 1 tablet (5 mg) by mouth 2 times daily.   atorvastatin (LIPITOR) 10 MG tablet  Self No No   Sig: TAKE 1 TABLET(10 MG) BY MOUTH DAILY   bisacodyl (DULCOLAX) 10 MG suppository   No No   Sig: Place 1 suppository (10 mg) rectally daily as needed for constipation.   buPROPion  (WELLBUTRIN XL) 150 MG 24 hr tablet   No No   Sig: TAKE 1 TABLET(150 MG) BY MOUTH EVERY MORNING   buPROPion (WELLBUTRIN XL) 300 MG 24 hr tablet  Self No No   Sig: TAKE 1 TABLET(300 MG) BY MOUTH EVERY MORNING   dorzolamide-timolol (COSOPT) 22.3-6.8 MG/ML ophthalmic solution  Self Yes No   Sig: Place 1 drop into both eyes 2 times daily   doxycycline hyclate (VIBRAMYCIN) 100 MG capsule   No No   Sig: Take 1 capsule (100 mg) by mouth 2 times daily.   famotidine (PEPCID) 20 MG tablet   No No   Sig: Take 1 tablet (20 mg) by mouth daily.   furosemide (LASIX) 20 MG tablet   No No   Sig: Take 1 tablet (20 mg) by mouth daily.   magnesium hydroxide (MILK OF MAGNESIA) 400 MG/5ML suspension   No No   Sig: Take 30 mLs by mouth every other day.   melatonin 3 MG tablet   Yes No   Sig: Take 3 mg by mouth at bedtime.   metoprolol tartrate (LOPRESSOR) 50 MG tablet   No No   Sig: Take 1 tablet (50 mg) by mouth 2 times daily.   miconazole (MICATIN) 2 % external powder   No No   Sig: Apply topically 2 times daily as needed for other (candidiasis/intertrigo).   multivitamin w/minerals (THERA-VIT-M) tablet  Self Yes No   Sig: Take 1 tablet by mouth daily   senna-docusate (SENOKOT-S/PERICOLACE) 8.6-50 MG tablet   No No   Sig: Take 1 tablet by mouth 2 times daily. Change to PRN if having loose stools   spironolactone (ALDACTONE) 25 MG tablet   No No   Sig: Take 1 tablet (25 mg) by mouth daily.      Facility-Administered Medications: None     Allergies   No Known Allergies    Social History   I have reviewed this patient's social history and updated it with pertinent information if needed. Riddhi HIRAM Aguilar  reports that she has never smoked. She has never used smokeless tobacco. She reports that she does not drink alcohol and does not use drugs.    Family History   I have reviewed this patient's family history and updated it with pertinent information if needed.   Family History   Problem Relation Age of Onset    Breast Cancer Sister     Breast  Cancer Sister     Cancer Brother         bone cancer in arm       Review of Systems   The 10 point Review of Systems is negative other than noted in the HPI or here. Shortness of breath, leg swelling    Physical Exam   Temp: 98.3  F (36.8  C) Temp src: Oral BP: 128/68 Pulse: 107   Resp: 20 SpO2: 96 % O2 Device: None (Room air)    Vital Signs with Ranges  Temp:  [98.3  F (36.8  C)] 98.3  F (36.8  C)  Pulse:  [] 107  Resp:  [20] 20  BP: (128-153)/(68-96) 128/68  SpO2:  [95 %-97 %] 96 %  0 lbs 0 oz    GEN:  Alert, oriented x 3, appears comfortable, NAD.  HEENT:  Normocephalic/atraumatic, no scleral icterus, no nasal discharge, mouth moist.  CV:  Regular rate and rhythm, no murmur or JVD.  S1 + S2 noted, no S3 or S4.  LUNGS:  Clear to auscultation bilaterally without rales/rhonchi/wheezing/retractions.  Symmetric chest rise on inhalation noted.  ABD:  Active bowel sounds, soft, non-tender/non-distended.  No rebound/guarding/rigidity.  EXT: 1-2+ edema.  Hands/feet warm to touch with good signs of peripheral perfusion.  No joint synovitis noted.  SKIN:  Wound on left lower extremity, dressed  NEURO:  Symmetric muscle strength, sensation to touch grossly intact.  No new focal deficits appreciated.    Data   Data reviewed today:  I personally reviewed   Recent Labs   Lab 07/02/25 2052   WBC 10.4   HGB 11.8   *         POTASSIUM 4.7   CHLORIDE 102   CO2 19*   BUN 27.0*   CR 1.08*   ANIONGAP 16*   TAMMY 9.3   GLC 79       Recent Results (from the past 24 hours)   XR Chest 2 Views    Narrative    EXAM: XR CHEST 2 VIEWS  LOCATION: [facility]  DATE: 7/2/2025    INDICATION: dyspnea  COMPARISON: 4/17/2025      Impression    IMPRESSION: Heart size is normal. Dense mitral annular calcification. No CHF, lobar consolidation or large effusions. No pneumothorax. Degenerative changes in both shoulders and in the thoracic spine.

## 2025-07-04 VITALS
DIASTOLIC BLOOD PRESSURE: 71 MMHG | RESPIRATION RATE: 18 BRPM | TEMPERATURE: 96.8 F | BODY MASS INDEX: 35.4 KG/M2 | HEART RATE: 77 BPM | SYSTOLIC BLOOD PRESSURE: 134 MMHG | WEIGHT: 175.27 LBS | OXYGEN SATURATION: 96 %

## 2025-07-04 LAB
ANION GAP SERPL CALCULATED.3IONS-SCNC: 13 MMOL/L (ref 7–15)
BUN SERPL-MCNC: 29.6 MG/DL (ref 8–23)
CALCIUM SERPL-MCNC: 9.5 MG/DL (ref 8.8–10.4)
CHLORIDE SERPL-SCNC: 96 MMOL/L (ref 98–107)
CREAT SERPL-MCNC: 1.15 MG/DL (ref 0.51–0.95)
EGFRCR SERPLBLD CKD-EPI 2021: 44 ML/MIN/1.73M2
GLUCOSE SERPL-MCNC: 99 MG/DL (ref 70–99)
HCO3 SERPL-SCNC: 26 MMOL/L (ref 22–29)
HOLD SPECIMEN: NORMAL
POTASSIUM SERPL-SCNC: 3.7 MMOL/L (ref 3.4–5.3)
SODIUM SERPL-SCNC: 135 MMOL/L (ref 135–145)

## 2025-07-04 PROCEDURE — 250N000011 HC RX IP 250 OP 636: Performed by: INTERNAL MEDICINE

## 2025-07-04 PROCEDURE — 250N000013 HC RX MED GY IP 250 OP 250 PS 637: Performed by: INTERNAL MEDICINE

## 2025-07-04 PROCEDURE — 36415 COLL VENOUS BLD VENIPUNCTURE: CPT | Performed by: INTERNAL MEDICINE

## 2025-07-04 PROCEDURE — 80048 BASIC METABOLIC PNL TOTAL CA: CPT | Performed by: INTERNAL MEDICINE

## 2025-07-04 PROCEDURE — 99238 HOSP IP/OBS DSCHRG MGMT 30/<: CPT | Performed by: INTERNAL MEDICINE

## 2025-07-04 RX ORDER — FUROSEMIDE 40 MG/1
40 TABLET ORAL DAILY
Qty: 30 TABLET | Refills: 0 | Status: SHIPPED | OUTPATIENT
Start: 2025-07-04 | End: 2025-07-04

## 2025-07-04 RX ORDER — FUROSEMIDE 40 MG/1
40 TABLET ORAL DAILY
Qty: 30 TABLET | Refills: 0 | Status: SHIPPED | OUTPATIENT
Start: 2025-07-04 | End: 2025-07-17

## 2025-07-04 RX ADMIN — METOPROLOL TARTRATE 50 MG: 50 TABLET, FILM COATED ORAL at 08:11

## 2025-07-04 RX ADMIN — Medication 1 TABLET: at 08:11

## 2025-07-04 RX ADMIN — APIXABAN 5 MG: 5 TABLET, FILM COATED ORAL at 08:11

## 2025-07-04 RX ADMIN — DORZOLAMIDE HYDROCHLORIDE AND TIMOLOL MALEATE 1 DROP: 22.3; 6.8 SOLUTION/ DROPS OPHTHALMIC at 08:17

## 2025-07-04 RX ADMIN — FAMOTIDINE 20 MG: 20 TABLET, FILM COATED ORAL at 08:11

## 2025-07-04 RX ADMIN — ATORVASTATIN CALCIUM 10 MG: 10 TABLET, FILM COATED ORAL at 08:11

## 2025-07-04 RX ADMIN — BUPROPION HYDROCHLORIDE 150 MG: 150 TABLET, EXTENDED RELEASE ORAL at 08:10

## 2025-07-04 RX ADMIN — BUPROPION HYDROCHLORIDE 300 MG: 150 TABLET, EXTENDED RELEASE ORAL at 08:14

## 2025-07-04 RX ADMIN — FUROSEMIDE 20 MG: 10 INJECTION, SOLUTION INTRAMUSCULAR; INTRAVENOUS at 08:11

## 2025-07-04 ASSESSMENT — ACTIVITIES OF DAILY LIVING (ADL)
ADLS_ACUITY_SCORE: 66
ADLS_ACUITY_SCORE: 69
ADLS_ACUITY_SCORE: 66
ADLS_ACUITY_SCORE: 66
ADLS_ACUITY_SCORE: 69
DEPENDENT_IADLS:: CLEANING;COOKING;LAUNDRY;SHOPPING;MEAL PREPARATION;MEDICATION MANAGEMENT;TRANSPORTATION
ADLS_ACUITY_SCORE: 66
ADLS_ACUITY_SCORE: 69
ADLS_ACUITY_SCORE: 69

## 2025-07-04 NOTE — DISCHARGE SUMMARY
"[facility]  Hospitalist Discharge Summary      Date of Admission:  7/2/2025  Date of Discharge:  7/4/2025  Discharging Provider: Marcus Pool MD  Discharge Service: Hospitalist Service    Discharge Diagnoses     Acute on chronic diastolic congestive heart failure    Clinically Significant Risk Factors     # Obesity: Estimated body mass index is 35.4 kg/m  as calculated from the following:    Height as of 6/23/25: 1.499 m (4' 11\").    Weight as of this encounter: 79.5 kg (175 lb 4.3 oz).       Follow-ups Needed After Discharge   Follow-up Appointments       Hospital Follow-up with Existing Primary Care Provider (PCP)          Schedule Primary Care visit within: 7 Days   Recommended labs and Imaging (to be ordered by Primary Care Provider): BMP                   Discharge Disposition   Discharged to home  Condition at discharge: Stable    Hospital Course     Riddhi Aguilar is a 89 year old female patient with past medical history of atrial fibrillation/a flutter on Eliquis, CHFpEF, chronic kidney disease stage III, osteoporosis, osteoarthritis, kidney stones, dyslipidemia, glaucoma, spinal stenosis, duodenal ulcer, history of breast cancer, history of left lower extremity cellulitis, bilateral lower extremity lymphedema, chronic anemia, was brought from home for evaluation for shortness of breath and generalized weakness.  She also states that she had worsening leg swelling.  She denies noncompliance with her diuretics.  In the ER, her vital signs showed temperature 98.3, pulse 94, blood pressure 143/91, oxygen saturation 97% on room air.  Laboratory workup showed sodium 137, potassium 4.7, creatinine 1.08, bicarb 19, anion gap 16, BNP 7216.  Troponin T high-sensitivity 24.  Repeat troponin 26.  WBC 10.4, hemoglobin 11.8.  Chest x-ray showed no evidence of infiltrates or pulmonary vascular congestion.  In the emergency room, she was given Lasix 40 mg IV.  She was admitted to the hospital " for further management.     Patient was admitted and was closely monitored on telemetry.  She was diuresed with IV 6 and lost about 4 pounds.  About 3 L of negative fluid balance.  Patient was advised to continue Lasix at increased dose of 40 mg once a day, continue Aldactone and close monitoring of her swelling and daily weight.  On the day of discharge she was awake alert and ambulatory.  Denies any chest pain or significant shortness of breath.    Consultations This Hospital Stay   WOUND OSTOMY CONTINENCE NURSE  IP CONSULT  CARE MANAGEMENT / SOCIAL WORK IP CONSULT    Code Status   No CPR- Do NOT Intubate    Time Spent on this Encounter   I, Marcus Pool MD, personally saw the patient today and spent less than or equal to 30 minutes discharging this patient.       Marcus Pool MD  [facility]  [address] [place] [address]  Phone: [phone]  Fax: [phone]  ______________________________________________________________________    Physical Exam   Vital Signs: Temp: 96.8  F (36  C) Temp src: Temporal BP: 134/71 Pulse: 77   Resp: 18 SpO2: 96 % O2 Device: None (Room air)    Weight: 175 lbs 4.25 oz       Primary Care Physician   Shun Prado    Discharge Orders      Home Care Referral      Reason for your hospital stay    CHF     Activity    Your activity upon discharge: activity as tolerated     Diet    Follow this diet upon discharge: Current Diet:Orders Placed This Encounter      Combination Diet Low Saturated Fat Na <2400mg Diet,     Hospital Follow-up with Existing Primary Care Provider (PCP)            Significant Results and Procedures   Most Recent 3 BMP's:  Recent Labs   Lab Test 07/04/25  0649 07/03/25  0755 07/02/25 2052    137 137   POTASSIUM 3.7 4.3 4.7   CHLORIDE 96* 99 102   CO2 26 23 19*   BUN 29.6* 25.5* 27.0*   CR 1.15* 1.08* 1.08*   ANIONGAP 13 15 16*   TAMMY 9.5 9.7 9.3   GLC 99 80 79       Discharge Medications      Review of your  medicines        CHANGE how you take these medications        Dose / Directions   furosemide 40 MG tablet  Commonly known as: LASIX  This may have changed:   medication strength  how much to take  Used for: Acute congestive heart failure, unspecified heart failure type (H)      Dose: 40 mg  Take 1 tablet (40 mg) by mouth daily.  Quantity: 30 tablet  Refills: 0            CONTINUE these medicines which have NOT CHANGED        Dose / Directions   acetaminophen 500 MG tablet  Commonly known as: TYLENOL  Used for: Pain      Dose: 1,000 mg  Take 2 tablets (1,000 mg) by mouth every 8 hours as needed for mild pain.  Refills: 0     apixaban ANTICOAGULANT 5 MG tablet  Commonly known as: ELIQUIS  Indication: Atrial Fibrillation Not Caused By A Heart Valve Problem  Used for: Atrial flutter with rapid ventricular response (H)      Dose: 5 mg  Take 1 tablet (5 mg) by mouth 2 times daily.  Refills: 0     atorvastatin 10 MG tablet  Commonly known as: LIPITOR  Used for: Hyperlipidemia LDL goal <130      Dose: 10 mg  TAKE 1 TABLET(10 MG) BY MOUTH DAILY  Quantity: 90 tablet  Refills: 0     bisacodyl 10 MG suppository  Commonly known as: DULCOLAX  Used for: Constipation, unspecified constipation type      Dose: 10 mg  Place 1 suppository (10 mg) rectally daily as needed for constipation.  Refills: 0     * buPROPion 300 MG 24 hr tablet  Commonly known as: WELLBUTRIN XL  Used for: Major depressive disorder, recurrent episode, mild      TAKE 1 TABLET(300 MG) BY MOUTH EVERY MORNING  Quantity: 90 tablet  Refills: 3     * buPROPion 150 MG 24 hr tablet  Commonly known as: WELLBUTRIN XL  Used for: Major depressive disorder, recurrent episode, mild      Dose: 150 mg  TAKE 1 TABLET(150 MG) BY MOUTH EVERY MORNING  Quantity: 90 tablet  Refills: 3     dorzolamide-timolol 2-0.5 % ophthalmic solution  Commonly known as: COSOPT      Dose: 1 drop  Place 1 drop into both eyes 2 times daily  Refills: 0     famotidine 20 MG tablet  Commonly known as:  PEPCID  Used for: Gastroesophageal reflux disease without esophagitis      Dose: 20 mg  Take 1 tablet (20 mg) by mouth daily.  Refills: 0     Lidocaine 4 % Patch  Commonly known as: LIDOCARE  Used for: Closed nondisplaced fracture of head of left radius, initial encounter      Dose: 1 patch  Place 1 patch over 12 hours onto the skin every 24 hours. To prevent lidocaine toxicity, patient should be patch free for 12 hrs daily.  Refills: 0     magnesium hydroxide 400 MG/5ML suspension  Commonly known as: MILK OF MAGNESIA      Dose: 30 mL  Take 30 mLs by mouth every other day.  Refills: 0     melatonin 3 MG tablet      Dose: 3 mg  Take 3 mg by mouth at bedtime.  Refills: 0     metoprolol tartrate 50 MG tablet  Commonly known as: LOPRESSOR  Used for: Atrial flutter with rapid ventricular response (H)      Dose: 50 mg  Take 1 tablet (50 mg) by mouth 2 times daily.  Refills: 0     miconazole 2 % external powder  Commonly known as: MICATIN  Used for: Rash      Apply topically 2 times daily as needed for other (candidiasis/intertrigo).  Refills: 0     multivitamin w/minerals tablet      Dose: 1 tablet  Take 1 tablet by mouth daily  Refills: 0     senna-docusate 8.6-50 MG tablet  Commonly known as: SENOKOT-S/PERICOLACE  Used for: Constipation, unspecified constipation type      Dose: 1 tablet  Take 1 tablet by mouth 2 times daily. Change to PRN if having loose stools  Refills: 0     spironolactone 25 MG tablet  Commonly known as: ALDACTONE      Dose: 25 mg  Take 1 tablet (25 mg) by mouth daily.  Refills: 0           * This list has 2 medication(s) that are the same as other medications prescribed for you. Read the directions carefully, and ask your doctor or other care provider to review them with you.                   Where to get your medicines        These medications were sent to [facility] [place] [id number] [facility] [address], [facility] [address]      Phone: [phone]   furosemide  40 MG tablet       Allergies   No Known Allergies

## 2025-07-04 NOTE — PLAN OF CARE
"AOX4, RA. Denies pain. Up 1 with walker and GB, voiding. On tele afib with CVR.   Plan of discharge TBD.  /80 (BP Location: Right arm)   Pulse 82   Temp 97.2  F (36.2  C) (Temporal)   Resp 20   Wt 79.5 kg (175 lb 4.3 oz)   SpO2 94%   BMI 35.40 kg/m      Goal Outcome Evaluation:      Plan of Care Reviewed With: patient    Overall Patient Progress: improvingOverall Patient Progress: improving    Problem: Adult Inpatient Plan of Care  Goal: Plan of Care Review  Description: The Plan of Care Review/Shift note should be completed every shift.  The Outcome Evaluation is a brief statement about your assessment that the patient is improving, declining, or no change.  This information will be displayed automatically on your shift  note.  Outcome: Progressing  Flowsheets (Taken 7/4/2025 0413)  Plan of Care Reviewed With: patient  Overall Patient Progress: improving  Goal: Patient-Specific Goal (Individualized)  Description: You can add care plan individualizations to a care plan. Examples of Individualization might be:  \"Parent requests to be called daily at 9am for status\", \"I have a hard time hearing out of my right ear\", or \"Do not touch me to wake me up as it startles  me\".  Outcome: Progressing  Goal: Absence of Hospital-Acquired Illness or Injury  Outcome: Progressing  Intervention: Identify and Manage Fall Risk  Recent Flowsheet Documentation  Taken 7/4/2025 0350 by Michelle Alexander RN  Safety Promotion/Fall Prevention:   activity supervised   assistive device/personal items within reach   clutter free environment maintained   increased rounding and observation   nonskid shoes/slippers when out of bed   patient and family education   safety round/check completed   supervised activity  Intervention: Prevent and Manage VTE (Venous Thromboembolism) Risk  Recent Flowsheet Documentation  Taken 7/4/2025 0350 by Michelle Alexander, RN  VTE Prevention/Management: (up in the chair) SCDs off (sequential " compression devices)  Intervention: Prevent Infection  Recent Flowsheet Documentation  Taken 7/4/2025 0350 by Michelle Alexander RN  Infection Prevention:   hand hygiene promoted   rest/sleep promoted   single patient room provided  Goal: Optimal Comfort and Wellbeing  Outcome: Progressing  Goal: Readiness for Transition of Care  Outcome: Progressing     Problem: Delirium  Goal: Optimal Coping  Outcome: Progressing  Goal: Improved Behavioral Control  Outcome: Progressing  Intervention: Minimize Safety Risk  Recent Flowsheet Documentation  Taken 7/4/2025 0350 by Michelle Alexander RN  Enhanced Safety Measures:   assistive devices when indicated   room near unit station  Goal: Improved Attention and Thought Clarity  Outcome: Progressing  Goal: Improved Sleep  Outcome: Progressing     Problem: Heart Failure  Goal: Optimal Coping  Outcome: Progressing  Goal: Optimal Cardiac Output  Outcome: Progressing  Goal: Stable Heart Rate and Rhythm  Outcome: Progressing  Goal: Fluid and Electrolyte Balance  Outcome: Progressing  Goal: Optimal Functional Ability  Outcome: Progressing  Goal: Improved Oral Intake  Outcome: Progressing  Goal: Effective Oxygenation and Ventilation  Outcome: Progressing  Intervention: Promote Airway Secretion Clearance  Recent Flowsheet Documentation  Taken 7/4/2025 0350 by Michelle Alexander RN  Cough And Deep Breathing: done with encouragement  Goal: Effective Breathing Pattern During Sleep  Outcome: Progressing  Intervention: Monitor and Manage Obstructive Sleep Apnea  Recent Flowsheet Documentation  Taken 7/4/2025 0350 by Michelle Alexander RN  Medication Review/Management: medications reviewed

## 2025-07-04 NOTE — PLAN OF CARE
A&Ox4.   LS CTA, on RA sats 96%.  Denies pain or discomfort. Some REDDY.  Tele: Afib.  Up with SBA.  Up to BR, up in chair.  PIV removed.  Discharge instructions reviewed with patient.  Personal belongings with patient.  discharge back to [facility] with family transport via w/c.  Problem: Adult Inpatient Plan of Care  Goal: Absence of Hospital-Acquired Illness or Injury  Intervention: Identify and Manage Fall Risk  Recent Flowsheet Documentation  Taken 7/4/2025 0900 by Edie Brush RN  Safety Promotion/Fall Prevention:   activity supervised   clutter free environment maintained   mobility aid in reach   nonskid shoes/slippers when out of bed   patient and family education   room near nurse's station   safety round/check completed  Intervention: Prevent and Manage VTE (Venous Thromboembolism) Risk  Recent Flowsheet Documentation  Taken 7/4/2025 0900 by Edie Brush RN  VTE Prevention/Management: SCDs off (sequential compression devices)  Goal: Optimal Comfort and Wellbeing  Intervention: Provide Person-Centered Care  Recent Flowsheet Documentation  Taken 7/4/2025 0900 by Edie Brush RN  Trust Relationship/Rapport:   care explained   choices provided   questions answered   questions encouraged   thoughts/feelings acknowledged     Problem: Delirium  Goal: Improved Behavioral Control  Intervention: Minimize Safety Risk  Recent Flowsheet Documentation  Taken 7/4/2025 0900 by Edie Brush RN  Enhanced Safety Measures:   assistive devices when indicated   pain management   room near unit station  Trust Relationship/Rapport:   care explained   choices provided   questions answered   questions encouraged   thoughts/feelings acknowledged     Problem: Heart Failure  Goal: Fluid and Electrolyte Balance  Intervention: Monitor and Manage Fluid and Electrolyte Balance  Recent Flowsheet Documentation  Taken 7/4/2025 0900 by Edie Brush RN  Fluid/Electrolyte Management: fluids provided  Goal: Optimal  Functional Ability  Intervention: Optimize Functional Ability  Recent Flowsheet Documentation  Taken 7/4/2025 0900 by Edie Brush RN  Activity Management: up in chair  Goal: Effective Oxygenation and Ventilation  Intervention: Promote Airway Secretion Clearance  Recent Flowsheet Documentation  Taken 7/4/2025 0900 by Edie Brush RN  Cough And Deep Breathing: done with encouragement  Activity Management: up in chair  Intervention: Optimize Oxygenation and Ventilation  Recent Flowsheet Documentation  Taken 7/4/2025 0900 by Edie Brush RN  Airway/Ventilation Management: pulmonary hygiene promoted  Goal: Effective Breathing Pattern During Sleep  Intervention: Monitor and Manage Obstructive Sleep Apnea  Recent Flowsheet Documentation  Taken 7/4/2025 0900 by Edie Brush RN  Medication Review/Management:   medications reviewed   high-risk medications identified   Goal Outcome Evaluation:

## 2025-07-04 NOTE — CONSULTS
Care Management Initial Consult    General Information  Assessment completed with: Patient,    Type of CM/SW Visit: Initial Assessment    Primary Care Provider verified and updated as needed: Yes   Readmission within the last 30 days: no previous admission in last 30 days      Reason for Consult: discharge planning  Advance Care Planning: Advance Care Planning Reviewed: no concerns identified, present on chart        Communication Assessment  Patient's communication style: spoken language (English or Bilingual)    Hearing Difficulty or Deaf: yes   Wear Glasses or Blind: yes    Cognitive  Cognitive/Neuro/Behavioral: WDL                      Living Environment:   People in home: alone     Current living Arrangements: assisted living  Name of Facility: [facility]   Able to return to prior arrangements: yes     Family/Social Support:  Care provided by:  (facility staff)  Provides care for: no one, unable/limited ability to care for self  Marital Status:   Support system: Children          Description of Support System: Supportive, Involved       Current Resources:   Patient receiving home care services: No     Community Resources: Home Care  Equipment currently used at home: walker, rolling  Supplies currently used at home:      Employment/Financial:  Employment Status: retired        Financial Concerns: none      Does the patient's insurance plan have a 3 day qualifying hospital stay waiver?  Yes     Which insurance plan 3 day waiver is available? Alternative insurance waiver    Will the waiver be used for post-acute placement? No    Lifestyle & Psychosocial Needs:  Social Drivers of Health     Food Insecurity: Low Risk  (7/3/2025)    Food Insecurity     Within the past 12 months, did you worry that your food would run out before you got money to buy more?: No     Within the past 12 months, did the food you bought just not last and you didn t have money to get more?: No   Depression: Not at risk  (2/17/2025)    PHQ-2     PHQ-2 Score: 0   Housing Stability: Low Risk  (7/3/2025)    Housing Stability     Do you have housing? : Yes     Are you worried about losing your housing?: No   Tobacco Use: Low Risk  (6/23/2025)    Patient History     Smoking Tobacco Use: Never     Smokeless Tobacco Use: Never     Passive Exposure: Not on file   Financial Resource Strain: Low Risk  (7/3/2025)    Financial Resource Strain     Within the past 12 months, have you or your family members you live with been unable to get utilities (heat, electricity) when it was really needed?: No   Recent Concern: Financial Resource Strain - High Risk (4/18/2025)    Financial Resource Strain     Within the past 12 months, have you or your family members you live with been unable to get utilities (heat, electricity) when it was really needed?: Yes   Alcohol Use: Not on file   Transportation Needs: Low Risk  (7/3/2025)    Transportation Needs     Within the past 12 months, has lack of transportation kept you from medical appointments, getting your medicines, non-medical meetings or appointments, work, or from getting things that you need?: No   Physical Activity: Unknown (5/14/2024)    Exercise Vital Sign     Days of Exercise per Week: 4 days     Minutes of Exercise per Session: Not on file   Interpersonal Safety: Low Risk  (7/3/2025)    Interpersonal Safety     Do you feel physically and emotionally safe where you currently live?: Yes     Within the past 12 months, have you been hit, slapped, kicked or otherwise physically hurt by someone?: No     Within the past 12 months, have you been humiliated or emotionally abused in other ways by your partner or ex-partner?: No   Stress: Not on file (11/9/2024)   Social Connections: Unknown (5/14/2024)    Social Connection and Isolation Panel [NHANES]     Frequency of Communication with Friends and Family: Not on file     Frequency of Social Gatherings with Friends and Family: Once a week     Attends  Buddhism Services: Not on file     Active Member of Clubs or Organizations: Not on file     Attends Club or Organization Meetings: Not on file     Marital Status: Not on file   Health Literacy: Not on file       Functional Status:  Prior to admission patient needed assistance:   Dependent ADLs:: Ambulation-walker  Dependent IADLs:: Cleaning, Cooking, Laundry, Shopping, Meal Preparation, Medication Management, Transportation       Mental Health Status:  Mental Health Status: No Current Concerns       Values/Beliefs:  Spiritual, Cultural Beliefs, Buddhism Practices, Values that affect care: no             Discussed  Partnership in Safe Discharge Planning  document with patient/family: No    Care Management Discharge Note    Discharge Date: 07/04/2025       Discharge Disposition: Assisted Living, Home Care    Discharge Services:  home care    Discharge DME: None    Discharge Transportation: family or friend will provide    Private pay costs discussed: Not applicable    Education Provided on the Discharge Plan:  yes  Persons Notified of Discharge Plans: patient  Patient/Family in Agreement with the Plan:  yes    Handoff Referral Completed: No, handoff not indicated or clinically appropriate     Additional Information:  Care management consult for discharge planning/disposition. Patient admitted for acute on chronic CHF.     Met with patient at bedside. Patient states she just moved in to [facility] living. She states they manage her medications, meals, showers, cleaning and laundry. She ambulates with a walker at baseline. She is open to homecare with [facility] for nursing. Patient will be discharging today. States her family will transport.     Called and spoke with nurse at [facility]. Confirmed services. Updated that patient is discharging back today. Has a change to her Lasix dose. They requested that patient be back there by 1400. Their fax is [phone]. They would like [facility]. Updated provider of pharmacy.     Updated patient that facility would like her back by 1400. She will let her family know and they will transport her by then. Discharge orders faxed to [facility]. Discharge orders also sent to [facility].       Courtney Smith RN  Care Coordinator  [facility]

## 2025-07-04 NOTE — PLAN OF CARE
"Goal Outcome Evaluation:      Plan of Care Reviewed With: patient    Overall Patient Progress: improvingOverall Patient Progress: improving    Outcome Evaluation: on room air, REDDY , BLE lymphadema wear, IV lasix    No c/o pain. Up with 1 assist & walker. Neyda regular diet. Voiding with incontinence at times. C/o constipation- senna colace given.       Problem: Adult Inpatient Plan of Care  Goal: Plan of Care Review  Description: The Plan of Care Review/Shift note should be completed every shift.  The Outcome Evaluation is a brief statement about your assessment that the patient is improving, declining, or no change.  This information will be displayed automatically on your shift  note.  Outcome: Progressing  Flowsheets (Taken 7/3/2025 2317)  Outcome Evaluation: on room air, REDDY , BLE lymphadema wear, IV lasix  Plan of Care Reviewed With: patient  Overall Patient Progress: improving  Goal: Patient-Specific Goal (Individualized)  Description: You can add care plan individualizations to a care plan. Examples of Individualization might be:  \"Parent requests to be called daily at 9am for status\", \"I have a hard time hearing out of my right ear\", or \"Do not touch me to wake me up as it startles  me\".  Outcome: Progressing  Goal: Absence of Hospital-Acquired Illness or Injury  Outcome: Progressing  Intervention: Identify and Manage Fall Risk  Recent Flowsheet Documentation  Taken 7/3/2025 1625 by Ashley Steel, RN  Safety Promotion/Fall Prevention:   activity supervised   assistive device/personal items within reach   clutter free environment maintained   increased rounding and observation   nonskid shoes/slippers when out of bed   patient and family education   safety round/check completed   supervised activity  Intervention: Prevent Skin Injury  Recent Flowsheet Documentation  Taken 7/3/2025 2055 by Ashley Steel, RN  Body Position: supine, head elevated  Goal: Optimal Comfort and Wellbeing  Outcome: " Progressing  Intervention: Provide Person-Centered Care  Recent Flowsheet Documentation  Taken 7/3/2025 1625 by Ashley Steel RN  Trust Relationship/Rapport:   care explained   choices provided   questions answered   reassurance provided  Goal: Readiness for Transition of Care  Outcome: Progressing     Problem: Delirium  Goal: Optimal Coping  Outcome: Progressing  Goal: Improved Behavioral Control  Outcome: Progressing  Intervention: Minimize Safety Risk  Recent Flowsheet Documentation  Taken 7/3/2025 1625 by Ashley Steel RN  Trust Relationship/Rapport:   care explained   choices provided   questions answered   reassurance provided  Goal: Improved Attention and Thought Clarity  Outcome: Progressing  Goal: Improved Sleep  Outcome: Progressing     Problem: Heart Failure  Goal: Optimal Coping  Outcome: Progressing  Goal: Optimal Cardiac Output  Outcome: Progressing  Goal: Stable Heart Rate and Rhythm  Outcome: Progressing  Goal: Fluid and Electrolyte Balance  Outcome: Progressing  Goal: Optimal Functional Ability  Outcome: Progressing  Intervention: Optimize Functional Ability  Recent Flowsheet Documentation  Taken 7/3/2025 2055 by Ashley Steel RN  Activity Management:   ambulated to bathroom   back to bed  Taken 7/3/2025 1907 by Ashley Steel RN  Activity Management: ambulated to bathroom  Goal: Improved Oral Intake  Outcome: Progressing  Goal: Effective Oxygenation and Ventilation  Outcome: Progressing  Intervention: Promote Airway Secretion Clearance  Recent Flowsheet Documentation  Taken 7/3/2025 2055 by Ashley Steel RN  Activity Management:   ambulated to bathroom   back to bed  Taken 7/3/2025 1907 by Ashley Steel RN  Activity Management: ambulated to bathroom  Taken 7/3/2025 1625 by Ashley Steel RN  Cough And Deep Breathing: done with encouragement  Goal: Effective Breathing Pattern During Sleep  Outcome: Progressing

## 2025-07-07 ENCOUNTER — ASSISTED LIVING VISIT (OUTPATIENT)
Dept: GERIATRICS | Facility: CLINIC | Age: OVER 89
End: 2025-07-07
Payer: COMMERCIAL

## 2025-07-07 VITALS — HEIGHT: 59 IN | WEIGHT: 174 LBS | BODY MASS INDEX: 35.08 KG/M2

## 2025-07-07 DIAGNOSIS — I50.30 HEART FAILURE WITH PRESERVED EJECTION FRACTION, NYHA CLASS I (H): Primary | ICD-10-CM

## 2025-07-07 DIAGNOSIS — D64.9 ANEMIA, UNSPECIFIED TYPE: ICD-10-CM

## 2025-07-07 DIAGNOSIS — R53.81 PHYSICAL DECONDITIONING: ICD-10-CM

## 2025-07-07 DIAGNOSIS — S81.802D OPEN WOUND OF LEFT LOWER EXTREMITY, SUBSEQUENT ENCOUNTER: ICD-10-CM

## 2025-07-07 DIAGNOSIS — I48.91 ATRIAL FIBRILLATION, UNSPECIFIED TYPE (H): ICD-10-CM

## 2025-07-07 DIAGNOSIS — I89.0 LYMPHEDEMA: ICD-10-CM

## 2025-07-07 DIAGNOSIS — N18.30 STAGE 3 CHRONIC KIDNEY DISEASE, UNSPECIFIED WHETHER STAGE 3A OR 3B CKD (H): ICD-10-CM

## 2025-07-07 DIAGNOSIS — I10 ESSENTIAL HYPERTENSION, BENIGN: ICD-10-CM

## 2025-07-07 PROBLEM — H61.21 RIGHT EAR IMPACTED CERUMEN: Status: ACTIVE | Noted: 2025-07-07

## 2025-07-07 PROBLEM — R52 PAIN, UNSPECIFIED: Status: ACTIVE | Noted: 2024-01-30

## 2025-07-07 PROBLEM — L97.909 CHRONIC SKIN ULCER OF LOWER LEG (H): Status: ACTIVE | Noted: 2025-05-30

## 2025-07-07 PROBLEM — S50.319A ABRASION OF ELBOW: Status: ACTIVE | Noted: 2025-05-30

## 2025-07-07 PROBLEM — K26.7 CHRONIC DUODENAL ULCER WITHOUT HEMORRHAGE OR PERFORATION: Status: ACTIVE | Noted: 2025-04-21

## 2025-07-07 PROBLEM — M81.0 AGE-RELATED OSTEOPOROSIS WITHOUT CURRENT PATHOLOGICAL FRACTURE: Status: ACTIVE | Noted: 2024-01-30

## 2025-07-07 PROBLEM — E55.9 VITAMIN D DEFICIENCY, UNSPECIFIED: Status: ACTIVE | Noted: 2024-01-31

## 2025-07-07 PROBLEM — K21.9 GASTROESOPHAGEAL REFLUX DISEASE WITHOUT ESOPHAGITIS: Status: ACTIVE | Noted: 2025-05-30

## 2025-07-07 PROBLEM — J31.0 CHRONIC RHINITIS: Status: ACTIVE | Noted: 2025-07-07

## 2025-07-07 PROBLEM — H90.3 BILATERAL SENSORINEURAL HEARING LOSS: Status: ACTIVE | Noted: 2025-07-07

## 2025-07-07 PROBLEM — H73.009 ACUTE MYRINGITIS: Status: ACTIVE | Noted: 2025-07-07

## 2025-07-07 PROBLEM — R12 HEARTBURN: Status: ACTIVE | Noted: 2024-01-30

## 2025-07-07 PROBLEM — H72.91 PERFORATION OF RIGHT TYMPANIC MEMBRANE: Status: ACTIVE | Noted: 2025-07-07

## 2025-07-07 PROBLEM — M48.00 SPINAL STENOSIS, SITE UNSPECIFIED: Status: ACTIVE | Noted: 2024-01-30

## 2025-07-07 PROBLEM — F41.9 ANXIETY DISORDER, UNSPECIFIED: Status: ACTIVE | Noted: 2024-01-30

## 2025-07-07 PROBLEM — L89.313 PRESSURE INJURY OF RIGHT BUTTOCK, STAGE 3 (H): Status: ACTIVE | Noted: 2025-05-30

## 2025-07-07 PROBLEM — Z91.81 HISTORY OF FALLING: Status: ACTIVE | Noted: 2024-05-22

## 2025-07-07 PROBLEM — I87.2 PERIPHERAL VENOUS INSUFFICIENCY: Status: ACTIVE | Noted: 2025-05-30

## 2025-07-07 PROBLEM — M19.90 OSTEOARTHROSIS: Status: ACTIVE | Noted: 2025-05-30

## 2025-07-07 PROBLEM — K59.00 CONSTIPATION, UNSPECIFIED: Status: ACTIVE | Noted: 2024-01-30

## 2025-07-07 PROCEDURE — 99349 HOME/RES VST EST MOD MDM 40: CPT | Performed by: NURSE PRACTITIONER

## 2025-07-07 NOTE — PROGRESS NOTES
[facility] GERIATRICS    PRIMARY CARE PROVIDER AND CLINIC:  Shun Prado MD, [address] / [place] [address]***  Chief Complaint   Patient presents with    Hospital F/U    [facility] Medical Record Number:  [id number]  Place of Service where encounter took place:  [facility] ([facility]) [[id number]]    Riddhi Aguilar  is a 89 year old  (2/15/1931), admitted to the above facility from  [facility]. Hospital stay 7/2/2025 through 7/4/2025..   HPI:    ***    CODE STATUS/ADVANCE DIRECTIVES DISCUSSION:  Prior  {CODE STATUS:403539}  ALLERGIES: No Known Allergies   PAST MEDICAL HISTORY:   Past Medical History:   Diagnosis Date    Anemia     baseline 10-11    Arthritis     Bleeding ulcer     duodenal ulcer    CKD (chronic kidney disease) stage 3, GFR 30-59 ml/min (H)     Esophageal reflux 7/22/2010    Gastroesophageal Reflux Disease    Essential hypertension, benign     MDD (major depressive disorder)     Nephrolithiasis     Osteoporosis     Pure hypercholesterolemia     Spinal stenosis     Unspecified cataract     Unspecified glaucoma(365.9)       PAST SURGICAL HISTORY:   has a past surgical history that includes NONSPECIFIC PROCEDURE; NONSPECIFIC PROCEDURE; Breast surgery; Dilation and curettage, hysteroscopy diagnostic, combined (02/06/2014); Incision and drainage perineal, combined (02/06/2014); Excise lesion lower extremity (Right, 08/29/2017); appendectomy; Combined cystoscopy, retrogrades, exchange stent ureter(s) (Left, 01/24/2024); Combined Cystoscopy, Retrogrades, Ureteroscopy, Laser Holmium Lithotripsy Ureter(S), Insert Stent (Left, 02/20/2024); Cystoscopy, remove stent(s), combined (Left, 02/20/2024); Cystoscopy; and Closed reduction, percutaneous pinning hip (Left, 5/20/2024).  FAMILY HISTORY: family history includes Breast Cancer in her sister and sister; Cancer in her brother.  SOCIAL HISTORY:   reports that she has never smoked. She has  never used smokeless tobacco. She reports that she does not drink alcohol and does not use drugs.  Patient's living condition: {LIVES WITH (NURSING HOME):422767}    Post Discharge Medication Reconciliation Status:   MED REC REQUIRED{TIP  Click the link below to document or use med rec list, use list to pull in response :399000}  Post Medication Reconciliation Status: {MED REC LIST:918078}       Current Outpatient Medications   Medication Sig Dispense Refill    acetaminophen (TYLENOL) 500 MG tablet Take 2 tablets (1,000 mg) by mouth every 8 hours as needed for mild pain.      apixaban ANTICOAGULANT (ELIQUIS) 5 MG tablet Take 1 tablet (5 mg) by mouth 2 times daily.      atorvastatin (LIPITOR) 10 MG tablet TAKE 1 TABLET(10 MG) BY MOUTH DAILY 90 tablet 0    bisacodyl (DULCOLAX) 10 MG suppository Place 1 suppository (10 mg) rectally daily as needed for constipation.      buPROPion (WELLBUTRIN XL) 150 MG 24 hr tablet TAKE 1 TABLET(150 MG) BY MOUTH EVERY MORNING 90 tablet 3    buPROPion (WELLBUTRIN XL) 300 MG 24 hr tablet TAKE 1 TABLET(300 MG) BY MOUTH EVERY MORNING 90 tablet 3    dorzolamide-timolol (COSOPT) 22.3-6.8 MG/ML ophthalmic solution Place 1 drop into both eyes 2 times daily      famotidine (PEPCID) 20 MG tablet Take 1 tablet (20 mg) by mouth daily.      furosemide (LASIX) 40 MG tablet Take 1 tablet (40 mg) by mouth daily. 30 tablet 0    Lidocaine (LIDOCARE) 4 % Patch Place 1 patch over 12 hours onto the skin every 24 hours. To prevent lidocaine toxicity, patient should be patch free for 12 hrs daily.      magnesium hydroxide (MILK OF MAGNESIA) 400 MG/5ML suspension Take 30 mLs by mouth every other day.      melatonin 3 MG tablet Take 3 mg by mouth at bedtime.      metoprolol tartrate (LOPRESSOR) 50 MG tablet Take 1 tablet (50 mg) by mouth 2 times daily.      miconazole (MICATIN) 2 % external powder Apply topically 2 times daily as needed for other (candidiasis/intertrigo).      multivitamin w/minerals  "(THERA-VIT-M) tablet Take 1 tablet by mouth daily      senna-docusate (SENOKOT-S/PERICOLACE) 8.6-50 MG tablet Take 1 tablet by mouth 2 times daily. Change to PRN if having loose stools      spironolactone (ALDACTONE) 25 MG tablet Take 1 tablet (25 mg) by mouth daily.       No current facility-administered medications for this visit.       ROS:  {ROS FGS:282982}    Vitals:  Ht 1.499 m (4' 11\")   Wt 78.9 kg (174 lb)   BMI 35.14 kg/m    Exam:  {Nursing home physical exam :557360}    Lab/Diagnostic data:  {fgslab:513386}    ASSESSMENT/PLAN:    {FGS DX2:362481}    Orders:  {fgsorders:665771}  ***    Electronically signed by:  Madonna Shoemaker CMA ***              " "in my HPI.    Vitals:  Ht 1.499 m (4' 11\")   Wt 78.9 kg (174 lb)   BMI 35.14 kg/m    Exam:  Resp: Effort WNL, LS decreased in bilateral bases  CV: irregular rhythm 90s, 1+ bilateral LE edema  Abd- soft, nontender, BS +  Musc- EMMANUEL  Skin- small denuded wound LLE, no surrounding erythema   Psych- alert, calm, pleasant      Lab/Diagnostic data:  Recent labs in [facility] reviewed by me today.     ASSESSMENT/PLAN:       Heart failure with preserved ejection fraction, NYHA class I (H)  Lymphedema  Essential hypertension, benign  Chronic, resp status stable, some mild chronic edema. VSS  - continue Lasix 40 mg, and on spironolactone  - continue metoprolol at current dosing  - monitor VS, weights and clinically  - f/w cardiology as directed    Atrial fibrillation, unspecified type (H)  Chronic, rate controlled  - continue metoprolol and Eliquis    Anemia, unspecified type  Multifactorial, HGB baseline 9-11, last 111.8 7/2, no s/s bleeding  - monitor for s/s bleeding  - recheck HGB to ensure stability    Stage 3 chronic kidney disease, unspecified whether stage 3a or 3b CKD (H)  Chronic, baseline Cr 1, last 1.15 7/4  - avoid nephrotoxins, dose renally  - recheck BMP to ensure stability    Open wound of left lower extremity, subsequent encounter  Initially due to fall. Healing. Treated for cellulitis as above. No noted s/s of cellulitis on wound exam today.   - home RN to manage, and DIL will also assist with woundcare    Physical deconditioning  - 2/2 above.   - home PT/OT to optimize strength, function, endurance, safety  Would also like to work on using home scooter for longer range mobility          Electronically signed by:  LUCY Guillory CNP               "

## 2025-07-07 NOTE — LETTER
7/7/2025      Riddhi Aguilar  [address] [place] [address]        No notes on file      Sincerely,        LUCY Guillory CNP    Electronically signed

## 2025-07-08 ENCOUNTER — DOCUMENTATION ONLY (OUTPATIENT)
Dept: GERIATRICS | Facility: CLINIC | Age: OVER 89
End: 2025-07-08
Payer: COMMERCIAL

## 2025-07-09 ENCOUNTER — ORDERS ONLY (AUTO-RELEASED) (OUTPATIENT)
Dept: CARDIOLOGY | Facility: CLINIC | Age: OVER 89
End: 2025-07-09

## 2025-07-09 ENCOUNTER — OFFICE VISIT (OUTPATIENT)
Dept: CARDIOLOGY | Facility: CLINIC | Age: OVER 89
End: 2025-07-09
Attending: STUDENT IN AN ORGANIZED HEALTH CARE EDUCATION/TRAINING PROGRAM
Payer: COMMERCIAL

## 2025-07-09 ENCOUNTER — PATIENT OUTREACH (OUTPATIENT)
Dept: CARE COORDINATION | Facility: CLINIC | Age: OVER 89
End: 2025-07-09

## 2025-07-09 VITALS
DIASTOLIC BLOOD PRESSURE: 72 MMHG | BODY MASS INDEX: 35.14 KG/M2 | HEIGHT: 60 IN | HEART RATE: 92 BPM | WEIGHT: 179 LBS | SYSTOLIC BLOOD PRESSURE: 128 MMHG

## 2025-07-09 DIAGNOSIS — I48.92 ATRIAL FLUTTER WITH RAPID VENTRICULAR RESPONSE (H): ICD-10-CM

## 2025-07-09 DIAGNOSIS — I50.9 CONGESTIVE HEART FAILURE, UNSPECIFIED HF CHRONICITY, UNSPECIFIED HEART FAILURE TYPE (H): ICD-10-CM

## 2025-07-09 RX ORDER — METOPROLOL TARTRATE 50 MG
75 TABLET ORAL 2 TIMES DAILY
Qty: 120 TABLET | Refills: 4 | Status: SHIPPED | OUTPATIENT
Start: 2025-07-09

## 2025-07-09 NOTE — PROGRESS NOTES
Clinic Care Coordination Contact  Care Coordination Clinician Chart Review    Situation: Patient chart reviewed by care coordinator.    Background: Clinic Care Coordination Referral received from inpatient care team for transition handoff communication following hospital admission.    Assessment: Upon chart review, patient is not a candidate for Primary Care Clinic Care Coordination enrollment due to reason stated below:  Patient getting PCP care through [facility].     Plan/Recommendations: Clinic Care Coordination Referral/order cancelled. RN/SW CC will perform no further monitoring/outreaches at this time and will remain available as needed. If new needs arise, a new Care Coordination Referral may be placed.    Ashley Eduardo RN Clinic Care Coordinator  [facility] Clinics: [place], [place] (on-site Wednesdays), [facility] (on-site Thursdays) & [facility].  [email or web address]  Phone: [phone]

## 2025-07-09 NOTE — LETTER
7/9/2025    Catherine Mcarthur, LUCY CNP  [address] [facility] [address]    RE: Riddhi Aguilar       Dear Colleague,     I had the pleasure of seeing Riddhi Aguilar in the [facility].  Cardiology Clinic Progress Note  Riddhi Aguilar MRN# [id number]   YOB: 1931 Age: 89 year old   Primary Cardiologist: Dr. Shirley  Reason for visit: Hospital follow-up             Assessment and Plan:   Riddhi Aguilar is a 89 year old female who is here today for hospital follow up.      1.  HFpEF - LVEF 60-65%, on furosemide 40 mg daily, spironolactone 25 mg daily. Baseline weight ~175 lbs.    2.  Persistent atrial fibrillation, atrial flutter -[id number] score 5 (age +2, female, hypertension, CHF) -Eliquis 5 mg twice daily for CVA prophylaxis, metoprolol tartrate 75 mg twice daily for rate control    - Goal for rate control given severe biatrial large men and age    3.  Aortic stenosis - mean AVR pressure gradient 15.7 mmHg, peak pressure gradient 24 mmHg, calculated OLIVIER 1.3 cm  on TTE 4/2025    4.  Mitral stenosis - on TTE 4/2025     5.  PVCs - 5% burden on Zio monitor 5/2025, metoprolol as above    6.  History of GIB - nothing recent      Today's Plan:   - Still noticing dyspnea on exertion. She does appear euvolemic, recently admitted for diuresis appears fairly euvolemic. Weights staying stable. Lungs clear to auscultation. Most recent BMP with increase Cr/BUN. Could be related to atrial fibrillation, HR 92 today in clinic. Will increase metoprolol tartrate 75 mg twice daily for better rate control. 3 day Zio monitor with this change.     - Continue furosemide 40 mg daily and spironolactone 25 mg daily. Will obtain BMP and NT proBNP at our next follow up to monitor response. Discussed with patient continue daily weights, let cardiology know if weight increases/worsening symptoms.     - Continue Eliquis for CVA prophylaxis. She did recently sustain a mechanical fall requiring staples  on her head. Will have to watch for frequent falls on anticoagulation.     Follow up plan:   - Follow up with cardiology in 1 month       Karen Waddell PA-C  [facility]        History of Presenting Illness:    Riddhi Aguilar is a very pleasant 89 year old female with HFpEF, atrial fibrillation/atrial flutter, mild to moderate aortic stenosis, mild mitral stenosis, hypertension, MDD, chronic left shoulder pain, and history of GI bleed.     She was admitted 4/2025 found to be in new onset atrial flutter.  Echo at the time showed EF 60 to 65%, RV normal in size and function, severe biatrial lodgment, mild mitral stenosis, mild to moderate valvular aortic stenosis.  Given age and severe biatrial enlargement the goal is rate control.  She was placed on metoprolol tartrate 50 mg twice daily Eliquis 5 mg twice daily.    Zio monitor at discharge showed 100% atrial fibrillation burden with average heart rate 89 bpm.  Occasional PVCs with 5% burden.    At her last follow-up 5/2025 she noted continued dyspnea on exertion that was making physical therapy and Occupational Therapy at TCU quite difficult.  We started furosemide 10 mg daily for 3 days and then daily as needed for weight gain or worsening lower extremity edema.    She was admitted 5/26/2025 -5/30/2025 after sustaining a mechanical fall, with scalp laceration right elbow abrasion.  She was also found to have lower extremity cellulitis.  Blood cultures with no growth.    She was admitted 7/2/2025 -7/4/2025 for acute on chronic heart failure exacerbation.  She was IV diuresed, transition to furosemide 40 mg daily.  Continued on spironolactone 25 mg daily weight on discharge was 175 lbs, reported to lose 4 pounds during admission.  Net -3 L.    Patient is here today for hospital follow-up. Patient reports feeling okay. Her daughter is present in the room during visit.  Most recently patient has moved to an assisted living facility.  She has  noticed that she will still have some dyspnea on exertion, she is rarely using her walker and is most only wheelchair-bound now.  When she was at her TCU she was able to at least walk 200 feet without any issue.  She noticed some improvement with increased diuretics but not much.  She weighs herself daily and her weights have been staying fairly stable, around 175-178 lbs.  She denies any orthopnea, or PND.  She does have bilateral lymphedema.  Denies any lightheadedness, dizziness, near-syncope or syncope.    Blood pressure 128/72 and HR 92 in clinic today. Wt 179 lbs.     Recent Labs  BMP 7/4/2025 mildly decreased chloride otherwise stable electrolytes, elevated BUN 29.6/creatinine 1.15/GFR 44.  Slightly worsened renal function since last.  CBC 7/3/2025 shows macrocytosis without anemia      Social History       Social History     Socioeconomic History     Marital status:      Spouse name: Not on file     Number of children: Not on file     Years of education: Not on file     Highest education level: Not on file   Occupational History     Not on file   Tobacco Use     Smoking status: Never     Smokeless tobacco: Never   Substance and Sexual Activity     Alcohol use: No     Alcohol/week: 0.0 standard drinks of alcohol     Drug use: No     Sexual activity: Not Currently     Partners: Male   Other Topics Concern     Parent/sibling w/ CABG, MI or angioplasty before 65F 55M? Not Asked   Social History Narrative     Not on file     Social Drivers of Health     Financial Resource Strain: Low Risk  (7/3/2025)    Financial Resource Strain      Within the past 12 months, have you or your family members you live with been unable to get utilities (heat, electricity) when it was really needed?: No   Recent Concern: Financial Resource Strain - High Risk (4/18/2025)    Financial Resource Strain      Within the past 12 months, have you or your family members you live with been unable to get utilities (heat, electricity)  when it was really needed?: Yes   Food Insecurity: Low Risk  (7/3/2025)    Food Insecurity      Within the past 12 months, did you worry that your food would run out before you got money to buy more?: No      Within the past 12 months, did the food you bought just not last and you didn t have money to get more?: No   Transportation Needs: Low Risk  (7/3/2025)    Transportation Needs      Within the past 12 months, has lack of transportation kept you from medical appointments, getting your medicines, non-medical meetings or appointments, work, or from getting things that you need?: No   Physical Activity: Unknown (5/14/2024)    Exercise Vital Sign      Days of Exercise per Week: 4 days      Minutes of Exercise per Session: Not on file   Stress: Not on file (11/9/2024)   Social Connections: Unknown (5/14/2024)    Social Connection and Isolation Panel [NHANES]      Frequency of Communication with Friends and Family: Not on file      Frequency of Social Gatherings with Friends and Family: Once a week      Attends [employer] Services: Not on file      Active Member of Clubs or Organizations: Not on file      Attends Club or Organization Meetings: Not on file      Marital Status: Not on file   Interpersonal Safety: Low Risk  (7/3/2025)    Interpersonal Safety      Do you feel physically and emotionally safe where you currently live?: Yes      Within the past 12 months, have you been hit, slapped, kicked or otherwise physically hurt by someone?: No      Within the past 12 months, have you been humiliated or emotionally abused in other ways by your partner or ex-partner?: No   Housing Stability: Low Risk  (7/3/2025)    Housing Stability      Do you have housing? : Yes      Are you worried about losing your housing?: No            Review of Systems:   Please see HPI         Physical Exam:   Vitals: There were no vitals taken for this visit.   Wt Readings from Last 4 Encounters:   07/07/25 78.9 kg (174 lb)   07/04/25 79.5 kg  (175 lb 4.3 oz)   06/23/25 78.9 kg (174 lb)   06/18/25 78.9 kg (174 lb)     GEN: well nourished, in no acute distress.  HEENT:  Pupils equal, round. Sclerae nonicteric.   NECK: Supple, no masses appreciated.  Minimal JVD with patient.  C/V: Irregularly irregular rhythm, normal rate no murmur, rub or gallop.    RESP: Respirations are unlabored. Clear to auscultation bilaterally without wheezing, rales, or rhonchi.  GI: Abdomen soft, nontender.  EXTREM: 1+ bilateral LE edema.  NEURO: Alert and oriented, cooperative.  SKIN: Warm and dry.        Data:   LIPID RESULTS:  Lab Results   Component Value Date    CHOL 127 05/09/2023    CHOL 126 04/12/2021    HDL 56 05/09/2023    HDL 61 04/12/2021    LDL 67 05/14/2024    LDL 57 05/09/2023    LDL 50 04/12/2021    TRIG 69 05/09/2023    TRIG 73 04/12/2021    CHOLHDLRATIO 2.9 09/21/2012     LIVER ENZYME RESULTS:  Lab Results   Component Value Date    AST 48 (H) 05/26/2025    AST 27 12/27/2018    ALT 55 (H) 05/26/2025    ALT 23 12/27/2018     CBC RESULTS:  Lab Results   Component Value Date    WBC 11.0 07/03/2025    WBC 5.2 04/26/2021    RBC 3.41 (L) 07/03/2025    RBC 2.83 (L) 04/26/2021    HGB 11.8 07/03/2025    HGB 9.9 (L) 04/26/2021    HCT 35.6 07/03/2025    HCT 30.1 (L) 04/26/2021     (H) 07/03/2025     (H) 04/26/2021    MCH 34.6 (H) 07/03/2025    MCH 35.0 (H) 04/26/2021    MCHC 33.1 07/03/2025    MCHC 32.9 04/26/2021    RDW 24.8 (H) 07/03/2025    RDW 20.0 (H) 04/26/2021     07/03/2025     04/26/2021     BMP RESULTS:  Lab Results   Component Value Date     07/04/2025     04/26/2021    POTASSIUM 3.7 07/04/2025    POTASSIUM 4.6 04/27/2022    POTASSIUM 4.1 04/26/2021    CHLORIDE 96 (L) 07/04/2025    CHLORIDE 106 04/27/2022    CHLORIDE 107 04/26/2021    CO2 26 07/04/2025    CO2 26 04/27/2022    CO2 29 04/26/2021    ANIONGAP 13 07/04/2025    ANIONGAP 7 04/27/2022    ANIONGAP 3 04/26/2021    GLC 99 07/04/2025    GLC 84 04/27/2022    GLC 79  04/26/2021    BUN 29.6 (H) 07/04/2025    BUN 28 04/27/2022    BUN 25 04/26/2021    CR 1.15 (H) 07/04/2025    CR 1.03 04/26/2021    GFRESTIMATED 44 (L) 07/04/2025    GFRESTIMATED 48 (L) 04/26/2021    GFRESTBLACK 55 (L) 04/26/2021    TAMMY 9.5 07/04/2025    TAMMY 9.1 04/26/2021      A1C RESULTS:  Lab Results   Component Value Date    A1C 5.3 05/14/2024     INR RESULTS:  Lab Results   Component Value Date    INR 1.05 12/16/2012            Medications     Current Outpatient Medications   Medication Sig Dispense Refill     acetaminophen (TYLENOL) 500 MG tablet Take 2 tablets (1,000 mg) by mouth every 8 hours as needed for mild pain.       apixaban ANTICOAGULANT (ELIQUIS) 5 MG tablet Take 1 tablet (5 mg) by mouth 2 times daily.       atorvastatin (LIPITOR) 10 MG tablet TAKE 1 TABLET(10 MG) BY MOUTH DAILY 90 tablet 0     bisacodyl (DULCOLAX) 10 MG suppository Place 1 suppository (10 mg) rectally daily as needed for constipation.       buPROPion (WELLBUTRIN XL) 150 MG 24 hr tablet TAKE 1 TABLET(150 MG) BY MOUTH EVERY MORNING 90 tablet 3     buPROPion (WELLBUTRIN XL) 300 MG 24 hr tablet TAKE 1 TABLET(300 MG) BY MOUTH EVERY MORNING 90 tablet 3     dorzolamide-timolol (COSOPT) 22.3-6.8 MG/ML ophthalmic solution Place 1 drop into both eyes 2 times daily       famotidine (PEPCID) 20 MG tablet Take 1 tablet (20 mg) by mouth daily.       furosemide (LASIX) 40 MG tablet Take 1 tablet (40 mg) by mouth daily. 30 tablet 0     Lidocaine (LIDOCARE) 4 % Patch Place 1 patch over 12 hours onto the skin every 24 hours. To prevent lidocaine toxicity, patient should be patch free for 12 hrs daily.       magnesium hydroxide (MILK OF MAGNESIA) 400 MG/5ML suspension Take 30 mLs by mouth every other day.       melatonin 3 MG tablet Take 3 mg by mouth at bedtime.       metoprolol tartrate (LOPRESSOR) 50 MG tablet Take 1 tablet (50 mg) by mouth 2 times daily.       miconazole (MICATIN) 2 % external powder Apply topically 2 times daily as needed for  other (candidiasis/intertrigo).       multivitamin w/minerals (THERA-VIT-M) tablet Take 1 tablet by mouth daily       senna-docusate (SENOKOT-S/PERICOLACE) 8.6-50 MG tablet Take 1 tablet by mouth 2 times daily. Change to PRN if having loose stools       spironolactone (ALDACTONE) 25 MG tablet Take 1 tablet (25 mg) by mouth daily.            Past Medical History     Past Medical History:   Diagnosis Date     Anemia     baseline 10-11     Arthritis      Bleeding ulcer     duodenal ulcer     CKD (chronic kidney disease) stage 3, GFR 30-59 ml/min (H)      Esophageal reflux 7/22/2010    Gastroesophageal Reflux Disease     Essential hypertension, benign      MDD (major depressive disorder)      Nephrolithiasis      Osteoporosis      Pure hypercholesterolemia      Spinal stenosis      Unspecified cataract      Unspecified glaucoma(365.9)      Past Surgical History:   Procedure Laterality Date     APPENDECTOMY       BREAST SURGERY      lumpectomy     CLOSED REDUCTION, PERCUTANEOUS PINNING HIP Left 5/20/2024    Procedure: Closed reduction percutaneous pinning, left hip;  Surgeon: Jose Luis Merino MD;  Location: [facility] OR     COMBINED CYSTOSCOPY, RETROGRADES, EXCHANGE STENT URETER(S) Left 01/24/2024    Procedure: Cystoscopy, left retrograde pyelogram, left JJ stent placement, <1hr physician fluoroscopy time;  Surgeon: Aureliano Brandt MD;  Location: [facility] OR     COMBINED CYSTOSCOPY, RETROGRADES, URETEROSCOPY, LASER HOLMIUM LITHOTRIPSY URETER(S), INSERT STENT Left 02/20/2024    Procedure: Cystoscopy, left diagnostic ureteroscopy, left retrograde pyelogram, left ureteral stent removal, fluoroscopic interpretation <1 hour physician time;  Surgeon: Richie Jaquez MD;  Location: [facility] OR     CYSTOSCOPY       CYSTOSCOPY, REMOVE STENT(S), COMBINED Left 02/20/2024    Procedure: Cystoscopy, remove stent(s), combined;  Surgeon: Richie Jaquez MD;  Location: [facility] OR     DILATION AND CURETTAGE, HYSTEROSCOPY DIAGNOSTIC, COMBINED   02/06/2014    Procedure: COMBINED DILATION AND CURETTAGE, HYSTEROSCOPY DIAGNOSTIC;  DILATION AND CURETTAGE, HYSTEROSCOPY, POLYPECTOMY, INCISION AND DRAINAGE OF SEBACEOUS CYST ;  Surgeon: Serena Zamora MD;  Location:  [facility]     EXCISE LESION LOWER EXTREMITY Right 08/29/2017    Procedure: EXCISE LESION LOWER EXTREMITY;  WIDE EXCISIONAL BIOPSY OF RIGHT ANKLE BASAL CELL CARCINOMA;  Surgeon: Juan Luis Flores MD;  Location:  OR     INCISION AND DRAINAGE PERINEAL, COMBINED  02/06/2014    Procedure: COMBINED INCISION AND DRAINAGE PERINEAL;;  Surgeon: Serena Zamora MD;  Location:  [facility]     ZZC NONSPECIFIC PROCEDURE      Repair of buckled retina of rt eye     ZZC NONSPECIFIC PROCEDURE      Appy     Family History   Problem Relation Age of Onset     Breast Cancer Sister      Breast Cancer Sister      Cancer Brother         bone cancer in arm            Allergies   Patient has no known allergies.    The longitudinal plan of care for the diagnosis(es)/condition(s) as documented were addressed during this visit. Due to the added complexity in care, I will continue to support Riddhi in the subsequent management and with ongoing continuity of care.     40 minutes spent on the date of the encounter doing chart review, history and exam, documentation and further activities as noted above    Thank you for allowing me to participate in the care of your patient.      Sincerely,     Karen Waddell PA-C     [facility]  cc:   Karen Waddell PA-C  [address] [place] [address]

## 2025-07-09 NOTE — PROGRESS NOTES
Cardiology Clinic Progress Note  Riddhi Aguilar MRN# [id number]   YOB: 1931 Age: 89 year old   Primary Cardiologist: Dr. Shirley  Reason for visit: Hospital follow-up             Assessment and Plan:   Riddhi Aguilar is a 89 year old female who is here today for hospital follow up.      1.  HFpEF - LVEF 60-65%, on furosemide 40 mg daily, spironolactone 25 mg daily. Baseline weight ~175 lbs.    2.  Persistent atrial fibrillation, atrial flutter -[id number] score 5 (age +2, female, hypertension, CHF) -Eliquis 5 mg twice daily for CVA prophylaxis, metoprolol tartrate 75 mg twice daily for rate control    - Goal for rate control given severe biatrial large men and age    3.  Aortic stenosis - mean AVR pressure gradient 15.7 mmHg, peak pressure gradient 24 mmHg, calculated OLIVIER 1.3 cm  on TTE 4/2025    4.  Mitral stenosis - on TTE 4/2025     5.  PVCs - 5% burden on Zio monitor 5/2025, metoprolol as above    6.  History of GIB - nothing recent      Today's Plan:   - Still noticing dyspnea on exertion. She does appear euvolemic, recently admitted for diuresis appears fairly euvolemic. Weights staying stable. Lungs clear to auscultation. Most recent BMP with increase Cr/BUN. Could be related to atrial fibrillation, HR 92 today in clinic. Will increase metoprolol tartrate 75 mg twice daily for better rate control. 3 day Zio monitor with this change.     - Continue furosemide 40 mg daily and spironolactone 25 mg daily. Will obtain BMP and NT proBNP at our next follow up to monitor response. Discussed with patient continue daily weights, let cardiology know if weight increases/worsening symptoms.     - Continue Eliquis for CVA prophylaxis. She did recently sustain a mechanical fall requiring staples on her head. Will have to watch for frequent falls on anticoagulation.     Follow up plan:   - Follow up with cardiology in 1 month       Karen Waddell PA-C  [facility]        History of  Presenting Illness:    Riddhi Aguilar is a very pleasant 89 year old female with HFpEF, atrial fibrillation/atrial flutter, mild to moderate aortic stenosis, mild mitral stenosis, hypertension, MDD, chronic left shoulder pain, and history of GI bleed.     She was admitted 4/2025 found to be in new onset atrial flutter.  Echo at the time showed EF 60 to 65%, RV normal in size and function, severe biatrial lodgment, mild mitral stenosis, mild to moderate valvular aortic stenosis.  Given age and severe biatrial enlargement the goal is rate control.  She was placed on metoprolol tartrate 50 mg twice daily Eliquis 5 mg twice daily.    Zio monitor at discharge showed 100% atrial fibrillation burden with average heart rate 89 bpm.  Occasional PVCs with 5% burden.    At her last follow-up 5/2025 she noted continued dyspnea on exertion that was making physical therapy and Occupational Therapy at TCU quite difficult.  We started furosemide 10 mg daily for 3 days and then daily as needed for weight gain or worsening lower extremity edema.    She was admitted 5/26/2025 -5/30/2025 after sustaining a mechanical fall, with scalp laceration right elbow abrasion.  She was also found to have lower extremity cellulitis.  Blood cultures with no growth.    She was admitted 7/2/2025 -7/4/2025 for acute on chronic heart failure exacerbation.  She was IV diuresed, transition to furosemide 40 mg daily.  Continued on spironolactone 25 mg daily weight on discharge was 175 lbs, reported to lose 4 pounds during admission.  Net -3 L.    Patient is here today for hospital follow-up. Patient reports feeling okay. Her daughter is present in the room during visit.  Most recently patient has moved to an assisted living facility.  She has noticed that she will still have some dyspnea on exertion, she is rarely using her walker and is most only wheelchair-bound now.  When she was at her TCU she was able to at least walk 200 feet without any issue.  She  noticed some improvement with increased diuretics but not much.  She weighs herself daily and her weights have been staying fairly stable, around 175-178 lbs.  She denies any orthopnea, or PND.  She does have bilateral lymphedema.  Denies any lightheadedness, dizziness, near-syncope or syncope.    Blood pressure 128/72 and HR 92 in clinic today. Wt 179 lbs.     Recent Labs  BMP 7/4/2025 mildly decreased chloride otherwise stable electrolytes, elevated BUN 29.6/creatinine 1.15/GFR 44.  Slightly worsened renal function since last.  CBC 7/3/2025 shows macrocytosis without anemia      Social History       Social History     Socioeconomic History    Marital status:      Spouse name: Not on file    Number of children: Not on file    Years of education: Not on file    Highest education level: Not on file   Occupational History    Not on file   Tobacco Use    Smoking status: Never    Smokeless tobacco: Never   Substance and Sexual Activity    Alcohol use: No     Alcohol/week: 0.0 standard drinks of alcohol    Drug use: No    Sexual activity: Not Currently     Partners: Male   Other Topics Concern    Parent/sibling w/ CABG, MI or angioplasty before 65F 55M? Not Asked   Social History Narrative    Not on file     Social Drivers of Health     Financial Resource Strain: Low Risk  (7/3/2025)    Financial Resource Strain     Within the past 12 months, have you or your family members you live with been unable to get utilities (heat, electricity) when it was really needed?: No   Recent Concern: Financial Resource Strain - High Risk (4/18/2025)    Financial Resource Strain     Within the past 12 months, have you or your family members you live with been unable to get utilities (heat, electricity) when it was really needed?: Yes   Food Insecurity: Low Risk  (7/3/2025)    Food Insecurity     Within the past 12 months, did you worry that your food would run out before you got money to buy more?: No     Within the past 12 months,  did the food you bought just not last and you didn t have money to get more?: No   Transportation Needs: Low Risk  (7/3/2025)    Transportation Needs     Within the past 12 months, has lack of transportation kept you from medical appointments, getting your medicines, non-medical meetings or appointments, work, or from getting things that you need?: No   Physical Activity: Unknown (5/14/2024)    Exercise Vital Sign     Days of Exercise per Week: 4 days     Minutes of Exercise per Session: Not on file   Stress: Not on file (11/9/2024)   Social Connections: Unknown (5/14/2024)    Social Connection and Isolation Panel [NHANES]     Frequency of Communication with Friends and Family: Not on file     Frequency of Social Gatherings with Friends and Family: Once a week     Attends Restoration Services: Not on file     Active Member of Clubs or Organizations: Not on file     Attends Club or Organization Meetings: Not on file     Marital Status: Not on file   Interpersonal Safety: Low Risk  (7/3/2025)    Interpersonal Safety     Do you feel physically and emotionally safe where you currently live?: Yes     Within the past 12 months, have you been hit, slapped, kicked or otherwise physically hurt by someone?: No     Within the past 12 months, have you been humiliated or emotionally abused in other ways by your partner or ex-partner?: No   Housing Stability: Low Risk  (7/3/2025)    Housing Stability     Do you have housing? : Yes     Are you worried about losing your housing?: No            Review of Systems:   Please see HPI         Physical Exam:   Vitals: There were no vitals taken for this visit.   Wt Readings from Last 4 Encounters:   07/07/25 78.9 kg (174 lb)   07/04/25 79.5 kg (175 lb 4.3 oz)   06/23/25 78.9 kg (174 lb)   06/18/25 78.9 kg (174 lb)     GEN: well nourished, in no acute distress.  HEENT:  Pupils equal, round. Sclerae nonicteric.   NECK: Supple, no masses appreciated.  Minimal JVD with patient.  C/V:  Irregularly irregular rhythm, normal rate no murmur, rub or gallop.    RESP: Respirations are unlabored. Clear to auscultation bilaterally without wheezing, rales, or rhonchi.  GI: Abdomen soft, nontender.  EXTREM: 1+ bilateral LE edema.  NEURO: Alert and oriented, cooperative.  SKIN: Warm and dry.        Data:   LIPID RESULTS:  Lab Results   Component Value Date    CHOL 127 05/09/2023    CHOL 126 04/12/2021    HDL 56 05/09/2023    HDL 61 04/12/2021    LDL 67 05/14/2024    LDL 57 05/09/2023    LDL 50 04/12/2021    TRIG 69 05/09/2023    TRIG 73 04/12/2021    CHOLHDLRATIO 2.9 09/21/2012     LIVER ENZYME RESULTS:  Lab Results   Component Value Date    AST 48 (H) 05/26/2025    AST 27 12/27/2018    ALT 55 (H) 05/26/2025    ALT 23 12/27/2018     CBC RESULTS:  Lab Results   Component Value Date    WBC 11.0 07/03/2025    WBC 5.2 04/26/2021    RBC 3.41 (L) 07/03/2025    RBC 2.83 (L) 04/26/2021    HGB 11.8 07/03/2025    HGB 9.9 (L) 04/26/2021    HCT 35.6 07/03/2025    HCT 30.1 (L) 04/26/2021     (H) 07/03/2025     (H) 04/26/2021    MCH 34.6 (H) 07/03/2025    MCH 35.0 (H) 04/26/2021    MCHC 33.1 07/03/2025    MCHC 32.9 04/26/2021    RDW 24.8 (H) 07/03/2025    RDW 20.0 (H) 04/26/2021     07/03/2025     04/26/2021     BMP RESULTS:  Lab Results   Component Value Date     07/04/2025     04/26/2021    POTASSIUM 3.7 07/04/2025    POTASSIUM 4.6 04/27/2022    POTASSIUM 4.1 04/26/2021    CHLORIDE 96 (L) 07/04/2025    CHLORIDE 106 04/27/2022    CHLORIDE 107 04/26/2021    CO2 26 07/04/2025    CO2 26 04/27/2022    CO2 29 04/26/2021    ANIONGAP 13 07/04/2025    ANIONGAP 7 04/27/2022    ANIONGAP 3 04/26/2021    GLC 99 07/04/2025    GLC 84 04/27/2022    GLC 79 04/26/2021    BUN 29.6 (H) 07/04/2025    BUN 28 04/27/2022    BUN 25 04/26/2021    CR 1.15 (H) 07/04/2025    CR 1.03 04/26/2021    GFRESTIMATED 44 (L) 07/04/2025    GFRESTIMATED 48 (L) 04/26/2021    GFRESTBLACK 55 (L) 04/26/2021    TAMMY 9.5  07/04/2025    TAMMY 9.1 04/26/2021      A1C RESULTS:  Lab Results   Component Value Date    A1C 5.3 05/14/2024     INR RESULTS:  Lab Results   Component Value Date    INR 1.05 12/16/2012            Medications     Current Outpatient Medications   Medication Sig Dispense Refill    acetaminophen (TYLENOL) 500 MG tablet Take 2 tablets (1,000 mg) by mouth every 8 hours as needed for mild pain.      apixaban ANTICOAGULANT (ELIQUIS) 5 MG tablet Take 1 tablet (5 mg) by mouth 2 times daily.      atorvastatin (LIPITOR) 10 MG tablet TAKE 1 TABLET(10 MG) BY MOUTH DAILY 90 tablet 0    bisacodyl (DULCOLAX) 10 MG suppository Place 1 suppository (10 mg) rectally daily as needed for constipation.      buPROPion (WELLBUTRIN XL) 150 MG 24 hr tablet TAKE 1 TABLET(150 MG) BY MOUTH EVERY MORNING 90 tablet 3    buPROPion (WELLBUTRIN XL) 300 MG 24 hr tablet TAKE 1 TABLET(300 MG) BY MOUTH EVERY MORNING 90 tablet 3    dorzolamide-timolol (COSOPT) 22.3-6.8 MG/ML ophthalmic solution Place 1 drop into both eyes 2 times daily      famotidine (PEPCID) 20 MG tablet Take 1 tablet (20 mg) by mouth daily.      furosemide (LASIX) 40 MG tablet Take 1 tablet (40 mg) by mouth daily. 30 tablet 0    Lidocaine (LIDOCARE) 4 % Patch Place 1 patch over 12 hours onto the skin every 24 hours. To prevent lidocaine toxicity, patient should be patch free for 12 hrs daily.      magnesium hydroxide (MILK OF MAGNESIA) 400 MG/5ML suspension Take 30 mLs by mouth every other day.      melatonin 3 MG tablet Take 3 mg by mouth at bedtime.      metoprolol tartrate (LOPRESSOR) 50 MG tablet Take 1 tablet (50 mg) by mouth 2 times daily.      miconazole (MICATIN) 2 % external powder Apply topically 2 times daily as needed for other (candidiasis/intertrigo).      multivitamin w/minerals (THERA-VIT-M) tablet Take 1 tablet by mouth daily      senna-docusate (SENOKOT-S/PERICOLACE) 8.6-50 MG tablet Take 1 tablet by mouth 2 times daily. Change to PRN if having loose stools       spironolactone (ALDACTONE) 25 MG tablet Take 1 tablet (25 mg) by mouth daily.            Past Medical History     Past Medical History:   Diagnosis Date    Anemia     baseline 10-11    Arthritis     Bleeding ulcer     duodenal ulcer    CKD (chronic kidney disease) stage 3, GFR 30-59 ml/min (H)     Esophageal reflux 7/22/2010    Gastroesophageal Reflux Disease    Essential hypertension, benign     MDD (major depressive disorder)     Nephrolithiasis     Osteoporosis     Pure hypercholesterolemia     Spinal stenosis     Unspecified cataract     Unspecified glaucoma(365.9)      Past Surgical History:   Procedure Laterality Date    APPENDECTOMY      BREAST SURGERY      lumpectomy    CLOSED REDUCTION, PERCUTANEOUS PINNING HIP Left 5/20/2024    Procedure: Closed reduction percutaneous pinning, left hip;  Surgeon: Jose Luis Merino MD;  Location: [facility] OR    COMBINED CYSTOSCOPY, RETROGRADES, EXCHANGE STENT URETER(S) Left 01/24/2024    Procedure: Cystoscopy, left retrograde pyelogram, left JJ stent placement, <1hr physician fluoroscopy time;  Surgeon: Aureliano Brandt MD;  Location: [facility] OR    COMBINED CYSTOSCOPY, RETROGRADES, URETEROSCOPY, LASER HOLMIUM LITHOTRIPSY URETER(S), INSERT STENT Left 02/20/2024    Procedure: Cystoscopy, left diagnostic ureteroscopy, left retrograde pyelogram, left ureteral stent removal, fluoroscopic interpretation <1 hour physician time;  Surgeon: Richie Jaquez MD;  Location: [facility] OR    CYSTOSCOPY      CYSTOSCOPY, REMOVE STENT(S), COMBINED Left 02/20/2024    Procedure: Cystoscopy, remove stent(s), combined;  Surgeon: Richie Jaquez MD;  Location: [facility] OR    DILATION AND CURETTAGE, HYSTEROSCOPY DIAGNOSTIC, COMBINED  02/06/2014    Procedure: COMBINED DILATION AND CURETTAGE, HYSTEROSCOPY DIAGNOSTIC;  DILATION AND CURETTAGE, HYSTEROSCOPY, POLYPECTOMY, INCISION AND DRAINAGE OF SEBACEOUS CYST ;  Surgeon: Serena Zamora MD;  Location:  [facility]    EXCISE LESION LOWER EXTREMITY Right 08/29/2017     Procedure: EXCISE LESION LOWER EXTREMITY;  WIDE EXCISIONAL BIOPSY OF RIGHT ANKLE BASAL CELL CARCINOMA;  Surgeon: Juan Luis Flores MD;  Location:  OR    INCISION AND DRAINAGE PERINEAL, COMBINED  02/06/2014    Procedure: COMBINED INCISION AND DRAINAGE PERINEAL;;  Surgeon: Serena Zamora MD;  Location:  [facility]    [facility] NONSPECIFIC PROCEDURE      Repair of buckled retina of rt eye    [facility] NONSPECIFIC PROCEDURE      Appy     Family History   Problem Relation Age of Onset    Breast Cancer Sister     Breast Cancer Sister     Cancer Brother         bone cancer in arm            Allergies   Patient has no known allergies.    The longitudinal plan of care for the diagnosis(es)/condition(s) as documented were addressed during this visit. Due to the added complexity in care, I will continue to support Riddhi in the subsequent management and with ongoing continuity of care.     40 minutes spent on the date of the encounter doing chart review, history and exam, documentation and further activities as noted above

## 2025-07-09 NOTE — PATIENT INSTRUCTIONS
It was nice seeing you today, please call [phone] if you have any questions or concerns     Plan   INCREASE metoprolol tartrate 75 (1.5 tablets) twice daily   Continue all other medications   Continue to weigh yourself daily - if your weight continues to increase please let us know, goal weight ~175 lbs   Wear heart monitor for 3 days   Schedule labs prior to next visit   Follow up    - Follow up in 1 month or sooner if needed   - Scheduling phone number [phone]    Karen Waddell PA-C  [facility]

## 2025-07-10 ENCOUNTER — TELEPHONE (OUTPATIENT)
Dept: CARDIOLOGY | Facility: CLINIC | Age: OVER 89
End: 2025-07-10
Payer: COMMERCIAL

## 2025-07-10 ENCOUNTER — TELEPHONE (OUTPATIENT)
Dept: GERIATRICS | Facility: CLINIC | Age: OVER 89
End: 2025-07-10
Payer: COMMERCIAL

## 2025-07-10 NOTE — TELEPHONE ENCOUNTER
Received call from Aggie nurse with [facility], (phone [phone]) asking about changes made at office visit yesterday, reviewed recommendations from Jing coto to increase metoprolol to tartrate to 75 mg twice daily, patient was advised to continue weigh yourself daily and to call if weight continues to increase.  Patient also advised to wear a Holter monitor, Zio patch was mailed to the facility.  Orders were faxed to [facility] at [phone]. Aryan JON

## 2025-07-10 NOTE — TELEPHONE ENCOUNTER
Wood PT with [facility] calling in for new orders but was informed there was a change of PCP. Patient now in [facility], unsure if this will be permanent. Wood states he will call new PCP for orders.

## 2025-07-14 ENCOUNTER — LAB REQUISITION (OUTPATIENT)
Dept: LAB | Facility: CLINIC | Age: OVER 89
End: 2025-07-14
Payer: COMMERCIAL

## 2025-07-14 ENCOUNTER — ASSISTED LIVING VISIT (OUTPATIENT)
Dept: GERIATRICS | Facility: CLINIC | Age: OVER 89
End: 2025-07-14
Payer: COMMERCIAL

## 2025-07-14 ENCOUNTER — TELEPHONE (OUTPATIENT)
Dept: GERIATRICS | Facility: CLINIC | Age: OVER 89
End: 2025-07-14

## 2025-07-14 ENCOUNTER — MEDICAL CORRESPONDENCE (OUTPATIENT)
Dept: HEALTH INFORMATION MANAGEMENT | Facility: CLINIC | Age: OVER 89
End: 2025-07-14

## 2025-07-14 VITALS — BODY MASS INDEX: 35.08 KG/M2 | WEIGHT: 174 LBS | HEIGHT: 59 IN

## 2025-07-14 DIAGNOSIS — Z91.81 HISTORY OF FALLING: ICD-10-CM

## 2025-07-14 DIAGNOSIS — G31.84 MCI (MILD COGNITIVE IMPAIRMENT): ICD-10-CM

## 2025-07-14 DIAGNOSIS — K26.7 CHRONIC DUODENAL ULCER WITHOUT HEMORRHAGE OR PERFORATION: ICD-10-CM

## 2025-07-14 DIAGNOSIS — I48.11 LONGSTANDING PERSISTENT ATRIAL FIBRILLATION (H): ICD-10-CM

## 2025-07-14 DIAGNOSIS — I35.0 AORTIC VALVE STENOSIS, ETIOLOGY OF CARDIAC VALVE DISEASE UNSPECIFIED: ICD-10-CM

## 2025-07-14 DIAGNOSIS — R53.1 GENERALIZED WEAKNESS: ICD-10-CM

## 2025-07-14 DIAGNOSIS — I50.33 ACUTE ON CHRONIC DIASTOLIC CONGESTIVE HEART FAILURE (H): Primary | ICD-10-CM

## 2025-07-14 DIAGNOSIS — I50.22 CHRONIC SYSTOLIC (CONGESTIVE) HEART FAILURE (H): ICD-10-CM

## 2025-07-14 DIAGNOSIS — I48.0 PAROXYSMAL ATRIAL FIBRILLATION (H): ICD-10-CM

## 2025-07-14 DIAGNOSIS — F33.0 MAJOR DEPRESSIVE DISORDER, RECURRENT EPISODE, MILD: ICD-10-CM

## 2025-07-14 DIAGNOSIS — I10 ESSENTIAL HYPERTENSION, BENIGN: ICD-10-CM

## 2025-07-14 PROCEDURE — 99349 HOME/RES VST EST MOD MDM 40: CPT | Performed by: INTERNAL MEDICINE

## 2025-07-14 NOTE — TELEPHONE ENCOUNTER
"Faxed \"signed\" Detailed Written Order - Wound Care to [facility] @ [phone]    Confirmation received  "

## 2025-07-14 NOTE — PROGRESS NOTES
"Riddhi Aguilar is a 89 year old female seen July 14, 2025 at [facility] where she has resided for one month (admit 6/2025) seen for initial visit   Pt is seen in her apartment up to recliner    Has been feeling tired and slow to wake up earlier this morning, family and nursing staff report not her usual self.   /60   No fever.    However by the time of my visit she is awake, alert and tracking well.   States family visit yesterday \"just wore me out.   Too much going on.\"    And did not get enough sleep last night.          By chart review, pt has a h/o HTN, CKD3, PUD, depression, spinal stenosis and anemia.   She was hospitalized in May 2024 following a fall in which she suffered a left hip fracture, s/p percutaneous pinning, and a left radial head fracture managed nonoperatively     Rehab at [facility]   She had an April 2025 [facility] hospitalization for new onset atrial fib with RVR and HFpEF    Started on metoprolol and apixaban by Cardiology   [facility] later in the month following a fall with scalp laceration, LLE cellulitis and sepsis    Treated with abx and IV furosemide   [facility] [place] [facility]    Hospitalized again 7/2-4 for HFpEF exacerbation, pressenting with dyspnea and weakness.   Diuresed with IV furosemide and improved  Discharged back to [place] with [facility]     She was seen in Cardiology clinic on 7/9  Metoprolol dose increased to 75 mg bid   Furosemide and spironolactone continued at usual doses       Past Medical History:   Diagnosis Date    Anemia     baseline 10-11    Arthritis     Bleeding ulcer     duodenal ulcer    CKD (chronic kidney disease) stage 3, GFR 30-59 ml/min (H)     Esophageal reflux 7/22/2010    Gastroesophageal Reflux Disease    Essential hypertension, benign     MDD (major depressive disorder)     Nephrolithiasis     Osteoporosis     Pure hypercholesterolemia     Spinal stenosis     " Unspecified cataract     Unspecified glaucoma(365.9)        Past Surgical History:   Procedure Laterality Date    APPENDECTOMY      BREAST SURGERY      lumpectomy    CLOSED REDUCTION, PERCUTANEOUS PINNING HIP Left 5/20/2024    Procedure: Closed reduction percutaneous pinning, left hip;  Surgeon: Jose Luis Merino MD;  Location: [facility] OR    COMBINED CYSTOSCOPY, RETROGRADES, EXCHANGE STENT URETER(S) Left 01/24/2024    Procedure: Cystoscopy, left retrograde pyelogram, left JJ stent placement, <1hr physician fluoroscopy time;  Surgeon: Aureliano Brandt MD;  Location: [facility] OR    COMBINED CYSTOSCOPY, RETROGRADES, URETEROSCOPY, LASER HOLMIUM LITHOTRIPSY URETER(S), INSERT STENT Left 02/20/2024    Procedure: Cystoscopy, left diagnostic ureteroscopy, left retrograde pyelogram, left ureteral stent removal, fluoroscopic interpretation <1 hour physician time;  Surgeon: Richie Jaquez MD;  Location: [facility] OR    CYSTOSCOPY      CYSTOSCOPY, REMOVE STENT(S), COMBINED Left 02/20/2024    Procedure: Cystoscopy, remove stent(s), combined;  Surgeon: Richie Jaquez MD;  Location:  OR    DILATION AND CURETTAGE, HYSTEROSCOPY DIAGNOSTIC, COMBINED  02/06/2014    Procedure: COMBINED DILATION AND CURETTAGE, HYSTEROSCOPY DIAGNOSTIC;  DILATION AND CURETTAGE, HYSTEROSCOPY, POLYPECTOMY, INCISION AND DRAINAGE OF SEBACEOUS CYST ;  Surgeon: Serena Zamora MD;  Location: [facility]    EXCISE LESION LOWER EXTREMITY Right 08/29/2017    Procedure: EXCISE LESION LOWER EXTREMITY;  WIDE EXCISIONAL BIOPSY OF RIGHT ANKLE BASAL CELL CARCINOMA;  Surgeon: Juan Luis Flores MD;  Location:  OR    INCISION AND DRAINAGE PERINEAL, COMBINED  02/06/2014    Procedure: COMBINED INCISION AND DRAINAGE PERINEAL;;  Surgeon: Serena Zamora MD;  Location:  [facility]    ZZC NONSPECIFIC PROCEDURE      Repair of buckled retina of rt eye    ZZ NONSPECIFIC PROCEDURE      Appy     SH:  Previously lived alone, [place] apartment at the [facility] and paulina Cedillo  "and Adonay Cotter lives locally and his wife is a nurse at [employer]    Non smoker     ROS:   SLUMS 20/30   CPT 4.7 in 2024     Ambulatory with 4WW short distances only, otherwise using WC     Wt Readings from Last 5 Encounters:   07/14/25 78.9 kg (174 lb)   07/09/25 81.2 kg (179 lb)   07/07/25 78.9 kg (174 lb)   07/04/25 79.5 kg (175 lb 4.3 oz)   06/23/25 78.9 kg (174 lb)      GENERAL APPEARANCE: alert and no distress, pale   Ht 1.499 m (4' 11\")   Wt 78.9 kg (174 lb)   BMI 35.14 kg/m     /60       HEENT: normocephalic, no lesion or abnormalities, [place]  Bruise left cheek  NECK: no adenopathy, no asymmetry, masses, or scars and thyroid normal to palpation  RESP: lungs clear to auscultation - no rales, rhonchi or wheezes  CV: chaotic rhythm, II/VI SUNNY   ABDOMEN:  soft, nontender, no HSM or masses and bowel sounds normal  MS: extremities with 1-2+ edema, with EdemaWear   SKIN: 3cm dry open area left calf, no erythema or drainage  NEURO: generalized weakness, some limits to history   PSYCH: affect okay, pleasant    LYMPHATICS: No cervical,  or supraclavicular nodes     Lab Results   Component Value Date     07/04/2025    POTASSIUM 3.7 07/04/2025    CHLORIDE 96 (L) 07/04/2025    CO2 26 07/04/2025    ANIONGAP 13 07/04/2025    GLC 99 07/04/2025    BUN 29.6 (H) 07/04/2025    CR 1.15 (H) 07/04/2025    GFRESTIMATED 44 (L) 07/04/2025    TAMMY 9.5 07/04/2025     Component Value Date    AST 48 05/26/2025      ALBUMIN 3.8 05/26/2025      ALKPHOS 79 05/26/2025     Component Value Date    WBC 11.0 07/03/2025      HGB 11.8 07/03/2025       07/03/2025       07/03/2025     ECHO 4/2025  1. The left ventricle is normal in size. Moderately increased left ventricular wall thickness is noted. Left ventricular systolic function is normal. The  visual ejection fraction is 60-65%. Diastolic function not assessed due to atrial fibrillation. No regional wall motion abnormalities noted.  2. The right ventricle is " normal size. The right ventricular systolic function is normal.  3. There is severe biatrial enlargement. There is no color Doppler evidence of an atrial shunt.  4. Mild mitral stenosis.  5. Mild to moderate valvular aortic stenosis. The mean AoV pressure gradient is 15.7 mmHg. The peak AoV pressure gradient is 24.0 mmHg. The calculated  aortic valve are is 1.3 cm^2.  6. No pericardial effusion.  7. In comparison to the previous report dated 01/25/2024, there has been a mild interval increase in transaortic gradients.    CT HEAD W/O CONTRAST, CT CERVICAL SPINE W/O CONTRAST    5/26/2025  HEAD CT:   INTRACRANIAL CONTENTS: No intracranial hemorrhage, extraaxial collection, or mass effect.  No CT evidence of acute infarct. Moderate presumed chronic small vessel ischemic changes. Mild generalized volume loss. No hydrocephalus.   VISUALIZED ORBITS/SINUSES/MASTOIDS: Prior bilateral cataract surgery. Visualized portions of the orbits are otherwise unremarkable. Chronic-appearing complete opacification of the right maxillary sinus. No middle ear or mastoid effusion.  BONES/SOFT TISSUES: No calvarial fracture. High posterior scalp soft tissue swelling with laceration and skin staples.  CERVICAL SPINE CT:   1.  No CT evidence for acute fracture or post traumatic subluxation.  2.  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  3.  Multilevel degenerative changes of the cervical spine, as above.      IMP/PLAN:   (I50.33) Acute on chronic diastolic congestive heart failure (H)  (primary encounter diagnosis)  (I35.0) Aortic valve stenosis, etiology of cardiac valve disease unspecified  (I10) Essential hypertension, benign  Comment: lower BP today after metoprolol dose increase     Symptoms of fatigue and REDDY improved over the course of the morning   Plan: continue metoprolol 75 mg bid for now, with hold parameters   May need to go back  down on dose   Furosemide 20 mg bid and spironolactone 25 mg/day    Check labs tomorrow and follow up with Cardiology clinic about pt's symptoms and tachycardia   Talked with son Vahe by phone, with update on pt's status and plan going forward       (I48.11) Longstanding persistent atrial fibrillation (H)  Comment: RVR today    Plan: metoprolol 75 mg bid for VR control  >>>with lower BPs may need to decrease dose   Apixaban 5 mg bid for stroke prophylaxis     (K26.7) Chronic duodenal ulcer without hemorrhage or perforation  Comment: by hx  Plan: famotidine 20 mg/ day     (R53.1) Generalized weakness  (Z91.81) History of falling  Comment: with injuries   Plan: [facility] PHYSICAL THERAPY /OCCUPATIONAL THERAPY for strengthening, balance, gait, ADLs       (G31.84) MCI (mild cognitive impairment)  Comment: scores as above  Plan: AL support for med admin, meals, activity     (F33.0) Major depressive disorder, recurrent episode, mild  Comment: by history   Plan: remains on PTA bupropion 450 mg/day      Advance directive:  DNR/DNI per signed CHINO Tamayo MD

## 2025-07-14 NOTE — LETTER
" 7/14/2025      Riddhi Aguilar  [address] [place] [address]        Riddhi Aguilar is a 89 year old female seen July 14, 2025 at [facility] where she has resided for one month (admit 6/2025) seen for initial visit   Pt is seen in her apartment up to recliner    Has been feeling tired and slow to wake up earlier this morning, family and nursing staff report not her usual self.   /60   No fever.    However by the time of my visit she is awake, alert and tracking well.   States family visit yesterday \"just wore me out.   Too much going on.\"    And did not get enough sleep last night.          By chart review, pt has a h/o HTN, CKD3, PUD, depression, spinal stenosis and anemia.   She was hospitalized in May 2024 following a fall in which she suffered a left hip fracture, s/p percutaneous pinning, and a left radial head fracture managed nonoperatively     Rehab at [facility]   She had an April 2025 [facility] hospitalization for new onset atrial fib with RVR and HFpEF    Started on metoprolol and apixaban by Cardiology   [facility] in [facility] following a fall with scalp laceration, LLE cellulitis and sepsis    Treated with abx and IV furosemide   [facility] [place] [facility]    Hospitalized again 7/2-4 for HFpEF exacerbation, pressenting with dyspnea and weakness.   Diuresed with IV furosemide and improved  [facility] back [facility] [place] [facility]     She was seen in Cardiology clinic on 7/9  Metoprolol dose increased to 75 mg bid   Furosemide and spironolactone continued at usual doses       Past Medical History:   Diagnosis Date     Anemia     baseline 10-11     Arthritis      Bleeding ulcer     duodenal ulcer     CKD (chronic kidney disease) stage 3, GFR 30-59 ml/min (H)      Esophageal reflux 7/22/2010    Gastroesophageal Reflux Disease     Essential hypertension, benign      MDD (major depressive disorder)      " Nephrolithiasis      Osteoporosis      Pure hypercholesterolemia      Spinal stenosis      Unspecified cataract      Unspecified glaucoma(365.9)        Past Surgical History:   Procedure Laterality Date     APPENDECTOMY       BREAST SURGERY      lumpectomy     CLOSED REDUCTION, PERCUTANEOUS PINNING HIP Left 5/20/2024    Procedure: Closed reduction percutaneous pinning, left hip;  Surgeon: Jose Luis Merino MD;  Location: [facility] OR     COMBINED CYSTOSCOPY, RETROGRADES, EXCHANGE STENT URETER(S) Left 01/24/2024    Procedure: Cystoscopy, left retrograde pyelogram, left JJ stent placement, <1hr physician fluoroscopy time;  Surgeon: Aureliano Brandt MD;  Location: [facility] OR     COMBINED CYSTOSCOPY, RETROGRADES, URETEROSCOPY, LASER HOLMIUM LITHOTRIPSY URETER(S), INSERT STENT Left 02/20/2024    Procedure: Cystoscopy, left diagnostic ureteroscopy, left retrograde pyelogram, left ureteral stent removal, fluoroscopic interpretation <1 hour physician time;  Surgeon: Richie Jaquez MD;  Location: [facility] OR     CYSTOSCOPY       CYSTOSCOPY, REMOVE STENT(S), COMBINED Left 02/20/2024    Procedure: Cystoscopy, remove stent(s), combined;  Surgeon: Richie Jaquez MD;  Location:  OR     DILATION AND CURETTAGE, HYSTEROSCOPY DIAGNOSTIC, COMBINED  02/06/2014    Procedure: COMBINED DILATION AND CURETTAGE, HYSTEROSCOPY DIAGNOSTIC;  DILATION AND CURETTAGE, HYSTEROSCOPY, POLYPECTOMY, INCISION AND DRAINAGE OF SEBACEOUS CYST ;  Surgeon: Serena Zamora MD;  Location: [facility]     EXCISE LESION LOWER EXTREMITY Right 08/29/2017    Procedure: EXCISE LESION LOWER EXTREMITY;  WIDE EXCISIONAL BIOPSY OF RIGHT ANKLE BASAL CELL CARCINOMA;  Surgeon: Juan Luis Flores MD;  Location:  OR     INCISION AND DRAINAGE PERINEAL, COMBINED  02/06/2014    Procedure: COMBINED INCISION AND DRAINAGE PERINEAL;;  Surgeon: Serena Zamora MD;  Location:  [facility]     ZZC NONSPECIFIC PROCEDURE      Repair of buckled retina of rt eye     ZZC NONSPECIFIC PROCEDURE       "Geovany     SH:  Previously lived alone[place] apartment at [place] [facility]   [employer] [place], sons Vahe and Adonay Cotter lives locally and his wife is a nurse at [employer]    Non smoker     ROS:   SLUMS 20/30   CPT 4.7 in 2024     Ambulatory with 4WW short distances only, otherwise using WC     Wt Readings from Last 5 Encounters:   07/14/25 78.9 kg (174 lb)   07/09/25 81.2 kg (179 lb)   07/07/25 78.9 kg (174 lb)   07/04/25 79.5 kg (175 lb 4.3 oz)   06/23/25 78.9 kg (174 lb)      GENERAL APPEARANCE: alert and no distress, pale   Ht 1.499 m (4' 11\")   Wt 78.9 kg (174 lb)   BMI 35.14 kg/m     /60       HEENT: normocephalic, no lesion or abnormalities, [place]  Bruise left cheek  NECK: no adenopathy, no asymmetry, masses, or scars and thyroid normal to palpation  RESP: lungs clear to auscultation - no rales, rhonchi or wheezes  CV: chaotic rhythm, II/VI SUNNY   ABDOMEN:  soft, nontender, no HSM or masses and bowel sounds normal  MS: extremities with 1-2+ edema, with EdemaWear   SKIN: 3cm dry open area left calf, no erythema or drainage  NEURO: generalized weakness, some limits to history   PSYCH: affect okay, pleasant    LYMPHATICS: No cervical,  or supraclavicular nodes     Lab Results   Component Value Date     07/04/2025    POTASSIUM 3.7 07/04/2025    CHLORIDE 96 (L) 07/04/2025    CO2 26 07/04/2025    ANIONGAP 13 07/04/2025    GLC 99 07/04/2025    BUN 29.6 (H) 07/04/2025    CR 1.15 (H) 07/04/2025    GFRESTIMATED 44 (L) 07/04/2025    TAMMY 9.5 07/04/2025     Component Value Date    AST 48 05/26/2025      ALBUMIN 3.8 05/26/2025      ALKPHOS 79 05/26/2025     Component Value Date    WBC 11.0 07/03/2025      HGB 11.8 07/03/2025       07/03/2025       07/03/2025     ECHO 4/2025  1. The left ventricle is normal in size. Moderately increased left ventricular wall thickness is noted. Left ventricular systolic function is normal. The  visual ejection fraction is 60-65%. Diastolic function not assessed " due to atrial fibrillation. No regional wall motion abnormalities noted.  2. The right ventricle is normal size. The right ventricular systolic function is normal.  3. There is severe biatrial enlargement. There is no color Doppler evidence of an atrial shunt.  4. Mild mitral stenosis.  5. Mild to moderate valvular aortic stenosis. The mean AoV pressure gradient is 15.7 mmHg. The peak AoV pressure gradient is 24.0 mmHg. The calculated  aortic valve are is 1.3 cm^2.  6. No pericardial effusion.  7. In comparison to the previous report dated 01/25/2024, there has been a mild interval increase in transaortic gradients.    CT HEAD W/O CONTRAST, CT CERVICAL SPINE W/O CONTRAST    5/26/2025  HEAD CT:   INTRACRANIAL CONTENTS: No intracranial hemorrhage, extraaxial collection, or mass effect.  No CT evidence of acute infarct. Moderate presumed chronic small vessel ischemic changes. Mild generalized volume loss. No hydrocephalus.   VISUALIZED ORBITS/SINUSES/MASTOIDS: Prior bilateral cataract surgery. Visualized portions of the orbits are otherwise unremarkable. Chronic-appearing complete opacification of the right maxillary sinus. No middle ear or mastoid effusion.  BONES/SOFT TISSUES: No calvarial fracture. High posterior scalp soft tissue swelling with laceration and skin staples.  CERVICAL SPINE CT:   1.  No CT evidence for acute fracture or post traumatic subluxation.  2.  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  3.  Multilevel degenerative changes of the cervical spine, as above.      IMP/PLAN:   (I50.33) Acute on chronic diastolic congestive heart failure (H)  (primary encounter diagnosis)  (I35.0) Aortic valve stenosis, etiology of cardiac valve disease unspecified  (I10) Essential hypertension, benign  Comment: lower BP today after metoprolol dose increase     Symptoms of fatigue and REDDY improved over the course of the  morning   Plan: continue metoprolol 75 mg bid for now, with hold parameters   May need to go back down on dose   Furosemide 20 mg bid and spironolactone 25 mg/day    Check labs tomorrow and follow up with Cardiology clinic about pt's symptoms and tachycardia   Talked with son Vaeh by phone, with update on pt's status and plan going forward       (I48.11) Longstanding persistent atrial fibrillation (H)  Comment: RVR today    Plan: metoprolol 75 mg bid for VR control  >>>with lower BPs may need to decrease dose   Apixaban 5 mg bid for stroke prophylaxis     (K26.7) Chronic duodenal ulcer without hemorrhage or perforation  Comment: by hx  Plan: famotidine 20 mg/ day     (R53.1) Generalized weakness  (Z91.81) History of falling  Comment: with injuries   Plan: [facility] PHYSICAL THERAPY /OCCUPATIONAL THERAPY for strengthening, balance, gait, ADLs       (G31.84) MCI (mild cognitive impairment)  Comment: scores as above  Plan: [place] support for med admin, meals, activity     (F33.0) Major depressive disorder, recurrent episode, mild  Comment: by history   Plan: remains on PTA bupropion 450 mg/day      Advance directive:  DNR/DNI per signed CHINO Tamayo MD       Sincerely,        Rea Tamayo MD    Electronically signed

## 2025-07-15 LAB
ALBUMIN SERPL BCG-MCNC: 3.6 G/DL (ref 3.5–5.2)
ALP SERPL-CCNC: 82 U/L (ref 40–150)
ALT SERPL W P-5'-P-CCNC: 142 U/L (ref 0–50)
ANION GAP SERPL CALCULATED.3IONS-SCNC: 16 MMOL/L (ref 7–15)
AST SERPL W P-5'-P-CCNC: 186 U/L (ref 0–45)
BILIRUB SERPL-MCNC: 2.4 MG/DL
BUN SERPL-MCNC: 49.5 MG/DL (ref 8–23)
CALCIUM SERPL-MCNC: 9.3 MG/DL (ref 8.8–10.4)
CHLORIDE SERPL-SCNC: 93 MMOL/L (ref 98–107)
CREAT SERPL-MCNC: 1.45 MG/DL (ref 0.51–0.95)
EGFRCR SERPLBLD CKD-EPI 2021: 33 ML/MIN/1.73M2
ERYTHROCYTE [DISTWIDTH] IN BLOOD BY AUTOMATED COUNT: 25.4 % (ref 10–15)
GLUCOSE SERPL-MCNC: 180 MG/DL (ref 70–99)
HCO3 SERPL-SCNC: 23 MMOL/L (ref 22–29)
HCT VFR BLD AUTO: 36.8 % (ref 35–47)
HGB BLD-MCNC: 12.1 G/DL (ref 11.7–15.7)
MCH RBC QN AUTO: 34.7 PG (ref 26.5–33)
MCHC RBC AUTO-ENTMCNC: 32.9 G/DL (ref 31.5–36.5)
MCV RBC AUTO: 105 FL (ref 78–100)
PLATELET # BLD AUTO: 310 10E3/UL (ref 150–450)
POTASSIUM SERPL-SCNC: 4.1 MMOL/L (ref 3.4–5.3)
PROT SERPL-MCNC: 5.8 G/DL (ref 6.4–8.3)
RBC # BLD AUTO: 3.49 10E6/UL (ref 3.8–5.2)
SODIUM SERPL-SCNC: 132 MMOL/L (ref 135–145)
WBC # BLD AUTO: 9.5 10E3/UL (ref 4–11)

## 2025-07-17 ENCOUNTER — APPOINTMENT (OUTPATIENT)
Dept: ULTRASOUND IMAGING | Facility: CLINIC | Age: OVER 89
End: 2025-07-17
Attending: EMERGENCY MEDICINE
Payer: COMMERCIAL

## 2025-07-17 ENCOUNTER — HOSPITAL ENCOUNTER (INPATIENT)
Facility: CLINIC | Age: OVER 89
End: 2025-07-17
Attending: EMERGENCY MEDICINE | Admitting: HOSPITALIST
Payer: COMMERCIAL

## 2025-07-17 ENCOUNTER — APPOINTMENT (OUTPATIENT)
Dept: GENERAL RADIOLOGY | Facility: CLINIC | Age: OVER 89
End: 2025-07-17
Attending: EMERGENCY MEDICINE
Payer: COMMERCIAL

## 2025-07-17 ENCOUNTER — APPOINTMENT (OUTPATIENT)
Dept: CT IMAGING | Facility: CLINIC | Age: OVER 89
End: 2025-07-17
Attending: EMERGENCY MEDICINE
Payer: COMMERCIAL

## 2025-07-17 VITALS
DIASTOLIC BLOOD PRESSURE: 95 MMHG | OXYGEN SATURATION: 95 % | TEMPERATURE: 98 F | RESPIRATION RATE: 16 BRPM | HEART RATE: 119 BPM | SYSTOLIC BLOOD PRESSURE: 109 MMHG

## 2025-07-17 DIAGNOSIS — M79.662 PAIN OF LEFT LOWER LEG: ICD-10-CM

## 2025-07-17 DIAGNOSIS — I50.9 CONGESTIVE HEART FAILURE, UNSPECIFIED HF CHRONICITY, UNSPECIFIED HEART FAILURE TYPE (H): ICD-10-CM

## 2025-07-17 DIAGNOSIS — R53.1 GENERALIZED WEAKNESS: ICD-10-CM

## 2025-07-17 DIAGNOSIS — I50.33 ACUTE ON CHRONIC DIASTOLIC CONGESTIVE HEART FAILURE (H): ICD-10-CM

## 2025-07-17 DIAGNOSIS — N39.0 URINARY TRACT INFECTION IN FEMALE: ICD-10-CM

## 2025-07-17 DIAGNOSIS — L97.909 CHRONIC SKIN ULCER OF LOWER LEG (H): Primary | ICD-10-CM

## 2025-07-17 DIAGNOSIS — R09.02 HYPOXIA: ICD-10-CM

## 2025-07-17 DIAGNOSIS — I35.0 AORTIC VALVE STENOSIS, ETIOLOGY OF CARDIAC VALVE DISEASE UNSPECIFIED: ICD-10-CM

## 2025-07-17 LAB
ALBUMIN SERPL BCG-MCNC: 3.8 G/DL (ref 3.5–5.2)
ALBUMIN UR-MCNC: NEGATIVE MG/DL
ALP SERPL-CCNC: 76 U/L (ref 40–150)
ALT SERPL W P-5'-P-CCNC: 128 U/L (ref 0–50)
AMMONIA PLAS-SCNC: 11 UMOL/L (ref 11–51)
ANION GAP SERPL CALCULATED.3IONS-SCNC: 13 MMOL/L (ref 7–15)
APPEARANCE UR: ABNORMAL
AST SERPL W P-5'-P-CCNC: 109 U/L (ref 0–45)
AST SERPL W P-5'-P-CCNC: ABNORMAL U/L
ATRIAL RATE - MUSE: 156 BPM
BACTERIA #/AREA URNS HPF: ABNORMAL /HPF
BASOPHILS # BLD AUTO: 0 10E3/UL (ref 0–0.2)
BASOPHILS NFR BLD AUTO: 0 %
BILIRUB DIRECT SERPL-MCNC: 0.89 MG/DL (ref 0–0.3)
BILIRUB SERPL-MCNC: 2.7 MG/DL
BILIRUB UR QL STRIP: NEGATIVE
BUN SERPL-MCNC: 37 MG/DL (ref 8–23)
CALCIUM SERPL-MCNC: 9.3 MG/DL (ref 8.8–10.4)
CHLORIDE SERPL-SCNC: 94 MMOL/L (ref 98–107)
COLOR UR AUTO: YELLOW
CREAT SERPL-MCNC: 1.19 MG/DL (ref 0.51–0.95)
DIASTOLIC BLOOD PRESSURE - MUSE: NORMAL MMHG
EGFRCR SERPLBLD CKD-EPI 2021: 42 ML/MIN/1.73M2
EOSINOPHIL # BLD AUTO: 0.1 10E3/UL (ref 0–0.7)
EOSINOPHIL NFR BLD AUTO: 1 %
ERYTHROCYTE [DISTWIDTH] IN BLOOD BY AUTOMATED COUNT: 25.4 % (ref 10–15)
GLUCOSE SERPL-MCNC: 107 MG/DL (ref 70–99)
GLUCOSE UR STRIP-MCNC: NEGATIVE MG/DL
HCO3 SERPL-SCNC: 25 MMOL/L (ref 22–29)
HCT VFR BLD AUTO: 38.2 % (ref 35–47)
HGB BLD-MCNC: 12.8 G/DL (ref 11.7–15.7)
HGB UR QL STRIP: ABNORMAL
HOLD SPECIMEN: NORMAL
HYALINE CASTS: 5 /LPF
IMM GRANULOCYTES # BLD: 0.2 10E3/UL
IMM GRANULOCYTES NFR BLD: 2 %
INTERPRETATION ECG - MUSE: NORMAL
KETONES UR STRIP-MCNC: NEGATIVE MG/DL
LACTATE SERPL-SCNC: 1.9 MMOL/L (ref 0.7–2)
LEUKOCYTE ESTERASE UR QL STRIP: ABNORMAL
LIPASE SERPL-CCNC: 16 U/L (ref 13–60)
LYMPHOCYTES # BLD AUTO: 0.4 10E3/UL (ref 0.8–5.3)
LYMPHOCYTES NFR BLD AUTO: 4 %
MAGNESIUM SERPL-MCNC: 2 MG/DL (ref 1.7–2.3)
MCH RBC QN AUTO: 35.4 PG (ref 26.5–33)
MCHC RBC AUTO-ENTMCNC: 33.5 G/DL (ref 31.5–36.5)
MCV RBC AUTO: 106 FL (ref 78–100)
MONOCYTES # BLD AUTO: 0.9 10E3/UL (ref 0–1.3)
MONOCYTES NFR BLD AUTO: 10 %
MUCOUS THREADS #/AREA URNS LPF: PRESENT /LPF
NEUTROPHILS # BLD AUTO: 7.6 10E3/UL (ref 1.6–8.3)
NEUTROPHILS NFR BLD AUTO: 83 %
NITRATE UR QL: NEGATIVE
NRBC # BLD AUTO: 0.3 10E3/UL
NRBC BLD AUTO-RTO: 4 /100
NT-PROBNP SERPL-MCNC: ABNORMAL PG/ML (ref 0–624)
P AXIS - MUSE: NORMAL DEGREES
PH UR STRIP: 6.5 [PH] (ref 5–7)
PLATELET # BLD AUTO: 335 10E3/UL (ref 150–450)
POTASSIUM SERPL-SCNC: 4.8 MMOL/L (ref 3.4–5.3)
PR INTERVAL - MUSE: NORMAL MS
PROT SERPL-MCNC: 6.2 G/DL (ref 6.4–8.3)
QRS DURATION - MUSE: 102 MS
QT - MUSE: 374 MS
QTC - MUSE: 503 MS
R AXIS - MUSE: -12 DEGREES
RBC # BLD AUTO: 3.62 10E6/UL (ref 3.8–5.2)
RBC URINE: 5 /HPF
SODIUM SERPL-SCNC: 132 MMOL/L (ref 135–145)
SP GR UR STRIP: 1.02 (ref 1–1.03)
SQUAMOUS EPITHELIAL: 1 /HPF
SYSTOLIC BLOOD PRESSURE - MUSE: NORMAL MMHG
T AXIS - MUSE: 138 DEGREES
TROPONIN T SERPL HS-MCNC: 35 NG/L
TROPONIN T SERPL HS-MCNC: 38 NG/L
UROBILINOGEN UR STRIP-MCNC: 3 MG/DL
VENTRICULAR RATE- MUSE: 109 BPM
WBC # BLD AUTO: 9.2 10E3/UL (ref 4–11)
WBC CLUMPS #/AREA URNS HPF: PRESENT /HPF
WBC URINE: 91 /HPF

## 2025-07-17 PROCEDURE — 73590 X-RAY EXAM OF LOWER LEG: CPT | Mod: LT

## 2025-07-17 PROCEDURE — 36415 COLL VENOUS BLD VENIPUNCTURE: CPT | Performed by: EMERGENCY MEDICINE

## 2025-07-17 PROCEDURE — 70450 CT HEAD/BRAIN W/O DYE: CPT

## 2025-07-17 PROCEDURE — 250N000011 HC RX IP 250 OP 636: Performed by: EMERGENCY MEDICINE

## 2025-07-17 PROCEDURE — 83735 ASSAY OF MAGNESIUM: CPT | Performed by: EMERGENCY MEDICINE

## 2025-07-17 PROCEDURE — 83690 ASSAY OF LIPASE: CPT | Performed by: EMERGENCY MEDICINE

## 2025-07-17 PROCEDURE — 85025 COMPLETE CBC W/AUTO DIFF WBC: CPT | Performed by: EMERGENCY MEDICINE

## 2025-07-17 PROCEDURE — 99222 1ST HOSP IP/OBS MODERATE 55: CPT | Performed by: HOSPITALIST

## 2025-07-17 PROCEDURE — 87040 BLOOD CULTURE FOR BACTERIA: CPT | Performed by: EMERGENCY MEDICINE

## 2025-07-17 PROCEDURE — 82140 ASSAY OF AMMONIA: CPT | Performed by: EMERGENCY MEDICINE

## 2025-07-17 PROCEDURE — 93005 ELECTROCARDIOGRAM TRACING: CPT

## 2025-07-17 PROCEDURE — 84484 ASSAY OF TROPONIN QUANT: CPT | Performed by: EMERGENCY MEDICINE

## 2025-07-17 PROCEDURE — 81001 URINALYSIS AUTO W/SCOPE: CPT | Performed by: EMERGENCY MEDICINE

## 2025-07-17 PROCEDURE — 96365 THER/PROPH/DIAG IV INF INIT: CPT | Mod: 59

## 2025-07-17 PROCEDURE — 74177 CT ABD & PELVIS W/CONTRAST: CPT

## 2025-07-17 PROCEDURE — 250N000013 HC RX MED GY IP 250 OP 250 PS 637: Performed by: EMERGENCY MEDICINE

## 2025-07-17 PROCEDURE — 87186 SC STD MICRODIL/AGAR DIL: CPT | Performed by: EMERGENCY MEDICINE

## 2025-07-17 PROCEDURE — P9047 ALBUMIN (HUMAN), 25%, 50ML: HCPCS | Performed by: EMERGENCY MEDICINE

## 2025-07-17 PROCEDURE — 99285 EMERGENCY DEPT VISIT HI MDM: CPT | Mod: 25

## 2025-07-17 PROCEDURE — 96375 TX/PRO/DX INJ NEW DRUG ADDON: CPT

## 2025-07-17 PROCEDURE — 71046 X-RAY EXAM CHEST 2 VIEWS: CPT

## 2025-07-17 PROCEDURE — 120N000001 HC R&B MED SURG/OB

## 2025-07-17 PROCEDURE — 83880 ASSAY OF NATRIURETIC PEPTIDE: CPT | Performed by: EMERGENCY MEDICINE

## 2025-07-17 PROCEDURE — 83605 ASSAY OF LACTIC ACID: CPT | Performed by: EMERGENCY MEDICINE

## 2025-07-17 PROCEDURE — 84155 ASSAY OF PROTEIN SERUM: CPT | Performed by: EMERGENCY MEDICINE

## 2025-07-17 PROCEDURE — 93971 EXTREMITY STUDY: CPT | Mod: LT

## 2025-07-17 PROCEDURE — 80048 BASIC METABOLIC PNL TOTAL CA: CPT | Performed by: EMERGENCY MEDICINE

## 2025-07-17 RX ORDER — METOPROLOL TARTRATE 25 MG/1
75 TABLET, FILM COATED ORAL 2 TIMES DAILY
Status: ON HOLD | COMMUNITY

## 2025-07-17 RX ORDER — CEFTRIAXONE 2 G/1
2 INJECTION, POWDER, FOR SOLUTION INTRAMUSCULAR; INTRAVENOUS ONCE
Status: COMPLETED | OUTPATIENT
Start: 2025-07-17 | End: 2025-07-17

## 2025-07-17 RX ORDER — HYDROMORPHONE HCL IN WATER/PF 6 MG/30 ML
0.2 PATIENT CONTROLLED ANALGESIA SYRINGE INTRAVENOUS
Status: DISCONTINUED | OUTPATIENT
Start: 2025-07-17 | End: 2025-07-24

## 2025-07-17 RX ORDER — ACETAMINOPHEN 325 MG/1
650 TABLET ORAL EVERY 4 HOURS PRN
Status: DISPENSED | OUTPATIENT
Start: 2025-07-17

## 2025-07-17 RX ORDER — LACTOSE-REDUCED FOOD
1 LIQUID (ML) ORAL DAILY PRN
Status: ON HOLD | COMMUNITY

## 2025-07-17 RX ORDER — AMOXICILLIN 250 MG
1 CAPSULE ORAL EVERY OTHER DAY
Status: ON HOLD | COMMUNITY

## 2025-07-17 RX ORDER — FUROSEMIDE 20 MG/1
20 TABLET ORAL EVERY MORNING
Status: ON HOLD | COMMUNITY

## 2025-07-17 RX ORDER — LIDOCAINE 4 G/G
1 PATCH TOPICAL EVERY 24 HOURS
Status: ON HOLD | COMMUNITY

## 2025-07-17 RX ORDER — ACETAMINOPHEN 325 MG/1
650 TABLET ORAL ONCE
Status: COMPLETED | OUTPATIENT
Start: 2025-07-17 | End: 2025-07-17

## 2025-07-17 RX ORDER — OXYCODONE HYDROCHLORIDE 5 MG/1
5 TABLET ORAL EVERY 4 HOURS PRN
Refills: 0 | Status: DISCONTINUED | OUTPATIENT
Start: 2025-07-17 | End: 2025-07-24

## 2025-07-17 RX ORDER — IOPAMIDOL 755 MG/ML
500 INJECTION, SOLUTION INTRAVASCULAR ONCE
Status: COMPLETED | OUTPATIENT
Start: 2025-07-17 | End: 2025-07-17

## 2025-07-17 RX ORDER — ALBUMIN (HUMAN) 12.5 G/50ML
25 SOLUTION INTRAVENOUS ONCE
Status: COMPLETED | OUTPATIENT
Start: 2025-07-17 | End: 2025-07-18

## 2025-07-17 RX ORDER — AMOXICILLIN 250 MG
2 CAPSULE ORAL EVERY OTHER DAY
Status: ON HOLD | COMMUNITY

## 2025-07-17 RX ORDER — FAMOTIDINE 20 MG/1
20 TABLET, FILM COATED ORAL EVERY MORNING
Status: ON HOLD | COMMUNITY

## 2025-07-17 RX ORDER — MULTIPLE VITAMINS W/ MINERALS TAB 9MG-400MCG
1 TAB ORAL EVERY MORNING
Status: ON HOLD | COMMUNITY

## 2025-07-17 RX ORDER — FUROSEMIDE 10 MG/ML
40 INJECTION INTRAMUSCULAR; INTRAVENOUS ONCE
Status: COMPLETED | OUTPATIENT
Start: 2025-07-17 | End: 2025-07-17

## 2025-07-17 RX ADMIN — ACETAMINOPHEN 650 MG: 325 TABLET ORAL at 18:14

## 2025-07-17 RX ADMIN — ALBUMIN HUMAN 25 G: 0.25 SOLUTION INTRAVENOUS at 22:21

## 2025-07-17 RX ADMIN — FUROSEMIDE 40 MG: 10 INJECTION, SOLUTION INTRAMUSCULAR; INTRAVENOUS at 21:21

## 2025-07-17 RX ADMIN — IOPAMIDOL 88 ML: 755 INJECTION, SOLUTION INTRAVENOUS at 18:55

## 2025-07-17 RX ADMIN — CEFTRIAXONE 2 G: 2 INJECTION, POWDER, FOR SOLUTION INTRAMUSCULAR; INTRAVENOUS at 21:21

## 2025-07-17 ASSESSMENT — ACTIVITIES OF DAILY LIVING (ADL)
ADLS_ACUITY_SCORE: 58

## 2025-07-17 NOTE — ED TRIAGE NOTES
Presents to ED via EMS from nursing facility. Pt has been experiencing increased weakness and general decline over the past month. Can typically walk with a walker and cannot today. Pt c/o some pain in left lower extremity. Pt fell on memorial day and still has bruising from that. Also concern for abnormal liver labs. Pt alert and oriented. EMS . Daughter in law is a nurse and is on the way.

## 2025-07-17 NOTE — ED PROVIDER NOTES
History     Chief Complaint:  Generalized Weakness and Leg Pain       HPI   Riddhi Aguilar is a 89 year old female       Here from assisted living, daughter who is a nurse here at our hospital accompanies her.    Concern is altered mental status with waxing and waning energy level throughout the day and periods of confusion.  Increased sleepiness, decreased functional ability normally can get up and around with a walker for short distances and is unable to get up on her own today.  Fell around Memorial Day, no new falls reported by daughter or the facility.  Chronically incontinent.  Has a known ulcer on the left leg and recently was treated for cellulitis.    History of heart failure and on a DOAC.  Last echocardiogram with preserved ejection fraction, also notes aortic stenosis with a valve area of 1.3.    Daughter noted increasing peripheral edema.  She was complaining of left leg pain today as well no known new fall or trauma.  Has been on diuretics since her hospitalization and had poor p.o. intake.      Independent Historian:   Daughter     Review of External Notes:   See ed course        Medications:    acetaminophen (TYLENOL) 500 MG tablet  apixaban ANTICOAGULANT (ELIQUIS) 5 MG tablet  bisacodyl (DULCOLAX) 10 MG suppository  buPROPion (WELLBUTRIN XL) 150 MG 24 hr tablet  dorzolamide-timolol (COSOPT) 22.3-6.8 MG/ML ophthalmic solution  famotidine (PEPCID) 20 MG tablet  furosemide (LASIX) 20 MG tablet  Lidocaine (LIDOCARE) 4 % Patch  magnesium hydroxide (MILK OF MAGNESIA) 400 MG/5ML suspension  melatonin 3 MG tablet  metoprolol tartrate (LOPRESSOR) 25 MG tablet  miconazole (MICATIN) 2 % external powder  multivitamin w/minerals (THERA-VIT-M) tablet  Nutritional Supplements (ENSURE) LIQD  senna-docusate (SENOKOT-S/PERICOLACE) 8.6-50 MG tablet  senna-docusate (SENOKOT-S/PERICOLACE) 8.6-50 MG tablet  spironolactone (ALDACTONE) 25 MG tablet  atorvastatin (LIPITOR) 10 MG tablet        Past Medical History:    Past  Medical History:   Diagnosis Date    Anemia     Arthritis     Bleeding ulcer     CKD (chronic kidney disease) stage 3, GFR 30-59 ml/min (H)     Esophageal reflux 7/22/2010    Essential hypertension, benign     MDD (major depressive disorder)     Nephrolithiasis     Osteoporosis     Pure hypercholesterolemia     Spinal stenosis     Unspecified cataract     Unspecified glaucoma(365.9)        Past Surgical History:    Past Surgical History:   Procedure Laterality Date    APPENDECTOMY      BREAST SURGERY      lumpectomy    CLOSED REDUCTION, PERCUTANEOUS PINNING HIP Left 5/20/2024    Procedure: Closed reduction percutaneous pinning, left hip;  Surgeon: Jose Luis Merino MD;  Location: [facility] OR    COMBINED CYSTOSCOPY, RETROGRADES, EXCHANGE STENT URETER(S) Left 01/24/2024    Procedure: Cystoscopy, left retrograde pyelogram, left JJ stent placement, <1hr physician fluoroscopy time;  Surgeon: Aureliano Brandt MD;  Location: [facility] OR    COMBINED CYSTOSCOPY, RETROGRADES, URETEROSCOPY, LASER HOLMIUM LITHOTRIPSY URETER(S), INSERT STENT Left 02/20/2024    Procedure: Cystoscopy, left diagnostic ureteroscopy, left retrograde pyelogram, left ureteral stent removal, fluoroscopic interpretation <1 hour physician time;  Surgeon: Richie Jaquez MD;  Location: [facility] OR    CYSTOSCOPY      CYSTOSCOPY, REMOVE STENT(S), COMBINED Left 02/20/2024    Procedure: Cystoscopy, remove stent(s), combined;  Surgeon: Richie Jaquez MD;  Location:  OR    DILATION AND CURETTAGE, HYSTEROSCOPY DIAGNOSTIC, COMBINED  02/06/2014    Procedure: COMBINED DILATION AND CURETTAGE, HYSTEROSCOPY DIAGNOSTIC;  DILATION AND CURETTAGE, HYSTEROSCOPY, POLYPECTOMY, INCISION AND DRAINAGE OF SEBACEOUS CYST ;  Surgeon: Serena Zamora MD;  Location: [facility]    EXCISE LESION LOWER EXTREMITY Right 08/29/2017    Procedure: EXCISE LESION LOWER EXTREMITY;  WIDE EXCISIONAL BIOPSY OF RIGHT ANKLE BASAL CELL CARCINOMA;  Surgeon: Juan Luis Flores MD;  Location: [facility]     INCISION AND DRAINAGE PERINEAL, COMBINED  02/06/2014    Procedure: COMBINED INCISION AND DRAINAGE PERINEAL;;  Surgeon: Serena Zamora MD;  Location: [facility] NONSPECIFIC PROCEDURE      Repair of buckled retina of rt eye    [facility] NONSPECIFIC PROCEDURE      Appy        Physical Exam   Patient Vitals for the past 24 hrs:   BP Temp Temp src Pulse Resp SpO2   07/17/25 2220 -- -- -- -- -- 95 %   07/17/25 2200 -- -- -- -- -- 97 %   07/17/25 2100 -- -- -- -- -- 97 %   07/17/25 2030 (!) 109/95 -- -- -- -- 94 %   07/17/25 1741 -- -- -- -- -- 98 %   07/17/25 1736 -- -- -- -- -- 99 %   07/17/25 1731 120/66 -- -- 119 -- --   07/17/25 1721 -- -- -- -- -- 92 %   07/17/25 1719 -- -- -- -- -- 92 %   07/17/25 1714 126/88 -- -- 53 -- (!) 90 %   07/17/25 1642 135/87 98  F (36.7  C) Oral -- 16 --          HENT: Pupils equal, scleral icterus, sublingual icterus, area of ecchymoses in the left inferior periorbital area.  No hyphema.  Scleral and subungual icterus  Neck: supple, no abnormal swelling, painless range of motion  Lungs:  CTAB,  no resp distress  CV: Irregular,  ppi  Abd: soft, nontender, nondistended, no rebound/masses/guarding/hsm  Ext: 3+ pitting edema bilateral lower extremities, left lower extremity skin discoloration and ulceration best seen in the pictures below.  Feet are warm and symmetric in temperature bilaterally.  Able to flex and extend the digits both of her feet.  Plantarflexion dorsiflexion intact.  There is tenderness palpation along the areas of erythema on the left lower leg.  Skin: warm, dry, well perfused  Neuro: alert, oriented to person and place, can identify her daughter.  She can answer simple questions and follow simple commands.  Symmetric  strength, plantarflexion dorsiflexion symmetric in the bilateral lower extremities.  Sensation intact all extremities.  Psych: Normal mood, normal affect                  Emergency Department Course   ECG  ECG results from 07/17/25   EKG 12-lead, tracing  only     Value    Systolic Blood Pressure     Diastolic Blood Pressure     Ventricular Rate 109    Atrial Rate 156    OH Interval     QRS Duration 102        QTc 503    P Axis     R AXIS -12    T Axis 138    Interpretation ECG      Atrial fibrillation with rapid ventricular response  ST & T wave abnormality, consider lateral ischemia  Abnormal ECG  When compared with ECG of 02-Jul-2025 19:16,  Atrial fibrillation has replaced Atrial flutter               Imaging:  XR Tibia and Fibula Left 2 Views   Final Result   IMPRESSION: The left tibia and tibial are negative for fracture. Degenerative change at the knee joint. Plantar and Achilles calcaneal spurring. Degenerative change at the tibiotalar joint.      XR Chest 2 Views   Final Result   IMPRESSION: Mild pulmonary vascular congestion. No pleural effusion or pneumothorax.      Moderate cardiomegaly. Dense calcification of the mitral annulus. Atherosclerotic calcifications of the thoracic aorta.      US Lower Extremity Venous Duplex Left   Final Result   IMPRESSION:   1.  No deep venous thrombosis in the left lower extremity. Localized subcutaneous edema in the area of erythema at the left shin.      Abd/pelvis CT,  IV  contrast only TRAUMA / AAA   Final Result   IMPRESSION:    1.  No acute CT abnormality of the abdomen/pelvis.   2.  Hepatic steatosis and cirrhosis.   3.  Mildly thick-walled urinary bladder, some of which is likely due to incomplete distention. Correlate with urinalysis to exclude acute cystitis.   4.  Mildly enlarged left common iliac lymph node, measuring 13 mm in short axis, previously 12 mm. This is likely benign and reactive.   5.  Trace interstitial edema.   6.  Moderate cardiomegaly.      Head CT w/o contrast   Final Result   IMPRESSION:   1.  No CT evidence for acute intracranial process.   2.  Brain atrophy and presumed chronic microvascular ischemic changes as above.                Laboratory:  Labs Ordered and Resulted from Time of ED  Arrival to Time of ED Departure   BASIC METABOLIC PANEL - Abnormal       Result Value    Sodium 132 (*)     Potassium 4.8      Chloride 94 (*)     Carbon Dioxide (CO2) 25      Anion Gap 13      Urea Nitrogen 37.0 (*)     Creatinine 1.19 (*)     GFR Estimate 42 (*)     Calcium 9.3      Glucose 107 (*)    HEPATIC FUNCTION PANEL - Abnormal    Protein Total 6.2 (*)     Albumin 3.8      Bilirubin Total 2.7 (*)     Alkaline Phosphatase 76      AST         (*)     Bilirubin Direct 0.89 (*)    TROPONIN T, HIGH SENSITIVITY - Abnormal    Troponin T, High Sensitivity 35 (*)    NT-PROBNP - Abnormal    NT-proBNP 10,285 (*)    ROUTINE UA WITH MICROSCOPIC REFLEX TO CULTURE - Abnormal    Color Urine Yellow      Appearance Urine Slightly Cloudy (*)     Glucose Urine Negative      Bilirubin Urine Negative      Ketones Urine Negative      Specific Gravity Urine 1.019      Blood Urine Small (*)     pH Urine 6.5      Protein Albumin Urine Negative      Urobilinogen Urine 3.0 (*)     Nitrite Urine Negative      Leukocyte Esterase Urine Large (*)     Bacteria Urine Moderate (*)     WBC Clumps Urine Present (*)     Mucus Urine Present (*)     RBC Urine 5 (*)     WBC Urine 91 (*)     Squamous Epithelials Urine 1      Hyaline Casts Urine 5 (*)    CBC WITH PLATELETS AND DIFFERENTIAL - Abnormal    WBC Count 9.2      RBC Count 3.62 (*)     Hemoglobin 12.8      Hematocrit 38.2       (*)     MCH 35.4 (*)     MCHC 33.5      RDW 25.4 (*)     Platelet Count 335      % Neutrophils 83      % Lymphocytes 4      % Monocytes 10      % Eosinophils 1      % Basophils 0      % Immature Granulocytes 2      NRBCs per 100 WBC 4 (*)     Absolute Neutrophils 7.6      Absolute Lymphocytes 0.4 (*)     Absolute Monocytes 0.9      Absolute Eosinophils 0.1      Absolute Basophils 0.0      Absolute Immature Granulocytes 0.2      Absolute NRBCs 0.3     TROPONIN T, HIGH SENSITIVITY - Abnormal    Troponin T, High Sensitivity 38 (*)    AST - Abnormal    AST  109 (*)    LIPASE - Normal    Lipase 16     LACTIC ACID WHOLE BLOOD WITH 1X REPEAT IN 2 HR WHEN >2 - Normal    Lactic Acid, Initial 1.9     AMMONIA - Normal    Ammonia 11     MAGNESIUM - Normal    Magnesium 2.0     GLUCOSE MONITOR NURSING POCT   BLOOD CULTURE   BLOOD CULTURE   URINE CULTURE        Procedures       Emergency Department Course & Assessments:    Interventions:  Medications   HYDROmorphone (DILAUDID) injection 0.2 mg (0.2 mg Intravenous Not Given 7/17/25 2221)   acetaminophen (TYLENOL) tablet 650 mg (has no administration in time range)   oxyCODONE (ROXICODONE) tablet 5 mg (has no administration in time range)   QUEtiapine (SEROquel) half-tab 12.5 mg (has no administration in time range)   acetaminophen (TYLENOL) tablet 650 mg (650 mg Oral $Given 7/17/25 1814)   sodium chloride (PF) 0.9% PF flush 100 mL (62 mLs Intravenous $Given 7/17/25 1855)   iopamidol (ISOVUE-370) solution 500 mL (88 mLs Intravenous $Given 7/17/25 1855)   cefTRIAXone (ROCEPHIN) 2 g vial to attach to  ml bag for ADULTS or NS 50 ml bag for PEDS (0 g Intravenous Stopped 7/17/25 2212)   furosemide (LASIX) injection 40 mg (40 mg Intravenous $Given 7/17/25 2121)   albumin human 25 % injection 25 g (25 g Intravenous $New Bag 7/17/25 2221)        Assessments:      Independent Interpretation (X-rays, CTs, rhythm strip):  See ed course    Consultations/Discussion of Management or Tests:  Admitting hospitalist        ED Course as of 07/17/25 2340   Thu Jul 17, 2025   1713 I reviewed discharge summary from July 4.  Patient was admitted for generalized weakness, congestive heart failure was given IV diuretics.  At that time she was improving, she was ambulatory getting closer to baseline.   1714 I reviewed assisted living visit from July 14.  Noted that she had been feeling fatigued and slow.   1758 ECG shows A-fib, no significant ST segment suggesting ischemia, QTc mildly prolonged at 503 ms.   1927 Tib-fib radiograph negative for  fracture or subcutaneous gas or other acute abnormality       Social Determinants of Health affecting care:           Impression & Plan    CMS Diagnoses:               MIPS (If applicable):             Medical Decision Makin-year-old female chronically on a DOAC here with multiple active medical problems including presumed heart failure exacerbation and was given Lasix here.  Therapeutic window smaller given the concomitant aortic stenosis.  I was given albumin as slightly hypoalbuminemic along with the Lasix.  Urinary tract infection and so given ceftriaxone.  Left lower extremity pain which is atraumatic in the setting of chronic venous ulcer.  See pictures above.  Potentially skin and soft tissue infection decent coverage initially here with ceftriaxone, nothing concerning for abscess or necrotizing soft tissue infection.  Radiograph negative ultrasound negative for DVT.    Head CT negative.  Abdomen benign.  Chest radiograph without lobar opacity pneumothorax etc.  Admit to the hospital service for further evaluation and management.  The patient and daughter comfortable agreeable with the plan.    Mild elevation of bilirubin, no obvious mass obstructing the common duct.  Clinically not concerning for choledocholithiasis.        Diagnosis:    ICD-10-CM    1. Generalized weakness  R53.1       2. Acute on chronic diastolic congestive heart failure (H)  I50.33       3. Aortic valve stenosis, etiology of cardiac valve disease unspecified  I35.0       4. Urinary tract infection in female  N39.0       5. Pain of left lower leg  M79.662              Disposition:  The patient was admitted to the hospital.       Discharge Medications:  New Prescriptions    No medications on file          John Haile MD  2025   John Haile, *        John Haile MD  [id number]

## 2025-07-17 NOTE — ED NOTES
Bed: ED13  Expected date: 7/17/25  Expected time: 4:35 PM  Means of arrival:   Comments:  MICHELLE

## 2025-07-18 LAB
ALBUMIN SERPL BCG-MCNC: 3.8 G/DL (ref 3.5–5.2)
ALP SERPL-CCNC: 62 U/L (ref 40–150)
ALT SERPL W P-5'-P-CCNC: 103 U/L (ref 0–50)
ANION GAP SERPL CALCULATED.3IONS-SCNC: 15 MMOL/L (ref 7–15)
AST SERPL W P-5'-P-CCNC: 95 U/L (ref 0–45)
ATRIAL RATE - MUSE: 156 BPM
BILIRUB DIRECT SERPL-MCNC: 1.2 MG/DL (ref 0–0.3)
BILIRUB SERPL-MCNC: 2.4 MG/DL
BUN SERPL-MCNC: 33.8 MG/DL (ref 8–23)
CALCIUM SERPL-MCNC: 9.3 MG/DL (ref 8.8–10.4)
CHLORIDE SERPL-SCNC: 94 MMOL/L (ref 98–107)
CREAT SERPL-MCNC: 1.16 MG/DL (ref 0.51–0.95)
DIASTOLIC BLOOD PRESSURE - MUSE: NORMAL MMHG
EGFRCR SERPLBLD CKD-EPI 2021: 43 ML/MIN/1.73M2
ERYTHROCYTE [DISTWIDTH] IN BLOOD BY AUTOMATED COUNT: 26 % (ref 10–15)
GLUCOSE BLDC GLUCOMTR-MCNC: 103 MG/DL (ref 70–99)
GLUCOSE SERPL-MCNC: 90 MG/DL (ref 70–99)
HCO3 SERPL-SCNC: 24 MMOL/L (ref 22–29)
HCT VFR BLD AUTO: 37 % (ref 35–47)
HGB BLD-MCNC: 12.3 G/DL (ref 11.7–15.7)
INTERPRETATION ECG - MUSE: NORMAL
MCH RBC QN AUTO: 35.1 PG (ref 26.5–33)
MCHC RBC AUTO-ENTMCNC: 33.2 G/DL (ref 31.5–36.5)
MCV RBC AUTO: 106 FL (ref 78–100)
P AXIS - MUSE: NORMAL DEGREES
PLATELET # BLD AUTO: 302 10E3/UL (ref 150–450)
POTASSIUM SERPL-SCNC: 3.6 MMOL/L (ref 3.4–5.3)
PR INTERVAL - MUSE: NORMAL MS
PROT SERPL-MCNC: 5.7 G/DL (ref 6.4–8.3)
QRS DURATION - MUSE: 102 MS
QT - MUSE: 374 MS
QTC - MUSE: 503 MS
R AXIS - MUSE: -12 DEGREES
RBC # BLD AUTO: 3.5 10E6/UL (ref 3.8–5.2)
SODIUM SERPL-SCNC: 133 MMOL/L (ref 135–145)
SYSTOLIC BLOOD PRESSURE - MUSE: NORMAL MMHG
T AXIS - MUSE: 138 DEGREES
VENTRICULAR RATE- MUSE: 109 BPM
WBC # BLD AUTO: 12.1 10E3/UL (ref 4–11)

## 2025-07-18 PROCEDURE — 99232 SBSQ HOSP IP/OBS MODERATE 35: CPT | Performed by: INTERNAL MEDICINE

## 2025-07-18 PROCEDURE — 82248 BILIRUBIN DIRECT: CPT | Performed by: HOSPITALIST

## 2025-07-18 PROCEDURE — 120N000001 HC R&B MED SURG/OB

## 2025-07-18 PROCEDURE — 250N000013 HC RX MED GY IP 250 OP 250 PS 637: Performed by: HOSPITALIST

## 2025-07-18 PROCEDURE — G0463 HOSPITAL OUTPT CLINIC VISIT: HCPCS

## 2025-07-18 PROCEDURE — 82962 GLUCOSE BLOOD TEST: CPT

## 2025-07-18 PROCEDURE — 36415 COLL VENOUS BLD VENIPUNCTURE: CPT | Performed by: HOSPITALIST

## 2025-07-18 PROCEDURE — 250N000011 HC RX IP 250 OP 636: Performed by: HOSPITALIST

## 2025-07-18 PROCEDURE — 85027 COMPLETE CBC AUTOMATED: CPT | Performed by: HOSPITALIST

## 2025-07-18 RX ORDER — AMOXICILLIN 250 MG
1 CAPSULE ORAL 2 TIMES DAILY PRN
Status: ACTIVE | OUTPATIENT
Start: 2025-07-18

## 2025-07-18 RX ORDER — ONDANSETRON 2 MG/ML
4 INJECTION INTRAMUSCULAR; INTRAVENOUS EVERY 6 HOURS PRN
Status: ACTIVE | OUTPATIENT
Start: 2025-07-18

## 2025-07-18 RX ORDER — BISACODYL 10 MG
10 SUPPOSITORY, RECTAL RECTAL DAILY PRN
Status: ACTIVE | OUTPATIENT
Start: 2025-07-18

## 2025-07-18 RX ORDER — ONDANSETRON 4 MG/1
4 TABLET, ORALLY DISINTEGRATING ORAL EVERY 6 HOURS PRN
Status: ACTIVE | OUTPATIENT
Start: 2025-07-18

## 2025-07-18 RX ORDER — CEFTRIAXONE 1 G/1
1 INJECTION, POWDER, FOR SOLUTION INTRAMUSCULAR; INTRAVENOUS EVERY 24 HOURS
Status: DISCONTINUED | OUTPATIENT
Start: 2025-07-18 | End: 2025-07-20

## 2025-07-18 RX ORDER — AMOXICILLIN 250 MG
1 CAPSULE ORAL DAILY
Status: DISPENSED | OUTPATIENT
Start: 2025-07-18

## 2025-07-18 RX ORDER — AMOXICILLIN 250 MG
2 CAPSULE ORAL 2 TIMES DAILY PRN
Status: ACTIVE | OUTPATIENT
Start: 2025-07-18

## 2025-07-18 RX ORDER — FAMOTIDINE 20 MG/1
20 TABLET, FILM COATED ORAL EVERY MORNING
Status: DISPENSED | OUTPATIENT
Start: 2025-07-18

## 2025-07-18 RX ORDER — MULTIPLE VITAMINS W/ MINERALS TAB 9MG-400MCG
1 TAB ORAL EVERY MORNING
Status: DISCONTINUED | OUTPATIENT
Start: 2025-07-18 | End: 2025-07-24

## 2025-07-18 RX ORDER — LIDOCAINE 40 MG/G
CREAM TOPICAL
Status: ACTIVE | OUTPATIENT
Start: 2025-07-18

## 2025-07-18 RX ORDER — FUROSEMIDE 20 MG/1
20 TABLET ORAL EVERY MORNING
Status: DISPENSED | OUTPATIENT
Start: 2025-07-19

## 2025-07-18 RX ORDER — BUPROPION HYDROCHLORIDE 150 MG/1
150 TABLET ORAL EVERY MORNING
Status: DISPENSED | OUTPATIENT
Start: 2025-07-18

## 2025-07-18 RX ORDER — CALCIUM CARBONATE 500 MG/1
1000 TABLET, CHEWABLE ORAL 4 TIMES DAILY PRN
Status: ACTIVE | OUTPATIENT
Start: 2025-07-18

## 2025-07-18 RX ORDER — DORZOLAMIDE HYDROCHLORIDE AND TIMOLOL MALEATE 20; 5 MG/ML; MG/ML
1 SOLUTION/ DROPS OPHTHALMIC 2 TIMES DAILY
Status: DISPENSED | OUTPATIENT
Start: 2025-07-18

## 2025-07-18 RX ADMIN — CEFTRIAXONE 1 G: 1 INJECTION, POWDER, FOR SOLUTION INTRAMUSCULAR; INTRAVENOUS at 20:19

## 2025-07-18 RX ADMIN — METOPROLOL TARTRATE 75 MG: 50 TABLET, FILM COATED ORAL at 08:31

## 2025-07-18 RX ADMIN — APIXABAN 5 MG: 5 TABLET, FILM COATED ORAL at 08:26

## 2025-07-18 RX ADMIN — FAMOTIDINE 20 MG: 20 TABLET, FILM COATED ORAL at 08:27

## 2025-07-18 RX ADMIN — METOPROLOL TARTRATE 75 MG: 50 TABLET, FILM COATED ORAL at 20:20

## 2025-07-18 RX ADMIN — Medication 1 MG: at 01:03

## 2025-07-18 RX ADMIN — ACETAMINOPHEN 650 MG: 325 TABLET ORAL at 01:03

## 2025-07-18 RX ADMIN — BUPROPION HYDROCHLORIDE 150 MG: 150 TABLET, EXTENDED RELEASE ORAL at 08:25

## 2025-07-18 RX ADMIN — Medication 1 TABLET: at 08:25

## 2025-07-18 RX ADMIN — DORZOLAMIDE HYDROCHLORIDE AND TIMOLOL MALEATE 1 DROP: 20; 5 SOLUTION/ DROPS OPHTHALMIC at 20:20

## 2025-07-18 RX ADMIN — APIXABAN 5 MG: 5 TABLET, FILM COATED ORAL at 20:20

## 2025-07-18 RX ADMIN — QUETIAPINE FUMARATE 12.5 MG: 25 TABLET ORAL at 03:39

## 2025-07-18 ASSESSMENT — ACTIVITIES OF DAILY LIVING (ADL)
ADLS_ACUITY_SCORE: 60
ADLS_ACUITY_SCORE: 60
ADLS_ACUITY_SCORE: 72
ADLS_ACUITY_SCORE: 58
ADLS_ACUITY_SCORE: 60
ADLS_ACUITY_SCORE: 72
DEPENDENT_IADLS:: CLEANING;COOKING;LAUNDRY;SHOPPING;MEAL PREPARATION;MEDICATION MANAGEMENT;TRANSPORTATION
ADLS_ACUITY_SCORE: 60
ADLS_ACUITY_SCORE: 72
ADLS_ACUITY_SCORE: 60
ADLS_ACUITY_SCORE: 64
ADLS_ACUITY_SCORE: 60
ADLS_ACUITY_SCORE: 67
ADLS_ACUITY_SCORE: 60
ADLS_ACUITY_SCORE: 72
ADLS_ACUITY_SCORE: 60

## 2025-07-18 NOTE — CONSULTS
"[facility] Nurse Inpatient Assessment     Consulted for: Left leg    Summary: Left leg wound starting about 3 months ago.  Patient having increased pain in leg and new discoloration.  Requested STEPHANIE's from MD to rule out arterial component.  Will treat conservatively pending this.      [facility] nurse follow-up plan: weekly    Patient History (according to provider note(s):      Riddhi Aguilar is a 89 year old female who presents for 3 to 4 days of increasing weakness and intermittent confusion.  Patient lives at [facility] and notes that beginning Monday she started to feel \"a little off.\"  Her daughter notes that her the doctor at the facility recommended she be seen in the emergency department which she declined.  She states that the physician started a new medication but does not know what it was.  She notes that her mom's heart rate was elevated and that she was having intermittent confusion.  They are worried about volume overload as she was having a more difficult time ambulating to the meal room and taking her meals due to breathing.     Assessment:      Areas visualized during today's visit: Focused:    Wound location: Left medial leg  Last photo: 7/18/25    Medial leg      Shin       Calf      Wound due to: Venous Ulcer  Wound history/plan of care: Wound starting about 3 months ago.  Patient reports it's making slow progress.  Patient now with increased pain and discoloration to leg.  Purple may be more related to anticoagulation but given slow healing wound also beneficial to rule out arterial component.   Wound base: 100 % Dry drainage and Slough     Palpation of the wound bed: firm      Drainage: none     Description of drainage: none     Measurements (length x width x depth, in cm): 1.7  x 2.7  x  0.2 cm      Tunneling: N/A     Undermining: N/A  Periwound skin: Erythema- blanchable, purple higher on calf and shin      Color: pink and purple      Temperature: normal   Odor: " none  Pain: moderate  Pain interventions prior to dressing change: slow and gentle cares   Treatment goal: Heal  and Maintain (prevention of deterioration)  STATUS: initial assessment  Supplies ordered: ordered xeroform     Wound location: Right medial leg and foot  Last photo: 7/18/25    Wound due to: Friction  Wound history/plan of care: Medial calf with likely abrasion.    Wound base: 100 % Dry drainage     Palpation of the wound bed: normal      Drainage: none     Description of drainage: none     Measurements (length x width x depth, in cm): Medial calf is 0.5x0.7cm.  Foot with oddly patterned measuring approximately 0.2x10cm     Tunneling: N/A     Undermining: N/A  Periwound skin: Intact      Color: pink      Temperature: normal   Odor: none  Pain: denies   Pain interventions prior to dressing change: N/A  Treatment goal: Heal   STATUS: initial assessment  Supplies ordered: at bedside    Treatment Plan:     Left leg wound(s): Daily   Cleanse with Vashe and dry  Apply Xeroform (SPD#[id number]) cut to fit to wound  Cover with dry gauze and wrap with kerlix    Orders: Written    RECOMMEND PRIMARY TEAM ORDER: ABIs   Education provided: importance of repositioning, plan of care, and Off-loading pressure  Discussed plan of care with: Patient, Family, and Nurse  Notify WOC if wound(s) deteriorate.  Nursing to notify the Provider(s) and re-consult the WOC Nurse if new skin concern.    DATA:     Current support surface: Standard  Standard gel mattress (Isoflex)  Containment of urine/stool: Incontinence Protocol  BMI: There is no height or weight on file to calculate BMI.   Active diet order: Orders Placed This Encounter      Combination Diet Low Saturated Fat Na <2400mg Diet, No Caffeine Diet     Output: I/O last 3 completed shifts:  In: -   Out: 550 [Urine:550]     Labs:   Recent Labs   Lab 07/18/25  0800   ALBUMIN 3.8   HGB 12.3   WBC 12.1*     Pressure injury risk assessment:   Sensory Perception: 3-->slightly  limited  Moisture: 3-->occasionally moist  Activity: 2-->chairfast  Mobility: 2-->very limited  Nutrition: 3-->adequate  Friction and Shear: 3-->no apparent problem  Syed Score: 16    Lv Saini RN CWOCN  Contact Via [facility] Emergency Nurse ([place])  Dept. Office Number: [phone]

## 2025-07-18 NOTE — PLAN OF CARE
[facility]    ED Boarding Nurse Handoff Addendum Report:    Date/time: 7/18/2025, 5:34 AM    Activity Level: assist of 2    Fall Risk: Yes:  bed/chair alarm on, arm band in place, activity supervised, mobility aid in reach, and room door open    Active Infusions: n/A    Current Meds Due: See MAR    Current care needs: Incont, external catheter.     Oxygen requirements (liters/min and/or FiO2): RA    Respiratory status: Room air    Vital signs (within last 30 minutes):    Vitals:    07/17/25 2200 07/17/25 2220 07/18/25 0047 07/18/25 0107   BP:   (!) 135/110 94/81   BP Location:   Right arm Right arm   Pulse:   69    Resp:   20    Temp:   98.8  F (37.1  C)    TempSrc:   Oral    SpO2: 97% 95% 93%        Focused assessment within last 30 minutes:    Pt A&O x4, but forgetful and restless and having hallucinations. On RA. A2, not out of bed durning care. PIV saline locked. Tele in place.PRN given per pt and daughter request (See MAR). Denies SOB,chest pain. Daughter at bedside. Redness/rash in the Lower extremity bilateral. Plan TBD.    ED Boarding Nurse name: Erin White RN         Goal Outcome Evaluation:      Plan of Care Reviewed With: patient    Overall Patient Progress: improvingOverall Patient Progress: improving    Outcome Evaluation: daughter at bedside      Problem: Adult Inpatient Plan of Care  Goal: Plan of Care Review  Description: The Plan of Care Review/Shift note should be completed every shift.  The Outcome Evaluation is a brief statement about your assessment that the patient is improving, declining, or no change.  This information will be displayed automatically on your shift  note.  Outcome: Progressing  Flowsheets (Taken 7/18/2025 0533)  Outcome Evaluation: daughter at bedside  Plan of Care Reviewed With: patient  Overall Patient Progress: improving  Goal: Patient-Specific Goal (Individualized)  Description: You can add care plan individualizations to a care plan. Examples  "of Individualization might be:  \"Parent requests to be called daily at 9am for status\", \"I have a hard time hearing out of my right ear\", or \"Do not touch me to wake me up as it startles  me\".  Outcome: Progressing  Goal: Absence of Hospital-Acquired Illness or Injury  Outcome: Progressing  Intervention: Identify and Manage Fall Risk  Recent Flowsheet Documentation  Taken 7/18/2025 0049 by Erin Whiet RN  Safety Promotion/Fall Prevention:   supervised activity   safety round/check completed  Intervention: Prevent and Manage VTE (Venous Thromboembolism) Risk  Recent Flowsheet Documentation  Taken 7/18/2025 0049 by Erin White RN  VTE Prevention/Management: SCDs off (sequential compression devices)  Goal: Optimal Comfort and Wellbeing  Outcome: Progressing  Intervention: Provide Person-Centered Care  Recent Flowsheet Documentation  Taken 7/18/2025 0049 by Erin White RN  Trust Relationship/Rapport:   questions encouraged   questions answered  Goal: Readiness for Transition of Care  Outcome: Progressing     Problem: Delirium  Goal: Optimal Coping  Outcome: Progressing  Goal: Improved Behavioral Control  Outcome: Progressing  Intervention: Minimize Safety Risk  Recent Flowsheet Documentation  Taken 7/18/2025 0049 by Erin White RN  Trust Relationship/Rapport:   questions encouraged   questions answered  Goal: Improved Attention and Thought Clarity  Outcome: Progressing  Goal: Improved Sleep  Outcome: Progressing     RECEIVING UNIT ED HANDOFF REVIEW    Above ED Nurse Handoff Report was reviewed: Yes  Reviewed by: Jared Brush RN on July 18, 2025 at 12:15 PM   I Vocera called the ED to inform them the note was read: Yes        "

## 2025-07-18 NOTE — H&P
[facility]    History and Physical - Hospitalist Service       Date of Admission:  7/17/2025    Assessment & Plan      Riddhi Aguilar is a 89 year old female admitted on 7/17/2025 who presents for 3 to 4 days of increasing weakness and intermittent confusion.    Confusion/hallucinations  Probable urinary tract infection -as her primary reason for coming to the hospital was ongoing confusion and hallucinations it seems the most likely explanation would be bladder infection.  Her UA is grossly abnormal.  She has no history of MR DO and on her last urine culture grew pansensitive Klebsiella.  While her heart rate is elevated this is likely secondary to her underlying A-fib/flutter I have low suspicion for sepsis at this time.  - Continue ceftriaxone therapy  - Follow-up urine and blood cultures  - Delirium precautions ordered    A-fib with RVR -likely secondary to physiologic response of underlying infection.  Patient has been continuing her metoprolol 75 mg twice daily.  Mild troponin elevation more consistent with supply/demand ischemia.  - Continue metoprolol therapy  - Cardiac monitoring    Mild to moderate aortic stenosis -noted on recent echo April 2025.  Do not think that it is contributing to her current symptom complex.    Elevated proBNP  Elevated creatinine -patient states that at no point over the last few days which she experiencing shortness of breath.  She does not use oxygen at baseline.  Her daughter does note that she is worried that she had worse p.o. intake over the last few days.  Unclear if this represents heart failure exacerbation or not.  She did receive Lasix and had 400 cc of urine output and her lungs are now remarkably clear.  Her elevated BNP could just be indicative of worsening renal function in the setting of poor p.o. intake and prerenal azotemia.  She does not meet criteria for MICHELE.  - Hold Lasix for now and repeat BMP in the morning  - Hold spironolactone  - If  "creatinine is improved then would continue PTA Lasix and think that part of her presentation was heart failure with exacerbation  - If her creatinine is worse then likely not contributing clinically to her clinical presentation.    Elevated transaminase levels  Elevated bilirubin -albumin 3.8,Platelets 335 decayed and normal liver synthetic function.  CT notable for hepatic steatosis and/or cirrhosis.  No clear cause of insult to liver however could be an indicative of a congestive hepatopathy in the setting of volume overload.  -Repeat LFTs tomorrow morning    Erythema of left shin -it is mildly tender to palpation, no increase in temperature or blanching.  Seems most consistent with a bruise for a patient with fragile skin on DOAC therapy.  - Continue to monitor clinically    Hyperlipidemia -continue atorvastatin    GERD -continue Pepcid        Diet:  Cardiac diet  DVT Prophylaxis: DOAC  Martines Catheter: Not present  Lines: None     Cardiac Monitoring: None  Code Status:  DNR/DNI    Clinically Significant Risk Factors Present on Admission         # Hyponatremia: Lowest Na = 132 mmol/L in last 2 days, will monitor as appropriate  # Hypochloremia: Lowest Cl = 94 mmol/L in last 2 days, will monitor as appropriate       # Drug Induced Coagulation Defect: home medication list includes an anticoagulant medication    # Hypertension: Noted on problem list  # Acute heart failure with preserved ejection fraction: heart failure noted on problem list, last echo with EF >50%, and receiving IV diuretics          # Obesity: Estimated body mass index is 35.14 kg/m  as calculated from the following:    Height as of 7/14/25: 1.499 m (4' 11\").    Weight as of 7/14/25: 78.9 kg (174 lb).       # Financial/Environmental Concerns:           Disposition Plan     Medically Ready for Discharge: Anticipated in 2-4 Days           Julius Cruz MD  Hospitalist Service  [facility]  Securely message with Penny (more " "info)  Text page via [facility] Paging/Directory     ______________________________________________________________________    Chief Complaint   Weakness    History is obtained from the patient and her daughter who is at bedside    History of Present Illness   Riddhi Aguilar is a 89 year old female who presents for 3 to 4 days of increasing weakness and intermittent confusion.  Patient lives at [facility] and notes that beginning Monday she started to feel \"a little off.\"  Her daughter notes that her the doctor at the facility recommended she be seen in the emergency department which she declined.  She states that the physician started a new medication but does not know what it was.  She notes that her mom's heart rate was elevated and that she was having intermittent confusion.  They are worried about volume overload as she was having a more difficult time ambulating to the meal room and taking her meals due to breathing.    unclear if this represents worsening from baseline as patient states that shortness of breath has not been up prominent symptom for her this week.    She finally presented today after having ongoing hallucinations and being more confused.  In the emergency department workup was notable for slightly elevated creatinine of 1.19 from her baseline of 0.8-0.9.  Bilirubin was mildly elevated.  Lactate was within normal at 1.9.  proBNP was elevated above her usual at 10,000.  White cell count 9.2.  UA was grossly abnormal.  Blood and urine cultures are pending.  Lipase was within normal limits.    In the emergency room she received ceftriaxone, Lasix, albumin.  She is feeling improved she has had approximately 400 cc of urine output since presentation.      Past Medical History    Past Medical History:   Diagnosis Date    Anemia     baseline 10-11    Arthritis     Bleeding ulcer     duodenal ulcer    CKD (chronic kidney disease) stage 3, GFR 30-59 ml/min (H)     Esophageal reflux 7/22/2010    " Gastroesophageal Reflux Disease    Essential hypertension, benign     MDD (major depressive disorder)     Nephrolithiasis     Osteoporosis     Pure hypercholesterolemia     Spinal stenosis     Unspecified cataract     Unspecified glaucoma(365.9)        Past Surgical History   Past Surgical History:   Procedure Laterality Date    APPENDECTOMY      BREAST SURGERY      lumpectomy    CLOSED REDUCTION, PERCUTANEOUS PINNING HIP Left 5/20/2024    Procedure: Closed reduction percutaneous pinning, left hip;  Surgeon: Jose Luis Merino MD;  Location: [facility] OR    COMBINED CYSTOSCOPY, RETROGRADES, EXCHANGE STENT URETER(S) Left 01/24/2024    Procedure: Cystoscopy, left retrograde pyelogram, left JJ stent placement, <1hr physician fluoroscopy time;  Surgeon: Aureliano Brandt MD;  Location: [facility] OR    COMBINED CYSTOSCOPY, RETROGRADES, URETEROSCOPY, LASER HOLMIUM LITHOTRIPSY URETER(S), INSERT STENT Left 02/20/2024    Procedure: Cystoscopy, left diagnostic ureteroscopy, left retrograde pyelogram, left ureteral stent removal, fluoroscopic interpretation <1 hour physician time;  Surgeon: Richie Jaquez MD;  Location: [facility] OR    CYSTOSCOPY      CYSTOSCOPY, REMOVE STENT(S), COMBINED Left 02/20/2024    Procedure: Cystoscopy, remove stent(s), combined;  Surgeon: Richie Jaquez MD;  Location:  OR    DILATION AND CURETTAGE, HYSTEROSCOPY DIAGNOSTIC, COMBINED  02/06/2014    Procedure: COMBINED DILATION AND CURETTAGE, HYSTEROSCOPY DIAGNOSTIC;  DILATION AND CURETTAGE, HYSTEROSCOPY, POLYPECTOMY, INCISION AND DRAINAGE OF SEBACEOUS CYST ;  Surgeon: Serena Zamora MD;  Location:  [facility]    EXCISE LESION LOWER EXTREMITY Right 08/29/2017    Procedure: EXCISE LESION LOWER EXTREMITY;  WIDE EXCISIONAL BIOPSY OF RIGHT ANKLE BASAL CELL CARCINOMA;  Surgeon: Juan Luis Flores MD;  Location:  OR    INCISION AND DRAINAGE PERINEAL, COMBINED  02/06/2014    Procedure: COMBINED INCISION AND DRAINAGE PERINEAL;;  Surgeon: Serena Zamora MD;   Location: [facility] NONSPECIFIC PROCEDURE      Repair of buckled retina of rt eye    [facility] NONSPECIFIC PROCEDURE      Appy       Prior to Admission Medications   Prior to Admission Medications   Prescriptions Last Dose Informant Patient Reported? Taking?   Lidocaine (LIDOCARE) 4 % Patch 7/17/2025 Morning  Yes Yes   Sig: Place 1 patch onto the skin every 24 hours. To prevent lidocaine toxicity, patient should be patch free for 12 hrs daily.   Nutritional Supplements (ENSURE) LIQD Unknown  Yes Yes   Sig: Take 1 Bottle by mouth daily as needed.   acetaminophen (TYLENOL) 500 MG tablet Unknown  No Yes   Sig: Take 2 tablets (1,000 mg) by mouth every 8 hours as needed for mild pain.   apixaban ANTICOAGULANT (ELIQUIS) 5 MG tablet 7/17/2025 Morning  No Yes   Sig: Take 1 tablet (5 mg) by mouth 2 times daily.   atorvastatin (LIPITOR) 10 MG tablet Not Taking Self No No   Sig: TAKE 1 TABLET(10 MG) BY MOUTH DAILY   Patient not taking: Reported on 7/17/2025   bisacodyl (DULCOLAX) 10 MG suppository Unknown  No Yes   Sig: Place 1 suppository (10 mg) rectally daily as needed for constipation.   buPROPion (WELLBUTRIN XL) 150 MG 24 hr tablet 7/17/2025 Morning  No Yes   Sig: TAKE 1 TABLET(150 MG) BY MOUTH EVERY MORNING   dorzolamide-timolol (COSOPT) 22.3-6.8 MG/ML ophthalmic solution 7/17/2025 Morning Self Yes Yes   Sig: Place 1 drop into both eyes 2 times daily   famotidine (PEPCID) 20 MG tablet 7/17/2025 Morning  Yes Yes   Sig: Take 20 mg by mouth every morning.   furosemide (LASIX) 20 MG tablet 7/17/2025 Morning  Yes Yes   Sig: Take 20 mg by mouth every morning.   magnesium hydroxide (MILK OF MAGNESIA) 400 MG/5ML suspension Unknown  Yes Yes   Sig: Take 30 mLs by mouth every 48 hours as needed for constipation.   melatonin 3 MG tablet 7/16/2025 Bedtime  Yes Yes   Sig: Take 3 mg by mouth at bedtime.   metoprolol tartrate (LOPRESSOR) 25 MG tablet 7/17/2025 Morning  Yes Yes   Sig: Take 75 mg by mouth 2 times daily.   miconazole  (MICATIN) 2 % external powder Unknown  No Yes   Sig: Apply topically 2 times daily as needed for other (candidiasis/intertrigo).   multivitamin w/minerals (THERA-VIT-M) tablet 7/17/2025 Morning  Yes Yes   Sig: Take 1 tablet by mouth every morning.   senna-docusate (SENOKOT-S/PERICOLACE) 8.6-50 MG tablet 7/16/2025 Morning  Yes Yes   Sig: Take 1 tablet by mouth every other day.   senna-docusate (SENOKOT-S/PERICOLACE) 8.6-50 MG tablet 7/17/2025 Morning  Yes Yes   Sig: Take 2 tablets by mouth every other day.   spironolactone (ALDACTONE) 25 MG tablet 7/17/2025 Morning  No Yes   Sig: Take 1 tablet (25 mg) by mouth daily.      Facility-Administered Medications: None        Review of Systems    The 10 point Review of Systems is negative other than noted in the HPI or here.      Physical Exam   Vital Signs: Temp: 98  F (36.7  C) Temp src: Oral BP: (!) 109/95 Pulse: 119   Resp: 16 SpO2: 95 %      Weight: 0 lbs 0 oz    General Appearance: Lying in bed, no acute distress  Respiratory: Her lungs are remarkably clear in all lung fields  Cardiovascular: She is irregular, tachycardic, no murmurs are appreciated  GI: Soft and nontender  Skin: She does have a 3 x 5 cm irregular bordered patchy red/violaceous area over her left anterior lateral shin.  It is not warm to touch and only minimally tender to palpation.  Other: Neuroexam is nonfocal, patient has no hallucinations currently and is a quite a good historian.    Medical Decision Making       55 MINUTES SPENT BY ME on the date of service doing chart review, history, exam, documentation & further activities per the note.      Data     I have personally reviewed the following data over the past 24 hrs:    9.2  \   12.8   / 335     132 (L) 94 (L) 37.0 (H) /  107 (H)   4.8 25 1.19 (H) \     ALT: 128 (H) AST: 109 (H) AP: 76 TBILI: 2.7 (H)   ALB: 3.8 TOT PROTEIN: 6.2 (L) LIPASE: 16     Trop: 38 (H) BNP: 10,285 (H)     Procal: N/A CRP: N/A Lactic Acid: 1.9         Imaging results  reviewed over the past 24 hrs:   Recent Results (from the past 24 hours)   Head CT w/o contrast    Narrative    EXAM: CT HEAD W/O CONTRAST  LOCATION: [facility]  DATE: 7/17/2025    INDICATION: AMS  COMPARISON:  CT head May 26, 2025.  TECHNIQUE: Routine CT Head without IV contrast. Multiplanar reformats. Dose reduction techniques were used.    FINDINGS:  INTRACRANIAL CONTENTS: No intracranial hemorrhage, extraaxial collection, or mass effect.  No CT evidence of acute infarct. Moderate presumed chronic small vessel ischemic changes. Mild to moderate generalized volume loss. No hydrocephalus.     VISUALIZED ORBITS/SINUSES/MASTOIDS: Prior bilateral cataract surgery. Visualized portions of the orbits are otherwise unremarkable. Partially imaged complete opacification of the right maxillary sinus.  No middle ear or mastoid effusion.    BONES/SOFT TISSUES: No acute abnormality.      Impression    IMPRESSION:  1.  No CT evidence for acute intracranial process.  2.  Brain atrophy and presumed chronic microvascular ischemic changes as above.     Abd/pelvis CT,  IV  contrast only TRAUMA / AAA    Narrative    EXAM: CT ABDOMEN PELVIS W CONTRAST  LOCATION: [facility]  DATE: 7/17/2025    INDICATION: Altered mental status and jaundice.  COMPARISON: 1/23/2024.  TECHNIQUE: CT scan of the abdomen and pelvis was performed following injection of IV contrast. Multiplanar reformats were obtained. Dose reduction techniques were used.  CONTRAST: 88 mL Isovue-370.    FINDINGS:  Patient was imaged with arm(s) at side, limiting study quality.    LOWER CHEST: Mosaic attenuation of the pulmonary parenchyma and trace interstitial edema. Moderate cardiomegaly. Dense calcifications of the mitral annulus. Calcifications of the aortic valve, coronary arteries, and visualized thoracic aorta.    HEPATOBILIARY: Hepatic steatosis and cirrhosis. Mild hepatomegaly.    Gallbladder is normal.    No intrahepatic  or intrahepatic biliary ductal dilatation.    PANCREAS: Enhances normally. No peripancreatic inflammatory fat stranding.    SPLEEN: Enhances normally. Normal size. Also capsular fluid collections of the medial spleen, not significantly changed from 11/15/2023.    ADRENAL GLANDS: Normal.    KIDNEYS: Both kidneys enhance symmetrically, without hydronephrosis.    No nephroureterolithiasis.    Mildly thick-walled urinary bladder, some of which is likely due to incomplete distention.    PELVIC ORGANS: Hysterectomy.    BOWEL: No evidence of acute gastrointestinal inflammation or obstruction. Appendix not visualized, likely surgically absent.    No intraperitoneal free fluid or free air.    LYMPH NODES: There is a mildly enlarged left common iliac lymph node, which measures 13 mm in short axis, previously 12 mm.    VASCULATURE: No abdominal aortic aneurysm. Severe atheromatous disease.    MUSCULOSKELETAL: No suspicious abnormality. Percutaneous fixation of the left femoral neck. Unchanged L3 compression deformity.    OTHER: No additionally significant abnormalities.      Impression    IMPRESSION:   1.  No acute CT abnormality of the abdomen/pelvis.  2.  Hepatic steatosis and cirrhosis.  3.  Mildly thick-walled urinary bladder, some of which is likely due to incomplete distention. Correlate with urinalysis to exclude acute cystitis.  4.  Mildly enlarged left common iliac lymph node, measuring 13 mm in short axis, previously 12 mm. This is likely benign and reactive.  5.  Trace interstitial edema.  6.  Moderate cardiomegaly.   US Lower Extremity Venous Duplex Left    Narrative    EXAM: US LOWER EXTREMITY VENOUS DUPLEX LEFT  LOCATION: [facility]  DATE: 7/17/2025    INDICATION: pain, sweling, ecchymosis  COMPARISON: None.  TECHNIQUE: Venous Duplex ultrasound of the left lower extremity with and without compression, augmentation and duplex. Color flow and spectral Doppler with waveform analysis  performed.    FINDINGS: Exam includes the common femoral, femoral, popliteal, and contralateral common femoral veins as well as segmentally visualized deep calf veins and greater saphenous vein.     LEFT: No deep vein thrombosis. No superficial thrombophlebitis. No popliteal cyst.          Impression    IMPRESSION:  1.  No deep venous thrombosis in the left lower extremity. Localized subcutaneous edema in the area of erythema at the left shin.   XR Chest 2 Views    Narrative    EXAM: XR CHEST 2 VIEWS  LOCATION: [facility]  DATE: 7/17/2025    INDICATION: Fatigue, shortness of breath, cough.  COMPARISON: 7/2/2025.      Impression    IMPRESSION: Mild pulmonary vascular congestion. No pleural effusion or pneumothorax.    Moderate cardiomegaly. Dense calcification of the mitral annulus. Atherosclerotic calcifications of the thoracic aorta.   XR Tibia and Fibula Left 2 Views    Narrative    EXAM: XR TIBIA AND FIBULA LEFT 2 VIEWS  LOCATION: [facility]  DATE: 7/17/2025    INDICATION: pain, swelling, bruising no reported fall  COMPARISON: None.      Impression    IMPRESSION: The left tibia and tibial are negative for fracture. Degenerative change at the knee joint. Plantar and Achilles calcaneal spurring. Degenerative change at the tibiotalar joint.

## 2025-07-18 NOTE — PROGRESS NOTES
07/18/25 1500   Skin   Skin WDL X;color;moisture;integrity   Skin Color pale;other (see comments);redness blanchable  (yellow sclera,)   Skin Temperature warm   Skin Moisture moist  (in sub pannicular and sub breast folds and axilla)   Skin Integrity abrasion/excoriation;bruised (ecchymotic);peeling/scaling;rash;scab;other (see comments)   Abrasion / Excoriation Right;Left;Abdominal;Chest;Foot;Ankle   Bruised (ecchymotic) location Right;Left;Abdominal;Fore Arm;Upper Arm;Thigh;Knee;Shin;Calf;Ankle;Heel;Foot   Peeling Right;Left;Heel;Foot;Ankle   Rash Right;Left;Chest;Pelvis;Perineum;Foot;Ankle   Redness blanchable location Buttocks/IT;Heel;Knee   Scab Right;Left;Ankle;Calf;Chest;Abdominal   Other (see comments) older wounds on bilateral ankles with redness. rash with scabbing on bilateral feet. open moisture damage to L pelvis under pannus. non blanchable redness to L leg bilateral IT. small open IT wound bilateral   Device Skin Interventions Taken Skin barrier applied;No adjustments needed     Admission/Transfer from: ED  2 RN skin assessment completed. Yes with Lv WOC RN  Significant findings include: old cellulitis wounds to bilateral ankles. Rash with scabbing on bilateral feet, open moisture damage to L hip. Moisture rash under both breasts and perineum. Non blnachable redness to left lower leg around calf. Non blnachable redness to bilateral IT with small openings. Excorations to chest and abdomen.   LDA added (if applicable)? YES: left leg, IT wounds, L pelvis   Requested WOC Nurse Consult from MD? YES: WOC consult already placed

## 2025-07-18 NOTE — ED NOTES
Bed: ED30  Expected date:   Expected time:   Means of arrival:   Comments:  Patient back in room from floor

## 2025-07-18 NOTE — ED NOTES
[facility]    ED Boarding Nurse Handoff Addendum Report:    Date/time: 7/18/2025, 12:32 PM    Activity Level: Hasn't been OOB    Fall Risk: Yes:  nonskid shoes/slippers when out of bed and patient and family education    Active Infusions: No    Current Meds Due: Yes- eye drops (sent with transport)    Current care needs:     Oxygen requirements (liters/min and/or FiO2): ***    Respiratory status: {Room air or supplements :478123}    Vital signs (within last 30 minutes):    Vitals:    07/18/25 0107 07/18/25 0858 07/18/25 0900 07/18/25 1226   BP: 94/81 115/73 115/73 117/85   BP Location: Right arm      Pulse:  101 96 112   Resp:  20  20   Temp:       TempSrc:       SpO2:  93%  95%       Focused assessment within last 30 minutes:    ***    ED Boarding Nurse name: Laquita Salcido RN

## 2025-07-18 NOTE — ED NOTES
Bed: ED30  Expected date:   Expected time:   Means of arrival:   Comments:  Room is still dirty ED 13

## 2025-07-18 NOTE — PROGRESS NOTES
[facility]    Hospitalist Progress Note  Provider : Candis Garg MD, MD  Date of Service (when I saw the patient): 07/18/2025    Assessment & Plan   Riddhi Aguilar is a 89 year old female admitted on 7/17/2025 who presents for 3 to 4 days of increasing weakness and intermittent confusion.     Confusion/hallucinations  Probable urinary tract infection -as her primary reason for coming to the hospital was ongoing confusion and hallucinations it seems the most likely explanation would be bladder infection.  Her UA is grossly abnormal.  She has no history of MR DO and on her last urine culture grew pansensitive Klebsiella. While her heart rate is elevated this is likely secondary to her underlying A-fib/flutter   - Continue ceftriaxone therapy  - Follow-up urine and blood cultures  - Delirium precautions ordered     A-fib with RVR -likely secondary to physiologic response of underlying infection.  Patient has been continuing her metoprolol 75 mg twice daily.  Mild troponin elevation more consistent with supply/demand ischemia.  - Continue metoprolol therapy  - Cardiac monitoring     Mild to moderate aortic stenosis -noted on recent echo April 2025.  Do not think that it is contributing to her current symptom complex.     Elevated proBNP  Elevated creatinine -patient states that at no point over the last few days which she experiencing shortness of breath.  She does not use oxygen at baseline.  Her daughter does note that she is worried that she had worse p.o. intake over the last few days.  Unclear if this represents heart failure exacerbation or not.  She did receive Lasix and had 400 cc of urine output and her lungs are now remarkably clear.  Her elevated BNP could just be indicative of worsening renal function in the setting of poor p.o. intake and prerenal azotemia.  She does not meet criteria for MICHELE.  - Creatinine at baseline today.  Will resume Lasix 20 mg daily.  - Will resume  "spironolactone tomorrow     Elevated transaminase levels  Elevated bilirubin -albumin 3.8,Platelets 335 decayed and normal liver synthetic function.  CT notable for hepatic steatosis and/or cirrhosis.  No clear cause of insult to liver however could be an indicative of a congestive hepatopathy in the setting of volume overload.  - Will repeat LFTs tomorrow     Erythema of left shin -it is mildly tender to palpation, no increase in temperature or blanching.  Seems most consistent with a bruise for a patient with fragile skin on DOAC therapy.  - Continue to monitor clinically     Hyperlipidemia -continue atorvastatin     GERD -continue Pepcid  Diet:  Cardiac diet  DVT Prophylaxis: DOAC  Martines Catheter: Not present  Lines: None     Cardiac Monitoring: None  Code Status:  DNR/DNI     # Hyponatremia: Lowest Na = 132 mmol/L in last 2 days, will monitor as appropriate  # Hypochloremia: Lowest Cl = 94 mmol/L in last 2 days, will monitor as appropriate       # Drug Induced Coagulation Defect: home medication list includes an anticoagulant medication    # Hypertension: Noted on problem list  # Acute heart failure with preserved ejection fraction: heart failure noted on problem list, last echo with EF >50%, and receiving IV diuretics     # Obesity: Estimated body mass index is 35.14 kg/m  as calculated from the following:    Height as of 7/14/25: 1.499 m (4' 11\").    Weight as of 7/14/25: 78.9 kg (174 lb).       # Financial/Environmental Concerns:        DVT Prophylaxis: Pneumatic Compression Devices    Code Status: No CPR- Do NOT Intubate    Medically Ready for Discharge: Anticipated in 2-4 Days     Candis Garg MD    Interval History   Patient seen and examined today.  Chart reviewed.  Also discussed with patient's daughter-in-law at bedside.  Patient feeling much better today.  Less confused today.  Daughter-in-law.     -Data reviewed today: I reviewed all new labs and imaging results over the last 24 hours. I " personally reviewed     Physical Exam   Temp: 98.8  F (37.1  C) Temp src: Oral BP: 117/85 Pulse: 112   Resp: 20 SpO2: 95 % O2 Device: None (Room air)    There were no vitals filed for this visit.  Vital Signs with Ranges  Temp:  [98  F (36.7  C)-98.8  F (37.1  C)] 98.8  F (37.1  C)  Pulse:  [] 112  Resp:  [16-20] 20  BP: ()/() 117/85  SpO2:  [90 %-99 %] 95 %  I/O last 3 completed shifts:  In: -   Out: 550 [Urine:550]    GEN:  Alert, oriented x 3, appears comfortable, NAD.  HEENT:  Normocephalic/atraumatic, no scleral icterus, no nasal discharge, mouth moist.  CV:  Regular rate and rhythm, no murmur or JVD.  S1 + S2 noted, no S3 or S4.  LUNGS:  Clear to auscultation bilaterally without rales/rhonchi/wheezing/retractions.  Symmetric chest rise on inhalation noted.  ABD:  Active bowel sounds, soft, non-tender/non-distended.  No rebound/guarding/rigidity.  EXT:  No edema or cyanosis.  Hands/feet warm to touch with good signs of peripheral perfusion.  No joint synovitis noted.  SKIN:  Dry to touch, no exanthems noted in the visualized areas.  NEURO:  Symmetric muscle strength, sensation to touch grossly intact.  No new focal deficits appreciated.    Medications   Current Facility-Administered Medications   Medication Dose Route Frequency Provider Last Rate Last Admin    Patient is already receiving anticoagulation with heparin, enoxaparin (LOVENOX), warfarin (COUMADIN)  or other anticoagulant medication   Does not apply Continuous PRN Julius Cruz MD         Current Facility-Administered Medications   Medication Dose Route Frequency Provider Last Rate Last Admin    apixaban ANTICOAGULANT (ELIQUIS) tablet 5 mg  5 mg Oral BID Julius Cruz MD   5 mg at 07/18/25 0826    buPROPion (WELLBUTRIN XL) 24 hr tablet 150 mg  150 mg Oral QAM Julius Cruz MD   150 mg at 07/18/25 0825    cefTRIAXone (ROCEPHIN) 1 g vial to attach to  mL bag for ADULTS or NS 50 mL bag for PEDS  1 g Intravenous  Q24H Julius Cruz MD        dorzolamide-timolol (COSOPT) ophthalmic solution 1 drop  1 drop Both Eyes BID Julius Cruz MD        famotidine (PEPCID) tablet 20 mg  20 mg Oral QAM Julius Cruz MD   20 mg at 07/18/25 0827    [START ON 7/19/2025] furosemide (LASIX) tablet 20 mg  20 mg Oral Candis Maya MD        metoprolol tartrate (LOPRESSOR) tablet 75 mg  75 mg Oral BID Julius Cruz MD   75 mg at 07/18/25 0831    multivitamin w/minerals (THERA-VIT-M) tablet 1 tablet  1 tablet Oral QAM Julius Cruz MD   1 tablet at 07/18/25 0825    senna-docusate (SENOKOT-S/PERICOLACE) 8.6-50 MG per tablet 1 tablet  1 tablet Oral Daily Julius Cruz MD        sodium chloride (PF) 0.9% PF flush 3 mL  3 mL Intracatheter Q8H ROBINSON Julius Cruz MD           Data   Recent Labs   Lab 07/18/25  0800 07/18/25  0338 07/17/25  2114 07/17/25  1718 07/15/25  1048   WBC 12.1*  --   --  9.2 9.5   HGB 12.3  --   --  12.8 12.1   *  --   --  106* 105*     --   --  335 310   *  --   --  132* 132*   POTASSIUM 3.6  --   --  4.8 4.1   CHLORIDE 94*  --   --  94* 93*   CO2 24  --   --  25 23   BUN 33.8*  --   --  37.0* 49.5*   CR 1.16*  --   --  1.19* 1.45*   ANIONGAP 15  --   --  13 16*   TAMMY 9.3  --   --  9.3 9.3   GLC 90 103*  --  107* 180*   ALBUMIN 3.8  --   --  3.8 3.6   PROTTOTAL 5.7*  --   --  6.2* 5.8*   BILITOTAL 2.4*  --   --  2.7* 2.4*   ALKPHOS 62  --   --  76 82   *  --   --  128* 142*   AST 95*  --  109*  --  186*   LIPASE  --   --   --  16  --        Recent Results (from the past 24 hours)   Head CT w/o contrast    Narrative    EXAM: CT HEAD W/O CONTRAST  LOCATION: [facility]  DATE: 7/17/2025    INDICATION: AMS  COMPARISON:  CT head May 26, 2025.  TECHNIQUE: Routine CT Head without IV contrast. Multiplanar reformats. Dose reduction techniques were used.    FINDINGS:  INTRACRANIAL CONTENTS: No intracranial hemorrhage, extraaxial collection,  or mass effect.  No CT evidence of acute infarct. Moderate presumed chronic small vessel ischemic changes. Mild to moderate generalized volume loss. No hydrocephalus.     VISUALIZED ORBITS/SINUSES/MASTOIDS: Prior bilateral cataract surgery. Visualized portions of the orbits are otherwise unremarkable. Partially imaged complete opacification of the right maxillary sinus.  No middle ear or mastoid effusion.    BONES/SOFT TISSUES: No acute abnormality.      Impression    IMPRESSION:  1.  No CT evidence for acute intracranial process.  2.  Brain atrophy and presumed chronic microvascular ischemic changes as above.     Abd/pelvis CT,  IV  contrast only TRAUMA / AAA    Narrative    EXAM: CT ABDOMEN PELVIS W CONTRAST  LOCATION: [facility]  DATE: 7/17/2025    INDICATION: Altered mental status and jaundice.  COMPARISON: 1/23/2024.  TECHNIQUE: CT scan of the abdomen and pelvis was performed following injection of IV contrast. Multiplanar reformats were obtained. Dose reduction techniques were used.  CONTRAST: 88 mL Isovue-370.    FINDINGS:  Patient was imaged with arm(s) at side, limiting study quality.    LOWER CHEST: Mosaic attenuation of the pulmonary parenchyma and trace interstitial edema. Moderate cardiomegaly. Dense calcifications of the mitral annulus. Calcifications of the aortic valve, coronary arteries, and visualized thoracic aorta.    HEPATOBILIARY: Hepatic steatosis and cirrhosis. Mild hepatomegaly.    Gallbladder is normal.    No intrahepatic or intrahepatic biliary ductal dilatation.    PANCREAS: Enhances normally. No peripancreatic inflammatory fat stranding.    SPLEEN: Enhances normally. Normal size. Also capsular fluid collections of the medial spleen, not significantly changed from 11/15/2023.    ADRENAL GLANDS: Normal.    KIDNEYS: Both kidneys enhance symmetrically, without hydronephrosis.    No nephroureterolithiasis.    Mildly thick-walled urinary bladder, some of which is likely  due to incomplete distention.    PELVIC ORGANS: Hysterectomy.    BOWEL: No evidence of acute gastrointestinal inflammation or obstruction. Appendix not visualized, likely surgically absent.    No intraperitoneal free fluid or free air.    LYMPH NODES: There is a mildly enlarged left common iliac lymph node, which measures 13 mm in short axis, previously 12 mm.    VASCULATURE: No abdominal aortic aneurysm. Severe atheromatous disease.    MUSCULOSKELETAL: No suspicious abnormality. Percutaneous fixation of the left femoral neck. Unchanged L3 compression deformity.    OTHER: No additionally significant abnormalities.      Impression    IMPRESSION:   1.  No acute CT abnormality of the abdomen/pelvis.  2.  Hepatic steatosis and cirrhosis.  3.  Mildly thick-walled urinary bladder, some of which is likely due to incomplete distention. Correlate with urinalysis to exclude acute cystitis.  4.  Mildly enlarged left common iliac lymph node, measuring 13 mm in short axis, previously 12 mm. This is likely benign and reactive.  5.  Trace interstitial edema.  6.  Moderate cardiomegaly.   US Lower Extremity Venous Duplex Left    Narrative    EXAM: US LOWER EXTREMITY VENOUS DUPLEX LEFT  LOCATION: [facility]  DATE: 7/17/2025    INDICATION: pain, sweling, ecchymosis  COMPARISON: None.  TECHNIQUE: Venous Duplex ultrasound of the left lower extremity with and without compression, augmentation and duplex. Color flow and spectral Doppler with waveform analysis performed.    FINDINGS: Exam includes the common femoral, femoral, popliteal, and contralateral common femoral veins as well as segmentally visualized deep calf veins and greater saphenous vein.     LEFT: No deep vein thrombosis. No superficial thrombophlebitis. No popliteal cyst.          Impression    IMPRESSION:  1.  No deep venous thrombosis in the left lower extremity. Localized subcutaneous edema in the area of erythema at the left shin.   XR Chest 2  Views    Narrative    EXAM: XR CHEST 2 VIEWS  LOCATION: [facility]  DATE: 7/17/2025    INDICATION: Fatigue, shortness of breath, cough.  COMPARISON: 7/2/2025.      Impression    IMPRESSION: Mild pulmonary vascular congestion. No pleural effusion or pneumothorax.    Moderate cardiomegaly. Dense calcification of the mitral annulus. Atherosclerotic calcifications of the thoracic aorta.   XR Tibia and Fibula Left 2 Views    Narrative    EXAM: XR TIBIA AND FIBULA LEFT 2 VIEWS  LOCATION: [facility]  DATE: 7/17/2025    INDICATION: pain, swelling, bruising no reported fall  COMPARISON: None.      Impression    IMPRESSION: The left tibia and tibial are negative for fracture. Degenerative change at the knee joint. Plantar and Achilles calcaneal spurring. Degenerative change at the tibiotalar joint.

## 2025-07-18 NOTE — PROGRESS NOTES
[facility]    Patient is currently receiving SN PT home care services from [facility].  and home health team have been notified of patients admission. [facility] liaison will continue to follow patient during hospital stay. If appropriate, provide home care orders to resume services at the time of discharge. If patients discharge date changes, please reach out to clinical liaison or the HUB to ensure SOC/ALLISON date is still appropriate for a safe discharge plan.     Thank you,     JUDSON Duvall, LPN  Home Care Liaison   Cell: [phone]

## 2025-07-18 NOTE — PHARMACY-ADMISSION MEDICATION HISTORY
Pharmacy Intern Admission Medication History    Admission medication history is complete. The information provided in this note is only as accurate as the sources available at the time of the update.    Information Source(s): Facility ([facility]/) medication list/MAR and CareEverywhere/SureScripts via phone    Pertinent Information: Med list sent from [facility]. Talked with Nurse from facility to verify meds and last doses. The nurse at the facility says that Atorvastatin has been ON HOLD since 7/4/25, so I kept it on the PTA list but did not include it in the med rec note. She also reports that the patient had a recent decrease in her Bupropion dose from 450 mg daily to 150 mg daily.     Changes made to PTA medication list:  Added: Ensure Liquid, Famotidine, Milk of Magnesia, Lidocaine Patches  Deleted: Atorvastatin, Bupropion  mg  Changed: Furosemide 40 mg daily -> 20 mg daily, Metoprolol Tartrate 50 mg tablet -> 25 mg tablet, Multivitamin 1 tab daily -> 1 tab every AM, Senna Docusate 1 tab BID -> 1 tab every other day alternating with 2 tabs every other day    Allergies reviewed with patient and updates made in EHR: unable to assess    Medication History Completed By: Roni Lira 7/17/2025 7:25 PM    PTA Med List   Medication Sig Last Dose/Taking    acetaminophen (TYLENOL) 500 MG tablet Take 2 tablets (1,000 mg) by mouth every 8 hours as needed for mild pain. Unknown    apixaban ANTICOAGULANT (ELIQUIS) 5 MG tablet Take 1 tablet (5 mg) by mouth 2 times daily. 7/17/2025 Morning    bisacodyl (DULCOLAX) 10 MG suppository Place 1 suppository (10 mg) rectally daily as needed for constipation. Unknown    buPROPion (WELLBUTRIN XL) 150 MG 24 hr tablet TAKE 1 TABLET(150 MG) BY MOUTH EVERY MORNING 7/17/2025 Morning    dorzolamide-timolol (COSOPT) 22.3-6.8 MG/ML ophthalmic solution Place 1 drop into both eyes 2 times daily 7/17/2025 Morning    famotidine (PEPCID) 20 MG tablet Take 20 mg by mouth every morning.  7/17/2025 Morning    furosemide (LASIX) 20 MG tablet Take 20 mg by mouth every morning. 7/17/2025 Morning    Lidocaine (LIDOCARE) 4 % Patch Place 1 patch onto the skin every 24 hours. To prevent lidocaine toxicity, patient should be patch free for 12 hrs daily. 7/17/2025 Morning    magnesium hydroxide (MILK OF MAGNESIA) 400 MG/5ML suspension Take 30 mLs by mouth every 48 hours as needed for constipation. Unknown    melatonin 3 MG tablet Take 3 mg by mouth at bedtime. 7/16/2025 Bedtime    metoprolol tartrate (LOPRESSOR) 25 MG tablet Take 75 mg by mouth 2 times daily. 7/17/2025 Morning    miconazole (MICATIN) 2 % external powder Apply topically 2 times daily as needed for other (candidiasis/intertrigo). Unknown    multivitamin w/minerals (THERA-VIT-M) tablet Take 1 tablet by mouth every morning. 7/17/2025 Morning    Nutritional Supplements (ENSURE) LIQD Take 1 Bottle by mouth daily as needed. Unknown    senna-docusate (SENOKOT-S/PERICOLACE) 8.6-50 MG tablet Take 1 tablet by mouth every other day. 7/16/2025 Morning    senna-docusate (SENOKOT-S/PERICOLACE) 8.6-50 MG tablet Take 2 tablets by mouth every other day. 7/17/2025 Morning    spironolactone (ALDACTONE) 25 MG tablet Take 1 tablet (25 mg) by mouth daily. 7/17/2025 Morning

## 2025-07-18 NOTE — ED NOTES
[facility]  ED Nurse Handoff Report    ED Chief complaint: Generalized Weakness and Leg Pain  . ED Diagnosis:   Final diagnoses:   Generalized weakness   Acute on chronic diastolic congestive heart failure (H)   Aortic valve stenosis, etiology of cardiac valve disease unspecified   Urinary tract infection in female   Pain of left lower leg       Allergies: No Known Allergies    Code Status: DNR / DNI    Activity level - Baseline/Home:  standby.  Activity Level - Current:   assist of 1.   Lift room needed: No.   Bariatric: No   Needed: No   Isolation: No.   Infection: Not Applicable.     Respiratory status: Room air    Vital Signs (within 30 minutes):   Vitals:    07/17/25 2030 07/17/25 2100 07/17/25 2200 07/17/25 2220   BP: (!) 109/95      Pulse:       Resp:       Temp:       TempSrc:       SpO2: 94% 97% 97% 95%       Cardiac Rhythm:  ,      Pain level:    Patient confused: Yes.   Patient Falls Risk: patient and family education, assistive device/personal items within reach, and activity supervised.   Elimination Status: Has voided     Patient Report - Initial Complaint: Generalized weakness, Leg pain.   Focused Assessment: Riddhi Aguilar is a 89 year old female         Here from assisted living, daughter who is a nurse here at our hospital accompanies her.     Concern is altered mental status with waxing and waning energy level throughout the day and periods of confusion.  Increased sleepiness, decreased functional ability normally can get up and around with a walker for short distances and is unable to get up on her own today.  Fell around Memorial Day, no new falls reported by daughter or the facility.  Chronically incontinent.  Has a known ulcer on the left leg and recently was treated for cellulitis.     History of heart failure and on a DOAC.  Last echocardiogram with preserved ejection fraction, also notes aortic stenosis with a valve area of 1.3.     Daughter noted  increasing peripheral edema.  She was complaining of left leg pain today as well no known new fall or trauma.  Has been on diuretics since her hospitalization and had poor p.o. intake.     Abnormal Results:   Labs Ordered and Resulted from Time of ED Arrival to Time of ED Departure   BASIC METABOLIC PANEL - Abnormal       Result Value    Sodium 132 (*)     Potassium 4.8      Chloride 94 (*)     Carbon Dioxide (CO2) 25      Anion Gap 13      Urea Nitrogen 37.0 (*)     Creatinine 1.19 (*)     GFR Estimate 42 (*)     Calcium 9.3      Glucose 107 (*)    HEPATIC FUNCTION PANEL - Abnormal    Protein Total 6.2 (*)     Albumin 3.8      Bilirubin Total 2.7 (*)     Alkaline Phosphatase 76      AST         (*)     Bilirubin Direct 0.89 (*)    TROPONIN T, HIGH SENSITIVITY - Abnormal    Troponin T, High Sensitivity 35 (*)    NT-PROBNP - Abnormal    NT-proBNP 10,285 (*)    ROUTINE UA WITH MICROSCOPIC REFLEX TO CULTURE - Abnormal    Color Urine Yellow      Appearance Urine Slightly Cloudy (*)     Glucose Urine Negative      Bilirubin Urine Negative      Ketones Urine Negative      Specific Gravity Urine 1.019      Blood Urine Small (*)     pH Urine 6.5      Protein Albumin Urine Negative      Urobilinogen Urine 3.0 (*)     Nitrite Urine Negative      Leukocyte Esterase Urine Large (*)     Bacteria Urine Moderate (*)     WBC Clumps Urine Present (*)     Mucus Urine Present (*)     RBC Urine 5 (*)     WBC Urine 91 (*)     Squamous Epithelials Urine 1      Hyaline Casts Urine 5 (*)    CBC WITH PLATELETS AND DIFFERENTIAL - Abnormal    WBC Count 9.2      RBC Count 3.62 (*)     Hemoglobin 12.8      Hematocrit 38.2       (*)     MCH 35.4 (*)     MCHC 33.5      RDW 25.4 (*)     Platelet Count 335      % Neutrophils 83      % Lymphocytes 4      % Monocytes 10      % Eosinophils 1      % Basophils 0      % Immature Granulocytes 2      NRBCs per 100 WBC 4 (*)     Absolute Neutrophils 7.6      Absolute Lymphocytes 0.4 (*)      Absolute Monocytes 0.9      Absolute Eosinophils 0.1      Absolute Basophils 0.0      Absolute Immature Granulocytes 0.2      Absolute NRBCs 0.3     TROPONIN T, HIGH SENSITIVITY - Abnormal    Troponin T, High Sensitivity 38 (*)    AST - Abnormal     (*)    LIPASE - Normal    Lipase 16     LACTIC ACID WHOLE BLOOD WITH 1X REPEAT IN 2 HR WHEN >2 - Normal    Lactic Acid, Initial 1.9     AMMONIA - Normal    Ammonia 11     MAGNESIUM - Normal    Magnesium 2.0     GLUCOSE MONITOR NURSING POCT   BLOOD CULTURE   BLOOD CULTURE   URINE CULTURE        XR Tibia and Fibula Left 2 Views   Final Result   IMPRESSION: The left tibia and tibial are negative for fracture. Degenerative change at the knee joint. Plantar and Achilles calcaneal spurring. Degenerative change at the tibiotalar joint.      XR Chest 2 Views   Final Result   IMPRESSION: Mild pulmonary vascular congestion. No pleural effusion or pneumothorax.      Moderate cardiomegaly. Dense calcification of the mitral annulus. Atherosclerotic calcifications of the thoracic aorta.      US Lower Extremity Venous Duplex Left   Final Result   IMPRESSION:   1.  No deep venous thrombosis in the left lower extremity. Localized subcutaneous edema in the area of erythema at the left shin.      Abd/pelvis CT,  IV  contrast only TRAUMA / AAA   Final Result   IMPRESSION:    1.  No acute CT abnormality of the abdomen/pelvis.   2.  Hepatic steatosis and cirrhosis.   3.  Mildly thick-walled urinary bladder, some of which is likely due to incomplete distention. Correlate with urinalysis to exclude acute cystitis.   4.  Mildly enlarged left common iliac lymph node, measuring 13 mm in short axis, previously 12 mm. This is likely benign and reactive.   5.  Trace interstitial edema.   6.  Moderate cardiomegaly.      Head CT w/o contrast   Final Result   IMPRESSION:   1.  No CT evidence for acute intracranial process.   2.  Brain atrophy and presumed chronic microvascular ischemic changes as  above.             Treatments provided: see MAR  Family Comments: daughter at bedside  OBS brochure/video discussed/provided to patient:  No  ED Medications:   Medications   HYDROmorphone (DILAUDID) injection 0.2 mg (0.2 mg Intravenous Not Given 7/17/25 2221)   acetaminophen (TYLENOL) tablet 650 mg (has no administration in time range)   oxyCODONE (ROXICODONE) tablet 5 mg (has no administration in time range)   QUEtiapine (SEROquel) half-tab 12.5 mg (has no administration in time range)   acetaminophen (TYLENOL) tablet 650 mg (650 mg Oral $Given 7/17/25 1814)   sodium chloride (PF) 0.9% PF flush 100 mL (62 mLs Intravenous $Given 7/17/25 1855)   iopamidol (ISOVUE-370) solution 500 mL (88 mLs Intravenous $Given 7/17/25 1855)   cefTRIAXone (ROCEPHIN) 2 g vial to attach to  ml bag for ADULTS or NS 50 ml bag for PEDS (0 g Intravenous Stopped 7/17/25 2212)   furosemide (LASIX) injection 40 mg (40 mg Intravenous $Given 7/17/25 2121)   albumin human 25 % injection 25 g (25 g Intravenous $New Bag 7/17/25 2221)       Drips infusing:  No  For the majority of the shift this patient was Green.   Interventions performed were N/a.    Sepsis treatment initiated: No    Cares/treatment/interventions/medications to be completed following ED care: I&Os    ED Nurse Name: Floresita Oh RN  11:52 PM

## 2025-07-18 NOTE — CONSULTS
Care Management Initial Consult    General Information  Assessment completed with: Patient,    Type of CM/SW Visit: Initial Assessment    Primary Care Provider verified and updated as needed: Yes   Readmission within the last 30 days: no previous admission in last 30 days      Reason for Consult: discharge planning  Advance Care Planning:            Communication Assessment  Patient's communication style: spoken language (English or Bilingual)             Cognitive  Cognitive/Neuro/Behavioral: WDL                      Living Environment:   People in home: facility resident     Current living Arrangements: assisted living      Able to return to prior arrangements: yes       Family/Social Support:  Care provided by: self, homecare agency, other (see comments)  Provides care for: no one, unable/limited ability to care for self     Support system: Facility resident(s)/Staff, Children          Description of Support System: Supportive, Involved    Support Assessment: Adequate family and caregiver support    Current Resources:   Patient receiving home care services: Yes        Community Resources: Home Care  Equipment currently used at home: walker, rolling  Supplies currently used at home:      Employment/Financial:  Employment Status:          Financial Concerns:             Does the patient's insurance plan have a 3 day qualifying hospital stay waiver?  Yes     Which insurance plan 3 day waiver is available? Alternative insurance waiver    Will the waiver be used for post-acute placement? Undetermined at this time    Lifestyle & Psychosocial Needs:  Social Drivers of Health     Food Insecurity: Low Risk  (7/3/2025)    Food Insecurity     Within the past 12 months, did you worry that your food would run out before you got money to buy more?: No     Within the past 12 months, did the food you bought just not last and you didn t have money to get more?: No   Depression: Not at risk (2/17/2025)    PHQ-2     PHQ-2 Score: 0    Housing Stability: Low Risk  (7/3/2025)    Housing Stability     Do you have housing? : Yes     Are you worried about losing your housing?: No   Tobacco Use: Low Risk  (7/9/2025)    Patient History     Smoking Tobacco Use: Never     Smokeless Tobacco Use: Never     Passive Exposure: Not on file   Financial Resource Strain: Low Risk  (7/3/2025)    Financial Resource Strain     Within the past 12 months, have you or your family members you live with been unable to get utilities (heat, electricity) when it was really needed?: No   Recent Concern: Financial Resource Strain - High Risk (4/18/2025)    Financial Resource Strain     Within the past 12 months, have you or your family members you live with been unable to get utilities (heat, electricity) when it was really needed?: Yes   Alcohol Use: Not on file   Transportation Needs: Low Risk  (7/3/2025)    Transportation Needs     Within the past 12 months, has lack of transportation kept you from medical appointments, getting your medicines, non-medical meetings or appointments, work, or from getting things that you need?: No   Physical Activity: Unknown (5/14/2024)    Exercise Vital Sign     Days of Exercise per Week: 4 days     Minutes of Exercise per Session: Not on file   Interpersonal Safety: Low Risk  (7/3/2025)    Interpersonal Safety     Do you feel physically and emotionally safe where you currently live?: Yes     Within the past 12 months, have you been hit, slapped, kicked or otherwise physically hurt by someone?: No     Within the past 12 months, have you been humiliated or emotionally abused in other ways by your partner or ex-partner?: No   Stress: Not on file (11/9/2024)   Social Connections: Unknown (5/14/2024)    Social Connection and Isolation Panel [NHANES]     Frequency of Communication with Friends and Family: Not on file     Frequency of Social Gatherings with Friends and Family: Once a week     Attends Shinto Services: Not on file     Active  Member of Clubs or Organizations: Not on file     Attends Club or Organization Meetings: Not on file     Marital Status: Not on file   Health Literacy: Not on file       Functional Status:  Prior to admission patient needed assistance:   Dependent ADLs:: Ambulation-walker, Bathing, Dressing  Dependent IADLs:: Cleaning, Cooking, Laundry, Shopping, Meal Preparation, Medication Management, Transportation  Assesssment of Functional Status: Not at baseline with ADL Functioning    Mental Health Status:          Chemical Dependency Status:                Values/Beliefs:  Spiritual, Cultural Beliefs, Pentecostal Practices, Values that affect care:                 Discussed  Partnership in Safe Discharge Planning  document with patient/family: No    Additional Information:    SW spoke with pt daughter in law Mary Ann regarding discharge planning. She reports that the pt has been in and out of TCU and recently moved to [facility]. Pt was most recently as [facility] and discharged 6/23 to [facility]. She is agreeable to SW reaching out to [facility], if pt were to need TCU, they prefer [facility] or [facility] though family may prefer return to [facility] even if below baseline.     Patient receives services as follows: AX-1 with all cares.     [facility] contact (name/number): RN P: [phone]   Fax #: [phone]  Barriers to pt returning: none-they prefer pt to go to TCU if needing AX-2 since it is below her baseline but can accept back if it is not recommended.  Baseline mobility: Independent with a walker, last week AX-1 with all mobility and cares.   Time of preferred return: weekends not preferred but can take before 1400. Weekdays before 1700.  New meds/prescriptions/Pharmacy: A&E  Transportation: Family vs [facility]    Other: [facility] RN/PT- SW left message to verify which disciplines. Family just obtained a WC for the pt.    RN CC/SW will continue to follow for discharge planning and return to facility.     Next Steps: follow for  discharge planning.       Elana Chakraborty/KRYSTINA Terrell, [facility]  Inpatient Care Coordination  Emergency Room /Float  [phone]    Elana Chakraborty, SW

## 2025-07-18 NOTE — PLAN OF CARE
"Pertinent assessments: A&Ox4, [place]. Bilateral hearing Aids in place. VSS. On RA. Sating mid 90s. On Tele, Afib RVR with HR of 100 to 130s, sometimes going upto 150s but not sustaining per tele tech. Notified crosscover (Dr. Gould). Pre-existing wounds LLE. Assist x2 with sera steady. External catheter in place. On contact precautions. Afebrile.     Major Shift Events: Afib RVR with HR of 100-130s. MD notified.     Treatment Plan: IV abx, monitor UC, Encourage mobility,     Goal Outcome Evaluation:  Plan of Care Reviewed With: patient, child  Overall Patient Progress: improvingOverall Patient Progress: improving  Outcome Evaluation: Family at [facility]. Afebrile  Problem: Adult Inpatient Plan of Care  Goal: Plan of Care Review  Description: The Plan of Care Review/Shift note should be completed every shift.  The Outcome Evaluation is a brief statement about your assessment that the patient is improving, declining, or no change.  This information will be displayed automatically on your shift  note.  Outcome: Progressing  Flowsheets (Taken 7/18/2025 1822)  Outcome Evaluation: Family at [facility]. Afebrile  Plan of Care Reviewed With:   patient   child  Overall Patient Progress: improving  Goal: Patient-Specific Goal (Individualized)  Description: You can add care plan individualizations to a care plan. Examples of Individualization might be:  \"Parent requests to be called daily at 9am for status\", \"I have a hard time hearing out of my right ear\", or \"Do not touch me to wake me up as it startles  me\".  Outcome: Progressing  Goal: Absence of Hospital-Acquired Illness or Injury  Outcome: Progressing  Intervention: Identify and Manage Fall Risk  Recent Flowsheet Documentation  Taken 7/18/2025 1659 by Renata Valdovinos RN  Safety Promotion/Fall Prevention:   supervised activity   safety round/check completed  Intervention: Prevent Skin Injury  Recent Flowsheet Documentation  Taken 7/18/2025 1659 by Renata Valdovinos, RN  Body " Position: weight shifting  Intervention: Prevent and Manage VTE (Venous Thromboembolism) Risk  Recent Flowsheet Documentation  Taken 7/18/2025 1659 by Renata Valdovinos RN  VTE Prevention/Management: SCDs off (sequential compression devices)  Intervention: Prevent Infection  Recent Flowsheet Documentation  Taken 7/18/2025 1659 by Renata Valdovinos RN  Infection Prevention:   environmental surveillance performed   hand hygiene promoted  Goal: Optimal Comfort and Wellbeing  Outcome: Progressing  Intervention: Monitor Pain and Promote Comfort  Recent Flowsheet Documentation  Taken 7/18/2025 1630 by Renata Valdovinos RN  Pain Management Interventions:   care clustered   declines  Intervention: Provide Person-Centered Care  Recent Flowsheet Documentation  Taken 7/18/2025 1659 by Renata Valdovinos RN  Trust Relationship/Rapport:   questions encouraged   questions answered  Goal: Readiness for Transition of Care  Outcome: Progressing     Problem: Delirium  Goal: Optimal Coping  Outcome: Progressing  Goal: Improved Behavioral Control  Outcome: Progressing  Intervention: Minimize Safety Risk  Recent Flowsheet Documentation  Taken 7/18/2025 1659 by Renata Valdovinos RN  Enhanced Safety Measures: assistive devices when indicated  Trust Relationship/Rapport:   questions encouraged   questions answered  Goal: Improved Attention and Thought Clarity  Outcome: Progressing  Goal: Improved Sleep  Outcome: Progressing

## 2025-07-18 NOTE — ED NOTES
[facility]  ED Nurse Handoff Report    ED Chief complaint: Generalized Weakness and Leg Pain  . ED Diagnosis:   Final diagnoses:   None       Allergies: No Known Allergies    Code Status: DNR / DNI    Activity level - Baseline/Home:  assist of 1.  Activity Level - Current:   assist of 1.   Lift room needed: No.   Bariatric: No   Needed: No   Isolation: No.   Infection: Not Applicable.     Respiratory status: Room air    Vital Signs (within 30 minutes):   Vitals:    07/17/25 1721 07/17/25 1731 07/17/25 1736 07/17/25 1741   BP:  120/66     Pulse:  119     Resp:       Temp:       TempSrc:       SpO2: 92%  99% 98%       Cardiac Rhythm:  ,      Pain level:    Patient confused: No.   Patient Falls Risk: Family at bedside. .   Elimination Status: Has not needed to pee yet.      Patient Report - Per provider note: Riddhi Aguilar is a 89 year old female         Here from assisted living, daughter who is a nurse here at our hospital accompanies her.     Concern is altered mental status with waxing and waning energy level throughout the day and periods of confusion.  Increased sleepiness, decreased functional ability normally can get up and around with a walker for short distances and is unable to get up on her own today.  Fell around Memorial Day, no new falls reported by daughter or the facility.  Chronically incontinent.  Has a known ulcer on the left leg and recently was treated for cellulitis.     History of heart failure and on a DOAC.  Last echocardiogram with preserved ejection fraction, also notes aortic stenosis with a valve area of 1.3.     Daughter noted increasing peripheral edema.  She was complaining of left leg pain today as well no known new fall or trauma.  Has been on diuretics since her hospitalization and had poor p.o. intake..   Focused Assessment:      Abnormal Results:   Labs Ordered and Resulted from Time of ED Arrival to Time of ED Departure   BASIC METABOLIC PANEL -  Abnormal       Result Value    Sodium 132 (*)     Potassium 4.8      Chloride 94 (*)     Carbon Dioxide (CO2) 25      Anion Gap 13      Urea Nitrogen 37.0 (*)     Creatinine 1.19 (*)     GFR Estimate 42 (*)     Calcium 9.3      Glucose 107 (*)    HEPATIC FUNCTION PANEL - Abnormal    Protein Total 6.2 (*)     Albumin 3.8      Bilirubin Total 2.7 (*)     Alkaline Phosphatase 76      AST         (*)     Bilirubin Direct 0.89 (*)    TROPONIN T, HIGH SENSITIVITY - Abnormal    Troponin T, High Sensitivity 35 (*)    NT-PROBNP - Abnormal    NT-proBNP 10,285 (*)    CBC WITH PLATELETS AND DIFFERENTIAL - Abnormal    WBC Count 9.2      RBC Count 3.62 (*)     Hemoglobin 12.8      Hematocrit 38.2       (*)     MCH 35.4 (*)     MCHC 33.5      RDW 25.4 (*)     Platelet Count 335      % Neutrophils 83      % Lymphocytes 4      % Monocytes 10      % Eosinophils 1      % Basophils 0      % Immature Granulocytes 2      NRBCs per 100 WBC 4 (*)     Absolute Neutrophils 7.6      Absolute Lymphocytes 0.4 (*)     Absolute Monocytes 0.9      Absolute Eosinophils 0.1      Absolute Basophils 0.0      Absolute Immature Granulocytes 0.2      Absolute NRBCs 0.3     LIPASE - Normal    Lipase 16     LACTIC ACID WHOLE BLOOD WITH 1X REPEAT IN 2 HR WHEN >2 - Normal    Lactic Acid, Initial 1.9     AMMONIA - Normal    Ammonia 11     MAGNESIUM - Normal    Magnesium 2.0     ROUTINE UA WITH MICROSCOPIC REFLEX TO CULTURE   TROPONIN T, HIGH SENSITIVITY   AST   BLOOD CULTURE   BLOOD CULTURE        US Lower Extremity Venous Duplex Left    (Results Pending)   XR Chest 2 Views    (Results Pending)   XR Tibia and Fibula Left 2 Views    (Results Pending)   Head CT w/o contrast    (Results Pending)   Abd/pelvis CT,  IV  contrast only TRAUMA / AAA    (Results Pending)       Treatments provided:   Family Comments: daughter at bedside.   OBS brochure/video discussed/provided to patient:  No  ED Medications:   Medications   acetaminophen (TYLENOL) tablet  650 mg (650 mg Oral $Given 7/17/25 1804)   sodium chloride (PF) 0.9% PF flush 100 mL (62 mLs Intravenous $Given 7/17/25 4025)   iopamidol (ISOVUE-370) solution 500 mL (88 mLs Intravenous $Given 7/17/25 1855)       Drips infusing:  No  For the majority of the shift this patient was [place].   Interventions performed were .    Sepsis treatment initiated: No    Cares/treatment/interventions/medications to be completed following ED care:     ED Nurse Name: Venancio Hyde RN  7:17 PM

## 2025-07-19 LAB
ALBUMIN SERPL BCG-MCNC: 3.5 G/DL (ref 3.5–5.2)
ALP SERPL-CCNC: 73 U/L (ref 40–150)
ALT SERPL W P-5'-P-CCNC: 171 U/L (ref 0–50)
ANION GAP SERPL CALCULATED.3IONS-SCNC: 18 MMOL/L (ref 7–15)
AST SERPL W P-5'-P-CCNC: 259 U/L (ref 0–45)
BILIRUB SERPL-MCNC: 3 MG/DL
BUN SERPL-MCNC: 30.4 MG/DL (ref 8–23)
CALCIUM SERPL-MCNC: 9.2 MG/DL (ref 8.8–10.4)
CHLORIDE SERPL-SCNC: 92 MMOL/L (ref 98–107)
CREAT SERPL-MCNC: 1.07 MG/DL (ref 0.51–0.95)
EGFRCR SERPLBLD CKD-EPI 2021: 48 ML/MIN/1.73M2
ERYTHROCYTE [DISTWIDTH] IN BLOOD BY AUTOMATED COUNT: 26.3 % (ref 10–15)
GLUCOSE SERPL-MCNC: 95 MG/DL (ref 70–99)
HAV IGM SERPL QL IA: NONREACTIVE
HBV CORE IGM SERPL QL IA: NONREACTIVE
HBV SURFACE AB SERPL IA-ACNC: <3.5 M[IU]/ML
HBV SURFACE AB SERPL IA-ACNC: NONREACTIVE M[IU]/ML
HBV SURFACE AG SERPL QL IA: NONREACTIVE
HCO3 SERPL-SCNC: 20 MMOL/L (ref 22–29)
HCT VFR BLD AUTO: 43.6 % (ref 35–47)
HCV AB SERPL QL IA: NONREACTIVE
HGB BLD-MCNC: 14.3 G/DL (ref 11.7–15.7)
MCH RBC QN AUTO: 35.1 PG (ref 26.5–33)
MCHC RBC AUTO-ENTMCNC: 32.8 G/DL (ref 31.5–36.5)
MCV RBC AUTO: 107 FL (ref 78–100)
PLATELET # BLD AUTO: 249 10E3/UL (ref 150–450)
POTASSIUM SERPL-SCNC: 4.6 MMOL/L (ref 3.4–5.3)
PROT SERPL-MCNC: 5.9 G/DL (ref 6.4–8.3)
RBC # BLD AUTO: 4.07 10E6/UL (ref 3.8–5.2)
SODIUM SERPL-SCNC: 130 MMOL/L (ref 135–145)
WBC # BLD AUTO: 8.9 10E3/UL (ref 4–11)

## 2025-07-19 PROCEDURE — 86705 HEP B CORE ANTIBODY IGM: CPT | Performed by: INTERNAL MEDICINE

## 2025-07-19 PROCEDURE — 85014 HEMATOCRIT: CPT | Performed by: INTERNAL MEDICINE

## 2025-07-19 PROCEDURE — 36415 COLL VENOUS BLD VENIPUNCTURE: CPT | Performed by: INTERNAL MEDICINE

## 2025-07-19 PROCEDURE — 86803 HEPATITIS C AB TEST: CPT | Performed by: INTERNAL MEDICINE

## 2025-07-19 PROCEDURE — 80053 COMPREHEN METABOLIC PANEL: CPT | Performed by: INTERNAL MEDICINE

## 2025-07-19 PROCEDURE — 99232 SBSQ HOSP IP/OBS MODERATE 35: CPT | Performed by: INTERNAL MEDICINE

## 2025-07-19 PROCEDURE — 250N000013 HC RX MED GY IP 250 OP 250 PS 637: Performed by: INTERNAL MEDICINE

## 2025-07-19 PROCEDURE — 250N000011 HC RX IP 250 OP 636: Performed by: HOSPITALIST

## 2025-07-19 PROCEDURE — 86706 HEP B SURFACE ANTIBODY: CPT | Performed by: INTERNAL MEDICINE

## 2025-07-19 PROCEDURE — 250N000013 HC RX MED GY IP 250 OP 250 PS 637: Performed by: HOSPITALIST

## 2025-07-19 PROCEDURE — 120N000001 HC R&B MED SURG/OB

## 2025-07-19 PROCEDURE — 87340 HEPATITIS B SURFACE AG IA: CPT | Performed by: INTERNAL MEDICINE

## 2025-07-19 RX ORDER — SPIRONOLACTONE 25 MG/1
25 TABLET ORAL DAILY
Status: DISPENSED | OUTPATIENT
Start: 2025-07-19

## 2025-07-19 RX ADMIN — METOPROLOL TARTRATE 75 MG: 50 TABLET, FILM COATED ORAL at 20:31

## 2025-07-19 RX ADMIN — FUROSEMIDE 20 MG: 20 TABLET ORAL at 07:54

## 2025-07-19 RX ADMIN — DORZOLAMIDE HYDROCHLORIDE AND TIMOLOL MALEATE 1 DROP: 20; 5 SOLUTION/ DROPS OPHTHALMIC at 20:40

## 2025-07-19 RX ADMIN — SPIRONOLACTONE 25 MG: 25 TABLET ORAL at 11:19

## 2025-07-19 RX ADMIN — APIXABAN 5 MG: 5 TABLET, FILM COATED ORAL at 20:31

## 2025-07-19 RX ADMIN — BUPROPION HYDROCHLORIDE 150 MG: 150 TABLET, EXTENDED RELEASE ORAL at 07:54

## 2025-07-19 RX ADMIN — SENNOSIDES AND DOCUSATE SODIUM 1 TABLET: 50; 8.6 TABLET ORAL at 07:54

## 2025-07-19 RX ADMIN — METOPROLOL TARTRATE 75 MG: 50 TABLET, FILM COATED ORAL at 07:54

## 2025-07-19 RX ADMIN — APIXABAN 5 MG: 5 TABLET, FILM COATED ORAL at 07:54

## 2025-07-19 RX ADMIN — FAMOTIDINE 20 MG: 20 TABLET, FILM COATED ORAL at 07:54

## 2025-07-19 RX ADMIN — CEFTRIAXONE 1 G: 1 INJECTION, POWDER, FOR SOLUTION INTRAMUSCULAR; INTRAVENOUS at 20:37

## 2025-07-19 RX ADMIN — DORZOLAMIDE HYDROCHLORIDE AND TIMOLOL MALEATE 1 DROP: 20; 5 SOLUTION/ DROPS OPHTHALMIC at 07:54

## 2025-07-19 RX ADMIN — Medication 1 TABLET: at 07:54

## 2025-07-19 ASSESSMENT — ACTIVITIES OF DAILY LIVING (ADL)
ADLS_ACUITY_SCORE: 72
ADLS_ACUITY_SCORE: 68
ADLS_ACUITY_SCORE: 72
ADLS_ACUITY_SCORE: 68
ADLS_ACUITY_SCORE: 72
ADLS_ACUITY_SCORE: 68
ADLS_ACUITY_SCORE: 68
ADLS_ACUITY_SCORE: 72
ADLS_ACUITY_SCORE: 72
ADLS_ACUITY_SCORE: 68
ADLS_ACUITY_SCORE: 72
ADLS_ACUITY_SCORE: 72
ADLS_ACUITY_SCORE: 68
ADLS_ACUITY_SCORE: 72

## 2025-07-19 NOTE — PLAN OF CARE
"Pt A/Ox4 but forgetful and [place]. Denies pain. Up assist 2 magali steady. Pale fragile skin with scattered bruises and multple wounds. Tolerating cardiac diet, WOC following. Contact iso maintained for MRSA. Incont bladder. Lung sounds dim. Sclera yellow. HR irregular a fib. 3L NC. Dyspnea noted.     Shift events: resumed diuretics, started on O2, abd US, hep testing. UC + e coli. Wound care completed for leg and backside    Treatment Plan: IV abx, monitor UC, Encourage mobility, Skin cares, Telemetry, Contact isolation, Cardiac diet, daily weights, WOC    Bedside Nurse: jared brush RN    Goal Outcome Evaluation:      Problem: Adult Inpatient Plan of Care  Goal: Plan of Care Review  Description: The Plan of Care Review/Shift note should be completed every shift.  The Outcome Evaluation is a brief statement about your assessment that the patient is improving, declining, or no change.  This information will be displayed automatically on your shift  note.  Outcome: Progressing  Flowsheets (Taken 7/19/2025 1801)  Outcome Evaluation: confusion improving. increasing mobilization, denies pain. shortness of breath lessened  Plan of Care Reviewed With:   patient   child   grandchild(nikki)  Overall Patient Progress: improving  Goal: Patient-Specific Goal (Individualized)  Description: You can add care plan individualizations to a care plan. Examples of Individualization might be:  \"Parent requests to be called daily at 9am for status\", \"I have a hard time hearing out of my right ear\", or \"Do not touch me to wake me up as it startles  me\".  Outcome: Progressing  Goal: Absence of Hospital-Acquired Illness or Injury  Outcome: Progressing  Intervention: Identify and Manage Fall Risk  Recent Flowsheet Documentation  Taken 7/19/2025 1100 by Jared Brush, RN  Safety Promotion/Fall Prevention:   activity supervised   clutter free environment maintained   nonskid shoes/slippers when out of bed   room near nurse's station   " room organization consistent   safety round/check completed  Intervention: Prevent and Manage VTE (Venous Thromboembolism) Risk  Recent Flowsheet Documentation  Taken 7/19/2025 1100 by Jared Brush RN  VTE Prevention/Management: SCDs off (sequential compression devices)  Intervention: Prevent Infection  Recent Flowsheet Documentation  Taken 7/19/2025 1100 by Jared Brush RN  Infection Prevention: hand hygiene promoted  Goal: Optimal Comfort and Wellbeing  Outcome: Progressing  Intervention: Monitor Pain and Promote Comfort  Recent Flowsheet Documentation  Taken 7/19/2025 1100 by Jared Brush RN  Pain Management Interventions:   care clustered   declines  Intervention: Provide Person-Centered Care  Recent Flowsheet Documentation  Taken 7/19/2025 1100 by Jared Brush RN  Trust Relationship/Rapport:   care explained   choices provided   emotional support provided   empathic listening provided  Goal: Readiness for Transition of Care  Outcome: Progressing     Problem: Delirium  Goal: Optimal Coping  Outcome: Progressing  Goal: Improved Behavioral Control  Outcome: Progressing  Intervention: Minimize Safety Risk  Recent Flowsheet Documentation  Taken 7/19/2025 1100 by Jared Brush RN  Enhanced Safety Measures: room near unit station  Trust Relationship/Rapport:   care explained   choices provided   emotional support provided   empathic listening provided  Goal: Improved Attention and Thought Clarity  Outcome: Progressing  Goal: Improved Sleep  Outcome: Progressing         Plan of Care Reviewed With: patient, child, grandchild(nikki)    Overall Patient Progress: improvingOverall Patient Progress: improving    Outcome Evaluation: confusion improving. increasing mobilization, denies pain. shortness of breath lessened

## 2025-07-19 NOTE — PLAN OF CARE
"Pt A/Ox4 but forgetful and [place]. Denies pain. Repo in bed with minimal assist. Pale fragile skin with scattered bruises. Tolerating cardiac diet, WOC following. Contact iso maintained for MRSA hx. Wound/abscess noted inside right lower mouth. MD notified via sticky note this morning as well as oncoming RN. [place] overnight. Lungs clear. HR irregular- afib on tele. [place] locked.     Shift events:   Mouth sore/abscess noted. Uneventful night, slept well. No significant change in condition.     Treatment Plan:   IV abx, monitor UC, Encourage mobility, Skin cares, Telemetry, Contact isolation, Cardiac diet, daily weights, WOC                                Problem: Adult Inpatient Plan of Care  Goal: Plan of Care Review  Description: The Plan of Care Review/Shift note should be completed every shift.  The Outcome Evaluation is a brief statement about your assessment that the patient is improving, declining, or no change.  This information will be displayed automatically on your shift  note.  Outcome: Progressing  Flowsheets (Taken 7/19/2025 0803)  Plan of Care Reviewed With: patient  Overall Patient Progress: no change  Goal: Patient-Specific Goal (Individualized)  Description: You can add care plan individualizations to a care plan. Examples of Individualization might be:  \"Parent requests to be called daily at 9am for status\", \"I have a hard time hearing out of my right ear\", or \"Do not touch me to wake me up as it startles  me\".  Outcome: Progressing  Goal: Absence of Hospital-Acquired Illness or Injury  Outcome: Progressing  Intervention: Identify and Manage Fall Risk  Recent Flowsheet Documentation  Taken 7/18/2025 2100 by Rylee Andrade, RN  Safety Promotion/Fall Prevention: safety round/check completed  Intervention: Prevent Skin Injury  Recent Flowsheet Documentation  Taken 7/18/2025 2100 by Rylee Andrade, RN  Body Position: position changed independently  Goal: Optimal Comfort and Wellbeing  Outcome: " Progressing  Intervention: Provide Person-Centered Care  Recent Flowsheet Documentation  Taken 7/18/2025 2100 by Rylee Andrade, RN  Trust Relationship/Rapport:   questions encouraged   questions answered  Goal: Readiness for Transition of Care  Outcome: Progressing   Goal Outcome Evaluation:      Plan of Care Reviewed With: patient    Overall Patient Progress: no changeOverall Patient Progress: no change

## 2025-07-19 NOTE — PROGRESS NOTES
[facility]    Hospitalist Progress Note  Provider : Candis Garg MD, MD  Date of Service (when I saw the patient): 07/19/2025    Assessment & Plan   Riddhi Aguilar is a 89 year old female admitted on 7/17/2025 who presents for 3 to 4 days of increasing weakness and intermittent confusion.     Confusion/hallucinations  Probable urinary tract infection -as her primary reason for coming to the hospital was ongoing confusion and hallucinations it seems the most likely explanation would be bladder infection.  Her UA is grossly abnormal. She has no history of MR DO and on her last urine culture grew pansensitive Klebsiella. While her heart rate is elevated this is likely secondary to her underlying A-fib/flutter   -Continue ceftriaxone therapy  -Blood cultures and urine culture negative to date.  -Delirium precautions ordered     A-fib with RVR -likely secondary to physiologic response of underlying infection.  Patient has been continuing her metoprolol 75 mg twice daily.  Mild troponin elevation more consistent with supply/demand ischemia.  -Continue metoprolol therapy  -Cardiac monitoring     Mild to moderate aortic stenosis -noted on recent echo April 2025.  Do not think that it is contributing to her current symptom complex.     Elevated proBNP  Elevated creatinine -patient states that at no point over the last few days which she experiencing shortness of breath.  She does not use oxygen at baseline.  Her daughter does note that she is worried that she had worse p.o. intake over the last few days.  Unclear if this represents heart failure exacerbation or not.  She did receive Lasix and had 400 cc of urine output and her lungs are now remarkably clear.  Her elevated BNP could just be indicative of worsening renal function in the setting of poor p.o. intake and prerenal azotemia.  She does not meet criteria for MICHELE.  - Creatinine at baseline.  Will continue Lasix and spironolactone    "  Elevated transaminase levels  Elevated bilirubin -albumin 3.8,Platelets 335 decayed and normal liver synthetic function.  CT notable for hepatic steatosis and/or cirrhosis.  No clear cause of insult to liver however could be an indicative of a congestive hepatopathy in the setting of volume overload.  - LFTs significantly elevated from yesterday.  , , bilirubin total 3.0.    - will check hepatitis panel for completeness.    - ultrasound of right upper quadrant showed cholelithiasis without other sonographic evidence of cholecystitis.  No biliary dilatation.  CBD measures 2.0 mm.  Normal liver echogenicity.     Erythema of left shin -it is mildly tender to palpation, no increase in temperature or blanching.  Seems most consistent with a bruise for a patient with fragile skin on DOAC therapy.  - continue to monitor clinically     Hyperlipidemia   - continue atorvastatin     GERD -continue Pepcid  Diet:  Cardiac diet  DVT Prophylaxis: DOAC  Martines Catheter: Not present  Lines: None     Cardiac Monitoring: None  Code Status:  DNR/DNI     # Hyponatremia: Lowest Na = 132 mmol/L in last 2 days, will monitor as appropriate  # Hypochloremia: Lowest Cl = 94 mmol/L in last 2 days, will monitor as appropriate       # Drug Induced Coagulation Defect: home medication list includes an anticoagulant medication    # Hypertension: Noted on problem list  # Acute heart failure with preserved ejection fraction: heart failure noted on problem list, last echo with EF >50%, and receiving IV diuretics     # Obesity: Estimated body mass index is 35.14 kg/m  as calculated from the following:    Height as of 7/14/25: 1.499 m (4' 11\").    Weight as of 7/14/25: 78.9 kg (174 lb).       # Financial/Environmental Concerns:        DVT Prophylaxis: Pneumatic Compression Devices    Code Status: No CPR- Do NOT Intubate    Medically Ready for Discharge: Tomorrow if continues to improve.     Candis Garg MD    Interval History "   Patient seen and examined today.  Chart reviewed.  Patient states that she has some dyspnea on exertion.  Denies cough or fever.  Confusion improving.  Denies chest pain.  No nausea or vomiting    -Data reviewed today: I reviewed all new labs and imaging results over the last 24 hours. I personally reviewed     Physical Exam   Temp: 97.5  F (36.4  C) Temp src: Axillary BP: 127/75 Pulse: 81   Resp: 16 SpO2: 96 % O2 Device: None (Room air)    There were no vitals filed for this visit.  Vital Signs with Ranges  Temp:  [97.5  F (36.4  C)-98.9  F (37.2  C)] 97.5  F (36.4  C)  Pulse:  [] 81  Resp:  [16-20] 16  BP: (104-141)/(75-86) 127/75  SpO2:  [95 %-96 %] 96 %  I/O last 3 completed shifts:  In: -   Out: 550 [Urine:550]    GEN:  Alert, oriented x 3, appears comfortable, NAD.  HEENT:  Normocephalic/atraumatic, no scleral icterus, no nasal discharge, mouth moist.  CV:  Regular rate and rhythm, no murmur or JVD.  S1 + S2 noted, no S3 or S4.  LUNGS:  Clear to auscultation bilaterally without rales/rhonchi/wheezing/retractions.  Symmetric chest rise on inhalation noted.  ABD:  Active bowel sounds, soft, non-tender/non-distended.  No rebound/guarding/rigidity.  EXT:  No edema or cyanosis.  Hands/feet warm to touch with good signs of peripheral perfusion.  No joint synovitis noted.  SKIN:  Dry to touch, no exanthems noted in the visualized areas.  NEURO:  Symmetric muscle strength, sensation to touch grossly intact.  No new focal deficits appreciated.    Medications   Current Facility-Administered Medications   Medication Dose Route Frequency Provider Last Rate Last Admin    Patient is already receiving anticoagulation with heparin, enoxaparin (LOVENOX), warfarin (COUMADIN)  or other anticoagulant medication   Does not apply Continuous PRN Julius Cruz MD         Current Facility-Administered Medications   Medication Dose Route Frequency Provider Last Rate Last Admin    apixaban ANTICOAGULANT (ELIQUIS) tablet 5  mg  5 mg Oral BID Julius Cruz MD   5 mg at 07/19/25 0754    buPROPion (WELLBUTRIN XL) 24 hr tablet 150 mg  150 mg Oral Julius Joel MD   150 mg at 07/19/25 0754    cefTRIAXone (ROCEPHIN) 1 g vial to attach to  mL bag for ADULTS or NS 50 mL bag for PEDS  1 g Intravenous Q24H Julius Cruz MD   1 g at 07/18/25 2019    dorzolamide-timolol (COSOPT) ophthalmic solution 1 drop  1 drop Both Eyes BID Julius Cruz MD   1 drop at 07/19/25 0754    famotidine (PEPCID) tablet 20 mg  20 mg Oral Julius Joel MD   20 mg at 07/19/25 0754    furosemide (LASIX) tablet 20 mg  20 mg Oral Candis Maya MD   20 mg at 07/19/25 0754    metoprolol tartrate (LOPRESSOR) tablet 75 mg  75 mg Oral BID Julius Cruz MD   75 mg at 07/19/25 0754    multivitamin w/minerals (THERA-VIT-M) tablet 1 tablet  1 tablet Oral Julius Joel MD   1 tablet at 07/19/25 0754    senna-docusate (SENOKOT-S/PERICOLACE) 8.6-50 MG per tablet 1 tablet  1 tablet Oral Daily Julius Cruz MD   1 tablet at 07/19/25 0754    sodium chloride (PF) 0.9% PF flush 3 mL  3 mL Intracatheter Q8H ROBINSON Julius Cruz MD   3 mL at 07/19/25 0753    spironolactone (ALDACTONE) tablet 25 mg  25 mg Oral Daily Candis Garg MD   25 mg at 07/19/25 1119       Data   Recent Labs   Lab 07/19/25  0745 07/18/25  0800 07/18/25  0338 07/17/25  2114 07/17/25  1718   WBC 8.9 12.1*  --   --  9.2   HGB 14.3 12.3  --   --  12.8   * 106*  --   --  106*    302  --   --  335   * 133*  --   --  132*   POTASSIUM 4.6 3.6  --   --  4.8   CHLORIDE 92* 94*  --   --  94*   CO2 20* 24  --   --  25   BUN 30.4* 33.8*  --   --  37.0*   CR 1.07* 1.16*  --   --  1.19*   ANIONGAP 18* 15  --   --  13   TAMMY 9.2 9.3  --   --  9.3   GLC 95 90 103*  --  107*   ALBUMIN 3.5 3.8  --   --  3.8   PROTTOTAL 5.9* 5.7*  --   --  6.2*   BILITOTAL 3.0* 2.4*  --   --  2.7*   ALKPHOS 73 62  --   --  76   * 103*  --   --   128*   * 95*  --    < >  --    LIPASE  --   --   --   --  16    < > = values in this interval not displayed.       Recent Results (from the past 24 hours)   US Abdomen Limited    Narrative    EXAM: US ABDOMEN LIMITED  LOCATION: [facility]  DATE: 7/19/2025    INDICATION: [id number]  COMPARISON: CT performed 7/17/2025  TECHNIQUE: Limited abdominal ultrasound.    FINDINGS:    GALLBLADDER: Multiple stones. No pericholecystic fluid. Negative sonographic Corbin's sign. Borderline gallbladder wall thickening.    BILE DUCTS: No biliary dilatation. The common duct measures 2.0 mm.    LIVER: Normal echogenicity. No focal mass. The portal vein is patent with flow in the normal direction.    RIGHT KIDNEY: No hydronephrosis.    PANCREAS: The visualized portions are normal.    No ascites.      Impression    IMPRESSION:  1.  Cholelithiasis without other sonographic evidence of cholecystitis.

## 2025-07-20 ENCOUNTER — APPOINTMENT (OUTPATIENT)
Dept: PHYSICAL THERAPY | Facility: CLINIC | Age: OVER 89
End: 2025-07-20
Attending: INTERNAL MEDICINE
Payer: COMMERCIAL

## 2025-07-20 LAB
ALBUMIN SERPL BCG-MCNC: 3.4 G/DL (ref 3.5–5.2)
ALP SERPL-CCNC: 76 U/L (ref 40–150)
ALT SERPL W P-5'-P-CCNC: 248 U/L (ref 0–50)
ANION GAP SERPL CALCULATED.3IONS-SCNC: 14 MMOL/L (ref 7–15)
AST SERPL W P-5'-P-CCNC: 290 U/L (ref 0–45)
BACTERIA UR CULT: ABNORMAL
BACTERIA UR CULT: ABNORMAL
BILIRUB SERPL-MCNC: 2.1 MG/DL
BUN SERPL-MCNC: 33.9 MG/DL (ref 8–23)
CALCIUM SERPL-MCNC: 9.1 MG/DL (ref 8.8–10.4)
CHLORIDE SERPL-SCNC: 95 MMOL/L (ref 98–107)
CREAT SERPL-MCNC: 1.1 MG/DL (ref 0.51–0.95)
EGFRCR SERPLBLD CKD-EPI 2021: 46 ML/MIN/1.73M2
ERYTHROCYTE [DISTWIDTH] IN BLOOD BY AUTOMATED COUNT: 26.1 % (ref 10–15)
GLUCOSE SERPL-MCNC: 148 MG/DL (ref 70–99)
HCO3 SERPL-SCNC: 25 MMOL/L (ref 22–29)
HCT VFR BLD AUTO: 42.9 % (ref 35–47)
HGB BLD-MCNC: 14.1 G/DL (ref 11.7–15.7)
MCH RBC QN AUTO: 34.5 PG (ref 26.5–33)
MCHC RBC AUTO-ENTMCNC: 32.9 G/DL (ref 31.5–36.5)
MCV RBC AUTO: 105 FL (ref 78–100)
PLATELET # BLD AUTO: 291 10E3/UL (ref 150–450)
POTASSIUM SERPL-SCNC: 4.3 MMOL/L (ref 3.4–5.3)
PROT SERPL-MCNC: 5.7 G/DL (ref 6.4–8.3)
RBC # BLD AUTO: 4.09 10E6/UL (ref 3.8–5.2)
SODIUM SERPL-SCNC: 134 MMOL/L (ref 135–145)
WBC # BLD AUTO: 11.4 10E3/UL (ref 4–11)

## 2025-07-20 PROCEDURE — 36415 COLL VENOUS BLD VENIPUNCTURE: CPT | Performed by: INTERNAL MEDICINE

## 2025-07-20 PROCEDURE — 82310 ASSAY OF CALCIUM: CPT | Performed by: INTERNAL MEDICINE

## 2025-07-20 PROCEDURE — 99232 SBSQ HOSP IP/OBS MODERATE 35: CPT | Performed by: INTERNAL MEDICINE

## 2025-07-20 PROCEDURE — 82947 ASSAY GLUCOSE BLOOD QUANT: CPT | Performed by: INTERNAL MEDICINE

## 2025-07-20 PROCEDURE — 85018 HEMOGLOBIN: CPT | Performed by: INTERNAL MEDICINE

## 2025-07-20 PROCEDURE — 250N000011 HC RX IP 250 OP 636: Performed by: INTERNAL MEDICINE

## 2025-07-20 PROCEDURE — 97530 THERAPEUTIC ACTIVITIES: CPT | Mod: GP

## 2025-07-20 PROCEDURE — 250N000013 HC RX MED GY IP 250 OP 250 PS 637: Performed by: HOSPITALIST

## 2025-07-20 PROCEDURE — 97161 PT EVAL LOW COMPLEX 20 MIN: CPT | Mod: GP

## 2025-07-20 PROCEDURE — 250N000013 HC RX MED GY IP 250 OP 250 PS 637: Performed by: INTERNAL MEDICINE

## 2025-07-20 PROCEDURE — 120N000001 HC R&B MED SURG/OB

## 2025-07-20 RX ORDER — NALOXONE HYDROCHLORIDE 0.4 MG/ML
0.4 INJECTION, SOLUTION INTRAMUSCULAR; INTRAVENOUS; SUBCUTANEOUS
Status: ACTIVE | OUTPATIENT
Start: 2025-07-20

## 2025-07-20 RX ORDER — NALOXONE HYDROCHLORIDE 0.4 MG/ML
0.2 INJECTION, SOLUTION INTRAMUSCULAR; INTRAVENOUS; SUBCUTANEOUS
Status: ACTIVE | OUTPATIENT
Start: 2025-07-20

## 2025-07-20 RX ORDER — FUROSEMIDE 10 MG/ML
20 INJECTION INTRAMUSCULAR; INTRAVENOUS ONCE
Status: COMPLETED | OUTPATIENT
Start: 2025-07-20 | End: 2025-07-20

## 2025-07-20 RX ORDER — ERTAPENEM 1 G/1
1 INJECTION, POWDER, LYOPHILIZED, FOR SOLUTION INTRAMUSCULAR; INTRAVENOUS EVERY 24 HOURS
Status: DISCONTINUED | OUTPATIENT
Start: 2025-07-20 | End: 2025-07-24

## 2025-07-20 RX ADMIN — FUROSEMIDE 20 MG: 10 INJECTION, SOLUTION INTRAMUSCULAR; INTRAVENOUS at 08:47

## 2025-07-20 RX ADMIN — BUPROPION HYDROCHLORIDE 150 MG: 150 TABLET, EXTENDED RELEASE ORAL at 08:47

## 2025-07-20 RX ADMIN — APIXABAN 5 MG: 5 TABLET, FILM COATED ORAL at 19:58

## 2025-07-20 RX ADMIN — FAMOTIDINE 20 MG: 20 TABLET, FILM COATED ORAL at 08:47

## 2025-07-20 RX ADMIN — SENNOSIDES AND DOCUSATE SODIUM 1 TABLET: 50; 8.6 TABLET ORAL at 08:48

## 2025-07-20 RX ADMIN — APIXABAN 5 MG: 5 TABLET, FILM COATED ORAL at 08:47

## 2025-07-20 RX ADMIN — Medication 1 TABLET: at 08:47

## 2025-07-20 RX ADMIN — ERTAPENEM SODIUM 1 G: 1 INJECTION, POWDER, LYOPHILIZED, FOR SOLUTION INTRAMUSCULAR; INTRAVENOUS at 06:32

## 2025-07-20 RX ADMIN — SPIRONOLACTONE 25 MG: 25 TABLET ORAL at 08:48

## 2025-07-20 RX ADMIN — ACETAMINOPHEN 650 MG: 325 TABLET ORAL at 21:13

## 2025-07-20 RX ADMIN — METOPROLOL TARTRATE 75 MG: 50 TABLET, FILM COATED ORAL at 08:48

## 2025-07-20 RX ADMIN — METOPROLOL TARTRATE 75 MG: 50 TABLET, FILM COATED ORAL at 19:58

## 2025-07-20 RX ADMIN — DORZOLAMIDE HYDROCHLORIDE AND TIMOLOL MALEATE 1 DROP: 20; 5 SOLUTION/ DROPS OPHTHALMIC at 20:00

## 2025-07-20 RX ADMIN — DORZOLAMIDE HYDROCHLORIDE AND TIMOLOL MALEATE 1 DROP: 20; 5 SOLUTION/ DROPS OPHTHALMIC at 08:47

## 2025-07-20 ASSESSMENT — ACTIVITIES OF DAILY LIVING (ADL)
ADLS_ACUITY_SCORE: 73
ADLS_ACUITY_SCORE: 67
ADLS_ACUITY_SCORE: 69
ADLS_ACUITY_SCORE: 67
ADLS_ACUITY_SCORE: 69
ADLS_ACUITY_SCORE: 73
ADLS_ACUITY_SCORE: 68
ADLS_ACUITY_SCORE: 73
ADLS_ACUITY_SCORE: 69
ADLS_ACUITY_SCORE: 68
ADLS_ACUITY_SCORE: 69
ADLS_ACUITY_SCORE: 68
ADLS_ACUITY_SCORE: 73
ADLS_ACUITY_SCORE: 73
ADLS_ACUITY_SCORE: 69
ADLS_ACUITY_SCORE: 68
ADLS_ACUITY_SCORE: 69
ADLS_ACUITY_SCORE: 69
ADLS_ACUITY_SCORE: 73

## 2025-07-20 NOTE — PROGRESS NOTES
"   07/20/25 1440   Appointment Info   Signing Clinician's Name / Credentials (PT) Sonia Urbina, PT, DPT   Living Environment   People in Home facility resident   Current Living Arrangements assisted living   Home Accessibility no concerns   Transportation Anticipated family or friend will provide   Self-Care   Usual Activity Tolerance moderate   Current Activity Tolerance poor   Regular Exercise No   Equipment Currently Used at Home grab bar, toilet;grab bar, tub/shower;shower chair;walker, rolling;other (see comments)  (4WW)   Fall history within last six months yes   Number of times patient has fallen within last six months 4   Activity/Exercise/Self-Care Comment Patient reports \"it depends on the day\" with how much assist she needs for mobility, but atleast Ax1. Facility staff assists with all ADLs.   General Information   Onset of Illness/Injury or Date of Surgery 07/17/25   Referring Physician Candis Garg MD   Patient/Family Therapy Goals Statement (PT) to get stronger   Pertinent History of Current Problem (include personal factors and/or comorbidities that impact the POC) Patient is 89 YO F who was admitted on 7/17/25 with increasing weakness and intermittent confusion, probably UTI and a-fib with RVR. PMH includes A-fib on Eliquis, CHFpEF, osteoporosis, osteoarthritis, kidney stones, dyslipidemia, glaucoma, spinal stenosis, duodenal ulcer, nephrolithiasis, breast cancer, left femoral neck and left radial head fracture following mechanical fall, chronic kidney stage III, lymphedema, chronic anemia, glaucoma.   Existing Precautions/Restrictions fall;oxygen therapy device and L/min  (1L/min O2)   Cognition   Affect/Mental Status (Cognition) WFL   Orientation Status (Cognition) oriented x 4   Follows Commands (Cognition) WFL   Pain Assessment   Patient Currently in Pain Yes, see Vital Sign flowsheet  (headache)   Integumentary/Edema   Integumentary/Edema Comments Bandage on L lower shin; " scattered bruises on extremities   Posture    Posture Forward head position;Protracted shoulders;Kyphosis   Range of Motion (ROM)   Range of Motion ROM is WFL   Strength (Manual Muscle Testing)   Strength (Manual Muscle Testing) Deficits observed during functional mobility   Strength Comments Functional weakness noted during transfers   Transfers   Comment, (Transfers) Sit to stand from chair with 4WW and Min A   Gait/Stairs (Locomotion)   [place] Level (Gait) minimum assist (75% patient effort)   Assistive Device (Gait) walker, 4-wheeled   Distance in Feet (Gait) 2   Comment, (Gait/Stairs) Patient too small, shuffling steps, fatigued very quickly.   Balance   Balance Comments Min A and 1 UE support of 4WW for standing balance when attempting to perform hygiene   Sensory Examination   Sensory Perception patient reports no sensory changes   Coordination   Coordination no deficits were identified   Clinical Impression   Criteria for Skilled Therapeutic Intervention Yes, treatment indicated   PT Diagnosis (PT) Impaired mobility   Influenced by the following impairments Weakness, decreased activity tolerance, impaired balance   Functional limitations due to impairments Increased difficulty with functional transfers and ambulation   Clinical Presentation (PT Evaluation Complexity) stable   Clinical Presentation Rationale Medical status, clinical judgement   Clinical Decision Making (Complexity) low complexity   Planned Therapy Interventions (PT) balance training;bed mobility training;gait training;patient/family education;strengthening;transfer training;progressive activity/exercise;home exercise program   Risk & Benefits of therapy have been explained evaluation/treatment results reviewed;care plan/treatment goals reviewed;risks/benefits reviewed;current/potential barriers reviewed;participants voiced agreement with care plan;participants included;patient   PT Total Evaluation Time   PT Eval, Low Complexity Minutes  (05780) 8   Physical Therapy Goals   PT Frequency 4x/week   PT Predicted Duration/Target Date for Goal Attainment 08/08/25   PT Goals Bed Mobility;Transfers;Gait   PT: Bed Mobility Supervision/stand-by assist;Supine to/from sit   PT: Transfers Supervision/stand-by assist;Sit to/from stand;Bed to/from chair;Assistive device  (4WW)   PT: Gait Supervision/stand-by assist;Rolling walker;25 feet  (4WW)   Interventions   Interventions Quick Adds Therapeutic Activity   Therapeutic Activity   Therapeutic Activities: dynamic activities to improve functional performance Minutes (65032) 24   Symptoms Noted During/After Treatment Fatigue;Shortness of breath   Treatment Detail/Skilled Intervention Following evaluation, extended time needed for set up for transfers. Verbal cues for hand placements. Patient checking locks of 4WW multiple times. Sit to stand with Min Ax1. Rehab aide present for safety and management of equipment. Patient took small, shuffling steps with 4WW to BSC with Min A for balance. Stand to sit with CGA. Sit <> stand from BSC x3 reps with CGA and cues for hand placements. Patient attempted self-hygiene, but needed seated rest breaks and could not fully complete, assist provided. After final seated rest, sit to stand from BSC with 4WW and CGA. Small steps back to recliner chair with CGA and cues for direction. Patient fatigued quickly with exertion, SpO2 droppined to 86% at lowest but improved to 93% with pursed lip breathing and rest. Patient in recliner with B LE elevated and alarm activated at end of session.   PT Discharge Planning   PT Plan Repeated transfers, progress amb as able   PT Discharge Recommendation (DC Rec) Transitional Care Facility;home with assist;home with home care physical therapy   PT Rationale for DC Rec Patient is from an [facility] but has had multiple recent TCU stays. Per chart, her [facility] is able to manage Ax1. Patient currently requiring Ax1 with transfers but is unable to tolerate  ambulation more than a few steps due to shortness of breath and fatigue. Anticipate patient would benefit from TCU to progress overall strength and endurance prior to returning home. If she were to return directly to her [facility], then Ax1 for transfers and would need w/c for mobility.   PT Brief overview of current status Ax1 transfers with 4WW, unable to tolerate ambulation   PT Total Distance Amb During Session (feet) 4   Physical Therapy Time and Intention   Timed Code Treatment Minutes 24   Total Session Time (sum of timed and untimed services) 32

## 2025-07-20 NOTE — PROGRESS NOTES
[facility]    Hospitalist Progress Note  Provider : Candis Garg MD, MD  Date of Service (when I saw the patient): 07/20/2025    Assessment & Plan   Riddhi Aguilar is a 89 year old female admitted on 7/17/2025 who presents for 3 to 4 days of increasing weakness and intermittent confusion.     Confusion/hallucinations  Probable urinary tract infection -as her primary reason for coming to the hospital was ongoing confusion and hallucinations it seems the most likely explanation would be bladder infection.  Her UA is grossly abnormal. She has no history of MR DO and on her last urine culture grew pansensitive Klebsiella. While her heart rate is elevated this is likely secondary to her underlying A-fib/flutter   -She was initially give IV Ceftriaxone. Urine culture growing ESBL. E.coli. Antibiotic changed to IV ertapenem last night  -Will continue IV ertapenem  -Mental status improving     A-fib with RVR -likely secondary to physiologic response of underlying infection.  Patient has been continuing her metoprolol 75 mg twice daily.  Mild troponin elevation more consistent with supply/demand ischemia.  -Heart rate 70s to 110s  -Continue metoprolol therapy  -Cardiac monitoring     Mild to moderate aortic stenosis -noted on recent echo April 2025.  Do not think that it is contributing to her current symptom complex.     Elevated proBNP  Elevated creatinine -patient states that at no point over the last few days which she experiencing shortness of breath.  She does not use oxygen at baseline.  Her daughter does note that she is worried that she had worse p.o. intake over the last few days.  Unclear if this represents heart failure exacerbation or not.  She did receive Lasix and had 400 cc of urine output and her lungs are now remarkably clear.  Her elevated BNP could just be indicative of worsening renal function in the setting of poor p.o. intake and prerenal azotemia.  She does not meet  "criteria for MICHELE.  -Creatinine at baseline.    -Will continue Lasix and spironolactone  -Will recheck BMP in a.m.     Elevated transaminase levels  Elevated bilirubin -albumin 3.8,Platelets 335 decayed and normal liver synthetic function.  CT notable for hepatic steatosis and/or cirrhosis.  No clear cause of insult to liver however could be an indicative of a congestive hepatopathy in the setting of volume overload.  -LFTs significantly elevated from yesterday.  , , bilirubin total 3.0.    -Viral hepatitis panel nonreactive.    -Ultrasound of right upper quadrant showed cholelithiasis without other sonographic evidence of cholecystitis.  No biliary dilatation.  CBD measures 2.0 mm.  Normal liver echogenicity.     Erythema of left shin -it is mildly tender to palpation, no increase in temperature or blanching.  Seems most consistent with a bruise for a patient with fragile skin on DOAC therapy.  - continue to monitor clinically     Hyperlipidemia   - continue atorvastatin     GERD -continue Pepcid  Diet:  Cardiac diet  DVT Prophylaxis: DOAC  Martines Catheter: Not present  Lines: None     Cardiac Monitoring: None  Code Status:  DNR/DNI     # Hyponatremia: Lowest Na = 132 mmol/L in last 2 days, will monitor as appropriate  # Hypochloremia: Lowest Cl = 94 mmol/L in last 2 days, will monitor as appropriate       # Drug Induced Coagulation Defect: home medication list includes an anticoagulant medication    # Hypertension: Noted on problem list  # Acute heart failure with preserved ejection fraction: heart failure noted on problem list, last echo with EF >50%, and receiving IV diuretics     # Obesity: Estimated body mass index is 35.14 kg/m  as calculated from the following:    Height as of 7/14/25: 1.499 m (4' 11\").    Weight as of 7/14/25: 78.9 kg (174 lb).       # Financial/Environmental Concerns:        DVT Prophylaxis: Pneumatic Compression Devices    Code Status: No CPR- Do NOT Intubate    Medically " Ready for Discharge: Tomorrow if continues to improve.     Candis Garg MD    Interval History   Patient seen and examined today.  Chart reviewed.  Patient states that she has some dyspnea on exertion.  Denies cough or fever.  Confusion improving.  Denies chest pain.  No nausea or vomiting    -Data reviewed today: I reviewed all new labs and imaging results over the last 24 hours. I personally reviewed     Physical Exam   Temp: 97  F (36.1  C) Temp src: Axillary BP: 134/86 Pulse: 100   Resp: 16 SpO2: 97 % O2 Device: Nasal cannula Oxygen Delivery: 1 LPM  There were no vitals filed for this visit.  Vital Signs with Ranges  Temp:  [97  F (36.1  C)-97.8  F (36.6  C)] 97  F (36.1  C)  Pulse:  [] 100  Resp:  [16-18] 16  BP: (116-142)/(68-91) 134/86  SpO2:  [93 %-98 %] 97 %  I/O last 3 completed shifts:  In: -   Out: 220 [Urine:220]    GEN:  Alert, oriented x 3, appears comfortable, NAD.  HEENT:  Normocephalic/atraumatic, no scleral icterus, no nasal discharge, mouth moist.  CV:  Regular rate and rhythm, no murmur or JVD.  S1 + S2 noted, no S3 or S4.  LUNGS:  Clear to auscultation bilaterally without rales/rhonchi/wheezing/retractions.  Symmetric chest rise on inhalation noted.  ABD:  Active bowel sounds, soft, non-tender/non-distended.  No rebound/guarding/rigidity.  EXT:  No edema or cyanosis.  Hands/feet warm to touch with good signs of peripheral perfusion.  No joint synovitis noted.  SKIN:  Dry to touch, no exanthems noted in the visualized areas.  NEURO:  Symmetric muscle strength, sensation to touch grossly intact.  No new focal deficits appreciated.    Medications   Current Facility-Administered Medications   Medication Dose Route Frequency Provider Last Rate Last Admin    Patient is already receiving anticoagulation with heparin, enoxaparin (LOVENOX), warfarin (COUMADIN)  or other anticoagulant medication   Does not apply Continuous PRN Julius Cruz MD         Current Facility-Administered  Medications   Medication Dose Route Frequency Provider Last Rate Last Admin    apixaban ANTICOAGULANT (ELIQUIS) tablet 5 mg  5 mg Oral BID Julius Cruz MD   5 mg at 07/20/25 0847    buPROPion (WELLBUTRIN XL) 24 hr tablet 150 mg  150 mg Oral Julius Joel MD   150 mg at 07/20/25 0847    dorzolamide-timolol (COSOPT) ophthalmic solution 1 drop  1 drop Both Eyes BID Julius Cruz MD   1 drop at 07/20/25 0847    ertapenem (INVanz) 1 g vial to attach to  mL bag  1 g Intravenous Q24H Faustino Cotton MD   1 g at 07/20/25 0632    famotidine (PEPCID) tablet 20 mg  20 mg Oral Julius Joel MD   20 mg at 07/20/25 0847    [Held by provider] furosemide (LASIX) tablet 20 mg  20 mg Oral Candis Maya MD   20 mg at 07/19/25 0754    metoprolol tartrate (LOPRESSOR) tablet 75 mg  75 mg Oral BID Julius Cruz MD   75 mg at 07/20/25 0848    multivitamin w/minerals (THERA-VIT-M) tablet 1 tablet  1 tablet Oral Julius Joel MD   1 tablet at 07/20/25 0847    senna-docusate (SENOKOT-S/PERICOLACE) 8.6-50 MG per tablet 1 tablet  1 tablet Oral Daily Julius Cruz MD   1 tablet at 07/20/25 0848    sodium chloride (PF) 0.9% PF flush 3 mL  3 mL Intracatheter Q8H ROBINSON Julius Cruz MD   3 mL at 07/20/25 0847    spironolactone (ALDACTONE) tablet 25 mg  25 mg Oral Daily Candis Garg MD   25 mg at 07/20/25 0848       Data   Recent Labs   Lab 07/20/25  0908 07/19/25  0745 07/18/25  0800 07/17/25  2114 07/17/25  1718   WBC 11.4* 8.9 12.1*  --  9.2   HGB 14.1 14.3 12.3  --  12.8   * 107* 106*  --  106*    249 302  --  335   * 130* 133*  --  132*   POTASSIUM 4.3 4.6 3.6  --  4.8   CHLORIDE 95* 92* 94*  --  94*   CO2 25 20* 24  --  25   BUN 33.9* 30.4* 33.8*  --  37.0*   CR 1.10* 1.07* 1.16*  --  1.19*   ANIONGAP 14 18* 15  --  13   TAMMY 9.1 9.2 9.3  --  9.3   * 95 90   < > 107*   ALBUMIN 3.4* 3.5 3.8  --  3.8   PROTTOTAL 5.7* 5.9* 5.7*  --   6.2*   BILITOTAL 2.1* 3.0* 2.4*  --  2.7*   ALKPHOS 76 73 62  --  76   * 171* 103*  --  128*   * 259* 95*   < >  --    LIPASE  --   --   --   --  16    < > = values in this interval not displayed.       No results found for this or any previous visit (from the past 24 hours).

## 2025-07-20 NOTE — PROGRESS NOTES
Urine culture positive for 2 species ESBL E. coli.  Currently on IV ceftriaxone for probable UTI.  -Discontinue ceftriaxone  -Start IV ertapenem 1 g every 24 hours

## 2025-07-20 NOTE — PLAN OF CARE
"Goal Outcome Evaluation:      Plan of Care Reviewed With: patient    Overall Patient Progress: improvingOverall Patient Progress: improving    Outcome Evaluation: POC in place  Pt is A & O with confusion, up A x 2 GB/walker pivot transfer, on 2 L nc, piv sl, LLE kerlix in place, Ertapenem  abx, a-fib on telemetry, family ok for TCU, SW following with discharge planning.  Problem: Adult Inpatient Plan of Care  Goal: Plan of Care Review  Description: The Plan of Care Review/Shift note should be completed every shift.  The Outcome Evaluation is a brief statement about your assessment that the patient is improving, declining, or no change.  This information will be displayed automatically on your shift  note.  Outcome: Progressing  Flowsheets (Taken 7/20/2025 0036)  Outcome Evaluation: POC in place  Plan of Care Reviewed With: patient  Overall Patient Progress: improving  Goal: Patient-Specific Goal (Individualized)  Description: You can add care plan individualizations to a care plan. Examples of Individualization might be:  \"Parent requests to be called daily at 9am for status\", \"I have a hard time hearing out of my right ear\", or \"Do not touch me to wake me up as it startles  me\".  Outcome: Progressing  Goal: Absence of Hospital-Acquired Illness or Injury  Outcome: Progressing  Intervention: Identify and Manage Fall Risk  Recent Flowsheet Documentation  Taken 7/19/2025 2245 by Johnny Fuller, RN  Safety Promotion/Fall Prevention:   activity supervised   clutter free environment maintained   nonskid shoes/slippers when out of bed   room near nurse's station   room organization consistent   safety round/check completed  Intervention: Prevent and Manage VTE (Venous Thromboembolism) Risk  Recent Flowsheet Documentation  Taken 7/19/2025 2245 by Johnny Fuller, RN  VTE Prevention/Management: SCDs off (sequential compression devices)  Intervention: Prevent Infection  Recent Flowsheet Documentation  Taken 7/19/2025 2245 by " Johnny Fuller, RN  Infection Prevention: hand hygiene promoted  Goal: Optimal Comfort and Wellbeing  Outcome: Progressing  Goal: Readiness for Transition of Care  Outcome: Progressing     Problem: Delirium  Goal: Optimal Coping  Outcome: Progressing  Goal: Improved Behavioral Control  Outcome: Progressing  Intervention: Minimize Safety Risk  Recent Flowsheet Documentation  Taken 7/19/2025 2245 by Johnny Fuller, RN  Enhanced Safety Measures: room near unit station  Goal: Improved Attention and Thought Clarity  Outcome: Progressing  Goal: Improved Sleep  Outcome: Progressing

## 2025-07-20 NOTE — PROGRESS NOTES
Care Management Follow Up    Length of Stay (days): 3    Expected Discharge Date: 07/21/2025     Concerns to be Addressed: discharge planning     Patient plan of care discussed at interdisciplinary rounds: Yes    Anticipated Discharge Disposition:     TCU           Anticipated Discharge Services:    Anticipated Discharge DME:      Patient/family educated on Medicare website which has current facility and service quality ratings:yes    Education Provided on the Discharge Plan:    Patient/Family in Agreement with the Plan: yes     Referrals Placed by CM/SW:    Private pay costs discussed: Not applicable    Discussed  Partnership in Safe Discharge Planning  document with patient/family: No     Handoff Completed: No, handoff not indicated or clinically appropriate    Additional Information:  PT recommends TCU. Patient has chosen [facility] as she lives at the [facility]. Referral sent    Next Steps: Monitor for acceptance Monday    Jessica Lee, RN, BSN, CM  Inpatient Care Coordination  [facility]  [phone]

## 2025-07-20 NOTE — PLAN OF CARE
"Pt A/Ox4 but forgetful and [place]. Denies pain. Up assist 2 pivot with walker. Pale fragile skin with scattered bruises and multple wounds. Tolerating cardiac diet, WOC following. Contact iso maintained for MRSA. Intermittently incontinent of bowel and bladder. Lung sounds dim, occasional cough. Sclera yellow. HR irregular a fib. 1L NC. Dyspnea on exertion. PIV L arm SL    Shift events: O2 weaned, 1x dose 20 mg iv lasix. Abx changed to NVANZ, bed bath. Wound care    Treatment Plan: IV abx, monitor UC, Encourage mobility, Skin cares, Telemetry, Contact isolation, Cardiac diet, daily weights, WOC    Bedside Nurse: jared brush RN    Goal Outcome Evaluation:      Problem: Adult Inpatient Plan of Care  Goal: Plan of Care Review  Description: The Plan of Care Review/Shift note should be completed every shift.  The Outcome Evaluation is a brief statement about your assessment that the patient is improving, declining, or no change.  This information will be displayed automatically on your shift  note.  Outcome: Progressing  Flowsheets (Taken 7/20/2025 1414)  Outcome Evaluation: mentation much improved, mobility improved. looking a lot better today. personal care needs met  Plan of Care Reviewed With:   patient   child   family  Overall Patient Progress: improving  Goal: Patient-Specific Goal (Individualized)  Description: You can add care plan individualizations to a care plan. Examples of Individualization might be:  \"Parent requests to be called daily at 9am for status\", \"I have a hard time hearing out of my right ear\", or \"Do not touch me to wake me up as it startles  me\".  Outcome: Progressing  Goal: Absence of Hospital-Acquired Illness or Injury  Outcome: Progressing  Intervention: Identify and Manage Fall Risk  Recent Flowsheet Documentation  Taken 7/20/2025 0900 by Jared Brush, RN  Safety Promotion/Fall Prevention:   activity supervised   safety round/check completed  Intervention: Prevent and Manage VTE " (Venous Thromboembolism) Risk  Recent Flowsheet Documentation  Taken 7/20/2025 0900 by Jared Brush RN  VTE Prevention/Management: patient refused intervention  Intervention: Prevent Infection  Recent Flowsheet Documentation  Taken 7/20/2025 0900 by Jared Brush RN  Infection Prevention: hand hygiene promoted  Goal: Optimal Comfort and Wellbeing  Outcome: Progressing  Intervention: Provide Person-Centered Care  Recent Flowsheet Documentation  Taken 7/20/2025 0900 by Jared Brush RN  Trust Relationship/Rapport:   care explained   choices provided   emotional support provided   empathic listening provided  Goal: Readiness for Transition of Care  Outcome: Progressing     Problem: Delirium  Goal: Optimal Coping  Outcome: Progressing  Goal: Improved Behavioral Control  Outcome: Progressing  Intervention: Minimize Safety Risk  Recent Flowsheet Documentation  Taken 7/20/2025 0900 by Jared Brush RN  Enhanced Safety Measures: room near unit station  Trust Relationship/Rapport:   care explained   choices provided   emotional support provided   empathic listening provided  Goal: Improved Attention and Thought Clarity  Outcome: Progressing  Goal: Improved Sleep  Outcome: Progressing         Plan of Care Reviewed With: patient, child, family    Overall Patient Progress: improvingOverall Patient Progress: improving    Outcome Evaluation: mentation much improved, mobility improved. looking a lot better today. personal care needs met

## 2025-07-21 LAB
ALBUMIN SERPL BCG-MCNC: 3 G/DL (ref 3.5–5.2)
ALP SERPL-CCNC: 71 U/L (ref 40–150)
ALT SERPL W P-5'-P-CCNC: 184 U/L (ref 0–50)
ANION GAP SERPL CALCULATED.3IONS-SCNC: 11 MMOL/L (ref 7–15)
AST SERPL W P-5'-P-CCNC: 170 U/L (ref 0–45)
BILIRUB DIRECT SERPL-MCNC: 0.92 MG/DL (ref 0–0.3)
BILIRUB SERPL-MCNC: 1.9 MG/DL
BUN SERPL-MCNC: 29 MG/DL (ref 8–23)
CALCIUM SERPL-MCNC: 8.6 MG/DL (ref 8.8–10.4)
CHLORIDE SERPL-SCNC: 95 MMOL/L (ref 98–107)
CREAT SERPL-MCNC: 0.93 MG/DL (ref 0.51–0.95)
EGFRCR SERPLBLD CKD-EPI 2021: 57 ML/MIN/1.73M2
ERYTHROCYTE [DISTWIDTH] IN BLOOD BY AUTOMATED COUNT: 25.5 % (ref 10–15)
GLUCOSE SERPL-MCNC: 79 MG/DL (ref 70–99)
HCO3 SERPL-SCNC: 25 MMOL/L (ref 22–29)
HCT VFR BLD AUTO: 40.8 % (ref 35–47)
HGB BLD-MCNC: 13.4 G/DL (ref 11.7–15.7)
MCH RBC QN AUTO: 34.7 PG (ref 26.5–33)
MCHC RBC AUTO-ENTMCNC: 32.8 G/DL (ref 31.5–36.5)
MCV RBC AUTO: 106 FL (ref 78–100)
PLATELET # BLD AUTO: 265 10E3/UL (ref 150–450)
POTASSIUM SERPL-SCNC: 3.5 MMOL/L (ref 3.4–5.3)
PROT SERPL-MCNC: 5.3 G/DL (ref 6.4–8.3)
RBC # BLD AUTO: 3.86 10E6/UL (ref 3.8–5.2)
SODIUM SERPL-SCNC: 131 MMOL/L (ref 135–145)
WBC # BLD AUTO: 8.8 10E3/UL (ref 4–11)

## 2025-07-21 PROCEDURE — 250N000013 HC RX MED GY IP 250 OP 250 PS 637: Performed by: INTERNAL MEDICINE

## 2025-07-21 PROCEDURE — 120N000001 HC R&B MED SURG/OB

## 2025-07-21 PROCEDURE — 36415 COLL VENOUS BLD VENIPUNCTURE: CPT | Performed by: INTERNAL MEDICINE

## 2025-07-21 PROCEDURE — 85027 COMPLETE CBC AUTOMATED: CPT | Performed by: INTERNAL MEDICINE

## 2025-07-21 PROCEDURE — 250N000011 HC RX IP 250 OP 636: Performed by: INTERNAL MEDICINE

## 2025-07-21 PROCEDURE — 250N000013 HC RX MED GY IP 250 OP 250 PS 637: Performed by: HOSPITALIST

## 2025-07-21 PROCEDURE — 99233 SBSQ HOSP IP/OBS HIGH 50: CPT | Performed by: INTERNAL MEDICINE

## 2025-07-21 PROCEDURE — 80048 BASIC METABOLIC PNL TOTAL CA: CPT | Performed by: INTERNAL MEDICINE

## 2025-07-21 PROCEDURE — 84155 ASSAY OF PROTEIN SERUM: CPT | Performed by: INTERNAL MEDICINE

## 2025-07-21 RX ORDER — FUROSEMIDE 10 MG/ML
20 INJECTION INTRAMUSCULAR; INTRAVENOUS ONCE
Status: COMPLETED | OUTPATIENT
Start: 2025-07-21 | End: 2025-07-21

## 2025-07-21 RX ADMIN — ERTAPENEM SODIUM 1 G: 1 INJECTION, POWDER, LYOPHILIZED, FOR SOLUTION INTRAMUSCULAR; INTRAVENOUS at 06:12

## 2025-07-21 RX ADMIN — Medication 1 TABLET: at 08:05

## 2025-07-21 RX ADMIN — SPIRONOLACTONE 25 MG: 25 TABLET ORAL at 08:05

## 2025-07-21 RX ADMIN — DORZOLAMIDE HYDROCHLORIDE AND TIMOLOL MALEATE 1 DROP: 20; 5 SOLUTION/ DROPS OPHTHALMIC at 08:05

## 2025-07-21 RX ADMIN — SENNOSIDES AND DOCUSATE SODIUM 1 TABLET: 50; 8.6 TABLET ORAL at 08:05

## 2025-07-21 RX ADMIN — DORZOLAMIDE HYDROCHLORIDE AND TIMOLOL MALEATE 1 DROP: 20; 5 SOLUTION/ DROPS OPHTHALMIC at 20:40

## 2025-07-21 RX ADMIN — FUROSEMIDE 20 MG: 10 INJECTION, SOLUTION INTRAMUSCULAR; INTRAVENOUS at 09:49

## 2025-07-21 RX ADMIN — METOPROLOL TARTRATE 75 MG: 50 TABLET, FILM COATED ORAL at 20:39

## 2025-07-21 RX ADMIN — FAMOTIDINE 20 MG: 20 TABLET, FILM COATED ORAL at 08:05

## 2025-07-21 RX ADMIN — BUPROPION HYDROCHLORIDE 150 MG: 150 TABLET, EXTENDED RELEASE ORAL at 08:05

## 2025-07-21 RX ADMIN — APIXABAN 5 MG: 5 TABLET, FILM COATED ORAL at 20:39

## 2025-07-21 RX ADMIN — METOPROLOL TARTRATE 75 MG: 50 TABLET, FILM COATED ORAL at 08:05

## 2025-07-21 RX ADMIN — ACETAMINOPHEN 650 MG: 325 TABLET ORAL at 21:41

## 2025-07-21 RX ADMIN — APIXABAN 5 MG: 5 TABLET, FILM COATED ORAL at 08:05

## 2025-07-21 ASSESSMENT — ACTIVITIES OF DAILY LIVING (ADL)
ADLS_ACUITY_SCORE: 70
ADLS_ACUITY_SCORE: 69
ADLS_ACUITY_SCORE: 70
ADLS_ACUITY_SCORE: 70
ADLS_ACUITY_SCORE: 69
ADLS_ACUITY_SCORE: 69
ADLS_ACUITY_SCORE: 70
ADLS_ACUITY_SCORE: 69
ADLS_ACUITY_SCORE: 70
ADLS_ACUITY_SCORE: 69
ADLS_ACUITY_SCORE: 70
ADLS_ACUITY_SCORE: 70
ADLS_ACUITY_SCORE: 69

## 2025-07-21 NOTE — PLAN OF CARE
"Pt pale with scattered bruising including face, redness to skin folds, coccyx, left lower leg wound- POC per WOC. INcont urine- purewick in place with sidra urine. Toleratind cardiac diet. VSS on 1L. Lungs clear on right, fine crackles noted on left side. Invanz for +UC. Iso for MRSA/ESBL. Pt on tele- afib RVR. Pt denies pain, A/Ox4 but forgetful and [place]. Bed alarm on. Room near nurses station. Slept well.     Shift events:   Uneventful night, slept well. No significant change in condition.     Treatment Plan:   IV abx, Invanx for +UC, Encourage mobility, Skin cares, bed alarm, Telemetry, Contact isolation, Cardiac diet, daily weights, WOC, PT, SW                                Problem: Adult Inpatient Plan of Care  Goal: Plan of Care Review  Description: The Plan of Care Review/Shift note should be completed every shift.  The Outcome Evaluation is a brief statement about your assessment that the patient is improving, declining, or no change.  This information will be displayed automatically on your shift  note.  Outcome: Progressing  Flowsheets (Taken 7/21/2025 0631)  Plan of Care Reviewed With: patient  Overall Patient Progress: improving  Goal: Patient-Specific Goal (Individualized)  Description: You can add care plan individualizations to a care plan. Examples of Individualization might be:  \"Parent requests to be called daily at 9am for status\", \"I have a hard time hearing out of my right ear\", or \"Do not touch me to wake me up as it startles  me\".  Outcome: Progressing  Goal: Absence of Hospital-Acquired Illness or Injury  Outcome: Progressing  Intervention: Identify and Manage Fall Risk  Recent Flowsheet Documentation  Taken 7/21/2025 0030 by Rylee Andrade, RN  Safety Promotion/Fall Prevention: safety round/check completed  Intervention: Prevent Skin Injury  Recent Flowsheet Documentation  Taken 7/21/2025 0030 by Rylee Andrade, RN  Body Position: position changed independently  Goal: Optimal Comfort " and Wellbeing  Outcome: Progressing  Intervention: Provide Person-Centered Care  Recent Flowsheet Documentation  Taken 7/21/2025 0030 by Rylee Andrade, RN  Trust Relationship/Rapport:   questions encouraged   questions answered  Goal: Readiness for Transition of Care  Outcome: Progressing   Goal Outcome Evaluation:      Plan of Care Reviewed With: patient    Overall Patient Progress: improvingOverall Patient Progress: improving

## 2025-07-21 NOTE — PLAN OF CARE
"Pt pale with scattered bruising including face, redness to skin folds, coccyx, left lower leg wound- POC per WOC. Occasional incontinence. Tolerating cardiac diet. VSS on RA, 1L with activity. Lungs diminished. Invanz for +UC. Iso for MRSA/ESBL. Pt on tele- afib. Pt denies pain, A/Ox4 but forgetful and [place].     Shift events: Bed bath, wound care. Turn and repo, 20mg iv lasix x1, refused pt. Weaned to RA for rest periods.     Treatment Plan: IV abx, Invanx for +UC, Encourage mobility, Skin cares, bed alarm, Telemetry, Contact isolation, Cardiac diet, daily weights, WOC, PT, SW    Bedside Nurse: Jared Brush RN    Goal Outcome Evaluation:      Problem: Adult Inpatient Plan of Care  Goal: Plan of Care Review  Description: The Plan of Care Review/Shift note should be completed every shift.  The Outcome Evaluation is a brief statement about your assessment that the patient is improving, declining, or no change.  This information will be displayed automatically on your shift  note.  Outcome: Progressing  Flowsheets (Taken 7/21/2025 1443)  Outcome Evaluation: mentation continues to improve.progressing mobility. WBC decreasing. TCU referrals out  Plan of Care Reviewed With:   patient   child   family  Overall Patient Progress: improving  Goal: Patient-Specific Goal (Individualized)  Description: You can add care plan individualizations to a care plan. Examples of Individualization might be:  \"Parent requests to be called daily at 9am for status\", \"I have a hard time hearing out of my right ear\", or \"Do not touch me to wake me up as it startles  me\".  Outcome: Progressing  Goal: Absence of Hospital-Acquired Illness or Injury  Outcome: Progressing  Intervention: Identify and Manage Fall Risk  Recent Flowsheet Documentation  Taken 7/21/2025 0900 by Jared Brush, RN  Safety Promotion/Fall Prevention: safety round/check completed  Intervention: Prevent Skin Injury  Recent Flowsheet Documentation  Taken 7/21/2025 0830 " by Jared Brush RN  Body Position: side-lying 30 degrees  Intervention: Prevent and Manage VTE (Venous Thromboembolism) Risk  Recent Flowsheet Documentation  Taken 7/21/2025 0900 by Jared Brush RN  VTE Prevention/Management: patient refused intervention  Intervention: Prevent Infection  Recent Flowsheet Documentation  Taken 7/21/2025 0900 by Jared Brush RN  Infection Prevention: hand hygiene promoted  Goal: Optimal Comfort and Wellbeing  Outcome: Progressing  Intervention: Provide Person-Centered Care  Recent Flowsheet Documentation  Taken 7/21/2025 0900 by Jared Brush RN  Trust Relationship/Rapport:   questions encouraged   questions answered  Goal: Readiness for Transition of Care  Outcome: Progressing     Problem: Delirium  Goal: Optimal Coping  Outcome: Progressing  Goal: Improved Behavioral Control  Outcome: Progressing  Intervention: Minimize Safety Risk  Recent Flowsheet Documentation  Taken 7/21/2025 0900 by Jared Brush RN  Enhanced Safety Measures: room near unit station  Trust Relationship/Rapport:   questions encouraged   questions answered  Goal: Improved Attention and Thought Clarity  Outcome: Progressing  Goal: Improved Sleep  Outcome: Progressing         Plan of Care Reviewed With: patient, child, family    Overall Patient Progress: improvingOverall Patient Progress: improving    Outcome Evaluation: mentation continues to improve.progressing mobility. WBC decreasing. TCU referrals out

## 2025-07-21 NOTE — PROGRESS NOTES
[facility]    Hospitalist Progress Note  Provider : Candis Garg MD, MD  Date of Service (when I saw the patient): 07/21/2025    Assessment & Plan   Riddhi Aguilar is a 89 year old female admitted on 7/17/2025 who presents for 3 to 4 days of increasing weakness and intermittent confusion.     Acute infectious encephalopathy  Urinary tract infection   -as her primary reason for coming to the hospital was ongoing confusion and hallucinations it seems the most likely due to UTI. Her last urine culture grew pansensitive Klebsiella  -Her UA is grossly abnormal. While her heart rate is elevated this is likely secondary to her underlying A-fib/flutter   -She was initially given IV Ceftriaxone. Urine culture growing ESBL. E.coli. Antibiotic changed to IV ertapenem on 7/20.   -Will continue IV ertapenem. Day#2 on ertapenem. Likely complete Abx as inpatient. Only oral option is Macrobid, however it was not ordered due to low GFR.   -Mental status improving     A-fib with RVR -likely secondary to physiologic response of underlying infection.  Patient has been continuing her metoprolol 75 mg twice daily.  Mild troponin elevation more consistent with supply/demand ischemia.  -Heart rate 70s to 110s  -Continue metoprolol therapy  -Cardiac monitoring     Mild to moderate aortic stenosis   -noted on recent echo April 2025.  Do not think that it is contributing to her current symptom complex.     Acute on chronic systolic CHF  Elevated proBNP  Elevated creatinine   -patient states that at no point over the last few days which she experiencing shortness of breath.  She does not use oxygen at baseline.  Her daughter does note that she is worried that she had worse p.o. intake over the last few days.    -Patient having mildly increased work of breath. Has trace leg edema. Her elevated BNP could just be indicative of worsening renal function in the setting of poor p.o. intake and prerenal azotemia.  "  -Received Lasix 20 mg IV yesterday. Will give Lasix 20 mg IV x1 today   -Will continue Lasix and spironolactone  -Will recheck BMP in a.m.     Elevated transaminase levels  Elevated bilirubin -albumin 3.8,Platelets 335 decayed and normal liver synthetic function.  CT notable for hepatic steatosis and/or cirrhosis.  No clear cause of insult to liver however could be an indicative of a congestive hepatopathy in the setting of volume overload.  -LFTs significantly elevated from yesterday.  , , bilirubin total 3.0.    -Viral hepatitis panel nonreactive.    -Ultrasound of right upper quadrant showed cholelithiasis without other sonographic evidence of cholecystitis.  No biliary dilatation. CBD measures 2.0 mm.  Normal liver echogenicity.  -Liver enzymes trending down. Will monitor      Erythema of left shin   -Seems most consistent with a bruise for a patient with fragile skin on DOAC therapy. Doesn't appear infected  -continue to monitor clinically     Hyperlipidemia   -continue atorvastatin     GERD -continue Pepcid  Diet:  Cardiac diet  DVT Prophylaxis: DOAC  [place]ines Catheter: Not present  Lines: None     Cardiac Monitoring: None  Code Status:  DNR/DNI     # Hyponatremia: Lowest Na = 132 mmol/L in last 2 days, will monitor as appropriate  # Hypochloremia: Lowest Cl = 94 mmol/L in last 2 days, will monitor as appropriate       # Drug Induced Coagulation Defect: home medication list includes an anticoagulant medication    # Hypertension: Noted on problem list  # Acute heart failure with preserved ejection fraction: heart failure noted on problem list, last echo with EF >50%, and receiving IV diuretics     # Obesity: Estimated body mass index is 35.14 kg/m  as calculated from the following:    Height as of 7/14/25: 1.499 m (4' 11\").    Weight as of 7/14/25: 78.9 kg (174 lb).       # Financial/Environmental Concerns:        DVT Prophylaxis: Pneumatic Compression Devices    Code Status: No CPR- Do NOT " Intubate    Medically Ready for Discharge: 2-4 days if she continues to improve     Candis Garg MD    Interval History   Patient seen and examined today.  Chart reviewed. She stated that overall her breathing is better but slightly short of breath on exertion. Her daughter at bedside and stated that her mental status is much better today.     -Data reviewed today: I reviewed all new labs and imaging results over the last 24 hours. I personally reviewed     Physical Exam   Temp: 97.3  F (36.3  C) Temp src: Axillary BP: 123/70 Pulse: 71   Resp: 16 SpO2: (!) 91 % O2 Device: None (Room air) Oxygen Delivery: 1 LPM  There were no vitals filed for this visit.  Vital Signs with Ranges  Temp:  [97.3  F (36.3  C)-98.4  F (36.9  C)] 97.3  F (36.3  C)  Pulse:  [] 71  Resp:  [14-18] 16  BP: (120-147)/(56-84) 123/70  SpO2:  [91 %-96 %] 91 %  I/O last 3 completed shifts:  In: 240 [P.O.:240]  Out: 300 [Urine:300]    GEN:  Alert, oriented x 3, appears comfortable, NAD.  HEENT:  Normocephalic/atraumatic, no scleral icterus, no nasal discharge, mouth moist.  CV:  Regular rate and rhythm, no murmur or JVD.  S1 + S2 noted, no S3 or S4.  LUNGS:  Clear to auscultation bilaterally without rales/rhonchi/wheezing/retractions.  Symmetric chest rise on inhalation noted.  ABD:  Active bowel sounds, soft, non-tender/non-distended.  No rebound/guarding/rigidity.  EXT:  No edema or cyanosis.  Hands/feet warm to touch with good signs of peripheral perfusion.  No joint synovitis noted.  SKIN:  Dry to touch, no exanthems noted in the visualized areas.  NEURO:  Symmetric muscle strength, sensation to touch grossly intact.  No new focal deficits appreciated.    Medications   Current Facility-Administered Medications   Medication Dose Route Frequency Provider Last Rate Last Admin    Patient is already receiving anticoagulation with heparin, enoxaparin (LOVENOX), warfarin (COUMADIN)  or other anticoagulant medication   Does not apply  Continuous PRN Julius Cruz MD         Current Facility-Administered Medications   Medication Dose Route Frequency Provider Last Rate Last Admin    apixaban ANTICOAGULANT (ELIQUIS) tablet 5 mg  5 mg Oral BID Julius Cruz MD   5 mg at 07/21/25 0805    buPROPion (WELLBUTRIN XL) 24 hr tablet 150 mg  150 mg Oral Julius Joel MD   150 mg at 07/21/25 0805    dorzolamide-timolol (COSOPT) ophthalmic solution 1 drop  1 drop Both Eyes BID Julius Cruz MD   1 drop at 07/21/25 0805    ertapenem (INVanz) 1 g vial to attach to  mL bag  1 g Intravenous Q24H Faustino Cotton MD   1 g at 07/21/25 0612    famotidine (PEPCID) tablet 20 mg  20 mg Oral Julius Joel MD   20 mg at 07/21/25 0805    [Held by provider] furosemide (LASIX) tablet 20 mg  20 mg Oral Candis Maya MD   20 mg at 07/19/25 0754    metoprolol tartrate (LOPRESSOR) tablet 75 mg  75 mg Oral BID Julius Cruz MD   75 mg at 07/21/25 0805    multivitamin w/minerals (THERA-VIT-M) tablet 1 tablet  1 tablet Oral Julius Jole MD   1 tablet at 07/21/25 0805    senna-docusate (SENOKOT-S/PERICOLACE) 8.6-50 MG per tablet 1 tablet  1 tablet Oral Daily Julius Cruz MD   1 tablet at 07/21/25 0805    sodium chloride (PF) 0.9% PF flush 3 mL  3 mL Intracatheter Q8H ROBINSON Julius Cruz MD   3 mL at 07/21/25 0805    spironolactone (ALDACTONE) tablet 25 mg  25 mg Oral Daily Candis Garg MD   25 mg at 07/21/25 0805       Data   Recent Labs   Lab 07/21/25  0711 07/20/25  0908 07/19/25  0745 07/17/25  2114 07/17/25  1718   WBC 8.8 11.4* 8.9   < > 9.2   HGB 13.4 14.1 14.3   < > 12.8   * 105* 107*   < > 106*    291 249   < > 335   * 134* 130*   < > 132*   POTASSIUM 3.5 4.3 4.6   < > 4.8   CHLORIDE 95* 95* 92*   < > 94*   CO2 25 25 20*   < > 25   BUN 29.0* 33.9* 30.4*   < > 37.0*   CR 0.93 1.10* 1.07*   < > 1.19*   ANIONGAP 11 14 18*   < > 13   TAMMY 8.6* 9.1 9.2   < > 9.3   GLC  79 148* 95   < > 107*   ALBUMIN 3.0* 3.4* 3.5   < > 3.8   PROTTOTAL 5.3* 5.7* 5.9*   < > 6.2*   BILITOTAL 1.9* 2.1* 3.0*   < > 2.7*   ALKPHOS 71 76 73   < > 76   * 248* 171*   < > 128*   * 290* 259*   < >  --    LIPASE  --   --   --   --  16    < > = values in this interval not displayed.       No results found for this or any previous visit (from the past 24 hours).

## 2025-07-22 LAB
ALBUMIN SERPL BCG-MCNC: 3.3 G/DL (ref 3.5–5.2)
ALP SERPL-CCNC: 74 U/L (ref 40–150)
ALT SERPL W P-5'-P-CCNC: 164 U/L (ref 0–50)
ANION GAP SERPL CALCULATED.3IONS-SCNC: 13 MMOL/L (ref 7–15)
AST SERPL W P-5'-P-CCNC: 133 U/L (ref 0–45)
BACTERIA SPEC CULT: NO GROWTH
BACTERIA SPEC CULT: NO GROWTH
BILIRUB SERPL-MCNC: 1.9 MG/DL
BUN SERPL-MCNC: 25.7 MG/DL (ref 8–23)
CALCIUM SERPL-MCNC: 8.9 MG/DL (ref 8.8–10.4)
CHLORIDE SERPL-SCNC: 95 MMOL/L (ref 98–107)
CREAT SERPL-MCNC: 0.9 MG/DL (ref 0.51–0.95)
EGFRCR SERPLBLD CKD-EPI 2021: 59 ML/MIN/1.73M2
ERYTHROCYTE [DISTWIDTH] IN BLOOD BY AUTOMATED COUNT: 26.2 % (ref 10–15)
GLUCOSE SERPL-MCNC: 102 MG/DL (ref 70–99)
HCO3 SERPL-SCNC: 27 MMOL/L (ref 22–29)
HCT VFR BLD AUTO: 42.9 % (ref 35–47)
HGB BLD-MCNC: 14.1 G/DL (ref 11.7–15.7)
MCH RBC QN AUTO: 35.2 PG (ref 26.5–33)
MCHC RBC AUTO-ENTMCNC: 32.9 G/DL (ref 31.5–36.5)
MCV RBC AUTO: 107 FL (ref 78–100)
PLATELET # BLD AUTO: 260 10E3/UL (ref 150–450)
POTASSIUM SERPL-SCNC: 3.8 MMOL/L (ref 3.4–5.3)
PROT SERPL-MCNC: 5.9 G/DL (ref 6.4–8.3)
RBC # BLD AUTO: 4.01 10E6/UL (ref 3.8–5.2)
SODIUM SERPL-SCNC: 135 MMOL/L (ref 135–145)
WBC # BLD AUTO: 7.8 10E3/UL (ref 4–11)

## 2025-07-22 PROCEDURE — 85014 HEMATOCRIT: CPT | Performed by: INTERNAL MEDICINE

## 2025-07-22 PROCEDURE — 250N000011 HC RX IP 250 OP 636: Performed by: INTERNAL MEDICINE

## 2025-07-22 PROCEDURE — 82310 ASSAY OF CALCIUM: CPT | Performed by: INTERNAL MEDICINE

## 2025-07-22 PROCEDURE — 250N000013 HC RX MED GY IP 250 OP 250 PS 637: Performed by: INTERNAL MEDICINE

## 2025-07-22 PROCEDURE — 36415 COLL VENOUS BLD VENIPUNCTURE: CPT | Performed by: INTERNAL MEDICINE

## 2025-07-22 PROCEDURE — 99232 SBSQ HOSP IP/OBS MODERATE 35: CPT | Performed by: INTERNAL MEDICINE

## 2025-07-22 PROCEDURE — 120N000001 HC R&B MED SURG/OB

## 2025-07-22 PROCEDURE — 82947 ASSAY GLUCOSE BLOOD QUANT: CPT | Performed by: INTERNAL MEDICINE

## 2025-07-22 PROCEDURE — 250N000013 HC RX MED GY IP 250 OP 250 PS 637: Performed by: HOSPITALIST

## 2025-07-22 RX ADMIN — Medication 1 TABLET: at 09:08

## 2025-07-22 RX ADMIN — ERTAPENEM SODIUM 1 G: 1 INJECTION, POWDER, LYOPHILIZED, FOR SOLUTION INTRAMUSCULAR; INTRAVENOUS at 06:07

## 2025-07-22 RX ADMIN — BUPROPION HYDROCHLORIDE 150 MG: 150 TABLET, EXTENDED RELEASE ORAL at 09:08

## 2025-07-22 RX ADMIN — SPIRONOLACTONE 25 MG: 25 TABLET ORAL at 09:08

## 2025-07-22 RX ADMIN — FAMOTIDINE 20 MG: 20 TABLET, FILM COATED ORAL at 09:08

## 2025-07-22 RX ADMIN — METOPROLOL TARTRATE 75 MG: 50 TABLET, FILM COATED ORAL at 21:02

## 2025-07-22 RX ADMIN — APIXABAN 5 MG: 5 TABLET, FILM COATED ORAL at 21:02

## 2025-07-22 RX ADMIN — DORZOLAMIDE HYDROCHLORIDE AND TIMOLOL MALEATE 1 DROP: 20; 5 SOLUTION/ DROPS OPHTHALMIC at 21:02

## 2025-07-22 RX ADMIN — SENNOSIDES AND DOCUSATE SODIUM 1 TABLET: 50; 8.6 TABLET ORAL at 09:08

## 2025-07-22 RX ADMIN — APIXABAN 5 MG: 5 TABLET, FILM COATED ORAL at 09:08

## 2025-07-22 RX ADMIN — FUROSEMIDE 20 MG: 20 TABLET ORAL at 11:23

## 2025-07-22 RX ADMIN — DORZOLAMIDE HYDROCHLORIDE AND TIMOLOL MALEATE 1 DROP: 20; 5 SOLUTION/ DROPS OPHTHALMIC at 09:08

## 2025-07-22 ASSESSMENT — ACTIVITIES OF DAILY LIVING (ADL)
ADLS_ACUITY_SCORE: 69
ADLS_ACUITY_SCORE: 73
ADLS_ACUITY_SCORE: 69
ADLS_ACUITY_SCORE: 73
ADLS_ACUITY_SCORE: 69
ADLS_ACUITY_SCORE: 73
ADLS_ACUITY_SCORE: 73
ADLS_ACUITY_SCORE: 69
ADLS_ACUITY_SCORE: 69
ADLS_ACUITY_SCORE: 77
ADLS_ACUITY_SCORE: 73
ADLS_ACUITY_SCORE: 69

## 2025-07-22 NOTE — PLAN OF CARE
"Pt pale with scattered bruises as well as LLE wound- dressed per WOC. Lungs clear/dim tonight with occasional nonprod cough. 1L while sleeping.   Tolerating cardiac diet. Saline locked. Invanz given. REDDY, turned with assist. Purewick for incont urine. Afib RVR on tele. A/O, forgetful- more clear this morning than previous shifts with patient. Bed alarm on- pt is inconsistent with call light use.     Major Shift Events:   Uneventful night, slept well. No significant change in condition.     Treatment Plan:   Tele. Invanz. Maintain contact precautions. WOC, PT, and SW following.                                     Problem: Adult Inpatient Plan of Care  Goal: Plan of Care Review  Description: The Plan of Care Review/Shift note should be completed every shift.  The Outcome Evaluation is a brief statement about your assessment that the patient is improving, declining, or no change.  This information will be displayed automatically on your shift  note.  Outcome: Progressing  Flowsheets (Taken 7/22/2025 4197)  Plan of Care Reviewed With: patient  Overall Patient Progress: improving  Goal: Patient-Specific Goal (Individualized)  Description: You can add care plan individualizations to a care plan. Examples of Individualization might be:  \"Parent requests to be called daily at 9am for status\", \"I have a hard time hearing out of my right ear\", or \"Do not touch me to wake me up as it startles  me\".  Outcome: Progressing  Goal: Absence of Hospital-Acquired Illness or Injury  Outcome: Progressing  Intervention: Identify and Manage Fall Risk  Recent Flowsheet Documentation  Taken 7/21/2025 2322 by Rylee Andrade, RN  Safety Promotion/Fall Prevention: safety round/check completed  Intervention: Prevent Skin Injury  Recent Flowsheet Documentation  Taken 7/21/2025 2329 by Rylee Andrade, RN  Body Position: position changed independently  Goal: Optimal Comfort and Wellbeing  Outcome: Progressing  Goal: Readiness for " Transition of Care  Outcome: Progressing   Goal Outcome Evaluation:      Plan of Care Reviewed With: patient    Overall Patient Progress: improvingOverall Patient Progress: improving

## 2025-07-22 NOTE — PLAN OF CARE
"To Do:  End of Shift Summary  For vital signs and complete assessments, please see documentation flowsheets.     Pertinent assessments: Pt A&Ox4. Forgetful at times. [place]. Denies pain, no N/V. VSS, on 1L NC. Per tele tech- a fib RVR. Mepliex on sacrum. Incontinent of urine, purewick in place. Cardiac diet. Dressing changed on left lower leg done this shift, CDI. WOC/PT/SW following.    Major Shift Events: none    Treatment Plan: Wound care. IV Invanz.       Goal Outcome Evaluation:      Plan of Care Reviewed With: patient    Overall Patient Progress: improving    Overall Patient Progress: improving         Problem: Adult Inpatient Plan of Care  Goal: Plan of Care Review  Description: The Plan of Care Review/Shift note should be completed every shift.  The Outcome Evaluation is a brief statement about your assessment that the patient is improving, declining, or no change.  This information will be displayed automatically on your shift  note.  Outcome: Progressing  Flowsheets (Taken 7/22/2025 1408)  Plan of Care Reviewed With: patient  Overall Patient Progress: improving  Goal: Patient-Specific Goal (Individualized)  Description: You can add care plan individualizations to a care plan. Examples of Individualization might be:  \"Parent requests to be called daily at 9am for status\", \"I have a hard time hearing out of my right ear\", or \"Do not touch me to wake me up as it startles  me\".  Outcome: Progressing  Goal: Absence of Hospital-Acquired Illness or Injury  Outcome: Progressing  Intervention: Prevent and Manage VTE (Venous Thromboembolism) Risk  Recent Flowsheet Documentation  Taken 7/22/2025 0920 by Tiffanie Matson, RN  VTE Prevention/Management: SCDs off (sequential compression devices)  Intervention: Prevent Infection  Recent Flowsheet Documentation  Taken 7/22/2025 0920 by Tiffanie Matson, RN  Infection Prevention: hand hygiene promoted  Goal: Optimal Comfort and Wellbeing  Outcome: Progressing  Goal: Readiness " for Transition of Care  Outcome: Progressing     Problem: Delirium  Goal: Optimal Coping  Outcome: Progressing  Goal: Improved Behavioral Control  Outcome: Progressing  Goal: Improved Attention and Thought Clarity  Outcome: Progressing  Goal: Improved Sleep  Outcome: Progressing

## 2025-07-22 NOTE — PLAN OF CARE
"To Do:  End of Shift Summary  For vital signs and complete assessments, please see documentation flowsheets.     Pertinent assessments: Pt is A/Ox4, forgetful. VSS on 1L NC. Tele- A fib  per tele tech. Denies N/V. C/o headache, tylenol given x1. Ax1 with walker and gait belt. PIV SL. Cardiac diet. L shin wound dressing, CDI. Blanchable redness on sacrum, mepilex CDI. Purewick in place, adequate output.     Major Shift Events: uneventful.    Treatment Plan: Tele. Invanz. Maintain contact precautions. WOC, PT, and SW following.     Bedside Nurse: Amna Sosa RN     Goal Outcome Evaluation:      Plan of Care Reviewed With: patient    Overall Patient Progress: improvingOverall Patient Progress: improving    Outcome Evaluation: Invanz. Tele.        Problem: Adult Inpatient Plan of Care  Goal: Plan of Care Review  Description: The Plan of Care Review/Shift note should be completed every shift.  The Outcome Evaluation is a brief statement about your assessment that the patient is improving, declining, or no change.  This information will be displayed automatically on your shift  note.  Outcome: Progressing  Flowsheets (Taken 7/21/2025 2236)  Outcome Evaluation: Invanz. Tele.  Plan of Care Reviewed With: patient  Overall Patient Progress: improving  Goal: Patient-Specific Goal (Individualized)  Description: You can add care plan individualizations to a care plan. Examples of Individualization might be:  \"Parent requests to be called daily at 9am for status\", \"I have a hard time hearing out of my right ear\", or \"Do not touch me to wake me up as it startles  me\".  Outcome: Progressing  Goal: Absence of Hospital-Acquired Illness or Injury  Outcome: Progressing  Intervention: Identify and Manage Fall Risk  Recent Flowsheet Documentation  Taken 7/21/2025 1702 by Amna Sosa, RN  Safety Promotion/Fall Prevention:   activity supervised   clutter free environment maintained   increased rounding and observation   increase " visualization of patient   nonskid shoes/slippers when out of bed   room near nurse's station   room organization consistent   safety round/check completed  Intervention: Prevent Skin Injury  Recent Flowsheet Documentation  Taken 7/21/2025 1702 by Amna Sosa RN  Body Position: position changed independently  Intervention: Prevent and Manage VTE (Venous Thromboembolism) Risk  Recent Flowsheet Documentation  Taken 7/21/2025 1702 by Amna Sosa RN  VTE Prevention/Management: SCDs off (sequential compression devices)  Intervention: Prevent Infection  Recent Flowsheet Documentation  Taken 7/21/2025 1702 by Amna Sosa RN  Infection Prevention:   cohorting utilized   rest/sleep promoted   single patient room provided   personal protective equipment utilized  Goal: Optimal Comfort and Wellbeing  Outcome: Progressing  Intervention: Monitor Pain and Promote Comfort  Recent Flowsheet Documentation  Taken 7/21/2025 2228 by Amna Sosa RN  Pain Management Interventions: rest  Taken 7/21/2025 2141 by Amna Sosa RN  Pain Management Interventions: medication (see MAR)  Goal: Readiness for Transition of Care  Outcome: Progressing     Problem: Delirium  Goal: Optimal Coping  Outcome: Progressing  Goal: Improved Behavioral Control  Outcome: Progressing  Intervention: Minimize Safety Risk  Recent Flowsheet Documentation  Taken 7/21/2025 1702 by Amna Sosa RN  Enhanced Safety Measures: room near unit station  Goal: Improved Attention and Thought Clarity  Outcome: Progressing  Goal: Improved Sleep  Outcome: Progressing        no

## 2025-07-22 NOTE — PROGRESS NOTES
[facility]    Medicine Progress Note - Hospitalist Service    Date of Admission:  7/17/2025    Assessment & Plan   Riddhi Aguilar is a 89 year old female admitted on 7/17/2025 who presented with 3 to 4 days of increasing weakness and intermittent confusion.  Evaluation revealed UTI with 2 strains of ESBL E. coli.  Patient is being treated with Invanz.  Confusion has improved.    Problem list:     Acute infectious encephalopathy  Urinary tract infection   -Her UA was grossly abnormal.   -She was initially given IV Ceftriaxone. Urine culture growing ESBL. E.coli. Antibiotic changed to IV ertapenem on 7/20.   -Will continue IV ertapenem.  Today is day#3 on ertapenem. Likely complete Abx as inpatient. Only oral option is Macrobid, however it was not ordered due to low GFR.  Anticipate 5 days of treatment.  -Mental status improving     A-fib with RVR -likely secondary to physiologic response of underlying infection.  Patient has been continuing her metoprolol 75 mg twice daily.  Mild troponin elevation more consistent with supply/demand ischemia.  -Heart rate 70s to 110s  -Continue metoprolol therapy  -Cardiac monitoring     Mild to moderate aortic stenosis   -noted on recent echo April 2025.  Do not think that it is contributing to her current symptom complex.     Acute on chronic systolic CHF with recovered ejection fraction  Elevated proBNP  Elevated creatinine   -History of systolic heart failure but most recent echo on 4/18/2025 showed ejection fraction of 60 to 65%  -patient states that at no point over the last few days which she experiencing shortness of breath.  She does not use oxygen at baseline.  Her daughter does note that she is worried that she had worse p.o. intake over the last few days.    -Patient having mildly increased work of breath. Has trace leg edema. Her elevated BNP could just be indicative of worsening renal function in the setting of poor p.o. intake and prerenal  azotemia.   -Received Lasix 20 mg IV for 2 days.  Restart PTA Lasix 20 mg p.o. daily today  -Will continue PTA spironolactone  -Will recheck BMP in a.m.     Elevated transaminase levels  Elevated bilirubin -albumin 3.8,Platelets 335 decayed and normal liver synthetic function.  CT notable for hepatic steatosis and/or cirrhosis.  No clear cause of insult to liver however could be an indicative of a congestive hepatopathy in the setting of volume overload.  -LFTs significantly elevated from yesterday.  , , bilirubin total 3.0.    -Viral hepatitis panel nonreactive.    -Ultrasound of right upper quadrant showed cholelithiasis without other sonographic evidence of cholecystitis.  No biliary dilatation. CBD measures 2.0 mm.  Normal liver echogenicity.  -Liver enzymes trending down. Will monitor      Erythema of left shin   -Seems most consistent with a bruise for a patient with fragile skin on DOAC therapy. Doesn't appear infected  -continue to monitor clinically     Hyperlipidemia   -continue atorvastatin    Conditioning  -PT eval to help determine if she needs additional services at her assisted living        Diet: Combination Diet Low Saturated Fat Na <2400mg Diet, No Caffeine Diet    DVT Prophylaxis: DOAC  Martines Catheter: Not present  Lines: None     Cardiac Monitoring: ACTIVE order. Indication: Tachyarrhythmias, acute (48 hours)  Code Status: No CPR- Do NOT Intubate      Clinically Significant Risk Factors         # Hyponatremia: Lowest Na = 131 mmol/L in last 2 days, will monitor as appropriate  # Hypochloremia: Lowest Cl = 95 mmol/L in last 2 days, will monitor as appropriate      # Hypoalbuminemia: Lowest albumin = 3 g/dL at 7/21/2025  7:11 AM, will monitor as appropriate     # Hypertension: Noted on problem list  # Acute heart failure with preserved ejection fraction: heart failure noted on problem list, last echo with EF >50%, and receiving IV diuretics           # Obesity: Estimated body mass  "index is 35.14 kg/m  as calculated from the following:    Height as of 7/14/25: 1.499 m (4' 11\").    Weight as of 7/14/25: 78.9 kg (174 lb).      # Financial/Environmental Concerns:           Social Drivers of Health    Physical Activity: Unknown (5/14/2024)    Exercise Vital Sign     Days of Exercise per Week: 4 days   Social Connections: Unknown (5/14/2024)    Social Connection and Isolation Panel [NHANES]     Frequency of Social Gatherings with Friends and Family: Once a week          Disposition Plan     Medically Ready for Discharge: Anticipated in 2-4 Days             Kory Brandt MD  Hospitalist Service  [facility]  Securely message with [employer] (more info)  Text page via [employer] Paging/Directory   ______________________________________________________________________    Interval History   No new problems.  Feeling better.  Confusion resolved.    Physical Exam   Vital Signs: Temp: 97.5  F (36.4  C) Temp src: Oral BP: (!) 142/89 Pulse: 99   Resp: 20 SpO2: 92 % O2 Device: Nasal cannula Oxygen Delivery: 2 LPM  Weight: 0 lbs 0 oz    GENERAL:  Comfortable. Cooperative.  PSYCH: pleasant, oriented, No acute distress.  EYES: PERRLA, Normal conjunctiva.  HEART:  Regular rate and rhythm. No JVD. Pulses normal. No edema.  LUNGS:  Clear to auscultation, normal Respiratory effort.  ABDOMEN:  Soft, no hepatosplenomegaly, normal bowel sounds.  EXTREMETIES: No clubbing, cyanosis or ischemia  SKIN:  Dry to touch, No rash.      Medical Decision Making       45 MINUTES SPENT BY ME on the date of service doing chart review, history, exam, documentation & further activities per the note.      Data     I have personally reviewed the following data over the past 24 hrs:    7.8  \   14.1   / 260     135 95 (L) 25.7 (H) /  102 (H)   3.8 27 0.90 \     ALT: 164 (H) AST: 133 (H) AP: 74 TBILI: 1.9 (H)   ALB: 3.3 (L) TOT PROTEIN: 5.9 (L) LIPASE: N/A       Imaging results reviewed over the past 24 hrs:   No " results found for this or any previous visit (from the past 24 hours).  Recent Labs   Lab 07/22/25  0814 07/21/25  0711 07/20/25  0908 07/17/25  2114 07/17/25  1718   WBC 7.8 8.8 11.4*   < > 9.2   HGB 14.1 13.4 14.1   < > 12.8   * 106* 105*   < > 106*    265 291   < > 335    131* 134*   < > 132*   POTASSIUM 3.8 3.5 4.3   < > 4.8   CHLORIDE 95* 95* 95*   < > 94*   CO2 27 25 25   < > 25   BUN 25.7* 29.0* 33.9*   < > 37.0*   CR 0.90 0.93 1.10*   < > 1.19*   ANIONGAP 13 11 14   < > 13   TAMMY 8.9 8.6* 9.1   < > 9.3   * 79 148*   < > 107*   ALBUMIN 3.3* 3.0* 3.4*   < > 3.8   PROTTOTAL 5.9* 5.3* 5.7*   < > 6.2*   BILITOTAL 1.9* 1.9* 2.1*   < > 2.7*   ALKPHOS 74 71 76   < > 76   * 184* 248*   < > 128*   * 170* 290*   < >  --    LIPASE  --   --   --   --  16    < > = values in this interval not displayed.

## 2025-07-23 ENCOUNTER — APPOINTMENT (OUTPATIENT)
Dept: GENERAL RADIOLOGY | Facility: CLINIC | Age: OVER 89
End: 2025-07-23
Attending: INTERNAL MEDICINE
Payer: COMMERCIAL

## 2025-07-23 LAB
ANION GAP SERPL CALCULATED.3IONS-SCNC: 14 MMOL/L (ref 7–15)
BUN SERPL-MCNC: 28.4 MG/DL (ref 8–23)
CALCIUM SERPL-MCNC: 9 MG/DL (ref 8.8–10.4)
CHLORIDE SERPL-SCNC: 96 MMOL/L (ref 98–107)
CREAT SERPL-MCNC: 0.98 MG/DL (ref 0.51–0.95)
EGFRCR SERPLBLD CKD-EPI 2021: 53 ML/MIN/1.73M2
GLUCOSE SERPL-MCNC: 108 MG/DL (ref 70–99)
HCO3 SERPL-SCNC: 25 MMOL/L (ref 22–29)
HOLD SPECIMEN: NORMAL
POTASSIUM SERPL-SCNC: 3.9 MMOL/L (ref 3.4–5.3)
SODIUM SERPL-SCNC: 135 MMOL/L (ref 135–145)

## 2025-07-23 PROCEDURE — 250N000013 HC RX MED GY IP 250 OP 250 PS 637: Performed by: INTERNAL MEDICINE

## 2025-07-23 PROCEDURE — 250N000013 HC RX MED GY IP 250 OP 250 PS 637: Performed by: HOSPITALIST

## 2025-07-23 PROCEDURE — 250N000011 HC RX IP 250 OP 636: Performed by: INTERNAL MEDICINE

## 2025-07-23 PROCEDURE — 120N000001 HC R&B MED SURG/OB

## 2025-07-23 PROCEDURE — 36415 COLL VENOUS BLD VENIPUNCTURE: CPT | Performed by: INTERNAL MEDICINE

## 2025-07-23 PROCEDURE — 99233 SBSQ HOSP IP/OBS HIGH 50: CPT | Performed by: INTERNAL MEDICINE

## 2025-07-23 PROCEDURE — 71045 X-RAY EXAM CHEST 1 VIEW: CPT

## 2025-07-23 PROCEDURE — 80048 BASIC METABOLIC PNL TOTAL CA: CPT | Performed by: INTERNAL MEDICINE

## 2025-07-23 RX ORDER — FUROSEMIDE 10 MG/ML
20 INJECTION INTRAMUSCULAR; INTRAVENOUS ONCE
Status: COMPLETED | OUTPATIENT
Start: 2025-07-23 | End: 2025-07-23

## 2025-07-23 RX ADMIN — FAMOTIDINE 20 MG: 20 TABLET, FILM COATED ORAL at 08:37

## 2025-07-23 RX ADMIN — DORZOLAMIDE HYDROCHLORIDE AND TIMOLOL MALEATE 1 DROP: 20; 5 SOLUTION/ DROPS OPHTHALMIC at 08:35

## 2025-07-23 RX ADMIN — APIXABAN 5 MG: 5 TABLET, FILM COATED ORAL at 21:27

## 2025-07-23 RX ADMIN — SPIRONOLACTONE 25 MG: 25 TABLET ORAL at 08:34

## 2025-07-23 RX ADMIN — METOPROLOL TARTRATE 75 MG: 50 TABLET, FILM COATED ORAL at 08:34

## 2025-07-23 RX ADMIN — ACETAMINOPHEN 650 MG: 325 TABLET ORAL at 02:13

## 2025-07-23 RX ADMIN — Medication 1 TABLET: at 08:33

## 2025-07-23 RX ADMIN — APIXABAN 5 MG: 5 TABLET, FILM COATED ORAL at 08:33

## 2025-07-23 RX ADMIN — SENNOSIDES AND DOCUSATE SODIUM 1 TABLET: 50; 8.6 TABLET ORAL at 08:32

## 2025-07-23 RX ADMIN — ERTAPENEM SODIUM 1 G: 1 INJECTION, POWDER, LYOPHILIZED, FOR SOLUTION INTRAMUSCULAR; INTRAVENOUS at 06:14

## 2025-07-23 RX ADMIN — OXYCODONE HYDROCHLORIDE 5 MG: 5 TABLET ORAL at 02:13

## 2025-07-23 RX ADMIN — METOPROLOL TARTRATE 75 MG: 50 TABLET, FILM COATED ORAL at 21:24

## 2025-07-23 RX ADMIN — DORZOLAMIDE HYDROCHLORIDE AND TIMOLOL MALEATE 1 DROP: 20; 5 SOLUTION/ DROPS OPHTHALMIC at 21:27

## 2025-07-23 RX ADMIN — FUROSEMIDE 20 MG: 20 TABLET ORAL at 08:34

## 2025-07-23 RX ADMIN — BUPROPION HYDROCHLORIDE 150 MG: 150 TABLET, EXTENDED RELEASE ORAL at 08:32

## 2025-07-23 RX ADMIN — FUROSEMIDE 20 MG: 10 INJECTION, SOLUTION INTRAMUSCULAR; INTRAVENOUS at 18:43

## 2025-07-23 ASSESSMENT — ACTIVITIES OF DAILY LIVING (ADL)
ADLS_ACUITY_SCORE: 73
ADLS_ACUITY_SCORE: 77
ADLS_ACUITY_SCORE: 77
ADLS_ACUITY_SCORE: 73
ADLS_ACUITY_SCORE: 77
ADLS_ACUITY_SCORE: 73
ADLS_ACUITY_SCORE: 77
ADLS_ACUITY_SCORE: 77
ADLS_ACUITY_SCORE: 73
ADLS_ACUITY_SCORE: 77
ADLS_ACUITY_SCORE: 73
ADLS_ACUITY_SCORE: 77
ADLS_ACUITY_SCORE: 73
ADLS_ACUITY_SCORE: 73
ADLS_ACUITY_SCORE: 77
ADLS_ACUITY_SCORE: 77

## 2025-07-23 NOTE — PLAN OF CARE
"  Problem: Adult Inpatient Plan of Care  Goal: Plan of Care ReviewTo Do:  End of Shift Summary  For vital signs and complete assessments, please see documentation flowsheets.     Pertinent assessments:  Alert and oriented --- up in chair and commode with heavy assist of 1  with belt and walker ----   appetite good --   denies pain and nausea ---  using pure wick--O2 at 2L NC   sats 91-93 %---  Major Shift Events  family at bedside   Treatment Plan:  Lasix   Bedside Nurse: Tasha Ferguson RN       Description: The Plan of Care Review/Shift note should be completed every shift.  The Outcome Evaluation is a brief statement about your assessment that the patient is improving, declining, or no change.  This information will be displayed automatically on your shift  note.  Outcome: Progressing  Flowsheets (Taken 7/23/2025 1721)  Outcome Evaluation: placement  Plan of Care Reviewed With:   patient   family  Overall Patient Progress: improving  Goal: Patient-Specific Goal (Individualized)  Description: You can add care plan individualizations to a care plan. Examples of Individualization might be:  \"Parent requests to be called daily at 9am for status\", \"I have a hard time hearing out of my right ear\", or \"Do not touch me to wake me up as it startles  me\".  Outcome: Progressing  Goal: Absence of Hospital-Acquired Illness or Injury  Outcome: Progressing  Intervention: Prevent Infection  Recent Flowsheet Documentation  Taken 7/23/2025 1200 by Tasha Ferguson, RN  Infection Prevention:   hand hygiene promoted   cohorting utilized  Goal: Optimal Comfort and Wellbeing  Outcome: Progressing  Goal: Readiness for Transition of Care  Outcome: Progressing     Problem: Delirium  Goal: Optimal Coping  Outcome: Progressing  Goal: Improved Behavioral Control  Outcome: Progressing  Goal: Improved Attention and Thought Clarity  Outcome: Progressing  Goal: Improved Sleep  Outcome: Progressing   Goal Outcome Evaluation:      Plan of Care " Reviewed With: patient, family    Overall Patient Progress: improvingOverall Patient Progress: improving    Outcome Evaluation: placement

## 2025-07-23 NOTE — PROGRESS NOTES
[facility]    Medicine Progress Note - Hospitalist Service    Date of Admission:  7/17/2025    Assessment & Plan     Riddhi Aguilar is a 89 year old female admitted on 7/17/2025 who presented with 3 to 4 days of increasing weakness and intermittent confusion.  Evaluation revealed UTI with 2 strains of ESBL E. coli.  Patient is being treated with Invanz.  Confusion has improved but patient is weak.  She has had several hospitalizations in recent months and seems to be declining to some extent.  Her appetite and oral intake are diminished.     Problem list:     Acute infectious encephalopathy  Urinary tract infection   -Her UA was grossly abnormal.   -She was initially given IV Ceftriaxone. Urine culture growing ESBL. E.coli. Antibiotic changed to IV ertapenem on 7/20.   -Will continue IV ertapenem.  Today is day#4 on ertapenem. Likely complete antibiotic as inpatient. Only oral option is Macrobid, however it was not ordered due to low GFR.  Anticipate 5 days of treatment.  -Mental status improving     A-fib with RVR -likely secondary to physiologic response of underlying infection.  Patient has been continuing her metoprolol 75 mg twice daily.  Mild troponin elevation more consistent with supply/demand ischemia.  -Heart rate 70s to 110s  -Continue metoprolol therapy  -Cardiac monitoring     Mild to moderate aortic stenosis   -noted on recent echo April 2025.  Do not think that it is contributing to her current symptom complex.     Acute on chronic systolic CHF with recovered ejection fraction  Elevated proBNP  Elevated creatinine   -History of systolic heart failure but most recent echo on 4/18/2025 showed ejection fraction of 60 to 65%  -patient states that at no point over the last few days which she experiencing shortness of breath.  She does not use oxygen at baseline.  Her daughter does note that she is worried that she had worse p.o. intake over the last few days.    -Patient having  mildly increased work of breath. Has trace leg edema. Her elevated BNP could just be indicative of worsening renal function in the setting of poor p.o. intake and prerenal azotemia.   -Received Lasix 20 mg IV for 2 days.  Restarted PTA Lasix 20 mg p.o. daily on 7/22  -Remains on PTA spironolactone  -Some SOB and new oxygen needs today. CXR suggested increased pulmonary edema  -Extra dose of Lasix 20 mg IV once today  -Will recheck BMP in a.m.     Elevated transaminase levels  Elevated bilirubin   -albumin 3.8,Platelets 335 decayed and normal liver synthetic function.  CT notable for hepatic steatosis and/or cirrhosis.  No clear cause of insult to liver however could be an indicative of a congestive hepatopathy in the setting of volume overload.  -LFTs significantly elevated from yesterday.  , , bilirubin total 3.0.    -Viral hepatitis panel nonreactive.    -Ultrasound of right upper quadrant showed cholelithiasis without other sonographic evidence of cholecystitis.  No biliary dilatation. CBD measures 2.0 mm.  Normal liver echogenicity.  -Liver enzymes trending down. Will monitor      Erythema of left shin   -Seems most consistent with a bruise for a patient with fragile skin on DOAC therapy. Doesn't appear infected  -continue to monitor clinically     Hyperlipidemia   -continue atorvastatin     Conditioning  -PT eval to help determine if she needs additional services at her assisted living    Concern for for failure to thrive  - Patient is much weaker as discussed above.  - Patient now with decreased oral intake of food and liquids  - Patient exhibits some sense of being discouraged.  - Talked with patient and her son at length and later with her son and daughter.  I offered patient Remeron to help with appetite and possible depression but she does not want to try that.  - Discussed with patient and family that since she recently responded well to the therapy it would be worth another try of physical  "therapy at assisted living or present paps TCU of current care needs dictate.  We discussed that at some point she will have a bout of illness where she does not rebound and that at that point, given her advanced age and goals for her own care and quality of life, it would be prudent to consider hospice at that time.  - Of note, patient worked as a hospice nurse for decades.          Diet: Combination Diet Low Saturated Fat Na <2400mg Diet, No Caffeine Diet    DVT Prophylaxis: DOAC  Martines Catheter: Not present  Lines: None     Cardiac Monitoring: None  Code Status: No CPR- Do NOT Intubate      Clinically Significant Risk Factors          # Hypochloremia: Lowest Cl = 95 mmol/L in last 2 days, will monitor as appropriate      # Hypoalbuminemia: Lowest albumin = 3 g/dL at 7/21/2025  7:11 AM, will monitor as appropriate     # Hypertension: Noted on problem list  # Acute heart failure with preserved ejection fraction: heart failure noted on problem list, last echo with EF >50%, and receiving IV diuretics           # Obesity: Estimated body mass index is 35.14 kg/m  as calculated from the following:    Height as of 7/14/25: 1.499 m (4' 11\").    Weight as of 7/14/25: 78.9 kg (174 lb).      # Financial/Environmental Concerns:           Social Drivers of Health    Physical Activity: Unknown (5/14/2024)    Exercise Vital Sign     Days of Exercise per Week: 4 days   Social Connections: Unknown (5/14/2024)    Social Connection and Isolation Panel [NHANES]     Frequency of Social Gatherings with Friends and Family: Once a week          Disposition Plan     Medically Ready for Discharge: Anticipated in 2-4 Days             Kory Brandt MD  Hospitalist Service  [facility]  Securely message with [employer] (more info)  Text page via [email or web address] Paging/Directory   ______________________________________________________________________    Interval History   No fever.  Feels weak which is not new.  Appetite for both " food and liquids his diminished today.  Somewhat discouraged today.  She shared with me that she feels like she is going to die sometime this year.    Physical Exam   Vital Signs: Temp: 97.4  F (36.3  C) Temp src: Oral BP: 138/83 Pulse: 74   Resp: 20 SpO2: 92 % O2 Device: Nasal cannula Oxygen Delivery: 2 LPM  Weight: 0 lbs 0 oz    GENERAL:  Comfortable. Cooperative.  PSYCH: pleasant, oriented, No acute distress.  EYES: PERRLA, Normal conjunctiva.  HEART:  Regular rate and rhythm. No JVD. Pulses normal. No edema.  LUNGS:  Clear to auscultation, normal Respiratory effort.  ABDOMEN:  Soft, no hepatosplenomegaly, normal bowel sounds.  EXTREMETIES: No clubbing, cyanosis or ischemia  SKIN:  Dry to touch, No rash.      Medical Decision Making       60 MINUTES SPENT BY ME on the date of service doing chart review, history, exam, documentation & further activities per the note.      Data     I have personally reviewed the following data over the past 24 hrs:    N/A  \   N/A   / N/A     135 96 (L) 28.4 (H) /  108 (H)   3.9 25 0.98 (H) \       Imaging results reviewed over the past 24 hrs:   Recent Results (from the past 24 hours)   XR Chest Port 1 View    Narrative    EXAM: XR CHEST PORT 1 VIEW  LOCATION: [facility]  DATE: 7/23/2025    INDICATION: hypoxia  COMPARISON: Chest radiograph 7/17/2025.      Impression    IMPRESSION: Stable size of cardiomediastinal silhouette. Significant mitral annular calcifications are again noted. Interval worsening of bibasilar opacities since prior radiograph, represent worsening edema, aspiration or developing airspace   consolidation. No definite pleural effusion or pneumothorax. Bones are unchanged.     Recent Labs   Lab 07/23/25  0712 07/22/25  0814 07/21/25  0711 07/20/25  0908 07/17/25  2114 07/17/25  1718   WBC  --  7.8 8.8 11.4*   < > 9.2   HGB  --  14.1 13.4 14.1   < > 12.8   MCV  --  107* 106* 105*   < > 106*   PLT  --  260 265 291   < > 335    135 131*  134*   < > 132*   POTASSIUM 3.9 3.8 3.5 4.3   < > 4.8   CHLORIDE 96* 95* 95* 95*   < > 94*   CO2 25 27 25 25   < > 25   BUN 28.4* 25.7* 29.0* 33.9*   < > 37.0*   CR 0.98* 0.90 0.93 1.10*   < > 1.19*   ANIONGAP 14 13 11 14   < > 13   TAMMY 9.0 8.9 8.6* 9.1   < > 9.3   * 102* 79 148*   < > 107*   ALBUMIN  --  3.3* 3.0* 3.4*   < > 3.8   PROTTOTAL  --  5.9* 5.3* 5.7*   < > 6.2*   BILITOTAL  --  1.9* 1.9* 2.1*   < > 2.7*   ALKPHOS  --  74 71 76   < > 76   ALT  --  164* 184* 248*   < > 128*   AST  --  133* 170* 290*   < >  --    LIPASE  --   --   --   --   --  16    < > = values in this interval not displayed.

## 2025-07-23 NOTE — PROGRESS NOTES
Care Management Follow Up    Length of Stay (days): 6    Expected Discharge Date: 07/24/2025     Concerns to be Addressed: discharge planning     Patient plan of care discussed at interdisciplinary rounds: Yes    Anticipated Discharge Disposition:  [facility]     Anticipated Discharge Services:  Home Care  Anticipated Discharge DME:  Walker, O2    Patient/family educated on Medicare website which has current facility and service quality ratings:  N/A  Education Provided on the Discharge Plan:  yes  Patient/Family in Agreement with the Plan:  yes    Referrals Placed by CM/SW:  home care  Private pay costs discussed: transportation costs    Discussed  Partnership in Safe Discharge Planning  document with patient/family: No     Handoff Completed: No, handoff not indicated or clinically appropriate    Additional Information:  Mallika met with the pt and her son Adonay who was at the bedside. Pt was sitting up in her chair when Sw arrived. Pt is currently on O2, but reports that she is on RA at baseline.  Sw discussed the discharge options of TCU vs returning to her [facility] with increased services and HC.   The pt will likely be in copay days at TCU due to the number of days she has recently been at TCU.  Pt and family (Adonay and Nguyen were present) are hoping that the pt can return to [facility] with resumption of [facility] RN/PT services. Mallika suggested also adding OT/HHA services which they are agreeable to. Mallika told them that they will call [facility] and confirm the pt's current status with them and discuss her return potentially tomorrow.  Mallika called and left a vm for [facility] RN, requesting a call back.  Mallika updated [facility] on the pt's potential discharge tomorrow.    Next Steps:   Mallika will continue with discharge planning and will be available as needed until discharge.    KRYSTINA Frazier, [facility]  Inpatient Care Coordination  [facility]  [phone]

## 2025-07-23 NOTE — PLAN OF CARE
"To Do:  End of Shift Summary  For vital signs and complete assessments, please see documentation flowsheets.     Pertinent assessments: Pt is A/Ox4, forgetful. VSS on 2L NC. Denies N/V and pain. Ax1 with walker and gait belt. PIV SL. Cardiac diet. L shin wound dressing, CDI. Blanchable redness on sacrum, mepilex CDI. Purewick in place, adequate output.      Major Shift Events: uneventful.     Treatment Plan: Invanz. Maintain contact precautions. WOC, PT, and SW following.      Bedside Nurse: Amna Sosa RN    Goal Outcome Evaluation:      Plan of Care Reviewed With: patient    Overall Patient Progress: improvingOverall Patient Progress: improving    Outcome Evaluation: Invanz. Denies pain.        Problem: Adult Inpatient Plan of Care  Goal: Plan of Care Review  Description: The Plan of Care Review/Shift note should be completed every shift.  The Outcome Evaluation is a brief statement about your assessment that the patient is improving, declining, or no change.  This information will be displayed automatically on your shift  note.  Outcome: Progressing  Flowsheets (Taken 7/22/2025 8057)  Outcome Evaluation: Invanz. Denies pain.  Plan of Care Reviewed With: patient  Overall Patient Progress: improving  Goal: Patient-Specific Goal (Individualized)  Description: You can add care plan individualizations to a care plan. Examples of Individualization might be:  \"Parent requests to be called daily at 9am for status\", \"I have a hard time hearing out of my right ear\", or \"Do not touch me to wake me up as it startles  me\".  Outcome: Progressing  Goal: Absence of Hospital-Acquired Illness or Injury  Outcome: Progressing  Intervention: Identify and Manage Fall Risk  Recent Flowsheet Documentation  Taken 7/22/2025 1617 by Amna Sosa, RN  Safety Promotion/Fall Prevention:   activity supervised   clutter free environment maintained   increased rounding and observation   increase visualization of patient   nonskid shoes/slippers " when out of bed   room near nurse's station   room organization consistent   safety round/check completed  Intervention: Prevent Skin Injury  Recent Flowsheet Documentation  Taken 7/22/2025 1617 by Amna Sosa RN  Body Position: position changed independently  Intervention: Prevent and Manage VTE (Venous Thromboembolism) Risk  Recent Flowsheet Documentation  Taken 7/22/2025 1617 by Amna Sosa RN  VTE Prevention/Management: SCDs off (sequential compression devices)  Intervention: Prevent Infection  Recent Flowsheet Documentation  Taken 7/22/2025 1617 by Amna Sosa RN  Infection Prevention:   cohorting utilized   rest/sleep promoted   single patient room provided   personal protective equipment utilized  Goal: Optimal Comfort and Wellbeing  Outcome: Progressing  Goal: Readiness for Transition of Care  Outcome: Progressing     Problem: Delirium  Goal: Optimal Coping  Outcome: Progressing  Goal: Improved Behavioral Control  Outcome: Progressing  Intervention: Minimize Safety Risk  Recent Flowsheet Documentation  Taken 7/22/2025 1617 by Amna Sosa RN  Enhanced Safety Measures: room near unit station  Goal: Improved Attention and Thought Clarity  Outcome: Progressing  Goal: Improved Sleep  Outcome: Progressing

## 2025-07-23 NOTE — PLAN OF CARE
"Pt pale with scattered bruising and wound to left leg. Incont urine- changed and purewick placed while sleeping. Tylenolo and oxycodone given as well as ice to lower back for pain 10/10. Awake several times- fair/poor sleep tonight. Alert and oriented to person, place, disoriented to time. Yells out instead of using call light. Bed alarm on. Saline locked. Invanz- today is 4/5. Remains on 2LO2. Low grade temp 99     Major Shift Events:   Incont urine/bed change, PO meds for chronic power back pain. Otherwise slept fair- awake several times overnight with back discomfort. Slightly confused but easily reoriented.      Treatment Plan:   Invanz. Maintain contact precautions. Cardiac diet, incont cares & encourage repositioning, Reorient PRN, Pain control, WOC, PT, and SW following.                                      Problem: Adult Inpatient Plan of Care  Goal: Plan of Care Review  Description: The Plan of Care Review/Shift note should be completed every shift.  The Outcome Evaluation is a brief statement about your assessment that the patient is improving, declining, or no change.  This information will be displayed automatically on your shift  note.  Outcome: Progressing  Flowsheets (Taken 7/23/2025 0628)  Plan of Care Reviewed With: patient  Overall Patient Progress: improving  Goal: Patient-Specific Goal (Individualized)  Description: You can add care plan individualizations to a care plan. Examples of Individualization might be:  \"Parent requests to be called daily at 9am for status\", \"I have a hard time hearing out of my right ear\", or \"Do not touch me to wake me up as it startles  me\".  Outcome: Progressing  Goal: Absence of Hospital-Acquired Illness or Injury  Outcome: Progressing  Intervention: Identify and Manage Fall Risk  Recent Flowsheet Documentation  Taken 7/23/2025 0015 by Rylee Andrade, RN  Safety Promotion/Fall Prevention: safety round/check completed  Intervention: Prevent Skin Injury  Recent " Flowsheet Documentation  Taken 7/23/2025 0015 by Rylee Andrade, RN  Body Position: supine, legs elevated  Goal: Optimal Comfort and Wellbeing  Outcome: Progressing  Goal: Readiness for Transition of Care  Outcome: Progressing   Goal Outcome Evaluation:      Plan of Care Reviewed With: patient    Overall Patient Progress: improvingOverall Patient Progress: improving

## 2025-07-24 VITALS
OXYGEN SATURATION: 97 % | SYSTOLIC BLOOD PRESSURE: 145 MMHG | DIASTOLIC BLOOD PRESSURE: 80 MMHG | TEMPERATURE: 97.4 F | HEART RATE: 84 BPM | RESPIRATION RATE: 20 BRPM

## 2025-07-24 LAB
ANION GAP SERPL CALCULATED.3IONS-SCNC: 14 MMOL/L (ref 7–15)
BUN SERPL-MCNC: 25.9 MG/DL (ref 8–23)
CALCIUM SERPL-MCNC: 8.8 MG/DL (ref 8.8–10.4)
CHLORIDE SERPL-SCNC: 98 MMOL/L (ref 98–107)
CREAT SERPL-MCNC: 0.9 MG/DL (ref 0.51–0.95)
EGFRCR SERPLBLD CKD-EPI 2021: 59 ML/MIN/1.73M2
GLUCOSE SERPL-MCNC: 95 MG/DL (ref 70–99)
HCO3 SERPL-SCNC: 26 MMOL/L (ref 22–29)
POTASSIUM SERPL-SCNC: 3.5 MMOL/L (ref 3.4–5.3)
SODIUM SERPL-SCNC: 138 MMOL/L (ref 135–145)

## 2025-07-24 PROCEDURE — 99223 1ST HOSP IP/OBS HIGH 75: CPT | Performed by: NURSE PRACTITIONER

## 2025-07-24 PROCEDURE — 250N000013 HC RX MED GY IP 250 OP 250 PS 637: Performed by: INTERNAL MEDICINE

## 2025-07-24 PROCEDURE — 99232 SBSQ HOSP IP/OBS MODERATE 35: CPT | Performed by: INTERNAL MEDICINE

## 2025-07-24 PROCEDURE — 80048 BASIC METABOLIC PNL TOTAL CA: CPT | Performed by: INTERNAL MEDICINE

## 2025-07-24 PROCEDURE — 250N000013 HC RX MED GY IP 250 OP 250 PS 637: Performed by: HOSPITALIST

## 2025-07-24 PROCEDURE — 120N000001 HC R&B MED SURG/OB

## 2025-07-24 PROCEDURE — 36415 COLL VENOUS BLD VENIPUNCTURE: CPT | Performed by: INTERNAL MEDICINE

## 2025-07-24 PROCEDURE — 250N000011 HC RX IP 250 OP 636: Performed by: INTERNAL MEDICINE

## 2025-07-24 RX ORDER — OLANZAPINE 5 MG/1
5 TABLET, ORALLY DISINTEGRATING ORAL EVERY 6 HOURS PRN
Status: ACTIVE | OUTPATIENT
Start: 2025-07-24

## 2025-07-24 RX ORDER — SENNOSIDES 8.6 MG
1 TABLET ORAL 2 TIMES DAILY PRN
Status: ACTIVE | OUTPATIENT
Start: 2025-07-24

## 2025-07-24 RX ORDER — BISACODYL 10 MG
10 SUPPOSITORY, RECTAL RECTAL
Status: ACTIVE | OUTPATIENT
Start: 2025-07-27

## 2025-07-24 RX ORDER — CARBOXYMETHYLCELLULOSE SODIUM 5 MG/ML
1-2 SOLUTION/ DROPS OPHTHALMIC
Status: ACTIVE | OUTPATIENT
Start: 2025-07-24

## 2025-07-24 RX ORDER — HYDROMORPHONE HYDROCHLORIDE 1 MG/ML
2 SOLUTION ORAL
Refills: 0 | Status: ACTIVE | OUTPATIENT
Start: 2025-07-24

## 2025-07-24 RX ORDER — HYDROMORPHONE HYDROCHLORIDE 2 MG/1
2 TABLET ORAL
Refills: 0 | Status: ACTIVE | OUTPATIENT
Start: 2025-07-24

## 2025-07-24 RX ORDER — ATROPINE SULFATE 10 MG/ML
2 SOLUTION/ DROPS OPHTHALMIC EVERY 4 HOURS PRN
Status: ACTIVE | OUTPATIENT
Start: 2025-07-24

## 2025-07-24 RX ORDER — NALOXONE HYDROCHLORIDE 0.4 MG/ML
0.2 INJECTION, SOLUTION INTRAMUSCULAR; INTRAVENOUS; SUBCUTANEOUS
Status: ACTIVE | OUTPATIENT
Start: 2025-07-24

## 2025-07-24 RX ORDER — NALOXONE HYDROCHLORIDE 0.4 MG/ML
0.1 INJECTION, SOLUTION INTRAMUSCULAR; INTRAVENOUS; SUBCUTANEOUS
Status: ACTIVE | OUTPATIENT
Start: 2025-07-24

## 2025-07-24 RX ORDER — HYDROMORPHONE HYDROCHLORIDE 1 MG/ML
0.3 INJECTION, SOLUTION INTRAMUSCULAR; INTRAVENOUS; SUBCUTANEOUS
Refills: 0 | Status: ACTIVE | OUTPATIENT
Start: 2025-07-24

## 2025-07-24 RX ORDER — HYDROMORPHONE HYDROCHLORIDE 1 MG/ML
1 SOLUTION ORAL
Refills: 0 | Status: ACTIVE | OUTPATIENT
Start: 2025-07-24

## 2025-07-24 RX ORDER — LORAZEPAM 2 MG/ML
1 CONCENTRATE ORAL 3 TIMES DAILY PRN
Status: ACTIVE | OUTPATIENT
Start: 2025-07-24

## 2025-07-24 RX ORDER — HYDROMORPHONE HYDROCHLORIDE 1 MG/ML
0.5 INJECTION, SOLUTION INTRAMUSCULAR; INTRAVENOUS; SUBCUTANEOUS
Refills: 0 | Status: ACTIVE | OUTPATIENT
Start: 2025-07-24

## 2025-07-24 RX ORDER — MINERAL OIL/HYDROPHIL PETROLAT
OINTMENT (GRAM) TOPICAL
Status: ACTIVE | OUTPATIENT
Start: 2025-07-24

## 2025-07-24 RX ADMIN — BUPROPION HYDROCHLORIDE 150 MG: 150 TABLET, EXTENDED RELEASE ORAL at 08:22

## 2025-07-24 RX ADMIN — APIXABAN 5 MG: 5 TABLET, FILM COATED ORAL at 21:11

## 2025-07-24 RX ADMIN — SPIRONOLACTONE 25 MG: 25 TABLET ORAL at 08:23

## 2025-07-24 RX ADMIN — DORZOLAMIDE HYDROCHLORIDE AND TIMOLOL MALEATE 1 DROP: 20; 5 SOLUTION/ DROPS OPHTHALMIC at 08:23

## 2025-07-24 RX ADMIN — FUROSEMIDE 20 MG: 20 TABLET ORAL at 08:22

## 2025-07-24 RX ADMIN — METOPROLOL TARTRATE 75 MG: 50 TABLET, FILM COATED ORAL at 08:22

## 2025-07-24 RX ADMIN — FAMOTIDINE 20 MG: 20 TABLET, FILM COATED ORAL at 08:22

## 2025-07-24 RX ADMIN — APIXABAN 5 MG: 5 TABLET, FILM COATED ORAL at 08:22

## 2025-07-24 RX ADMIN — Medication 1 TABLET: at 08:22

## 2025-07-24 RX ADMIN — METOPROLOL TARTRATE 75 MG: 50 TABLET, FILM COATED ORAL at 21:11

## 2025-07-24 RX ADMIN — ERTAPENEM SODIUM 1 G: 1 INJECTION, POWDER, LYOPHILIZED, FOR SOLUTION INTRAMUSCULAR; INTRAVENOUS at 06:15

## 2025-07-24 RX ADMIN — DORZOLAMIDE HYDROCHLORIDE AND TIMOLOL MALEATE 1 DROP: 20; 5 SOLUTION/ DROPS OPHTHALMIC at 21:11

## 2025-07-24 RX ADMIN — SENNOSIDES AND DOCUSATE SODIUM 1 TABLET: 50; 8.6 TABLET ORAL at 08:22

## 2025-07-24 ASSESSMENT — ACTIVITIES OF DAILY LIVING (ADL)
ADLS_ACUITY_SCORE: 68
ADLS_ACUITY_SCORE: 68
ADLS_ACUITY_SCORE: 74
ADLS_ACUITY_SCORE: 68
ADLS_ACUITY_SCORE: 72
ADLS_ACUITY_SCORE: 74
ADLS_ACUITY_SCORE: 72
ADLS_ACUITY_SCORE: 72
ADLS_ACUITY_SCORE: 68
ADLS_ACUITY_SCORE: 74
ADLS_ACUITY_SCORE: 72
ADLS_ACUITY_SCORE: 72
ADLS_ACUITY_SCORE: 74
ADLS_ACUITY_SCORE: 68
ADLS_ACUITY_SCORE: 74
ADLS_ACUITY_SCORE: 68
ADLS_ACUITY_SCORE: 72

## 2025-07-24 NOTE — PROGRESS NOTES
[facility]    Medicine Progress Note - Hospitalist Service    Date of Admission:  7/17/2025    Assessment & Plan   Riddhi Aguilar is a 89 year old female admitted on 7/17/2025 who presented with 3 to 4 days of increasing weakness and intermittent confusion.  Evaluation revealed UTI with 2 strains of ESBL E. coli.  Patient was treated with Invanz.  Confusion improved but patient remained weak.  She has had several hospitalizations in recent months and seemed to be declining.  Her appetite and oral intake are diminished.  Patient was expected to improve some with treatment of UTI but she remained weak.  After discussion with the patient and her family she has decided to enter into hospice care.  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  Social work and palliative care were consulted.     Problem list:     Acute infectious encephalopathy  Urinary tract infection   -Her UA was grossly abnormal.   -She was initially given IV Ceftriaxone. Urine culture growing ESBL. E.coli. Antibiotic changed to IV ertapenem on 7/20.   -Complete IV Invanz today.      A-fib with RVR   -likely secondary to physiologic response of underlying infection.  Patient has been continuing her metoprolol 75 mg twice daily.  Mild troponin elevation more consistent with supply/demand ischemia.  -Heart rate 70s to 110s  -Continue metoprolol therapy  -Cardiac monitoring     Mild to moderate aortic stenosis   -noted on recent echo April 2025.  Do not think that it is contributing to her current symptom complex.     Acute on chronic systolic CHF with recovered ejection fraction  Elevated proBNP  Elevated creatinine   -History of systolic heart failure but most recent echo on 4/18/2025 showed ejection fraction of 60 to 65%  -patient states that at no point over the last few days which she experiencing shortness of breath.  She does not use oxygen at baseline.  Her daughter does note that she is  worried that she had worse p.o. intake over the last few days.    -Patient having mildly increased work of breath. Has trace leg edema. Her elevated BNP could just be indicative of worsening renal function in the setting of poor p.o. intake and prerenal azotemia.   -Received Lasix 20 mg IV for 2 days.  Restarted PTA Lasix 20 mg p.o. daily on 7/22  -Remains on PTA spironolactone  -Some SOB and new oxygen needs on 7/23. CXR suggested increased pulmonary edema  -Extra dose of Lasix 20 mg IV once given on 7/23     Elevated transaminase levels  Elevated bilirubin   -albumin 3.8,Platelets 335 decayed and normal liver synthetic function.  CT notable for hepatic steatosis and/or cirrhosis.  No clear cause of insult to liver however could be an indicative of a congestive hepatopathy in the setting of volume overload.  -LFTs significantly elevated from yesterday.  , , bilirubin total 3.0.    -Viral hepatitis panel nonreactive.    -Ultrasound of right upper quadrant showed cholelithiasis without other sonographic evidence of cholecystitis.  No biliary dilatation. CBD measures 2.0 mm.  Normal liver echogenicity.  -Liver enzymes trending down. Will monitor      Erythema of left shin   -Seems most consistent with a bruise for a patient with fragile skin on DOAC therapy. Doesn't appear infected  -continue to monitor clinically     Hyperlipidemia   -continue atorvastatin     Conditioning  -PT eval to help determine if she needs additional services at her assisted living     Concern for for failure to thrive  - Patient and family report decline over the last 2 years with increase in rate of decline in the last few months.  She has had several hospitalizations recently.  In spite of not being as ill at this time as she was during other admissions, she is weaker and more withdrawn.  After discussion with patient and her family she has decided to go into hospice care.  Palliative care and social work have been consulted.   "We are looking to discharge her back to her assisted living facility with hospice care tomorrow.          Diet: Regular Diet Adult    DVT Prophylaxis: comfort cares  Martines Catheter: Not present  Lines: None     Cardiac Monitoring: None  Code Status: No CPR- Do NOT Intubate      Clinically Significant Risk Factors          # Hypochloremia: Lowest Cl = 96 mmol/L in last 2 days, will monitor as appropriate      # Hypoalbuminemia: Lowest albumin = 3 g/dL at 7/21/2025  7:11 AM, will monitor as appropriate     # Hypertension: Noted on problem list  # Acute heart failure with preserved ejection fraction: heart failure noted on problem list, last echo with EF >50%, and receiving IV diuretics           # Obesity: Estimated body mass index is 35.14 kg/m  as calculated from the following:    Height as of 7/14/25: 1.499 m (4' 11\").    Weight as of 7/14/25: 78.9 kg (174 lb).      # Financial/Environmental Concerns:           Social Drivers of Health    Physical Activity: Unknown (5/14/2024)    Exercise Vital Sign     Days of Exercise per Week: 4 days   Social Connections: Unknown (5/14/2024)    Social Connection and Isolation Panel [NHANES]     Frequency of Social Gatherings with Friends and Family: Once a week          Disposition Plan     Medically Ready for Discharge: Anticipated Tomorrow             Kory Brandt MD  Hospitalist Service  [facility]  Securely message with [employer] (more info)  Text page via [facility] Paging/Directory   ______________________________________________________________________    Interval History   Patient remains weak and with poor oral intake.  Denies pain and shortness of breath.  Still needing oxygen.  Patient and family have decided for her to go into hospice with comfort cares.    Physical Exam   Vital Signs: Temp: 97.4  F (36.3  C) Temp src: Oral BP: 128/82 Pulse: 83   Resp: 20 SpO2: 97 % O2 Device: Nasal cannula Oxygen Delivery: 2 LPM  Weight: 0 lbs 0 oz    GENERAL:  " Comfortable. Cooperative.  PSYCH: pleasant, oriented, No acute distress.  EYES: PERRLA, Normal conjunctiva.  HEART:  Regular rate and rhythm. No JVD. Pulses normal. No edema.  LUNGS:  Clear to auscultation, normal Respiratory effort.  ABDOMEN:  Soft, no hepatosplenomegaly, normal bowel sounds.  EXTREMETIES: No clubbing, cyanosis or ischemia  SKIN:  Dry to touch, No rash.      Medical Decision Making       45 MINUTES SPENT BY ME on the date of service doing chart review, history, exam, documentation & further activities per the note.      Data     I have personally reviewed the following data over the past 24 hrs:    N/A  \   N/A   / N/A     138 98 25.9 (H) /  95   3.5 26 0.90 \       Imaging results reviewed over the past 24 hrs:   No results found for this or any previous visit (from the past 24 hours).  Recent Labs   Lab 07/24/25  0655 07/23/25  0712 07/22/25  0814 07/21/25  0711 07/20/25  0908 07/17/25  2114 07/17/25  1718   WBC  --   --  7.8 8.8 11.4*   < > 9.2   HGB  --   --  14.1 13.4 14.1   < > 12.8   MCV  --   --  107* 106* 105*   < > 106*   PLT  --   --  260 265 291   < > 335    135 135 131* 134*   < > 132*   POTASSIUM 3.5 3.9 3.8 3.5 4.3   < > 4.8   CHLORIDE 98 96* 95* 95* 95*   < > 94*   CO2 26 25 27 25 25   < > 25   BUN 25.9* 28.4* 25.7* 29.0* 33.9*   < > 37.0*   CR 0.90 0.98* 0.90 0.93 1.10*   < > 1.19*   ANIONGAP 14 14 13 11 14   < > 13   TAMMY 8.8 9.0 8.9 8.6* 9.1   < > 9.3   GLC 95 108* 102* 79 148*   < > 107*   ALBUMIN  --   --  3.3* 3.0* 3.4*   < > 3.8   PROTTOTAL  --   --  5.9* 5.3* 5.7*   < > 6.2*   BILITOTAL  --   --  1.9* 1.9* 2.1*   < > 2.7*   ALKPHOS  --   --  74 71 76   < > 76   ALT  --   --  164* 184* 248*   < > 128*   AST  --   --  133* 170* 290*   < >  --    LIPASE  --   --   --   --   --   --  16    < > = values in this interval not displayed.

## 2025-07-24 NOTE — CONSULTS
Palliative Care Consultation Note  [facility]      Patient: Riddhi Aguilar  Date of Admission:  7/17/2025    Requesting Clinician / Team: Social work on behalf of hospital medicine  Reason for consult:   Goals of care     Recommendations & Counseling     GOALS OF CARE:   Comfort focused   Riddhi shared that she is no longer interested in seeking restorative care and repeat hospitalizations.  She is no longer interested in seeking rehabilitation.  She shared that she has had minimal intake and continues with profound fatigue.   Transition to comfort focused goals while awaiting discharge with hospice support.  We reviewed her routine medications and plan to continue with her antihypertensives, diuretics and blood thinners at this time.  Shared that hospice will work with her as her body changes to determine when these medications can be stopped.    ADVANCE CARE PLANNING:  No health care directive on file. Per system policy, Surrogate Decision-makers for Patients With Diminished Decision-making Capacity offers guidance on possible decision-makers.   There is a POLST form on file, but will need to be updated.  Hospice will work with her and family on filling out a new POLST during their initial intake.  Code status: No CPR- Do NOT Intubate    MEDICAL MANAGEMENT:   Comfort Care   Below are common symptoms routinely seen in patients with comfort focused goals and at the end of life. This patient may not currently exhibit all of these symptoms; however, if these were to occur our recommendations are as follows:    #Pain  1st choice: hydromorphone PO 1-2 mg q 2 hour as needed.   2nd choice: hydromorphone IV 0.3-0.5 mg q 15 minutes as needed     If unable to take PO and morphine, consider oxycodone SL 5-10 mg q 1 hour as needed     #Air hunger/Dyspnea   1st choice: hydromorphone PO 1-2 mg q 1 hour as needed.   2nd choice: hydromorphone IV 0.3-0.5 mg q 15 minutes as needed     #Anxiety    1st choice:  Lorazepam PO/SL 1 mg  q 3 hour as needed.   2nd choice: Lorazepam IV 1 mg q 3 hour as needed    #Secretion burden   Robinul 0.1 mg (PO/IV) q 4 hours as needed. If ineffective, increase up to 0.3 mg   Consider atropine if Robinul ineffective after dose increase      #Nausea   Zofran 4 mg q 6 hours as needed. Can increase to 8 mg   Zyprexa 5 mg q 6 hours as needed     #Agitation  Aggressive control of other symptoms first, then  1st choice: Zyprexa 5 mg ODT or IM   2nd choice: Increase dose of Zyprexa to 10 mg or consider switch to Haldol   3rd choice: Lorazepam as above     PSYCHOSOCIAL/SPIRITUAL SUPPORT:  Family   Nancy community: [facility]     Palliative Care will continue to follow with. Thank you for the consult and allowing us to aid in the care of Riddhi Aguilar.    These recommendations have been discussed with bedside and primary team.    Andrez Rojas NP  [facility], Palliative Care  Securely message with the [employer]-Online Web Console (learn more here) or  Text page via [employer] Paging/Directory     Chart documentation was completed, in part, with Livefyre voice-recognition software. Even though reviewed, some grammatical, spelling, and word errors may remain.    Assessment      Riddhi Aguilar is a 89 year old female with a past medical history of hypertension, hyperlipidemia, A-fib on chronic anticoagulation, chronic kidney disease, aortic stenosis, cataracts, glaucoma, GERD, major depressive disorder, osteoporosis who presented on 7/17 with worsening weakness and confusion.  She was treated for a probable urinary tract infection.  She was initially plan to discharge today back to her assisted living with ongoing home care however given concern for ongoing failure to thrive, weakness and Riddhi's desire to no longer pursue restorative treatment the palliative care service was consulted for goals of care support    Today, the patient was seen for:  Goals of care support    History of Present Illness   Met with Riddhi  and her children both in person and over the phone.   Introduced the role of palliative care as an interdisciplinary team that cares for patients with serious illness to help support symptom management, communication, coping for patients and their families as well as support with medical decision making.    Prognosis, Goals, & Planning:   Functional Status just prior to this current hospitalization:  Karnofsky Performance Scale (KPS): Unable to care for self; requires equivalent of institutional or hospital care; disease may be progressing rapidly: 40 - Disabled; requires special care and assistance.    Prognosis, Goals, and/or Advance Care Planning:  Discussed what continuing restorative/life-prolonging care entails, including continued (re)admissions to the hospital, continuing with routine medical care in in the outpatient setting, engaging in therapies with the hope to prolong life.   Shared that with any treatment, there will be risks and benefits that may affect overall quality of life.  Shared that as we age and diseases progress, patients may spend more and more time in a medical setting to attempt at prolonging life. Moreover, the increase in medical care does not equate to improvement of symptoms or disease.  We hope that there will be improvement, but often the increase in treatments are needed to maintain a disease state rather than improvement.   Education provided on the role of palliative care while continuing with disease treatments (support/coping, symptom management).  We discussed that if/when disease treatments are no longer effective or when their quality of life is too negatively impacted, patients may elect to focus more solely on quality-of-life measures and stop disease treatment.    Education provided on transition to comfort-focused goals of care including discontinuation of  interventions that do not directly promote comfort.  Education given that current treatments that promote comfort  and quality of life are continued. Anticipatory guidance was given regarding feeding, hunger, fluids at end of life. We discussed utilization of medications to ease air hunger, agitation and restlessness. Discussed that this process is very purposeful in terms of ensuring patient is as comfortable as possible and that family wishes are honored.  Education provided regarding hospice philosophy, prognostic, and eligibility criteria. Discussed what services are provided nd those that are not. Discussed common misconceptions. We explored the various disposition options where they can receive hospice care (home, residential hospice homes, LTC with hospice) including subsequent financial and familial implications. Discussed typical anticipated timing of discharge, but we discussed that care-management will assist in specifics.     Code Status was addressed today:   No she is already DNR/DNI which is clinically appropriate          Coping, Meaning, & Spirituality:   Mood, coping, and/or meaning in the context of serious illness were addressed today: Yes    Social:   Living situation: resides in assisted living [facility]  Important relationships/caregivers: Her children.  She is  her   on hospice care several years ago    Medications:  Reviewed this patient's medication profile and medications from this hospitalization.   [place] Board of Pharmacy Data Base Reviewed: Yes:   reviewed -  .    ROS:  Comprehensive ROS is reviewed and is negative except as here & per HPI:     Physical Exam   Vital Signs with Ranges  Temp:  [97.4  F (36.3  C)-97.6  F (36.4  C)] 97.4  F (36.3  C)  Pulse:  [71-83] 83  Resp:  [20] 20  BP: (128-144)/(82-89) 128/82  SpO2:  [92 %-97 %] 97 %  Wt Readings from Last 10 Encounters:   25 78.9 kg (174 lb)   25 81.2 kg (179 lb)   25 78.9 kg (174 lb)   25 79.5 kg (175 lb 4.3 oz)   25 78.9 kg (174 lb)   25 78.9 kg (174 lb)   25 78.9 kg  (174 lb)   06/11/25 79.4 kg (175 lb)   06/09/25 79.4 kg (175 lb)   06/06/25 82.8 kg (182 lb 8 oz)     0 lbs 0 oz    Physical Exam  Vitals and nursing note reviewed.   Constitutional:       General: She is not in acute distress.  HENT:      Head: Normocephalic.      Mouth/Throat:      Mouth: Mucous membranes are moist.      Pharynx: Oropharynx is clear.   Eyes:      Extraocular Movements: Extraocular movements intact.      Conjunctiva/sclera: Conjunctivae normal.      Pupils: Pupils are equal, round, and reactive to light.   Cardiovascular:      Rate and Rhythm: Normal rate and regular rhythm.      Pulses: Normal pulses.   Pulmonary:      Effort: Pulmonary effort is normal. No respiratory distress.      Breath sounds: No wheezing.   Abdominal:      General: Abdomen is flat. There is no distension.      Tenderness: There is no abdominal tenderness.   Musculoskeletal:         General: Normal range of motion.   Skin:     General: Skin is warm and dry.      Capillary Refill: Capillary refill takes less than 2 seconds.   Neurological:      General: No focal deficit present.      Mental Status: Mental status is at baseline. She is lethargic.   Psychiatric:         Mood and Affect: Mood normal.         Thought Content: Thought content normal.           Data reviewed:  Recent Results (from the past 24 hours)   XR Chest Port 1 View    Narrative    EXAM: XR CHEST PORT 1 VIEW  LOCATION: [facility]  DATE: 7/23/2025    INDICATION: hypoxia  COMPARISON: Chest radiograph 7/17/2025.      Impression    IMPRESSION: Stable size of cardiomediastinal silhouette. Significant mitral annular calcifications are again noted. Interval worsening of bibasilar opacities since prior radiograph, represent worsening edema, aspiration or developing airspace   consolidation. No definite pleural effusion or pneumothorax. Bones are unchanged.   Basic Metabolic Panel (Limited Occurrences)   Result Value Ref Range    Sodium 138 135 -  145 mmol/L    Potassium 3.5 3.4 - 5.3 mmol/L    Chloride 98 98 - 107 mmol/L    Carbon Dioxide (CO2) 26 22 - 29 mmol/L    Anion Gap 14 7 - 15 mmol/L    Urea Nitrogen 25.9 (H) 8.0 - 23.0 mg/dL    Creatinine 0.90 0.51 - 0.95 mg/dL    GFR Estimate 59 (L) >60 mL/min/1.73m2    Calcium 8.8 8.8 - 10.4 mg/dL    Glucose 95 70 - 99 mg/dL       Medical Decision Making       85 MINUTES SPENT BY ME on the date of service doing chart review, history, exam, documentation & further activities per the note.

## 2025-07-24 NOTE — PLAN OF CARE
"Pt alert but lethargic tonight. A/O, forgetful. [place]- ESTEFANY hearing aids in place except while sleeping. Skin pale, bruised and edematous. LLE wound covered- CDI. Denies pain tonight. Purewick for urine. Contact iso maintained. Tolerating cardiac diet. VSS on 2L O2. Saline locked. Lungs clear, dim in ESTEFANY bases and CRI[place] fine crackles.     Major Shift Events:   Uneventful night, slept well. No significant change in condition.     Treatment Plan:    IV Lasix, IV Invanz- today day 5/5. PRN O2, incontince and skin cares provided. Purewick, Contact isolation, Cardiac diet, PT, WOC and SW following.                                 Problem: Adult Inpatient Plan of Care  Goal: Plan of Care Review  Description: The Plan of Care Review/Shift note should be completed every shift.  The Outcome Evaluation is a brief statement about your assessment that the patient is improving, declining, or no change.  This information will be displayed automatically on your shift  note.  Outcome: Progressing  Flowsheets (Taken 7/24/2025 0601)  Plan of Care Reviewed With: patient  Overall Patient Progress: improving  Goal: Patient-Specific Goal (Individualized)  Description: You can add care plan individualizations to a care plan. Examples of Individualization might be:  \"Parent requests to be called daily at 9am for status\", \"I have a hard time hearing out of my right ear\", or \"Do not touch me to wake me up as it startles  me\".  Outcome: Progressing  Goal: Absence of Hospital-Acquired Illness or Injury  Outcome: Progressing  Goal: Optimal Comfort and Wellbeing  Outcome: Progressing  Goal: Readiness for Transition of Care  Outcome: Progressing   Goal Outcome Evaluation:      Plan of Care Reviewed With: patient    Overall Patient Progress: improvingOverall Patient Progress: improving               "

## 2025-07-24 NOTE — PLAN OF CARE
"Pt up heavy Ax2 with walker & belt. A&O. [place], has HAs. On 3L O2, difficult to get good O2 sat reading with poor circulation. REDDY. Incont of urine. LE elana. Drsg changed to LLE. Sat up in recliner all day. Family in room all day. Appetite good, denies nausea. Appears comfortable. Placed on comfort cares.   Goal Outcome Evaluation:      Plan of Care Reviewed With: patient    Overall Patient Progress: no changeOverall Patient Progress: no change    Outcome Evaluation: Comfort cares    Problem: Adult Inpatient Plan of Care  Goal: Plan of Care Review  Description: The Plan of Care Review/Shift note should be completed every shift.  The Outcome Evaluation is a brief statement about your assessment that the patient is improving, declining, or no change.  This information will be displayed automatically on your shift  note.  Outcome: Not Progressing  Flowsheets (Taken 7/24/2025 1831)  Outcome Evaluation: Comfort cares  Plan of Care Reviewed With: patient  Overall Patient Progress: no change  Goal: Patient-Specific Goal (Individualized)  Description: You can add care plan individualizations to a care plan. Examples of Individualization might be:  \"Parent requests to be called daily at 9am for status\", \"I have a hard time hearing out of my right ear\", or \"Do not touch me to wake me up as it startles  me\".  Outcome: Not Progressing  Goal: Absence of Hospital-Acquired Illness or Injury  Outcome: Not Progressing  Intervention: Identify and Manage Fall Risk  Recent Flowsheet Documentation  Taken 7/24/2025 0900 by Justa Piper, RN  Safety Promotion/Fall Prevention: activity supervised  Intervention: Prevent Skin Injury  Recent Flowsheet Documentation  Taken 7/24/2025 0900 by Justa Piper, RN  Body Position: weight shifting  Intervention: Prevent and Manage VTE (Venous Thromboembolism) Risk  Recent Flowsheet Documentation  Taken 7/24/2025 0900 by Justa Piper, RN  VTE Prevention/Management: SCDs off (sequential compression " devices)  Goal: Optimal Comfort and Wellbeing  Outcome: Not Progressing  Goal: Readiness for Transition of Care  Outcome: Not Progressing

## 2025-07-24 NOTE — PROGRESS NOTES
Care Management Follow Up    Length of Stay (days): 7    Expected Discharge Date: 07/25/2025     Concerns to be Addressed: discharge planning     Patient plan of care discussed at interdisciplinary rounds: Yes    Anticipated Discharge Disposition: [facility]      Anticipated Discharge Services:  Hospice  Anticipated Discharge DME:  O2    Patient/family educated on Medicare website which has current facility and service quality ratings:  yes  Education Provided on the Discharge Plan:  yes  Patient/Family in Agreement with the Plan:  yes    Referrals Placed by CM/SW:  Hospice  Private pay costs discussed: transportation costs    Discussed  Partnership in Safe Discharge Planning  document with patient/family: No     Handoff Completed: No, handoff not indicated or clinically appropriate    Additional Information:  Mallika met with the pt and her son Adonay who was at the bedside. Pt expressed that she is tired of coming to the hospital and wants to go back to [facility]. Sw explained that if she discharges and no longer wants to come to the hospital, that would mean she would discharge home on hospice if she qualifies. Pt is aware of hospice and requested this discharge plan. Sw placed a Palliative Care consult for follow-up.  Mallika spoke with [facility] DONTRELL, Madi P: [phone], said that they can admit the pt back on hospice. Sw explained that it will be tomorrow and she will have new home O2. They said that they have a current contract with [facility] if the pt and family are interested.  Mallika met with the pt and they requested that a referral be sent to [facility]. Adonay was still present and her son Vahe and dtr-in-law Mary Ann were on the phone. Mallika told them that they will follow up with them once everything is lined up.  Mallika sent a referral to [facility] and spoke with Muriel [phone]. They will deliver O2 to the [facility] tomorrow. Pt is on RA at baseline, but now requiring 2L. They can do the admission at 1300 tomorrow. [facility] will fill the comfort meds.  Sw arranged for w/c transport tomorrow between [id number].  Sw updated the pt and family. Sw addressed their questions and concerns.       Next Steps:   Sw will continue with discharge planning and will be available as needed until discharge.      KRYSTINA Frazier, [facility]  Inpatient Care Coordination  [facility]  [phone]

## 2025-07-25 VITALS
HEART RATE: 69 BPM | DIASTOLIC BLOOD PRESSURE: 81 MMHG | SYSTOLIC BLOOD PRESSURE: 132 MMHG | RESPIRATION RATE: 20 BRPM | OXYGEN SATURATION: 97 % | TEMPERATURE: 97.4 F

## 2025-07-25 PROCEDURE — 250N000013 HC RX MED GY IP 250 OP 250 PS 637: Performed by: INTERNAL MEDICINE

## 2025-07-25 PROCEDURE — 99239 HOSP IP/OBS DSCHRG MGMT >30: CPT | Performed by: INTERNAL MEDICINE

## 2025-07-25 PROCEDURE — 250N000013 HC RX MED GY IP 250 OP 250 PS 637: Performed by: HOSPITALIST

## 2025-07-25 RX ADMIN — FAMOTIDINE 20 MG: 20 TABLET, FILM COATED ORAL at 08:40

## 2025-07-25 RX ADMIN — APIXABAN 5 MG: 5 TABLET, FILM COATED ORAL at 08:41

## 2025-07-25 RX ADMIN — SPIRONOLACTONE 25 MG: 25 TABLET ORAL at 08:41

## 2025-07-25 RX ADMIN — METOPROLOL TARTRATE 75 MG: 50 TABLET, FILM COATED ORAL at 08:40

## 2025-07-25 RX ADMIN — BUPROPION HYDROCHLORIDE 150 MG: 150 TABLET, EXTENDED RELEASE ORAL at 08:41

## 2025-07-25 RX ADMIN — SENNOSIDES AND DOCUSATE SODIUM 1 TABLET: 50; 8.6 TABLET ORAL at 08:40

## 2025-07-25 RX ADMIN — DORZOLAMIDE HYDROCHLORIDE AND TIMOLOL MALEATE 1 DROP: 20; 5 SOLUTION/ DROPS OPHTHALMIC at 08:50

## 2025-07-25 RX ADMIN — FUROSEMIDE 20 MG: 20 TABLET ORAL at 08:40

## 2025-07-25 ASSESSMENT — ACTIVITIES OF DAILY LIVING (ADL)
ADLS_ACUITY_SCORE: 74

## 2025-07-25 NOTE — PROGRESS NOTES
Care Management Discharge Note    Discharge Date: 07/25/2025       Discharge Disposition:  [facility]DC    Discharge Services:  Hospice    Discharge DME:  O2    Discharge Transportation: agency - [facility] w/c    Private pay costs discussed: transportation costs  Reviewed out of pocket cost for [facility] transport, $99.20 base and $6.38 per mile to the destination. Spoke with the pt and her family, they expressed understanding and are agreeable to this.     Does the patient's insurance plan have a 3 day qualifying hospital stay waiver?  Yes     Which insurance plan 3 day waiver is available? Alternative insurance waiver    Will the waiver be used for post-acute placement? No    PAS Confirmation Code: N/A   Patient/family educated on Medicare website which has current facility and service quality ratings:  yes    Education Provided on the Discharge Plan:  yes  Persons Notified of Discharge Plans: Pt, Pt's children, [facility]  Patient/Family in Agreement with the Plan:  yes    Handoff Referral Completed: No, handoff not indicated or clinically appropriate    Additional Information:  The pt will discharge back to [facility] today with [facility]. The pt's hospice equipment was delivered and hospice filled the pt's comfort meds.   Sw met with the pt and family to address any questions or concerns, none were identified.  Sw confirmed the pt's discharge with [facility] and faxed them the pt's discharge orders P: [phone] F: [phone].  Mallika confirmed the pt's discharge with [facility] and faxed them the pt's discharge orders P: [phone] F: [phone].    Sw will continue to be available as needed until discharge.    KRYSTINA Frazier, [facility]  Inpatient Care Coordination  [facility]  [phone]

## 2025-07-25 NOTE — PLAN OF CARE
"Pt returned to bed at 2200. Bruising noted to ESTEFANY upper and lower extremities as well as wounds to coccyx, thigh and left LE.   Incont urine- cares provided and purewick placed for night time. Declined pain meds, turns with assist. Low airloss bed. A/o but forgetful and [place]. Slept well.     Major Shift Events:   Uneventful night, slept well. No significant change in condition.     Treatment Plan:  Comfort cares, O2 for comfort, incont cares, Purewick, plan to return to [facility] with hospice consult. Supportive family involved.                           Problem: Adult Inpatient Plan of Care  Goal: Plan of Care Review  Description: The Plan of Care Review/Shift note should be completed every shift.  The Outcome Evaluation is a brief statement about your assessment that the patient is improving, declining, or no change.  This information will be displayed automatically on your shift  note.  Outcome: Not Progressing  Flowsheets (Taken 7/25/2025 0641)  Plan of Care Reviewed With: patient  Overall Patient Progress: no change  Goal: Patient-Specific Goal (Individualized)  Description: You can add care plan individualizations to a care plan. Examples of Individualization might be:  \"Parent requests to be called daily at 9am for status\", \"I have a hard time hearing out of my right ear\", or \"Do not touch me to wake me up as it startles  me\".  Outcome: Not Progressing  Goal: Absence of Hospital-Acquired Illness or Injury  Outcome: Not Progressing  Goal: Optimal Comfort and Wellbeing  Outcome: Not Progressing  Goal: Readiness for Transition of Care  Outcome: Not Progressing   Goal Outcome Evaluation:      Plan of Care Reviewed With: patient    Overall Patient Progress: no changeOverall Patient Progress: no change               "

## 2025-07-25 NOTE — DISCHARGE SUMMARY
"[facility]  Hospitalist Discharge Summary      Date of Admission:  7/17/2025  Date of Discharge:  7/25/2025  Discharging Provider: Kory Brandt MD  Discharge Service: Hospitalist Service    Discharge Diagnoses     Urinary tract infection with ESBL E. Coli  Acute infectious encephalopathy  Acute hypoxic respiratory failure  Acute on chronic heart failure with recovered ejection fraction  Aortic stenosis  Atrial fibrillation with rapid ventricular response    Failure to thrive  Advanced age  Deconditioning  Electing comfort cares and hospice enrollment    Left medial leg wound, present on admission     Hyperlipidemia   Depression  Atrial fibrillation  Chronic anticoagulation with DOAC (Eliquis)       Clinically Significant Risk Factors     # Obesity: Estimated body mass index is 35.14 kg/m  as calculated from the following:    Height as of 7/14/25: 1.499 m (4' 11\").    Weight as of 7/14/25: 78.9 kg (174 lb).       Follow-ups Needed After Discharge   Follow-up Appointments       Follow Up      Follow up with [facility] later today        Hospital Follow-up with Existing Primary Care Provider (PCP)          Schedule Primary Care visit within: 7 Days               Unresulted Labs Ordered in the Past 30 Days of this Admission       No orders found from 6/17/2025 to 7/18/2025.            Discharge Disposition   Discharged to assisted living with hospice  Condition at discharge: declining    Hospital Course   Riddhi Aguilar is a 89 year old female admitted on 7/17/2025 who presented with 3 to 4 days of increasing weakness and intermittent confusion.  Evaluation revealed UTI with 2 strains of ESBL E. coli.  Patient was treated with Invanz.  Confusion improved but patient remained weak.  She has had several hospitalizations in recent months and seemed to be declining.  Her appetite and oral intake are diminished.  Patient was expected to improve some with treatment of UTI but she remained weak.  " After discussion with the patient and her family she has decided to enter into hospice care.  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  Social work and palliative care were consulted.       Consultations This Hospital Stay   CARE MANAGEMENT / SOCIAL WORK IP CONSULT  WOUND OSTOMY CONTINENCE NURSE  IP CONSULT  PHYSICAL THERAPY ADULT IP CONSULT  PALLIATIVE CARE ADULT IP CONSULT    Code Status   No CPR- Do NOT Intubate    Time Spent on this Encounter   I, Kory Brandt MD, personally saw the patient today and spent greater than 30 minutes discharging this patient.       Kory Brandt MD  [place] [address] [facility]  [address] [place] [address]  Phone: [phone]  Fax: [phone]  ______________________________________________________________________    Physical Exam   Vital Signs:     BP: 132/81 Pulse: 69       O2 Device: Nasal cannula Oxygen Delivery: 3 LPM  Weight: 0 lbs 0 oz  GENERAL:  Comfortable. Cooperative.  PSYCH: pleasant, oriented, No acute distress.  EYES: PERRLA, Normal conjunctiva.         Primary Care Physician   Catherine Mcarthur    Discharge Orders      Hospice Referral      Reason for your hospital stay    UTI with ESBL E. Coli and failure to thrive in setting of advanced age, congestive heart failure, acute hypoxic respiratory failure, and deconditioning. Patient is electing comfort care and hospice enrollment.     Activity    Your activity upon discharge: activity as tolerated     Follow Up    Follow up with [facility] later today     Wound care    Left leg wound(s): Daily   1. Cleanse with Vashe and dry  2. Apply Xeroform ([facility]#[id number]) cut to fit to wound  3. Cover with dry gauze and wrap with kerlix     Oxygen Adult/Peds    Oxygen Documentation  I certify that this patient, Riddhi Aguilar has been under my care (or a nurse practitioner or physican's assistant working with me). This is the face-to-face encounter for  oxygen medical necessity.      At the time of this encounter, I have reviewed the qualifying testing and have determined that supplemental oxygen is reasonable and necessary and is expected to improve the patient's condition in a home setting.         Patient has continued oxygen desaturation due to Chronic Heart Failure I50.    If portability is ordered, is the patient mobile within the home? no    Was this visit performed as a telehealth visit: No     Diet    Follow this diet upon discharge: Regular     Hospital Follow-up with Existing Primary Care Provider (PCP)            Significant Results and Procedures   Most Recent 3 CBC's:  Recent Labs   Lab Test 07/22/25  0814 07/21/25  0711 07/20/25  0908   WBC 7.8 8.8 11.4*   HGB 14.1 13.4 14.1   * 106* 105*    265 291     Most Recent 3 BMP's:  Recent Labs   Lab Test 07/24/25  0655 07/23/25  0712 07/22/25  0814    135 135   POTASSIUM 3.5 3.9 3.8   CHLORIDE 98 96* 95*   CO2 26 25 27   BUN 25.9* 28.4* 25.7*   CR 0.90 0.98* 0.90   ANIONGAP 14 14 13   TAMMY 8.8 9.0 8.9   GLC 95 108* 102*     Most Recent 2 LFT's:  Recent Labs   Lab Test 07/22/25  0814 07/21/25  0711   * 170*   * 184*   ALKPHOS 74 71   BILITOTAL 1.9* 1.9*   ,   Results for orders placed or performed during the hospital encounter of 07/17/25   US Lower Extremity Venous Duplex Left    Narrative    EXAM: US LOWER EXTREMITY VENOUS DUPLEX LEFT  LOCATION: [facility]  DATE: 7/17/2025    INDICATION: pain, sweling, ecchymosis  COMPARISON: None.  TECHNIQUE: Venous Duplex ultrasound of the left lower extremity with and without compression, augmentation and duplex. Color flow and spectral Doppler with waveform analysis performed.    FINDINGS: Exam includes the common femoral, femoral, popliteal, and contralateral common femoral veins as well as segmentally visualized deep calf veins and greater saphenous vein.     LEFT: No deep vein thrombosis. No superficial  thrombophlebitis. No popliteal cyst.          Impression    IMPRESSION:  1.  No deep venous thrombosis in the left lower extremity. Localized subcutaneous edema in the area of erythema at the left shin.   XR Chest 2 Views    Narrative    EXAM: XR CHEST 2 VIEWS  LOCATION: [facility]  DATE: 7/17/2025    INDICATION: Fatigue, shortness of breath, cough.  COMPARISON: 7/2/2025.      Impression    IMPRESSION: Mild pulmonary vascular congestion. No pleural effusion or pneumothorax.    Moderate cardiomegaly. Dense calcification of the mitral annulus. Atherosclerotic calcifications of the thoracic aorta.   XR Tibia and Fibula Left 2 Views    Narrative    EXAM: XR TIBIA AND FIBULA LEFT 2 VIEWS  LOCATION: [facility]  DATE: 7/17/2025    INDICATION: pain, swelling, bruising no reported fall  COMPARISON: None.      Impression    IMPRESSION: The left tibia and tibial are negative for fracture. Degenerative change at the knee joint. Plantar and Achilles calcaneal spurring. Degenerative change at the tibiotalar joint.   Head CT w/o contrast    Narrative    EXAM: CT HEAD W/O CONTRAST  LOCATION: [facility]  DATE: 7/17/2025    INDICATION: AMS  COMPARISON:  CT head May 26, 2025.  TECHNIQUE: Routine CT Head without IV contrast. Multiplanar reformats. Dose reduction techniques were used.    FINDINGS:  INTRACRANIAL CONTENTS: No intracranial hemorrhage, extraaxial collection, or mass effect.  No CT evidence of acute infarct. Moderate presumed chronic small vessel ischemic changes. Mild to moderate generalized volume loss. No hydrocephalus.     VISUALIZED ORBITS/SINUSES/MASTOIDS: Prior bilateral cataract surgery. Visualized portions of the orbits are otherwise unremarkable. Partially imaged complete opacification of the right maxillary sinus.  No middle ear or mastoid effusion.    BONES/SOFT TISSUES: No acute abnormality.      Impression    IMPRESSION:  1.  No CT evidence  for acute intracranial process.  2.  Brain atrophy and presumed chronic microvascular ischemic changes as above.     Abd/pelvis CT,  IV  contrast only TRAUMA / AAA    Narrative    EXAM: CT ABDOMEN PELVIS W CONTRAST  LOCATION: [facility]  DATE: 7/17/2025    INDICATION: Altered mental status and jaundice.  COMPARISON: 1/23/2024.  TECHNIQUE: CT scan of the abdomen and pelvis was performed following injection of IV contrast. Multiplanar reformats were obtained. Dose reduction techniques were used.  CONTRAST: 88 mL Isovue-370.    FINDINGS:  Patient was imaged with arm(s) at side, limiting study quality.    LOWER CHEST: Mosaic attenuation of the pulmonary parenchyma and trace interstitial edema. Moderate cardiomegaly. Dense calcifications of the mitral annulus. Calcifications of the aortic valve, coronary arteries, and visualized thoracic aorta.    HEPATOBILIARY: Hepatic steatosis and cirrhosis. Mild hepatomegaly.    Gallbladder is normal.    No intrahepatic or intrahepatic biliary ductal dilatation.    PANCREAS: Enhances normally. No peripancreatic inflammatory fat stranding.    SPLEEN: Enhances normally. Normal size. Also capsular fluid collections of the medial spleen, not significantly changed from 11/15/2023.    ADRENAL GLANDS: Normal.    KIDNEYS: Both kidneys enhance symmetrically, without hydronephrosis.    No nephroureterolithiasis.    Mildly thick-walled urinary bladder, some of which is likely due to incomplete distention.    PELVIC ORGANS: Hysterectomy.    BOWEL: No evidence of acute gastrointestinal inflammation or obstruction. Appendix not visualized, likely surgically absent.    No intraperitoneal free fluid or free air.    LYMPH NODES: There is a mildly enlarged left common iliac lymph node, which measures 13 mm in short axis, previously 12 mm.    VASCULATURE: No abdominal aortic aneurysm. Severe atheromatous disease.    MUSCULOSKELETAL: No suspicious abnormality. Percutaneous  fixation of the left femoral neck. Unchanged L3 compression deformity.    OTHER: No additionally significant abnormalities.      Impression    IMPRESSION:   1.  No acute CT abnormality of the abdomen/pelvis.  2.  Hepatic steatosis and cirrhosis.  3.  Mildly thick-walled urinary bladder, some of which is likely due to incomplete distention. Correlate with urinalysis to exclude acute cystitis.  4.  Mildly enlarged left common iliac lymph node, measuring 13 mm in short axis, previously 12 mm. This is likely benign and reactive.  5.  Trace interstitial edema.  6.  Moderate cardiomegaly.   US Abdomen Limited    Narrative    EXAM: US ABDOMEN LIMITED  LOCATION: [facility]  DATE: 7/19/2025    INDICATION: [id number]  COMPARISON: CT performed 7/17/2025  TECHNIQUE: Limited abdominal ultrasound.    FINDINGS:    GALLBLADDER: Multiple stones. No pericholecystic fluid. Negative sonographic Corbin's sign. Borderline gallbladder wall thickening.    BILE DUCTS: No biliary dilatation. The common duct measures 2.0 mm.    LIVER: Normal echogenicity. No focal mass. The portal vein is patent with flow in the normal direction.    RIGHT KIDNEY: No hydronephrosis.    PANCREAS: The visualized portions are normal.    No ascites.      Impression    IMPRESSION:  1.  Cholelithiasis without other sonographic evidence of cholecystitis.         XR Chest Port 1 View    Narrative    EXAM: XR CHEST PORT 1 VIEW  LOCATION: [facility]  DATE: 7/23/2025    INDICATION: hypoxia  COMPARISON: Chest radiograph 7/17/2025.      Impression    IMPRESSION: Stable size of cardiomediastinal silhouette. Significant mitral annular calcifications are again noted. Interval worsening of bibasilar opacities since prior radiograph, represent worsening edema, aspiration or developing airspace   consolidation. No definite pleural effusion or pneumothorax. Bones are unchanged.       Discharge Medications      Review of your medicines         CONTINUE these medicines which have NOT CHANGED        Dose / Directions   acetaminophen 500 MG tablet  Commonly known as: TYLENOL  Used for: Pain      Dose: 1,000 mg  Take 2 tablets (1,000 mg) by mouth every 8 hours as needed for mild pain.  Refills: 0     apixaban ANTICOAGULANT 5 MG tablet  Commonly known as: ELIQUIS  Indication: Atrial Fibrillation Not Caused By A Heart Valve Problem  Used for: Atrial flutter with rapid ventricular response (H)      Dose: 5 mg  Take 1 tablet (5 mg) by mouth 2 times daily.  Refills: 0     bisacodyl 10 MG suppository  Commonly known as: DULCOLAX  Used for: Constipation, unspecified constipation type      Dose: 10 mg  Place 1 suppository (10 mg) rectally daily as needed for constipation.  Refills: 0     buPROPion 150 MG 24 hr tablet  Commonly known as: WELLBUTRIN XL  Used for: Major depressive disorder, recurrent episode, mild      Dose: 150 mg  TAKE 1 TABLET(150 MG) BY MOUTH EVERY MORNING  Quantity: 90 tablet  Refills: 3     dorzolamide-timolol 2-0.5 % ophthalmic solution  Commonly known as: COSOPT      Dose: 1 drop  Place 1 drop into both eyes 2 times daily  Refills: 0     Ensure Liqd      Dose: 1 Bottle  Take 1 Bottle by mouth daily as needed.  Refills: 0     famotidine 20 MG tablet  Commonly known as: PEPCID      Dose: 20 mg  Take 20 mg by mouth every morning.  Refills: 0     furosemide 20 MG tablet  Commonly known as: LASIX      Dose: 20 mg  Take 20 mg by mouth every morning.  Refills: 0     Lidocaine 4 % Patch  Commonly known as: LIDOCARE      Dose: 1 patch  Place 1 patch onto the skin every 24 hours. To prevent lidocaine toxicity, patient should be patch free for 12 hrs daily.  Refills: 0     magnesium hydroxide 400 MG/5ML suspension  Commonly known as: MILK OF MAGNESIA      Dose: 30 mL  Take 30 mLs by mouth every 48 hours as needed for constipation.  Refills: 0     melatonin 3 MG tablet      Dose: 3 mg  Take 3 mg by mouth at bedtime.  Refills: 0     metoprolol  tartrate 25 MG tablet  Commonly known as: LOPRESSOR      Dose: 75 mg  Take 75 mg by mouth 2 times daily.  Refills: 0     miconazole 2 % external powder  Commonly known as: MICATIN  Used for: Rash      Apply topically 2 times daily as needed for other (candidiasis/intertrigo).  Refills: 0     * senna-docusate 8.6-50 MG tablet  Commonly known as: SENOKOT-S/PERICOLACE      Dose: 1 tablet  Take 1 tablet by mouth every other day.  Refills: 0     * senna-docusate 8.6-50 MG tablet  Commonly known as: SENOKOT-S/PERICOLACE      Dose: 2 tablet  Take 2 tablets by mouth every other day.  Refills: 0     spironolactone 25 MG tablet  Commonly known as: ALDACTONE      Dose: 25 mg  Take 1 tablet (25 mg) by mouth daily.  Refills: 0           * This list has 2 medication(s) that are the same as other medications prescribed for you. Read the directions carefully, and ask your doctor or other care provider to review them with you.                STOP taking      atorvastatin 10 MG tablet  Commonly known as: LIPITOR        multivitamin w/minerals tablet               Allergies   No Known Allergies

## 2025-07-25 NOTE — PLAN OF CARE
Physical Therapy Discharge Summary    Reason for therapy discharge:    Discharged to [facility] with hospice    Progress towards therapy goal(s). See goals on Care Plan in [facility] electronic health record for goal details.  Goals partially met.  Barriers to achieving goals:   discharge from facility.    Therapy recommendation(s):    No further therapy is recommended.

## 2025-07-26 ENCOUNTER — HEALTH MAINTENANCE LETTER (OUTPATIENT)
Age: OVER 89
End: 2025-07-26

## 2025-07-28 ENCOUNTER — LAB REQUISITION (OUTPATIENT)
Dept: LAB | Facility: CLINIC | Age: OVER 89
End: 2025-07-28
Payer: COMMERCIAL

## 2025-07-28 DIAGNOSIS — R53.1 WEAKNESS: ICD-10-CM

## 2025-07-28 DIAGNOSIS — Z09 HOSPITAL DISCHARGE FOLLOW-UP: ICD-10-CM

## 2025-07-31 ENCOUNTER — DOCUMENTATION ONLY (OUTPATIENT)
Dept: OTHER | Facility: CLINIC | Age: OVER 89
End: 2025-07-31
Payer: COMMERCIAL

## 2025-07-31 DIAGNOSIS — F33.0 MAJOR DEPRESSIVE DISORDER, RECURRENT EPISODE, MILD: ICD-10-CM

## 2025-07-31 LAB — CV ZIO PRELIM RESULTS: NORMAL

## 2025-08-05 RX ORDER — BUPROPION HYDROCHLORIDE 300 MG/1
300 TABLET ORAL EVERY MORNING
Qty: 90 TABLET | Refills: 3 | OUTPATIENT
Start: 2025-08-05

## (undated) DEVICE — CATH URETERAL FLEX TIP TIGERTAIL 06FRX70CM 139006

## (undated) DEVICE — SU MONOCRYL 4-0 PS-2 18" UND Y496G

## (undated) DEVICE — SOL WATER IRRIG 1000ML BOTTLE 2F7114

## (undated) DEVICE — ESU ELEC BLADE 2.75" COATED/INSULATED E1455

## (undated) DEVICE — SOL NACL 0.9% IRRIG 3000ML BAG 2B7477

## (undated) DEVICE — LINEN HALF SHEET 5512

## (undated) DEVICE — LINEN FULL SHEET 5511

## (undated) DEVICE — LINEN TOWEL PACK X5 5464

## (undated) DEVICE — GLOVE PROTEXIS W/NEU-THERA 7.5  2D73TE75

## (undated) DEVICE — LINEN ORTHO ACL PACK 5447

## (undated) DEVICE — SU VICRYL 2-0 CT-1 27" UND J259H

## (undated) DEVICE — DRSG STERI STRIP 1/2X4" R1547

## (undated) DEVICE — GLOVE BIOGEL PI MICRO INDICATOR UNDERGLOVE SZ 7.5 48975

## (undated) DEVICE — PREP TECHNI-CARE CHLOROXYLENOL 3% 4OZ BOTTLE C222-4ZWO

## (undated) DEVICE — BAG CLEAR TRASH 1.3M 39X33" P4040C

## (undated) DEVICE — TUBING IRRIG TUR Y TYPE 96" LF 6543-01

## (undated) DEVICE — GLOVE BIOGEL PI MICRO SZ 7.5 48575

## (undated) DEVICE — GLOVE BIOGEL PI SZ 8.5 40885

## (undated) DEVICE — DRILL BIT QUICK COUPLING CAN 5.0X300MM 310.63

## (undated) DEVICE — DRSG AQUACEL AG HYDROFIBER 3.5X6" 422604

## (undated) DEVICE — GLOVE BIOGEL PI SZ 6.0 40860

## (undated) DEVICE — SU VICRYL 0 CT-1 36" J346H

## (undated) DEVICE — Device

## (undated) DEVICE — SYR 03ML LL W/O NDL 309657

## (undated) DEVICE — CAST PADDING 4" UNSTERILE 9044

## (undated) DEVICE — GLOVE BIOGEL PI MICRO INDICATOR UNDERGLOVE SZ 7.0 48970

## (undated) DEVICE — COVER FOOTSWITCH W/CINCH 20X24" 923267

## (undated) DEVICE — ESU GROUND PAD ADULT W/CORD E7507

## (undated) DEVICE — GOWN IMPERVIOUS SPECIALTY XLG/XLONG 32474

## (undated) DEVICE — GLOVE BIOGEL PI MICRO INDICATOR UNDERGLOVE SZ 6.5 48965

## (undated) DEVICE — PREP CHLORAPREP 26ML TINTED ORANGE  260815

## (undated) DEVICE — PREP CHLORAPREP 26ML TINTED HI-LITE ORANGE 930815

## (undated) DEVICE — WIRE GUIDE SYN 2.8X450MM THRD 900.726

## (undated) DEVICE — IMP SCR SYN CAN 7.3X16 THRDX95MM SS 208.895: Type: IMPLANTABLE DEVICE | Site: HIP | Status: NON-FUNCTIONAL

## (undated) DEVICE — PREP SCRUB SOL EXIDINE 4% CHG 4OZ 29002-404

## (undated) DEVICE — NDL 19GA 1.5"

## (undated) DEVICE — PACK CYSTO CUSTOM RIDGES

## (undated) DEVICE — STPL SKIN 35W 6.9MM  PXW35

## (undated) DEVICE — GLOVE BIOGEL PI MICRO SZ 7.0 48570

## (undated) DEVICE — GLOVE BIOGEL PI MICRO INDICATOR UNDERGLOVE SZ 8.5 48985

## (undated) DEVICE — RAD RX ISOVUE 300 (50ML) 61% IOPAMIDOL CHARGE PER ML

## (undated) DEVICE — SOL NACL 0.9% IRRIG 1000ML BOTTLE 07138-09

## (undated) DEVICE — VALVE ENDOSCOPIC UROSEAL OD9- FR 1 HAND ADJUSTABLE Y PORT LA

## (undated) DEVICE — GUIDEWIRE URO STR STIFF .035"X150CM NITINOL 150NSS35

## (undated) DEVICE — SU VICRYL 3-0 SH 27" J316H

## (undated) DEVICE — PACK MINOR SBA15MIFSE

## (undated) DEVICE — DECANTER VIAL 2006S

## (undated) DEVICE — PAD CHUX UNDERPAD 30X36" P3036C

## (undated) DEVICE — PACK HIP NAILING SOP32HPFC4

## (undated) DEVICE — DRAPE LAP W/ARMBOARD 29410

## (undated) DEVICE — CATH TRAY FOLEY SURESTEP 16FR DRAIN BAG STATOCK A899916

## (undated) RX ORDER — ONDANSETRON 2 MG/ML
INJECTION INTRAMUSCULAR; INTRAVENOUS
Status: DISPENSED
Start: 2017-08-29

## (undated) RX ORDER — TRANEXAMIC ACID 10 MG/ML
INJECTION, SOLUTION INTRAVENOUS
Status: DISPENSED
Start: 2024-05-20

## (undated) RX ORDER — LIDOCAINE HYDROCHLORIDE 10 MG/ML
INJECTION, SOLUTION EPIDURAL; INFILTRATION; INTRACAUDAL; PERINEURAL
Status: DISPENSED
Start: 2024-02-20

## (undated) RX ORDER — CEFAZOLIN SODIUM/WATER 2 G/20 ML
SYRINGE (ML) INTRAVENOUS
Status: DISPENSED
Start: 2024-05-20

## (undated) RX ORDER — DEXAMETHASONE SODIUM PHOSPHATE 4 MG/ML
INJECTION, SOLUTION INTRA-ARTICULAR; INTRALESIONAL; INTRAMUSCULAR; INTRAVENOUS; SOFT TISSUE
Status: DISPENSED
Start: 2024-02-20

## (undated) RX ORDER — BUPIVACAINE HYDROCHLORIDE 5 MG/ML
INJECTION, SOLUTION EPIDURAL; INTRACAUDAL
Status: DISPENSED
Start: 2017-08-29

## (undated) RX ORDER — FENTANYL CITRATE 50 UG/ML
INJECTION, SOLUTION INTRAMUSCULAR; INTRAVENOUS
Status: DISPENSED
Start: 2024-01-24

## (undated) RX ORDER — PROPOFOL 10 MG/ML
INJECTION, EMULSION INTRAVENOUS
Status: DISPENSED
Start: 2024-02-20

## (undated) RX ORDER — FENTANYL CITRATE 50 UG/ML
INJECTION, SOLUTION INTRAMUSCULAR; INTRAVENOUS
Status: DISPENSED
Start: 2024-05-20

## (undated) RX ORDER — CEFAZOLIN SODIUM/WATER 2 G/20 ML
SYRINGE (ML) INTRAVENOUS
Status: DISPENSED
Start: 2024-02-20

## (undated) RX ORDER — ONDANSETRON 2 MG/ML
INJECTION INTRAMUSCULAR; INTRAVENOUS
Status: DISPENSED
Start: 2024-05-20

## (undated) RX ORDER — PROPOFOL 10 MG/ML
INJECTION, EMULSION INTRAVENOUS
Status: DISPENSED
Start: 2024-05-20

## (undated) RX ORDER — LIDOCAINE HYDROCHLORIDE 10 MG/ML
INJECTION, SOLUTION INFILTRATION; PERINEURAL
Status: DISPENSED
Start: 2017-08-29

## (undated) RX ORDER — DEXAMETHASONE SODIUM PHOSPHATE 4 MG/ML
INJECTION, SOLUTION INTRA-ARTICULAR; INTRALESIONAL; INTRAMUSCULAR; INTRAVENOUS; SOFT TISSUE
Status: DISPENSED
Start: 2024-05-20

## (undated) RX ORDER — PROPOFOL 10 MG/ML
INJECTION, EMULSION INTRAVENOUS
Status: DISPENSED
Start: 2017-08-29

## (undated) RX ORDER — GLYCOPYRROLATE 0.2 MG/ML
INJECTION, SOLUTION INTRAMUSCULAR; INTRAVENOUS
Status: DISPENSED
Start: 2024-02-20

## (undated) RX ORDER — FENTANYL CITRATE 50 UG/ML
INJECTION, SOLUTION INTRAMUSCULAR; INTRAVENOUS
Status: DISPENSED
Start: 2017-08-29

## (undated) RX ORDER — BUPIVACAINE HYDROCHLORIDE 2.5 MG/ML
INJECTION, SOLUTION EPIDURAL; INFILTRATION; INTRACAUDAL
Status: DISPENSED
Start: 2024-05-20

## (undated) RX ORDER — FENTANYL CITRATE 50 UG/ML
INJECTION, SOLUTION INTRAMUSCULAR; INTRAVENOUS
Status: DISPENSED
Start: 2024-02-20

## (undated) RX ORDER — CEFAZOLIN SODIUM 2 G/100ML
INJECTION, SOLUTION INTRAVENOUS
Status: DISPENSED
Start: 2017-08-29

## (undated) RX ORDER — LIDOCAINE HYDROCHLORIDE 10 MG/ML
INJECTION, SOLUTION EPIDURAL; INFILTRATION; INTRACAUDAL; PERINEURAL
Status: DISPENSED
Start: 2024-05-20

## (undated) RX ORDER — ONDANSETRON 2 MG/ML
INJECTION INTRAMUSCULAR; INTRAVENOUS
Status: DISPENSED
Start: 2024-02-20

## (undated) RX ORDER — GINSENG 100 MG
CAPSULE ORAL
Status: DISPENSED
Start: 2017-08-29